# Patient Record
Sex: FEMALE | Race: WHITE | Employment: OTHER | URBAN - METROPOLITAN AREA
[De-identification: names, ages, dates, MRNs, and addresses within clinical notes are randomized per-mention and may not be internally consistent; named-entity substitution may affect disease eponyms.]

---

## 2017-04-25 ENCOUNTER — APPOINTMENT (OUTPATIENT)
Dept: CT IMAGING | Age: 72
End: 2017-04-25
Attending: STUDENT IN AN ORGANIZED HEALTH CARE EDUCATION/TRAINING PROGRAM
Payer: MEDICARE

## 2017-04-25 ENCOUNTER — HOSPITAL ENCOUNTER (EMERGENCY)
Age: 72
Discharge: HOME | End: 2017-04-25
Attending: STUDENT IN AN ORGANIZED HEALTH CARE EDUCATION/TRAINING PROGRAM
Payer: MEDICARE

## 2017-04-25 VITALS
SYSTOLIC BLOOD PRESSURE: 148 MMHG | TEMPERATURE: 97.8 F | HEART RATE: 93 BPM | HEIGHT: 63 IN | OXYGEN SATURATION: 96 % | DIASTOLIC BLOOD PRESSURE: 70 MMHG | BODY MASS INDEX: 31.89 KG/M2 | RESPIRATION RATE: 15 BRPM | WEIGHT: 180 LBS

## 2017-04-25 DIAGNOSIS — R10.31 ABDOMINAL PAIN, RIGHT LOWER QUADRANT: Primary | ICD-10-CM

## 2017-04-25 LAB
ALBUMIN SERPL BCP-MCNC: 4.1 G/DL (ref 3.5–5)
ALBUMIN/GLOB SERPL: 1 {RATIO} (ref 1.1–2.2)
ALP SERPL-CCNC: 110 U/L (ref 45–117)
ALT SERPL-CCNC: 36 U/L (ref 12–78)
ANION GAP BLD CALC-SCNC: 12 MMOL/L (ref 5–15)
APPEARANCE UR: CLEAR
AST SERPL W P-5'-P-CCNC: 42 U/L (ref 15–37)
BACTERIA URNS QL MICRO: NEGATIVE /HPF
BASOPHILS # BLD AUTO: 0 K/UL (ref 0–0.1)
BASOPHILS # BLD: 1 % (ref 0–1)
BILIRUB SERPL-MCNC: 0.4 MG/DL (ref 0.2–1)
BILIRUB UR QL: NEGATIVE
BUN SERPL-MCNC: 20 MG/DL (ref 6–20)
BUN/CREAT SERPL: 17 (ref 12–20)
CALCIUM SERPL-MCNC: 8.9 MG/DL (ref 8.5–10.1)
CHLORIDE SERPL-SCNC: 102 MMOL/L (ref 97–108)
CO2 SERPL-SCNC: 29 MMOL/L (ref 21–32)
COLOR UR: ABNORMAL
CREAT SERPL-MCNC: 1.2 MG/DL (ref 0.55–1.02)
EOSINOPHIL # BLD: 0.2 K/UL (ref 0–0.4)
EOSINOPHIL NFR BLD: 3 % (ref 0–7)
EPITH CASTS URNS QL MICRO: ABNORMAL /LPF
ERYTHROCYTE [DISTWIDTH] IN BLOOD BY AUTOMATED COUNT: 13.7 % (ref 11.5–14.5)
GLOBULIN SER CALC-MCNC: 4.2 G/DL (ref 2–4)
GLUCOSE SERPL-MCNC: 96 MG/DL (ref 65–100)
GLUCOSE UR STRIP.AUTO-MCNC: NEGATIVE MG/DL
HCT VFR BLD AUTO: 45.1 % (ref 35–47)
HGB BLD-MCNC: 14.8 G/DL (ref 11.5–16)
HGB UR QL STRIP: NEGATIVE
HYALINE CASTS URNS QL MICRO: ABNORMAL /LPF (ref 0–5)
KETONES UR QL STRIP.AUTO: NEGATIVE MG/DL
LEUKOCYTE ESTERASE UR QL STRIP.AUTO: NEGATIVE
LIPASE SERPL-CCNC: 149 U/L (ref 73–393)
LYMPHOCYTES # BLD AUTO: 27 % (ref 12–49)
LYMPHOCYTES # BLD: 2 K/UL (ref 0.8–3.5)
MCH RBC QN AUTO: 28 PG (ref 26–34)
MCHC RBC AUTO-ENTMCNC: 32.8 G/DL (ref 30–36.5)
MCV RBC AUTO: 85.3 FL (ref 80–99)
MONOCYTES # BLD: 0.8 K/UL (ref 0–1)
MONOCYTES NFR BLD AUTO: 11 % (ref 5–13)
NEUTS SEG # BLD: 4.3 K/UL (ref 1.8–8)
NEUTS SEG NFR BLD AUTO: 58 % (ref 32–75)
NITRITE UR QL STRIP.AUTO: NEGATIVE
PH UR STRIP: 5.5 [PH] (ref 5–8)
PLATELET # BLD AUTO: 299 K/UL (ref 150–400)
POTASSIUM SERPL-SCNC: 4.2 MMOL/L (ref 3.5–5.1)
PROT SERPL-MCNC: 8.3 G/DL (ref 6.4–8.2)
PROT UR STRIP-MCNC: NEGATIVE MG/DL
RBC # BLD AUTO: 5.29 M/UL (ref 3.8–5.2)
RBC #/AREA URNS HPF: ABNORMAL /HPF (ref 0–5)
SODIUM SERPL-SCNC: 143 MMOL/L (ref 136–145)
SP GR UR REFRACTOMETRY: 1.01 (ref 1–1.03)
UA: UC IF INDICATED,UAUC: ABNORMAL
UROBILINOGEN UR QL STRIP.AUTO: 0.2 EU/DL (ref 0.2–1)
WBC # BLD AUTO: 7.3 K/UL (ref 3.6–11)
WBC URNS QL MICRO: ABNORMAL /HPF (ref 0–4)

## 2017-04-25 PROCEDURE — 80053 COMPREHEN METABOLIC PANEL: CPT | Performed by: EMERGENCY MEDICINE

## 2017-04-25 PROCEDURE — 36415 COLL VENOUS BLD VENIPUNCTURE: CPT | Performed by: EMERGENCY MEDICINE

## 2017-04-25 PROCEDURE — 74011250636 HC RX REV CODE- 250/636: Performed by: STUDENT IN AN ORGANIZED HEALTH CARE EDUCATION/TRAINING PROGRAM

## 2017-04-25 PROCEDURE — 74177 CT ABD & PELVIS W/CONTRAST: CPT

## 2017-04-25 PROCEDURE — 99283 EMERGENCY DEPT VISIT LOW MDM: CPT

## 2017-04-25 PROCEDURE — 96374 THER/PROPH/DIAG INJ IV PUSH: CPT

## 2017-04-25 PROCEDURE — 83690 ASSAY OF LIPASE: CPT | Performed by: EMERGENCY MEDICINE

## 2017-04-25 PROCEDURE — 85025 COMPLETE CBC W/AUTO DIFF WBC: CPT | Performed by: EMERGENCY MEDICINE

## 2017-04-25 PROCEDURE — 81001 URINALYSIS AUTO W/SCOPE: CPT | Performed by: EMERGENCY MEDICINE

## 2017-04-25 PROCEDURE — 74011636320 HC RX REV CODE- 636/320: Performed by: RADIOLOGY

## 2017-04-25 PROCEDURE — 74011250637 HC RX REV CODE- 250/637: Performed by: STUDENT IN AN ORGANIZED HEALTH CARE EDUCATION/TRAINING PROGRAM

## 2017-04-25 RX ORDER — GLIPIZIDE AND METFORMIN HCL 2.5; 5 MG/1; MG/1
1 TABLET, FILM COATED ORAL
COMMUNITY
End: 2017-05-25

## 2017-04-25 RX ORDER — KETOROLAC TROMETHAMINE 30 MG/ML
15 INJECTION, SOLUTION INTRAMUSCULAR; INTRAVENOUS
Status: COMPLETED | OUTPATIENT
Start: 2017-04-25 | End: 2017-04-25

## 2017-04-25 RX ORDER — FUROSEMIDE 20 MG/1
TABLET ORAL DAILY
COMMUNITY
End: 2019-02-07

## 2017-04-25 RX ORDER — ONDANSETRON 4 MG/1
4 TABLET, ORALLY DISINTEGRATING ORAL
Qty: 6 TAB | Refills: 0 | Status: SHIPPED | OUTPATIENT
Start: 2017-04-25 | End: 2017-04-27

## 2017-04-25 RX ORDER — LOVASTATIN 10 MG/1
10 TABLET ORAL
COMMUNITY
End: 2020-01-13 | Stop reason: SDUPTHER

## 2017-04-25 RX ORDER — OXYCODONE AND ACETAMINOPHEN 5; 325 MG/1; MG/1
1 TABLET ORAL
Status: COMPLETED | OUTPATIENT
Start: 2017-04-25 | End: 2017-04-25

## 2017-04-25 RX ORDER — OXYCODONE AND ACETAMINOPHEN 5; 325 MG/1; MG/1
1 TABLET ORAL
Qty: 10 TAB | Refills: 0 | Status: SHIPPED | OUTPATIENT
Start: 2017-04-25 | End: 2017-04-27

## 2017-04-25 RX ADMIN — IOPAMIDOL 100 ML: 755 INJECTION, SOLUTION INTRAVENOUS at 15:41

## 2017-04-25 RX ADMIN — KETOROLAC TROMETHAMINE 15 MG: 30 INJECTION, SOLUTION INTRAMUSCULAR at 16:17

## 2017-04-25 RX ADMIN — OXYCODONE HYDROCHLORIDE AND ACETAMINOPHEN 1 TABLET: 5; 325 TABLET ORAL at 17:03

## 2017-04-25 NOTE — DISCHARGE INSTRUCTIONS
We hope that we have addressed all of your medical concerns. The examination and treatment you received in the Emergency Department were for an emergent problem and were not intended as complete care. It is important that you follow up with your healthcare provider(s) for ongoing care. If your symptoms worsen or do not improve as expected, and you are unable to reach your usual health care provider(s), you should return to the Emergency Department. Today's healthcare is undergoing tremendous change, and patient satisfaction surveys are one of the many tools to assess the quality of medical care. You may receive a survey from the [facility] regarding your experience in the Emergency Department. I hope that your experience has been completely positive, particularly the medical care that I provided. As such, please participate in the survey; anything less than excellent does not meet my expectations or intentions. [facility] and [employer] participate in nationally recognized quality of care measures. If your blood pressure is greater than 120/80, as reported below, we urge that you seek medical care to address the potential of high blood pressure, commonly known as hypertension. Hypertension can be hereditary or can be caused by certain medical conditions, pain, stress, or \"white coat syndrome. \"       Please make an appointment with your health care provider(s) for follow up of your Emergency Department visit. VITALS:   Patient Vitals for the past 8 hrs:   Temp Pulse Resp BP SpO2   04/25/17 1705 - - - 148/70 97 %   04/25/17 1420 97.8 °F (36.6 °C) 93 15 155/87 98 %          Thank you for allowing us to provide you with medical care today. We realize that you have many choices for your emergency care needs. Please choose us in the future for any continued health care needs. Tita Love, [address]   Office: [phone]            Recent Results (from the past 24 hour(s))   CBC WITH AUTOMATED DIFF    Collection Time: 04/25/17  2:39 PM   Result Value Ref Range    WBC 7.3 3.6 - 11.0 K/uL    RBC 5.29 (H) 3.80 - 5.20 M/uL    HGB 14.8 11.5 - 16.0 g/dL    HCT 45.1 35.0 - 47.0 %    MCV 85.3 80.0 - 99.0 FL    MCH 28.0 26.0 - 34.0 PG    MCHC 32.8 30.0 - 36.5 g/dL    RDW 13.7 11.5 - 14.5 %    PLATELET 784 122 - 138 K/uL    NEUTROPHILS 58 32 - 75 %    LYMPHOCYTES 27 12 - 49 %    MONOCYTES 11 5 - 13 %    EOSINOPHILS 3 0 - 7 %    BASOPHILS 1 0 - 1 %    ABS. NEUTROPHILS 4.3 1.8 - 8.0 K/UL    ABS. LYMPHOCYTES 2.0 0.8 - 3.5 K/UL    ABS. MONOCYTES 0.8 0.0 - 1.0 K/UL    ABS. EOSINOPHILS 0.2 0.0 - 0.4 K/UL    ABS. BASOPHILS 0.0 0.0 - 0.1 K/UL   METABOLIC PANEL, COMPREHENSIVE    Collection Time: 04/25/17  2:39 PM   Result Value Ref Range    Sodium 143 136 - 145 mmol/L    Potassium 4.2 3.5 - 5.1 mmol/L    Chloride 102 97 - 108 mmol/L    CO2 29 21 - 32 mmol/L    Anion gap 12 5 - 15 mmol/L    Glucose 96 65 - 100 mg/dL    BUN 20 6 - 20 MG/DL    Creatinine 1.20 (H) 0.55 - 1.02 MG/DL    BUN/Creatinine ratio 17 12 - 20      GFR est AA 54 (L) >60 ml/min/1.73m2    GFR est non-AA 45 (L) >60 ml/min/1.73m2    Calcium 8.9 8.5 - 10.1 MG/DL    Bilirubin, total 0.4 0.2 - 1.0 MG/DL    ALT (SGPT) 36 12 - 78 U/L    AST (SGOT) 42 (H) 15 - 37 U/L    Alk.  phosphatase 110 45 - 117 U/L    Protein, total 8.3 (H) 6.4 - 8.2 g/dL    Albumin 4.1 3.5 - 5.0 g/dL    Globulin 4.2 (H) 2.0 - 4.0 g/dL    A-G Ratio 1.0 (L) 1.1 - 2.2     LIPASE    Collection Time: 04/25/17  2:39 PM   Result Value Ref Range    Lipase 149 73 - 393 U/L   URINALYSIS W/ REFLEX CULTURE    Collection Time: 04/25/17  2:39 PM   Result Value Ref Range    Color YELLOW/STRAW      Appearance CLEAR CLEAR      Specific gravity 1.013 1.003 - 1.030      pH (UA) 5.5 5.0 - 8.0      Protein NEGATIVE  NEG mg/dL    Glucose NEGATIVE  NEG mg/dL    Ketone NEGATIVE  NEG mg/dL Bilirubin NEGATIVE  NEG      Blood NEGATIVE  NEG      Urobilinogen 0.2 0.2 - 1.0 EU/dL    Nitrites NEGATIVE  NEG      Leukocyte Esterase NEGATIVE  NEG      WBC 0-4 0 - 4 /hpf    RBC 0-5 0 - 5 /hpf    Epithelial cells MODERATE (A) FEW /lpf    Bacteria NEGATIVE  NEG /hpf    UA:UC IF INDICATED CULTURE NOT INDICATED BY UA RESULT CNI      Hyaline cast 2-5 0 - 5 /lpf       Ct Abd Pelv W Cont    Result Date: 4/25/2017  EXAM:  CT ABDOMEN PELVIS WITH CONTRAST INDICATION:  RLQ Pain COMPARISON: 3/10/2013. CONTRAST:  100 mL of Isovue-370. TECHNIQUE:  Multislice helical CT was performed from the diaphragm to the symphysis pubis during uneventful rapid bolus intravenous contrast administration. Oral contrast was not administered. Contiguous 5 mm axial images were reconstructed and lung and soft tissue windows were generated. Coronal and sagittal reformations were generated. CT dose reduction was achieved through use of a standardized protocol tailored for this examination and automatic exposure control for dose modulation. FINDINGS: The visualized portions of the lung bases are clear. There is a right breast implant. There are no focal abnormalities within the spleen, pancreas, or adrenal glands. There is an unchanged hepatic cyst. The liver otherwise appears unremarkable. . The gallbladder appears unremarkable. There is no biliary dilatation. The aorta tapers without aneurysm. There is no retroperitoneal adenopathy or mass. There is no bowel dilatation or bowel wall thickening. There is distal colonic diverticulosis. . The appendix is not clearly seen but is not dilated. There is no ascites or free intraperitoneal air. The uterus and adnexal regions are unremarkable. Images of the pelvis are degraded by streak artifact from the patient's left hip prosthesis. .   There is no pelvic mass or adenopathy. Scoliosis and degenerative changes are present in the spine. IMPRESSION: No acute findings in the abdomen or pelvis.  Colonic diverticulosis. .            Abdominal Pain: Care Instructions  Your Care Instructions    Abdominal pain has many possible causes. Some aren't serious and get better on their own in a few days. Others need more testing and treatment. If your pain continues or gets worse, you need to be rechecked and may need more tests to find out what is wrong. You may need surgery to correct the problem. Don't ignore new symptoms, such as fever, nausea and vomiting, urination problems, pain that gets worse, and dizziness. These may be signs of a more serious problem. Your doctor may have recommended a follow-up visit in the next 8 to 12 hours. If you are not getting better, you may need more tests or treatment. The doctor has checked you carefully, but problems can develop later. If you notice any problems or new symptoms, get medical treatment right away. Follow-up care is a key part of your treatment and safety. Be sure to make and go to all appointments, and call your doctor if you are having problems. It's also a good idea to know your test results and keep a list of the medicines you take. How can you care for yourself at home? · Rest until you feel better. · To prevent dehydration, drink plenty of fluids, enough so that your urine is light yellow or clear like water. Choose water and other caffeine-free clear liquids until you feel better. If you have kidney, heart, or liver disease and have to limit fluids, talk with your doctor before you increase the amount of fluids you drink. · If your stomach is upset, eat mild foods, such as rice, dry toast or crackers, bananas, and applesauce. Try eating several small meals instead of two or three large ones. · Wait until 48 hours after all symptoms have gone away before you have spicy foods, alcohol, and drinks that contain caffeine. · Do not eat foods that are high in fat.   · Avoid anti-inflammatory medicines such as aspirin, ibuprofen (Advil, Motrin), and naproxen (Aleve). These can cause stomach upset. Talk to your doctor if you take daily aspirin for another health problem. When should you call for help? Call 911 anytime you think you may need emergency care. For example, call if:  · You passed out (lost consciousness). · You pass maroon or very bloody stools. · You vomit blood or what looks like coffee grounds. · You have new, severe belly pain. Call your doctor now or seek immediate medical care if:  · Your pain gets worse, especially if it becomes focused in one area of your belly. · You have a new or higher fever. · Your stools are black and look like tar, or they have streaks of blood. · You have unexpected vaginal bleeding. · You have symptoms of a urinary tract infection. These may include:  ¨ Pain when you urinate. ¨ Urinating more often than usual.  ¨ Blood in your urine. · You are dizzy or lightheaded, or you feel like you may faint. Watch closely for changes in your health, and be sure to contact your doctor if:  · You are not getting better after 1 day (24 hours). Where can you learn more? Go to http://bib-amanda.info/. Enter G792 in the search box to learn more about \"Abdominal Pain: Care Instructions. \"  Current as of: May 27, 2016  Content Version: 11.2  © 5172-1508 [employer]. Care instructions adapted under license by [employer] (which disclaims liability or warranty for this information). If you have questions about a medical condition or this instruction, always ask your healthcare professional. [place] [address] any warranty or liability for your use of this information.

## 2017-04-25 NOTE — ED PROVIDER NOTES
HPI Comments: 73 y.o. female with past medical history significant for HTN, DM, breast cancer, high cholesterol, bronchitis, and obesity who presents to the ED with chief complaint of abdominal pain. Pt reports RLQ abdominal pain onset yesterday that worsened this morning and is accompanied by nausea and decreased appetite, rates pain 8-9/10. Pt states her abdominal pain improves when she is still. Pt states she has hx of right oophorectomy but denies hx of appendectomy. Pt states she has hx of left breast cancer, says she has hx of left mastectomy but denies any radiation or chemo treatments. Pt states she has hx of DM and takes Glipizide. Pt states she has hx of left hip replacement. Pt states she has been having regular BM's. Pt denies fever, chills, or constipation. There are no other acute medical complaints voiced at this time. Social Hx: Occasional EtOH use. Denies smoking tobacco or use of drugs. PCP: Chavo Hicks MD    Note written by Ashish Olivarez, as dictated by Jolly Douglas MD 3:12 PM     The history is provided by the patient. Past Medical History:   Diagnosis Date    Bronchitis     [facility] ([facility] [address]     breast    Diabetes ([facility] [address]     High cholesterol     Hypertension     Obesity (BMI 30-39. 9)        Past Surgical History:   Procedure Laterality Date    BREAST SURGERY PROCEDURE UNLISTED      L mastectomy    HX GYN      ablation, R ovary removed, tubal ligation    HX HAMMER TOE REPAIR      HX HEENT      tonsillectomy    HX ORTHOPAEDIC      L hip, R leg, C4-C5 fusion, L foot         Family History:   Problem Relation Age of Onset    Heart Attack Father     Diabetes Mother        Social History     Social History    Marital status: SINGLE     Spouse name: N/A    Number of children: N/A    Years of education: N/A     Occupational History    Not on file.      Social History Main Topics    Smoking status: Never Smoker    Smokeless tobacco: Not on file    Alcohol use Yes      Comment: seldom    Drug use: No    Sexual activity: Not on file     Other Topics Concern    Not on file     Social History Narrative         ALLERGIES: Crestor [rosuvastatin]; Lipitor [atorvastatin]; and Xarelto [rivaroxaban]    Review of Systems   Constitutional: Positive for appetite change (decreased). Negative for activity change, chills, diaphoresis, fatigue and fever. HENT: Negative for congestion and sore throat. Eyes: Negative for photophobia and visual disturbance. Respiratory: Negative for chest tightness and shortness of breath. Cardiovascular: Negative for chest pain, palpitations and leg swelling. Gastrointestinal: Positive for abdominal pain (RLQ) and nausea. Negative for blood in stool, constipation, diarrhea and vomiting. Genitourinary: Negative for difficulty urinating, dysuria, flank pain, frequency and hematuria. Musculoskeletal: Negative for back pain. Neurological: Negative for dizziness, syncope, numbness and headaches. All other systems reviewed and are negative. Vitals:    04/25/17 1420   BP: 155/87   Pulse: 93   Resp: 15   Temp: 97.8 °F (36.6 °C)   SpO2: 98%   Weight: 81.6 kg (180 lb)   Height: 5' 3\" (1.6 m)            Physical Exam   Constitutional: She is oriented to person, place, and time. She appears well-developed and well-nourished. No distress. HENT:   Head: Normocephalic and atraumatic. Nose: Nose normal.   Mouth/Throat: Oropharynx is clear and moist. No oropharyngeal exudate. Eyes: Conjunctivae and EOM are normal. Right eye exhibits no discharge. Left eye exhibits no discharge. No scleral icterus. Neck: Normal range of motion. Neck supple. No JVD present. No tracheal deviation present. No thyromegaly present. Cardiovascular: Normal rate, regular rhythm, normal heart sounds and intact distal pulses. Exam reveals no gallop and no friction rub. No murmur heard. Pulmonary/Chest: Effort normal and breath sounds normal. No stridor.  No respiratory distress. She has no wheezes. She has no rales. She exhibits no tenderness. Abdominal: Bowel sounds are normal. She exhibits no distension and no mass. There is tenderness (RLQ). There is no rebound. Musculoskeletal: Normal range of motion. She exhibits no edema or tenderness. Lymphadenopathy:     She has no cervical adenopathy. Neurological: She is alert and oriented to person, place, and time. No cranial nerve deficit. Coordination normal.   Skin: Skin is warm and dry. No rash noted. She is not diaphoretic. No erythema. No pallor. Psychiatric: She has a normal mood and affect. Her behavior is normal. Judgment and thought content normal.   Nursing note and vitals reviewed.      Note written by Ashish Duncan, as dictated by Love Hi MD 3:13 PM    MDM  Number of Diagnoses or Management Options  Abdominal pain, right lower quadrant:      Amount and/or Complexity of Data Reviewed  Clinical lab tests: ordered and reviewed  Tests in the radiology section of CPT®: ordered and reviewed  Review and summarize past medical records: yes    Risk of Complications, Morbidity, and/or Mortality  Presenting problems: moderate  Diagnostic procedures: moderate  Management options: moderate    Patient Progress  Patient progress: stable    ED Course       Procedures

## 2017-04-25 NOTE — ED TRIAGE NOTES
Patient arrives with the complaint of RLQ abdominal pain and nausea since yesterday. Denies any dysuria, hematuria, blood in stool, fevers, diarrhea.

## 2017-04-25 NOTE — ED NOTES
Assumed care of patient. Introduced self to patient and discussed plan of care and expected timeline of patients stay. Patient advised that medical information would be discussed during their stay and it is their responsibility to excuse any family or friends they do not want present. Will continue to monitor patient and update them on the plan of care.

## 2017-04-30 ENCOUNTER — HOSPITAL ENCOUNTER (EMERGENCY)
Age: 72
Discharge: HOME | End: 2017-04-30
Attending: EMERGENCY MEDICINE | Admitting: EMERGENCY MEDICINE
Payer: MEDICARE

## 2017-04-30 ENCOUNTER — APPOINTMENT (OUTPATIENT)
Dept: CT IMAGING | Age: 72
End: 2017-04-30
Attending: EMERGENCY MEDICINE
Payer: MEDICARE

## 2017-04-30 VITALS
TEMPERATURE: 98.2 F | SYSTOLIC BLOOD PRESSURE: 122 MMHG | WEIGHT: 180 LBS | BODY MASS INDEX: 31.89 KG/M2 | HEART RATE: 74 BPM | RESPIRATION RATE: 16 BRPM | OXYGEN SATURATION: 95 % | HEIGHT: 63 IN | DIASTOLIC BLOOD PRESSURE: 74 MMHG

## 2017-04-30 DIAGNOSIS — M48.061 SPINAL STENOSIS OF LUMBAR REGION: Primary | ICD-10-CM

## 2017-04-30 DIAGNOSIS — R10.31 RLQ ABDOMINAL PAIN: ICD-10-CM

## 2017-04-30 DIAGNOSIS — M54.16 LUMBAR RADICULOPATHY, ACUTE: ICD-10-CM

## 2017-04-30 DIAGNOSIS — T14.8XXA MUSCLE STRAIN: ICD-10-CM

## 2017-04-30 LAB
ALBUMIN SERPL BCP-MCNC: 3.7 G/DL (ref 3.5–5)
ALBUMIN/GLOB SERPL: 1 {RATIO} (ref 1.1–2.2)
ALP SERPL-CCNC: 89 U/L (ref 45–117)
ALT SERPL-CCNC: 28 U/L (ref 12–78)
ANION GAP BLD CALC-SCNC: 8 MMOL/L (ref 5–15)
APPEARANCE UR: CLEAR
AST SERPL W P-5'-P-CCNC: 28 U/L (ref 15–37)
BACTERIA URNS QL MICRO: NEGATIVE /HPF
BASOPHILS # BLD AUTO: 0 K/UL (ref 0–0.1)
BASOPHILS # BLD: 1 % (ref 0–1)
BILIRUB SERPL-MCNC: 0.5 MG/DL (ref 0.2–1)
BILIRUB UR QL: NEGATIVE
BUN SERPL-MCNC: 21 MG/DL (ref 6–20)
BUN/CREAT SERPL: 20 (ref 12–20)
CALCIUM SERPL-MCNC: 8.8 MG/DL (ref 8.5–10.1)
CHLORIDE SERPL-SCNC: 105 MMOL/L (ref 97–108)
CO2 SERPL-SCNC: 27 MMOL/L (ref 21–32)
COLOR UR: ABNORMAL
CREAT SERPL-MCNC: 1.04 MG/DL (ref 0.55–1.02)
EOSINOPHIL # BLD: 0.3 K/UL (ref 0–0.4)
EOSINOPHIL NFR BLD: 4 % (ref 0–7)
EPITH CASTS URNS QL MICRO: ABNORMAL /LPF
ERYTHROCYTE [DISTWIDTH] IN BLOOD BY AUTOMATED COUNT: 13.4 % (ref 11.5–14.5)
GLOBULIN SER CALC-MCNC: 3.8 G/DL (ref 2–4)
GLUCOSE SERPL-MCNC: 105 MG/DL (ref 65–100)
GLUCOSE UR STRIP.AUTO-MCNC: NEGATIVE MG/DL
HCT VFR BLD AUTO: 40.7 % (ref 35–47)
HGB BLD-MCNC: 14.2 G/DL (ref 11.5–16)
HGB UR QL STRIP: NEGATIVE
HYALINE CASTS URNS QL MICRO: ABNORMAL /LPF (ref 0–5)
KETONES UR QL STRIP.AUTO: NEGATIVE MG/DL
LEUKOCYTE ESTERASE UR QL STRIP.AUTO: NEGATIVE
LYMPHOCYTES # BLD AUTO: 26 % (ref 12–49)
LYMPHOCYTES # BLD: 2 K/UL (ref 0.8–3.5)
MCH RBC QN AUTO: 28.6 PG (ref 26–34)
MCHC RBC AUTO-ENTMCNC: 34.9 G/DL (ref 30–36.5)
MCV RBC AUTO: 81.9 FL (ref 80–99)
MONOCYTES # BLD: 0.7 K/UL (ref 0–1)
MONOCYTES NFR BLD AUTO: 9 % (ref 5–13)
NEUTS SEG # BLD: 4.7 K/UL (ref 1.8–8)
NEUTS SEG NFR BLD AUTO: 60 % (ref 32–75)
NITRITE UR QL STRIP.AUTO: NEGATIVE
PH UR STRIP: 7 [PH] (ref 5–8)
PLATELET # BLD AUTO: 292 K/UL (ref 150–400)
POTASSIUM SERPL-SCNC: 4.9 MMOL/L (ref 3.5–5.1)
PROT SERPL-MCNC: 7.5 G/DL (ref 6.4–8.2)
PROT UR STRIP-MCNC: NEGATIVE MG/DL
RBC # BLD AUTO: 4.97 M/UL (ref 3.8–5.2)
RBC #/AREA URNS HPF: ABNORMAL /HPF (ref 0–5)
SODIUM SERPL-SCNC: 140 MMOL/L (ref 136–145)
SP GR UR REFRACTOMETRY: 1.01 (ref 1–1.03)
UROBILINOGEN UR QL STRIP.AUTO: 0.2 EU/DL (ref 0.2–1)
WBC # BLD AUTO: 7.7 K/UL (ref 3.6–11)
WBC URNS QL MICRO: ABNORMAL /HPF (ref 0–4)

## 2017-04-30 PROCEDURE — 96361 HYDRATE IV INFUSION ADD-ON: CPT

## 2017-04-30 PROCEDURE — 77030005563 HC CATH URETH INT MMGH -A

## 2017-04-30 PROCEDURE — 74011636320 HC RX REV CODE- 636/320: Performed by: EMERGENCY MEDICINE

## 2017-04-30 PROCEDURE — 80053 COMPREHEN METABOLIC PANEL: CPT | Performed by: EMERGENCY MEDICINE

## 2017-04-30 PROCEDURE — 74011250636 HC RX REV CODE- 250/636: Performed by: EMERGENCY MEDICINE

## 2017-04-30 PROCEDURE — 99283 EMERGENCY DEPT VISIT LOW MDM: CPT

## 2017-04-30 PROCEDURE — 51701 INSERT BLADDER CATHETER: CPT

## 2017-04-30 PROCEDURE — 36415 COLL VENOUS BLD VENIPUNCTURE: CPT | Performed by: EMERGENCY MEDICINE

## 2017-04-30 PROCEDURE — 74177 CT ABD & PELVIS W/CONTRAST: CPT

## 2017-04-30 PROCEDURE — 96375 TX/PRO/DX INJ NEW DRUG ADDON: CPT

## 2017-04-30 PROCEDURE — 81001 URINALYSIS AUTO W/SCOPE: CPT | Performed by: EMERGENCY MEDICINE

## 2017-04-30 PROCEDURE — 85025 COMPLETE CBC W/AUTO DIFF WBC: CPT | Performed by: EMERGENCY MEDICINE

## 2017-04-30 PROCEDURE — 96374 THER/PROPH/DIAG INJ IV PUSH: CPT

## 2017-04-30 RX ORDER — DEXAMETHASONE SODIUM PHOSPHATE 10 MG/ML
4 INJECTION INTRAMUSCULAR; INTRAVENOUS
Status: COMPLETED | OUTPATIENT
Start: 2017-04-30 | End: 2017-04-30

## 2017-04-30 RX ORDER — DIAZEPAM 5 MG/1
5 TABLET ORAL
Qty: 10 TAB | Refills: 0 | Status: SHIPPED | OUTPATIENT
Start: 2017-04-30 | End: 2017-05-25

## 2017-04-30 RX ORDER — CARVEDILOL 3.12 MG/1
3.12 TABLET ORAL 2 TIMES DAILY WITH MEALS
COMMUNITY
End: 2019-03-01 | Stop reason: SDUPTHER

## 2017-04-30 RX ORDER — METHYLPREDNISOLONE 4 MG/1
TABLET ORAL
Qty: 1 DOSE PACK | Refills: 0 | Status: SHIPPED | OUTPATIENT
Start: 2017-04-30 | End: 2017-05-25

## 2017-04-30 RX ORDER — MORPHINE SULFATE 4 MG/ML
4 INJECTION, SOLUTION INTRAMUSCULAR; INTRAVENOUS ONCE
Status: COMPLETED | OUTPATIENT
Start: 2017-04-30 | End: 2017-04-30

## 2017-04-30 RX ADMIN — DEXAMETHASONE SODIUM PHOSPHATE 4 MG: 10 INJECTION, SOLUTION INTRAMUSCULAR; INTRAVENOUS at 14:06

## 2017-04-30 RX ADMIN — SODIUM CHLORIDE 1000 ML: 900 INJECTION, SOLUTION INTRAVENOUS at 11:35

## 2017-04-30 RX ADMIN — IOPAMIDOL 100 ML: 755 INJECTION, SOLUTION INTRAVENOUS at 12:50

## 2017-04-30 RX ADMIN — Medication 4 MG: at 11:32

## 2017-04-30 NOTE — ED PROVIDER NOTES
HPI Comments: 73 y.o. female with past medical history significant for DM, HTN, elevated cholesterol, breast CA, mastectomy, obesity, bronchitis, tonsillectomy, cervical fusion who presents accompanied by relative with chief complaint of RLQ abdominal pain. Patient states she has had RLQ abdominal pain and right flank pain for the past ~6 days. Patient was seen for this in ED 5 days ago where she had CT showing diverticuloses that was not infected and denies being rx abx. Patient notes pain has not improved and she complains of exacerbation in pain with sitting. In ED, patient claims her pain is \"10/10\". She reports taking Aleve with little relief. Patient denies any recent fall or injury. She denies any hx of kidney stones. Patient denies nausea, vomiting, diarrhea, constipation, urinary problems, leg pain, bowel incontinence, groin pain. There are no other acute medical concerns at this time. Social hx: non-smoker. +ETOH use; rare    PCP: Maliha Gaytan MD    Note written by Consuelo Fay. [place] Jimbo, as dictated by Tiffanie Shanks MD 11:21 AM      The history is provided by the patient. No  was used. Past Medical History:   Diagnosis Date    Bronchitis     Cancer ([address]     breast    Diabetes ([address]     High cholesterol     Hypertension     Obesity (BMI 30-39. 9)        Past Surgical History:   Procedure Laterality Date    BREAST SURGERY PROCEDURE UNLISTED      L mastectomy    HX GYN      ablation, R ovary removed, tubal ligation    HX HAMMER TOE REPAIR      HX HEENT      tonsillectomy    HX ORTHOPAEDIC      L hip, R leg, C4-C5 fusion, L foot         Family History:   Problem Relation Age of Onset    Heart Attack Father     Diabetes Mother        Social History     Social History    Marital status: SINGLE     Spouse name: N/A    Number of children: N/A    Years of education: N/A     Occupational History    Not on file.      Social History Main Topics    Smoking status: Never Smoker    Smokeless tobacco: Not on file    Alcohol use Yes      Comment: seldom    Drug use: No    Sexual activity: Not on file     Other Topics Concern    Not on file     Social History Narrative         ALLERGIES: Crestor [rosuvastatin]; Lipitor [atorvastatin]; and Xarelto [rivaroxaban]    Review of Systems   Constitutional: Negative for chills and fever. HENT: Negative for congestion. Respiratory: Negative for cough and shortness of breath. Cardiovascular: Negative for chest pain. Gastrointestinal: Positive for abdominal pain (RLQ). Negative for constipation, diarrhea, nausea and vomiting. Genitourinary: Positive for flank pain. Negative for difficulty urinating, dysuria and hematuria. Skin: Negative for rash. All other systems reviewed and are negative. Vitals:    04/30/17 1101 04/30/17 1215   BP: (!) 136/91 126/58   Pulse: 87    Resp: 16    Temp: 98.2 °F (36.8 °C)    SpO2: 96% 95%   Weight: 81.6 kg (180 lb)    Height: 5' 3\" (1.6 m)             Physical Exam   Constitutional: She is oriented to person, place, and time. She appears well-developed and well-nourished. No distress. HENT:   Head: Normocephalic and atraumatic. Eyes: Conjunctivae and EOM are normal. Pupils are equal, round, and reactive to light. Neck: Normal range of motion. Cardiovascular: Normal rate, regular rhythm, normal heart sounds and intact distal pulses. Exam reveals no friction rub. No murmur heard. Pulmonary/Chest: Effort normal and breath sounds normal. No respiratory distress. She has no wheezes. She has no rales. She exhibits no tenderness. Abdominal: Soft. Bowel sounds are normal. She exhibits no distension. There is tenderness. There is guarding. There is no rebound. rlq ttp   Musculoskeletal: Normal range of motion. No midline T or L spine ttp or step off   Neurological: She is alert and oriented to person, place, and time. Coordination normal.   Skin: Skin is warm and dry.  She is not diaphoretic. No pallor. Psychiatric: She has a normal mood and affect. Her behavior is normal.   Nursing note and vitals reviewed. MDM  Number of Diagnoses or Management Options  Lumbar radiculopathy, acute:   Muscle strain:   RLQ abdominal pain:   Spinal stenosis of lumbar region:   Diagnosis management comments: Appendicitis vs radicular pain from lumbar herniated disc vs muscular strain       Amount and/or Complexity of Data Reviewed  Clinical lab tests: ordered and reviewed  Tests in the radiology section of CPT®: ordered and reviewed    Patient Progress  Patient progress: stable    ED Course       Procedures    PROGRESS NOTE:  1:25 PM  Attempted to review  but was unable to find patient in system. 1:38 PM  spokewith pt appendix nl here today. ? l1 radicular pain vs muscle strain. Taking flexeril with no relief. Will switch and told pt to stop aleve and will rx steroid pack. She will follow up with Dr Jesus Ramos who she taylor sees.  Last steroid injection was in december

## 2017-04-30 NOTE — DISCHARGE INSTRUCTIONS
We hope that we have addressed all of your medical concerns. The examination and treatment you received in the Emergency Department were for an emergent problem and were not intended as complete care. It is important that you follow up with your healthcare provider(s) for ongoing care. If your symptoms worsen or do not improve as expected, and you are unable to reach your usual health care provider(s), you should return to the Emergency Department. Today's healthcare is undergoing tremendous change, and patient satisfaction surveys are one of the many tools to assess the quality of medical care. You may receive a survey from the [employer] regarding your experience in the Emergency Department. I hope that your experience has been completely positive, particularly the medical care that I provided. As such, please participate in the survey; anything less than excellent does not meet my expectations or intentions. [facility] and 8 [facility] participate in nationally recognized quality of care measures. If your blood pressure is greater than 120/80, as reported below, we urge that you seek medical care to address the potential of high blood pressure, commonly known as hypertension. Hypertension can be hereditary or can be caused by certain medical conditions, pain, stress, or \"white coat syndrome. \"       Please make an appointment with your health care provider(s) for follow up of your Emergency Department visit. VITALS:   Patient Vitals for the past 8 hrs:   Temp Pulse Resp BP SpO2   04/30/17 1215 - - - 126/58 95 %   04/30/17 1101 98.2 °F (36.8 °C) 87 16 (!) 136/91 96 %          Thank you for allowing us to provide you with medical care today. We realize that you have many choices for your emergency care needs. Please choose us in the future for any continued health care needs.       Shruthi Blair MD    [address] [facility] Emergency [place]: [phone]            Recent Results (from the past 24 hour(s))   CBC WITH AUTOMATED DIFF    Collection Time: 04/30/17 11:24 AM   Result Value Ref Range    WBC 7.7 3.6 - 11.0 K/uL    RBC 4.97 3.80 - 5.20 M/uL    HGB 14.2 11.5 - 16.0 g/dL    HCT 40.7 35.0 - 47.0 %    MCV 81.9 80.0 - 99.0 FL    MCH 28.6 26.0 - 34.0 PG    MCHC 34.9 30.0 - 36.5 g/dL    RDW 13.4 11.5 - 14.5 %    PLATELET 396 399 - 030 K/uL    NEUTROPHILS 60 32 - 75 %    LYMPHOCYTES 26 12 - 49 %    MONOCYTES 9 5 - 13 %    EOSINOPHILS 4 0 - 7 %    BASOPHILS 1 0 - 1 %    ABS. NEUTROPHILS 4.7 1.8 - 8.0 K/UL    ABS. LYMPHOCYTES 2.0 0.8 - 3.5 K/UL    ABS. MONOCYTES 0.7 0.0 - 1.0 K/UL    ABS. EOSINOPHILS 0.3 0.0 - 0.4 K/UL    ABS. BASOPHILS 0.0 0.0 - 0.1 K/UL   METABOLIC PANEL, COMPREHENSIVE    Collection Time: 04/30/17 11:24 AM   Result Value Ref Range    Sodium 140 136 - 145 mmol/L    Potassium 4.9 3.5 - 5.1 mmol/L    Chloride 105 97 - 108 mmol/L    CO2 27 21 - 32 mmol/L    Anion gap 8 5 - 15 mmol/L    Glucose 105 (H) 65 - 100 mg/dL    BUN 21 (H) 6 - 20 MG/DL    Creatinine 1.04 (H) 0.55 - 1.02 MG/DL    BUN/Creatinine ratio 20 12 - 20      GFR est AA >60 >60 ml/min/1.73m2    GFR est non-AA 53 (L) >60 ml/min/1.73m2    Calcium 8.8 8.5 - 10.1 MG/DL    Bilirubin, total 0.5 0.2 - 1.0 MG/DL    ALT (SGPT) 28 12 - 78 U/L    AST (SGOT) 28 15 - 37 U/L    Alk. phosphatase 89 45 - 117 U/L    Protein, total 7.5 6.4 - 8.2 g/dL    Albumin 3.7 3.5 - 5.0 g/dL    Globulin 3.8 2.0 - 4.0 g/dL    A-G Ratio 1.0 (L) 1.1 - 2.2         Ct Abd Pelv W Cont    Result Date: 4/30/2017  INDICATION: rlq pain please eval for appendicitis versus upper lumbar radiculopathy COMPARISON: CT 4/25/2017 TECHNIQUE: Following the uneventful intravenous administration of 100 cc Isovue-370, thin axial images were obtained through the abdomen and pelvis. Coronal and sagittal reconstructions were generated. Oral contrast was not administered.  CT dose reduction was achieved through use of a standardized protocol tailored for this examination and automatic exposure control for dose modulation. FINDINGS: LUNG BASES: Mild bibasilar atelectasis INCIDENTALLY IMAGED HEART AND MEDIASTINUM: A right breast implant is partially visualized. LIVER: Unchanged cyst in the right lobe of the liver GALLBLADDER: Unremarkable. SPLEEN: No mass. PANCREAS: No mass or ductal dilatation. ADRENALS: Unremarkable. KIDNEYS: No mass, calculus, or hydronephrosis. STOMACH: Unremarkable. SMALL BOWEL: No dilatation or wall thickening. COLON: No dilatation or wall thickening. Of note, the cecum is in the left upper quadrant, but is not abnormally distended. APPENDIX: Unremarkable. PERITONEUM: No ascites or pneumoperitoneum. RETROPERITONEUM: Atherosclerosis of the aorta without aneurysm REPRODUCTIVE ORGANS: Obscured by metallic artifact from the left hip replacement URINARY BLADDER: No mass or calculus. BONES: No destructive bone lesion. ADDITIONAL COMMENTS: Moderate to severe degenerative spondylosis throughout the spine. Grade 1 anterolisthesis at L4-L5. Degenerative dextroscoliosis. IMPRESSION: No acute abdominal or pelvic pathology. No evidence of appendicitis. Severe degenerative spondylosis of the lumbar spine, with severe central canal stenosis at L4-L5 and moderate to severe central canal stenosis at L3-L4. Back Pain: Care Instructions  Your Care Instructions    Back pain has many possible causes. It is often related to problems with muscles and ligaments of the back. It may also be related to problems with the nerves, discs, or bones of the back. Moving, lifting, standing, sitting, or sleeping in an awkward way can strain the back. Sometimes you don't notice the injury until later. Arthritis is another common cause of back pain. Although it may hurt a lot, back pain usually improves on its own within several weeks. Most people recover in 12 weeks or less.  Using good home treatment and being careful not to stress your back can help you feel better sooner. Follow-up care is a key part of your treatment and safety. Be sure to make and go to all appointments, and call your doctor if you are having problems. Its also a good idea to know your test results and keep a list of the medicines you take. How can you care for yourself at home? · Sit or lie in positions that are most comfortable and reduce your pain. Try one of these positions when you lie down:  ¨ Lie on your back with your knees bent and supported by large pillows. ¨ Lie on the floor with your legs on the seat of a sofa or chair. Vearl Prader on your side with your knees and hips bent and a pillow between your legs. ¨ Lie on your stomach if it does not make pain worse. · Do not sit up in bed, and avoid soft couches and twisted positions. Bed rest can help relieve pain at first, but it delays healing. Avoid bed rest after the first day of back pain. · Change positions every 30 minutes. If you must sit for long periods of time, take breaks from sitting. Get up and walk around, or lie in a comfortable position. · Try using a heating pad on a low or medium setting for 15 to 20 minutes every 2 or 3 hours. Try a warm shower in place of one session with the heating pad. · You can also try an ice pack for 10 to 15 minutes every 2 to 3 hours. Put a thin cloth between the ice pack and your skin. · Take pain medicines exactly as directed. ¨ If the doctor gave you a prescription medicine for pain, take it as prescribed. ¨ If you are not taking a prescription pain medicine, ask your doctor if you can take an over-the-counter medicine. · Take short walks several times a day. You can start with 5 to 10 minutes, 3 or 4 times a day, and work up to longer walks. Walk on level surfaces and avoid hills and stairs until your back is better. · Return to work and other activities as soon as you can.  Continued rest without activity is usually not good for your back. · To prevent future back pain, do exercises to stretch and strengthen your back and stomach. Learn how to use good posture, safe lifting techniques, and proper body mechanics. When should you call for help? Call your doctor now or seek immediate medical care if:  · You have new or worsening numbness in your legs. · You have new or worsening weakness in your legs. (This could make it hard to stand up.)  · You lose control of your bladder or bowels. Watch closely for changes in your health, and be sure to contact your doctor if:  · Your pain gets worse. · You are not getting better after 2 weeks. Where can you learn more? Go to http://bib-amanda.info/. Enter U485 in the search box to learn more about \"Back Pain: Care Instructions. \"  Current as of: May 23, 2016  Content Version: 11.2  © 6075-6354 [employer]. Care instructions adapted under license by "[employer] (which disclaims liability or warranty for this information). If you have questions about a medical condition or this instruction, always ask your healthcare professional. [place] [address] any warranty or liability for your use of this information. Lumbar Stenosis: Care Instructions  Your Care Instructions    Stenosis in the spine is a narrowing of the canal that is around the spinal cord and nerve roots in your back. It can happen as part of aging. Sometimes bone and other tissue grow into this canal and press on the spinal nerves. This can cause pain, numbness, and weakness. When it happens in the lower part of your back, it is called lumbar stenosis. Lumbar stenosis can cause problems in the legs, feet, and rear end (buttocks). You may be able to relieve symptoms of lumbar stenosis with pain medicine. Your doctor may suggest physical therapy and exercises to keep your spine strong and flexible. Some people try cortisone shots to reduce swelling.  If pain and numbness in your legs are still so bad that you cannot do your normal activities, you may need surgery. Follow-up care is a key part of your treatment and safety. Be sure to make and go to all appointments, and call your doctor if you are having problems. It's also a good idea to know your test results and keep a list of the medicines you take. How can you care for yourself at home? · Take an over-the-counter pain medicine, such as acetaminophen (Tylenol), ibuprofen (Advil, Motrin), or naproxen (Aleve). Read and follow all instructions on the label. · Do not take two or more pain medicines at the same time unless the doctor told you to. Many pain medicines have acetaminophen, which is Tylenol. Too much acetaminophen (Tylenol) can be harmful. · Stay at a healthy weight. Being overweight puts extra strain on your spine. · Change positions often when you sit or stand. This can ease pain. For example, lean forward. This may reduce pressure on the spinal cord and its nerves. · Avoid doing things that make your symptoms worse. Walking downhill and standing for a long time may cause pain. · Stretch and strengthen your back muscles as your doctor or physical therapist recommends. If your doctor says it is okay to do them, these exercises may help. ¨ Lie on your back with your knees bent. Gently pull one bent knee to your chest. Put that foot back on the floor, and then pull the other knee to your chest.  ¨ Do pelvic tilts. Lie on your back with your knees bent. Tighten your stomach muscles. Pull your belly button (navel) in and up toward your ribs. You should feel like your back is pressing to the floor and your hips and pelvis are slightly lifting off the floor. Hold for 6 seconds while breathing smoothly. ¨ Stand with your back flat against a wall. Slowly slide down until your knees are slightly bent. Hold for 10 seconds, then slide back up the wall. · Remove or change anything in your house that may cause you to fall. Keep walkways clear of clutter, electrical cords, and throw rugs. When should you call for help? Call 911 anytime you think you may need emergency care. For example, call if:  · You are unable to move a leg at all. Call your doctor now or seek immediate medical care if:  · You have new or worse symptoms in your legs, belly, or buttocks. Symptoms may include:  ¨ Numbness or tingling. ¨ Weakness. ¨ Pain. · You lose bladder or bowel control. Watch closely for changes in your health, and be sure to contact your doctor if:  · You are not getting better as expected. Where can you learn more? Go to http://bib-amanda.info/. Vickie Xie in the search box to learn more about \"Lumbar Stenosis: Care Instructions. \"  Current as of: May 23, 2016  Content Version: 11.2  © 2144-4790 [employer]. Care instructions adapted under license by [employer] (which disclaims liability or warranty for this information). If you have questions about a medical condition or this instruction, always ask your healthcare professional. [address] any warranty or liability for your use of this information.

## 2017-04-30 NOTE — ED TRIAGE NOTES
74 y/o female presents to ED complaining of RLQ and flank pain since last Monday. Was seen here Tuesday for same.

## 2017-05-25 RX ORDER — GLIPIZIDE 2.5 MG/1
2.5 TABLET, EXTENDED RELEASE ORAL
COMMUNITY
End: 2019-03-28 | Stop reason: SDUPTHER

## 2017-05-25 NOTE — PERIOP NOTES
[address]  Endoscopy Preprocedure Instructions      1. On the day of your surgery, please report to registration located on the 2nd floor of the  [facility]. yes    2. You must have a responsible adult to drive you to the hospital, stay at the hospital during your procedure and drive you home. If they leave your procedure will not be started (no exceptions). yes    3. Do not have anything to eat or drink (including water, gum, mints, coffee, and juice) after midnight. This does not apply to the medications you were instructed to take by your physician. yesIf you are currently taking Plavix, Coumadin, Aspirin, or other blood-thinning agents, contact your physician for special instructions. not applicable,    4. If you are having a procedure that requires bowel prep: We recommend that you have only clear liquids the day before your procedure and begin your bowel prep by 5:00 pm.  You may continue to drink clear liquids until midnight. If for any reason you are not able to complete your prep please notify your physician immediately. yes    5. Have a list of all current medications, including vitamins, herbal supplements and any other over the counter medications. yes    6. If you wear glasses, contacts, dentures and/or hearing aids, they may be removed prior to procedure, please bring a case to store them in. yes    7. You should understand that if you do not follow these instructions your procedure may be cancelled. If your physical condition changes (I.e. fever, cold or flu) please contact your doctor as soon as possible. 8. It is important that you be on time.   If for any reason you are unable to keep your appointment please call )- the day of or your physicians office prior to your scheduled procedure

## 2017-05-26 ENCOUNTER — ANESTHESIA EVENT (OUTPATIENT)
Dept: ENDOSCOPY | Age: 72
End: 2017-05-26
Payer: MEDICARE

## 2017-05-26 ENCOUNTER — ANESTHESIA (OUTPATIENT)
Dept: ENDOSCOPY | Age: 72
End: 2017-05-26
Payer: MEDICARE

## 2017-05-26 ENCOUNTER — HOSPITAL ENCOUNTER (OUTPATIENT)
Age: 72
Setting detail: OUTPATIENT SURGERY
Discharge: HOME | End: 2017-05-26
Attending: INTERNAL MEDICINE | Admitting: INTERNAL MEDICINE
Payer: MEDICARE

## 2017-05-26 VITALS
RESPIRATION RATE: 22 BRPM | HEIGHT: 63 IN | TEMPERATURE: 97.9 F | OXYGEN SATURATION: 96 % | DIASTOLIC BLOOD PRESSURE: 78 MMHG | SYSTOLIC BLOOD PRESSURE: 122 MMHG | HEART RATE: 94 BPM | WEIGHT: 180 LBS | BODY MASS INDEX: 31.89 KG/M2

## 2017-05-26 LAB
GLUCOSE BLD STRIP.AUTO-MCNC: 110 MG/DL (ref 65–100)
SERVICE CMNT-IMP: ABNORMAL

## 2017-05-26 PROCEDURE — 74011250636 HC RX REV CODE- 250/636

## 2017-05-26 PROCEDURE — 76060000031 HC ANESTHESIA FIRST 0.5 HR: Performed by: INTERNAL MEDICINE

## 2017-05-26 PROCEDURE — 76040000019: Performed by: INTERNAL MEDICINE

## 2017-05-26 PROCEDURE — 82962 GLUCOSE BLOOD TEST: CPT

## 2017-05-26 RX ORDER — SODIUM CHLORIDE 9 MG/ML
50 INJECTION, SOLUTION INTRAVENOUS CONTINUOUS
Status: DISCONTINUED | OUTPATIENT
Start: 2017-05-26 | End: 2017-05-26 | Stop reason: HOSPADM

## 2017-05-26 RX ORDER — NALOXONE HYDROCHLORIDE 0.4 MG/ML
0.4 INJECTION, SOLUTION INTRAMUSCULAR; INTRAVENOUS; SUBCUTANEOUS
Status: DISCONTINUED | OUTPATIENT
Start: 2017-05-26 | End: 2017-05-26 | Stop reason: HOSPADM

## 2017-05-26 RX ORDER — DEXTROMETHORPHAN/PSEUDOEPHED 2.5-7.5/.8
1.2 DROPS ORAL
Status: DISCONTINUED | OUTPATIENT
Start: 2017-05-26 | End: 2017-05-26 | Stop reason: HOSPADM

## 2017-05-26 RX ORDER — MIDAZOLAM HYDROCHLORIDE 1 MG/ML
.25-5 INJECTION, SOLUTION INTRAMUSCULAR; INTRAVENOUS
Status: DISCONTINUED | OUTPATIENT
Start: 2017-05-26 | End: 2017-05-26 | Stop reason: HOSPADM

## 2017-05-26 RX ORDER — PROPOFOL 10 MG/ML
INJECTION, EMULSION INTRAVENOUS
Status: DISCONTINUED | OUTPATIENT
Start: 2017-05-26 | End: 2017-05-26 | Stop reason: HOSPADM

## 2017-05-26 RX ORDER — PROPOFOL 10 MG/ML
INJECTION, EMULSION INTRAVENOUS AS NEEDED
Status: DISCONTINUED | OUTPATIENT
Start: 2017-05-26 | End: 2017-05-26 | Stop reason: HOSPADM

## 2017-05-26 RX ORDER — EPINEPHRINE 0.1 MG/ML
1 INJECTION INTRACARDIAC; INTRAVENOUS
Status: DISCONTINUED | OUTPATIENT
Start: 2017-05-26 | End: 2017-05-26 | Stop reason: HOSPADM

## 2017-05-26 RX ORDER — FLUMAZENIL 0.1 MG/ML
0.2 INJECTION INTRAVENOUS
Status: DISCONTINUED | OUTPATIENT
Start: 2017-05-26 | End: 2017-05-26 | Stop reason: HOSPADM

## 2017-05-26 RX ORDER — ATROPINE SULFATE 0.1 MG/ML
0.4 INJECTION INTRAVENOUS
Status: DISCONTINUED | OUTPATIENT
Start: 2017-05-26 | End: 2017-05-26 | Stop reason: HOSPADM

## 2017-05-26 RX ORDER — SODIUM CHLORIDE 9 MG/ML
INJECTION, SOLUTION INTRAVENOUS
Status: DISCONTINUED | OUTPATIENT
Start: 2017-05-26 | End: 2017-05-26 | Stop reason: HOSPADM

## 2017-05-26 RX ADMIN — PROPOFOL 125 MCG/KG/MIN: 10 INJECTION, EMULSION INTRAVENOUS at 10:20

## 2017-05-26 RX ADMIN — PROPOFOL 70 MG: 10 INJECTION, EMULSION INTRAVENOUS at 10:20

## 2017-05-26 RX ADMIN — PROPOFOL 30 MG: 10 INJECTION, EMULSION INTRAVENOUS at 10:24

## 2017-05-26 RX ADMIN — SODIUM CHLORIDE: 9 INJECTION, SOLUTION INTRAVENOUS at 10:01

## 2017-05-26 NOTE — ANESTHESIA POSTPROCEDURE EVALUATION
Post-Anesthesia Evaluation and Assessment    Patient: Franchesca Alamo MRN: [id number]  SSN: [id number]    YOB: 1945  Age: 73 y.o. Sex: female       Cardiovascular Function/Vital Signs  Visit Vitals    /78    Pulse 94    Temp 36.6 °C (97.9 °F)    Resp 22    Ht 5' 3\" (1.6 m)    Wt 81.6 kg (180 lb)    SpO2 96%    Breastfeeding No    BMI 31.89 kg/m2       Patient is status post No value filed. anesthesia for Procedure(s):  COLONOSCOPY- no info to  . Nausea/Vomiting: None    Postoperative hydration reviewed and adequate. Pain:  Pain Scale 1: Numeric (0 - 10) (05/26/17 1048)  Pain Intensity 1: 0 (05/26/17 1048)   Managed    Neurological Status: At baseline    Mental Status and Level of Consciousness: Arousable    Pulmonary Status:   O2 Device: Room air (05/26/17 1048)   Adequate oxygenation and airway patent    Complications related to anesthesia: None    Post-anesthesia assessment completed.  No concerns    Signed By: Willian Matt DO     May 26, 2017

## 2017-05-26 NOTE — ANESTHESIA PREPROCEDURE EVALUATION
Anesthetic History   No history of anesthetic complications            Review of Systems / Medical History  Patient summary reviewed and nursing notes reviewed    Pulmonary            Asthma : well controlled       Neuro/Psych              Cardiovascular    Hypertension: well controlled          Hyperlipidemia         GI/Hepatic/Renal                Endo/Other    Diabetes: well controlled, type 2    Obesity and cancer    Comments: Left Breast cancer Other Findings              Physical Exam    Airway  Mallampati: II    Neck ROM: normal range of motion   Mouth opening: Normal     Cardiovascular    Rhythm: regular  Rate: normal         Dental  No notable dental hx       Pulmonary  Breath sounds clear to auscultation               Abdominal         Other Findings            Anesthetic Plan    ASA: 2              Anesthetic plan and risks discussed with: Patient      Informed consent obtained.

## 2017-05-26 NOTE — PERIOP NOTES
Received report from [facility]. See anesthesia record. ABD remains soft and non-tender post procedure. Pt has no complaints at this time and tolerated the procedure well.

## 2017-05-26 NOTE — ROUTINE PROCESS
Franchesca Mixer  1945  [id number]    Situation:  Verbal report received from: Tono Ordonez RN  Procedure: Procedure(s):  COLONOSCOPY- no info to      Background:    Preoperative diagnosis: RIGHT LOWER QUADRANT PAIN  Postoperative diagnosis: diverticulosis, hemorrhoids    :  Dr. Franny Ahumada  Assistant(s): Endoscopy Technician-1: Daniel Coughlin  Endoscopy RN-1: Tono Ordonez RN    Specimens: * No specimens in log *  H. Pylori  no    Assessment:  Intra-procedure medications   Anesthesia gave intra-procedure sedation and medications, see anesthesia flow sheet yes    Intravenous fluids: NS@ KVO     Vital signs stable     Abdominal assessment: round and soft     Recommendation:  Discharge patient per MD order.   Family or Friend   Permission to share finding with family or friend yes

## 2017-05-26 NOTE — IP AVS SNAPSHOT
[address] [place] [address] 
[phone] Patient: Franchesca Alamo MRN: [id number] :1945 You are allergic to the following Allergen Reactions Crestor (Rosuvastatin) Other (comments) Pain in left leg Lipitor (Atorvastatin) Other (comments) Left leg pain Xarelto (Rivaroxaban) Other (comments) Pt states it caused internal bleeding Recent Documentation Height Weight Breastfeeding? BMI OB Status Smoking Status 1.6 m 81.6 kg No 31.89 kg/m2 Postmenopausal Never Smoker Emergency Contacts Name Discharge Info Relation Home Work Mobile Heather DISCHARGE CAREGIVER [3] Child [2] [phone] About your hospitalization You were admitted on:  May 26, 2017 You last received care in the:  [facility] ENDOSCOPY You were discharged on:  May 26, 2017 Unit phone number:  [phone] Why you were hospitalized Your primary diagnosis was:  Not on File Providers Seen During Your Hospitalizations Provider Role Specialty Primary office phone Sri Campbell MD Attending Provider Gastroenterology [phone] Your Primary Care Physician (PCP) Primary Care Physician Office Phone Office Fax Howell Emiliana [phone] Follow-up Information None Current Discharge Medication List  
  
CONTINUE these medications which have NOT CHANGED Dose & Instructions Dispensing Information Comments Morning Noon Evening Bedtime  
 aspirin 81 mg tablet Your last dose was: Your next dose is:    
   
   
 Dose:  81 mg Take 81 mg by mouth. Refills:  0  
     
   
   
   
  
 carvedilol 3.125 mg tablet Commonly known as:  Esaw Delphine Your last dose was: Your next dose is:    
   
   
 Dose:  3.125 mg Take 3.125 mg by mouth two (2) times daily (with meals). Refills:  0 furosemide 20 mg tablet Commonly known as:  LASIX Your last dose was: Your next dose is: Take  by mouth daily. Refills:  0  
     
   
   
   
  
 glipiZIDE SR 2.5 mg CR tablet Commonly known as:  GLUCOTROL XL Your last dose was: Your next dose is: Take  by mouth daily. Refills:  0 LINZESS 72 mcg Cap Generic drug:  linaclotide Your last dose was: Your next dose is: Take  by mouth daily. Refills:  0  
     
   
   
   
  
 losartan 25 mg tablet Commonly known as:  COZAAR Your last dose was: Your next dose is:    
   
   
 Dose:  25 mg Take 25 mg by mouth daily. Refills:  0  
     
   
   
   
  
 lovastatin 10 mg tablet Commonly known as:  MEVACOR Your last dose was: Your next dose is:    
   
   
 Dose:  10 mg Take 10 mg by mouth nightly. Refills:  0 PROTONIX 40 mg tablet Generic drug:  pantoprazole Your last dose was: Your next dose is:    
   
   
 Dose:  40 mg Take 40 mg by mouth daily. Takes prn Refills:  0 SINGULAIR 10 mg tablet Generic drug:  montelukast  
   
Your last dose was: Your next dose is:    
   
   
 Dose:  10 mg Take 10 mg by mouth daily. Refills:  0 Discharge Instructions [facility]. [facility] Zeferino Ma M.D. 
[phone] COLON DISCHARGE INSTRUCTIONS 
    
2017 [facility] :  1945 BandarLoyaldulce Medical Record Number:  [id number] COLONOSCOPY FINDINGS: 
Your colonoscopy showed diverticulosis and tight twisted colon in the sigmoid, otherwise mucosa within normal. Moderate size internal hemorrhoids seen. DISCOMFORT: 
Redness at IV site- apply warm compress to area; if redness or soreness persist- contact your physician There may be a slight amount of blood passed from the rectum Gaseous discomfort- walking, belching will help relieve any discomfort You may not operate a vehicle for 12 hours You may not engage in an occupation involving machinery or appliances for rest of today You may not drink alcoholic beverages for at least 12 hours Avoid making any critical decisions for at least 24 hour DIET: 
 High fiber diet.  however -  remember your colon is empty and a heavy meal will produce gas. Avoid these foods:  vegetables, fried / greasy foods, carbonated drinks for today ACTIVITY: 
You may resume your normal daily activities it is recommended that you spend the remainder of the day resting -  avoid any strenuous activity. CALL M.D. ANY SIGN OF: Increasing pain, nausea, vomiting Abdominal distension (swelling) New increased bleeding (oral or rectal) Fever (chills) Pain in chest area Bloody discharge from nose or mouth Shortness of breath Follow-up Instructions: 
 Call Dr. Murali Pascal if any questions or problems. Telephone # [phone] Remain on daily bowel regimen to avoid any constipation, will need to keep stools on the soft side. Should have a repeat colonoscopy in 10 years. Discharge Orders None Introducing [facility]! Dear Jr Fay: Thank you for requesting a [facility] account. Our records indicate that you already have an active [facility] account. You can access your account anytime at [email or web address] Did you know that you can access your hospital and ER discharge instructions at any time in [facility]? You can also review all of your test results from your hospital stay or ER visit. Additional Information If you have questions, please visit the Frequently Asked Questions section of the [facility] website at [email or web address] Remember, [facility] is NOT to be used for urgent needs. For medical emergencies, dial 911. Now available from your iPhone and Android! General Information Please provide this summary of care documentation to your next provider. Patient Signature:  ____________________________________________________________ Date:  ____________________________________________________________  
  
Sho Canes Provider Signature:  ____________________________________________________________ Date:  ____________________________________________________________

## 2017-05-26 NOTE — DISCHARGE INSTRUCTIONS
[facility]. Damir Conte M.D.  [phone]            COLON DISCHARGE INSTRUCTIONS       2017    Dahiana Godoy  :  1945  Prem Medical Record Number:  [id number]      COLONOSCOPY FINDINGS:  Your colonoscopy showed diverticulosis and tight twisted colon in the sigmoid, otherwise mucosa within normal. Moderate size internal hemorrhoids seen. DISCOMFORT:  Redness at IV site- apply warm compress to area; if redness or soreness persist- contact your physician  There may be a slight amount of blood passed from the rectum  Gaseous discomfort- walking, belching will help relieve any discomfort  You may not operate a vehicle for 12 hours  You may not engage in an occupation involving machinery or appliances for rest of today  You may not drink alcoholic beverages for at least 12 hours  Avoid making any critical decisions for at least 24 hour  DIET:   High fiber diet. - however -  remember your colon is empty and a heavy meal will produce gas. Avoid these foods:  vegetables, fried / greasy foods, carbonated drinks for today     ACTIVITY:  You may resume your normal daily activities it is recommended that you spend the remainder of the day resting -  avoid any strenuous activity. CALL M.D. ANY SIGN OF:   Increasing pain, nausea, vomiting  Abdominal distension (swelling)  New increased bleeding (oral or rectal)  Fever (chills)  Pain in chest area  Bloody discharge from nose or mouth   Shortness of breath    Follow-up Instructions:   Call Dr. Tirge Shipley if any questions or problems. Telephone # [phone] on daily bowel regimen to avoid any constipation, will need to keep stools on the soft side. Should have a repeat colonoscopy in 10 years.

## 2017-05-26 NOTE — ANESTHESIA POSTPROCEDURE EVALUATION
Post-Anesthesia Evaluation and Assessment    Patient: Suhas Smith MRN: [id number]  SSN: [id number]    YOB: 1945  Age: 73 y.o. Sex: female       Cardiovascular Function/Vital Signs  Visit Vitals    /78    Pulse 94    Temp 36.6 °C (97.9 °F)    Resp 22    Ht 5' 3\" (1.6 m)    Wt 81.6 kg (180 lb)    SpO2 96%    Breastfeeding No    BMI 31.89 kg/m2       Patient is status post MAC anesthesia for Procedure(s):  COLONOSCOPY- no info to  . Nausea/Vomiting: None    Postoperative hydration reviewed and adequate. Pain:  Pain Scale 1: Numeric (0 - 10) (05/26/17 1048)  Pain Intensity 1: 0 (05/26/17 1048)   Managed    Neurological Status: At baseline    Mental Status and Level of Consciousness: Arousable    Pulmonary Status:   O2 Device: Room air (05/26/17 1048)   Adequate oxygenation and airway patent    Complications related to anesthesia: None    Post-anesthesia assessment completed.  No concerns    Signed By: Anderson Rivera DO     May 26, 2017

## 2017-05-26 NOTE — PROCEDURES
Madisyn Daniel M.D.  [phone]            2017          Colonoscopy Operative Report  Dahiana Godoy  :  1945  Prem Medical Record Number:  [id number]      Indications:    Abdominal pain, RLQ, Constipation     :  Elba Lobo MD    Referring Provider: Danielle Neely MD    Sedation:  MAC anesthesia    Pre-Procedural Exam:      Airway: clear,  No airway problems anticipated  Heart: RRR, without gallops or rubs  Lungs: clear bilaterally without wheezes, crackles, or rhonchi  Abdomen: soft, nontender, nondistended, bowel sounds present  Mental Status: awake, alert and oriented to person, place and time     Procedure Details:  After informed consent was obtained with all risks and benefits of procedure explained and preoperative exam completed, the patient was taken to the endoscopy suite and placed in the left lateral decubitus position. Upon sequential sedation as per above, a digital rectal exam was performed. The Olympus videocolonoscope  was inserted in the rectum and carefully advanced to the cecum, which was identified by the ileocecal valve and appendiceal orifice. The quality of preparation was good. The colonoscope was slowly withdrawn with careful inspection and evaluation between folds. Retroflexion in the rectum was performed. Findings:   Terminal Ileum: not intubated  Cecum: normal  Ascending Colon: normal  Transverse Colon: normal  Descending Colon: no mucosal lesion appreciated  moderate diverticulosis; Sigmoid: no mucosal lesion appreciated  severe diverticulosis and long tortuosity of the lumen  Rectum: no mucosal lesion appreciated  Grade 2 internal hemorrhoid(s); Interventions:  none    Specimen Removed:  none    Complications: None. EBL:  None.     Impression:  Mucosa within normal throughout the colon                         Extensive left colon diverticulosis and tortuosity most likely causing her symptoms Small internal hemorrhoids    Recommendations:  -Repeat colonoscopy in 10 years   -High fiber diet.    -Resume normal medication(s). -Will need to remain on daily bowel regimen and keep stools soft, drinking plenty of fluids on a daily basis. Discharge Disposition:  Home in the company of a  when able to ambulate.     Trang Thorpe MD  5/26/2017  10:38 AM

## 2017-05-26 NOTE — H&P
The patient is a 73year old female who presents with a complaint of Abdominal Pain. The patient presents for reoccurrence of symptoms. The onset of the abdominal pain has been variable and has been occurring in an intermittent pattern for months with a increasing course . The abdominal pain is described as sharp pain and dull ache and  is described as being located in the right lower quadrant .  The symptoms have been associated with constipation,  while the symptoms have not been associated with bloody stools or black stools. Previous diagnostic tests have included colonoscopy (2012) and CT scan (4/30 and 4/25- reviewed- cecum noted on LUQ- stool noted throughout colon). Note for \"Abdominal Pain\": She had worsening RLQ pain into her back. She was seen in the ED at [facility] 4/25 with CT showing diverticulosis and degeneration to back. She has known back issues but felt this pain was different than her usual back pain. She was sent home and pain worsened. She returned to ED and underwent another CT 4/30. She was given Prednisone and Valium ( only took 1 tab of this) for pain from scolosis. She has noted some improvement to her back but her RLQ continues to bother her. Problem List/Past Medical (Edelmira Peter; 5/5/2017 11:05 AM)  Constipation (564.00  K59.00)   Abdominal pain, RLQ (789.03  R10.31)   Diabetes Mellitus   Hypertension     Past Surgical History (Edelmira Peter; 5/5/2017 11:05 AM)  Mastectomy  Left. Leg Surgery  Right. Hammer Toe Surgery   Cervical Fusion  c4-c5  Tonsillectomy   Hip Replacement  Left. Oophorectomy  Right. Cataract Surgery     Allergies (Daline Roelofse; 5/5/2017 11:04 AM)  Xarelto *ANTICOAGULANTS*     Medication History (Daline Roelofse; 5/5/2017 11:13 AM)  GlipiZIDE ER  (2.5MG Tablet ER 24HR, Oral daily) Active. Lovastatin  (10MG Tablet, Oral daily) Active. Singulair  (10MG Tablet, Oral) Active. Coreg  (3.125MG Tablet, Oral twice daily) Active.  (10mg)  Cozaar  (25MG Tablet, Oral) Active. Lasix  (20MG Tablet, Oral) Active. Aspirin  (81MG Tablet, Oral) Active. Multivitamin  (Oral) Active. PredniSONE (Ritesh)  (5MG Tablet, 4mg Ritseh Oral decreasing - 21 days) Active. Medications Reconciled     Family History (Louisa Mireles; 5/5/2017 11:11 AM)  Non-Contributory Family History     Social History (Louisa Mireles; 5/5/2017 11:05 AM)  Employment status  Retired. Alcohol Use  Occasional alcohol use. Tobacco Use  Never smoker. Marital status  . Blood Transfusion  Yes. Diagnostic Studies History (Louisa Mireles; 5/5/2017 11:05 AM)  Colonoscopy 02/2012    Health Maintenance History (Louisa Mireles; 5/5/2017 11:05 AM)  Leonard Ruiz Date: 2016. Pneumovax  yes    Review of Systems (Louisa Mireles; 5/5/2017 11:04 AM)  General Not Present- Chronic Fatigue, Poor Appetite, Weight Gain and Weight Loss. Skin Not Present- Itching, Rash and Skin Color Changes. HEENT Not Present- Hearing Loss and Vertigo. Respiratory Not Present- Difficulty Breathing and TB exposure. Cardiovascular Present- Hypertension. Not Present- Chest Pain, Use of Antibiotics before Dental Procedures and Use of Blood Thinners. Gastrointestinal Present- See HPI. Musculoskeletal Present- Arthritis. Not Present- Hip Replacement Surgery and Knee Replacement Surgery. Neurological Not Present- Weakness. Psychiatric Not Present- Depression. Endocrine Present- Diabetes. Not Present- Thyroid Problems. Hematology Not Present- Anemia. Vitals (Louisa Mireles; 5/5/2017 11:10 AM)  5/5/2017 11:05 AM  Weight: 189 lb   Height: 63 in    Body Surface Area: 1.95 m²   Body Mass Index: 33.48 kg/m²  Temp.: 97.8° F (Temporal)    Pulse: 84 (Regular)    Resp.: 20 (Unlabored)     BP: 132/76 (Sitting, Left Arm, Standard)        Physical Exam [facility] CORAL Ray FNP; 5/5/2017 2:44 PM)  General  Mental Status - Alert. General Appearance - Cooperative, Pleasant, Not in acute distress. Orientation - Oriented X3.   Build & Woody Hearing nourished and Well developed. Integumentary  General Characteristics  Overall examination of the patient's skin reveals - no rashes, no bruises and no spider angiomas. Color - normal coloration of skin. Head and Neck  Neck  Global Assessment - full range of motion and supple, no bruit auscultated on the right, no bruit auscultated on the left, non-tender, no lymphadenopathy. Thyroid  Gland Characteristics - normal size and consistency. Eye  Eyeball - Left - No Exophthalmos. Eyeball - Right - No Exophthalmos. Sclera/Conjunctiva - Left - No Jaundice. Sclera/Conjunctiva - Right - No Jaundice. Chest and Lung Exam  Chest and lung exam reveals  - quiet, even and easy respiratory effort with no use of accessory muscles. Auscultation  Breath sounds - Normal. Adventitious sounds - No Adventitious sounds. Cardiovascular  Auscultation  Rhythm - Regular, No Tachycardia, No Bradycardia . Heart Sounds - Normal heart sounds , S1 WNL and S2 WNL, No S3, No Summation Gallop. Murmurs & Other Heart Sounds - Auscultation of the heart reveals - No Murmurs. Abdomen  Inspection  Inspection of the abdomen reveals - Non-distended. Palpation/Percussion  Tenderness - Right Lower Quadrant. Rebound tenderness - No rebound. Liver - No hepatosplenomegaly. Abdominal Mass Palpable - No masses. Other Characteristics - No Ascites. Organomegally - None. Auscultation  Auscultation of the abdomen reveals - Bowel sounds normal, No Abdominal bruits and No Succussion splash. Rectal - Did not examine. Peripheral Vascular  Upper Extremity  Inspection - Left - Normal - No Clubbing, No Cyanosis, No Edema, Pulses Intact. Right - Normal - No Clubbing, No Cyanosis, No Edema, Pulses Intact. Palpation - Edema - Left - No edema. Right - No edema. Lower Extremity  Inspection - Left - Inspection Normal. Right - Inspection Normal. Palpation - Edema - Left - No edema. Right - No edema.     Neurologic  Neurologic evaluation reveals  - Cranial nerves grossly intact, no focal neurologic deficits. Motor  Involuntary Movements - Asterixis - not present. Musculoskeletal  Global Assessment  Gait and Station - normal gait and station. Assessment & Plan (Tami Dumont Colt Eggautam FNP; 5/5/2017 2:47 PM)  Abdominal pain, RLQ (789.03  R10.31)  Impression: Suspect her underlying constipation may not be well controlled again and this may be the culprit, however, she has had progressively worsening pain. Last colonoscopy was 5 years ago. Proceed with bowel regimen and repeat colonoscopy to exclude an other underlying pathology. Samples given of Linzess 72 to try as she had too much diarrhea on the higher dose. Current Plans    Colonoscopy (74291) (Discussed risks and benefits with the patient to include:; perforation, post polypectomy, or post biopsy bleeding, missed lesions, and sedation reactions.)  Started Suprep Bowel Prep, 1 (one) Solution Take as directed before Colonoscopy, 1 Kit, 1 day starting 05/05/2017, No Refill. Constipation (564.00  K59.00)  Impression: Trial of Linzess 72 and if she does not tolerate that, she can go back to Miralax +/- Senokot regimen and titrate for stool consistency. Current Plans  Pt Education - How to access health information online: discussed with patient and provided information. Patient is to call me for any questions or concerns. Follow up after testing is completed.

## 2018-11-06 ENCOUNTER — APPOINTMENT (OUTPATIENT)
Dept: CT IMAGING | Age: 73
End: 2018-11-06
Attending: EMERGENCY MEDICINE
Payer: MEDICARE

## 2018-11-06 ENCOUNTER — APPOINTMENT (OUTPATIENT)
Dept: ULTRASOUND IMAGING | Age: 73
End: 2018-11-06
Attending: EMERGENCY MEDICINE
Payer: MEDICARE

## 2018-11-06 ENCOUNTER — HOSPITAL ENCOUNTER (EMERGENCY)
Age: 73
Discharge: HOME | End: 2018-11-06
Attending: EMERGENCY MEDICINE
Payer: MEDICARE

## 2018-11-06 VITALS
BODY MASS INDEX: 33.66 KG/M2 | HEIGHT: 63 IN | TEMPERATURE: 98 F | WEIGHT: 190 LBS | RESPIRATION RATE: 18 BRPM | SYSTOLIC BLOOD PRESSURE: 130 MMHG | HEART RATE: 89 BPM | OXYGEN SATURATION: 96 % | DIASTOLIC BLOOD PRESSURE: 73 MMHG

## 2018-11-06 DIAGNOSIS — K76.89 HEPATIC CYST: ICD-10-CM

## 2018-11-06 DIAGNOSIS — R06.02 SOB (SHORTNESS OF BREATH): ICD-10-CM

## 2018-11-06 DIAGNOSIS — L03.119 CELLULITIS OF LOWER EXTREMITY, UNSPECIFIED LATERALITY: Primary | ICD-10-CM

## 2018-11-06 LAB
ALBUMIN SERPL-MCNC: 3.3 G/DL (ref 3.5–5)
ALBUMIN/GLOB SERPL: 0.9 {RATIO} (ref 1.1–2.2)
ALP SERPL-CCNC: 84 U/L (ref 45–117)
ALT SERPL-CCNC: 21 U/L (ref 12–78)
ANION GAP SERPL CALC-SCNC: 11 MMOL/L (ref 5–15)
APTT PPP: 23.8 SEC (ref 22.1–32)
AST SERPL-CCNC: 32 U/L (ref 15–37)
BASOPHILS # BLD: 0.1 K/UL (ref 0–0.1)
BASOPHILS NFR BLD: 1 % (ref 0–1)
BILIRUB SERPL-MCNC: 0.2 MG/DL (ref 0.2–1)
BNP SERPL-MCNC: 139 PG/ML (ref 0–125)
BUN SERPL-MCNC: 22 MG/DL (ref 6–20)
BUN/CREAT SERPL: 19 (ref 12–20)
CALCIUM SERPL-MCNC: 9.1 MG/DL (ref 8.5–10.1)
CHLORIDE SERPL-SCNC: 105 MMOL/L (ref 97–108)
CO2 SERPL-SCNC: 24 MMOL/L (ref 21–32)
CREAT SERPL-MCNC: 1.15 MG/DL (ref 0.55–1.02)
D DIMER PPP FEU-MCNC: 1.29 MG/L FEU (ref 0–0.65)
DIFFERENTIAL METHOD BLD: ABNORMAL
EOSINOPHIL # BLD: 0.3 K/UL (ref 0–0.4)
EOSINOPHIL NFR BLD: 3 % (ref 0–7)
ERYTHROCYTE [DISTWIDTH] IN BLOOD BY AUTOMATED COUNT: 14.1 % (ref 11.5–14.5)
GLOBULIN SER CALC-MCNC: 3.6 G/DL (ref 2–4)
GLUCOSE SERPL-MCNC: 179 MG/DL (ref 65–100)
HCT VFR BLD AUTO: 40.6 % (ref 35–47)
HGB BLD-MCNC: 13.8 G/DL (ref 11.5–16)
INR PPP: 0.9 (ref 0.9–1.1)
LYMPHOCYTES # BLD: 2.6 K/UL (ref 0.8–3.5)
LYMPHOCYTES NFR BLD: 23 % (ref 12–49)
MCH RBC QN AUTO: 28.3 PG (ref 26–34)
MCHC RBC AUTO-ENTMCNC: 34 G/DL (ref 30–36.5)
MCV RBC AUTO: 83.4 FL (ref 80–99)
MONOCYTES # BLD: 1.2 K/UL (ref 0–1)
MONOCYTES NFR BLD: 10 % (ref 5–13)
NEUTS SEG # BLD: 7.4 K/UL (ref 1.8–8)
NEUTS SEG NFR BLD: 63 % (ref 32–75)
PLATELET # BLD AUTO: 285 K/UL (ref 150–400)
PMV BLD AUTO: 10.3 FL (ref 8.9–12.9)
POTASSIUM SERPL-SCNC: 4.1 MMOL/L (ref 3.5–5.1)
PROT SERPL-MCNC: 6.9 G/DL (ref 6.4–8.2)
PROTHROMBIN TIME: 9.1 SEC (ref 9–11.1)
RBC # BLD AUTO: 4.87 M/UL (ref 3.8–5.2)
SODIUM SERPL-SCNC: 140 MMOL/L (ref 136–145)
THERAPEUTIC RANGE,PTTT: NORMAL SECS (ref 58–77)
TROPONIN I SERPL-MCNC: <0.05 NG/ML
WBC # BLD AUTO: 11.5 K/UL (ref 3.6–11)
XXWBCSUS: 0

## 2018-11-06 PROCEDURE — 93970 EXTREMITY STUDY: CPT

## 2018-11-06 PROCEDURE — 80053 COMPREHEN METABOLIC PANEL: CPT | Performed by: EMERGENCY MEDICINE

## 2018-11-06 PROCEDURE — 85610 PROTHROMBIN TIME: CPT | Performed by: EMERGENCY MEDICINE

## 2018-11-06 PROCEDURE — 85025 COMPLETE CBC W/AUTO DIFF WBC: CPT | Performed by: EMERGENCY MEDICINE

## 2018-11-06 PROCEDURE — 74011636320 HC RX REV CODE- 636/320: Performed by: EMERGENCY MEDICINE

## 2018-11-06 PROCEDURE — 83880 ASSAY OF NATRIURETIC PEPTIDE: CPT | Performed by: EMERGENCY MEDICINE

## 2018-11-06 PROCEDURE — 85730 THROMBOPLASTIN TIME PARTIAL: CPT | Performed by: EMERGENCY MEDICINE

## 2018-11-06 PROCEDURE — 84484 ASSAY OF TROPONIN QUANT: CPT | Performed by: EMERGENCY MEDICINE

## 2018-11-06 PROCEDURE — 85379 FIBRIN DEGRADATION QUANT: CPT | Performed by: EMERGENCY MEDICINE

## 2018-11-06 PROCEDURE — 71275 CT ANGIOGRAPHY CHEST: CPT

## 2018-11-06 PROCEDURE — 93005 ELECTROCARDIOGRAM TRACING: CPT

## 2018-11-06 PROCEDURE — 36415 COLL VENOUS BLD VENIPUNCTURE: CPT | Performed by: EMERGENCY MEDICINE

## 2018-11-06 PROCEDURE — 99283 EMERGENCY DEPT VISIT LOW MDM: CPT

## 2018-11-06 RX ORDER — CEPHALEXIN 500 MG/1
500 CAPSULE ORAL 3 TIMES DAILY
Qty: 21 CAP | Refills: 0 | Status: SHIPPED | OUTPATIENT
Start: 2018-11-06 | End: 2018-11-13

## 2018-11-06 RX ADMIN — IOPAMIDOL 100 ML: 755 INJECTION, SOLUTION INTRAVENOUS at 22:23

## 2018-11-07 LAB
ATRIAL RATE: 80 BPM
CALCULATED P AXIS, ECG09: 35 DEGREES
CALCULATED R AXIS, ECG10: 11 DEGREES
CALCULATED T AXIS, ECG11: 55 DEGREES
DIAGNOSIS, 93000: NORMAL
P-R INTERVAL, ECG05: 132 MS
Q-T INTERVAL, ECG07: 372 MS
QRS DURATION, ECG06: 70 MS
QTC CALCULATION (BEZET), ECG08: 429 MS
VENTRICULAR RATE, ECG03: 80 BPM

## 2018-11-07 NOTE — DISCHARGE INSTRUCTIONS
Cellulitis: Care Instructions  Your Care Instructions    Cellulitis is a skin infection caused by bacteria, most often strep or staph. It often occurs after a break in the skin from a scrape, cut, bite, or puncture, or after a rash. Cellulitis may be treated without doing tests to find out what caused it. But your doctor may do tests, if needed, to look for a specific bacteria, like methicillin-resistant Staphylococcus aureus (MRSA). The doctor has checked you carefully, but problems can develop later. If you notice any problems or new symptoms, get medical treatment right away. Follow-up care is a key part of your treatment and safety. Be sure to make and go to all appointments, and call your doctor if you are having problems. It's also a good idea to know your test results and keep a list of the medicines you take. How can you care for yourself at home? · Take your antibiotics as directed. Do not stop taking them just because you feel better. You need to take the full course of antibiotics. · Prop up the infected area on pillows to reduce pain and swelling. Try to keep the area above the level of your heart as often as you can. · If your doctor told you how to care for your wound, follow your doctor's instructions. If you did not get instructions, follow this general advice:  ? Wash the wound with clean water 2 times a day. Don't use hydrogen peroxide or alcohol, which can slow healing. ? You may cover the wound with a thin layer of petroleum jelly, such as Vaseline, and a nonstick bandage. ? Apply more petroleum jelly and replace the bandage as needed. · Be safe with medicines. Take pain medicines exactly as directed. ? If the doctor gave you a prescription medicine for pain, take it as prescribed. ? If you are not taking a prescription pain medicine, ask your doctor if you can take an over-the-counter medicine.   To prevent cellulitis in the future  · Try to prevent cuts, scrapes, or other injuries to your skin. Cellulitis most often occurs where there is a break in the skin. · If you get a scrape, cut, mild burn, or bite, wash the wound with clean water as soon as you can to help avoid infection. Don't use hydrogen peroxide or alcohol, which can slow healing. · If you have swelling in your legs (edema), support stockings and good skin care may help prevent leg sores and cellulitis. · Take care of your feet, especially if you have diabetes or other conditions that increase the risk of infection. Wear shoes and socks. Do not go barefoot. If you have athlete's foot or other skin problems on your feet, talk to your doctor about how to treat them. When should you call for help? Call your doctor now or seek immediate medical care if:    · You have signs that your infection is getting worse, such as:  ? Increased pain, swelling, warmth, or redness. ? Red streaks leading from the area. ? Pus draining from the area. ? A fever.     · You get a rash.    Watch closely for changes in your health, and be sure to contact your doctor if:    · You do not get better as expected. Where can you learn more? Go to http://bib-amanda.info/. Amy Morrison in the search box to learn more about \"Cellulitis: Care Instructions. \"  Current as of: April 18, 2018  Content Version: 11.8  © 5544-1022 [employer]. Care instructions adapted under license by [employer] (which disclaims liability or warranty for this information). If you have questions about a medical condition or this instruction, always ask your healthcare professional. [place] [address] any warranty or liability for your use of this information. Shortness of Breath: Care Instructions  Your Care Instructions  Shortness of breath has many causes. Sometimes conditions such as anxiety can lead to shortness of breath. Some people get mild shortness of breath when they exercise.  Trouble breathing also can be a symptom of a serious problem, such as asthma, lung disease, emphysema, heart problems, and pneumonia. If your shortness of breath continues, you may need tests and treatment. Watch for any changes in your breathing and other symptoms. Follow-up care is a key part of your treatment and safety. Be sure to make and go to all appointments, and call your doctor if you are having problems. It's also a good idea to know your test results and keep a list of the medicines you take. How can you care for yourself at home? · Do not smoke or allow others to smoke around you. If you need help quitting, talk to your doctor about stop-smoking programs and medicines. These can increase your chances of quitting for good. · Get plenty of rest and sleep. · Take your medicines exactly as prescribed. Call your doctor if you think you are having a problem with your medicine. · Find healthy ways to deal with stress. ? Exercise daily. ? Get plenty of sleep. ? Eat regularly and well. When should you call for help? Call 911 anytime you think you may need emergency care. For example, call if:    · You have severe shortness of breath.     · You have symptoms of a heart attack. These may include:  ? Chest pain or pressure, or a strange feeling in the chest.  ? Sweating. ? Shortness of breath. ? Nausea or vomiting. ? Pain, pressure, or a strange feeling in the back, neck, jaw, or upper belly or in one or both shoulders or arms. ? Lightheadedness or sudden weakness. ? A fast or irregular heartbeat. After you call 911, the  may tell you to chew 1 adult-strength or 2 to 4 low-dose aspirin. Wait for an ambulance. Do not try to drive yourself.    Call your doctor now or seek immediate medical care if:    · Your shortness of breath gets worse or you start to wheeze.  Wheezing is a high-pitched sound when you breathe.     · You wake up at night out of breath or have to prop your head up on several pillows to breathe.     · You are short of breath after only light activity or while at rest.    Watch closely for changes in your health, and be sure to contact your doctor if:    · You do not get better over the next 1 to 2 days. Where can you learn more? Go to http://bib-amanda.info/. Enter S780 in the search box to learn more about \"Shortness of Breath: Care Instructions. \"  Current as of: December 6, 2017  Content Version: 11.8  © 4374-2230 [employer]. Care instructions adapted under license by [employer] (which disclaims liability or warranty for this information). If you have questions about a medical condition or this instruction, always ask your healthcare professional. [address] any warranty or liability for your use of this information. We hope that we have addressed all of your medical concerns. The examination and treatment you received in the Emergency Department were for an emergent problem and were not intended as complete care. It is important that you follow up with your healthcare provider(s) for ongoing care. If your symptoms worsen or do not improve as expected, and you are unable to reach your usual health care provider(s), you should return to the Emergency Department. Today's healthcare is undergoing tremendous change, and patient satisfaction surveys are one of the many tools to assess the quality of medical care. You may receive a survey from the [employer] regarding your experience in the Emergency Department. I hope that your experience has been completely positive, particularly the medical care that I provided. As such, please participate in the survey; anything less than excellent does not meet my expectations or intentions. [facility] and [facility] participate in nationally recognized quality of care measures.   If your blood pressure is greater than 120/80, as reported below, we urge that you seek medical care to address the potential of high blood pressure, commonly known as hypertension. Hypertension can be hereditary or can be caused by certain medical conditions, pain, stress, or \"white coat syndrome. \"       Please make an appointment with your health care provider(s) for follow up of your Emergency Department visit. VITALS:   Patient Vitals for the past 8 hrs:   Temp Pulse Resp BP SpO2   11/06/18 2012 98 °F (36.7 °C) 86 20 179/78 96 %          Thank you for allowing us to provide you with medical care today. We realize that you have many choices for your emergency care needs. Please choose us in the future for any continued health care needs. Rosa Cueto, [address].   Office: [phone]            Recent Results (from the past 24 hour(s))   CBC WITH AUTOMATED DIFF    Collection Time: 11/06/18  9:01 PM   Result Value Ref Range    WBC 11.5 (H) 3.6 - 11.0 K/uL    RBC 4.87 3.80 - 5.20 M/uL    HGB 13.8 11.5 - 16.0 g/dL    HCT 40.6 35.0 - 47.0 %    MCV 83.4 80.0 - 99.0 FL    MCH 28.3 26.0 - 34.0 PG    MCHC 34.0 30.0 - 36.5 g/dL    RDW 14.1 11.5 - 14.5 %    PLATELET 363 265 - 800 K/uL    MPV 10.3 8.9 - 12.9 FL    NEUTROPHILS 63 32 - 75 %    LYMPHOCYTES 23 12 - 49 %    MONOCYTES 10 5 - 13 %    EOSINOPHILS 3 0 - 7 %    BASOPHILS 1 0 - 1 %    ABS. NEUTROPHILS 7.4 1.8 - 8.0 K/UL    ABS. LYMPHOCYTES 2.6 0.8 - 3.5 K/UL    ABS. MONOCYTES 1.2 (H) 0.0 - 1.0 K/UL    ABS. EOSINOPHILS 0.3 0.0 - 0.4 K/UL    ABS.  BASOPHILS 0.1 0.0 - 0.1 K/UL    DF AUTOMATED      XXWBCSUS 0     METABOLIC PANEL, COMPREHENSIVE    Collection Time: 11/06/18  9:01 PM   Result Value Ref Range    Sodium 140 136 - 145 mmol/L    Potassium 4.1 3.5 - 5.1 mmol/L    Chloride 105 97 - 108 mmol/L    CO2 24 21 - 32 mmol/L    Anion gap 11 5 - 15 mmol/L    Glucose 179 (H) 65 - 100 mg/dL    BUN 22 (H) 6 - 20 MG/DL    Creatinine 1.15 (H) 0.55 - 1.02 MG/DL    BUN/Creatinine ratio 19 12 - 20      GFR est AA 56 (L) >60 ml/min/1.73m2    GFR est non-AA 47 (L) >60 ml/min/1.73m2    Calcium 9.1 8.5 - 10.1 MG/DL    Bilirubin, total 0.2 0.2 - 1.0 MG/DL    ALT (SGPT) 21 12 - 78 U/L    AST (SGOT) 32 15 - 37 U/L    Alk. phosphatase 84 45 - 117 U/L    Protein, total 6.9 6.4 - 8.2 g/dL    Albumin 3.3 (L) 3.5 - 5.0 g/dL    Globulin 3.6 2.0 - 4.0 g/dL    A-G Ratio 0.9 (L) 1.1 - 2.2     D DIMER    Collection Time: 11/06/18  9:01 PM   Result Value Ref Range    D-dimer 1.29 (H) 0.00 - 0.65 mg/L FEU   PTT    Collection Time: 11/06/18  9:01 PM   Result Value Ref Range    aPTT 23.8 22.1 - 32.0 sec    aPTT, therapeutic range     58.0 - 77.0 SECS   PROTHROMBIN TIME + INR    Collection Time: 11/06/18  9:01 PM   Result Value Ref Range    INR 0.9 0.9 - 1.1      Prothrombin time 9.1 9.0 - 11.1 sec   NT-PRO BNP    Collection Time: 11/06/18  9:01 PM   Result Value Ref Range    NT pro- (H) 0 - 125 PG/ML   TROPONIN I    Collection Time: 11/06/18  9:01 PM   Result Value Ref Range    Troponin-I, Qt. <0.05 <0.05 ng/mL   EKG, 12 LEAD, INITIAL    Collection Time: 11/06/18  9:32 PM   Result Value Ref Range    Ventricular Rate 80 BPM    Atrial Rate 80 BPM    P-R Interval 132 ms    QRS Duration 70 ms    Q-T Interval 372 ms    QTC Calculation (Bezet) 429 ms    Calculated P Axis 35 degrees    Calculated R Axis 11 degrees    Calculated T Axis 55 degrees    Diagnosis       Sinus rhythm with marked sinus arrhythmia  Otherwise normal ECG  When compared with ECG of 02-MAY-2013 15:13,  premature atrial complexes are no longer present         Cta Chest W Or W Wo Cont    Result Date: 11/6/2018  History: Shortness of breath and elevated d-dimer. CTA of the chest was performed. 100 mL Isovue-370 were injected and scanning was performed during the arterial phase of contrast administration. Post processing was performed. 3D reconstructions were performed.   CT dose reduction was achieved through use of a standardized protocol tailored for this examination and automatic exposure control for dose modulation. There are no intraluminal filling defects to suggest pulmonary embolism. The heart, pericardium, and great vessels appear unremarkable. The chest wall and axilla appear unremarkable. The lungs are clear. There is a hepatic cyst. There is right nephrolithiasis. There is a right breast implant. IMPRESSION: 1. No evidence for pulmonary embolism. 2. Right nephrolithiasis. Duplex Lower Ext Venous Bilat    Result Date: 11/6/2018  INDICATION:  Bilateral  leg swelling Bilateral duplex venous Doppler examination was performed from the inguinal ligament to the midcalf. Deep venous structures are compressible throughout. Both wave form and color flow analyses demonstrated normal flow patterns. Augmentation was demonstrable. IMPRESSION:  NO DVT OF THE LEGS.

## 2018-11-07 NOTE — ED PROVIDER NOTES
HPI  
71 yo WF present with left leg pain onset 2 weeks ago. Pt states she fell in August and bruised her legs, then had a bout of pneumonia, was taking levaquin and prednisone. Her pcp was concerned she had a ruptured achilles tendon and sent pt to ortho on call tonight. They did not feel she had a ruptured tendon and sent to ED for doppler. Denies fever, chills, cp, sob. Pt has hx of GI bleed requiring transfusion after taking xarelto for hip replacement. Past Medical History:  
Diagnosis Date  Bronchitis  Cancer ([facility] [address] breast - left  Diabetes ([facility] [address]  Hepatitis age 11  
 High cholesterol  Hypertension  Obesity (BMI 30-39. 9) Past Surgical History:  
Procedure Laterality Date  BREAST SURGERY PROCEDURE UNLISTED    
 L mastectomy  HX GYN    
 ablation, R ovary removed, tubal ligation  HX HAMMER TOE REPAIR    
 HX HEENT    
 tonsillectomy  HX ORTHOPAEDIC    
 L hip, R leg, C4-C5 fusion, L foot  HX OTHER SURGICAL    
 xiphoid removal  
 
   
Family History:  
Problem Relation Age of Onset  Heart Attack Father  Diabetes Mother Social History Socioeconomic History  Marital status: SINGLE Spouse name: Not on file  Number of children: Not on file  Years of education: Not on file  Highest education level: Not on file Social Needs  Financial resource strain: Not on file  Food insecurity - worry: Not on file  Food insecurity - inability: Not on file  Transportation needs - medical: Not on file  Transportation needs - non-medical: Not on file Occupational History  Not on file Tobacco Use  Smoking status: Never Smoker  Smokeless tobacco: Never Used Substance and Sexual Activity  Alcohol use: Yes Comment: seldom  Drug use: No  
 Sexual activity: Not on file Other Topics Concern  Not on file Social History Narrative  Not on file ALLERGIES: Crestor [rosuvastatin]; Lipitor [atorvastatin]; and Xarelto [rivaroxaban] Review of Systems Constitutional: Negative for chills and fever. Respiratory: Positive for shortness of breath. Negative for cough. Cardiovascular: Positive for leg swelling. Negative for chest pain. Gastrointestinal: Negative for abdominal pain, diarrhea, nausea and vomiting. Genitourinary: Negative for dysuria and hematuria. Musculoskeletal: Negative for back pain. Skin: Positive for rash. All other systems reviewed and are negative. Vitals:  
 11/06/18 2012 BP: 179/78 Pulse: 86 Resp: 20 Temp: 98 °F (36.7 °C) SpO2: 96% Weight: 86.2 kg (190 lb) Height: 5' 3\" (1.6 m) Physical Exam  
Physical Examination: General appearance - alert, well appearing, and in no distress, oriented to person, place, and time and normal appearing weight Eyes - pupils equal and reactive, extraocular eye movements intact Neck - supple, no significant adenopathy Chest - clear to auscultation, no wheezes, rales or rhonchi, symmetric air entry Heart - normal rate, regular rhythm, normal S1, S2, no murmurs, rubs, clicks or gallops Abdomen - soft, nontender, nondistended, no masses or organomegaly Back exam - full range of motion, no tenderness, palpable spasm or pain on motion Neurological - alert, oriented, normal speech, no focal findings or movement disorder noted Musculoskeletal - no joint tenderness, deformity or swelling Extremities - peripheral pulses normal, mild erythema and edema to b/l LE L>R, strong pedal pulses, NVI, achilles intact b/l LE, no clubbing or cyanosis Skin - normal coloration and turgor, no rashes, no suspicious skin lesions noted MDM Number of Diagnoses or Management Options Amount and/or Complexity of Data Reviewed Clinical lab tests: ordered and reviewed Tests in the radiology section of CPT®: ordered and reviewed Decide to obtain previous medical records or to obtain history from someone other than the patient: yes Review and summarize past medical records: yes Independent visualization of images, tracings, or specimens: yes Patient Progress Patient progress: stable Procedures ED EKG interpretation: 
Rhythm: normal sinus rhythm; and irregular. Rate (approx.): 80; Axis: normal; P wave: normal; QRS interval: normal ; ST/T wave: non-specific changesEKG documented by Ramana Betancourt MD 
 
No DVT or PE. Will tx leg pain and swelling as cellulitis. F/u with pcp or return to ED for worsening symptoms.

## 2018-11-07 NOTE — ED TRIAGE NOTES
Left foot is red and swollen and painful, just left [facility] for evaluation of swelling in left foot that has been getting worse for 2 weeks. It is not an achiles tendon problem and sent here to rule out a blood clot

## 2019-02-07 ENCOUNTER — APPOINTMENT (OUTPATIENT)
Dept: VASCULAR SURGERY | Age: 74
DRG: 167 | End: 2019-02-07
Attending: EMERGENCY MEDICINE
Payer: MEDICARE

## 2019-02-07 ENCOUNTER — HOSPITAL ENCOUNTER (INPATIENT)
Age: 74
LOS: 2 days | Discharge: HOME | DRG: 167 | End: 2019-02-10
Attending: EMERGENCY MEDICINE | Admitting: INTERNAL MEDICINE
Payer: MEDICARE

## 2019-02-07 ENCOUNTER — APPOINTMENT (OUTPATIENT)
Dept: CT IMAGING | Age: 74
DRG: 167 | End: 2019-02-07
Attending: PHYSICIAN ASSISTANT
Payer: MEDICARE

## 2019-02-07 ENCOUNTER — APPOINTMENT (OUTPATIENT)
Dept: GENERAL RADIOLOGY | Age: 74
DRG: 167 | End: 2019-02-07
Attending: EMERGENCY MEDICINE
Payer: MEDICARE

## 2019-02-07 DIAGNOSIS — I82.412 ACUTE DEEP VEIN THROMBOSIS (DVT) OF FEMORAL VEIN OF LEFT LOWER EXTREMITY (HCC): ICD-10-CM

## 2019-02-07 DIAGNOSIS — R60.0 BILATERAL LOWER EXTREMITY EDEMA: ICD-10-CM

## 2019-02-07 DIAGNOSIS — I26.99 BILATERAL PULMONARY EMBOLISM (HCC): Primary | ICD-10-CM

## 2019-02-07 DIAGNOSIS — Z87.19 HISTORY OF GI BLEED: ICD-10-CM

## 2019-02-07 DIAGNOSIS — I26.92 ACUTE SADDLE PULMONARY EMBOLISM WITHOUT ACUTE COR PULMONALE (HCC): ICD-10-CM

## 2019-02-07 DIAGNOSIS — Z85.3 HISTORY OF LEFT BREAST CANCER: ICD-10-CM

## 2019-02-07 DIAGNOSIS — I82.402 DEEP VEIN THROMBOSIS (DVT) OF LEFT LOWER EXTREMITY, UNSPECIFIED CHRONICITY, UNSPECIFIED VEIN (HCC): ICD-10-CM

## 2019-02-07 DIAGNOSIS — R06.02 SHORTNESS OF BREATH: ICD-10-CM

## 2019-02-07 LAB
ALBUMIN SERPL-MCNC: 3.2 G/DL (ref 3.5–5)
ALBUMIN/GLOB SERPL: 0.8 {RATIO} (ref 1.1–2.2)
ALP SERPL-CCNC: 102 U/L (ref 45–117)
ALT SERPL-CCNC: 23 U/L (ref 12–78)
ANION GAP SERPL CALC-SCNC: 8 MMOL/L (ref 5–15)
APTT PPP: 26.6 SEC (ref 22.1–32)
AST SERPL-CCNC: 22 U/L (ref 15–37)
ATRIAL RATE: 76 BPM
BASOPHILS # BLD: 0.1 K/UL (ref 0–0.1)
BASOPHILS NFR BLD: 1 % (ref 0–1)
BILIRUB SERPL-MCNC: 0.6 MG/DL (ref 0.2–1)
BUN SERPL-MCNC: 16 MG/DL (ref 6–20)
BUN/CREAT SERPL: 15 (ref 12–20)
CALCIUM SERPL-MCNC: 9.1 MG/DL (ref 8.5–10.1)
CALCULATED P AXIS, ECG09: 27 DEGREES
CALCULATED R AXIS, ECG10: 6 DEGREES
CALCULATED T AXIS, ECG11: 61 DEGREES
CHLORIDE SERPL-SCNC: 106 MMOL/L (ref 97–108)
CO2 SERPL-SCNC: 28 MMOL/L (ref 21–32)
COMMENT, HOLDF: NORMAL
CREAT SERPL-MCNC: 1.04 MG/DL (ref 0.55–1.02)
D DIMER PPP FEU-MCNC: 9.47 MG/L FEU (ref 0–0.65)
DIAGNOSIS, 93000: NORMAL
DIFFERENTIAL METHOD BLD: ABNORMAL
EOSINOPHIL # BLD: 0.3 K/UL (ref 0–0.4)
EOSINOPHIL NFR BLD: 3 % (ref 0–7)
ERYTHROCYTE [DISTWIDTH] IN BLOOD BY AUTOMATED COUNT: 13.2 % (ref 11.5–14.5)
EST. AVERAGE GLUCOSE BLD GHB EST-MCNC: 157 MG/DL
GLOBULIN SER CALC-MCNC: 4 G/DL (ref 2–4)
GLUCOSE BLD STRIP.AUTO-MCNC: 177 MG/DL (ref 65–100)
GLUCOSE BLD STRIP.AUTO-MCNC: 196 MG/DL (ref 65–100)
GLUCOSE BLD STRIP.AUTO-MCNC: 96 MG/DL (ref 65–100)
GLUCOSE SERPL-MCNC: 107 MG/DL (ref 65–100)
HBA1C MFR BLD: 7.1 % (ref 4.2–6.3)
HCT VFR BLD AUTO: 42.9 % (ref 35–47)
HGB BLD-MCNC: 13.8 G/DL (ref 11.5–16)
IMM GRANULOCYTES # BLD AUTO: 0.1 K/UL (ref 0–0.04)
IMM GRANULOCYTES NFR BLD AUTO: 0 % (ref 0–0.5)
INR PPP: 1 (ref 0.9–1.1)
LYMPHOCYTES # BLD: 1.9 K/UL (ref 0.8–3.5)
LYMPHOCYTES NFR BLD: 15 % (ref 12–49)
MCH RBC QN AUTO: 26.8 PG (ref 26–34)
MCHC RBC AUTO-ENTMCNC: 32.2 G/DL (ref 30–36.5)
MCV RBC AUTO: 83.5 FL (ref 80–99)
MONOCYTES # BLD: 1 K/UL (ref 0–1)
MONOCYTES NFR BLD: 8 % (ref 5–13)
NEUTS SEG # BLD: 9.3 K/UL (ref 1.8–8)
NEUTS SEG NFR BLD: 74 % (ref 32–75)
NRBC # BLD: 0 K/UL (ref 0–0.01)
NRBC BLD-RTO: 0 PER 100 WBC
P-R INTERVAL, ECG05: 134 MS
PLATELET # BLD AUTO: 158 K/UL (ref 150–400)
PMV BLD AUTO: 10.2 FL (ref 8.9–12.9)
POTASSIUM SERPL-SCNC: 4.3 MMOL/L (ref 3.5–5.1)
PROT SERPL-MCNC: 7.2 G/DL (ref 6.4–8.2)
PROTHROMBIN TIME: 10.2 SEC (ref 9–11.1)
Q-T INTERVAL, ECG07: 382 MS
QRS DURATION, ECG06: 74 MS
QTC CALCULATION (BEZET), ECG08: 429 MS
RBC # BLD AUTO: 5.14 M/UL (ref 3.8–5.2)
SAMPLES BEING HELD,HOLD: NORMAL
SERVICE CMNT-IMP: ABNORMAL
SERVICE CMNT-IMP: ABNORMAL
SERVICE CMNT-IMP: NORMAL
SODIUM SERPL-SCNC: 142 MMOL/L (ref 136–145)
THERAPEUTIC RANGE,PTTT: NORMAL SECS (ref 58–77)
VENTRICULAR RATE, ECG03: 76 BPM
WBC # BLD AUTO: 12.6 K/UL (ref 3.6–11)

## 2019-02-07 PROCEDURE — 74011636320 HC RX REV CODE- 636/320: Performed by: RADIOLOGY

## 2019-02-07 PROCEDURE — 93971 EXTREMITY STUDY: CPT

## 2019-02-07 PROCEDURE — 80053 COMPREHEN METABOLIC PANEL: CPT

## 2019-02-07 PROCEDURE — 85379 FIBRIN DEGRADATION QUANT: CPT

## 2019-02-07 PROCEDURE — 93005 ELECTROCARDIOGRAM TRACING: CPT

## 2019-02-07 PROCEDURE — 82962 GLUCOSE BLOOD TEST: CPT

## 2019-02-07 PROCEDURE — 74011000250 HC RX REV CODE- 250: Performed by: INTERNAL MEDICINE

## 2019-02-07 PROCEDURE — 77030013140 HC MSK NEB VYRM -A

## 2019-02-07 PROCEDURE — 99283 EMERGENCY DEPT VISIT LOW MDM: CPT

## 2019-02-07 PROCEDURE — 36415 COLL VENOUS BLD VENIPUNCTURE: CPT

## 2019-02-07 PROCEDURE — 94762 N-INVAS EAR/PLS OXIMTRY CONT: CPT

## 2019-02-07 PROCEDURE — 71275 CT ANGIOGRAPHY CHEST: CPT

## 2019-02-07 PROCEDURE — 83036 HEMOGLOBIN GLYCOSYLATED A1C: CPT

## 2019-02-07 PROCEDURE — 94640 AIRWAY INHALATION TREATMENT: CPT

## 2019-02-07 PROCEDURE — 96372 THER/PROPH/DIAG INJ SC/IM: CPT

## 2019-02-07 PROCEDURE — 74011250637 HC RX REV CODE- 250/637: Performed by: INTERNAL MEDICINE

## 2019-02-07 PROCEDURE — 99218 HC RM OBSERVATION: CPT

## 2019-02-07 PROCEDURE — 71046 X-RAY EXAM CHEST 2 VIEWS: CPT

## 2019-02-07 PROCEDURE — 85610 PROTHROMBIN TIME: CPT

## 2019-02-07 PROCEDURE — 85730 THROMBOPLASTIN TIME PARTIAL: CPT

## 2019-02-07 PROCEDURE — 74011250636 HC RX REV CODE- 250/636: Performed by: PHYSICIAN ASSISTANT

## 2019-02-07 PROCEDURE — 85025 COMPLETE CBC W/AUTO DIFF WBC: CPT

## 2019-02-07 RX ORDER — ENOXAPARIN SODIUM 100 MG/ML
1 INJECTION SUBCUTANEOUS
Status: COMPLETED | OUTPATIENT
Start: 2019-02-07 | End: 2019-02-07

## 2019-02-07 RX ORDER — ENOXAPARIN SODIUM 100 MG/ML
1 INJECTION SUBCUTANEOUS EVERY 12 HOURS
Status: DISCONTINUED | OUTPATIENT
Start: 2019-02-08 | End: 2019-02-09

## 2019-02-07 RX ORDER — BUDESONIDE 0.5 MG/2ML
500 INHALANT ORAL
Status: DISCONTINUED | OUTPATIENT
Start: 2019-02-07 | End: 2019-02-10 | Stop reason: HOSPADM

## 2019-02-07 RX ORDER — SODIUM CHLORIDE 0.9 % (FLUSH) 0.9 %
5-40 SYRINGE (ML) INJECTION EVERY 8 HOURS
Status: DISCONTINUED | OUTPATIENT
Start: 2019-02-07 | End: 2019-02-10 | Stop reason: HOSPADM

## 2019-02-07 RX ORDER — CARVEDILOL 3.12 MG/1
3.12 TABLET ORAL 2 TIMES DAILY WITH MEALS
Status: DISCONTINUED | OUTPATIENT
Start: 2019-02-07 | End: 2019-02-10 | Stop reason: HOSPADM

## 2019-02-07 RX ORDER — LOVASTATIN 20 MG/1
10 TABLET ORAL
Status: DISCONTINUED | OUTPATIENT
Start: 2019-02-07 | End: 2019-02-10 | Stop reason: HOSPADM

## 2019-02-07 RX ORDER — MAGNESIUM SULFATE 100 %
4 CRYSTALS MISCELLANEOUS AS NEEDED
Status: DISCONTINUED | OUTPATIENT
Start: 2019-02-07 | End: 2019-02-10 | Stop reason: HOSPADM

## 2019-02-07 RX ORDER — CALC/MAG/B COMPLEX/D3/HERB 61
15 TABLET ORAL
COMMUNITY
End: 2019-03-06

## 2019-02-07 RX ORDER — INSULIN LISPRO 100 [IU]/ML
INJECTION, SOLUTION INTRAVENOUS; SUBCUTANEOUS
Status: DISCONTINUED | OUTPATIENT
Start: 2019-02-07 | End: 2019-02-10 | Stop reason: HOSPADM

## 2019-02-07 RX ORDER — ARFORMOTEROL TARTRATE 15 UG/2ML
15 SOLUTION RESPIRATORY (INHALATION)
Status: DISCONTINUED | OUTPATIENT
Start: 2019-02-07 | End: 2019-02-10 | Stop reason: HOSPADM

## 2019-02-07 RX ORDER — ENOXAPARIN SODIUM 100 MG/ML
1 INJECTION SUBCUTANEOUS EVERY 24 HOURS
Status: DISCONTINUED | OUTPATIENT
Start: 2019-02-08 | End: 2019-02-07

## 2019-02-07 RX ORDER — DEXTROSE 50 % IN WATER (D50W) INTRAVENOUS SYRINGE
25-50 AS NEEDED
Status: DISCONTINUED | OUTPATIENT
Start: 2019-02-07 | End: 2019-02-10 | Stop reason: HOSPADM

## 2019-02-07 RX ORDER — SODIUM CHLORIDE 0.9 % (FLUSH) 0.9 %
5-40 SYRINGE (ML) INJECTION AS NEEDED
Status: DISCONTINUED | OUTPATIENT
Start: 2019-02-07 | End: 2019-02-10 | Stop reason: HOSPADM

## 2019-02-07 RX ORDER — LOSARTAN POTASSIUM 25 MG/1
25 TABLET ORAL DAILY
Status: DISCONTINUED | OUTPATIENT
Start: 2019-02-08 | End: 2019-02-10 | Stop reason: HOSPADM

## 2019-02-07 RX ORDER — BISMUTH SUBSALICYLATE 262 MG
1 TABLET,CHEWABLE ORAL
COMMUNITY

## 2019-02-07 RX ORDER — PANTOPRAZOLE SODIUM 40 MG/1
40 TABLET, DELAYED RELEASE ORAL
Status: DISCONTINUED | OUTPATIENT
Start: 2019-02-08 | End: 2019-02-10 | Stop reason: HOSPADM

## 2019-02-07 RX ORDER — MONTELUKAST SODIUM 10 MG/1
10 TABLET ORAL
Status: DISCONTINUED | OUTPATIENT
Start: 2019-02-07 | End: 2019-02-10 | Stop reason: HOSPADM

## 2019-02-07 RX ORDER — GLIPIZIDE 2.5 MG/1
2.5 TABLET, EXTENDED RELEASE ORAL EVERY EVENING
Status: DISCONTINUED | OUTPATIENT
Start: 2019-02-07 | End: 2019-02-10 | Stop reason: HOSPADM

## 2019-02-07 RX ADMIN — BUDESONIDE 500 MCG: 0.5 INHALANT RESPIRATORY (INHALATION) at 21:18

## 2019-02-07 RX ADMIN — ARFORMOTEROL TARTRATE 15 MCG: 15 SOLUTION RESPIRATORY (INHALATION) at 21:18

## 2019-02-07 RX ADMIN — GLIPIZIDE 2.5 MG: 2.5 TABLET, FILM COATED, EXTENDED RELEASE ORAL at 17:47

## 2019-02-07 RX ADMIN — IOPAMIDOL 100 ML: 755 INJECTION, SOLUTION INTRAVENOUS at 12:43

## 2019-02-07 RX ADMIN — LOVASTATIN 10 MG: 20 TABLET ORAL at 21:40

## 2019-02-07 RX ADMIN — CARVEDILOL 3.12 MG: 6.25 TABLET, FILM COATED ORAL at 17:46

## 2019-02-07 RX ADMIN — Medication 10 ML: at 17:58

## 2019-02-07 RX ADMIN — MONTELUKAST SODIUM 10 MG: 10 TABLET, FILM COATED ORAL at 21:39

## 2019-02-07 RX ADMIN — ENOXAPARIN SODIUM 90 MG: 100 INJECTION SUBCUTANEOUS at 15:05

## 2019-02-07 NOTE — PROGRESS NOTES
[facility]: MED RECONCILIATION Comments/Recommendations:  
Patient is awake, alert, and knowledgeable about home medications Verifies allergies and preferred pharmacy The patient's aspirin dose was increased two weeks ago from 81 mg at bedtime to 162 mg twice daily by the family nurse practitioner for swelling in the legs Blood glucose is monitored at home first thing in the morning and the patient is usually around 100. The patient took a short course of furosemide at the beginning of the January 2019 for the swelling in her legs but it was not helpful. Medications added: · MVI · Vitamin c 
· lansoprazole Medications removed: · Linaclotide · Pantoprazole Medications adjusted: · Aspirin changed to 162 mg BID Allergies: Crestor [rosuvastatin]; Lipitor [atorvastatin]; and Xarelto [rivaroxaban] Prior to Admission Medications:  
Prior to Admission Medications Prescriptions Last Dose Informant Patient Reported? Taking?  
ascorbate calcium (VITAMIN C PO) 2/6/2019 at Unknown time Self Yes Yes Sig: Take 1 Tab by mouth nightly. aspirin 81 mg tablet 2/6/2019 at Unknown time Self Yes Yes Sig: Take 162 mg by mouth two (2) times a day. carvedilol (COREG) 3.125 mg tablet 2/7/2019 at Unknown time Self Yes Yes Sig: Take 3.125 mg by mouth two (2) times daily (with meals). glipiZIDE SR (GLUCOTROL XL) 2.5 mg CR tablet 2/6/2019 at Unknown time Self Yes Yes Sig: Take 2.5 mg by mouth nightly. lansoprazole (PREVACID) 15 mg capsule  Self Yes Yes Sig: Take 15 mg by mouth daily as needed (heartburn). losartan (COZAAR) 25 mg tablet 2/7/2019 at Unknown time Self Yes Yes Sig: Take 25 mg by mouth daily. lovastatin (MEVACOR) 10 mg tablet 2/6/2019 at Unknown time Self Yes Yes Sig: Take 10 mg by mouth nightly. montelukast (SINGULAIR) 10 mg tablet 2/6/2019 at Unknown time Self Yes Yes Sig: Take 10 mg by mouth nightly. multivitamin (ONE A DAY) tablet 2/6/2019 at Unknown time Self Yes Yes Sig: Take 1 Tab by mouth nightly. Facility-Administered Medications: None Thank you, Lester eHaly, PharmD, BCPS

## 2019-02-07 NOTE — H&P
Admission History and Physical 
 
 
NAME:  Wesley Plascencia :   1945 MRN:  [id number] PCP:  Hallie Vidal NP Date/Time:  2019 Assessment/Plan:   
  
Pulmonary embolism ([facility] (2019) / Acute deep vein thrombosis (DVT) of left lower extremity ([facility] (2019): Bilateral with saddle embolus. Not hypotensive or hypoxic. Has dyspnea with exertion. Despite hx of GI bleed, current benefit of anticoagulation outweighs risk of GI bleeding, patient agrees. Unclear precipitating event. Has hx of breast CA s/p mastectomy w/o evidence of masses on CTA chest. 
-- agree with Lovenox -- eventual dc with DOAC Type 2 diabetes mellitus without complication, without long-term current use of insulin ([facility] (2012): -- continue glipizide 
-- add SSI 
 
HTN (hypertension) (2012): Not hypotensive despite PE. 
-- continue coreg and losartan 
-- hold lasix Hx GI bleed:  Admitted in  for melena developed while on Xarelto. The Xarelto was started for DVT prophylaxis after left THR. No recurrence since. EGD then was negative for source of bleeding. Bleeding had stopped with stopping anticoagulation. Colonoscopy in  with internal hemorrhoids and tics. -- add PPI Subjective: CHIEF COMPLAINT:  Left leg swelling and sob HISTORY OF PRESENT ILLNESS:    
Ms. Ondina Jeffrey is a 72 y.o.  female who is admitted with Pulmonary embolism ([facility] Ms. Ondina Jeffrey presented to the Emergency Department today complaining of left leg swelling and sob. Left leg swelling since august / September. Reports having a fall and had bruising of both legs. However, remained ambulatory. Since then has noted increasing leg edema of left. Had doppler in November that was negative. However, has had persistent edema of LLE. Also has noted sob with activity for the past 1-2 months. No cough. No chest pain.   No fever, chills. Reports dx and treatment of pneumonia with Levaquin per PCP. No hx of car / air travel, immobility, surgery. Not on hormone replacement. Past Medical History:  
Diagnosis Date  Bronchitis  Cancer ([facility] breast - left  Diabetes ([facility]  Hepatitis age 11  
 High cholesterol  Hypertension  Obesity (BMI 30-39. 9) Past Surgical History:  
Procedure Laterality Date  BREAST SURGERY PROCEDURE UNLISTED    
 L mastectomy  COLONOSCOPY N/A 5/26/2017 COLONOSCOPY- no info to   performed by Isis Leon MD at [place] [address] HX GYN    
 ablation, R ovary removed, tubal ligation  HX HAMMER TOE REPAIR    
 HX HEENT    
 tonsillectomy  HX ORTHOPAEDIC    
 L hip, R leg, C4-C5 fusion, L foot  HX OTHER SURGICAL    
 xiphoid removal  
 
 
Social History Tobacco Use  Smoking status: Former Smoker  Smokeless tobacco: Never Used Substance Use Topics  Alcohol use: Yes Comment: seldom Family History Problem Relation Age of Onset  Heart Attack Father  Diabetes Mother Allergies Allergen Reactions  Crestor [Rosuvastatin] Other (comments) Pain in left leg Causes muscle spasms  Lipitor [Atorvastatin] Other (comments) Left leg pain Causes muscle spasms  Xarelto [Rivaroxaban] Other (comments) Pt states it caused internal bleeding Prior to Admission medications Medication Sig Start Date End Date Taking? Authorizing Provider  
glipiZIDE SR (GLUCOTROL XL) 2.5 mg CR tablet Take  by mouth daily. Provider, Historical  
linaclotide (LINZESS) 72 mcg cap Take  by mouth daily. Provider, Historical  
carvedilol (COREG) 3.125 mg tablet Take 3.125 mg by mouth two (2) times daily (with meals). Provider, Historical  
lovastatin (MEVACOR) 10 mg tablet Take 10 mg by mouth nightly. Akhil Gillespie MD  
furosemide (LASIX) 20 mg tablet Take  by mouth daily.     Akhil Gillespie MD  
 pantoprazole (PROTONIX) 40 mg tablet Take 40 mg by mouth daily. Takes prn    Akhil Gillespie MD  
aspirin 81 mg tablet Take 81 mg by mouth. Akhil Gillespie MD  
losartan (COZAAR) 25 mg tablet Take 25 mg by mouth daily. Akhil Gillespie MD  
montelukast (SINGULAIR) 10 mg tablet Take 10 mg by mouth daily. Akhil Gillespie MD  
 
 
 
Review of Systems: 
(bold if positive, if negative) Gen:  Eyes:  ENT:  CVS:  edemaPulm:  dyspneaGI:   
:   
MS:  Skin:  Endo:   
Hem:  Renal:  Edema Neuro:    
 
 
  
Objective: VITALS:   
Vital signs reviewed; most recent are: 
 
Visit Vitals BP (!) 142/94 (BP 1 Location: Right arm, BP Patient Position: At rest) Pulse 84 Temp 98 °F (36.7 °C) Resp 14 Wt 88.9 kg (196 lb) SpO2 100% BMI 34.72 kg/m² SpO2 Readings from Last 6 Encounters:  
02/07/19 100% 11/06/18 96% 05/26/17 96% 04/30/17 95% 04/25/17 96% 05/02/13 93% No intake or output data in the 24 hours ending 02/07/19 1344 Exam:  
 
Physical Exam: 
 
Gen:  Well-developed, well-nourished, in no acute distress HEENT:  Pink conjunctivae, PERRL, hearing intact to voice, moist mucous membranes Neck:  Supple Resp:  No accessory muscle use, clear breath sounds without wheezes rales or rhonchi 
Card:  No murmurs, normal S1, S2 without thrills, LLE peripheral edema Abd:  Soft, non-tender, non-distended, normoactive bowel sounds are present Lymph:  No cervical adenopathy Musc:  LLE tight and edematous Skin:  No rashes or ulcers, skin turgor is good Neuro:  Cranial nerves 3-12 are grossly intact,  strength is 5/5 bilaterally, dorsi / plantarflexion strength is 5/5 bilaterally, follows commands appropriately Psych:  Alert with good insight. Oriented to person, place, and time Labs: 
 
Recent Labs 02/07/19 
1056 WBC 12.6* HGB 13.8 HCT 42.9  Recent Labs 02/07/19 
1056   
K 4.3  CO2 28 * BUN 16  
CREA 1.04* CA 9.1 ALB 3.2*  
 TBILI 0.6 SGOT 22 ALT 23 Lab Results Component Value Date/Time Glucose (POC) 110 (H) 05/26/2017 08:12 AM  
 Glucose (POC) 99 02/25/2012 11:35 AM  
 
No results for input(s): PH, PCO2, PO2, HCO3, FIO2 in the last 72 hours. Recent Labs 02/07/19 
1056 INR 1.0  
 
CTA chest:  Multiple bilateral acute pulmonary emboli, including a saddle embolus in the main pulmonary artery. Medical records reviewed in preparation for this admission: Old medical records. Surrogate decision maker:  daughter Total time spent in care of this patient: 60 Minutes Care Plan discussed with: Patient Discussed:  Care Plan Prophylaxis:  Lovenox Probable Disposition:  Home w/Family 
        
___________________________________________________ Attending Physician: Carmelita Vieyra MD

## 2019-02-07 NOTE — PROGRESS NOTES
1500- Bedside and Verbal shift change report given to Hannah (oncoming nurse) by Mustapha Villareal (offgoing nurse). Report included the following information SBAR, Kardex, ED Summary, [facility] and Recent Results. 1920- Verbal shift change report given to Wellington Patel (oncoming nurse) by Juan Pablo Qiu (offgoing nurse). Report included the following information SBAR, Kardex, ED Summary, MAR, Recent Results and Cardiac Rhythm NSR, PVC's.

## 2019-02-07 NOTE — ED PROVIDER NOTES
72 y.o. female with past medical history significant for HTN, diabetes, high cholesterol, left breaest cancer, hepatitis, bronchitis, and obesity who presents from home with chief complaint of leg swelling. Pt began experiencing LLE redness, pain, and swelling worsening over the past few months. Pt reports that she fell 6 months ago and was found to have PNM at that time and was treated with Levaquin for ~3 months. After, she began experiencing swelling around her left achilles tendon so f/u with [facility] months ago where they told her she had cellulitis. She has since completed multiple rounds of Abx since that time and notes that while she is on Abx she does not experience LLE swelling. Pt is scheduled for a Doppler next week. She states that her swelling and pain has worsened prompting her to come to the ED for evaluation. She also notes sob. She was tx recently for pna. Her sob has been persistent since. Pt denies CP, fever, chills, n/v/d or urinary sxs. .  There are no other acute medical concerns at this time. Social hx: no tobacco use; rare EtOH use PCP: Hallie Vidal NP Note written by Ashish Soares, as dictated by Jonelle Haile MD and Curry Booth. AHMET Ford-C10:36 AM 
 
 
The history is provided by the patient. No  was used. Past Medical History:  
Diagnosis Date  Bronchitis  Cancer ([address] breast - left  Diabetes ([facility]  Hepatitis age 11  
 High cholesterol  Hypertension  Obesity (BMI 30-39. 9) Past Surgical History:  
Procedure Laterality Date  BREAST SURGERY PROCEDURE UNLISTED    
 L mastectomy  COLONOSCOPY N/A 5/26/2017 COLONOSCOPY- no info to   performed by Bee Villa MD at [address] HX GYN    
 ablation, R ovary removed, tubal ligation  HX HAMMER TOE REPAIR    
 HX HEENT    
 tonsillectomy  HX ORTHOPAEDIC    
 L hip, R leg, C4-C5 fusion, L foot  HX OTHER SURGICAL xiphoid removal  
 
   
Family History:  
Problem Relation Age of Onset  Heart Attack Father  Diabetes Mother Social History Socioeconomic History  Marital status: SINGLE Spouse name: Not on file  Number of children: Not on file  Years of education: Not on file  Highest education level: Not on file Social Needs  Financial resource strain: Not on file  Food insecurity - worry: Not on file  Food insecurity - inability: Not on file  Transportation needs - medical: Not on file  Transportation needs - non-medical: Not on file Occupational History  Not on file Tobacco Use  Smoking status: Former Smoker  Smokeless tobacco: Never Used Substance and Sexual Activity  Alcohol use: Yes Comment: seldom  Drug use: No  
 Sexual activity: Not on file Other Topics Concern  Not on file Social History Narrative  Not on file ALLERGIES: Crestor [rosuvastatin]; Lipitor [atorvastatin]; and Xarelto [rivaroxaban] Review of Systems Constitutional: Negative for appetite change, chills, fatigue and fever. HENT: Negative for congestion, ear pain, postnasal drip, rhinorrhea, sneezing and sore throat. Eyes: Negative for redness and visual disturbance. Respiratory: Negative for cough, shortness of breath and wheezing. Cardiovascular: Positive for leg swelling. Negative for chest pain and palpitations. Gastrointestinal: Negative for abdominal pain, anal bleeding, constipation, diarrhea, nausea and vomiting. Genitourinary: Negative for difficulty urinating, dysuria, frequency and hematuria. Musculoskeletal: Positive for myalgias. Negative for arthralgias, back pain and neck stiffness. Skin: Positive for color change. Negative for rash. Allergic/Immunologic: Negative for immunocompromised state. Neurological: Negative for dizziness, syncope, weakness, light-headedness and headaches. Hematological: Negative for adenopathy. Vitals:  
 02/07/19 1039 02/07/19 1503 02/07/19 1600 BP: (!) 142/94 152/55 123/63 Pulse: 84 95 94 Resp: 14 14 22 Temp: 98 °F (36.7 °C) SpO2: 100% 98% (!) 88% Weight: 88.9 kg (196 lb) Physical Exam  
Constitutional: She is oriented to person, place, and time. She appears well-developed and well-nourished. No distress. HENT:  
Head: Normocephalic and atraumatic. Right Ear: External ear normal.  
Left Ear: External ear normal.  
Eyes: EOM are normal. Pupils are equal, round, and reactive to light. Neck: Neck supple. Cardiovascular: Normal rate, regular rhythm, normal heart sounds and intact distal pulses. Exam reveals no gallop and no friction rub. No murmur heard. Pulmonary/Chest: Effort normal and breath sounds normal. No stridor. No respiratory distress. She has no wheezes. She has no rales. She exhibits no tenderness. Abdominal: Soft. Bowel sounds are normal. She exhibits no distension and no mass. There is no tenderness. There is no rebound and no guarding. Musculoskeletal: Normal range of motion. She exhibits edema. She exhibits no tenderness or deformity. Left lower leg edema with injection. No deformity. Dnvi. Cap refill brisk. Normothermic. ROM intact. No lesions, ambulates without assistance Neurological: She is alert and oriented to person, place, and time. No cranial nerve deficit. Coordination normal.  
Skin: Skin is warm and dry. Capillary refill takes less than 2 seconds. No rash noted. No erythema. No pallor. Psychiatric: She has a normal mood and affect. Her behavior is normal.  
Nursing note and vitals reviewed. MDM Number of Diagnoses or Management Options Bilateral pulmonary embolism [facility]):  
Deep vein thrombosis (DVT) of left lower extremity, unspecified chronicity, unspecified vein ([facility]): Amount and/or Complexity of Data Reviewed Clinical lab tests: ordered and reviewed Tests in the radiology section of CPT®: ordered and reviewed Tests in the medicine section of CPT®: reviewed and ordered Review and summarize past medical records: yes Independent visualization of images, tracings, or specimens: yes Critical Care Total time providing critical care: 30-74 minutes Procedures 11:27 AM 
Discussed pt, sx, hx and current findings with Jonelle Haile MD. He is in agreement with plan and will see pt. Will get labs, imaging and continue to monitor Curry Booth. MAYCOL Ford 
 
11:50 AM 
Pt has + dvt will get CTA chest 
Curry Booth. MAYCOL Ford 
 
1:25 PM  
Ct + for saddle pe. Will admit Curry Booth. MAYCOL Ford 
 
 
1:25 PM 
Curry Booth. MAYCOL Ford spoke with Dr. Aylin Aguayo, Consult for Hospitalist. Discussed available diagnostic tests and clinical findings. He is in agreement with care plans as outlined. He will admit pt  
AHMET Kasper Critical Care: The reason for providing this level of medical care for this critically ill patient was due to a critical illness that impaired one or more vital organ systems such that there was a high probability of imminent or life threatening deterioration in the patients condition. This care involved high complexity decision making to assess, manipulate, and support vital system functions. Total critical care time spent exclusive of procedures:  35 min LABS COMPLETED AND REVIEWED: 
Recent Results (from the past 12 hour(s)) CBC WITH AUTOMATED DIFF Collection Time: 02/07/19 10:56 AM  
Result Value Ref Range WBC 12.6 (H) 3.6 - 11.0 K/uL  
 RBC 5.14 3.80 - 5.20 M/uL  
 HGB 13.8 11.5 - 16.0 g/dL HCT 42.9 35.0 - 47.0 % MCV 83.5 80.0 - 99.0 FL  
 MCH 26.8 26.0 - 34.0 PG  
 MCHC 32.2 30.0 - 36.5 g/dL  
 RDW 13.2 11.5 - 14.5 % PLATELET 677 772 - 257 K/uL MPV 10.2 8.9 - 12.9 FL  
 NRBC 0.0 0  WBC ABSOLUTE NRBC 0.00 0.00 - 0.01 K/uL NEUTROPHILS 74 32 - 75 % LYMPHOCYTES 15 12 - 49 % MONOCYTES 8 5 - 13 % EOSINOPHILS 3 0 - 7 % BASOPHILS 1 0 - 1 % IMMATURE GRANULOCYTES 0 0.0 - 0.5 % ABS. NEUTROPHILS 9.3 (H) 1.8 - 8.0 K/UL  
 ABS. LYMPHOCYTES 1.9 0.8 - 3.5 K/UL  
 ABS. MONOCYTES 1.0 0.0 - 1.0 K/UL  
 ABS. EOSINOPHILS 0.3 0.0 - 0.4 K/UL  
 ABS. BASOPHILS 0.1 0.0 - 0.1 K/UL  
 ABS. IMM. GRANS. 0.1 (H) 0.00 - 0.04 K/UL  
 DF AUTOMATED METABOLIC PANEL, COMPREHENSIVE Collection Time: 02/07/19 10:56 AM  
Result Value Ref Range Sodium 142 136 - 145 mmol/L Potassium 4.3 3.5 - 5.1 mmol/L Chloride 106 97 - 108 mmol/L  
 CO2 28 21 - 32 mmol/L Anion gap 8 5 - 15 mmol/L Glucose 107 (H) 65 - 100 mg/dL BUN 16 6 - 20 MG/DL Creatinine 1.04 (H) 0.55 - 1.02 MG/DL  
 BUN/Creatinine ratio 15 12 - 20 GFR est AA >60 >60 ml/min/1.73m2 GFR est non-AA 52 (L) >60 ml/min/1.73m2 Calcium 9.1 8.5 - 10.1 MG/DL Bilirubin, total 0.6 0.2 - 1.0 MG/DL  
 ALT (SGPT) 23 12 - 78 U/L  
 AST (SGOT) 22 15 - 37 U/L Alk. phosphatase 102 45 - 117 U/L Protein, total 7.2 6.4 - 8.2 g/dL Albumin 3.2 (L) 3.5 - 5.0 g/dL Globulin 4.0 2.0 - 4.0 g/dL A-G Ratio 0.8 (L) 1.1 - 2.2 D DIMER Collection Time: 02/07/19 10:56 AM  
Result Value Ref Range D-dimer 9.47 (H) 0.00 - 0.65 mg/L FEU  
PTT Collection Time: 02/07/19 10:56 AM  
Result Value Ref Range aPTT 26.6 22.1 - 32.0 sec  
 aPTT, therapeutic range     58.0 - 77.0 SECS  
PROTHROMBIN TIME + INR Collection Time: 02/07/19 10:56 AM  
Result Value Ref Range INR 1.0 0.9 - 1.1 Prothrombin time 10.2 9.0 - 11.1 sec SAMPLES BEING HELD Collection Time: 02/07/19 10:56 AM  
Result Value Ref Range SAMPLES BEING HELD RED,SST COMMENT Add-on orders for these samples will be processed based on acceptable specimen integrity and analyte stability, which may vary by analyte. EKG, 12 LEAD, INITIAL Collection Time: 02/07/19  2:16 PM  
Result Value Ref Range Ventricular Rate 76 BPM  
 Atrial Rate 76 BPM  
 P-R Interval 134 ms QRS Duration 74 ms Q-T Interval 382 ms QTC Calculation (Bezet) 429 ms Calculated P Axis 27 degrees Calculated R Axis 6 degrees Calculated T Axis 61 degrees Diagnosis Normal sinus rhythm with sinus arrhythmia Normal ECG When compared with ECG of 06-NOV-2018 21:32, No significant change was found GLUCOSE, POC Collection Time: 02/07/19  2:29 PM  
Result Value Ref Range Glucose (POC) 96 65 - 100 mg/dL Performed by [employer] [facility] COMPLETED AND REVIEWED: 
The following have been ordered and reviewed: 
 
Xr Chest Pa Lat Result Date: 2/7/2019 INDICATION: Dyspnea COMPARISON: 5/2/2013 FINDINGS: PA and lateral views of the chest demonstrate a stable cardiomediastinal silhouette. There is mild airspace disease in the lingula. The lungs are otherwise clear. The visualized osseous structures are unremarkable. IMPRESSION: Mild airspace disease in the lingular segment of the left upper lobe may represent atelectasis or early pneumonia. Follow-up is recommended to assure resolution. Cta Chest W Or W Wo Cont Result Date: 2/7/2019 INDICATION: DVT. Shortness of breath. COMPARISON:November 6, 2018 TECHNIQUE:  Routine noncontrast imaging the chest was performed for localization purposes. Then, following the uneventful intravenous administration of 100 cc [id number], thin helical axial images were obtained through the chest. 3D image postprocessing was performed. CT dose reduction was achieved through use of a standardized protocol tailored for this examination and automatic exposure control for dose modulation. FINDINGS: CHEST WALL: Status post left mastectomy with TRAM flap reconstruction. Right breast implant. THYROID: No nodule. MEDIASTINUM: No mass or lymphadenopathy. KEVIN: No mass or lymphadenopathy. THORACIC AORTA: No dissection or aneurysm. PULMONARY ARTERIES: Main pulmonary artery is normal in caliber.  Multiple bilateral acute pulmonary emboli, including a saddle embolus in the main pulmonary artery. TRACHEA/BRONCHI: Patent. ESOPHAGUS: No wall thickening or dilatation. HEART: Normal in size. PLEURA: No effusion or pneumothorax. LUNGS: 7 mm right lower lobe pulmonary nodule (series 2, image 36) is not significantly changed. Mild linear atelectasis in the lingula. INCIDENTALLY IMAGED UPPER ABDOMEN: Punctate nonobstructing right renal calculus. BONES: No destructive bone lesion. ADDITIONAL COMMENTS: N/A IMPRESSION: Extensive bilateral acute pulmonary emboli, including saddle embolus in the main pulmonary artery. Unchanged 7 mm right lower lobe pulmonary nodule. Mild linear atelectasis in the lingula. The findings were called to Lyndsay Villatoro on February 7, 2019 at 1:22 PM by Dr. Shelly Lacy. [address] MEDICATIONS GIVEN: 
Medications  
sodium chloride (NS) flush 5-40 mL (not administered)  
sodium chloride (NS) flush 5-40 mL (not administered)  
carvedilol (COREG) tablet 3.125 mg (not administered) glipiZIDE SR (GLUCOTROL XL) tablet 2.5 mg (not administered)  
losartan (COZAAR) tablet 25 mg (not administered) lovastatin (MEVACOR) tablet 10 mg (not administered)  
montelukast (SINGULAIR) tablet 10 mg (not administered)  
pantoprazole (PROTONIX) tablet 40 mg (not administered)  
insulin lispro (HUMALOG) injection (not administered) glucose chewable tablet 16 g (not administered) dextrose (D50W) injection syrg 12.5-25 g (not administered) glucagon (GLUCAGEN) injection 1 mg (not administered)  
enoxaparin (LOVENOX) injection 90 mg (not administered) iopamidol (ISOVUE-370) 76 % injection 100 mL (100 mL IntraVENous Given 2/7/19 1243) enoxaparin (LOVENOX) injection 90 mg (90 mg SubCUTAneous Given 2/7/19 1505) CLINICAL IMPRESSION: 
1. Bilateral pulmonary embolism ([address] 2. Deep vein thrombosis (DVT) of left lower extremity, unspecified chronicity, unspecified vein (HCC) Plan 1.  Admission per Hospitalist 
 
 
1:26 PM 
 The patient is being admitted to the hospital.  The results of their tests and reasons for their admission have been discussed with them and/or available family. The patient/family has conveyed agreement and understanding for the need to be admitted and for their admission diagnosis. Consultation has been made with the inpatient physician specialist for hospitalization.

## 2019-02-07 NOTE — ED TRIAGE NOTES
Left leg swelling and redness x months/November. Seen at [place] recently but unsure of date. Dx with cellulitis previously. Leg swells when not on AB meds. PCP concerned about circulation.

## 2019-02-08 ENCOUNTER — APPOINTMENT (OUTPATIENT)
Dept: INTERVENTIONAL RADIOLOGY/VASCULAR | Age: 74
DRG: 167 | End: 2019-02-08
Attending: RADIOLOGY
Payer: MEDICARE

## 2019-02-08 ENCOUNTER — APPOINTMENT (OUTPATIENT)
Dept: NON INVASIVE DIAGNOSTICS | Age: 74
DRG: 167 | End: 2019-02-08
Attending: INTERNAL MEDICINE
Payer: MEDICARE

## 2019-02-08 PROBLEM — I26.99 ACUTE PULMONARY EMBOLISM (HCC): Status: ACTIVE | Noted: 2019-02-08

## 2019-02-08 LAB
ANION GAP SERPL CALC-SCNC: 7 MMOL/L (ref 5–15)
BUN SERPL-MCNC: 15 MG/DL (ref 6–20)
BUN/CREAT SERPL: 15 (ref 12–20)
CALCIUM SERPL-MCNC: 8.6 MG/DL (ref 8.5–10.1)
CHLORIDE SERPL-SCNC: 107 MMOL/L (ref 97–108)
CO2 SERPL-SCNC: 27 MMOL/L (ref 21–32)
CREAT SERPL-MCNC: 1 MG/DL (ref 0.55–1.02)
ECHO AO ASC DIAM: 3.02 CM
ECHO AO ROOT DIAM: 3.21 CM
ECHO EST RA PRESSURE: 3 MMHG
ECHO IVC SNIFF: 1.29 CM
ECHO LA AREA 4C: 11.7 CM2
ECHO LA MAJOR AXIS: 4.04 CM
ECHO LA TO AORTIC ROOT RATIO: 1.26
ECHO LA VOL 2C: 29.69 ML (ref 22–52)
ECHO LA VOL 4C: 23.57 ML (ref 22–52)
ECHO LA VOL BP: 28.3 ML (ref 22–52)
ECHO LA VOL/BSA BIPLANE: 14.76 ML/M2 (ref 16–28)
ECHO LA VOLUME INDEX A2C: 15.49 ML/M2 (ref 16–28)
ECHO LA VOLUME INDEX A4C: 12.29 ML/M2 (ref 16–28)
ECHO LV E' LATERAL VELOCITY: 9.63 CENTIMETER/SECOND
ECHO LV E' SEPTAL VELOCITY: 8.41 CENTIMETER/SECOND
ECHO LV INTERNAL DIMENSION DIASTOLIC: 4.25 CM (ref 3.9–5.3)
ECHO LV INTERNAL DIMENSION SYSTOLIC: 2.84 CM
ECHO LV IVSD: 1.19 CM (ref 0.6–0.9)
ECHO LV MASS 2D: 201.7 G (ref 67–162)
ECHO LV MASS INDEX 2D: 105.2 G/M2 (ref 43–95)
ECHO LV POSTERIOR WALL DIASTOLIC: 1.14 CM (ref 0.6–0.9)
ECHO LVOT DIAM: 1.62 CM
ECHO MV A VELOCITY: 56.97 CM/S
ECHO MV AREA PHT: 3.8 CM2
ECHO MV E DECELERATION TIME (DT): 201.5 MS
ECHO MV E VELOCITY: 0.55 CM/S
ECHO MV E/A RATIO: 0.01
ECHO MV PRESSURE HALF TIME (PHT): 58.4 MS
ECHO PULMONARY ARTERY SYSTOLIC PRESSURE (PASP): 37.7 MMHG
ECHO RIGHT VENTRICULAR SYSTOLIC PRESSURE (RVSP): 37.7 MMHG
ECHO RV INTERNAL DIMENSION: 3.04 CM
ECHO RV TAPSE: 2.43 CM (ref 1.5–2)
ECHO TV REGURGITANT MAX VELOCITY: 294.59 CM/S
ECHO TV REGURGITANT PEAK GRADIENT: 34.7 MMHG
ERYTHROCYTE [DISTWIDTH] IN BLOOD BY AUTOMATED COUNT: 13.2 % (ref 11.5–14.5)
GLUCOSE BLD STRIP.AUTO-MCNC: 109 MG/DL (ref 65–100)
GLUCOSE BLD STRIP.AUTO-MCNC: 148 MG/DL (ref 65–100)
GLUCOSE BLD STRIP.AUTO-MCNC: 157 MG/DL (ref 65–100)
GLUCOSE BLD STRIP.AUTO-MCNC: 91 MG/DL (ref 65–100)
GLUCOSE SERPL-MCNC: 129 MG/DL (ref 65–100)
HCT VFR BLD AUTO: 37.4 % (ref 35–47)
HGB BLD-MCNC: 12.5 G/DL (ref 11.5–16)
MAGNESIUM SERPL-MCNC: 1.8 MG/DL (ref 1.6–2.4)
MCH RBC QN AUTO: 27.5 PG (ref 26–34)
MCHC RBC AUTO-ENTMCNC: 33.4 G/DL (ref 30–36.5)
MCV RBC AUTO: 82.2 FL (ref 80–99)
NRBC # BLD: 0 K/UL (ref 0–0.01)
NRBC BLD-RTO: 0 PER 100 WBC
PHOSPHATE SERPL-MCNC: 3.4 MG/DL (ref 2.6–4.7)
PLATELET # BLD AUTO: 165 K/UL (ref 150–400)
PMV BLD AUTO: 10.6 FL (ref 8.9–12.9)
POTASSIUM SERPL-SCNC: 4.1 MMOL/L (ref 3.5–5.1)
RBC # BLD AUTO: 4.55 M/UL (ref 3.8–5.2)
SERVICE CMNT-IMP: ABNORMAL
SERVICE CMNT-IMP: NORMAL
SODIUM SERPL-SCNC: 141 MMOL/L (ref 136–145)
WBC # BLD AUTO: 10.7 K/UL (ref 3.6–11)

## 2019-02-08 PROCEDURE — 65660000000 HC RM CCU STEPDOWN

## 2019-02-08 PROCEDURE — C1769 GUIDE WIRE: HCPCS

## 2019-02-08 PROCEDURE — 37191 INS ENDOVAS VENA CAVA FILTR: CPT

## 2019-02-08 PROCEDURE — 36415 COLL VENOUS BLD VENIPUNCTURE: CPT

## 2019-02-08 PROCEDURE — 77030011229 HC DIL VESL COON COOK -A

## 2019-02-08 PROCEDURE — 96372 THER/PROPH/DIAG INJ SC/IM: CPT

## 2019-02-08 PROCEDURE — 74011250636 HC RX REV CODE- 250/636: Performed by: INTERNAL MEDICINE

## 2019-02-08 PROCEDURE — 76937 US GUIDE VASCULAR ACCESS: CPT

## 2019-02-08 PROCEDURE — 06H03DZ INSERTION OF INTRALUMINAL DEVICE INTO INFERIOR VENA CAVA, PERCUTANEOUS APPROACH: ICD-10-PCS

## 2019-02-08 PROCEDURE — 82962 GLUCOSE BLOOD TEST: CPT

## 2019-02-08 PROCEDURE — 83735 ASSAY OF MAGNESIUM: CPT

## 2019-02-08 PROCEDURE — 74011250636 HC RX REV CODE- 250/636: Performed by: RADIOLOGY

## 2019-02-08 PROCEDURE — 99218 HC RM OBSERVATION: CPT

## 2019-02-08 PROCEDURE — C1880 VENA CAVA FILTER: HCPCS

## 2019-02-08 PROCEDURE — 94640 AIRWAY INHALATION TREATMENT: CPT

## 2019-02-08 PROCEDURE — 74011250636 HC RX REV CODE- 250/636

## 2019-02-08 PROCEDURE — 85027 COMPLETE CBC AUTOMATED: CPT

## 2019-02-08 PROCEDURE — 74011636320 HC RX REV CODE- 636/320: Performed by: RADIOLOGY

## 2019-02-08 PROCEDURE — 74011250637 HC RX REV CODE- 250/637: Performed by: INTERNAL MEDICINE

## 2019-02-08 PROCEDURE — 74011000250 HC RX REV CODE- 250: Performed by: INTERNAL MEDICINE

## 2019-02-08 PROCEDURE — 80048 BASIC METABOLIC PNL TOTAL CA: CPT

## 2019-02-08 PROCEDURE — 84100 ASSAY OF PHOSPHORUS: CPT

## 2019-02-08 PROCEDURE — 93306 TTE W/DOPPLER COMPLETE: CPT

## 2019-02-08 RX ORDER — LIDOCAINE HYDROCHLORIDE 10 MG/ML
10-20 INJECTION INFILTRATION; PERINEURAL
Status: DISCONTINUED | OUTPATIENT
Start: 2019-02-08 | End: 2019-02-08 | Stop reason: HOSPADM

## 2019-02-08 RX ORDER — FENTANYL CITRATE 50 UG/ML
25-100 INJECTION, SOLUTION INTRAMUSCULAR; INTRAVENOUS
Status: DISCONTINUED | OUTPATIENT
Start: 2019-02-08 | End: 2019-02-08 | Stop reason: HOSPADM

## 2019-02-08 RX ADMIN — ARFORMOTEROL TARTRATE 15 MCG: 15 SOLUTION RESPIRATORY (INHALATION) at 08:28

## 2019-02-08 RX ADMIN — MONTELUKAST SODIUM 10 MG: 10 TABLET, FILM COATED ORAL at 21:26

## 2019-02-08 RX ADMIN — ENOXAPARIN SODIUM 90 MG: 100 INJECTION SUBCUTANEOUS at 18:11

## 2019-02-08 RX ADMIN — Medication 10 ML: at 21:27

## 2019-02-08 RX ADMIN — LIDOCAINE HYDROCHLORIDE 10 ML: 10 INJECTION, SOLUTION INFILTRATION; PERINEURAL at 15:03

## 2019-02-08 RX ADMIN — FENTANYL CITRATE 25 MCG: 50 INJECTION, SOLUTION INTRAMUSCULAR; INTRAVENOUS at 15:03

## 2019-02-08 RX ADMIN — CARVEDILOL 3.12 MG: 6.25 TABLET, FILM COATED ORAL at 18:11

## 2019-02-08 RX ADMIN — ARFORMOTEROL TARTRATE 15 MCG: 15 SOLUTION RESPIRATORY (INHALATION) at 19:53

## 2019-02-08 RX ADMIN — GLIPIZIDE 2.5 MG: 2.5 TABLET, FILM COATED, EXTENDED RELEASE ORAL at 18:10

## 2019-02-08 RX ADMIN — ENOXAPARIN SODIUM 90 MG: 100 INJECTION SUBCUTANEOUS at 06:51

## 2019-02-08 RX ADMIN — IOPAMIDOL 15 ML: 755 INJECTION, SOLUTION INTRAVENOUS at 15:15

## 2019-02-08 RX ADMIN — FENTANYL CITRATE 25 MCG: 50 INJECTION, SOLUTION INTRAMUSCULAR; INTRAVENOUS at 14:58

## 2019-02-08 RX ADMIN — BUDESONIDE 500 MCG: 0.5 INHALANT RESPIRATORY (INHALATION) at 08:28

## 2019-02-08 RX ADMIN — FENTANYL CITRATE 25 MCG: 50 INJECTION, SOLUTION INTRAMUSCULAR; INTRAVENOUS at 14:53

## 2019-02-08 RX ADMIN — BUDESONIDE 500 MCG: 0.5 INHALANT RESPIRATORY (INHALATION) at 19:53

## 2019-02-08 RX ADMIN — LOSARTAN POTASSIUM 25 MG: 25 TABLET, FILM COATED ORAL at 08:28

## 2019-02-08 RX ADMIN — FENTANYL CITRATE 25 MCG: 50 INJECTION, SOLUTION INTRAMUSCULAR; INTRAVENOUS at 15:07

## 2019-02-08 RX ADMIN — PANTOPRAZOLE SODIUM 40 MG: 40 TABLET, DELAYED RELEASE ORAL at 06:52

## 2019-02-08 RX ADMIN — LOVASTATIN 10 MG: 20 TABLET ORAL at 21:26

## 2019-02-08 RX ADMIN — CARVEDILOL 3.12 MG: 6.25 TABLET, FILM COATED ORAL at 08:29

## 2019-02-08 NOTE — CONSULTS
Name: Marysol Oakley: [employer]   : 1945 Admit Date: 2019   Phone: [phone]  Room: [place] [address]   PCP: Alfonso Mcbride NP  MRN: [id number]   Date: 2019  Code: Full Code          Chart and notes reviewed. Data reviewed. I review the patient's current medications in the medical record at each encounter. I have evaluated and examined the patient. HPI:    8:20 AM       History was obtained from patient and chart. I was asked by Angeli Salamanca MD to see Cristina Shoemaker in consultation for a chief complaint of saddle PE. Ms. Conrado Ceja is a pleasant 72year old female who presented to the [facility] ED with left leg swelling and SOB. Reports left leg swelling since Aug/Sept of last year. Reports sustaining a fall with bruising of both legs, but left leg remained swollen. Had been treated around that time with Levaquin and prednisone for pneumonia. Initially, her PCP thought she may have developed tendonitis from the Levaquin and she was referred to Ortho. Achilles tendonitis was ruled out by Ortho. She then had LE doppler in Nov that was negative. Reports WEISS that began in the last 1-2 months, off and on and mostly associated with walking long distances. Denies fever, chills, cough, or CP. Denies history of plane/car/train travel. Denies recent period of immobility or surgery. Does not take HRT. Does have history of left breast cancer, s/p mastectomy 10 years ago. States she did not require chemo or radiation and no lymph nodes were removed. She does have history of GI bleed while taking Xarelto. In , had left THR and was place on Xarelto for DVT prophylaxis. Developed melena. EGD with no bleeding source identified and patient denies further issues with bleeding since then. 2017 colonoscopy with normal mucosa, small internal hemorrhoids, and left colon diverticulosis. She is a former smoker.     Chest CTA is personally visualized and compared to prior images from 11/18. There is extensive bilateral acute pulmonary emboli, including saddle embolus in the main pulmonary artery. There is unchanged 7 mm right lower lobe pulmonary nodule. There is mild linear atelectasis in the lingula. 2/7/19 left lower extremity venous duplex is positive for deep vein thrombosis involving the CFV, FV, profunda, popliteal, posterior tibial and gastrocnemius veins. The right CFV is thrombus free. 11/6/18 BLE VD and chest CTA negative for DVT/PE. WBC 10.7  Hgb 12.5  Creat 1.00    Past Medical History:   Diagnosis Date    Bronchitis     [facility])     breast - left    Diabetes ([address]     Hepatitis age 12    High cholesterol     Hypertension     Obesity (BMI 30-39. 9)        Past Surgical History:   Procedure Laterality Date    BREAST SURGERY PROCEDURE UNLISTED      L mastectomy    COLONOSCOPY N/A 5/26/2017    COLONOSCOPY- no info to   performed by Glenna Spann MD at [facility] HX GYN      ablation, R ovary removed, tubal ligation    HX HAMMER TOE REPAIR      HX HEENT      tonsillectomy    HX ORTHOPAEDIC      L hip, R leg, C4-C5 fusion, L foot    HX OTHER SURGICAL      xiphoid removal       Family History   Problem Relation Age of Onset    Heart Attack Father     Diabetes Mother        Social History     Tobacco Use    Smoking status: Former Smoker    Smokeless tobacco: Never Used   Substance Use Topics    Alcohol use: Yes     Comment: seldom       Allergies   Allergen Reactions    Crestor [Rosuvastatin] Other (comments)     Pain in left leg  Causes muscle spasms    Lipitor [Atorvastatin] Other (comments)     Left leg pain  Causes muscle spasms    Xarelto [Rivaroxaban] Other (comments)     Pt states it caused internal bleeding       Current Facility-Administered Medications   Medication Dose Route Frequency    sodium chloride (NS) flush 5-40 mL  5-40 mL IntraVENous Q8H    sodium chloride (NS) flush 5-40 mL  5-40 mL IntraVENous PRN    carvedilol (COREG) tablet 3.125 mg  3.125 mg Oral BID WITH MEALS    glipiZIDE SR (GLUCOTROL XL) tablet 2.5 mg  2.5 mg Oral QPM    losartan (COZAAR) tablet 25 mg  25 mg Oral DAILY    lovastatin (MEVACOR) tablet 10 mg  10 mg Oral QHS    montelukast (SINGULAIR) tablet 10 mg  10 mg Oral QHS    pantoprazole (PROTONIX) tablet 40 mg  40 mg Oral ACB    insulin lispro (HUMALOG) injection   SubCUTAneous AC&HS    glucose chewable tablet 16 g  4 Tab Oral PRN    dextrose (D50W) injection syrg 12.5-25 g  25-50 mL IntraVENous PRN    glucagon (GLUCAGEN) injection 1 mg  1 mg IntraMUSCular PRN    enoxaparin (LOVENOX) injection 90 mg  1 mg/kg SubCUTAneous Q12H    arformoterol (BROVANA) neb solution 15 mcg  15 mcg Nebulization BID RT    budesonide (PULMICORT) 500 mcg/2 ml nebulizer suspension  500 mcg Nebulization BID RT     Current Outpatient Medications   Medication Sig    lansoprazole (PREVACID) 15 mg capsule Take 15 mg by mouth daily as needed (heartburn).  multivitamin (ONE A DAY) tablet Take 1 Tab by mouth nightly.  ascorbate calcium (VITAMIN C PO) Take 1 Tab by mouth nightly.  mometasone-formoterol (DULERA) 200-5 mcg/actuation HFA inhaler Take 2 Puffs by inhalation two (2) times a day.  glipiZIDE SR (GLUCOTROL XL) 2.5 mg CR tablet Take 2.5 mg by mouth nightly.  carvedilol (COREG) 3.125 mg tablet Take 3.125 mg by mouth two (2) times daily (with meals).  lovastatin (MEVACOR) 10 mg tablet Take 10 mg by mouth nightly.  aspirin 81 mg tablet Take 162 mg by mouth two (2) times a day.  losartan (COZAAR) 25 mg tablet Take 25 mg by mouth daily.  montelukast (SINGULAIR) 10 mg tablet Take 10 mg by mouth nightly. REVIEW OF SYSTEMS   12 point ROS negative except as stated in the HPI. Physical Exam:   Visit Vitals  BP (!) 176/96   Pulse 78   Temp 97.9 °F (36.6 °C)   Resp 22   Ht 5' 3\" (1.6 m)   Wt 88.9 kg (196 lb)   SpO2 95%   Breastfeeding?  No   BMI 34.72 kg/m² General:  Alert, cooperative, no distress, appears stated age. Head:  Normocephalic, without obvious abnormality, atraumatic. Eyes:  Conjunctivae/corneas clear. Nose: Nares normal. Septum midline. Mucosa normal.    Throat: Lips, mucosa, and tongue normal.    Neck: Supple, symmetrical, trachea midline, no adenopathy. Lungs:   Clear to auscultation bilaterally. Chest wall:  No tenderness or deformity. Heart:  Regular rate and rhythm, S1, S2 normal, no murmur, click, rub or gallop. Abdomen:   Soft, non-tender. Bowel sounds normal. No masses,  No organomegaly. Extremities: Right extremities normal, atraumatic, no cyanosis or edema. LLE edematous and swollen. Pulses: 2+ and symmetric all extremities. Skin: Skin color, texture, turgor normal. No rashes or lesions   Lymph nodes: Cervical, supraclavicular nodes normal.   Neurologic: Grossly nonfocal       Lab Results   Component Value Date/Time    Sodium 141 02/08/2019 03:38 AM    Potassium 4.1 02/08/2019 03:38 AM    Chloride 107 02/08/2019 03:38 AM    CO2 27 02/08/2019 03:38 AM    BUN 15 02/08/2019 03:38 AM    Creatinine 1.00 02/08/2019 03:38 AM    Glucose 129 (H) 02/08/2019 03:38 AM    Calcium 8.6 02/08/2019 03:38 AM    Magnesium 1.8 02/08/2019 03:38 AM    Phosphorus 3.4 02/08/2019 03:38 AM       Lab Results   Component Value Date/Time    WBC 10.7 02/08/2019 03:38 AM    HGB 12.5 02/08/2019 03:38 AM    PLATELET 320 18/20/3629 03:38 AM    MCV 82.2 02/08/2019 03:38 AM       Lab Results   Component Value Date/Time    INR 1.0 02/07/2019 10:56 AM    aPTT 26.6 02/07/2019 10:56 AM    AST (SGOT) 22 02/07/2019 10:56 AM    Alk.  phosphatase 102 02/07/2019 10:56 AM    Protein, total 7.2 02/07/2019 10:56 AM    Albumin 3.2 (L) 02/07/2019 10:56 AM    Globulin 4.0 02/07/2019 10:56 AM       No results found for: IRON, FE, TIBC, IBCT, PSAT, FERR    Lab Results   Component Value Date/Time    Sed rate (ESR) 36 (H) 06/17/2010 04:30 PM    C-Reactive protein 0.94 (H) 06/17/2010 04:30 PM        No results found for: PH, PHI, PCO2, PCO2I, PO2, PO2I, HCO3, HCO3I, FIO2, FIO2I    Lab Results   Component Value Date/Time    Troponin-I, Qt. <0.05 11/06/2018 09:01 PM        Lab Results   Component Value Date/Time    Culture result: KLEBSIELLA PNEUMONIAE  ESCHERICHIA COLI 03/10/2013 12:41 PM    Culture result:  06/17/2010 04:22 PM     NO GROWTH 14 DAYS, ON SOLID MEDIA  STAPHYLOCOCCUS SPECIES, COAGULASE NEGATIVE ISOLATED FROM THIO BROTH ONLY    Culture result:  06/17/2010 04:22 PM     NO GROWTH 14 DAYS, ON SOLID MEDIA  STAPHYLOCOCCUS SPECIES, COAGULASE NEGATIVE , ISOLATED FROM THIO BROTH ONLY . PLATES HELD 3 DAYS. CALL MICROBIOLOGY LAB IF SENSITIVITIES ARE NEEDED. No results found for: TOXA1, RPR, HBCM, HBSAG, HAAB, HCAB1, HAAT, G6PD, CRYAC, HIVGT, HIVR, HIV1, HIV12, HIVPC, HIVRPI    No results found for: VANCT, CPK    Lab Results   Component Value Date/Time    Color YELLOW/STRAW 04/30/2017 01:15 PM    Appearance CLEAR 04/30/2017 01:15 PM    Specific gravity 1.010 04/30/2017 01:15 PM    pH (UA) 7.0 04/30/2017 01:15 PM    Protein NEGATIVE  04/30/2017 01:15 PM    Glucose NEGATIVE  04/30/2017 01:15 PM    Ketone NEGATIVE  04/30/2017 01:15 PM    Bilirubin NEGATIVE  04/30/2017 01:15 PM    Blood NEGATIVE  04/30/2017 01:15 PM    Urobilinogen 0.2 04/30/2017 01:15 PM    Nitrites NEGATIVE  04/30/2017 01:15 PM    Leukocyte Esterase NEGATIVE  04/30/2017 01:15 PM    WBC 0-4 04/30/2017 01:15 PM    RBC 0-5 04/30/2017 01:15 PM    Bacteria NEGATIVE  04/30/2017 01:15 PM       IMPRESSION  · Saddle PE  · LLE DVT  · HTN  · DM  · Hx of GI bleed in 2012 s/p prophylactic Xarelto for left THR  · ? Hx of COPD. Given diagnosis of COPD last fall when she was treated for pneumonia. Has never had PFTs. Was placed on Dulera and Singulair - unsure she sees any benefit with use. · Small RLL lung nodule  · Former smoker    PLAN  · Currently hemodynamically stable  · ECHO pending  · Continue therapeutic Lovenox. Will need [facility] for at least 6 months at discharge. Benefit outweighs risk of bleeding at this time. · Hematology consulted  · Given age, hypercoagulable profile unlikely to be beneficial at this point. · Continue Brovana/Pulmicort nebs in substitution for home Dulera. Continue Singulair. Would benefit from outpatient PFTs. · Will need continued CT surveillance of lung nodule, particularly with hx of breast cancer and smoking history. · GI prophylaxis: Protonix    Discussed with nursing, ECHO tech, and Hematology. Thank you for allowing us to participate in the care of this patient.       Shawn Llamas

## 2019-02-08 NOTE — CONSULTS
[id number] [facility] at [facility]  [phone]    Hematology / Oncology Consult    Reason for Visit:   Daniel Cartwright is a 72 y.o. female who is seen in consultation at the request of Dr. Connie Scherer for evaluation of PE. History of Present Illness:   Ms. Sherie Mcgarry is a 71 y/o female with type II DM, hyperlipidemia, HTN, remote h/o left breast cancer admitted with worsening shortness of breath, found to have PE. She states she was diagnosed with PNA a few months ago after reporting shortness of breath. She was treated with Levaquin. However, she continued to have SOB, but chest CTA and dopplers negative in 11/2018. She states she suffered a fall when she tripped in her kitchen. She developed bruises on her legs and states her legs have remained swollen since then. No fractures, car/air travel, immobility, surgery, hormone replacement/OCPs. No prior personal or family h/o DVT. After a prior joint surgery in 2012, patient was treated with Xarelto for DVT prophylaxis, which resulted in a GI bleed. Therefore, medication was discontinued. No GI bleed since then. For h/o breast cancer, patient underwent left breast mastectomy and reconstruction; no XRT or chemotherapy. She was seen by Dr. Elizabeth Dumont in the past.     Past Medical History:   Diagnosis Date    Bronchitis     [facility])     breast - left    Diabetes ([address]     Hepatitis age 12    High cholesterol     Hypertension     Obesity (BMI 30-39. 9)       Past Surgical History:   Procedure Laterality Date    BREAST SURGERY PROCEDURE UNLISTED      L mastectomy    COLONOSCOPY N/A 5/26/2017    COLONOSCOPY- no info to   performed by Sadia Gaspar MD at [address] HX GYN      ablation, R ovary removed, tubal ligation    HX HAMMER TOE REPAIR      HX HEENT      tonsillectomy    HX ORTHOPAEDIC      L hip, R leg, C4-C5 fusion, L foot    HX OTHER SURGICAL      xiphoid removal      Social History     Tobacco Use    Smoking status: Former Smoker    Smokeless tobacco: Never Used   Substance Use Topics    Alcohol use: Yes     Comment: seldom      Family History   Problem Relation Age of Onset    Heart Attack Father     Diabetes Mother      Current Facility-Administered Medications   Medication Dose Route Frequency    sodium chloride (NS) flush 5-40 mL  5-40 mL IntraVENous Q8H    sodium chloride (NS) flush 5-40 mL  5-40 mL IntraVENous PRN    carvedilol (COREG) tablet 3.125 mg  3.125 mg Oral BID WITH MEALS    glipiZIDE SR (GLUCOTROL XL) tablet 2.5 mg  2.5 mg Oral QPM    losartan (COZAAR) tablet 25 mg  25 mg Oral DAILY    lovastatin (MEVACOR) tablet 10 mg  10 mg Oral QHS    montelukast (SINGULAIR) tablet 10 mg  10 mg Oral QHS    pantoprazole (PROTONIX) tablet 40 mg  40 mg Oral ACB    insulin lispro (HUMALOG) injection   SubCUTAneous AC&HS    glucose chewable tablet 16 g  4 Tab Oral PRN    dextrose (D50W) injection syrg 12.5-25 g  25-50 mL IntraVENous PRN    glucagon (GLUCAGEN) injection 1 mg  1 mg IntraMUSCular PRN    enoxaparin (LOVENOX) injection 90 mg  1 mg/kg SubCUTAneous Q12H    arformoterol (BROVANA) neb solution 15 mcg  15 mcg Nebulization BID RT    budesonide (PULMICORT) 500 mcg/2 ml nebulizer suspension  500 mcg Nebulization BID RT     Current Outpatient Medications   Medication Sig    lansoprazole (PREVACID) 15 mg capsule Take 15 mg by mouth daily as needed (heartburn).  multivitamin (ONE A DAY) tablet Take 1 Tab by mouth nightly.  ascorbate calcium (VITAMIN C PO) Take 1 Tab by mouth nightly.  mometasone-formoterol (DULERA) 200-5 mcg/actuation HFA inhaler Take 2 Puffs by inhalation two (2) times a day.  glipiZIDE SR (GLUCOTROL XL) 2.5 mg CR tablet Take 2.5 mg by mouth nightly.  carvedilol (COREG) 3.125 mg tablet Take 3.125 mg by mouth two (2) times daily (with meals).  lovastatin (MEVACOR) 10 mg tablet Take 10 mg by mouth nightly.     aspirin 81 mg tablet Take 162 mg by mouth two (2) times a day.    losartan (COZAAR) 25 mg tablet Take 25 mg by mouth daily.  montelukast (SINGULAIR) 10 mg tablet Take 10 mg by mouth nightly. Allergies   Allergen Reactions    Crestor [Rosuvastatin] Other (comments)     Pain in left leg  Causes muscle spasms    Lipitor [Atorvastatin] Other (comments)     Left leg pain  Causes muscle spasms    Xarelto [Rivaroxaban] Other (comments)     Pt states it caused internal bleeding        Review of Systems: A complete review of systems was obtained, negative except as described above. Physical Exam:     Visit Vitals  /54 (BP 1 Location: Left arm, BP Patient Position: At rest)   Pulse 80   Temp 97.9 °F (36.6 °C)   Resp 22   Ht 5' 3\" (1.6 m)   Wt 196 lb (88.9 kg)   SpO2 94%   Breastfeeding? No   BMI 34.72 kg/m²     ECOG PS: 0  General: No distress  Eyes: PERRLA, anicteric sclerae  HENT: Atraumatic with normal appearance of ears and nose; OP clear  Neck: Supple; no thyromegaly   Lymphatic: No cervical, supraclavicular, or inguinal adenopathy  Respiratory: CTAB, normal respiratory effort  CV: Normal rate, regular rhythm, no murmurs. Trace to 1+ pitting edema in BLE  GI: Soft, nontender, nondistended, no masses, no hepatomegaly, no splenomegaly  MS: Normal gait and station. Digits without clubbing or cyanosis. Skin: No rashes, ecchymoses, or petechiae. Normal temperature, turgor, and texture. Neuro/Psych: Alert, oriented, appropriate affect, normal judgment/insight      Results:     Lab Results   Component Value Date/Time    WBC 10.7 02/08/2019 03:38 AM    HGB 12.5 02/08/2019 03:38 AM    HCT 37.4 02/08/2019 03:38 AM    PLATELET 192 65/07/4316 03:38 AM    MCV 82.2 02/08/2019 03:38 AM    ABS.  NEUTROPHILS 9.3 (H) 02/07/2019 10:56 AM    Hemoglobin (POC) 7.1 (L) 02/24/2012 12:39 AM    Hematocrit (POC) 21 (L) 02/24/2012 12:39 AM     Lab Results   Component Value Date/Time    Sodium 141 02/08/2019 03:38 AM    Potassium 4.1 02/08/2019 03:38 AM    Chloride 107 02/08/2019 03:38 AM    CO2 27 02/08/2019 03:38 AM    Glucose 129 (H) 02/08/2019 03:38 AM    BUN 15 02/08/2019 03:38 AM    Creatinine 1.00 02/08/2019 03:38 AM    GFR est AA >60 02/08/2019 03:38 AM    GFR est non-AA 55 (L) 02/08/2019 03:38 AM    Calcium 8.6 02/08/2019 03:38 AM    Sodium (POC) 146 (H) 02/24/2012 12:39 AM    Potassium (POC) 3.5 02/24/2012 12:39 AM    Chloride (POC) 113 (H) 02/24/2012 12:39 AM    Glucose (POC) 148 (H) 02/08/2019 11:38 AM    BUN (POC) 9 02/24/2012 12:39 AM    Creatinine (POC) 0.7 02/24/2012 12:39 AM    Calcium, ionized (POC) 1.12 02/24/2012 12:39 AM     Lab Results   Component Value Date/Time    Bilirubin, total 0.6 02/07/2019 10:56 AM    ALT (SGPT) 23 02/07/2019 10:56 AM    AST (SGOT) 22 02/07/2019 10:56 AM    Alk. phosphatase 102 02/07/2019 10:56 AM    Protein, total 7.2 02/07/2019 10:56 AM    Albumin 3.2 (L) 02/07/2019 10:56 AM    Globulin 4.0 02/07/2019 10:56 AM     Chest CTA 2/7/19: IMPRESSION:  Extensive bilateral acute pulmonary emboli, including saddle embolus in the main  pulmonary artery. Unchanged 7 mm right lower lobe pulmonary nodule. Mild linear  atelectasis in the lingula. Assessment and Recommendations:   Susan Pineda is a 72 y.o. female with DM II, HTN, XOL admitted with PE.    1. Saddle PE / Bilateral PE: Unprovoked based on lack of a clear known cause, but given patient's age and lack of prior VTE, I do not have a high suspicion of inherited thrombophilias. Therefore, I do not recommend a hypercoag workup. I recommend starting on anticoagulation with plan to continue for at least 6 months. At that time, I will f/u with patient and have a risks/benefits conversation to decide whether she should continue on anticoagulation indefinitely. She does have h/o GI bleed several years ago while on Xarelto, but given that was many years ago and pt has current VTE, I wholeheartedly recommend anticoagulation as benefits outweigh risks at this time. Echo reviewed.    -- Agree with anticoagulation, Lovenox 1mg/kg q12h or IV Heparin infusion are both options. Agree with transitioning to Eliquis later in hospital stay.  or SW must ensure patient can afford medication. -- Will plan to follow up with patient in 2 weeks as outpatient     2. Shortness of breath: 2/2 PE currently. Prior SOB may have been related to PNA. However, patient may have had small pulmonary emboli even a few months ago which did not show up on CT. 3. BLE edema: Subacute since a fall in a few months ago. Would not expect a fall to cause BLE edema. Negative dopplers in the past, rechecking now. -- LE dopplers pending    4. Remote h/o left breast cancer: S/p left mastectomy and reconstruction in 2009. No radiation or chemotherapy.       Signed By: Marlin Louis MD     February 8, 2019

## 2019-02-08 NOTE — PROGRESS NOTES
Consult received Chart reviewed and case discussed with Dr Elizabeth Vivas I think IVC filter reasonable in this patient given heavy clot burden, history of falls, and remote history of GI bleed Will place a removable filter later today Recommend continue anticoagulation until then.

## 2019-02-08 NOTE — PROGRESS NOTES
Per Dr. Corinne Leys, it's ok to let patient use the bedside commode slowly. TRANSFER - OUT REPORT: 
 
Verbal report given to Dandre Storey RN(name) on Quentin Guillory  being transferred to [facility] (unit) for routine progression of care Report consisted of patients Situation, Background, Assessment and  
Recommendations(SBAR). Information from the following report(s) SBAR, Kardex, [facility] and Recent Results was reviewed with the receiving nurse. Lines:  
Peripheral IV 02/07/19 Right Arm (Active) Site Assessment Clean, dry, & intact 2/8/2019  4:22 PM  
Phlebitis Assessment 0 2/8/2019  4:22 PM  
Infiltration Assessment 0 2/8/2019  4:22 PM  
Dressing Status Clean, dry, & intact 2/8/2019  4:22 PM  
Dressing Type Transparent;Tape 2/8/2019  4:22 PM  
Hub Color/Line Status Blue 2/8/2019  4:22 PM  
  
 
Opportunity for questions and clarification was provided. Patient transported with: 
 Registered Nurse Tech

## 2019-02-08 NOTE — PROGRESS NOTES
2/7/2019  
7:42 PM 
Case management note Met with patient to discuss discharge planning. Confirmed demographics. Patient lives alone, drives and performs ADL's independently. She lives in one story home with 3 steps to enter. [employer] No advance directive Jenni Brought can transport on discharge Patient has some concerns about co pays for medications. She became tearful talking about the fact she cant get some inhalers. I gave her my card and shared some information regarding how to get them through grants from the drug company OBS educated, letter signed and placed in chart. Reason for Admission:   SOB/ leg swelling RRAT Score:      9 Plan for utilizing home health:  TBD Likelihood of Readmission:  Low/green Transition of Care Plan:          Home with family assistance, out patient follow up Care Management Interventions PCP Verified by CM: Yes(suzanne sullivan NP in [place]) Mode of Transport at Discharge: Self Transition of Care Consult (CM Consult): Discharge Planning Lisa Morfin, [address]

## 2019-02-08 NOTE — PROGRESS NOTES
[address], [address] [place] [address] 
[phone] Medical Progress Note NAME: Jaleel Edwards :  1945 MRM:  [id number] Date/Time: 2019 Subjective: Chief Complaint:  Patient was seen and examined by me. Chart reviewed. Still with some LLE pain, mild dyspnea Objective:  
 
 
Vitals:  
 
 
Last 24hrs VS reviewed since prior progress note. Most recent are: 
 
Visit Vitals BP (!) 176/96 Pulse 78 Temp 97.9 °F (36.6 °C) Resp 22 Ht 5' 3\" (1.6 m) Wt 88.9 kg (196 lb) SpO2 95% Breastfeeding? No  
BMI 34.72 kg/m² SpO2 Readings from Last 6 Encounters:  
19 95% 18 96% 17 96% 17 95% 17 96% 13 93% No intake or output data in the 24 hours ending 19 0920 Exam:  
 
Physical Exam: 
 
Gen:  Well-developed, well-nourished, obese, mild distress HEENT:  Pink conjunctivae, PERRL, hearing intact to voice, moist mucous membranes Neck:  Supple, without masses, thyroid non-tender Resp:  No accessory muscle use, clear breath sounds without wheezes rales or rhonchi 
Card:  No murmurs, normal S1, S2 without thrills, LLE edema Abd:  Soft, non-tender, non-distended, normoactive bowel sounds are present Musc:  No cyanosis or clubbing Skin:  No rashes Neuro:  Cranial nerves 3-12 are grossly intact, follows commands appropriately Psych:  Good insight, oriented to person, place and time, alert Medications Reviewed: (see below) Lab Data Reviewed: (see below) 
 
______________________________________________________________________ Medications:  
 
Current Facility-Administered Medications Medication Dose Route Frequency  sodium chloride (NS) flush 5-40 mL  5-40 mL IntraVENous Q8H  
 sodium chloride (NS) flush 5-40 mL  5-40 mL IntraVENous PRN  
 carvedilol (COREG) tablet 3.125 mg  3.125 mg Oral BID WITH MEALS  
  glipiZIDE SR (GLUCOTROL XL) tablet 2.5 mg  2.5 mg Oral QPM  
 losartan (COZAAR) tablet 25 mg  25 mg Oral DAILY  lovastatin (MEVACOR) tablet 10 mg  10 mg Oral QHS  montelukast (SINGULAIR) tablet 10 mg  10 mg Oral QHS  pantoprazole (PROTONIX) tablet 40 mg  40 mg Oral ACB  insulin lispro (HUMALOG) injection   SubCUTAneous AC&HS  
 glucose chewable tablet 16 g  4 Tab Oral PRN  
 dextrose (D50W) injection syrg 12.5-25 g  25-50 mL IntraVENous PRN  
 glucagon (GLUCAGEN) injection 1 mg  1 mg IntraMUSCular PRN  
 enoxaparin (LOVENOX) injection 90 mg  1 mg/kg SubCUTAneous Q12H  
 arformoterol (BROVANA) neb solution 15 mcg  15 mcg Nebulization BID RT  
 budesonide (PULMICORT) 500 mcg/2 ml nebulizer suspension  500 mcg Nebulization BID RT Current Outpatient Medications Medication Sig  
 lansoprazole (PREVACID) 15 mg capsule Take 15 mg by mouth daily as needed (heartburn).  multivitamin (ONE A DAY) tablet Take 1 Tab by mouth nightly.  ascorbate calcium (VITAMIN C PO) Take 1 Tab by mouth nightly.  mometasone-formoterol (DULERA) 200-5 mcg/actuation HFA inhaler Take 2 Puffs by inhalation two (2) times a day.  glipiZIDE SR (GLUCOTROL XL) 2.5 mg CR tablet Take 2.5 mg by mouth nightly.  carvedilol (COREG) 3.125 mg tablet Take 3.125 mg by mouth two (2) times daily (with meals).  lovastatin (MEVACOR) 10 mg tablet Take 10 mg by mouth nightly.  aspirin 81 mg tablet Take 162 mg by mouth two (2) times a day.  losartan (COZAAR) 25 mg tablet Take 25 mg by mouth daily.  montelukast (SINGULAIR) 10 mg tablet Take 10 mg by mouth nightly. Lab Review:  
 
Recent Labs 02/08/19 
0338 02/07/19 
1056 WBC 10.7 12.6* HGB 12.5 13.8 HCT 37.4 42.9  158 Recent Labs 02/08/19 
0338 02/07/19 
1056  142  
K 4.1 4.3  106 CO2 27 28 * 107* BUN 15 16 CREA 1.00 1.04* CA 8.6 9.1 MG 1.8  --   
PHOS 3.4  --   
ALB  --  3.2* TBILI  --  0.6 SGOT  --  22 ALT  --  23 INR  --  1.0 Lab Results Component Value Date/Time Glucose (POC) 109 (H) 02/08/2019 06:57 AM  
 Glucose (POC) 196 (H) 02/07/2019 09:36 PM  
 Glucose (POC) 177 (H) 02/07/2019 05:58 PM  
 Glucose (POC) 96 02/07/2019 02:29 PM  
 Glucose (POC) 110 (H) 05/26/2017 08:12 AM  
 
 
  
Assessment / Plan:  
 
Principal Problem: 
 
73 yo hx of HTN, DM, L breast CA, presented w/ LLE DVT, saddle pulm embolism 1) Acute saddle Pulmonary embolism/acute LLE DVT: unclear trigger. Hx of L breast CA, but resolved over 9 years ago. Will monitor closely on Tele. Check Echo to eval for heart strain. Consult Pulm and Hematology. Consult Vascular for an IVC filter. Cont Lovenox, plan for oral anticoagulant once hemodynamically stable 2) HTN: cont coreg, losartan. Holding lasix 3) DM type 2: A1C 7.1%. Cont glipizide, SSI 
 
4) Hx of GI bleed: in 2012, due to Wetzel Elmer 56. GI eval was negative. No issues with GI bleed since then. Cont PPI 5) Debility: work if PT/OT if stable Total time spent with patient: 35 Minutes Care Plan discussed with: Patient, nursing, pulm Discussed:  Care Plan Prophylaxis:  Lovenox Disposition:   PT, OT, RN 
        
___________________________________________________ Attending Physician: Christopher Mccarthy MD

## 2019-02-08 NOTE — PROGRESS NOTES
TRANSFER - IN REPORT: 
 
Verbal report received from ED RN on Darl Fleischer  being received from ED(unit) for routine progression of care Report consisted of patients Situation, Background, Assessment and  
Recommendations(SBAR). Information from the following report(s) SBAR was reviewed with the receiving nurse. Opportunity for questions and clarification was provided. Assessment completed upon patients arrival to unit and care assumed.

## 2019-02-08 NOTE — PROGRESS NOTES
Bedside and Verbal shift change report given to [address] [place] [address] (oncoming nurse) by Ena Leach RN (offgoing nurse). Report included the following information SBAR, Kardex, MAR, Recent Results and Cardiac Rhythm NSR with PVC.

## 2019-02-08 NOTE — ED NOTES
Pt Throughput: Receiving [facility] Charge RN made aware of patient's bed placement.   
 
Gauri Dietrich RN

## 2019-02-09 LAB
ANION GAP SERPL CALC-SCNC: 8 MMOL/L (ref 5–15)
BUN SERPL-MCNC: 14 MG/DL (ref 6–20)
BUN/CREAT SERPL: 13 (ref 12–20)
CALCIUM SERPL-MCNC: 8.5 MG/DL (ref 8.5–10.1)
CHLORIDE SERPL-SCNC: 107 MMOL/L (ref 97–108)
CO2 SERPL-SCNC: 27 MMOL/L (ref 21–32)
CREAT SERPL-MCNC: 1.04 MG/DL (ref 0.55–1.02)
ERYTHROCYTE [DISTWIDTH] IN BLOOD BY AUTOMATED COUNT: 13.2 % (ref 11.5–14.5)
GLUCOSE BLD STRIP.AUTO-MCNC: 102 MG/DL (ref 65–100)
GLUCOSE BLD STRIP.AUTO-MCNC: 139 MG/DL (ref 65–100)
GLUCOSE BLD STRIP.AUTO-MCNC: 192 MG/DL (ref 65–100)
GLUCOSE BLD STRIP.AUTO-MCNC: 201 MG/DL (ref 65–100)
GLUCOSE SERPL-MCNC: 127 MG/DL (ref 65–100)
HCT VFR BLD AUTO: 37 % (ref 35–47)
HGB BLD-MCNC: 12.2 G/DL (ref 11.5–16)
MAGNESIUM SERPL-MCNC: 1.9 MG/DL (ref 1.6–2.4)
MCH RBC QN AUTO: 27.4 PG (ref 26–34)
MCHC RBC AUTO-ENTMCNC: 33 G/DL (ref 30–36.5)
MCV RBC AUTO: 83.1 FL (ref 80–99)
NRBC # BLD: 0 K/UL (ref 0–0.01)
NRBC BLD-RTO: 0 PER 100 WBC
PHOSPHATE SERPL-MCNC: 3.1 MG/DL (ref 2.6–4.7)
PLATELET # BLD AUTO: 172 K/UL (ref 150–400)
PMV BLD AUTO: 10.5 FL (ref 8.9–12.9)
POTASSIUM SERPL-SCNC: 4.2 MMOL/L (ref 3.5–5.1)
RBC # BLD AUTO: 4.45 M/UL (ref 3.8–5.2)
SERVICE CMNT-IMP: ABNORMAL
SODIUM SERPL-SCNC: 142 MMOL/L (ref 136–145)
WBC # BLD AUTO: 11.2 K/UL (ref 3.6–11)

## 2019-02-09 PROCEDURE — 94640 AIRWAY INHALATION TREATMENT: CPT

## 2019-02-09 PROCEDURE — 65660000000 HC RM CCU STEPDOWN

## 2019-02-09 PROCEDURE — 74011000250 HC RX REV CODE- 250: Performed by: INTERNAL MEDICINE

## 2019-02-09 PROCEDURE — 83735 ASSAY OF MAGNESIUM: CPT

## 2019-02-09 PROCEDURE — 84100 ASSAY OF PHOSPHORUS: CPT

## 2019-02-09 PROCEDURE — 74011250637 HC RX REV CODE- 250/637: Performed by: INTERNAL MEDICINE

## 2019-02-09 PROCEDURE — 82962 GLUCOSE BLOOD TEST: CPT

## 2019-02-09 PROCEDURE — 85027 COMPLETE CBC AUTOMATED: CPT

## 2019-02-09 PROCEDURE — 80048 BASIC METABOLIC PNL TOTAL CA: CPT

## 2019-02-09 PROCEDURE — 36415 COLL VENOUS BLD VENIPUNCTURE: CPT

## 2019-02-09 PROCEDURE — 74011636637 HC RX REV CODE- 636/637: Performed by: INTERNAL MEDICINE

## 2019-02-09 PROCEDURE — 74011250636 HC RX REV CODE- 250/636: Performed by: INTERNAL MEDICINE

## 2019-02-09 PROCEDURE — 94760 N-INVAS EAR/PLS OXIMETRY 1: CPT

## 2019-02-09 RX ORDER — ACETAMINOPHEN 325 MG/1
650 TABLET ORAL
Status: DISCONTINUED | OUTPATIENT
Start: 2019-02-09 | End: 2019-02-10 | Stop reason: HOSPADM

## 2019-02-09 RX ADMIN — Medication 10 ML: at 15:15

## 2019-02-09 RX ADMIN — APIXABAN 10 MG: 5 TABLET, FILM COATED ORAL at 17:34

## 2019-02-09 RX ADMIN — ACETAMINOPHEN 650 MG: 325 TABLET ORAL at 21:00

## 2019-02-09 RX ADMIN — Medication 10 ML: at 20:49

## 2019-02-09 RX ADMIN — MONTELUKAST SODIUM 10 MG: 10 TABLET, FILM COATED ORAL at 20:48

## 2019-02-09 RX ADMIN — PANTOPRAZOLE SODIUM 40 MG: 40 TABLET, DELAYED RELEASE ORAL at 06:36

## 2019-02-09 RX ADMIN — GLIPIZIDE 2.5 MG: 2.5 TABLET, FILM COATED, EXTENDED RELEASE ORAL at 17:34

## 2019-02-09 RX ADMIN — CARVEDILOL 3.12 MG: 6.25 TABLET, FILM COATED ORAL at 17:33

## 2019-02-09 RX ADMIN — ARFORMOTEROL TARTRATE 15 MCG: 15 SOLUTION RESPIRATORY (INHALATION) at 19:31

## 2019-02-09 RX ADMIN — INSULIN LISPRO 3 UNITS: 100 INJECTION, SOLUTION INTRAVENOUS; SUBCUTANEOUS at 12:00

## 2019-02-09 RX ADMIN — BUDESONIDE 500 MCG: 0.5 INHALANT RESPIRATORY (INHALATION) at 07:02

## 2019-02-09 RX ADMIN — LOSARTAN POTASSIUM 25 MG: 25 TABLET, FILM COATED ORAL at 08:31

## 2019-02-09 RX ADMIN — BUDESONIDE 500 MCG: 0.5 INHALANT RESPIRATORY (INHALATION) at 19:31

## 2019-02-09 RX ADMIN — LOVASTATIN 10 MG: 20 TABLET ORAL at 20:47

## 2019-02-09 RX ADMIN — CARVEDILOL 3.12 MG: 6.25 TABLET, FILM COATED ORAL at 08:05

## 2019-02-09 RX ADMIN — ARFORMOTEROL TARTRATE 15 MCG: 15 SOLUTION RESPIRATORY (INHALATION) at 07:02

## 2019-02-09 RX ADMIN — ENOXAPARIN SODIUM 90 MG: 100 INJECTION SUBCUTANEOUS at 06:28

## 2019-02-09 NOTE — PROGRESS NOTES
3:09PM 
CM spoke to pt regarding cost of medication. She is able to pay for the medication, explained she will need to call her prescription insurance company on Monday to ask about her coverage to find out when they predict her medication cost will go up and how much she will be responsible for. She said she would call on Monday but felt she can cover the cost when the medication cost go up. Informed MD.  No further services. Nam Swartz 2:25 PM 
CM consult for eliquis coverage. CM [facility].  At this time in the year the cost would be $47/month which could be for several months after that the cost would go up and pt would either be responsible for 50% or 80% of the cost of the medication depending on her plan, pharmacist suggested pt calling her prescription plan to find out where she is in her coverage. CM will discuss with pt. Nam Swartz

## 2019-02-09 NOTE — PROGRESS NOTES
[employer] [facility] at [facility] [phone] Hematology / Oncology Established Visit Reason for Visit:  
Yuridia Solis is a 72 y.o. female who is seen for f/u of PE. History of Present Illness: Ms. Saray Hurst is a 73 y/o female with type II DM, hyperlipidemia, HTN, remote h/o left breast cancer admitted with worsening shortness of breath, found to have PE. She states she was diagnosed with PNA a few months ago after reporting shortness of breath. She was treated with Levaquin. However, she continued to have SOB, but chest CTA and dopplers negative in 11/2018. She states she suffered a fall when she tripped in her kitchen. She developed bruises on her legs and states her legs have remained swollen since then. No fractures, car/air travel, immobility, surgery, hormone replacement/OCPs. No prior personal or family h/o DVT. After a prior joint surgery in 2012, patient was treated with Xarelto for DVT prophylaxis, which resulted in a GI bleed. Therefore, medication was discontinued. No GI bleed since then. For h/o breast cancer, patient underwent left breast mastectomy and reconstruction; no XRT or chemotherapy. She was seen by Dr. Jose Camarillo in the past.  
 
Today, patient is doing well. She states her breathing is more comfortable and her left leg is less swollen. On 2/8/19, she underwent IVC filter placement. She is not on any supplemental O2 at this time. Past Medical History:  
Diagnosis Date  Bronchitis  Cancer ([facility] [address] breast - left  Diabetes ([facility] [address]  Hepatitis age 11  
 High cholesterol  Hypertension  Obesity (BMI 30-39. 9) Past Surgical History:  
Procedure Laterality Date  BREAST SURGERY PROCEDURE UNLISTED    
 L mastectomy  COLONOSCOPY N/A 5/26/2017 COLONOSCOPY- no info to   performed by Natalie Polk MD at [address] [facility] HX GYN    
 ablation, R ovary removed, tubal ligation  HX HAMMER TOE REPAIR    
 HX HEENT    
 tonsillectomy  HX ORTHOPAEDIC    
 L hip, R leg, C4-C5 fusion, L foot  HX OTHER SURGICAL    
 xiphoid removal  
 IR PLC IVC FILTER  2/8/2019 Social History Tobacco Use  Smoking status: Former Smoker  Smokeless tobacco: Never Used Substance Use Topics  Alcohol use: Yes Comment: seldom Family History Problem Relation Age of Onset  Heart Attack Father  Diabetes Mother Current Facility-Administered Medications Medication Dose Route Frequency  sodium chloride (NS) flush 5-40 mL  5-40 mL IntraVENous Q8H  
 sodium chloride (NS) flush 5-40 mL  5-40 mL IntraVENous PRN  
 carvedilol (COREG) tablet 3.125 mg  3.125 mg Oral BID WITH MEALS  glipiZIDE SR (GLUCOTROL XL) tablet 2.5 mg  2.5 mg Oral QPM  
 losartan (COZAAR) tablet 25 mg  25 mg Oral DAILY  lovastatin (MEVACOR) tablet 10 mg  10 mg Oral QHS  montelukast (SINGULAIR) tablet 10 mg  10 mg Oral QHS  pantoprazole (PROTONIX) tablet 40 mg  40 mg Oral ACB  insulin lispro (HUMALOG) injection   SubCUTAneous AC&HS  
 glucose chewable tablet 16 g  4 Tab Oral PRN  
 dextrose (D50W) injection syrg 12.5-25 g  25-50 mL IntraVENous PRN  
 glucagon (GLUCAGEN) injection 1 mg  1 mg IntraMUSCular PRN  
 enoxaparin (LOVENOX) injection 90 mg  1 mg/kg SubCUTAneous Q12H  
 arformoterol (BROVANA) neb solution 15 mcg  15 mcg Nebulization BID RT  
 budesonide (PULMICORT) 500 mcg/2 ml nebulizer suspension  500 mcg Nebulization BID RT Allergies Allergen Reactions  Crestor [Rosuvastatin] Other (comments) Pain in left leg Causes muscle spasms  Lipitor [Atorvastatin] Other (comments) Left leg pain Causes muscle spasms  Xarelto [Rivaroxaban] Other (comments) Pt states it caused internal bleeding Review of Systems: A complete review of systems was obtained, negative except as described above. Physical Exam:  
 
Visit Vitals /70 (BP 1 Location: Left arm, BP Patient Position: At rest) Pulse 91 Temp 98.2 °F (36.8 °C) Resp 20 Ht 5' 3\" (1.6 m) Wt 196 lb (88.9 kg) SpO2 93% Breastfeeding? No  
BMI 34.72 kg/m² ECOG PS: 0 General: No distress Eyes: PERRLA, anicteric sclerae HENT: Atraumatic with normal appearance of ears and nose; OP clear Neck: Supple; no thyromegaly Lymphatic: No cervical, supraclavicular, or inguinal adenopathy Respiratory: CTAB, normal respiratory effort CV: Normal rate, regular rhythm, no murmurs. Trace to 1+ pitting edema in BLE 
GI: Soft, nontender, nondistended, no masses, no hepatomegaly, no splenomegaly MS: Normal gait and station. Digits without clubbing or cyanosis. Skin: No rashes, ecchymoses, or petechiae. Normal temperature, turgor, and texture. Neuro/Psych: Alert, oriented, appropriate affect, normal judgment/insight Results:  
 
Lab Results Component Value Date/Time WBC 11.2 (H) 02/09/2019 01:50 AM  
 HGB 12.2 02/09/2019 01:50 AM  
 HCT 37.0 02/09/2019 01:50 AM  
 PLATELET 594 28/38/0387 01:50 AM  
 MCV 83.1 02/09/2019 01:50 AM  
 ABS. NEUTROPHILS 9.3 (H) 02/07/2019 10:56 AM  
 Hemoglobin (POC) 7.1 (L) 02/24/2012 12:39 AM  
 Hematocrit (POC) 21 (L) 02/24/2012 12:39 AM  
 
Lab Results Component Value Date/Time  Sodium 142 02/09/2019 01:50 AM  
 Potassium 4.2 02/09/2019 01:50 AM  
 Chloride 107 02/09/2019 01:50 AM  
 CO2 27 02/09/2019 01:50 AM  
 Glucose 127 (H) 02/09/2019 01:50 AM  
 BUN 14 02/09/2019 01:50 AM  
 Creatinine 1.04 (H) 02/09/2019 01:50 AM  
 GFR est AA >60 02/09/2019 01:50 AM  
 GFR est non-AA 52 (L) 02/09/2019 01:50 AM  
 Calcium 8.5 02/09/2019 01:50 AM  
 Sodium (POC) 146 (H) 02/24/2012 12:39 AM  
 Potassium (POC) 3.5 02/24/2012 12:39 AM  
 Chloride (POC) 113 (H) 02/24/2012 12:39 AM  
 Glucose (POC) 102 (H) 02/09/2019 07:27 AM  
 BUN (POC) 9 02/24/2012 12:39 AM  
 Creatinine (POC) 0.7 02/24/2012 12:39 AM  
 Calcium, ionized (POC) 1.12 02/24/2012 12:39 AM  
 
Lab Results Component Value Date/Time Bilirubin, total 0.6 02/07/2019 10:56 AM  
 ALT (SGPT) 23 02/07/2019 10:56 AM  
 AST (SGOT) 22 02/07/2019 10:56 AM  
 Alk. phosphatase 102 02/07/2019 10:56 AM  
 Protein, total 7.2 02/07/2019 10:56 AM  
 Albumin 3.2 (L) 02/07/2019 10:56 AM  
 Globulin 4.0 02/07/2019 10:56 AM  
 
Chest CTA 2/7/19: IMPRESSION: 
Extensive bilateral acute pulmonary emboli, including saddle embolus in the main 
pulmonary artery. Unchanged 7 mm right lower lobe pulmonary nodule. Mild linear 
atelectasis in the lingula. Lower extremity doppler 2/7/19: 
Left lower extremity venous duplex is positive for deep vein thrombosis involving the CFV, FV, profunda, popliteal, posterior tibial and gastrocnemius veins. The right CFV is thrombus free. Assessment and Recommendations:  
Randi Olmstead is a 72 y.o. female with DM II, HTN, XOL admitted with PE. 
 
1. Saddle PE / Bilateral PE / LLE DVT: Unprovoked based on lack of a clear known cause, but given patient's age and lack of prior VTE, I do not have a high suspicion of inherited thrombophilias. Therefore, I do not recommend a hypercoag workup. I recommend starting on anticoagulation with plan to continue for at least 6 months. At that time, I will f/u with patient and have a risks/benefits conversation to decide whether she should continue on anticoagulation indefinitely. She does have h/o GI bleed several years ago while on Xarelto, but given that was many years ago and pt has current VTE, I wholeheartedly recommend anticoagulation as benefits outweigh risks at this time. Echo reviewed. -- Continue on anticoagulation with plan to continue for at least 6 months. Agree with transitioning to Eliquis prior to hospital discharge.  or SW must ensure patient can afford medication. -- Will plan to follow up with patient in 2 weeks as outpatient 2. Shortness of breath: 2/2 PE currently. Prior SOB may have been related to PNA. However, patient may have had small pulmonary emboli even a few months ago which did not show up on CT. 3. BLE edema: 2/2 left leg DVT 4. Remote h/o left breast cancer: S/p left mastectomy and reconstruction in 2009. No radiation or chemotherapy. Signed By: Janak Houston MD   
 February 9, 2019

## 2019-02-09 NOTE — PROGRESS NOTES
Jaquan Flannery [facility] 
[address] [facility], [address] [place] [address] 
[phone] Medical Progress Note NAME: Darl Fleischer :  1945 MRM:  [id number] Date/Time: 2019  7:00 AM 
 
  
Assessment and Plan: 1. Acute saddle Pulmonary embolism/acute LLE DVT: unclear trigger. Echo is unremarkable. Hematology, pulmonary and vascular evaluation ap[preciated. Cont Lovenox, plan for oral anticoagulant for 6 months and FU with hematology to discuss future plan of  treatment. Had IVC filter placed on  
  
2. HTN: cont coreg, losartan. Holding lasix 
  
3. DM type 2: A1C 7.1%. Cont glipizide, SSI 
  
4. Hx of GI bleed: in , due to Wetzel Elmer 56. GI eval was negative. No issues with GI bleed since then. Cont PPI. Benefit of anticoagulation outweigh risk.  
  
5.  Debility: work if PT/OT Subjective: Chief Complaint:  Follow up of pt who was admitted with massive PE. Denies SOB or chest pain ROS: 
(bold if positive, if negative) Tolerating PT  Tolerating Diet Objective:  
 
Last 24hrs VS reviewed since prior progress note. Most recent are: 
 
Visit Vitals /69 (BP 1 Location: Left arm, BP Patient Position: At rest) Pulse 83 Temp 98.8 °F (37.1 °C) Resp 20 Ht 5' 3\" (1.6 m) Wt 88.9 kg (196 lb) SpO2 96% Breastfeeding? No  
BMI 34.72 kg/m² SpO2 Readings from Last 6 Encounters:  
19 96% 18 96% 17 96% 17 95% 17 96% 13 93% O2 Flow Rate (L/min): 2 l/min Intake/Output Summary (Last 24 hours) at 2019 0700 Last data filed at 2019 9901 Gross per 24 hour Intake 490 ml Output 400 ml Net 90 ml Physical Exam: 
 
Gen:  Well-developed, well-nourished, in no acute distress HEENT:  Pink conjunctivae, PERRL, hearing intact to voice, moist mucous membranes Neck:  Supple, without masses, thyroid non-tender Resp:  No accessory muscle use, clear breath sounds without wheezes rales or rhonchi 
Card:  No murmurs, normal S1, S2 without thrills, bruits or peripheral edema Abd:  Soft, non-tender, non-distended, normoactive bowel sounds are present, no palpable organomegaly and no detectable hernias Lymph:  No cervical or inguinal adenopathy Musc:  No cyanosis or clubbing Skin:  No rashes or ulcers, skin turgor is good Neuro:  Cranial nerves are grossly intact, no focal motor weakness, follows commands appropriately Psych:  Good insight, oriented to person, place and time, alert 
__________________________________________________________________ Medications Reviewed: (see below) Medications:  
 
Current Facility-Administered Medications Medication Dose Route Frequency  sodium chloride (NS) flush 5-40 mL  5-40 mL IntraVENous Q8H  
 sodium chloride (NS) flush 5-40 mL  5-40 mL IntraVENous PRN  
 carvedilol (COREG) tablet 3.125 mg  3.125 mg Oral BID WITH MEALS  glipiZIDE SR (GLUCOTROL XL) tablet 2.5 mg  2.5 mg Oral QPM  
 losartan (COZAAR) tablet 25 mg  25 mg Oral DAILY  lovastatin (MEVACOR) tablet 10 mg  10 mg Oral QHS  montelukast (SINGULAIR) tablet 10 mg  10 mg Oral QHS  pantoprazole (PROTONIX) tablet 40 mg  40 mg Oral ACB  insulin lispro (HUMALOG) injection   SubCUTAneous AC&HS  
 glucose chewable tablet 16 g  4 Tab Oral PRN  
 dextrose (D50W) injection syrg 12.5-25 g  25-50 mL IntraVENous PRN  
 glucagon (GLUCAGEN) injection 1 mg  1 mg IntraMUSCular PRN  
 enoxaparin (LOVENOX) injection 90 mg  1 mg/kg SubCUTAneous Q12H  
 arformoterol (BROVANA) neb solution 15 mcg  15 mcg Nebulization BID RT  
 budesonide (PULMICORT) 500 mcg/2 ml nebulizer suspension  500 mcg Nebulization BID RT Lab Data Reviewed: (see below) Lab Review:  
 
Recent Labs 02/09/19 
0150 02/08/19 
0338 02/07/19 
1056 WBC 11.2* 10.7 12.6* HGB 12.2 12.5 13.8 HCT 37.0 37.4 42.9  165 158 Recent Labs 02/09/19 
0150 02/08/19 
0338 02/07/19 
1056  141 142  
K 4.2 4.1 4.3  107 106 CO2 27 27 28 * 129* 107* BUN 14 15 16 CREA 1.04* 1.00 1.04* CA 8.5 8.6 9.1 MG 1.9 1.8  --   
PHOS 3.1 3.4  --   
ALB  --   --  3.2* TBILI  --   --  0.6 SGOT  --   --  22 ALT  --   --  23 INR  --   --  1.0 Lab Results Component Value Date/Time Glucose (POC) 157 (H) 02/08/2019 10:03 PM  
 Glucose (POC) 91 02/08/2019 04:18 PM  
 Glucose (POC) 148 (H) 02/08/2019 11:38 AM  
 Glucose (POC) 109 (H) 02/08/2019 06:57 AM  
 Glucose (POC) 196 (H) 02/07/2019 09:36 PM  
 
No results for input(s): PH, PCO2, PO2, HCO3, FIO2 in the last 72 hours. Recent Labs 02/07/19 
1056 INR 1.0 All Micro Results None I have reviewed notes of prior 24hr. Other pertinent lab: Total time spent with patient: 28 Care Plan discussed with: Patient, Nursing Staff and >50% of time spent in counseling and coordination of care Discussed:  Care Plan Prophylaxis:  Lovenox Disposition:  Home w/Family 
        
___________________________________________________ Attending Physician: Nighat Cavanaugh MD

## 2019-02-09 NOTE — PROGRESS NOTES
Name: Ish Chain: [address]  
: 1945 Admit Date: 2019 Phone: [phone]  Room: [facility]/01 PCP: Vester Essex, NP  MRN: [id number] Date: 2019  Code: Full Code Overnight events: 
Feels better. Walked to bathroom and back. Has had some SOB sensation when coming back to bed and felt a little weak getting out of bed Echo with no RV strain. Past Medical History:  
Diagnosis Date  Bronchitis  Cancer ([facility] breast - left  Diabetes ([facility]  Hepatitis age 11  
 High cholesterol  Hypertension  Obesity (BMI 30-39. 9) Past Surgical History:  
Procedure Laterality Date  BREAST SURGERY PROCEDURE UNLISTED    
 L mastectomy  COLONOSCOPY N/A 2017 COLONOSCOPY- no info to   performed by Marii Arredondo MD at [address] HX GYN    
 ablation, R ovary removed, tubal ligation  HX HAMMER TOE REPAIR    
 HX HEENT    
 tonsillectomy  HX ORTHOPAEDIC    
 L hip, R leg, C4-C5 fusion, L foot  HX OTHER SURGICAL    
 xiphoid removal  
 IR PLC IVC FILTER  2019 Family History Problem Relation Age of Onset  Heart Attack Father  Diabetes Mother Social History Tobacco Use  Smoking status: Former Smoker  Smokeless tobacco: Never Used Substance Use Topics  Alcohol use: Yes Comment: seldom Allergies Allergen Reactions  Crestor [Rosuvastatin] Other (comments) Pain in left leg Causes muscle spasms  Lipitor [Atorvastatin] Other (comments) Left leg pain Causes muscle spasms  Xarelto [Rivaroxaban] Other (comments) Pt states it caused internal bleeding Current Facility-Administered Medications Medication Dose Route Frequency  sodium chloride (NS) flush 5-40 mL  5-40 mL IntraVENous Q8H  
 sodium chloride (NS) flush 5-40 mL  5-40 mL IntraVENous PRN  
 carvedilol (COREG) tablet 3.125 mg  3.125 mg Oral BID WITH MEALS  
  glipiZIDE SR (GLUCOTROL XL) tablet 2.5 mg  2.5 mg Oral QPM  
 losartan (COZAAR) tablet 25 mg  25 mg Oral DAILY  lovastatin (MEVACOR) tablet 10 mg  10 mg Oral QHS  montelukast (SINGULAIR) tablet 10 mg  10 mg Oral QHS  pantoprazole (PROTONIX) tablet 40 mg  40 mg Oral ACB  insulin lispro (HUMALOG) injection   SubCUTAneous AC&HS  
 glucose chewable tablet 16 g  4 Tab Oral PRN  
 dextrose (D50W) injection syrg 12.5-25 g  25-50 mL IntraVENous PRN  
 glucagon (GLUCAGEN) injection 1 mg  1 mg IntraMUSCular PRN  
 enoxaparin (LOVENOX) injection 90 mg  1 mg/kg SubCUTAneous Q12H  
 arformoterol (BROVANA) neb solution 15 mcg  15 mcg Nebulization BID RT  
 budesonide (PULMICORT) 500 mcg/2 ml nebulizer suspension  500 mcg Nebulization BID RT  
 
 
 
REVIEW OF SYSTEMS  
12 point ROS negative except as stated in the HPI. Physical Exam:  
Visit Vitals /60 (BP 1 Location: Left arm, BP Patient Position: At rest) Pulse 87 Temp 97.7 °F (36.5 °C) Resp 20 Ht 5' 3\" (1.6 m) Wt 88.9 kg (196 lb) SpO2 94% Breastfeeding? No  
BMI 34.72 kg/m² General:  Alert, cooperative, no distress, appears stated age. Head:  Normocephalic, without obvious abnormality, atraumatic. Eyes:  Conjunctivae/corneas clear. Nose: Nares normal. Septum midline. Mucosa normal.   
Throat: Lips, mucosa, and tongue normal.   
Neck: Supple, symmetrical, trachea midline, no adenopathy. Lungs:   Clear to auscultation bilaterally. Chest wall:  No tenderness or deformity. Heart:  Regular rate and rhythm, S1, S2 normal, no murmur, click, rub or gallop. Abdomen:   Soft, non-tender. Bowel sounds normal. No masses,  No organomegaly. Extremities: Right extremities normal, atraumatic, no cyanosis or edema. LLE edematous and swollen. Pulses: 2+ and symmetric all extremities. Skin: Skin color, texture, turgor normal. No rashes or lesions Lymph nodes: Cervical, supraclavicular nodes normal.  
 Neurologic: Grossly nonfocal  
 
 
Lab Results Component Value Date/Time Sodium 142 02/09/2019 01:50 AM  
 Potassium 4.2 02/09/2019 01:50 AM  
 Chloride 107 02/09/2019 01:50 AM  
 CO2 27 02/09/2019 01:50 AM  
 BUN 14 02/09/2019 01:50 AM  
 Creatinine 1.04 (H) 02/09/2019 01:50 AM  
 Glucose 127 (H) 02/09/2019 01:50 AM  
 Calcium 8.5 02/09/2019 01:50 AM  
 Magnesium 1.9 02/09/2019 01:50 AM  
 Phosphorus 3.1 02/09/2019 01:50 AM  
 
 
Lab Results Component Value Date/Time WBC 11.2 (H) 02/09/2019 01:50 AM  
 HGB 12.2 02/09/2019 01:50 AM  
 PLATELET 071 07/43/2713 01:50 AM  
 MCV 83.1 02/09/2019 01:50 AM  
 
 
Lab Results Component Value Date/Time INR 1.0 02/07/2019 10:56 AM  
 aPTT 26.6 02/07/2019 10:56 AM  
 AST (SGOT) 22 02/07/2019 10:56 AM  
 Alk. phosphatase 102 02/07/2019 10:56 AM  
 Protein, total 7.2 02/07/2019 10:56 AM  
 Albumin 3.2 (L) 02/07/2019 10:56 AM  
 Globulin 4.0 02/07/2019 10:56 AM  
 
 
No results found for: IRON, FE, TIBC, IBCT, PSAT, FERR Lab Results Component Value Date/Time Sed rate (ESR) 36 (H) 06/17/2010 04:30 PM  
 C-Reactive protein 0.94 (H) 06/17/2010 04:30 PM  
  
 
No results found for: PH, PHI, PCO2, PCO2I, PO2, PO2I, HCO3, HCO3I, FIO2, FIO2I Lab Results Component Value Date/Time Troponin-I, Qt. <0.05 11/06/2018 09:01 PM  
  
 
Lab Results Component Value Date/Time Culture result: KLEBSIELLA PNEUMONIAE 
ESCHERICHIA COLI 03/10/2013 12:41 PM  
 Culture result:  06/17/2010 04:22 PM  
  NO GROWTH 14 DAYS, ON SOLID MEDIA 
STAPHYLOCOCCUS SPECIES, COAGULASE NEGATIVE ISOLATED FROM THIO BROTH ONLY Culture result:  06/17/2010 04:22 PM  
  NO GROWTH 14 DAYS, ON SOLID MEDIA 
STAPHYLOCOCCUS SPECIES, COAGULASE NEGATIVE , ISOLATED FROM THIO BROTH ONLY . PLATES HELD 3 DAYS. CALL MICROBIOLOGY LAB IF SENSITIVITIES ARE NEEDED.   
 
 
No results found for: TOXA1, RPR, HBCM, HBSAG, HAAB, HCAB1, HAAT, G6PD, CRYAC, HIVGT, HIVR, HIV1, HIV12, HIVPC, HIVRPI 
 
 No results found for: VANCT, CPK Lab Results Component Value Date/Time Color YELLOW/STRAW 04/30/2017 01:15 PM  
 Appearance CLEAR 04/30/2017 01:15 PM  
 Specific gravity 1.010 04/30/2017 01:15 PM  
 pH (UA) 7.0 04/30/2017 01:15 PM  
 Protein NEGATIVE  04/30/2017 01:15 PM  
 Glucose NEGATIVE  04/30/2017 01:15 PM  
 Ketone NEGATIVE  04/30/2017 01:15 PM  
 Bilirubin NEGATIVE  04/30/2017 01:15 PM  
 Blood NEGATIVE  04/30/2017 01:15 PM  
 Urobilinogen 0.2 04/30/2017 01:15 PM  
 Nitrites NEGATIVE  04/30/2017 01:15 PM  
 Leukocyte Esterase NEGATIVE  04/30/2017 01:15 PM  
 WBC 0-4 04/30/2017 01:15 PM  
 RBC 0-5 04/30/2017 01:15 PM  
 Bacteria NEGATIVE  04/30/2017 01:15 PM  
 
 
IMPRESSION 
· Saddle PE 
· LLE DVT · HTN 
· DM 
· Hx of GI bleed in 2012 s/p prophylactic Xarelto for left THR · ? Hx of COPD. Given diagnosis of COPD last fall when she was treated for pneumonia. Has never had PFTs. Was placed on Dulera and Singulair - unsure she sees any benefit with use. · Small RLL lung nodule · Former smoker PLAN 
 
· Start [facility] whenever clear which one pt will pay for · Please get orthostatics before discharge · Continue Brovana/Pulmicort nebs in substitution for home Dulera. Continue Singulair. Would benefit from outpatient PFTs. · Will need continued CT surveillance of lung nodule in 6 months, particularly with hx of breast cancer and smoking history. · Will see as needed Sunday and see back Monday if still here. Pls call with questions. · Will follow up with pulmonary in 2-3 weeks. Pt given my card.   
 
Brianne Thomas MD

## 2019-02-09 NOTE — PROGRESS NOTES
SHIFT REPORT: 
1900- Bedside shift change report given to Katiuska Mario RN (oncoming nurse) by Sammy Schaffer RN (offgoing nurse). Report included the following information SBAR, Kardex, Procedure Summary, Intake/Output, Recent Results and Cardiac Rhythm. SHIFT SUMMARY: 
2110-Dr. Villalta notified of pt having low back pain; Tylenol po ordered and given. END OF SHIFT REPORT: 
0700-Bedside shift change report given to Peter Parra RN (oncoming nurse) by Katiuska Mario RN (offgoing nurse). Report included the following information SBAR, Kardex, Procedure Summary, Intake/Output, Recent Results and Cardiac Rhythm .

## 2019-02-09 NOTE — PROGRESS NOTES
Problem: Falls - Risk of 
Goal: *Absence of Falls Document Colletteo Horn Fall Risk and appropriate interventions in the flowsheet. Outcome: Progressing Towards Goal 
Fall Risk Interventions: 
Mobility Interventions: Patient to call before getting OOB Medication Interventions: Patient to call before getting OOB, Teach patient to arise slowly Elimination Interventions: Bed/chair exit alarm, Call light in reach, Patient to call for help with toileting needs

## 2019-02-09 NOTE — PROGRESS NOTES
SHIFT REPORT: 
1900- Bedside shift change report given to Gm Melgoza RN (oncoming nurse) by Dm Perry RN (offgoing nurse). Report included the following information SBAR, Kardex, Procedure Summary, Intake/Output, Recent Results and Cardiac Rhythm. SHIFT SUMMARY: 
0150-venous blood drawn for AM labs. END OF SHIFT REPORT: 
0700-Bedside shift change report given to Dm Perry RN (oncoming nurse) by Gm Melgoza RN (offgoing nurse). Report included the following information SBAR, Kardex, Procedure Summary, Intake/Output, Recent Results and Cardiac Rhythm .

## 2019-02-10 VITALS
DIASTOLIC BLOOD PRESSURE: 68 MMHG | SYSTOLIC BLOOD PRESSURE: 138 MMHG | WEIGHT: 196 LBS | HEIGHT: 63 IN | TEMPERATURE: 97.5 F | OXYGEN SATURATION: 93 % | BODY MASS INDEX: 34.73 KG/M2 | HEART RATE: 74 BPM | RESPIRATION RATE: 17 BRPM

## 2019-02-10 LAB
GLUCOSE BLD STRIP.AUTO-MCNC: 110 MG/DL (ref 65–100)
SERVICE CMNT-IMP: ABNORMAL

## 2019-02-10 PROCEDURE — 94640 AIRWAY INHALATION TREATMENT: CPT

## 2019-02-10 PROCEDURE — 74011000250 HC RX REV CODE- 250: Performed by: INTERNAL MEDICINE

## 2019-02-10 PROCEDURE — 74011250637 HC RX REV CODE- 250/637: Performed by: INTERNAL MEDICINE

## 2019-02-10 PROCEDURE — 82962 GLUCOSE BLOOD TEST: CPT

## 2019-02-10 RX ADMIN — ARFORMOTEROL TARTRATE 15 MCG: 15 SOLUTION RESPIRATORY (INHALATION) at 07:28

## 2019-02-10 RX ADMIN — LOSARTAN POTASSIUM 25 MG: 25 TABLET, FILM COATED ORAL at 08:27

## 2019-02-10 RX ADMIN — CARVEDILOL 3.12 MG: 6.25 TABLET, FILM COATED ORAL at 08:27

## 2019-02-10 RX ADMIN — PANTOPRAZOLE SODIUM 40 MG: 40 TABLET, DELAYED RELEASE ORAL at 06:13

## 2019-02-10 RX ADMIN — BUDESONIDE 500 MCG: 0.5 INHALANT RESPIRATORY (INHALATION) at 07:28

## 2019-02-10 RX ADMIN — APIXABAN 10 MG: 5 TABLET, FILM COATED ORAL at 08:27

## 2019-02-10 NOTE — DISCHARGE INSTRUCTIONS
ACUTE DIAGNOSES:  Pulmonary embolism (HCC) [I26.99]  Acute pulmonary embolism ([facility] [I26.99]    CHRONIC MEDICAL DIAGNOSES:  Problem List as of 2/10/2019 Date Reviewed: 2/7/2019          Codes Class Noted - Resolved    Acute pulmonary embolism ([facility] ICD-10-CM: I26.99  ICD-9-CM: 415.19  2/8/2019 - Present        * (Principal) Pulmonary embolism ([facility] ICD-10-CM: I26.99  ICD-9-CM: 415.19  2/7/2019 - Present        Acute deep vein thrombosis (DVT) of left lower extremity ([facility] ICD-10-CM: I82.402  ICD-9-CM: 453.40  2/7/2019 - Present        Debility ICD-10-CM: R53.81  ICD-9-CM: 799.3  2/25/2012 - Present        Melena ICD-10-CM: K92.1  ICD-9-CM: 578.1  2/24/2012 - Present        Acute posthemorrhagic anemia ICD-10-CM: D62  ICD-9-CM: 285.1  2/24/2012 - Present        Type 2 diabetes mellitus without complication, without long-term current use of insulin ([facility] ICD-10-CM: E11.9  ICD-9-CM: 250.00  2/24/2012 - Present        HTN (hypertension) ICD-10-CM: I10  ICD-9-CM: 401.9  2/24/2012 - Present        Other and unspecified hyperlipidemia ICD-10-CM: E78.5  ICD-9-CM: 272.4  2/24/2012 - Present        RESOLVED: GI bleeding ICD-10-CM: K92.2  ICD-9-CM: 578.9  2/25/2012 - 2/25/2012        RESOLVED: Edema ICD-10-CM: R60.9  ICD-9-CM: 782.3  2/24/2012 - 2/25/2012        RESOLVED: Hypokalemia ICD-10-CM: E87.6  ICD-9-CM: 276.8  2/24/2012 - 2/25/2012        RESOLVED: Hypomagnesemia ICD-10-CM: B59.43  ICD-9-CM: 275.2  2/24/2012 - 2/25/2012              DISCHARGE MEDICATIONS:          · It is important that you take the medication exactly as they are prescribed. · Keep your medication in the bottles provided by the pharmacist and keep a list of the medication names, dosages, and times to be taken in your wallet. · Do not take other medications without consulting your doctor.        DIET:  Diabetic Diet    ACTIVITY: Activity as tolerated    ADDITIONAL INFORMATION: If you experience any of the following symptoms then please call your primary care physician or return to the emergency room if you cannot get hold of your doctor: Fever, chills, nausea, vomiting, diarrhea, change in mentation, falling, bleeding, shortness of breath. FOLLOW UP CARE:  Dr. Ray Acevedo, NP  you are to call and set up an appointment to see them in 2 weeks. Follow-up with hematology, Dr Roxanna Mackey, hematology  in 2 weeks    Follow up with pulmonary, Dr Magda Saenz in 2-3 weeks    Follow up with vascular surgery in 2-3 weeks       Information obtained by :  I understand that if any problems occur once I am at home I am to contact my physician. I understand and acknowledge receipt of the instructions indicated above.                                                                                                                                            Physician's or R.N.'s Signature                                                                  Date/Time                                                                                                                                              Patient or Representative Signature                                                          Date/Time

## 2019-02-10 NOTE — DISCHARGE SUMMARY
Hospitalist Discharge Summary     Patient ID:    La Cheek  [id number]  72 y.o.  1945    Admit date: 2/7/2019    Discharge date and time: 2/10/2019    Admission Diagnoses: Pulmonary embolism ([facility] [I26.99]  Acute pulmonary embolism ([facility] [I26.99]    Chronic Diagnoses:    Problem List as of 2/10/2019 Date Reviewed: 2/7/2019          Codes Class Noted - Resolved    Acute pulmonary embolism ([facility] ICD-10-CM: I26.99  ICD-9-CM: 415.19  2/8/2019 - Present        * (Principal) Pulmonary embolism ([facility] ICD-10-CM: I26.99  ICD-9-CM: 415.19  2/7/2019 - Present        Acute deep vein thrombosis (DVT) of left lower extremity ([facility] ICD-10-CM: I82.402  ICD-9-CM: 453.40  2/7/2019 - Present        Debility ICD-10-CM: R53.81  ICD-9-CM: 799.3  2/25/2012 - Present        Melena ICD-10-CM: K92.1  ICD-9-CM: 578.1  2/24/2012 - Present        Acute posthemorrhagic anemia ICD-10-CM: D62  ICD-9-CM: 285.1  2/24/2012 - Present        Type 2 diabetes mellitus without complication, without long-term current use of insulin ([facility] ICD-10-CM: E11.9  ICD-9-CM: 250.00  2/24/2012 - Present        HTN (hypertension) ICD-10-CM: I10  ICD-9-CM: 401.9  2/24/2012 - Present        Other and unspecified hyperlipidemia ICD-10-CM: E78.5  ICD-9-CM: 272.4  2/24/2012 - Present        RESOLVED: GI bleeding ICD-10-CM: K92.2  ICD-9-CM: 578.9  2/25/2012 - 2/25/2012        RESOLVED: Edema ICD-10-CM: R60.9  ICD-9-CM: 782.3  2/24/2012 - 2/25/2012        RESOLVED: Hypokalemia ICD-10-CM: E87.6  ICD-9-CM: 276.8  2/24/2012 - 2/25/2012        RESOLVED: Hypomagnesemia ICD-10-CM: E83.42  ICD-9-CM: 275.2  2/24/2012 - 2/25/2012              Discharge Medications:   Current Discharge Medication List      START taking these medications    Details   apixaban (ELIQUIS) 5 mg (74 tabs) starter pack Take 10 mg (two 5 mg tablets) by mouth twice a day for 7 days   Followed by 5 mg (one 5 mg tablet) by mouth twice a day  Qty: 1 Dose Pack, Refills: 0         CONTINUE these medications which have NOT CHANGED    Details   lansoprazole (PREVACID) 15 mg capsule Take 15 mg by mouth daily as needed (heartburn). multivitamin (ONE A DAY) tablet Take 1 Tab by mouth nightly. ascorbate calcium (VITAMIN C PO) Take 1 Tab by mouth nightly. mometasone-formoterol (DULERA) 200-5 mcg/actuation HFA inhaler Take 2 Puffs by inhalation two (2) times a day. glipiZIDE SR (GLUCOTROL XL) 2.5 mg CR tablet Take 2.5 mg by mouth nightly. carvedilol (COREG) 3.125 mg tablet Take 3.125 mg by mouth two (2) times daily (with meals). lovastatin (MEVACOR) 10 mg tablet Take 10 mg by mouth nightly. aspirin 81 mg tablet Take 162 mg by mouth two (2) times a day. losartan (COZAAR) 25 mg tablet Take 25 mg by mouth daily. montelukast (SINGULAIR) 10 mg tablet Take 10 mg by mouth nightly. Follow up Care:    1. Didi Celaya NP in 1-2 weeks  2. Hematology  3. pulmonary    Diet:  Diabetic Diet    Disposition:  Home. Advanced Directive:    Discharge Exam:  See today's note. CONSULTATIONS: Pulmonary/Intensive care, Hematology/Oncology and Vascular Surgery    Significant Diagnostic Studies:   Recent Labs     02/09/19  0150 02/08/19  0338   WBC 11.2* 10.7   HGB 12.2 12.5   HCT 37.0 37.4    165     Recent Labs     02/09/19  0150 02/08/19  0338 02/07/19  1056    141 142   K 4.2 4.1 4.3    107 106   CO2 27 27 28   BUN 14 15 16   CREA 1.04* 1.00 1.04*   * 129* 107*   CA 8.5 8.6 9.1   MG 1.9 1.8  --    PHOS 3.1 3.4  --      Recent Labs     02/07/19  1056   SGOT 22   ALT 23      TBILI 0.6   TP 7.2   ALB 3.2*   GLOB 4.0     Recent Labs     02/07/19  1056   INR 1.0   PTP 10.2   APTT 26.6      No results for input(s): FE, TIBC, PSAT, FERR in the last 72 hours. No results for input(s): PH, PCO2, PO2 in the last 72 hours. No results for input(s): CPK, CKMB in the last 72 hours.     No lab exists for component: TROPONINI  Lab Results   Component Value Date/Time    Glucose (POC) 110 (H) 02/10/2019 06:40 AM    Glucose (POC) 192 (H) 02/09/2019 08:54 PM    Glucose (POC) 139 (H) 02/09/2019 03:48 PM    Glucose (POC) 201 (H) 02/09/2019 10:23 AM    Glucose (POC) 102 (H) 02/09/2019 07:27 AM             HOSPITAL COURSE & TREATMENT RENDERED:   1. Acute saddle Pulmonary embolism/acute LLE DVT: unclear trigger. Echo is unremarkable. Hematology, pulmonary and vascular evaluation ap[preciated. Started on eliquis for 6 months and FU with hematology to discuss future plan of  treatment. Had IVC filter placed on 2/8. FU with vascular      2. HTN: cont coreg, losartan.       3. DM type 2: A1C 7.1%.  Cont glipizide, SSI     4. Hx of GI bleed: in 2012, due to [address] eval was negative.  No issues with GI bleed since then.  Cont PPI.  Benefit of anticoagulation outweigh risk.      5.  Debility: PT/OT     Discharged in improved condition       Signed:  Ana Lezama MD  2/10/2019  7:02 AM

## 2019-02-10 NOTE — PROGRESS NOTES
Problem: Falls - Risk of 
Goal: *Absence of Falls Document Taylor Carrillo Fall Risk and appropriate interventions in the flowsheet. Outcome: Progressing Towards Goal 
Fall Risk Interventions: 
Mobility Interventions: Bed/chair exit alarm, PT Consult for mobility concerns, Utilize walker, cane, or other assistive device, Communicate number of staff needed for ambulation/transfer Medication Interventions: Teach patient to arise slowly, Patient to call before getting OOB, Assess postural VS orthostatic hypotension Elimination Interventions: Bed/chair exit alarm, Call light in reach, Patient to call for help with toileting needs, Toilet paper/wipes in reach

## 2019-02-10 NOTE — PROGRESS NOTES
Client off unit by Wheelchair at this time. E-sign was not functioning at this time and client signed manual form to confirm she received her discharge instructions.

## 2019-02-10 NOTE — PROGRESS NOTES
[facility]  Medical Oncology at [address] [facility] [phone] Hematology / Oncology Established Visit Reason for Visit:  
Heather Mccall is a 72 y.o. female who is seen for f/u of PE. History of Present Illness: Ms. Melvin Stark is a 71 y/o female with type II DM, hyperlipidemia, HTN, remote h/o left breast cancer admitted with worsening shortness of breath, found to have PE. She states she was diagnosed with PNA a few months ago after reporting shortness of breath. She was treated with Levaquin. However, she continued to have SOB, but chest CTA and dopplers negative in 11/2018. She states she suffered a fall when she tripped in her kitchen. She developed bruises on her legs and states her legs have remained swollen since then. No fractures, car/air travel, immobility, surgery, hormone replacement/OCPs. No prior personal or family h/o DVT. After a prior joint surgery in 2012, patient was treated with Xarelto for DVT prophylaxis, which resulted in a GI bleed. Therefore, medication was discontinued. No GI bleed since then. For h/o breast cancer, patient underwent left breast mastectomy and reconstruction; no XRT or chemotherapy. She was seen by Dr. Selam Loera in the past.  
 
Today, patient is doing well. She has walked the halls and in her room without any shortness of breath. She understands Eliquis instructions and need for f/u with us in 2 weeks. Past Medical History:  
Diagnosis Date  Bronchitis  Cancer ([address] breast - left  Diabetes ([address]  Hepatitis age 11  
 High cholesterol  Hypertension  Obesity (BMI 30-39. 9) Past Surgical History:  
Procedure Laterality Date  BREAST SURGERY PROCEDURE UNLISTED    
 L mastectomy  COLONOSCOPY N/A 5/26/2017 COLONOSCOPY- no info to   performed by Lorette Bloch, MD at [address] [facility] HX GYN    
 ablation, R ovary removed, tubal ligation  HX HAMMER TOE REPAIR    
 HX HEENT    
 tonsillectomy  HX ORTHOPAEDIC    
 L hip, R leg, C4-C5 fusion, L foot  HX OTHER SURGICAL    
 xiphoid removal  
 IR PLC IVC FILTER  2/8/2019 Social History Tobacco Use  Smoking status: Former Smoker  Smokeless tobacco: Never Used Substance Use Topics  Alcohol use: Yes Comment: seldom Family History Problem Relation Age of Onset  Heart Attack Father  Diabetes Mother Current Facility-Administered Medications Medication Dose Route Frequency  apixaban (ELIQUIS) tablet 10 mg  10 mg Oral Q12H  
 acetaminophen (TYLENOL) tablet 650 mg  650 mg Oral Q6H PRN  
 sodium chloride (NS) flush 5-40 mL  5-40 mL IntraVENous Q8H  
 sodium chloride (NS) flush 5-40 mL  5-40 mL IntraVENous PRN  
 carvedilol (COREG) tablet 3.125 mg  3.125 mg Oral BID WITH MEALS  glipiZIDE SR (GLUCOTROL XL) tablet 2.5 mg  2.5 mg Oral QPM  
 losartan (COZAAR) tablet 25 mg  25 mg Oral DAILY  lovastatin (MEVACOR) tablet 10 mg  10 mg Oral QHS  montelukast (SINGULAIR) tablet 10 mg  10 mg Oral QHS  pantoprazole (PROTONIX) tablet 40 mg  40 mg Oral ACB  insulin lispro (HUMALOG) injection   SubCUTAneous AC&HS  
 glucose chewable tablet 16 g  4 Tab Oral PRN  
 dextrose (D50W) injection syrg 12.5-25 g  25-50 mL IntraVENous PRN  
 glucagon (GLUCAGEN) injection 1 mg  1 mg IntraMUSCular PRN  
 arformoterol (BROVANA) neb solution 15 mcg  15 mcg Nebulization BID RT  
 budesonide (PULMICORT) 500 mcg/2 ml nebulizer suspension  500 mcg Nebulization BID RT Allergies Allergen Reactions  Crestor [Rosuvastatin] Other (comments) Pain in left leg Causes muscle spasms  Lipitor [Atorvastatin] Other (comments) Left leg pain Causes muscle spasms  Xarelto [Rivaroxaban] Other (comments) Pt states it caused internal bleeding Review of Systems: A complete review of systems was obtained, negative except as described above. Physical Exam:  
 
Visit Vitals /68 (BP 1 Location: Left arm, BP Patient Position: Sitting) Pulse 74 Temp 97.5 °F (36.4 °C) Resp 17 Ht 5' 3\" (1.6 m) Wt 196 lb (88.9 kg) SpO2 93% Breastfeeding? No  
BMI 34.72 kg/m² ECOG PS: 0 General: No distress Eyes: PERRLA, anicteric sclerae HENT: Atraumatic with normal appearance of ears and nose; OP clear Neck: Supple; no thyromegaly Lymphatic: No cervical, supraclavicular, or inguinal adenopathy Respiratory: CTAB, normal respiratory effort CV: Normal rate, regular rhythm, no murmurs. Trace to 1+ pitting edema in BLE 
GI: Soft, nontender, nondistended, no masses, no hepatomegaly, no splenomegaly MS: Normal gait and station. Digits without clubbing or cyanosis. Skin: No rashes, ecchymoses, or petechiae. Normal temperature, turgor, and texture. Neuro/Psych: Alert, oriented, appropriate affect, normal judgment/insight Results:  
 
Lab Results Component Value Date/Time WBC 11.2 (H) 02/09/2019 01:50 AM  
 HGB 12.2 02/09/2019 01:50 AM  
 HCT 37.0 02/09/2019 01:50 AM  
 PLATELET 136 23/29/0812 01:50 AM  
 MCV 83.1 02/09/2019 01:50 AM  
 ABS. NEUTROPHILS 9.3 (H) 02/07/2019 10:56 AM  
 Hemoglobin (POC) 7.1 (L) 02/24/2012 12:39 AM  
 Hematocrit (POC) 21 (L) 02/24/2012 12:39 AM  
 
Lab Results Component Value Date/Time  Sodium 142 02/09/2019 01:50 AM  
 Potassium 4.2 02/09/2019 01:50 AM  
 Chloride 107 02/09/2019 01:50 AM  
 CO2 27 02/09/2019 01:50 AM  
 Glucose 127 (H) 02/09/2019 01:50 AM  
 BUN 14 02/09/2019 01:50 AM  
 Creatinine 1.04 (H) 02/09/2019 01:50 AM  
 GFR est AA >60 02/09/2019 01:50 AM  
 GFR est non-AA 52 (L) 02/09/2019 01:50 AM  
 Calcium 8.5 02/09/2019 01:50 AM  
 Sodium (POC) 146 (H) 02/24/2012 12:39 AM  
 Potassium (POC) 3.5 02/24/2012 12:39 AM  
 Chloride (POC) 113 (H) 02/24/2012 12:39 AM  
 Glucose (POC) 110 (H) 02/10/2019 06:40 AM  
 BUN (POC) 9 02/24/2012 12:39 AM  
 Creatinine (POC) 0.7 02/24/2012 12:39 AM  
 Calcium, ionized (POC) 1.12 02/24/2012 12:39 AM  
 
Lab Results Component Value Date/Time Bilirubin, total 0.6 02/07/2019 10:56 AM  
 ALT (SGPT) 23 02/07/2019 10:56 AM  
 AST (SGOT) 22 02/07/2019 10:56 AM  
 Alk. phosphatase 102 02/07/2019 10:56 AM  
 Protein, total 7.2 02/07/2019 10:56 AM  
 Albumin 3.2 (L) 02/07/2019 10:56 AM  
 Globulin 4.0 02/07/2019 10:56 AM  
 
Chest CTA 2/7/19: IMPRESSION: 
Extensive bilateral acute pulmonary emboli, including saddle embolus in the main 
pulmonary artery. Unchanged 7 mm right lower lobe pulmonary nodule. Mild linear 
atelectasis in the lingula. Lower extremity doppler 2/7/19: 
Left lower extremity venous duplex is positive for deep vein thrombosis involving the CFV, FV, profunda, popliteal, posterior tibial and gastrocnemius veins. The right CFV is thrombus free. Assessment and Recommendations:  
Wesley Plascencia is a 72 y.o. female with DM II, HTN, XOL admitted with PE. 
 
1. Saddle PE / Bilateral PE / LLE DVT: Unprovoked based on lack of a clear known cause, but given patient's age and lack of prior VTE, I do not have a high suspicion of inherited thrombophilias. Therefore, I do not recommend a hypercoag workup. I recommend starting on anticoagulation with plan to continue for at least 6 months. At that time, I will f/u with patient and have a risks/benefits conversation to decide whether she should continue on anticoagulation indefinitely. She does have h/o GI bleed several years ago while on Xarelto, but given that was many years ago and pt has current VTE, I wholeheartedly recommend anticoagulation as benefits outweigh risks at this time. Echo reviewed. S/p IVC filter placement on 2/8/19. 
-- Continue on anticoagulation with plan to continue for at least 6 months. Agree with Eliquis upon hospital discharge.  or SW must ensure patient can afford medication. -- Will plan to follow up with patient in 2 weeks as outpatient 2. Shortness of breath: 2/2 PE currently. Prior SOB may have been related to PNA. However, patient may have had small pulmonary emboli even a few months ago which did not show up on CT. 3. BLE edema: 2/2 left leg DVT 4. Remote h/o left breast cancer: S/p left mastectomy and reconstruction in 2009. No radiation or chemotherapy. Signed By: Nito Horn MD   
 February 10, 2019

## 2019-02-10 NOTE — PROGRESS NOTES
Jaquan Flannery [facility] 
[address] [facility], [address] [place] [address] 
[phone] Medical Progress Note NAME: Daniel Cartwright :  1945 MRM:  [id number] Date/Time: 2/10/2019  7:00 AM 
 
  
Assessment and Plan: 1. Acute saddle Pulmonary embolism/acute LLE DVT: unclear trigger. Echo is unremarkable. Hematology, pulmonary and vascular evaluation ap[preciated. Started on eliquis. FU with hematology to discuss future plan of  treatment. Had IVC filter placed on > FU with vascular 
  
2. HTN: cont coreg, losartan.   
  
3. DM type 2: A1C 7.1%. Cont glipizide, SSI 
  
4. Hx of GI bleed: in , due to [address] 56. GI eval was negative. No issues with GI bleed since then. Cont PPI. Benefit of anticoagulation outweigh risk.  
  
5.  Debility: work if PT/OT Subjective: Chief Complaint:  Follow up of pt who was admitted with massive PE. Denies SOB or chest pain ROS: 
(bold if positive, if negative) Tolerating PT  Tolerating Diet Objective:  
 
Last 24hrs VS reviewed since prior progress note. Most recent are: 
 
Visit Vitals /63 (BP 1 Location: Left arm, BP Patient Position: At rest) Pulse 69 Temp 98 °F (36.7 °C) Resp 20 Ht 5' 3\" (1.6 m) Wt 88.9 kg (196 lb) SpO2 96% Breastfeeding? No  
BMI 34.72 kg/m² SpO2 Readings from Last 6 Encounters:  
02/10/19 96% 18 96% 17 96% 17 95% 17 96% 13 93% O2 Flow Rate (L/min): 2 l/min Intake/Output Summary (Last 24 hours) at 2/10/2019 3997 Last data filed at 2/10/2019 3556 Gross per 24 hour Intake 250 ml Output 901 ml Net -651 ml Physical Exam: 
 
Gen:  Well-developed, well-nourished, in no acute distress HEENT:  Pink conjunctivae, PERRL, hearing intact to voice, moist mucous membranes Neck:  Supple, without masses, thyroid non-tender Resp:  No accessory muscle use, clear breath sounds without wheezes rales or rhonchi Card:  No murmurs, normal S1, S2 without thrills, bruits or peripheral edema Abd:  Soft, non-tender, non-distended, normoactive bowel sounds are present, no palpable organomegaly and no detectable hernias Lymph:  No cervical or inguinal adenopathy Musc:  No cyanosis or clubbing Skin:  No rashes or ulcers, skin turgor is good Neuro:  Cranial nerves are grossly intact, no focal motor weakness, follows commands appropriately Psych:  Good insight, oriented to person, place and time, alert 
__________________________________________________________________ Medications Reviewed: (see below) Medications:  
 
Current Facility-Administered Medications Medication Dose Route Frequency  apixaban (ELIQUIS) tablet 10 mg  10 mg Oral Q12H  
 acetaminophen (TYLENOL) tablet 650 mg  650 mg Oral Q6H PRN  
 sodium chloride (NS) flush 5-40 mL  5-40 mL IntraVENous Q8H  
 sodium chloride (NS) flush 5-40 mL  5-40 mL IntraVENous PRN  
 carvedilol (COREG) tablet 3.125 mg  3.125 mg Oral BID WITH MEALS  glipiZIDE SR (GLUCOTROL XL) tablet 2.5 mg  2.5 mg Oral QPM  
 losartan (COZAAR) tablet 25 mg  25 mg Oral DAILY  lovastatin (MEVACOR) tablet 10 mg  10 mg Oral QHS  montelukast (SINGULAIR) tablet 10 mg  10 mg Oral QHS  pantoprazole (PROTONIX) tablet 40 mg  40 mg Oral ACB  insulin lispro (HUMALOG) injection   SubCUTAneous AC&HS  
 glucose chewable tablet 16 g  4 Tab Oral PRN  
 dextrose (D50W) injection syrg 12.5-25 g  25-50 mL IntraVENous PRN  
 glucagon (GLUCAGEN) injection 1 mg  1 mg IntraMUSCular PRN  
 arformoterol (BROVANA) neb solution 15 mcg  15 mcg Nebulization BID RT  
 budesonide (PULMICORT) 500 mcg/2 ml nebulizer suspension  500 mcg Nebulization BID RT Lab Data Reviewed: (see below) Lab Review:  
 
Recent Labs 02/09/19 
0150 02/08/19 
0338 02/07/19 
1056 WBC 11.2* 10.7 12.6* HGB 12.2 12.5 13.8 HCT 37.0 37.4 42.9  165 158 Recent Labs 02/09/19 
0150 02/08/19 6231 02/07/19 
1056  141 142  
K 4.2 4.1 4.3  107 106 CO2 27 27 28 * 129* 107* BUN 14 15 16 CREA 1.04* 1.00 1.04* CA 8.5 8.6 9.1 MG 1.9 1.8  --   
PHOS 3.1 3.4  --   
ALB  --   --  3.2* TBILI  --   --  0.6 SGOT  --   --  22 ALT  --   --  23 INR  --   --  1.0 Lab Results Component Value Date/Time Glucose (POC) 110 (H) 02/10/2019 06:40 AM  
 Glucose (POC) 192 (H) 02/09/2019 08:54 PM  
 Glucose (POC) 139 (H) 02/09/2019 03:48 PM  
 Glucose (POC) 201 (H) 02/09/2019 10:23 AM  
 Glucose (POC) 102 (H) 02/09/2019 07:27 AM  
 
No results for input(s): PH, PCO2, PO2, HCO3, FIO2 in the last 72 hours. Recent Labs 02/07/19 
1056 INR 1.0 All Micro Results None I have reviewed notes of prior 24hr. Other pertinent lab: Total time spent with patient: 28 Care Plan discussed with: Patient, Nursing Staff and >50% of time spent in counseling and coordination of care Discussed:  Care Plan Prophylaxis:  Lovenox Disposition:  Home w/Family 
        
___________________________________________________ Attending Physician: Darius Campbell MD

## 2019-02-14 ENCOUNTER — CLINICAL SUPPORT (OUTPATIENT)
Dept: CARDIOLOGY CLINIC | Age: 74
End: 2019-02-14

## 2019-02-14 DIAGNOSIS — I65.23 BILATERAL CAROTID ARTERY STENOSIS: ICD-10-CM

## 2019-02-14 DIAGNOSIS — I73.9 PAD (PERIPHERAL ARTERY DISEASE) (HCC): ICD-10-CM

## 2019-02-15 LAB
LEFT ABI: 1.32
LEFT CCA DIST DIAS: 19.5 CENTIMETER/SECOND
LEFT CCA DIST SYS: 63.7 CENTIMETER/SECOND
LEFT CCA PROX DIAS: 15.3 CENTIMETER/SECOND
LEFT CCA PROX SYS: 96 CENTIMETER/SECOND
LEFT CFA DIST SYS PSV: 125 CENTIMETER/SECOND
LEFT CFA MID SYS PSV: 150 CENTIMETER/SECOND
LEFT CFA PROX SYS PSV: 154.2 CENTIMETER/SECOND
LEFT DIST ATA VELOCITY: 67.5 CENTIMETER/SECOND
LEFT DIST PTA PSV: 71.8 CENTIMETER/SECOND
LEFT ECA DIAS: 7 CM/S
LEFT ECA SYS: 54 CM/S
LEFT ICA DIST DIAS: 23.2 CENTIMETER/SECOND
LEFT ICA DIST SYS: 62.1 CENTIMETER/SECOND
LEFT ICA MID DIAS: 14.8 CENTIMETER/SECOND
LEFT ICA MID SYS: 45.2 CENTIMETER/SECOND
LEFT ICA PROX DIAS: 6.9 CENTIMETER/SECOND
LEFT ICA PROX SYS: 43.1 CENTIMETER/SECOND
LEFT ICA/CCA SYS: 0.65
LEFT MID ATA VELOCITY: 84.9 CENTIMETER/SECOND
LEFT POPLITEAL DIST SYS PSV: 99.3 CENTIMETER/SECOND
LEFT POPLITEAL MID SYS PSV: 77.4 CENTIMETER/SECOND
LEFT POPLITEAL PROX SYS PSV: 67.9 CENTIMETER/SECOND
LEFT POSTERIOR TIBIAL: 172 MMHG
LEFT PROX PFA A PSV: 131.1 CENTIMETER/SECOND
LEFT PROX PTA PSV: 109.1 CENTIMETER/SECOND
LEFT PTA MID SYS PSV: 93.1 CENTIMETER/SECOND
LEFT SUPER FEMORAL DIST SYS PSV: 138.1 CENTIMETER/SECOND
LEFT SUPER FEMORAL MID SYS PSV: 138.1 CENTIMETER/SECOND
LEFT SUPER FEMORAL PROX SYS PSV: 134.8 CENTIMETER/SECOND
LEFT VERTEBRAL DIAS: 13.28 CENTIMETER/SECOND
LEFT VERTEBRAL SYS: 37.5 CENTIMETER/SECOND
RIGHT ABI: 1.26
RIGHT ARM BP: 130 MMHG
RIGHT CCA DIST DIAS: 18.3 CENTIMETER/SECOND
RIGHT CCA DIST SYS: 68 CENTIMETER/SECOND
RIGHT CCA PROX DIAS: 15.6 CENTIMETER/SECOND
RIGHT CCA PROX SYS: 85.5 CENTIMETER/SECOND
RIGHT CFA DIST SYS PSV: 112.3 CENTIMETER/SECOND
RIGHT CFA MID SYS PSV: 119.4 CENTIMETER/SECOND
RIGHT CFA PROX SYS PSV: 133.2 CENTIMETER/SECOND
RIGHT DIST ATA VELOCITY: 67 CENTIMETER/SECOND
RIGHT ECA DIAS: 12 CM/S
RIGHT ECA SYS: 72 CM/S
RIGHT ICA DIST DIAS: 14 CENTIMETER/SECOND
RIGHT ICA DIST SYS: 61.9 CENTIMETER/SECOND
RIGHT ICA MID DIAS: 17.6 CENTIMETER/SECOND
RIGHT ICA MID SYS: 48.4 CENTIMETER/SECOND
RIGHT ICA PROX DIAS: 9.6 CENTIMETER/SECOND
RIGHT ICA PROX SYS: 38.2 CENTIMETER/SECOND
RIGHT ICA/CCA SYS: 0.7
RIGHT POPLITEAL DIST SYS PSV: 77.3 CENTIMETER/SECOND
RIGHT POPLITEAL MID SYS PSV: 66.9 CENTIMETER/SECOND
RIGHT POPLITEAL PROX SYS PSV: 66.9 CENTIMETER/SECOND
RIGHT POSTERIOR TIBIAL: 164 MMHG
RIGHT PROX ATA VELOCITY: 96 CENTIMETER/SECOND
RIGHT PROX PFA A PSV: 112.6 CENTIMETER/SECOND
RIGHT PROX PTA PSV: 107.4 CENTIMETER/SECOND
RIGHT PTA MID SYS PSV: 74.9 CENTIMETER/SECOND
RIGHT SUPER FEMORAL DIST SYS PSV: 76.4 CENTIMETER/SECOND
RIGHT SUPER FEMORAL MID SYS PSV: 116.6 CENTIMETER/SECOND
RIGHT SUPER FEMORAL PROX SYS PSV: 106.8 CENTIMETER/SECOND
RIGHT VERTEBRAL DIAS: 14.21 CENTIMETER/SECOND
RIGHT VERTEBRAL SYS: 41.6 CENTIMETER/SECOND

## 2019-02-28 ENCOUNTER — OFFICE VISIT (OUTPATIENT)
Dept: ONCOLOGY | Age: 74
End: 2019-02-28

## 2019-02-28 VITALS
WEIGHT: 204.2 LBS | DIASTOLIC BLOOD PRESSURE: 62 MMHG | TEMPERATURE: 97.4 F | SYSTOLIC BLOOD PRESSURE: 130 MMHG | BODY MASS INDEX: 36.18 KG/M2 | HEIGHT: 63 IN | HEART RATE: 82 BPM

## 2019-02-28 DIAGNOSIS — R06.02 SHORTNESS OF BREATH: ICD-10-CM

## 2019-02-28 DIAGNOSIS — I82.412 ACUTE DEEP VEIN THROMBOSIS (DVT) OF FEMORAL VEIN OF LEFT LOWER EXTREMITY (HCC): ICD-10-CM

## 2019-02-28 DIAGNOSIS — R60.0 BILATERAL EDEMA OF LOWER EXTREMITY: ICD-10-CM

## 2019-02-28 DIAGNOSIS — E11.9 TYPE 2 DIABETES MELLITUS WITHOUT COMPLICATION, WITHOUT LONG-TERM CURRENT USE OF INSULIN (HCC): ICD-10-CM

## 2019-02-28 DIAGNOSIS — I26.92 ACUTE SADDLE PULMONARY EMBOLISM WITHOUT ACUTE COR PULMONALE (HCC): Primary | ICD-10-CM

## 2019-02-28 NOTE — PROGRESS NOTES
La Cheek is a 72 y.o. female here for evaluation of PE/DVT. Patient with no complaints of pain at this time.

## 2019-02-28 NOTE — PROGRESS NOTES
[address] [facility] Medical Oncology at [facility] [phone] Hematology / Oncology Established Visit Reason for Visit:  
Randi Olmstead is a 72 y.o. female who is seen for f/u of PE. History of Present Illness: Ms. Matilda Roe is a 71 y/o female with type II DM, hyperlipidemia, HTN, remote h/o left breast cancer admitted with worsening shortness of breath, found to have PE. She states she was diagnosed with PNA a few months ago after reporting shortness of breath. She was treated with Levaquin. However, she continued to have SOB, but chest CTA and dopplers negative in 11/2018. She states she suffered a fall when she tripped in her kitchen. She developed bruises on her legs and states her legs have remained swollen since then. No fractures, car/air travel, immobility, surgery, hormone replacement/OCPs. No prior personal or family h/o DVT. After a prior joint surgery in 2012, patient was treated with Xarelto for DVT prophylaxis, which resulted in a GI bleed. Therefore, medication was discontinued. No GI bleed since then. For h/o breast cancer, patient underwent left breast mastectomy and reconstruction; no XRT or chemotherapy. She was seen by Dr. Yonatan Lobo in the past.  
 
Today, patient comes in for follow of PE. Her prior shortness of breath is much improved compared to recent hospitalization with diagnosis of PE. She did have some intermittent SOB even several months prior to the PE. She had a PNA in Aug/Sept 2018 as well. She states that her intermittent SOB occurs usually with exertion. She does feel like she has been generally more down on energy and depressed since leaving the hospital and wonders if the Eliquis could be causing. No SI/HI. She thinks that she may do better now that she knows it is safe to move around despite having the blood clots. Past Medical History:  
Diagnosis Date  Asthma  Bronchitis  Cancer ([address] breast - left  Diabetes ([address]  Hepatitis age 11  
 High cholesterol  Hypertension  Obesity (BMI 30-39. 9) Past Surgical History:  
Procedure Laterality Date  BREAST SURGERY PROCEDURE UNLISTED    
 L mastectomy  COLONOSCOPY N/A 5/26/2017 COLONOSCOPY- no info to   performed by Dennise Leyden, MD at [address] HX GYN    
 ablation, R ovary removed, tubal ligation  HX HAMMER TOE REPAIR    
 HX HEENT    
 tonsillectomy  HX ORTHOPAEDIC    
 L hip, R leg, C4-C5 fusion, L foot  HX OTHER SURGICAL    
 xiphoid removal  
 IR PLC IVC FILTER  2/8/2019 Social History Tobacco Use  Smoking status: Former Smoker Last attempt to quit: 2009 Years since quitting: 10.1  Smokeless tobacco: Never Used Substance Use Topics  Alcohol use: No  
  Frequency: Never Family History Problem Relation Age of Onset  Heart Attack Father  Diabetes Mother Current Outpatient Medications Medication Sig  
 apixaban (ELIQUIS) 5 mg (74 tabs) starter pack Take 10 mg (two 5 mg tablets) by mouth twice a day for 7 days Followed by 5 mg (one 5 mg tablet) by mouth twice a day  lansoprazole (PREVACID) 15 mg capsule Take 15 mg by mouth daily as needed (heartburn).  multivitamin (ONE A DAY) tablet Take 1 Tab by mouth nightly.  ascorbate calcium (VITAMIN C PO) Take 1 Tab by mouth nightly.  mometasone-formoterol (DULERA) 200-5 mcg/actuation HFA inhaler Take 2 Puffs by inhalation two (2) times a day.  glipiZIDE SR (GLUCOTROL XL) 2.5 mg CR tablet Take 2.5 mg by mouth nightly.  carvedilol (COREG) 3.125 mg tablet Take 3.125 mg by mouth two (2) times daily (with meals).  lovastatin (MEVACOR) 10 mg tablet Take 10 mg by mouth nightly.  aspirin 81 mg tablet Take 162 mg by mouth two (2) times a day.  losartan (COZAAR) 25 mg tablet Take 25 mg by mouth daily.  montelukast (SINGULAIR) 10 mg tablet Take 10 mg by mouth nightly. No current facility-administered medications for this visit. Allergies Allergen Reactions  Crestor [Rosuvastatin] Other (comments) Pain in left leg Causes muscle spasms  Lipitor [Atorvastatin] Other (comments) Left leg pain Causes muscle spasms  Xarelto [Rivaroxaban] Other (comments) Pt states it caused internal bleeding Review of Systems: A complete review of systems was obtained, negative except as described above. Physical Exam:  
 
Visit Vitals /62 (BP 1 Location: Left arm, BP Patient Position: Sitting) Pulse 82 Temp 97.4 °F (36.3 °C) (Oral) Ht 5' 3\" (1.6 m) Wt 204 lb 3.2 oz (92.6 kg) BMI 36.17 kg/m² ECOG PS: 0 General: No distress Eyes: PERRLA, anicteric sclerae HENT: Atraumatic with normal appearance of ears and nose; OP clear Neck: Supple; no thyromegaly Lymphatic: No cervical, supraclavicular, or inguinal adenopathy Respiratory: CTAB, normal respiratory effort CV: Normal rate, regular rhythm, no murmurs. 1+ pitting edema in LLE, trace pitting edema in RLE 
GI: Soft, nontender, nondistended, no masses, no hepatomegaly, no splenomegaly MS: Normal gait and station. Digits without clubbing or cyanosis. Skin: No rashes, ecchymoses, or petechiae. Normal temperature, turgor, and texture. Neuro/Psych: Alert, oriented, appropriate affect, normal judgment/insight Results:  
 
Lab Results Component Value Date/Time WBC 11.2 (H) 02/09/2019 01:50 AM  
 HGB 12.2 02/09/2019 01:50 AM  
 HCT 37.0 02/09/2019 01:50 AM  
 PLATELET 059 19/54/1410 01:50 AM  
 MCV 83.1 02/09/2019 01:50 AM  
 ABS. NEUTROPHILS 9.3 (H) 02/07/2019 10:56 AM  
 Hemoglobin (POC) 7.1 (L) 02/24/2012 12:39 AM  
 Hematocrit (POC) 21 (L) 02/24/2012 12:39 AM  
 
Lab Results Component Value Date/Time  Sodium 142 02/09/2019 01:50 AM  
 Potassium 4.2 02/09/2019 01:50 AM  
 Chloride 107 02/09/2019 01:50 AM  
 CO2 27 02/09/2019 01:50 AM  
 Glucose 127 (H) 02/09/2019 01:50 AM  
 BUN 14 02/09/2019 01:50 AM  
 Creatinine 1.04 (H) 02/09/2019 01:50 AM  
 GFR est AA >60 02/09/2019 01:50 AM  
 GFR est non-AA 52 (L) 02/09/2019 01:50 AM  
 Calcium 8.5 02/09/2019 01:50 AM  
 Sodium (POC) 146 (H) 02/24/2012 12:39 AM  
 Potassium (POC) 3.5 02/24/2012 12:39 AM  
 Chloride (POC) 113 (H) 02/24/2012 12:39 AM  
 Glucose (POC) 110 (H) 02/10/2019 06:40 AM  
 BUN (POC) 9 02/24/2012 12:39 AM  
 Creatinine (POC) 0.7 02/24/2012 12:39 AM  
 Calcium, ionized (POC) 1.12 02/24/2012 12:39 AM  
 
Lab Results Component Value Date/Time Bilirubin, total 0.6 02/07/2019 10:56 AM  
 ALT (SGPT) 23 02/07/2019 10:56 AM  
 AST (SGOT) 22 02/07/2019 10:56 AM  
 Alk. phosphatase 102 02/07/2019 10:56 AM  
 Protein, total 7.2 02/07/2019 10:56 AM  
 Albumin 3.2 (L) 02/07/2019 10:56 AM  
 Globulin 4.0 02/07/2019 10:56 AM  
 
Chest CTA 2/7/19: IMPRESSION: 
Extensive bilateral acute pulmonary emboli, including saddle embolus in the main 
pulmonary artery. Unchanged 7 mm right lower lobe pulmonary nodule. Mild linear 
atelectasis in the lingula. Lower extremity doppler 2/7/19: 
Left lower extremity venous duplex is positive for deep vein thrombosis involving the CFV, FV, profunda, popliteal, posterior tibial and gastrocnemius veins. The right CFV is thrombus free. Assessment and Recommendations:  
Jennyfer Rahman is a 72 y.o. female with DM II, HTN, XOL admitted with PE. 
 
1. Saddle PE / Bilateral PE / LLE DVT: Unprovoked based on lack of a clear known cause, but given patient's age and lack of prior VTE, I do not have a high suspicion of inherited thrombophilias. Therefore, I do not recommend a hypercoag workup. I recommend starting on anticoagulation with plan to continue for at least 6 months. At that time, I will f/u with patient and have a risks/benefits conversation to decide whether she should continue on anticoagulation indefinitely.  She does have h/o GI bleed several years ago while on Xarelto, but given that was many years ago and pt has current VTE, I recommended anticoagulation as benefits outweigh risks at this time. Echo reviewed. S/p IVC filter placement on 2/8/19. 
-- Continue on anticoagulation with plan to continue for at least 6 months and possibly indefinitely. Agree with Eliquis upon hospital discharge. -- Return in one month to see if fatigue, depression is better - as pt feels this may be related to Eliquis. I do not suspect Eliquis, but cannot rule it out. No  
 
2. Fatigue, depression: Most likely pt is overwhelmed by recent hospital stay and medical problems. No SI/HI. I offered for patient to speak with our SW and obtain list of resources for support, but she declined. 3. Intermittent shortness of breath: 2/2 PE currently. Prior SOB may have been related to PNA. However, patient may have had small pulmonary emboli even a few months ago which did not show up on CT. 
-- f/u with Pulmonary today 4. BLE edema: L > R, 2/2 left leg DVT. I discussed with patient that lymphedema evaluation may be helpful since her edema is slow to improve and to reduce risk of post-thrombotic syndrome. -- Refer to lymphedema clinic. 5. Remote h/o left breast cancer: S/p left mastectomy and reconstruction in 2009. No radiation or chemotherapy. Signed By: Mary Melendez MD   
 February 28, 2019

## 2019-03-01 ENCOUNTER — OFFICE VISIT (OUTPATIENT)
Dept: FAMILY MEDICINE CLINIC | Age: 74
End: 2019-03-01

## 2019-03-01 ENCOUNTER — TELEPHONE (OUTPATIENT)
Dept: ONCOLOGY | Age: 74
End: 2019-03-01

## 2019-03-01 VITALS
BODY MASS INDEX: 36.14 KG/M2 | RESPIRATION RATE: 20 BRPM | HEIGHT: 63 IN | SYSTOLIC BLOOD PRESSURE: 132 MMHG | HEART RATE: 70 BPM | DIASTOLIC BLOOD PRESSURE: 71 MMHG | TEMPERATURE: 98.1 F | WEIGHT: 204 LBS

## 2019-03-01 DIAGNOSIS — Z76.89 ENCOUNTER TO ESTABLISH CARE: Primary | ICD-10-CM

## 2019-03-01 DIAGNOSIS — Z09 ENCOUNTER FOR EXAMINATION FOLLOWING TREATMENT AT HOSPITAL: ICD-10-CM

## 2019-03-01 DIAGNOSIS — E11.9 TYPE 2 DIABETES MELLITUS WITHOUT COMPLICATION, WITHOUT LONG-TERM CURRENT USE OF INSULIN (HCC): ICD-10-CM

## 2019-03-01 DIAGNOSIS — M79.89 SWELLING OF LOWER EXTREMITY: ICD-10-CM

## 2019-03-01 DIAGNOSIS — I82.412 ACUTE DEEP VEIN THROMBOSIS (DVT) OF FEMORAL VEIN OF LEFT LOWER EXTREMITY (HCC): ICD-10-CM

## 2019-03-01 DIAGNOSIS — I26.92 ACUTE SADDLE PULMONARY EMBOLISM WITHOUT ACUTE COR PULMONALE (HCC): ICD-10-CM

## 2019-03-01 DIAGNOSIS — J44.9 COPD SUGGESTED BY INITIAL EVALUATION (HCC): ICD-10-CM

## 2019-03-01 DIAGNOSIS — E78.49 OTHER HYPERLIPIDEMIA: ICD-10-CM

## 2019-03-01 DIAGNOSIS — I10 ESSENTIAL HYPERTENSION: ICD-10-CM

## 2019-03-01 DIAGNOSIS — Z79.01 CHRONIC ANTICOAGULATION: ICD-10-CM

## 2019-03-01 DIAGNOSIS — I82.409 DEEP VEIN THROMBOSIS (DVT) OF LOWER EXTREMITY, UNSPECIFIED CHRONICITY, UNSPECIFIED LATERALITY, UNSPECIFIED VEIN (HCC): Primary | ICD-10-CM

## 2019-03-01 PROBLEM — E66.01 SEVERE OBESITY (HCC): Status: ACTIVE | Noted: 2019-03-01

## 2019-03-01 RX ORDER — LOSARTAN POTASSIUM 25 MG/1
25 TABLET ORAL DAILY
Qty: 30 TAB | Refills: 0 | Status: SHIPPED | OUTPATIENT
Start: 2019-03-01 | End: 2019-03-31

## 2019-03-01 RX ORDER — CARVEDILOL 3.12 MG/1
3.12 TABLET ORAL 2 TIMES DAILY WITH MEALS
Qty: 60 TAB | Refills: 2 | Status: SHIPPED | OUTPATIENT
Start: 2019-03-01 | End: 2019-03-31

## 2019-03-01 RX ORDER — BUDESONIDE AND FORMOTEROL FUMARATE DIHYDRATE 160; 4.5 UG/1; UG/1
2 AEROSOL RESPIRATORY (INHALATION) 2 TIMES DAILY
COMMUNITY
Start: 2019-03-01 | End: 2019-07-15 | Stop reason: ALTCHOICE

## 2019-03-01 NOTE — PROGRESS NOTES
1. Have you been to the ER, urgent care clinic since your last visit? Hospitalized since your last visit? No    2. Have you seen or consulted any other health care providers outside of the [address] [place] [address] since your last visit? Include any pap smears or colon screening.  No  Reviewed record in preparation for visit and have necessary documentation  Pt did not bring medication to office visit for review    Goals that were addressed and/or need to be completed during or after this appointment include   Health Maintenance Due   Topic Date Due    Hepatitis C Screening  1945    LIPID PANEL Q1  1945    FOOT EXAM Q1  07/26/1957    MICROALBUMIN Q1  07/26/1957    EYE EXAM RETINAL OR DILATED  07/26/1957    DTaP/Tdap/Td series (1 - Tdap) 07/26/1968    Shingrix Vaccine Age 50> (1 of 2) 07/26/1997    FOBT Q 1 YEAR AGE 50-75  07/26/1997    GLAUCOMA SCREENING Q2Y  07/26/2012    Bone Densitometry (Dexa) Screening  07/26/2012    Pneumococcal 65+ Low/Medium Risk (1 of 2 - PCV13) 07/26/2012    BREAST CANCER SCRN MAMMOGRAM  10/23/2014    MEDICARE YEARLY EXAM  03/20/2018    Influenza Age 9 to Adult  08/01/2018

## 2019-03-01 NOTE — PROGRESS NOTES
[id number] [address] [facility],    [address]   Affiliated with [facility] and Giorgio Hough Note   Subjective:   Octavia Resendez is a 72 y.o. female with history of HTN, type II DM, hyperlipidemia, HTN,h/o breast cancer s/p left breast masectomy (2010), history of GI bleed 2012, current h/o unprovoked  PE, Bilateral PE, LLE DVT, saddle PE on [facility] IVCF presents for evaluation of establishing care  [address] [place] [address] care  History provided by patient     She is here today to establish care. She is transferring care from [place]. HPI:  Saddle PE / Bilateral PE / LLE DVT  Pt is followed by Dr. Beverly Pillai currently on Eliquis for the next 6 months. Recently had a IVCF placed on 2/8/2019 during recent hospital stay 2/7 to 2/10/2019. Pt went to [facility] ED for left leg swelling and SOB for the 5-6 months with bruising on both legs. Not on hormone replacement therapy, denied recent travel,immobility or surgery. CTA showed extenisve bilateral acute pulmonary emboli, including saddle embolus in the main pulmonary artery. She has follow up with Dr. Beverly Pillai 3/28/2019. She was noted to have a extensive history of PNA, treated with levaquin. Pt states she has had follow up with Dr. Marlen Clemente recommended leaving in the filter indefinitely. Pt also had follow up with Pulmonologist yesterday and was told to continue with [facility], Symbicort. She has follow up for PFT testing to confirm COPD next month. Pt states she is only glizipide 2.5mg qhs. Pt thinks her last A1c 6.1. HTN   Pt takes coreg, cozaar. Social history   Pt reports smoking, started 25year old, stopped at 63years of age. Retired from working at a [employer]. Current Outpatient Medications on File Prior to Visit   Medication Sig Dispense Refill    budesonide-formoterol (SYMBICORT) 160-4.5 mcg/actuation HFAA Take 2 Puffs by inhalation two (2) times a day.       apixaban (ELIQUIS) 5 mg (74 tabs) starter pack Take 10 mg (two 5 mg tablets) by mouth twice a day for 7 days   Followed by 5 mg (one 5 mg tablet) by mouth twice a day 1 Dose Pack 0    multivitamin (ONE A DAY) tablet Take 1 Tab by mouth nightly.  ascorbate calcium (VITAMIN C PO) Take 1 Tab by mouth nightly.  mometasone-formoterol (DULERA) 200-5 mcg/actuation HFA inhaler Take 2 Puffs by inhalation two (2) times a day.  glipiZIDE SR (GLUCOTROL XL) 2.5 mg CR tablet Take 2.5 mg by mouth nightly.  lovastatin (MEVACOR) 10 mg tablet Take 10 mg by mouth nightly.  aspirin 81 mg tablet Take 162 mg by mouth two (2) times a day.  montelukast (SINGULAIR) 10 mg tablet Take 10 mg by mouth nightly.  lansoprazole (PREVACID) 15 mg capsule Take 15 mg by mouth daily as needed (heartburn). No current facility-administered medications on file prior to visit. Past Medical History:   Diagnosis Date    Asthma     Bronchitis     Cancer ([facility] [address]     breast - left    Diabetes (HCC)     Hepatitis age 12    High cholesterol     Hypertension     Obesity (BMI 30-39. 9)        Social History     Socioeconomic History    Marital status: SINGLE     Spouse name: Not on file    Number of children: Not on file    Years of education: Not on file    Highest education level: Not on file   Social Needs    Financial resource strain: Not on file    Food insecurity - worry: Not on file    Food insecurity - inability: Not on file    Transportation needs - medical: Not on file   [place] needs - non-medical: Not on file   Occupational History    Not on file   Tobacco Use    Smoking status: Former Smoker     Last attempt to quit: 2009     Years since quitting: 10.1    Smokeless tobacco: Never Used   Substance and Sexual Activity    Alcohol use: No     Frequency: Never    Drug use: No    Sexual activity: No   Other Topics Concern    Not on file   Social History Narrative    Not on file     Past Surgical History:   Procedure Laterality Date    BREAST SURGERY PROCEDURE UNLISTED      L mastectomy    COLONOSCOPY N/A 5/26/2017    COLONOSCOPY- no info to   performed by Isis Leon MD at [address] HX GYN      ablation, R ovary removed, tubal ligation    HX HAMMER TOE REPAIR      HX HEENT      tonsillectomy    HX ORTHOPAEDIC      L hip, R leg, C4-C5 fusion, L foot    HX OTHER SURGICAL      xiphoid removal    IR PLC IVC FILTER  2/8/2019     . Review of Systems   Constitutional: Negative for chills and fever. Respiratory: Negative for cough and hemoptysis. Cardiovascular: Negative for chest pain. Gastrointestinal: Negative for abdominal pain, nausea and vomiting. Musculoskeletal: Negative for myalgias. Skin: Negative for itching and rash. Neurological: Negative for dizziness, tingling, sensory change, focal weakness and headaches. Objective:     Visit Vitals  /71 (BP 1 Location: Left arm, BP Patient Position: Sitting)   Pulse 70   Temp 98.1 °F (36.7 °C) (Oral)   Resp 20   Ht 5' 3\" (1.6 m)   Wt 204 lb (92.5 kg)   BMI 36.14 kg/m²      Physical Exam:  Physical Exam   Constitutional: She is oriented to person, place, and time. She appears well-developed and well-nourished. HENT:   Head: Normocephalic and atraumatic. Eyes: Conjunctivae are normal. Pupils are equal, round, and reactive to light. Right eye exhibits no discharge. Left eye exhibits no discharge. No scleral icterus. Neck: Normal range of motion. Neck supple. Cardiovascular: Normal rate, regular rhythm, normal heart sounds and intact distal pulses. Exam reveals no gallop and no friction rub. No murmur heard. Pulmonary/Chest: Effort normal and breath sounds normal. No respiratory distress. She has no wheezes. She has no rales. She exhibits no tenderness. Abdominal: Soft. Bowel sounds are normal. There is no tenderness. Musculoskeletal: Normal range of motion. She exhibits no edema, tenderness or deformity.    Neurological: She is alert and oriented to person, place, and time. No cranial nerve deficit. Skin: Skin is warm and dry. No rash noted. No erythema. Nursing note and vitals reviewed. Assessment and orders:    Pt is a 73 yo female with extensive medical history as stated above who is here to establish care and HTN medication refill         ICD-10-CM ICD-9-CM    1. Encounter to establish care Z76.89 V65.8    2. Encounter for examination following treatment at hospital Z09 V67.9    3. Essential hypertension I10 401.9 carvedilol (COREG) 3.125 mg tablet      losartan (COZAAR) 25 mg tablet   4. Acute deep vein thrombosis (DVT) of femoral vein of left lower extremity (HCC) I82.412 453.41    5. Acute saddle pulmonary embolism without acute cor pulmonale (HCC) I26.92 415.13 budesonide-formoterol (SYMBICORT) 160-4.5 mcg/actuation HFAA   6. Type 2 diabetes mellitus without complication, without long-term current use of insulin (HCC) E11.9 250.00    7. Chronic anticoagulation Z79.01 V58.61    8. Other hyperlipidemia E78.49 272.4    9. COPD suggested by initial evaluation ([facility] [address] J44.9 496 budesonide-formoterol (SYMBICORT) 160-4.5 mcg/actuation HFAA     Diagnoses and all orders for this visit:    1. Encounter to establish care    2. Encounter for examination following treatment at hospital    3. Essential hypertension  -     carvedilol (COREG) 3.125 mg tablet; Take 1 Tab by mouth two (2) times daily (with meals) for 30 days. -     losartan (COZAAR) 25 mg tablet; Take 1 Tab by mouth daily for 30 days. 4. Acute deep vein thrombosis (DVT) of femoral vein of left lower extremity ([facility] [address]    5. Acute saddle pulmonary embolism without acute cor pulmonale (HCC)    6. Type 2 diabetes mellitus without complication, without long-term current use of insulin ([facility])    7. Chronic anticoagulation    8. Other hyperlipidemia    9.  COPD suggested by initial evaluation [facility])      Follow-up Disposition:  Return in about 2 weeks (around 3/15/2019) for routine . Patient Care Team:  Karsten Lang MD as PCP - General (Family Practice)  Christiano Quezada MD (Hematology and Oncology)  Sury Holt MD (Gastroenterology)  Gaby Dumont MD (Gynecology)  Aung Major MD (Ophthalmology)  Leela Cruz MD (Pulmonary Disease)    I have reviewed patient medical and social history and medications. I have reviewed pertinent labs results and other data. I have discussed the diagnosis with the patient and the intended plan as seen in the above orders. The patient has received an after-visit summary and questions were answered concerning future plans. I have discussed medication side effects and warnings with the patient as well.     Amor Pablo MD  Resident [facility] HERNANDEZ [facility]ERRY ORDONE[facility]  03/05/19

## 2019-03-08 ENCOUNTER — DOCUMENTATION ONLY (OUTPATIENT)
Dept: FAMILY MEDICINE CLINIC | Age: 74
End: 2019-03-08

## 2019-03-08 DIAGNOSIS — D05.12 DUCTAL CARCINOMA IN SITU (DCIS) OF LEFT BREAST: ICD-10-CM

## 2019-03-08 PROBLEM — D05.10 DCIS (DUCTAL CARCINOMA IN SITU) OF BREAST: Status: ACTIVE | Noted: 2019-03-08

## 2019-03-08 NOTE — PROGRESS NOTES
[address] [facility]     Mammogram Results : 2017  Pt with left mastectomy 2/2 breast cancer DCIS in 2008.    Mammogram completed 7/14/2017 showing no specfic mammogram evidence of malignancy of the right breast. Recommendation was rescreen in one year    Pap smear Results 2/2017 from [facility]   Negative for intraepithelial or malignancy    Colonoscopy :5/30/2017 : negative , completed by Dr. Velázquez Stable : normal 8/22/2007    Shaquille Webb MD  Family Medicine Resident  PGY 3

## 2019-03-12 ENCOUNTER — OFFICE VISIT (OUTPATIENT)
Dept: FAMILY MEDICINE CLINIC | Age: 74
End: 2019-03-12

## 2019-03-12 VITALS
BODY MASS INDEX: 36.14 KG/M2 | OXYGEN SATURATION: 96 % | DIASTOLIC BLOOD PRESSURE: 81 MMHG | RESPIRATION RATE: 20 BRPM | HEIGHT: 63 IN | WEIGHT: 204 LBS | SYSTOLIC BLOOD PRESSURE: 135 MMHG | TEMPERATURE: 97.7 F | HEART RATE: 46 BPM

## 2019-03-12 DIAGNOSIS — Z00.00 MEDICARE ANNUAL WELLNESS VISIT, SUBSEQUENT: Primary | ICD-10-CM

## 2019-03-12 RX ORDER — DEXTROMETHORPHAN HYDROBROMIDE, GUAIFENESIN 5; 100 MG/5ML; MG/5ML
650 LIQUID ORAL EVERY 8 HOURS
Qty: 60 TAB | Refills: 0 | Status: SHIPPED | OUTPATIENT
Start: 2019-03-12

## 2019-03-12 NOTE — PROGRESS NOTES
[address]  [address] [facility] [address]  [phone]             Date of visit: 3/12/2019       This is an Subsequent Medicare Annual Wellness Visit (AWV), (Performed more than 12 months after effective date of Medicare Part B enrollment and 12 months after last preventive visit, Once in a lifetime)    I have reviewed the patient's medical history in detail and updated the computerized patient record. Josie Layne is a 72 y.o. female   History obtained from: the patient. Concerns today   Patient understands that medical problems addressed today may incur additional cost as this is a preventive visit  History     Patient Active Problem List   Diagnosis Code    Melena K92.1    Acute posthemorrhagic anemia D62    Type 2 diabetes mellitus without complication, without long-term current use of insulin (HCC) E11.9    HTN (hypertension) I10    Other hyperlipidemia E78.49    Debility R53.81    Pulmonary embolism (HCC) I26.99    Acute deep vein thrombosis (DVT) of left lower extremity (HCC) I82.402    Acute pulmonary embolism (HCC) I26.99    Chronic anticoagulation Z79.01    Severe obesity (HCC) E66.01    DCIS (ductal carcinoma in situ) of breast D05.10     Past Medical History:   Diagnosis Date    Asthma     Bronchitis     Cancer ([address]     breast - left    Diabetes ([address]     Hepatitis age 11    High cholesterol     Hypertension     Obesity (BMI 30-39. 9)       Past Surgical History:   Procedure Laterality Date    BREAST SURGERY PROCEDURE UNLISTED      L mastectomy    COLONOSCOPY N/A 5/26/2017    COLONOSCOPY- no info to   performed by Marii Arredondo MD at [address] HX GYN      ablation, R ovary removed, tubal ligation    HX HAMMER TOE REPAIR      HX HEENT      tonsillectomy    HX ORTHOPAEDIC      L hip, R leg, C4-C5 fusion, L foot    HX OTHER SURGICAL      xiphoid removal    IR PLC IVC FILTER  2/8/2019     Allergies Allergen Reactions    Crestor [Rosuvastatin] Other (comments)     Pain in left leg  Causes muscle spasms    Lipitor [Atorvastatin] Other (comments)     Left leg pain  Causes muscle spasms    Xarelto [Rivaroxaban] Other (comments)     Pt states it caused internal bleeding     Current Outpatient Medications   Medication Sig Dispense Refill    acetaminophen (TYLENOL ARTHRITIS PAIN) 650 mg TbER Take 1 Tab by mouth every eight (8) hours. 60 Tab 0    budesonide-formoterol (SYMBICORT) 160-4.5 mcg/actuation HFAA Take 2 Puffs by inhalation two (2) times a day.  carvedilol (COREG) 3.125 mg tablet Take 1 Tab by mouth two (2) times daily (with meals) for 30 days. 60 Tab 2    losartan (COZAAR) 25 mg tablet Take 1 Tab by mouth daily for 30 days. 30 Tab 0    apixaban (ELIQUIS) 5 mg (74 tabs) starter pack Take 10 mg (two 5 mg tablets) by mouth twice a day for 7 days   Followed by 5 mg (one 5 mg tablet) by mouth twice a day 1 Dose Pack 0    multivitamin (ONE A DAY) tablet Take 1 Tab by mouth nightly.  ascorbate calcium (VITAMIN C PO) Take 1 Tab by mouth nightly.  mometasone-formoterol (DULERA) 200-5 mcg/actuation HFA inhaler Take 2 Puffs by inhalation two (2) times a day.  glipiZIDE SR (GLUCOTROL XL) 2.5 mg CR tablet Take 2.5 mg by mouth nightly.  lovastatin (MEVACOR) 10 mg tablet Take 10 mg by mouth nightly.  aspirin 81 mg tablet Take 162 mg by mouth two (2) times a day.  montelukast (SINGULAIR) 10 mg tablet Take 10 mg by mouth nightly. Family History   Problem Relation Age of Onset    Heart Attack Father     Diabetes Mother      Social History     Tobacco Use    Smoking status: Former Smoker     Last attempt to quit: 2009     Years since quitting: 10.1    Smokeless tobacco: Never Used   Substance Use Topics    Alcohol use: No     Frequency: Never       Specialists/Care Team   Heather Ulloa Thania has established care with the following healthcare providers:  Patient Care Team:  Zak Ornelas MD as PCP - General (Family Practice)  Lonnie Kam MD (Hematology and Oncology)  Kayla Kaiser MD (Gastroenterology)  Maritza Deluna MD (Gynecology)  Meggan Upton MD (Ophthalmology)  Kurt Raygoza MD (Pulmonary Disease)    Health Risk Assessment     Demographics   female  72 y.o. General Health Questions   -During the past 4 weeks:   -how would you rate your health in general? Good   -how often have you been bothered by feeling dizzy when standing up? never   -how much have you been bothered by bodily pain? The only pain is the sciatic nerve on the left leg. She was previously getting steroid shots. She has been on gabapentin. She is willing try    -Have you noticed any hearing difficulties? no   -has your physical and emotional health limited your social activities with family or friends? No, not since being out of the hospital. Her grandson went to [facility]    Emotional Health Questions   -Do you have a history of depression, anxiety, or emotional problems? no  -Over the past 2 weeks, have you felt down, depressed or hopeless? no  -Over the past 2 weeks, have you felt little interest or pleasure in doing things? She     Health Habits   Please describe your diet habits: not the best but trying to improve. She is eating one salad and meat a day. Do you get 5 servings of fruits or vegetables daily? no  Do you exercise regularly? no  Alcohol Use: Had not had a drink in at least 20 years. Activities of Daily Living and Functional Status   -Do you need help with eating, walking, dressing, bathing, toileting, the phone, transportation, shopping, preparing meals, housework, laundry, medications or managing money? Yes, daugther or grandson  -In the past four weeks, was someone available to help you if you needed and wanted help with anything? Yes, grandson or daughter  -Are you confident are you that you can control and manage most of your health problems? yes  -Have you been given information to help you keep track of your medications? yes  -How often do you have trouble taking your medications as prescribed? never    Fall Risk and Home Safety   Have you fallen 2 or more times in the past year? No, not since the one in august   Does your home have rugs in the hallway, lack grab bars in the bathroom, lack handrails on the stairs or have poor lighting? Pt has grab bars in the bathroom   Do you have smoke detectors and check them regularly? yes  Do you have difficulties driving a car? no  Do you always fasten your seat belt when you are in a car? yes    Review of Systems (if indicated for problems addressed today)       Physical Examination     Vitals:    03/12/19 1106   BP: 135/81   Pulse: (!) 46   Resp: 20   Temp: 97.7 °F (36.5 °C)   TempSrc: Oral   SpO2: 96%   Weight: 204 lb (92.5 kg)   Height: 5' 3\" (1.6 m)     Body mass index is 36.14 kg/m². No exam data present  Was the patient's timed Up & Go test unsteady or longer than 30 seconds?  yes    Evaluation of Cognitive Function   Mood/affect:  neutralwit some happiness  Orientation: Person, Place, Time and Situation  Appearance: age appropriate, casually dressed and well dressed  Family member/caregiver input: N/A    Additional exam if indicated for problems addressed today:      Advice/Referrals/Counseling (as indicated)   Education and counseling provided for any problems identified above:     Preventive Services     (Preventive care checklist to be included in patient instructions)  Pt reports the following were done previously however, awaiting paperwork from former PCP's office     Discussed today Done Previously Not Needed     x  Pneumococcal vaccines    x  Flu vaccine    x  Hepatitis B vaccine (if at risk)    x  Shingles vaccine    x  TDAP vaccine    x  Mammogram    xx  Pap smear      Colorectal cancer screening    x  Low-dose CT for lung cancer screening      Bone density test      Glaucoma screening    x Cholesterol test   2/7/2019 x  Diabetes screening test       Diabetes self-management class      Nutritionist referral for diabetes or renal disease     Discussion of Advance Directive   Discussed with Heather Rivas her ability to prepare and advance directive in the case that an injury or illness causes her to be unable to make health care decisions. Assessment/Plan   V70.0    ICD-10-CM ICD-9-CM    1. Medicare annual wellness visit, subsequent Z00.00 V70.0        Orders Placed This Encounter    acetaminophen (TYLENOL ARTHRITIS PAIN) 650 mg TbER       Follow-up Disposition:  Return in about 4 weeks (around 4/9/2019) for routine follow up .     Brandon Ward MD

## 2019-03-12 NOTE — PROGRESS NOTES
1. Have you been to the ER, urgent care clinic since your last visit? Hospitalized since your last visit? no    2. Have you seen or consulted any other health care providers outside of the [address] [place] [address] since your last visit? Include any pap smears or colon screening. no  Reviewed record in preparation for visit and have obtained necessary documentation. Patient did not bring medications to visit for review. Information provided on Advanced Directive, Living Will. Body mass index is 36.14 kg/m².    Health Maintenance Due   Topic Date Due    Hepatitis C Screening  1945    LIPID PANEL Q1  1945    FOOT EXAM Q1  07/26/1957    MICROALBUMIN Q1  07/26/1957    EYE EXAM RETINAL OR DILATED  07/26/1957    DTaP/Tdap/Td series (1 - Tdap) 07/26/1968    Shingrix Vaccine Age 50> (1 of 2) 07/26/1997    FOBT Q 1 YEAR AGE 50-75  07/26/1997    GLAUCOMA SCREENING Q2Y  07/26/2012    Pneumococcal 65+ Low/Medium Risk (1 of 2 - PCV13) 07/26/2012    MEDICARE YEARLY EXAM  03/20/2018

## 2019-03-12 NOTE — PATIENT INSTRUCTIONS
A Healthy Lifestyle: Care Instructions  Your Care Instructions    A healthy lifestyle can help you feel good, stay at a healthy weight, and have plenty of energy for both work and play. A healthy lifestyle is something you can share with your whole family. A healthy lifestyle also can lower your risk for serious health problems, such as high blood pressure, heart disease, and diabetes. You can follow a few steps listed below to improve your health and the health of your family. Follow-up care is a key part of your treatment and safety. Be sure to make and go to all appointments, and call your doctor if you are having problems. It's also a good idea to know your test results and keep a list of the medicines you take. How can you care for yourself at home? · Do not eat too much sugar, fat, or fast foods. You can still have dessert and treats now and then. The goal is moderation. · Start small to improve your eating habits. Pay attention to portion sizes, drink less juice and soda pop, and eat more fruits and vegetables. ? Eat a healthy amount of food. A 3-ounce serving of meat, for example, is about the size of a deck of cards. Fill the rest of your plate with vegetables and whole grains. ? Limit the amount of soda and sports drinks you have every day. Drink more water when you are thirsty. ? Eat at least 5 servings of fruits and vegetables every day. It may seem like a lot, but it is not hard to reach this goal. A serving or helping is 1 piece of fruit, 1 cup of vegetables, or 2 cups of leafy, raw vegetables. Have an apple or some carrot sticks as an afternoon snack instead of a candy bar. Try to have fruits and/or vegetables at every meal.  · Make exercise part of your daily routine. You may want to start with simple activities, such as walking, bicycling, or slow swimming. Try to be active 30 to 60 minutes every day. You do not need to do all 30 to 60 minutes all at once.  For example, you can exercise 3 times a day for 10 or 20 minutes. Moderate exercise is safe for most people, but it is always a good idea to talk to your doctor before starting an exercise program.  · Keep moving. Tracy Em the lawn, work in the garden, or [employer]. Take the stairs instead of the elevator at work. · If you smoke, quit. People who smoke have an increased risk for heart attack, stroke, cancer, and other lung illnesses. Quitting is hard, but there are ways to boost your chance of quitting tobacco for good. ? Use nicotine gum, patches, or lozenges. ? Ask your doctor about stop-smoking programs and medicines. ? Keep trying. In addition to reducing your risk of diseases in the future, you will notice some benefits soon after you stop using tobacco. If you have shortness of breath or asthma symptoms, they will likely get better within a few weeks after you quit. · Limit how much alcohol you drink. Moderate amounts of alcohol (up to 2 drinks a day for men, 1 drink a day for women) are okay. But drinking too much can lead to liver problems, high blood pressure, and other health problems. Family health  If you have a family, there are many things you can do together to improve your health. · Eat meals together as a family as often as possible. · Eat healthy foods. This includes fruits, vegetables, lean meats and dairy, and whole grains. · Include your family in your fitness plan. Most people think of activities such as jogging or tennis as the way to fitness, but there are many ways you and your family can be more active. Anything that makes you breathe hard and gets your heart pumping is exercise. Here are some tips:  ? Walk to do errands or to take your child to school or the bus.  ? Go for a family bike ride after dinner instead of watching TV. Where can you learn more? Go to http://bib-amanda.info/. Enter N717 in the search box to learn more about \"A Healthy Lifestyle: Care Instructions. \"  Current as of: September 11, 2018  Content Version: 11.9  © 9417-9660 [employer]. Care instructions adapted under license by [employer] (which disclaims liability or warranty for this information). If you have questions about a medical condition or this instruction, always ask your healthcare professional. [address] any warranty or liability for your use of this information.

## 2019-03-27 ENCOUNTER — OFFICE VISIT (OUTPATIENT)
Dept: FAMILY MEDICINE CLINIC | Age: 74
End: 2019-03-27

## 2019-03-27 VITALS
DIASTOLIC BLOOD PRESSURE: 67 MMHG | HEART RATE: 51 BPM | SYSTOLIC BLOOD PRESSURE: 144 MMHG | RESPIRATION RATE: 20 BRPM | TEMPERATURE: 96.7 F | WEIGHT: 208.8 LBS | OXYGEN SATURATION: 99 % | HEIGHT: 63 IN | BODY MASS INDEX: 37 KG/M2

## 2019-03-27 DIAGNOSIS — R05.8 NON-PRODUCTIVE COUGH: Primary | ICD-10-CM

## 2019-03-27 DIAGNOSIS — J06.9 UPPER RESPIRATORY TRACT INFECTION, UNSPECIFIED TYPE: ICD-10-CM

## 2019-03-27 DIAGNOSIS — Z87.01 HX OF BACTERIAL PNEUMONIA: ICD-10-CM

## 2019-03-27 RX ORDER — PREDNISONE 20 MG/1
40 TABLET ORAL
Qty: 10 TAB | Refills: 0 | Status: SHIPPED | OUTPATIENT
Start: 2019-03-27 | End: 2019-04-01

## 2019-03-27 RX ORDER — MINERAL OIL
180 ENEMA (ML) RECTAL
COMMUNITY
Start: 2019-03-27 | End: 2020-02-10

## 2019-03-27 RX ORDER — AZITHROMYCIN 250 MG/1
TABLET, FILM COATED ORAL
Qty: 6 TAB | Refills: 0 | Status: SHIPPED | OUTPATIENT
Start: 2019-03-27 | End: 2019-04-01

## 2019-03-27 NOTE — PROGRESS NOTES
Chief Complaint   Patient presents with    Cough     runny nose, sneezing, SOB     Visit Vitals  /67 (BP 1 Location: Left arm, BP Patient Position: Sitting)   Pulse (!) 51   Temp 96.7 °F (35.9 °C) (Oral)   Resp 20   Ht 5' 3\" (1.6 m)   Wt 208 lb 12.8 oz (94.7 kg)   SpO2 99%   BMI 36.99 kg/m²     1. Have you been to the ER, urgent care clinic since your last visit? Hospitalized since your last visit? No    2. Have you seen or consulted any other health care providers outside of the [address] [place] [address] since your last visit? Include any pap smears or colon screening.  No    Reviewed record in preparation for visit and have necessary documentation  Pt did not bring medication to office visit for review  opportunity was given for questions  Goals that were addressed and/or need to be completed during or after this appointment include  Health Maintenance Due   Topic Date Due    Hepatitis C Screening  1945    LIPID PANEL Q1  1945    FOOT EXAM Q1  07/26/1957    MICROALBUMIN Q1  07/26/1957    EYE EXAM RETINAL OR DILATED  07/26/1957    DTaP/Tdap/Td series (1 - Tdap) 07/26/1968    Shingrix Vaccine Age 50> (1 of 2) 07/26/1997    FOBT Q 1 YEAR AGE 50-75  07/26/1997    GLAUCOMA SCREENING Q2Y  07/26/2012    Pneumococcal 65+ years (1 of 2 - PCV13) 07/26/2012    BREAST CANCER SCRN MAMMOGRAM  07/14/2019

## 2019-03-27 NOTE — PROGRESS NOTES
Olamide Sloan  72 y.o. female  1945  [address]  [id number]     [id number]: Progress Note       Encounter Date: 3/27/2019    Chief Complaint   Patient presents with    Cough     runny nose, sneezing, SOB     History of Present Illness   Olamide Sloan is a 72 y.o. female who presents to clinic today for:    Cough- ~3 days ago began with runny nose, sneezing, SOB, Right eye pain and pressure. No known sick contact exposure. Denies fever, sore throat, ear pain, body aches/chills. Tolerating fluids but decreased appetite. OTC treatments tylenol and aleve as needed. Patient states she had pneumonia 08/2018. She will establish with pulmonology 04/2019 for what sounds like PFT/spirometry testing. She states she is unsure if she has asthma or COPD. She has a lingering and annoying cough. She quit smoking in 2009; she was smoking ~1-2 packs a day for ~40 years. She has a Hematologist appointment tomorrow for f/u on blood clots. She is also establishing with the lymphedema clinic for her Left leg edema. Health Maintenance    Health Maintenance Due   Topic Date Due    Hepatitis C Screening  1945    LIPID PANEL Q1  1945    FOOT EXAM Q1  07/26/1957    MICROALBUMIN Q1  07/26/1957    EYE EXAM RETINAL OR DILATED  07/26/1957    DTaP/Tdap/Td series (1 - Tdap) 07/26/1968    Shingrix Vaccine Age 50> (1 of 2) 07/26/1997    FOBT Q 1 YEAR AGE 50-75  07/26/1997    GLAUCOMA SCREENING Q2Y  07/26/2012    Pneumococcal 65+ years (1 of 2 - PCV13) 07/26/2012    BREAST CANCER SCRN MAMMOGRAM  07/14/2019     Review of Systems   Review of Systems   Constitutional: Negative. HENT: Positive for sinus pain. Eyes: Negative. Respiratory: Positive for cough and shortness of breath. Cardiovascular: Negative. Gastrointestinal: Negative. Genitourinary: Negative. Musculoskeletal: Negative. Skin: Negative. Neurological: Negative. Endo/Heme/Allergies: Negative. Psychiatric/Behavioral: Negative. Vitals/Objective:     Vitals:    03/27/19 1406   BP: 144/67   Pulse: (!) 51   Resp: 20   Temp: 96.7 °F (35.9 °C)   TempSrc: Oral   SpO2: 99%   Weight: 208 lb 12.8 oz (94.7 kg)   Height: 5' 3\" (1.6 m)     Body mass index is 36.99 kg/m². Physical Exam   Constitutional: She is oriented to person, place, and time. She is active and cooperative. HENT:   Head: Normocephalic. Nose: Mucosal edema present. Right sinus exhibits maxillary sinus tenderness and frontal sinus tenderness. Left sinus exhibits no maxillary sinus tenderness and no frontal sinus tenderness. Mouth/Throat: Uvula is midline, oropharynx is clear and moist and mucous membranes are normal.   Eyes: Conjunctivae are normal.   Neck: Trachea normal, normal range of motion, full passive range of motion without pain and phonation normal. Neck supple. Cardiovascular: Normal rate, regular rhythm, normal heart sounds and normal pulses. Pulmonary/Chest: Effort normal and breath sounds normal.   Musculoskeletal: Normal range of motion. Lymphadenopathy:     She has no cervical adenopathy. Neurological: She is alert and oriented to person, place, and time. Skin: Skin is warm, dry and intact. Psychiatric: She has a normal mood and affect. Her speech is normal and behavior is normal. Judgment and thought content normal. Cognition and memory are normal.       No results found for this or any previous visit (from the past 24 hour(s)). Assessment and Plan:   1. Non-productive cough    - predniSONE (DELTASONE) 20 mg tablet; Take 40 mg by mouth daily (with breakfast) for 5 days. Dispense: 10 Tab; Refill: 0  - azithromycin (ZITHROMAX) 250 mg tablet; Take 2 tablets today, then take 1 tablet daily  Dispense: 6 Tab; Refill: 0    2. Hx of bacterial pneumonia      3. Upper respiratory tract infection, unspecified type    Will cover with a antibiotic and steroid treatment.  Discussed strict ED parameters. Discussed supportive therapy-hydration to thin secretions, tylenol/ibuprofen as needed. Also discussed mucinex DM 1200mg daily for the next 7 days. Patient to closely monitor her blood sugars and diet while taking the steroid. She states her sugars typically run in the low 120s. Discussed patient taking her medications/list with her to pulmonology in order to discuss if the beta blocker is ok for her to take. Unsure if she has asthma and the role the beta blocker plays in her lung disease. I have discussed the diagnosis with the patient and the intended plan as seen in the above orders. she has expressed understanding. The patient has received an after-visit summary and questions were answered concerning future plans. I have discussed medication side effects and warnings with the patient as well. Electronically Signed: Peter Alvarado NP     History/Allergies   Patients past medical, surgical and family histories were reviewed and updated. Past Medical History:   Diagnosis Date    Asthma     Bronchitis     Cancer ([facility] [address]     breast - left    Diabetes (HCC)     Hepatitis age 12    High cholesterol     Hypertension     Obesity (BMI 30-39. 9)       Past Surgical History:   Procedure Laterality Date    BREAST SURGERY PROCEDURE UNLISTED      L mastectomy    COLONOSCOPY N/A 5/26/2017    COLONOSCOPY- no info to   performed by Elieser Sanders MD at [facility] HX GYN      ablation, R ovary removed, tubal ligation    HX HAMMER TOE REPAIR      HX HEENT      tonsillectomy    HX ORTHOPAEDIC      L hip, R leg, C4-C5 fusion, L foot    HX OTHER SURGICAL      xiphoid removal    IR PLC IVC FILTER  2/8/2019     Family History   Problem Relation Age of Onset    Heart Attack Father     Diabetes Mother      Social History     Socioeconomic History    Marital status: SINGLE     Spouse name: Not on file    Number of children: Not on file    Years of education: Not on file    Highest education level: Not on file   Occupational History    Not on file   Social Needs    Financial resource strain: Not on file    Food insecurity:     Worry: Not on file     Inability: Not on file    Transportation needs:     Medical: Not on file     Non-medical: Not on file   Tobacco Use    Smoking status: Former Smoker     Last attempt to quit: 2009     Years since quitting: 10.2    Smokeless tobacco: Never Used   Substance and Sexual Activity    Alcohol use: No     Frequency: Never    Drug use: No    Sexual activity: Never   Lifestyle    Physical activity:     Days per week: Not on file     Minutes per session: Not on file    Stress: Not on file   Relationships    Social connections:     Talks on phone: Not on file     Gets together: Not on file     Attends [employer] service: Not on file     Active member of club or organization: Not on file     Attends meetings of clubs or organizations: Not on file     Relationship status: Not on file    Intimate partner violence:     Fear of current or ex partner: Not on file     Emotionally abused: Not on file     Physically abused: Not on file     Forced sexual activity: Not on file   Other Topics Concern    Not on file   Social History Narrative    Not on file         Allergies   Allergen Reactions    Crestor [Rosuvastatin] Other (comments)     Pain in left leg  Causes muscle spasms    Lipitor [Atorvastatin] Other (comments)     Left leg pain  Causes muscle spasms    Xarelto [Rivaroxaban] Other (comments)     Pt states it caused internal bleeding       Disposition     Follow-up and Dispositions  ·   Return if symptoms worsen or fail to improve.          Future Appointments   Date Time Provider Bill Ricardo   3/28/2019 10:30 AM Michelle Vega MD [facility] SCHED   4/4/2019 12:30 PM Bebeto Push [address]   4[facility]9/2019 10:40 [facility]            Current Medications after this visit Current Outpatient Medications   Medication Sig    fexofenadine (ALLEGRA) 180 mg tablet Take 1 Tab by mouth.  predniSONE (DELTASONE) 20 mg tablet Take 40 mg by mouth daily (with breakfast) for 5 days.  azithromycin (ZITHROMAX) 250 mg tablet Take 2 tablets today, then take 1 tablet daily    acetaminophen (TYLENOL ARTHRITIS PAIN) 650 mg TbER Take 1 Tab by mouth every eight (8) hours.  budesonide-formoterol (SYMBICORT) 160-4.5 mcg/actuation HFAA Take 2 Puffs by inhalation two (2) times a day.  carvedilol (COREG) 3.125 mg tablet Take 1 Tab by mouth two (2) times daily (with meals) for 30 days.  losartan (COZAAR) 25 mg tablet Take 1 Tab by mouth daily for 30 days.  apixaban (ELIQUIS) 5 mg (74 tabs) starter pack Take 10 mg (two 5 mg tablets) by mouth twice a day for 7 days   Followed by 5 mg (one 5 mg tablet) by mouth twice a day    multivitamin (ONE A DAY) tablet Take 1 Tab by mouth nightly.  ascorbate calcium (VITAMIN C PO) Take 1 Tab by mouth nightly.  mometasone-formoterol (DULERA) 200-5 mcg/actuation HFA inhaler Take 2 Puffs by inhalation two (2) times a day.  glipiZIDE SR (GLUCOTROL XL) 2.5 mg CR tablet Take 2.5 mg by mouth nightly.  lovastatin (MEVACOR) 10 mg tablet Take 10 mg by mouth nightly.  aspirin 81 mg tablet Take 162 mg by mouth two (2) times a day.  montelukast (SINGULAIR) 10 mg tablet Take 10 mg by mouth nightly. No current facility-administered medications for this visit. There are no discontinued medications.

## 2019-03-27 NOTE — PATIENT INSTRUCTIONS

## 2019-03-28 ENCOUNTER — DOCUMENTATION ONLY (OUTPATIENT)
Dept: ONCOLOGY | Age: 74
End: 2019-03-28

## 2019-03-28 ENCOUNTER — OFFICE VISIT (OUTPATIENT)
Dept: ONCOLOGY | Age: 74
End: 2019-03-28

## 2019-03-28 VITALS
SYSTOLIC BLOOD PRESSURE: 142 MMHG | RESPIRATION RATE: 20 BRPM | WEIGHT: 205 LBS | BODY MASS INDEX: 36.32 KG/M2 | HEIGHT: 63 IN | DIASTOLIC BLOOD PRESSURE: 54 MMHG | TEMPERATURE: 97.7 F | HEART RATE: 71 BPM | OXYGEN SATURATION: 96 %

## 2019-03-28 DIAGNOSIS — Z79.01 ON CONTINUOUS ORAL ANTICOAGULATION: ICD-10-CM

## 2019-03-28 DIAGNOSIS — Z86.711 HISTORY OF PULMONARY EMBOLUS (PE): Primary | ICD-10-CM

## 2019-03-28 DIAGNOSIS — Z86.718 HISTORY OF DVT (DEEP VEIN THROMBOSIS): ICD-10-CM

## 2019-03-28 DIAGNOSIS — R60.0 LOWER EXTREMITY EDEMA: ICD-10-CM

## 2019-03-28 RX ORDER — GLIPIZIDE 2.5 MG/1
2.5 TABLET, EXTENDED RELEASE ORAL
Qty: 30 TAB | Status: CANCELLED | OUTPATIENT
Start: 2019-03-28

## 2019-03-28 NOTE — PROGRESS NOTES
[id number] [facility] at [address] [place] [address]  [phone]    Hematology / Oncology Established Visit    Reason for Visit:   Randi Olmstead is a 72 y.o. female who is seen for f/u of PE. History of Present Illness:   Ms. Matilda Roe is a 71 y/o female with type II DM, hyperlipidemia, HTN, remote h/o left breast cancer admitted with worsening shortness of breath, found to have PE. She states she was diagnosed with PNA a few months ago after reporting shortness of breath. She was treated with Levaquin. However, she continued to have SOB, but chest CTA and dopplers negative in 11/2018. She states she suffered a fall when she tripped in her kitchen. She developed bruises on her legs and states her legs have remained swollen since then. No fractures, car/air travel, immobility, surgery, hormone replacement/OCPs. No prior personal or family h/o DVT. After a prior joint surgery in 2012, patient was treated with Xarelto for DVT prophylaxis, which resulted in a GI bleed. Therefore, medication was discontinued. No GI bleed since then. For h/o breast cancer, patient underwent left breast mastectomy and reconstruction; no XRT or chemotherapy. She was seen by Dr. Yonatan Lobo in the past.      Interval History: Today, patient comes in for follow of PE. She is currently sick with a URI, on prednisone and z-pack. Follows up with pulmonary in April for additional testing due to mild WEISS. Her mood has improved and her energy has improved. She continues to take Eliquis. She denies chest pain, heart palpitations. Still has some SOB and sees Pulmonary next month. Past Medical History:   Diagnosis Date    Asthma     Bronchitis     Cancer ([address]     breast - left    Diabetes (HCC)     Hepatitis age 12    High cholesterol     Hypertension     Obesity (BMI 30-39. 9)       Past Surgical History:   Procedure Laterality Date    BREAST SURGERY PROCEDURE UNLISTED      L mastectomy    COLONOSCOPY N/A 5/26/2017 COLONOSCOPY- no info to   performed by Lb Cote MD at [facility] HX GYN      ablation, R ovary removed, tubal ligation    HX HAMMER TOE REPAIR      HX HEENT      tonsillectomy    HX ORTHOPAEDIC      L hip, R leg, C4-C5 fusion, L foot    HX OTHER SURGICAL      xiphoid removal    IR PLC IVC FILTER  2/8/2019      Social History     Tobacco Use    Smoking status: Former Smoker     Last attempt to quit: 2009     Years since quitting: 10.2    Smokeless tobacco: Never Used   Substance Use Topics    Alcohol use: No     Frequency: Never      Family History   Problem Relation Age of Onset    Heart Attack Father     Diabetes Mother      Current Outpatient Medications   Medication Sig    fexofenadine (ALLEGRA) 180 mg tablet Take 1 Tab by mouth.  predniSONE (DELTASONE) 20 mg tablet Take 40 mg by mouth daily (with breakfast) for 5 days.  azithromycin (ZITHROMAX) 250 mg tablet Take 2 tablets today, then take 1 tablet daily    acetaminophen (TYLENOL ARTHRITIS PAIN) 650 mg TbER Take 1 Tab by mouth every eight (8) hours.  budesonide-formoterol (SYMBICORT) 160-4.5 mcg/actuation HFAA Take 2 Puffs by inhalation two (2) times a day.  carvedilol (COREG) 3.125 mg tablet Take 1 Tab by mouth two (2) times daily (with meals) for 30 days.  losartan (COZAAR) 25 mg tablet Take 1 Tab by mouth daily for 30 days.  apixaban (ELIQUIS) 5 mg (74 tabs) starter pack Take 10 mg (two 5 mg tablets) by mouth twice a day for 7 days   Followed by 5 mg (one 5 mg tablet) by mouth twice a day    multivitamin (ONE A DAY) tablet Take 1 Tab by mouth nightly.  ascorbate calcium (VITAMIN C PO) Take 1 Tab by mouth nightly.  mometasone-formoterol (DULERA) 200-5 mcg/actuation HFA inhaler Take 2 Puffs by inhalation two (2) times a day.  glipiZIDE SR (GLUCOTROL XL) 2.5 mg CR tablet Take 2.5 mg by mouth nightly.  lovastatin (MEVACOR) 10 mg tablet Take 10 mg by mouth nightly.     aspirin 81 mg tablet Take 162 mg by mouth two (2) times a day.  montelukast (SINGULAIR) 10 mg tablet Take 10 mg by mouth nightly. No current facility-administered medications for this visit. Allergies   Allergen Reactions    Crestor [Rosuvastatin] Other (comments)     Pain in left leg  Causes muscle spasms    Lipitor [Atorvastatin] Other (comments)     Left leg pain  Causes muscle spasms    Xarelto [Rivaroxaban] Other (comments)     Pt states it caused internal bleeding        Review of Systems: A complete review of systems was obtained, negative except as described above. Physical Exam:     Visit Vitals  /54   Pulse 71   Temp 97.7 °F (36.5 °C) (Oral)   Resp 20   Ht 5' 3\" (1.6 m)   Wt 205 lb (93 kg)   SpO2 96%   BMI 36.31 kg/m²     ECOG PS: 0  General: No distress  Eyes: PERRLA, anicteric sclerae  HENT: Atraumatic with normal appearance of ears and nose; OP clear  Neck: Supple; no thyromegaly   Lymphatic: No cervical, supraclavicular, or inguinal adenopathy  Respiratory: CTAB, normal respiratory effort  CV: Normal rate, regular rhythm, no murmurs. 2+ pitting edema in LLE, trace pitting edema in RLE  GI: Soft, nontender, nondistended, no masses, no hepatomegaly, no splenomegaly  MS: Normal gait and station. Digits without clubbing or cyanosis. Skin: No rashes, ecchymoses, or petechiae. Normal temperature, turgor, and texture. Neuro/Psych: Alert, oriented, appropriate affect, normal judgment/insight      Results:     Lab Results   Component Value Date/Time    WBC 11.2 (H) 02/09/2019 01:50 AM    HGB 12.2 02/09/2019 01:50 AM    HCT 37.0 02/09/2019 01:50 AM    PLATELET 426 48/20/9977 01:50 AM    MCV 83.1 02/09/2019 01:50 AM    ABS.  NEUTROPHILS 9.3 (H) 02/07/2019 10:56 AM    Hemoglobin (POC) 7.1 (L) 02/24/2012 12:39 AM    Hematocrit (POC) 21 (L) 02/24/2012 12:39 AM     Lab Results   Component Value Date/Time    Sodium 142 02/09/2019 01:50 AM    Potassium 4.2 02/09/2019 01:50 AM    Chloride 107 02/09/2019 01:50 AM    CO2 27 02/09/2019 01:50 AM    Glucose 127 (H) 02/09/2019 01:50 AM    BUN 14 02/09/2019 01:50 AM    Creatinine 1.04 (H) 02/09/2019 01:50 AM    GFR est AA >60 02/09/2019 01:50 AM    GFR est non-AA 52 (L) 02/09/2019 01:50 AM    Calcium 8.5 02/09/2019 01:50 AM    Sodium (POC) 146 (H) 02/24/2012 12:39 AM    Potassium (POC) 3.5 02/24/2012 12:39 AM    Chloride (POC) 113 (H) 02/24/2012 12:39 AM    Glucose (POC) 110 (H) 02/10/2019 06:40 AM    BUN (POC) 9 02/24/2012 12:39 AM    Creatinine (POC) 0.7 02/24/2012 12:39 AM    Calcium, ionized (POC) 1.12 02/24/2012 12:39 AM     Lab Results   Component Value Date/Time    Bilirubin, total 0.6 02/07/2019 10:56 AM    ALT (SGPT) 23 02/07/2019 10:56 AM    AST (SGOT) 22 02/07/2019 10:56 AM    Alk. phosphatase 102 02/07/2019 10:56 AM    Protein, total 7.2 02/07/2019 10:56 AM    Albumin 3.2 (L) 02/07/2019 10:56 AM    Globulin 4.0 02/07/2019 10:56 AM     Chest CTA 2/7/19: IMPRESSION:  Extensive bilateral acute pulmonary emboli, including saddle embolus in the main  pulmonary artery. Unchanged 7 mm right lower lobe pulmonary nodule. Mild linear  atelectasis in the lingula. Lower extremity doppler 2/7/19:  Left lower extremity venous duplex is positive for deep vein thrombosis involving the CFV, FV, profunda, popliteal, posterior tibial and gastrocnemius veins. The right CFV is thrombus free. Assessment and Recommendations:   Osiel Landaverde is a 72 y.o. female with DM II, HTN, XOL admitted with PE.    1. Saddle PE / Bilateral PE / LLE DVT: Unprovoked based on lack of a clear known cause, but given patient's age and lack of prior VTE, I do not have a high suspicion of inherited thrombophilias. Therefore, I do not recommend a hypercoag workup. I recommend remaining on anticoagulation indefinitely. She does have h/o GI bleed several years ago while on Xarelto, but given that was many years ago and pt has current VTE, I recommended anticoagulation as benefits outweigh risks at this time. Echo reviewed. S/p IVC filter placement on 2/8/19.  -- Continue on anticoagulation (on Eliquis) with plan to continue for at least 6 months and possibly indefinitely. -- She is worried about the increasing cost of Eliquis, her insurance is changing in July and new cost will be 500 dollars, on Xalrelto in the past caused GI bleed. -- Meet with Melissa Pettit. -- Return in 3 months. 2. Intermittent shortness of breath: 2/2 PE currently. Prior SOB may have been related to PNA. However, patient may have had small pulmonary emboli even a few months ago which did not show up on CT.  -- F/U with Pulmonary in April 18     4. BLE edema: L > R, 2/2 left leg DVT. I discussed with patient that lymphedema evaluation may be helpful since her edema is slow to improve and to reduce risk of post-thrombotic syndrome. -- Lymphedema clinic on 4/4/19.   -- Encouraged walking for collateral circulation and elevation of legs when sitting. 5. Remote h/o left breast cancer: S/p left mastectomy and reconstruction in 2009. No radiation or chemotherapy.       Signed By: Lena Martinez MD     March 28, 2019

## 2019-03-29 RX ORDER — GLIPIZIDE 2.5 MG/1
2.5 TABLET, EXTENDED RELEASE ORAL
Qty: 30 TAB | Refills: 3 | Status: SHIPPED | OUTPATIENT
Start: 2019-03-29 | End: 2020-01-13 | Stop reason: SDUPTHER

## 2019-04-04 ENCOUNTER — HOSPITAL ENCOUNTER (OUTPATIENT)
Dept: PHYSICAL THERAPY | Age: 74
Discharge: HOME | End: 2019-04-04
Payer: MEDICARE

## 2019-04-04 VITALS
WEIGHT: 205 LBS | OXYGEN SATURATION: 96 % | BODY MASS INDEX: 36.32 KG/M2 | HEIGHT: 63 IN | SYSTOLIC BLOOD PRESSURE: 156 MMHG | DIASTOLIC BLOOD PRESSURE: 78 MMHG | HEART RATE: 88 BPM

## 2019-04-04 PROCEDURE — 97530 THERAPEUTIC ACTIVITIES: CPT

## 2019-04-04 PROCEDURE — 97162 PT EVAL MOD COMPLEX 30 MIN: CPT

## 2019-04-04 NOTE — PROGRESS NOTES
[address] Darlyn Li, [address]        OUTPATIENT physical Therapy Evaluation with CMS G codes    NAME: Marni Alcala AGE: 72 y.o. GENDER: female  DATE: 4/4/2019  REFERRING PHYSICIAN: Ashley Allan MD  HISTORY AND BACKGROUND:  Patient referred to clinic for evaluation and treatment of bilateral LE swelling, L>R, with recent history of L DVT and multiple pulmonary emboli 2/7/2019. Patient reports noting bilateral LE swelling 8/2018, with negative doppler study at that time. States trial of oral diuretic did not improve LE edema. States she did experience a fall at home 8/2018 upon carrying her groceries in. See further medical history as noted below. Primary Diagnosis:  · B LE lymphedema, secondary (I89.0)  Other Treatment Diagnoses:  · Swelling not relieved by elevation (R60.9)  · Morbid obesity (E66.01)  · L LE DVT (I82.409)  · Pulmonary embolism (I26)   Date of Onset: 2/7/2019  Present Symptoms and Functional Limitations: bilateral LE edema, present since 8/2018, with diagnosis of PE/L LE DVT 2/7/2019. Patient reports no LE swelling noted prior to 8/2018. Reports mild pain, moderate to severe limb heaviness/skin tightness, enlarged limb size. Reports moderate to severe impact on body image, socialization being limited, with no understanding of how to manage condition. States difficulty fitting pants/shoes due to swelling. Lymphedema Life Impact Scale: Score of 34 and impairment percentage of 50%. See scanned document. Past Medical History:   Past Medical History:   Diagnosis Date    Asthma     Bronchitis     Cancer ([address]     breast - left    Diabetes (HCC)     Hepatitis age 12    High cholesterol     Hypertension     Obesity (BMI 30-39. 9)     Thromboembolus ([address]     L LE with PE     Past Surgical History:   Procedure Laterality Date    BREAST SURGERY PROCEDURE UNLISTED      L mastectomy    COLONOSCOPY N/A 5/26/2017    COLONOSCOPY- no info to   performed by Dominique Franco MD at [address] [facility] [address] HX GYN      ablation, R ovary removed, tubal ligation    HX HAMMER TOE REPAIR      HX HEENT      tonsillectomy    HX ORTHOPAEDIC      L hip, R leg, C4-C5 fusion, L foot    HX OTHER SURGICAL      xiphoid removal    IR PLC IVC FILTER  2/8/2019     Current Medications:    Current Outpatient Medications   Medication Sig    glipiZIDE SR (GLUCOTROL XL) 2.5 mg CR tablet Take 1 Tab by mouth nightly.  fexofenadine (ALLEGRA) 180 mg tablet Take 1 Tab by mouth.  acetaminophen (TYLENOL ARTHRITIS PAIN) 650 mg TbER Take 1 Tab by mouth every eight (8) hours.  budesonide-formoterol (SYMBICORT) 160-4.5 mcg/actuation HFAA Take 2 Puffs by inhalation two (2) times a day.  apixaban (ELIQUIS) 5 mg (74 tabs) starter pack Take 10 mg (two 5 mg tablets) by mouth twice a day for 7 days   Followed by 5 mg (one 5 mg tablet) by mouth twice a day    multivitamin (ONE A DAY) tablet Take 1 Tab by mouth nightly.  ascorbate calcium (VITAMIN C PO) Take 1 Tab by mouth nightly.  mometasone-formoterol (DULERA) 200-5 mcg/actuation HFA inhaler Take 2 Puffs by inhalation two (2) times a day.  lovastatin (MEVACOR) 10 mg tablet Take 10 mg by mouth nightly.  aspirin 81 mg tablet Take 162 mg by mouth two (2) times a day.  montelukast (SINGULAIR) 10 mg tablet Take 10 mg by mouth nightly. No current facility-administered medications for this encounter. Allergies: Allergies   Allergen Reactions    Crestor [Rosuvastatin] Other (comments)     Pain in left leg  Causes muscle spasms    Lipitor [Atorvastatin] Other (comments)     Left leg pain  Causes muscle spasms    Xarelto [Rivaroxaban] Other (comments)     Pt states it caused internal bleeding      Social/Work History and Prior Level of Function: Retired from [employer], sedentary job.   Has been retired for 9 years. Reports LE swelling initially noticed 8/2018. No swelling prior to that time. Living Situation: house, 3 stairs to enter. Trainable Caregiver?: Daughter 10 miles from patient, works during school year. Self-care/ADLs: indep     Mobility: indep   Sleeping Arrangement:  bed   Adaptive Equipment Owned: cane, shower seat. Other: na  Previous Therapy:  Tried oral diuretic 8/2018, not helpful. Tried OTC compression sock, knee highs. Compression/Lymphedema Equipment:  OTC compression socks, too tight. SUBJECTIVE:   \"My legs started swelling last August.\"  Pt reports pneumonia diagnosed around that time, and history of 1 fall 8/2018. Pt states she tried compression socks she obtained at pharmacy, knee highs, spent about $10 on socks, stating they caused pain when she wore them as they were too tight. Otherwise, patient reports she has not tried other measures to manage her LE edema. Patients goals for therapy: reduce LE swelling and learn how to [address] [place] [address] SUMMARY:   Pain:   Pain Scale 1: Numeric (0 - 10)  Right LE 2/10, tightness, heaviness, lower leg. Pain Intensity 1: 3     Pain Location 1: Leg     Pain Orientation 1: Lower, Left, Upper     Patient Stated Pain Goal: 0             Skin and Tissue Assessment:  Dermal Status:  [x]   Intact [x]  Dry   [x]  Tenuous [x] Flaky   []  Wound/lesion present []  Scars   []  Dermatitis    Texture/Consistency:  [x]  Boggy: R LE calf/ankle; L foot/full leg [x]  Pitting Edema: +2 L dorsal foot/lower leg; +1 R distal leg/ankle region. []  Brawny []  Combination   [x]  Fibrotic/Woody: bilateral LE, lower leg region, mid calf to ankle. L knee/thigh mild tissue changes noted. Pigmentation/Color Change:  []  Normal []  Hemosiderin   []  Red []  Erythematous   [x]  Hyperpigmented:  Bilateral LE, L>R.   See photoa []  Hyperlipodermatosclerosis   Anomalies:  []  Lymphorrhea []  Vesicles   []  Petechiae []  Warty Vercusis   [] Bullae []  Papilloma   Circulatory:  []  DEB []  Varicosities:   [x]  Pulses: dorsal pedal pulses palpable. []  Vascular studies ruled out DVT in past   []  DVT History    Nails:  []   Normal  [x]   Fungus  Stemmers Sign: mild positive, L        Height:  Height: 5' 3\" (160 cm)  Weight:  Weight: 93 kg (205 lb)   BMI:  BMI (calculated): 36.3  (36 or greater: adversely affecting lymphedema)  Volumetric Measurements:   Right:  9953.07 mL Left:  10,658. 34 mL   % Difference: 7.09% Dorsal foot involvement, L   (See scanned graph)  Range of Motion: WFL bilateral LE  Strength: WFL     Sensation:   Intact  Mobility:    Independent  Safety:  Patient is alert and oriented:  Intact x 4   Safety awareness:  intact   Fall Risk?:   Moderate, 1 fall 8/2018. Patient given written fall prevention handout: Yes   Precautions:  Standard lymphedema precautions to include avoiding blood pressure readings, injections and IVs or other procedures/acts that could lead to broken skin on affected area, and avoiding excessive heat, resistive activity or altitude without compression garment    Evaluation Time: 12:32-1:00 pm  28 minutes    TREATMENT PROVIDED:   1. Treatment description:  Therapeutic activity:  The patient was educated regarding the role and function of the lymphatic system, pathophysiology of lymphedema/post thrombotic syndrome, and instructed in the lymphedema management protocol of complete decongestive therapy (CDT). This includes skin care to prevent breakdown or infection, lymphedema exercises, custom compression therapy options (bandaging, compression garments, compression pump, [employer] [place], [employer], [employer], etc. as needed). Patient advised that manual lymph drainage will be declined due to history of DVT 2 months ago. May re-visit 6 months post DVT. We discussed the need for consistent compression system for lymphedema management.   Skin care education was performed,applying low pH lotion to extremity using upward strokes to stimulate lymphatic vessels. Pt was given information regarding obtaining bandaging supplies. Patient advised that CCL 2 medical grade compression stocking wear is indicated post limb decongestion, with thigh high stocking indicated for L LE, knee high for R LE. Patient verbalizes understanding that compression garment wear will be deferred until limb volume reduction is attained bilateral LE. Due to patient living over an hour from the clinic, patient was advised that having a family member/friend learning the bandaging technique would benefit her, including allowing her to reduce visits to weekly when indicated while awaiting compression stockings for fit. ASSESSMENT:   Brenda Domínguez is a 72 y.o. female who presents with stage II lymphedema bilateral LE's, complicated by post-thrombotic syndrome L LE. Note limb volume discrepancy of 7.09% difference, L LE>R LE, with mild positive Stemmer's sign and dorsal foot edema noted L LE. Pitting edema noted bilateral LE as stated above, with 8 month history of LE swelling reported by patient. Due to skin/tissue changes bilateral LE's, poor response to oral diuretic, anticipate lymphedema vs edema currently present. Patient will benefit from complete decongestive therapy (CDT) including short-stretch textile bandages/compression system to decongest limbs, instruction in proper skin care to recognize signs/symptoms of and prevent infection, therapeutic exercise. MLD will be deferred due to patient having recent history of DVT L LE as noted above. This care is medically necessary due to the infection risk with lymphedema and to improve functional activities. CDT is necessary to resolve swelling to allow patient to return to wearing normal clothes/footwear and prevent worsening of symptoms such as venous stasis ulcerations, infections, or hospitalizations.   Patient will be independent with home program strategies to allow improved ADL ability and mobility and to allow patient to return to greatest functional independence. Rehabilitation potential is considered to be Good. Factors which may influence rehabilitation potential include distance patient lives from clinic, patient requiring a  to transport her to app. Patient does report good family support of daughter/grandson. Patient will benefit from 10-14 physical therapy visits over 10-12 weeks to optimize improvement in these areas. PLAN OF CARE:   Recommendations and Planned Interventions:  Manual lymph drainage/complete decongestive therapy--Defer  Multi-layer compression bandaging (short-stretch)  Compression garment fitting/provision  Lymphedema therapeutic exercise  Self-care training  Education in skin care and lymphedema precautions  Self/family-bandaging education per home program  Caregiver education as needed     GOALS  Short term goals  Time frame: 6-8 weeks  1. Patient will demonstrate knowledge of signs/symptoms of infections/cellulitis and be independent in skin care to prevent cellulitis. 2.  Patient will demonstrate independence in lymphedema home program of therapeutic exercises to improve circulation and decongest limbs, 200 R LE/400-500 L LE, to improve ADLs. 3.  Patient will tolerate multi-layer bandages (MLB) and show measureable decrease in limb volume to allow ordering of home compression system (daytime, nighttime garments and pump as needed). Long term goals  Time frame: 8-12 weeks  1. Pt will show improvement in Lymphedema Life Impact Scale by decreasing impairment percentage to under 30% and thus allow improvement in patient's quality of life. 2.  Patient will be independent with don/doff of compression system and use in order to prevent reaccumulation of fluid at discharge.   3.  Pt will be independent in lymphedema specific HEP show stable limb volumes showing decongestion and pt. ready for transition to independent restorative phase of lymphedema therapy. In compliance with CMSs Claims Based Outcome Reporting, the following G-code set was chosen for this patient based on their primary functional limitation being treated: The outcome measure chosen to determine the severity of the functional limitation was the LLIS with a score of 34/68 which was correlated with the impairment scale. ? Other PT/OT Primary Functional Limitations:     - CURRENT STATUS: CK - 40%-59% impaired, limited or restricted    - GOAL STATUS: CJ - 20%-39% impaired, limited or restricted    - D/C STATUS:  ---------------To be determined---------------     Physical Therapy Evaluation Charge Determination   History Examination Presentation Decision-Making   MEDIUM  Complexity : 1-2 comorbidities / personal factors will impact the outcome/ POC  MEDIUM Complexity : 3 Standardized tests and measures addressing body structure, function, activity limitation and / or participation in recreation  MEDIUM Complexity : Evolving with changing characteristics  Other outcome measures LLIS  MEDIUM      Based on the above components, the patient evaluation is determined to be of the following complexity level: MEDIUM  Patient has participated in goal setting and agrees to work toward plan of care. Patient was instructed to call if questions or concerns arise. Thank you for this referral.  Anh Garza, PT, CLT   Time Calculation: 60 mins    TREATMENT PLAN EFFECTIVE DATES:   4/4/2019 TO 7/1/2019  I have read the above plan of care for Robin Leon. I certify the above prescribed services are required by this patient and are medically necessary.   The above plan of care has been developed in conjunction with the lymphedema/physical therapist.       Physician Signature: ____________________________________Date:______________

## 2019-04-09 ENCOUNTER — OFFICE VISIT (OUTPATIENT)
Dept: FAMILY MEDICINE CLINIC | Age: 74
End: 2019-04-09

## 2019-04-09 VITALS
HEART RATE: 79 BPM | BODY MASS INDEX: 36.5 KG/M2 | RESPIRATION RATE: 16 BRPM | SYSTOLIC BLOOD PRESSURE: 129 MMHG | DIASTOLIC BLOOD PRESSURE: 71 MMHG | TEMPERATURE: 97.1 F | HEIGHT: 63 IN | WEIGHT: 206 LBS | OXYGEN SATURATION: 95 %

## 2019-04-09 DIAGNOSIS — Z79.01 CHRONIC ANTICOAGULATION: ICD-10-CM

## 2019-04-09 DIAGNOSIS — I82.412 ACUTE DEEP VEIN THROMBOSIS (DVT) OF FEMORAL VEIN OF LEFT LOWER EXTREMITY (HCC): ICD-10-CM

## 2019-04-09 DIAGNOSIS — Z87.19 HISTORY OF GI BLEED: ICD-10-CM

## 2019-04-09 DIAGNOSIS — E11.9 TYPE 2 DIABETES MELLITUS WITHOUT COMPLICATION, WITHOUT LONG-TERM CURRENT USE OF INSULIN (HCC): ICD-10-CM

## 2019-04-09 DIAGNOSIS — I26.92 ACUTE SADDLE PULMONARY EMBOLISM WITHOUT ACUTE COR PULMONALE (HCC): ICD-10-CM

## 2019-04-09 DIAGNOSIS — R25.2 LEG CRAMPING: ICD-10-CM

## 2019-04-09 DIAGNOSIS — I10 ESSENTIAL HYPERTENSION: Primary | ICD-10-CM

## 2019-04-09 RX ORDER — CYCLOBENZAPRINE HCL 5 MG
5 TABLET ORAL
Qty: 30 TAB | Refills: 0 | Status: SHIPPED | OUTPATIENT
Start: 2019-04-09 | End: 2019-07-29 | Stop reason: SDUPTHER

## 2019-04-09 NOTE — PROGRESS NOTES
1. Have you been to the ER, urgent care clinic since your last visit? Hospitalized since your last visit? no 
 
2. Have you seen or consulted any other health care providers outside of the [address] [place] [address] since your last visit? Include any pap smears or colon screening. yes Reviewed record in preparation for visit and have obtained necessary documentation. Patient did not bring medications to visit for review. Information provided on Advanced Directive, Living Will. Body mass index is 36.49 kg/m². Health Maintenance Due Topic Date Due  
 Hepatitis C Screening  1945  LIPID PANEL Q1  1945  
 FOOT EXAM Q1  07/26/1957  MICROALBUMIN Q1  07/26/1957  
 EYE EXAM RETINAL OR DILATED  07/26/1957  
 DTaP/Tdap/Td series (1 - Tdap) 07/26/1968  Shingrix Vaccine Age 50> (1 of 2) 07/26/1997  
 FOBT Q 1 YEAR AGE 50-75  07/26/1997  GLAUCOMA SCREENING Q2Y  07/26/2012  Pneumococcal 65+ years (1 of 2 - PCV13) 07/26/2012  BREAST CANCER SCRN MAMMOGRAM  07/14/2019  
 
- check for functional glucose monitor and record keeping system Pt was given BS record log to document home readings and return to office for review Diabetic Bundle: LDL- 
A1C-7.1 BP-129/71 Smoking? no 
Anticoagulation medication? yes Eye exam dilated?  
Foot exam?

## 2019-04-09 NOTE — PATIENT INSTRUCTIONS
A Healthy Lifestyle: Care Instructions Your Care Instructions A healthy lifestyle can help you feel good, stay at a healthy weight, and have plenty of energy for both work and play. A healthy lifestyle is something you can share with your whole family. A healthy lifestyle also can lower your risk for serious health problems, such as high blood pressure, heart disease, and diabetes. You can follow a few steps listed below to improve your health and the health of your family. Follow-up care is a key part of your treatment and safety. Be sure to make and go to all appointments, and call your doctor if you are having problems. It's also a good idea to know your test results and keep a list of the medicines you take. How can you care for yourself at home? · Do not eat too much sugar, fat, or fast foods. You can still have dessert and treats now and then. The goal is moderation. · Start small to improve your eating habits. Pay attention to portion sizes, drink less juice and soda pop, and eat more fruits and vegetables. ? Eat a healthy amount of food. A 3-ounce serving of meat, for example, is about the size of a deck of cards. Fill the rest of your plate with vegetables and whole grains. ? Limit the amount of soda and sports drinks you have every day. Drink more water when you are thirsty. ? Eat at least 5 servings of fruits and vegetables every day. It may seem like a lot, but it is not hard to reach this goal. A serving or helping is 1 piece of fruit, 1 cup of vegetables, or 2 cups of leafy, raw vegetables. Have an apple or some carrot sticks as an afternoon snack instead of a candy bar. Try to have fruits and/or vegetables at every meal. 
· Make exercise part of your daily routine. You may want to start with simple activities, such as walking, bicycling, or slow swimming. Try to be active 30 to 60 minutes every day.  You do not need to do all 30 to 60 minutes all at once. For example, you can exercise 3 times a day for 10 or 20 minutes. Moderate exercise is safe for most people, but it is always a good idea to talk to your doctor before starting an exercise program. 
· Keep moving. [place] the lawn, work in the garden, or [place]. Take the stairs instead of the elevator at work. · If you smoke, quit. People who smoke have an increased risk for heart attack, stroke, cancer, and other lung illnesses. Quitting is hard, but there are ways to boost your chance of quitting tobacco for good. ? Use nicotine gum, patches, or lozenges. ? Ask your doctor about stop-smoking programs and medicines. ? Keep trying. In addition to reducing your risk of diseases in the future, you will notice some benefits soon after you stop using tobacco. If you have shortness of breath or asthma symptoms, they will likely get better within a few weeks after you quit. · Limit how much alcohol you drink. Moderate amounts of alcohol (up to 2 drinks a day for men, 1 drink a day for women) are okay. But drinking too much can lead to liver problems, high blood pressure, and other health problems. Family health If you have a family, there are many things you can do together to improve your health. · Eat meals together as a family as often as possible. · Eat healthy foods. This includes fruits, vegetables, lean meats and dairy, and whole grains. · Include your family in your fitness plan. Most people think of activities such as jogging or tennis as the way to fitness, but there are many ways you and your family can be more active. Anything that makes you breathe hard and gets your heart pumping is exercise. Here are some tips: 
? Walk to do errands or to take your child to school or the bus. 
? Go for a family bike ride after dinner instead of watching TV. Where can you learn more? Go to http://bib-amanda.info/. Enter O111 in the search box to learn more about \"A Healthy Lifestyle: Care Instructions. \" Current as of: September 11, 2018 Content Version: 11.9 © 1301-0143 [employer]. Care instructions adapted under license by [employer] (which disclaims liability or warranty for this information). If you have questions about a medical condition or this instruction, always ask your healthcare professional. [place] [address] any warranty or liability for your use of this information.

## 2019-04-09 NOTE — PROGRESS NOTES
[address] [facility], [address] [facility] [employer] Note Subjective:  
Maureen Connolly is a 72 y.o. female presents for evaluation routine follow up and fasting labs CC: \" routine follow up\" History provided by patient HPI: 
 
Pt has follow up with lymphedema clinic, 5/10. She had follow up with Dr. Dayne Alarcon, they are working towards making eliquis affordable. Pt states her mood is upbeat She is not feeling down or hopeless. Pt states she has worsening of cramping of her lower extremities with  fluctuating swelling, she has follow up with lymphema clinic. Pt denies symptoms of polyuria,polydipsia. She compliant with outpatient medications. Current Outpatient Medications on File Prior to Visit Medication Sig Dispense Refill  glipiZIDE SR (GLUCOTROL XL) 2.5 mg CR tablet Take 1 Tab by mouth nightly. 30 Tab 3  
 fexofenadine (ALLEGRA) 180 mg tablet Take 1 Tab by mouth.  acetaminophen (TYLENOL ARTHRITIS PAIN) 650 mg TbER Take 1 Tab by mouth every eight (8) hours. 60 Tab 0  
 budesonide-formoterol (SYMBICORT) 160-4.5 mcg/actuation HFAA Take 2 Puffs by inhalation two (2) times a day.  apixaban (ELIQUIS) 5 mg (74 tabs) starter pack Take 10 mg (two 5 mg tablets) by mouth twice a day for 7 days Followed by 5 mg (one 5 mg tablet) by mouth twice a day 1 Dose Pack 0  
 multivitamin (ONE A DAY) tablet Take 1 Tab by mouth nightly.  ascorbate calcium (VITAMIN C PO) Take 1 Tab by mouth nightly.  mometasone-formoterol (DULERA) 200-5 mcg/actuation HFA inhaler Take 2 Puffs by inhalation two (2) times a day.  lovastatin (MEVACOR) 10 mg tablet Take 10 mg by mouth nightly.  aspirin 81 mg tablet Take 162 mg by mouth two (2) times a day.  montelukast (SINGULAIR) 10 mg tablet Take 10 mg by mouth nightly. No current facility-administered medications on file prior to visit. Past Medical History: Diagnosis Date  Asthma  Bronchitis  Cancer ([facility] breast - left  Diabetes ([facility]  Hepatitis age 11  
 High cholesterol  Hypertension  Obesity (BMI 30-39. 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  
  Worry: Not on file Inability: Not on file  Transportation needs:  
  Medical: Not on file Non-medical: Not on file Tobacco Use  Smoking status: Former Smoker Last attempt to quit: 2009 Years since quitting: 10.2  Smokeless tobacco: Never Used Substance and Sexual Activity  Alcohol use: No  
  Frequency: Never  Drug use: No  
 Sexual activity: Never Lifestyle  Physical activity:  
  Days per week: Not on file Minutes per session: Not on file  Stress: Not on file Relationships  Social connections:  
  Talks on phone: Not on file Gets together: Not on file Attends Sabianist service: Not on file Active member of club or organization: Not on file Attends meetings of clubs or organizations: Not on file Relationship status: Not on file  Intimate partner violence:  
  Fear of current or ex partner: Not on file Emotionally abused: Not on file Physically abused: Not on file Forced sexual activity: Not on file Other Topics Concern  Caffeine Concern Not Asked  Back Care Not Asked  Exercise Not Asked  Occupational Exposure Not Asked  Sleep Concern Not Asked  Stress Concern Not Asked  Weight Concern Not Asked Social History Narrative  Not on file Review of Systems Constitutional: Negative for chills and fever. Respiratory: Negative for cough and hemoptysis. Cardiovascular: Negative for chest pain. Gastrointestinal: Negative for abdominal pain, nausea and vomiting. Genitourinary: Negative for dysuria. Musculoskeletal: Negative for myalgias. Skin: Negative for itching and rash. Neurological: Negative for dizziness, tingling, sensory change, focal weakness and headaches. Psychiatric/Behavioral: Negative for depression, hallucinations, memory loss, substance abuse and suicidal ideas. The patient is not nervous/anxious and does not have insomnia. Objective:  
 
Visit Vitals /71 (BP 1 Location: Left arm, BP Patient Position: Sitting) Pulse 79 Temp 97.1 °F (36.2 °C) (Oral) Resp 16 Ht 5' 3\" (1.6 m) Wt 206 lb (93.4 kg) SpO2 95% BMI 36.49 kg/m² Physical Exam: 
Physical Exam  
Constitutional: She is oriented to person, place, and time. She appears well-developed and well-nourished. HENT:  
Head: Normocephalic and atraumatic. Eyes: Pupils are equal, round, and reactive to light. Conjunctivae are normal. Right eye exhibits no discharge. Left eye exhibits no discharge. No scleral icterus. Neck: Normal range of motion. Neck supple. Cardiovascular: Normal rate, regular rhythm, normal heart sounds and intact distal pulses. Exam reveals no gallop and no friction rub. No murmur heard. Pulmonary/Chest: Effort normal and breath sounds normal. No respiratory distress. She has no wheezes. She has no rales. She exhibits no tenderness. Abdominal: Soft. Bowel sounds are normal. There is no tenderness. Musculoskeletal: She exhibits no edema, tenderness or deformity. 2 + pitting edema in the LLE Trace pitting edema in the RLE Neurological: She is alert and oriented to person, place, and time. No cranial nerve deficit. Skin: Skin is warm and dry. No rash noted. No erythema. Psychiatric: She has a normal mood and affect.  Her behavior is normal. Judgment and thought content normal.  
Nursing note and vitals reviewed. Assessment and orders: ICD-10-CM ICD-9-CM 1. Essential hypertension V35 221.2 METABOLIC PANEL, COMPREHENSIVE  
   TSH REFLEX TO T4  
2. Type 2 diabetes mellitus without complication, without long-term current use of insulin (HCC) E11.9 250.00 HEMOGLOBIN A1C WITH EAG 3. Leg cramping R25.2 729.82 cyclobenzaprine (FLEXERIL) 5 mg tablet Diagnoses and all orders for this visit: 1. Essential hypertension -     METABOLIC PANEL, COMPREHENSIVE 
-     TSH REFLEX TO T4 
 
2. Type 2 diabetes mellitus without complication, without long-term current use of insulin (HCC) 
-     HEMOGLOBIN A1C WITH EAG 3. Leg cramping 
-     cyclobenzaprine (FLEXERIL) 5 mg tablet; Take 1 Tab by mouth nightly. Awaiting more records from former PCP in order to update HM Follow-up and Dispositions · Return in about 3 months (around 7/9/2019) for routine follow up. I have reviewed patient medical and social history and medications. I have reviewed pertinent labs results and other data. I have discussed the diagnosis with the patient and the intended plan as seen in the above orders. The patient has received an after-visit summary and questions were answered concerning future plans. I have discussed medication side effects and warnings with the patient as well. Tony Wagner MD 
Resident JAGDISH HERNANDEZ & JERRY ORDONEZ [facility] 04/12/19

## 2019-04-16 PROBLEM — Z87.19 HISTORY OF GI BLEED: Status: ACTIVE | Noted: 2019-04-16

## 2019-04-17 NOTE — PROGRESS NOTES
I reviewed with the resident the medical history and the resident's findings on the physical examination. I discussed with the resident the patient's diagnosis and concur with the plan. Records from most recent Hematology visit personally reviewed. They report pt doing well on Eliquis after having CTA showing multiple pulmonary emboli. Also report that she had a GI bleed after being on Xarelto for DVT ppx in the past. Of note, pt now taking Eliquis as well as aspirin per her medication list. Need to confirm with H/O that she should continue the aspirin as well.

## 2019-04-23 ENCOUNTER — OFFICE VISIT (OUTPATIENT)
Dept: FAMILY MEDICINE CLINIC | Age: 74
End: 2019-04-23

## 2019-04-23 VITALS
SYSTOLIC BLOOD PRESSURE: 135 MMHG | OXYGEN SATURATION: 95 % | WEIGHT: 203.8 LBS | BODY MASS INDEX: 36.11 KG/M2 | HEART RATE: 58 BPM | TEMPERATURE: 97.3 F | HEIGHT: 63 IN | DIASTOLIC BLOOD PRESSURE: 62 MMHG | RESPIRATION RATE: 20 BRPM

## 2019-04-23 DIAGNOSIS — R06.2 WHEEZE: ICD-10-CM

## 2019-04-23 DIAGNOSIS — R05.9 COUGH IN ADULT: Primary | ICD-10-CM

## 2019-04-23 RX ORDER — GUAIFENESIN AND DEXTROMETHORPHAN HYDROBROMIDE 1200; 60 MG/1; MG/1
1 TABLET, EXTENDED RELEASE ORAL 2 TIMES DAILY
Qty: 14 TAB | Refills: 0 | Status: SHIPPED | OUTPATIENT
Start: 2019-04-23 | End: 2019-04-30

## 2019-04-23 RX ORDER — IPRATROPIUM BROMIDE AND ALBUTEROL SULFATE 2.5; .5 MG/3ML; MG/3ML
3 SOLUTION RESPIRATORY (INHALATION)
Qty: 30 NEBULE | Refills: 0
Start: 2019-04-23 | End: 2019-04-23 | Stop reason: ALTCHOICE

## 2019-04-23 RX ORDER — PREDNISONE 20 MG/1
40 TABLET ORAL
Qty: 10 TAB | Refills: 0 | Status: SHIPPED | OUTPATIENT
Start: 2019-04-23 | End: 2019-04-28

## 2019-04-23 RX ORDER — ALBUTEROL SULFATE 90 UG/1
AEROSOL, METERED RESPIRATORY (INHALATION)
Refills: 3 | COMMUNITY
Start: 2019-04-18 | End: 2020-11-18 | Stop reason: SDUPTHER

## 2019-04-23 RX ORDER — TIOTROPIUM BROMIDE AND OLODATEROL 3.124; 2.736 UG/1; UG/1
SPRAY, METERED RESPIRATORY (INHALATION)
Refills: 6 | COMMUNITY
Start: 2019-04-18 | End: 2019-08-14 | Stop reason: ALTCHOICE

## 2019-04-23 NOTE — PROGRESS NOTES
Chief Complaint   Patient presents with    Cough     thick yellow, with slight blood noted     Visit Vitals  /62 (BP 1 Location: Left arm, BP Patient Position: Sitting)   Pulse (!) 58   Temp 97.3 °F (36.3 °C) (Oral)   Resp 20   Ht 5' 3\" (1.6 m)   Wt 203 lb 12.8 oz (92.4 kg)   SpO2 95%   BMI 36.10 kg/m²     1. Have you been to the ER, urgent care clinic since your last visit? Hospitalized since your last visit? No    2. Have you seen or consulted any other health care providers outside of the [address] [place] [address] since your last visit? Include any pap smears or colon screening.  No    Reviewed record in preparation for visit and have necessary documentation  Pt did not bring medication to office visit for review  opportunity was given for questions  Goals that were addressed and/or need to be completed during or after this appointment include   Health Maintenance Due   Topic Date Due    Hepatitis C Screening  1945    LIPID PANEL Q1  1945    FOOT EXAM Q1  07/26/1957    MICROALBUMIN Q1  07/26/1957    DTaP/Tdap/Td series (1 - Tdap) 07/26/1968    Shingrix Vaccine Age 50> (1 of 2) 07/26/1997    FOBT Q 1 YEAR AGE 50-75  07/26/1997    Pneumococcal 65+ years (1 of 2 - PCV13) 07/26/2012    BREAST CANCER SCRN MAMMOGRAM  07/14/2019

## 2019-04-23 NOTE — PATIENT INSTRUCTIONS
COPD Exacerbation Plan: Care Instructions  Your Care Instructions    If you have chronic obstructive pulmonary disease (COPD), your usual shortness of breath could suddenly get worse. You may start coughing more and have more mucus. This flare-up is called a COPD exacerbation (say \"do-KTR-[id number]-BAY-priscilla\"). A lung infection or air pollution could set off an exacerbation. Sometimes it can happen after a quick change in temperature or being around chemicals. Work with your doctor to make a plan for dealing with an exacerbation. You can better manage it if you plan ahead. Follow-up care is a key part of your treatment and safety. Be sure to make and go to all appointments, and call your doctor if you are having problems. It's also a good idea to know your test results and keep a list of the medicines you take. How can you care for yourself at home? During an exacerbation  · Do not panic if you start to have one. Quick treatment at home may help you prevent serious breathing problems. If you have a COPD exacerbation plan that you developed with your doctor, follow it. · Take your medicines exactly as your doctor tells you.  ? Use your inhaler as directed by your doctor. If your symptoms do not get better after you use your medicine, have someone take you to the emergency room. Call an ambulance if necessary. ? With inhaled medicines, a spacer or a nebulizer may help you get more medicine to your lungs. Ask your doctor or pharmacist how to use them properly. Practice using the spacer in front of a mirror before you have an exacerbation. This may help you get the medicine into your lungs quickly. ? If your doctor has given you steroid pills, take them as directed. ? Your doctor may have given you a prescription for antibiotics, which you can fill if you need it. ? Talk to your doctor if you have any problems with your medicine.  And call your doctor if you have to use your antibiotic or steroid pills.  Preventing an exacerbation  · Do not smoke. This is the most important step you can take to prevent more damage to your lungs and prevent problems. If you already smoke, it is never too late to stop. If you need help quitting, talk to your doctor about stop-smoking programs and medicines. These can increase your chances of quitting for good. · Take your daily medicines as prescribed. · Avoid colds and flu. ? Get a pneumococcal vaccine. ? Get a flu vaccine each year, as soon as it is available. Ask those you live or work with to do the same, so they will not get the flu and infect you. ? Try to stay away from people with colds or the flu. ? Wash your hands often. · Avoid secondhand smoke; air pollution; cold, dry air; hot, humid air; and high altitudes. Stay at home with your windows closed when air pollution is bad. · Learn breathing techniques for COPD, such as breathing through pursed lips. These techniques can help you breathe easier during an exacerbation. When should you call for help? Call 911 anytime you think you may need emergency care. For example, call if:    · You have severe trouble breathing.     · You have severe chest pain.    Call your doctor now or seek immediate medical care if:    · You have new or worse shortness of breath.     · You develop new chest pain.     · You are coughing more deeply or more often, especially if you notice more mucus or a change in the color of your mucus.     · You cough up blood.     · You have new or increased swelling in your legs or belly.     · You have a fever.    Watch closely for changes in your health, and be sure to contact your doctor if:    · You need to use your antibiotic or steroid pills.     · Your symptoms are getting worse. Where can you learn more? Go to http://bib-amanda.info/. Enter M082 in the search box to learn more about \"COPD Exacerbation Plan: Care Instructions. \"  Current as of: September 5, 2018  Content Version: 11.9  © 5423-3194 [employer]. Care instructions adapted under license by [employer] (which disclaims liability or warranty for this information). If you have questions about a medical condition or this instruction, always ask your healthcare professional. [address] any warranty or liability for your use of this information.

## 2019-04-23 NOTE — PROGRESS NOTES
Brenda Domínguez  72 y.o. female  1945  [facility] [id number]  [id number]     [facility]: Progress Note       Encounter Date: 4/23/2019    Chief Complaint   Patient presents with    Cough     thick yellow, with slight blood noted     History of Present Illness   Brenda Domínguez is a 72 y.o. female who presents to clinic today for:    Cough-Noted cough in the AM with scant blood from cough. States she does not note any blood in the PM. Established with Pulmonology and dx with COPD; she will f/u with their office mid summer. She states she was given sample inhalers-symbicort, dulera and stiolto respimat; patient states she can not afford the ~$160 copay for her inhalers and is relying on samples. She denies fevers, SOB, CP, LE edema, unintentional weight loss, changes with her voice or swallowing issues. Some anxiety about pneumonia-was dx with pneumonia 2018. Unsure of her recent triggers however pollen count has been usually high this season. Health Maintenance    Health Maintenance Due   Topic Date Due    Hepatitis C Screening  1945    LIPID PANEL Q1  1945    FOOT EXAM Q1  07/26/1957    MICROALBUMIN Q1  07/26/1957    DTaP/Tdap/Td series (1 - Tdap) 07/26/1968    Shingrix Vaccine Age 50> (1 of 2) 07/26/1997    FOBT Q 1 YEAR AGE 50-75  07/26/1997    Pneumococcal 65+ years (1 of 2 - PCV13) 07/26/2012    BREAST CANCER SCRN MAMMOGRAM  07/14/2019     Review of Systems   Review of Systems   Constitutional: Negative. HENT: Negative. Eyes: Negative. Respiratory: Positive for cough. Cardiovascular: Negative. Gastrointestinal: Negative. Genitourinary: Negative. Musculoskeletal: Negative. Skin: Negative. Neurological: Negative. Endo/Heme/Allergies: Negative. Psychiatric/Behavioral: Negative.         Vitals/Objective:     Vitals:    04/23/19 0927   BP: 135/62   Pulse: (!) 58   Resp: 20   Temp: 97.3 °F (36.3 °C)   TempSrc: Oral   SpO2: 95%   Weight: 203 lb 12.8 oz (92.4 kg)   Height: 5' 3\" (1.6 m)     Body mass index is 36.1 kg/m². Physical Exam   Constitutional: She is oriented to person, place, and time. She is active and cooperative. HENT:   Nose: Nose normal.   Mouth/Throat: Uvula is midline, oropharynx is clear and moist and mucous membranes are normal.   Eyes: Conjunctivae and lids are normal.   Neck: Normal range of motion and phonation normal.   Cardiovascular: Normal rate, regular rhythm, normal heart sounds and normal pulses. Pulmonary/Chest: Effort normal. She has no decreased breath sounds. She has wheezes. She has no rhonchi. She has no rales. Anterior-wheeze noted on expiration. Posterior-scattered wheeze noted on inspiration and expiration. Negative for rales or rhonchi. Musculoskeletal: Normal range of motion. Lymphadenopathy:     She has no cervical adenopathy. Neurological: She is alert and oriented to person, place, and time. Skin: Skin is warm, dry and intact. Psychiatric: She has a normal mood and affect. Her speech is normal and behavior is normal. Judgment and thought content normal. Cognition and memory are normal.       No results found for this or any previous visit (from the past 24 hour(s)). Assessment and Plan:   1. Wheeze    - predniSONE (DELTASONE) 20 mg tablet; Take 40 mg by mouth daily (with breakfast) for 5 days. Dispense: 10 Tab; Refill: 0  - UT PRESSURIZED/NONPRESSURIZED INHALATION TREATMENT    2. Cough in adult    - predniSONE (DELTASONE) 20 mg tablet; Take 40 mg by mouth daily (with breakfast) for 5 days. Dispense: 10 Tab; Refill: 0  - dextromethorphan-guaiFENesin (MUCINEX DM) 60-1,200 mg Tb12; Take 1 Tab by mouth two (2) times a day for 7 days. Dispense: 14 Tab; Refill: 0    Duo neb given in clinic with positive results. Advised patient to go home and use her inhalers as prescribed by pulmonology. Discussed steroid burst, fluids and high dose mucinex dm.  Patient to call our office by Friday for worsening symptoms. I have discussed the diagnosis with the patient and the intended plan as seen in the above orders. she has expressed understanding. The patient has received an after-visit summary and questions were answered concerning future plans. I have discussed medication side effects and warnings with the patient as well. Electronically Signed: Vandana Bryan NP     History/Allergies   Patients past medical, surgical and family histories were reviewed and updated. Past Medical History:   Diagnosis Date    Asthma     Bronchitis     Cancer ([facility]     breast - left    Diabetes (HCC)     Hepatitis age 12    High cholesterol     Hypertension     Obesity (BMI 30-39. 9)     Thromboembolus ([facility]     L LE with PE      Past Surgical History:   Procedure Laterality Date    BREAST SURGERY PROCEDURE UNLISTED      L mastectomy    COLONOSCOPY N/A 5/26/2017    COLONOSCOPY- no info to   performed by Simeon Doss MD at [facility] HX GYN      ablation, R ovary removed, tubal ligation    HX HAMMER TOE REPAIR      HX HEENT      tonsillectomy    HX ORTHOPAEDIC      L hip, R leg, C4-C5 fusion, L foot    HX OTHER SURGICAL      xiphoid removal    IR PLC IVC FILTER  2/8/2019     Family History   Problem Relation Age of Onset    Heart Attack Father     Diabetes Mother      Social History     Socioeconomic History    Marital status: SINGLE     Spouse name: Not on file    Number of children: Not on file    Years of education: Not on file    Highest education level: Not on file   Occupational History    Not on file   Social Needs    Financial resource strain: Not on file    Food insecurity:     Worry: Not on file     Inability: Not on file    Transportation needs:     Medical: Not on file     Non-medical: Not on file   Tobacco Use    Smoking status: Former Smoker     Last attempt to quit: 2009     Years since quitting: 10.3    Smokeless tobacco: Never Used   Substance and Sexual Activity    Alcohol use: No     Frequency: Never    Drug use: No    Sexual activity: Never   Lifestyle    Physical activity:     Days per week: Not on file     Minutes per session: Not on file    Stress: Not on file   Relationships    Social connections:     Talks on phone: Not on file     Gets together: Not on file     Attends Mormonism service: Not on file     Active member of club or organization: Not on file     Attends meetings of clubs or organizations: Not on file     Relationship status: Not on file    Intimate partner violence:     Fear of current or ex partner: Not on file     Emotionally abused: Not on file     Physically abused: Not on file     Forced sexual activity: Not on file   Other Topics Concern    Caffeine Concern Not Asked    Back Care Not Asked    Exercise Not Asked    Occupational Exposure Not Asked    Sleep Concern Not Asked    Stress Concern Not Asked    Weight Concern Not Asked   Social History Narrative    Not on file         Allergies   Allergen Reactions    Crestor [Rosuvastatin] Other (comments)     Pain in left leg  Causes muscle spasms    Lipitor [Atorvastatin] Other (comments)     Left leg pain  Causes muscle spasms    Xarelto [Rivaroxaban] Other (comments)     Pt states it caused internal bleeding       Disposition     Follow-up and Dispositions  ·   Return if symptoms worsen or fail to improve.          Future Appointments   Date Time Provider Bill Ricardo   5/10/2019 12:30 PM Sary Raid [address]   5/16/2019 12:30 PM Sary Raid [address]   5/24/2019 12:30 PM Sary Raid [address]   5/30/2019 12:30 PM Tereza Phillips, PT [address]   6/7/2019 12:30 PM Sary Raid [address]   6/14/2019 12:30 PM Sary Raid [address]   6/21/2019 10:30 AM Luciano Kirby MD [facility]   6/28/2019 12:30 PM Tereza Phillips, PT [facility]            Current Medications after this visit     Current Outpatient Medications   Medication Sig    STIOLTO RESPIMAT 2.5-2.5 mcg/actuation inhaler     predniSONE (DELTASONE) 20 mg tablet Take 40 mg by mouth daily (with breakfast) for 5 days.  dextromethorphan-guaiFENesin (MUCINEX DM) 60-1,200 mg Tb12 Take 1 Tab by mouth two (2) times a day for 7 days.  cyclobenzaprine (FLEXERIL) 5 mg tablet Take 1 Tab by mouth nightly.  glipiZIDE SR (GLUCOTROL XL) 2.5 mg CR tablet Take 1 Tab by mouth nightly.  fexofenadine (ALLEGRA) 180 mg tablet Take 1 Tab by mouth.  acetaminophen (TYLENOL ARTHRITIS PAIN) 650 mg TbER Take 1 Tab by mouth every eight (8) hours.  budesonide-formoterol (SYMBICORT) 160-4.5 mcg/actuation HFAA Take 2 Puffs by inhalation two (2) times a day.  apixaban (ELIQUIS) 5 mg (74 tabs) starter pack Take 10 mg (two 5 mg tablets) by mouth twice a day for 7 days   Followed by 5 mg (one 5 mg tablet) by mouth twice a day    multivitamin (ONE A DAY) tablet Take 1 Tab by mouth nightly.  ascorbate calcium (VITAMIN C PO) Take 1 Tab by mouth nightly.  mometasone-formoterol (DULERA) 200-5 mcg/actuation HFA inhaler Take 2 Puffs by inhalation two (2) times a day.  lovastatin (MEVACOR) 10 mg tablet Take 10 mg by mouth nightly.  aspirin 81 mg tablet Take 162 mg by mouth two (2) times a day.  montelukast (SINGULAIR) 10 mg tablet Take 10 mg by mouth nightly.  PROAIR HFA 90 mcg/actuation inhaler      No current facility-administered medications for this visit.       Medications Discontinued During This Encounter   Medication Reason    albuterol-ipratropium (DUO-NEB) 2.5 mg-0.5 mg/3 ml nebu Therapy Completed

## 2019-04-26 ENCOUNTER — TELEPHONE (OUTPATIENT)
Dept: FAMILY MEDICINE CLINIC | Age: 74
End: 2019-04-26

## 2019-04-26 NOTE — TELEPHONE ENCOUNTER
Returned patient call,  States she is feeling some better, still coughing, not coughing up as much phlegm but has some labored breathing at times, still using inhaler as needed,  Also states she think she will be okay and will give the office a call on Monday with an update.

## 2019-05-09 DIAGNOSIS — E78.5 HYPERLIPIDEMIA, UNSPECIFIED HYPERLIPIDEMIA TYPE: Primary | ICD-10-CM

## 2019-05-10 ENCOUNTER — HOSPITAL ENCOUNTER (OUTPATIENT)
Dept: PHYSICAL THERAPY | Age: 74
Discharge: HOME | End: 2019-05-10
Payer: MEDICARE

## 2019-05-10 LAB
ALBUMIN SERPL-MCNC: 4 G/DL (ref 3.5–4.8)
ALBUMIN/GLOB SERPL: 1.4 {RATIO} (ref 1.2–2.2)
ALP SERPL-CCNC: 85 IU/L (ref 39–117)
ALT SERPL-CCNC: 17 IU/L (ref 0–32)
AST SERPL-CCNC: 23 IU/L (ref 0–40)
BILIRUB SERPL-MCNC: 0.4 MG/DL (ref 0–1.2)
BUN SERPL-MCNC: 21 MG/DL (ref 8–27)
BUN/CREAT SERPL: 22 (ref 12–28)
CALCIUM SERPL-MCNC: 9.4 MG/DL (ref 8.7–10.3)
CHLORIDE SERPL-SCNC: 105 MMOL/L (ref 96–106)
CO2 SERPL-SCNC: 24 MMOL/L (ref 20–29)
CREAT SERPL-MCNC: 0.94 MG/DL (ref 0.57–1)
EST. AVERAGE GLUCOSE BLD GHB EST-MCNC: 146 MG/DL
GLOBULIN SER CALC-MCNC: 2.8 G/DL (ref 1.5–4.5)
GLUCOSE SERPL-MCNC: 116 MG/DL (ref 65–99)
HBA1C MFR BLD: 6.7 % (ref 4.8–5.6)
POTASSIUM SERPL-SCNC: 4.6 MMOL/L (ref 3.5–5.2)
PROT SERPL-MCNC: 6.8 G/DL (ref 6–8.5)
SODIUM SERPL-SCNC: 143 MMOL/L (ref 134–144)
TSH SERPL DL<=0.005 MIU/L-ACNC: 2.32 UIU/ML (ref 0.45–4.5)

## 2019-05-10 PROCEDURE — 97530 THERAPEUTIC ACTIVITIES: CPT

## 2019-05-10 NOTE — PROGRESS NOTES
_                                   
[address] [facility] [address] [place] [address]. [address] Dylon Li LYmphedema Therapy Visit: 2 [x]                  Daily note               []                 30 day/10th visit progress note NAME: Rajiv Turcios DATE: 5/10/2019 GOALS Short term goals Time frame: 6-8 weeks 1. Patient will demonstrate knowledge of signs/symptoms of infections/cellulitis and be independent in skin care to prevent cellulitis. 2.  Patient will demonstrate independence in lymphedema home program of therapeutic exercises to improve circulation and decongest limbs, 200 R LE/400-500 L LE, to improve ADLs. 3.  Patient will tolerate multi-layer bandages (MLB) and show measureable decrease in limb volume to allow ordering of home compression system (daytime, nighttime garments and pump as needed).  
  
Long term goals Time frame: 8-12 weeks 1. Pt will show improvement in Lymphedema Life Impact Scale by decreasing impairment percentage to under 30% and thus allow improvement in patient's quality of life. 2.  Patient will be independent with don/doff of compression system and use in order to prevent reaccumulation of fluid at discharge. 3.  Pt will be independent in lymphedema specific HEP show stable limb volumes showing decongestion and pt. ready for transition to independent restorative phase of lymphedema therapy. SUBJECTIVE REPORT:  Patient arrives with compression bandaging supplies today, with her daughter present to learn compression bandaging technique. Patient lives over an hour from clinic, and states 2x/week visits would be difficult for her. Requests family learn bandaging technique, with plan for weekly outpatient visits. Pain: 6/10, L LE 
     
     
     
     
     
Gait:  
[x]  Independent gait without any assistive device for community distances ADLs:  indep Treatment Response:  Initiated family instruction in bandaging application, knee high L, with good response noted. [x]   Patient reviewed packet received at evaluation 
[]   Patient completed home program as prescribed 
[]   Patient not fully compliant with home program to date 
[]   Patient waiting on compression garment arrival 
Function: ongoing 
[]   Patient requiring less assistance with completion of home program 
[]   ADLs are requiring less assistance  
[]   Patient able to return to work/leisure activities Lymphedema Life Impact Scale: scores 34 with 50% impairment TREATMENT AND OBJECTIVE DATA SUMMARY:  
Patient/Family Education:  
 
Educated in skin care: [x]   Skin care products [x]   Hygiene [x]  Prevention of cellulitis 
[]   Wound care Educated in exercise: []   Walking program 
[]   Sara ball routine 
[]   Stick routine [x]   ROM routine Instructed in self MLD:  
Written sequence given and reviewed with patient as well as demonstration and instruction during MLD portion of the session. Instructed in don/doff of compression system:  
[x]   Multi layer bandage (MLB) donning principles and wear precautions []   Day garments []   Night garments Therapeutic Exercise/Procedure Free exercises/ROM: Patient to continue ankle pumps/circumduction/toe flex/ext Home program: Patient to perform daily to BID: 
[x]   Skin care [x]   Deep abdominal breathing 
[x]   Exercise routine 
[]   Walking program 
[x]   Rest in supine  
[x]   Compression bandage 
[]   Compression garments 
[]   Vasopneumatic device 
[]   Wound care 
[]   Self MLD [x]   Bring supplies to each therapy visit 
[]   Purchase necessary supplies 
[]   Weight loss program 
[]   Follow up with Rationale: Exercise will increase the lymph angiomotoricity and tissue pressure of the skin and thus decrease swelling. Therapeutic activity: 12:45-1:44 pm  59 minutes Area to decongest:   
[] UE 
 []  Right     []  Left      [] Trunk [x] LE   
         []  Right     [x]  Left      [] Trunk Sequence used and effectiveness: [] Secondary/Primary sequence for upper extremity with / without trunk involvement 
 
[] Secondary/Primary sequence for lower extremities with / without trunk involvement  
 
[] Modifications were made to manual lymph drainage sequence to exclude cervical techniques secondary to patient's age [x] Self MLD sequence/techniques/hand strokes: patient to apply low pH lotion R LE/L thigh, superficial technique, upward strokes. Skin/wound care/debridement:   
Upper/Lower extremity compression: Patient/dtr educated regarding pathophysiology of post-thrombotic syndrome/lymphedema. Advised that condition is chronic in nature. Advised that compression garment wear will be indicated upon decongestion of L LE. Daughter instructed in knee high MLB L LE. Therapist demonstrated technique initially, with daughter able to apply MLB with min cues, however, demonstrates good technique, stating she is comfortable completing task at home 1-2x/week until next treatment. To call clinic with any questions or concerns prior to next scheduled appt. Therapist re-applied multi-layer compression bandaging to: Below knee [x] Thigh high  [] Upper Extremity [] Right []   Left [x]    Bilateral [] The following multi-layer bandages were applied: 
[]  Tricofix           
[]  Silver Stockinette            
[]  Tg soft [x]  Protouch 
 
[]  Transelast  
 
Padding layer: 
[]  Cellona [x]  Fleece Foam: 
[] 10cm roll 
[x] 12cm roll 
[] 15cm roll 
[] Gray foam 
[] Komprex 
[] Komprex 2 Short stretch bandages:  
[] 4cm [x] 6cm [x] 8cm [x] 10cm 
[] 12cm Educated patient in multi-layer bandaging donning principles and precautions. Compression bandaging instructions: 1.   Compression bandaging will be applied twice a week by therapist in clinic, with adjustments to be made at home as indicated if bandaging becomes loose or uncomfortable. 2. If tolerated, remain bandaged between appointments with therapist, removing under the following conditionsDO NOT WAIT FOR A RETURN PHONE José Luis Gibbs: 
 
-Numbness/tingling in extremity different from what you have experienced without the bandages in place. 
-Compromise in circulation, monitoring blood refill into toes after applying gentle pressure to toes. 
-Onset of pain in extremity that is sudden and severe in nature. -Redness, warmth in limb, and/or fever, flu-like symptoms, which may indicate infection. If this occurs, call your doctor right away. If you note a sudden increase in swelling in extremity, with or without redness/warmth, go to the emergency room as soon as possible to have blood clot ruled out. 3. Compression bandaging supplies that can be laundered are the brown compression wraps and any foam applied for padding. Launder in washer/dryer with NO fabric softener, bleach, woolite. Dry on a low heat. 4. Once compression garments are ordered, compression bandaging will be continued until garments arrive for fitting. This process can take several weeks. Kinesiotaping: na  
Girth/Volume measurement: Reviewed results of volumetric measurements taken on evaluation. TOTAL TREATMENT 59 mins ASSESSMENT:  
Treatment effectiveness and tolerance: Dtr demonstrates good understanding of MLB application, knee high, L LE. Patient/Dtr stating grandson is also willing to learn bandaging technique to assist patient, and will likely attend next visit. Good capillary refill noted L toes after bandaging application. Hyperpigmentation persists L LE, however, not warm to touch comparing with R LE. Progress toward goals: Ongoing.   
 
PLAN OF CARE:  
Changes to the plan of care: Assess response to MLB, and home application of bandaging. Address any concerns regarding home bandaging. Repeat limb volumes 1-2 visits. Proceed with compression garment order when indicated. Frequency: []  2 times a week [x]  Weekly []  Biweekly []  Monthly Emelina Grullon PT, CLT

## 2019-05-14 ENCOUNTER — OFFICE VISIT (OUTPATIENT)
Dept: FAMILY MEDICINE CLINIC | Age: 74
End: 2019-05-14

## 2019-05-14 VITALS
HEART RATE: 90 BPM | WEIGHT: 204 LBS | RESPIRATION RATE: 24 BRPM | TEMPERATURE: 97.7 F | BODY MASS INDEX: 36.14 KG/M2 | HEIGHT: 63 IN | DIASTOLIC BLOOD PRESSURE: 77 MMHG | OXYGEN SATURATION: 95 % | SYSTOLIC BLOOD PRESSURE: 131 MMHG

## 2019-05-14 DIAGNOSIS — E11.9 TYPE 2 DIABETES MELLITUS WITHOUT COMPLICATION, WITHOUT LONG-TERM CURRENT USE OF INSULIN (HCC): Primary | ICD-10-CM

## 2019-05-14 DIAGNOSIS — Z11.59 ENCOUNTER FOR HEPATITIS C SCREENING TEST FOR LOW RISK PATIENT: ICD-10-CM

## 2019-05-14 NOTE — PROGRESS NOTES
1. Have you been to the ER, urgent care clinic since your last visit? Hospitalized since your last visit? no 
 
2. Have you seen or consulted any other health care providers outside of the [address] [place] [address] since your last visit? Include any pap smears or colon screening. no 
Reviewed record in preparation for visit and have obtained necessary documentation. Patient did not bring medications to visit for review. Information provided on Advanced Directive, Living Will. Body mass index is 36.14 kg/m². Health Maintenance Due Topic Date Due  
 Hepatitis C Screening  1945  LIPID PANEL Q1  1945  
 FOOT EXAM Q1  07/26/1957  MICROALBUMIN Q1  07/26/1957  
 DTaP/Tdap/Td series (1 - Tdap) 07/26/1968  Shingrix Vaccine Age 50> (1 of 2) 07/26/1997  
 FOBT Q 1 YEAR AGE 50-75  07/26/1997  Pneumococcal 65+ years (1 of 2 - PCV13) 07/26/2012  BREAST CANCER SCRN MAMMOGRAM  07/14/2019

## 2019-05-14 NOTE — PATIENT INSTRUCTIONS
A Healthy Lifestyle: Care Instructions Your Care Instructions A healthy lifestyle can help you feel good, stay at a healthy weight, and have plenty of energy for both work and play. A healthy lifestyle is something you can share with your whole family. A healthy lifestyle also can lower your risk for serious health problems, such as high blood pressure, heart disease, and diabetes. You can follow a few steps listed below to improve your health and the health of your family. Follow-up care is a key part of your treatment and safety. Be sure to make and go to all appointments, and call your doctor if you are having problems. It's also a good idea to know your test results and keep a list of the medicines you take. How can you care for yourself at home? · Do not eat too much sugar, fat, or fast foods. You can still have dessert and treats now and then. The goal is moderation. · Start small to improve your eating habits. Pay attention to portion sizes, drink less juice and soda pop, and eat more fruits and vegetables. ? Eat a healthy amount of food. A 3-ounce serving of meat, for example, is about the size of a deck of cards. Fill the rest of your plate with vegetables and whole grains. ? Limit the amount of soda and sports drinks you have every day. Drink more water when you are thirsty. ? Eat at least 5 servings of fruits and vegetables every day. It may seem like a lot, but it is not hard to reach this goal. A serving or helping is 1 piece of fruit, 1 cup of vegetables, or 2 cups of leafy, raw vegetables. Have an apple or some carrot sticks as an afternoon snack instead of a candy bar. Try to have fruits and/or vegetables at every meal. 
· Make exercise part of your daily routine. You may want to start with simple activities, such as walking, bicycling, or slow swimming. Try to be active 30 to 60 minutes every day.  You do not need to do all 30 to 60 minutes all at once. For example, you can exercise 3 times a day for 10 or 20 minutes. Moderate exercise is safe for most people, but it is always a good idea to talk to your doctor before starting an exercise program. 
· Keep moving. Tad Kim the lawn, work in the garden, or Onco[employer]ex. Take the stairs instead of the elevator at work. · If you smoke, quit. People who smoke have an increased risk for heart attack, stroke, cancer, and other lung illnesses. Quitting is hard, but there are ways to boost your chance of quitting tobacco for good. ? Use nicotine gum, patches, or lozenges. ? Ask your doctor about stop-smoking programs and medicines. ? Keep trying. In addition to reducing your risk of diseases in the future, you will notice some benefits soon after you stop using tobacco. If you have shortness of breath or asthma symptoms, they will likely get better within a few weeks after you quit. · Limit how much alcohol you drink. Moderate amounts of alcohol (up to 2 drinks a day for men, 1 drink a day for women) are okay. But drinking too much can lead to liver problems, high blood pressure, and other health problems. Family health If you have a family, there are many things you can do together to improve your health. · Eat meals together as a family as often as possible. · Eat healthy foods. This includes fruits, vegetables, lean meats and dairy, and whole grains. · Include your family in your fitness plan. Most people think of activities such as jogging or tennis as the way to fitness, but there are many ways you and your family can be more active. Anything that makes you breathe hard and gets your heart pumping is exercise. Here are some tips: 
? Walk to do errands or to take your child to school or the bus. 
? Go for a family bike ride after dinner instead of watching TV. Where can you learn more? Go to http://bib-amanda.info/. Enter B618 in the search box to learn more about \"A Healthy Lifestyle: Care Instructions. \" Current as of: September 11, 2018 Content Version: 11.9 © 9921-4532 [employer]. Care instructions adapted under license by [employer] (which disclaims liability or warranty for this information). If you have questions about a medical condition or this instruction, always ask your healthcare professional. [place] [address] any warranty or liability for your use of this information.

## 2019-05-14 NOTE — PROGRESS NOTES
[id number] [facility], [address] [facility]iliated with [facility] and [facility] Note Subjective:  
Sy Ulrich is a 72 y.o. female presents for discussion of labs and DM follow up CC: Lab follow up History provided by patient HPI: 
Pt is followed by Lymphedema clinic routinely. She recently had left leg wrapped for symptoms of swelling. She has follow up again in two days. Type II DM Pt takes glucotrol 2.5mg qhs. She checks her blood sugars occasionally. Blood sugars run in the low to medium 100s. Current Outpatient Medications on File Prior to Visit Medication Sig Dispense Refill  PROAIR HFA 90 mcg/actuation inhaler   3  
 STIOLTO RESPIMAT 2.5-2.5 mcg/actuation inhaler   6  cyclobenzaprine (FLEXERIL) 5 mg tablet Take 1 Tab by mouth nightly. 30 Tab 0  
 glipiZIDE SR (GLUCOTROL XL) 2.5 mg CR tablet Take 1 Tab by mouth nightly. 30 Tab 3  
 fexofenadine (ALLEGRA) 180 mg tablet Take 1 Tab by mouth.  acetaminophen (TYLENOL ARTHRITIS PAIN) 650 mg TbER Take 1 Tab by mouth every eight (8) hours. 60 Tab 0  
 budesonide-formoterol (SYMBICORT) 160-4.5 mcg/actuation HFAA Take 2 Puffs by inhalation two (2) times a day.  apixaban (ELIQUIS) 5 mg (74 tabs) starter pack Take 10 mg (two 5 mg tablets) by mouth twice a day for 7 days Followed by 5 mg (one 5 mg tablet) by mouth twice a day 1 Dose Pack 0  
 multivitamin (ONE A DAY) tablet Take 1 Tab by mouth nightly.  ascorbate calcium (VITAMIN C PO) Take 1 Tab by mouth nightly.  lovastatin (MEVACOR) 10 mg tablet Take 10 mg by mouth nightly.  aspirin 81 mg tablet Take 162 mg by mouth two (2) times a day.  montelukast (SINGULAIR) 10 mg tablet Take 10 mg by mouth nightly.  mometasone-formoterol (DULERA) 200-5 mcg/actuation HFA inhaler Take 2 Puffs by inhalation two (2) times a day. No current facility-administered medications on file prior to visit. Past Medical History:  
Diagnosis Date  Asthma  Bronchitis  Cancer ([facility] breast - left  Diabetes ([facility]  Hepatitis age 11  
 High cholesterol  Hypertension  Obesity (BMI 30-39. 9)  Thromboembolus ([facility] L LE with PE Social History Socioeconomic History  Marital status: SINGLE Spouse name: Not on file  Number of children: Not on file  Years of education: Not on file  Highest education level: Not on file Occupational History  Not on file Social Needs  Financial resource strain: Not on file  Food insecurity:  
  Worry: Not on file Inability: Not on file  Transportation needs:  
  Medical: Not on file Non-medical: Not on file Tobacco Use  Smoking status: Former Smoker Last attempt to quit: 2009 Years since quitting: 10.3  Smokeless tobacco: Never Used Substance and Sexual Activity  Alcohol use: No  
  Frequency: Never  Drug use: No  
 Sexual activity: Never Lifestyle  Physical activity:  
  Days per week: Not on file Minutes per session: Not on file  Stress: Not on file Relationships  Social connections:  
  Talks on phone: Not on file Gets together: Not on file Attends Worship service: Not on file Active member of club or organization: Not on file Attends meetings of clubs or organizations: Not on file Relationship status: Not on file  Intimate partner violence:  
  Fear of current or ex partner: Not on file Emotionally abused: Not on file Physically abused: Not on file Forced sexual activity: Not on file Other Topics Concern  Caffeine Concern Not Asked  Back Care Not Asked  Exercise Not Asked  Occupational Exposure Not Asked  Sleep Concern Not Asked  Stress Concern Not Asked  Weight Concern Not Asked Social History Narrative  Not on file Review of Systems Constitutional: Negative for chills and fever. Respiratory: Negative for cough, hemoptysis, sputum production, shortness of breath and wheezing. Cardiovascular: Negative for chest pain and palpitations. Gastrointestinal: Negative for abdominal pain, nausea and vomiting. Musculoskeletal: Negative for back pain, joint pain, myalgias and neck pain. Neurological: Negative for dizziness, sensory change, speech change, focal weakness, weakness and headaches. Objective:  
 
Visit Vitals /77 (BP 1 Location: Right arm, BP Patient Position: Sitting) Pulse 90 Temp 97.7 °F (36.5 °C) (Oral) Resp 24 Ht 5' 3\" (1.6 m) Wt 204 lb (92.5 kg) SpO2 95% BMI 36.14 kg/m² Physical Exam: 
Physical Exam  
Constitutional: She is oriented to person, place, and time. She appears well-developed and well-nourished. HENT:  
Head: Normocephalic and atraumatic. Eyes: Pupils are equal, round, and reactive to light. Conjunctivae are normal. Right eye exhibits no discharge. Left eye exhibits no discharge. No scleral icterus. Neck: Normal range of motion. Neck supple. Cardiovascular: Normal rate, regular rhythm, normal heart sounds and intact distal pulses. Exam reveals no gallop and no friction rub. No murmur heard. Pulmonary/Chest: Effort normal and breath sounds normal. No respiratory distress. She has no wheezes. She has no rales. She exhibits no tenderness. Musculoskeletal: She exhibits no edema, tenderness or deformity. Left LE with ace bandage to the knees. Right LE with trace pitting edema Neurological: She is alert and oriented to person, place, and time. No cranial nerve deficit. Skin: Skin is warm and dry. No rash noted. No erythema. Nursing note and vitals reviewed. Diabetic foot exam:   
 
Sensation by vibration sense: good Monofilament test:  good Skin integrity: intact without lesions Vascular: good circulation Motor:  moves all toes, strength seems ok Assessment and orders: ICD-10-CM ICD-9-CM 1. Type 2 diabetes mellitus without complication, without long-term current use of insulin (HCC) E11.9 250.00 glucose blood VI test strips (FREESTYLE LITE STRIPS) strip MICROALBUMIN, UR, RAND W/ MICROALB/CREAT RATIO  
    DIABETES FOOT EXAM  
2. Encounter for hepatitis C screening test for low risk patient Z11.59 V73.89 HEPATITIS C AB Diagnoses and all orders for this visit: 
 
1. Type 2 diabetes mellitus without complication, without long-term current use of insulin (HCC) 
-     glucose blood VI test strips (FREESTYLE LITE STRIPS) strip; Test blood sugars prn -     MICROALBUMIN, UR, RAND W/ MICROALB/CREAT RATIO 
-      DIABETES FOOT EXAM 
 
2. Encounter for hepatitis C screening test for low risk patient 
-     HEPATITIS C AB Follow-up and Dispositions · Return for return as needed . I have reviewed patient medical and social history and medications. I have reviewed pertinent labs results and other data. I have discussed the diagnosis with the patient and the intended plan as seen in the above orders. The patient has received an after-visit summary and questions were answered concerning future plans. I have discussed medication side effects and warnings with the patient as well. Lily Rivera MD 
Resident [facility]AGD[facility] HERNANDEZ & JERRY ORDONEZ [facility] 05/14/19

## 2019-05-16 ENCOUNTER — HOSPITAL ENCOUNTER (OUTPATIENT)
Dept: PHYSICAL THERAPY | Age: 74
Discharge: HOME | End: 2019-05-16
Payer: MEDICARE

## 2019-05-16 PROCEDURE — 97530 THERAPEUTIC ACTIVITIES: CPT

## 2019-05-16 NOTE — PROGRESS NOTES
_                                   
[address] [facility] [address] [place] [address]. [address] Dylon Li LYmphedema Therapy Visit: 3 [x]                  Daily note               []                 30 day/10th visit progress note NAME: Nohemi Monae DATE: 5/16/2019 GOALS Short term goals Time frame: 6-8 weeks 1. Patient will demonstrate knowledge of signs/symptoms of infections/cellulitis and be independent in skin care to prevent cellulitis. 5/16/2019 goal met. 2.  Patient will demonstrate independence in lymphedema home program of therapeutic exercises to improve circulation and decongest limbs, 200 R LE/400-500 L LE, to improve ADLs. 5/16/2019 L LE reduced by 760.92 mL; R LE reduced by 215.31%. 3.  Patient will tolerate multi-layer bandages (MLB) and show measureable decrease in limb volume to allow ordering of home compression system (daytime, nighttime garments and pump as needed).  
  
Long term goals Time frame: 8-12 weeks 1. Pt will show improvement in Lymphedema Life Impact Scale by decreasing impairment percentage to under 30% and thus allow improvement in patient's quality of life. 2.  Patient will be independent with don/doff of compression system and use in order to prevent reaccumulation of fluid at discharge. 3.  Pt will be independent in lymphedema specific HEP show stable limb volumes showing decongestion and pt. ready for transition to independent restorative phase of lymphedema therapy. SUBJECTIVE REPORT:  Patient arrives with compression bandaging knee high L LE today, with her daughter applying bandaging at home. Patient lives over an hour from clinic, and states 2x/week visits would be difficult for her, with patient bringing grandson to appt today with her to observe bandaging. Patient reports some discomfort with bandaging.   Upon arrival, states she does not want to bandage anymore and would like to consider velcro compression garment option. Pain: 6/10, L LE 
     
     
     
     
     
Gait:  
[x]  Independent gait without any assistive device for community distances ADLs:  indep Treatment Response:  Initiated family instruction in bandaging application, knee high L, with good response noted. [x]   Patient reviewed packet received at evaluation 
[x]   Patient completed home program as prescribed 
[]   Patient not fully compliant with home program to date 
[]   Patient waiting on compression garment arrival 
Function: ongoing 
[]   Patient requiring less assistance with completion of home program 
[]   ADLs are requiring less assistance  
[]   Patient able to return to work/leisure activities Lymphedema Life Impact Scale: scores 34 with 50% impairment TREATMENT AND OBJECTIVE DATA SUMMARY:  
Patient/Family Education:  
 
Educated in skin care: [x]   Skin care products [x]   Hygiene [x]  Prevention of cellulitis 
[]   Wound care Educated in exercise: []   Walking program 
[]   Sara ball routine 
[]   Stick routine [x]   ROM routine Instructed in self MLD:  
Written sequence given and reviewed with patient as well as demonstration and instruction during MLD portion of the session. Instructed in don/doff of compression system:  
[x]   Multi layer bandage (MLB) donning principles and wear precautions []   Day garments []   Night garments Therapeutic Exercise/Procedure Free exercises/ROM: Patient to continue ankle pumps/circumduction/toe flex/ext Home program: Patient to perform daily to BID: 
[x]   Skin care [x]   Deep abdominal breathing 
[x]   Exercise routine 
[]   Walking program 
[x]   Rest in supine  
[x]   Compression bandage 
[]   Compression garments 
[]   Vasopneumatic device 
[]   Wound care 
[]   Self MLD [x]   Bring supplies to each therapy visit 
[]   Purchase necessary supplies []   Weight loss program 
[]   Follow up with Rationale: Exercise will increase the lymph angiomotoricity and tissue pressure of the skin and thus decrease swelling. Therapeutic activity: 12:40-1:45 pm  59 minutes Area to decongest:   
[] UE 
        []  Right     []  Left      [] Trunk [x] LE   
         []  Right     [x]  Left      [] Trunk Sequence used and effectiveness: [] Secondary/Primary sequence for upper extremity with / without trunk involvement 
 
[] Secondary/Primary sequence for lower extremities with / without trunk involvement  
 
[] Modifications were made to manual lymph drainage sequence to exclude cervical techniques secondary to patient's age 
 
[] Self MLD sequence/techniques/hand strokes: patient to apply low pH lotion R LE/L thigh, superficial technique, upward strokes. Skin/wound care/debridement:   
Upper/Lower extremity compression: Patient reports upon arrival that she does not want to continue bandaging, stating it is hard to get a shower. When pricing for velcro system presented, patient reports she cannot afford velcro garment. Limb volumes completed today with excellent progress noted. L LE reduced by 760.92 mL, with limb volume discrepancy reduced from 7.09% to 1.64%. Patient advised that she has progressed to the point of being measured for compression stockings, with pricing of $58.98/pair obtained from [facility], with patient advised that she will require 2 pair/year for effective management of lymphedema. Patient advised that she would benefit from thigh high compression bandaging/garment wear, however, patient declines, and initially, states she declines further treatment due to being unable to afford stockings. Patient advised of risks of leaving lymphedema unmanaged, with primary concern being placed at higher risk of developing cellulitis, which patient states she has already had.   Patient advised that she has the right to refuse treatment, with physicians to be notified of patient's desire to no longer continue treatment. However, after further discussion with patient/grandson, patient prefers to continue bandaging, and will consider proceeding with compression garment order next visit. Patient advised of good response to treatment thus far, anticipating she will do well with continued conservative treatment. Grandson observed knee high MLB L LE. Therapist provided verbal instruction in bandaging application. stating she is comfortable family to continue completing task 1-2x/week until next treatment. To call clinic with any questions or concerns prior to next scheduled appt. Therapist re-applied multi-layer compression bandaging to: Below knee [x] Thigh high  [] Upper Extremity [] Right []   Left [x]    Bilateral [] The following multi-layer bandages were applied: 
[]  Tricofix           
[]  Silver Stockinette            
[]  Tg soft [x]  Protouch 
 
[]  Transelast  
 
Padding layer: 
[]  Cellona [x]  Fleece Foam: 
[] 10cm roll 
[x] 12cm roll 
[] 15cm roll 
[] Gray foam 
[] Komprex 
[] Komprex 2 Short stretch bandages:  
[] 4cm [x] 6cm [x] 8cm [x] 10cm 
[] 12cm Educated patient in multi-layer bandaging donning principles and precautions. Compression bandaging instructions: 1. Compression bandaging will be applied twice a week by therapist in clinic, with adjustments to be made at home as indicated if bandaging becomes loose or uncomfortable. 2. If tolerated, remain bandaged between appointments with therapist, removing under the following conditionsDO NOT WAIT FOR A RETURN PHONE José Luis [address]: 
 
-Numbness/tingling in extremity different from what you have experienced without the bandages in place. 
-Compromise in circulation, monitoring blood refill into toes after applying gentle pressure to toes. 
-Onset of pain in extremity that is sudden and severe in nature. -Redness, warmth in limb, and/or fever, flu-like symptoms, which may indicate infection. If this occurs, call your doctor right away. If you note a sudden increase in swelling in extremity, with or without redness/warmth, go to the emergency room as soon as possible to have blood clot ruled out. 3. Compression bandaging supplies that can be laundered are the brown compression wraps and any foam applied for padding. Launder in washer/dryer with NO fabric softener, bleach, woolite. Dry on a low heat. 4. Once compression garments are ordered, compression bandaging will be continued until garments arrive for fitting. This process can take several weeks. Kinesiotaping: na  
Girth/Volume measurement: Reviewed results of volumetric measurements taken on evaluation. TOTAL TREATMENT 65 mins ASSESSMENT:  
Treatment effectiveness and tolerance: Dtr demonstrates good understanding of MLB application previous visit, knee high, L LE. Ggrandson to appt today with patient to observe treatment, providing good support to patient in encouraging she continue treatment. Good capillary refill noted L toes after bandaging application. Hyperpigmentation persists L LE, however, not warm to touch comparing with R LE. Improvement in limb volume measurements and visible presentation of L LE today. Progress toward goals: Ongoing. PLAN OF CARE:  
Changes to the plan of care: Assess response to MLB, and home application of bandaging. Address any concerns regarding home bandaging. Repeat limb volumes 1-2 visits. Proceed with compression garment order when indicated. Frequency: []  2 times a week [x]  Weekly []  Biweekly []  Monthly Sudarshan Fry PT, CLT

## 2019-05-23 DIAGNOSIS — E11.9 TYPE 2 DIABETES MELLITUS WITHOUT COMPLICATION, WITHOUT LONG-TERM CURRENT USE OF INSULIN (HCC): ICD-10-CM

## 2019-05-23 NOTE — TELEPHONE ENCOUNTER
Kimberly Pan, [address]             Pt is calling about her recent Rx for her testing strips that went over to the pharmacy on file.  Pt's pharmacy([facility]) has had the Rx for over a week and they are not able to fill it to a possible prior authorization.  She wanted someone to F/Up with the pharmacy so she can get her Rx. Best contact number is [phone].

## 2019-05-24 ENCOUNTER — APPOINTMENT (OUTPATIENT)
Dept: PHYSICAL THERAPY | Age: 74
End: 2019-05-24
Payer: MEDICARE

## 2019-05-28 DIAGNOSIS — E11.9 TYPE 2 DIABETES MELLITUS WITHOUT COMPLICATION, WITHOUT LONG-TERM CURRENT USE OF INSULIN (HCC): ICD-10-CM

## 2019-05-28 NOTE — TELEPHONE ENCOUNTER
Pt states that she still has not been able to get her test strips. Pharmacy states that another physician has to write Rx b/c ins will not cover under Dr. Sanket Greer.

## 2019-05-30 ENCOUNTER — HOSPITAL ENCOUNTER (OUTPATIENT)
Dept: PHYSICAL THERAPY | Age: 74
Discharge: HOME | End: 2019-05-30
Payer: MEDICARE

## 2019-05-30 ENCOUNTER — HOSPITAL ENCOUNTER (OUTPATIENT)
Dept: CT IMAGING | Age: 74
Discharge: HOME | End: 2019-05-30
Attending: NURSE PRACTITIONER
Payer: MEDICARE

## 2019-05-30 DIAGNOSIS — R93.89 ABNORMAL CT OF THE CHEST: ICD-10-CM

## 2019-05-30 PROCEDURE — 97140 MANUAL THERAPY 1/> REGIONS: CPT

## 2019-05-30 PROCEDURE — 71250 CT THORAX DX C-: CPT

## 2019-05-30 NOTE — PROGRESS NOTES
[facility]  [facility]  [address] [facility]  [address]  Dylon Li        LYmphedema Therapy  Visit: 4    [x]                  Daily note               []                 30 day/10th visit progress note    NAME: Suma Cabello  DATE: 5/30/2019    GOALS  Short term goals  Time frame: 6-8 weeks  1. Patient will demonstrate knowledge of signs/symptoms of infections/cellulitis and be independent in skin care to prevent cellulitis. 5/16/2019 goal met. 2.  Patient will demonstrate independence in lymphedema home program of therapeutic exercises to improve circulation and decongest limbs, 200 R LE/400-500 L LE, to improve ADLs. 5/16/2019 L LE reduced by 760.92 mL; R LE reduced by 215.31%. 3.  Patient will tolerate multi-layer bandages (MLB) and show measureable decrease in limb volume to allow ordering of home compression system (daytime, nighttime garments and pump as needed).      Long term goals  Time frame: 8-12 weeks  1. Pt will show improvement in Lymphedema Life Impact Scale by decreasing impairment percentage to under 30% and thus allow improvement in patient's quality of life. 2.  Patient will be independent with don/doff of compression system and use in order to prevent reaccumulation of fluid at discharge. 3.  Pt will be independent in lymphedema specific HEP show stable limb volumes showing decongestion and pt. ready for transition to independent restorative phase of lymphedema therapy. SUBJECTIVE REPORT:  Patient arrives with compression bandaging knee high L LE today, with her daughter applying bandaging at home for the last 2 weeks. Patient lives over an hour from clinic, and states 2x/week visits would be difficult for her. Pt asking today about transition to garments, says she is tired of bandaging.  Pt brought knee high stockings from home to trial.    Patient also asking today about a vasopneumatic pump. Pt says her friend has a pump that used to belong to her  and asked if pt would like to have it. Advised pt that pump is not recommended due to history of recent DVT left LE in February; pt verbalized understanding. Pain: 6/10, L LE                                Gait:   [x]  Independent gait without any assistive device for community distances  ADLs:  indep  Treatment Response:  Continued family instruction in bandaging application, knee high L, with good response noted. [x]   Patient reviewed packet received at evaluation  [x]   Patient completed home program as prescribed  []   Patient not fully compliant with home program to date  [x]   Patient waiting on compression garment arrival  Function: ongoing  []   Patient requiring less assistance with completion of home program  []   ADLs are requiring less assistance   []   Patient able to return to work/leisure activities  Lymphedema Life Impact Scale: scores 34 with 50% impairment  TREATMENT AND OBJECTIVE DATA SUMMARY:   Patient/Family Education:      Educated in skin care: [x]   Skin care products  [x]   Hygiene  [x]  Prevention of cellulitis  []   Wound care     Educated in exercise: []   Walking program  []   Sara ball routine  []   Stick routine  [x]   ROM routine     Instructed in self MLD:   Written sequence given and reviewed with patient as well as demonstration and instruction during MLD portion of the session.      Instructed in don/doff of compression system:   [x]   Multi layer bandage (MLB) donning principles and wear precautions  []   Day garments  []   Night garments     Therapeutic Exercise/Procedure              Free exercises/ROM: Patient to continue ankle pumps/circumduction/toe flex/ext   Home program: Patient to perform daily to BID:  [x]   Skin care  [x]   Deep abdominal breathing  [x]   Exercise routine  []   Walking program  [x]   Rest in supine   [x]   Compression bandage  []   Compression garments  [] Vasopneumatic device  []   Wound care  []   Self MLD  [x]   Bring supplies to each therapy visit  []   Purchase necessary supplies  []   Weight loss program  []   Follow up with       Rationale: Exercise will increase the lymph angiomotoricity and tissue pressure of the skin and thus decrease swelling. Manual therapy  40 minutes     Area to decongest:    [] UE          []  Right     []  Left      [] Trunk      [x] LE             []  Right     [x]  Left      [] Trunk         Sequence used and effectiveness: [] Secondary/Primary sequence for upper extremity with / without trunk involvement    [] Secondary/Primary sequence for lower extremities with / without trunk involvement     [] Modifications were made to manual lymph drainage sequence to exclude cervical techniques secondary to patient's age    [] Self MLD sequence/techniques/hand strokes: patient to apply low pH lotion R LE/L thigh, superficial technique, upward strokes. Skin/wound care/debridement: MLB removed and pt's left LE washed with hygienic wipes. Applied eucerin lotion to left LE using upward strokes to stimulate lymphatic vessels. Upper/Lower extremity compression: Pt asking today about transition to garments. Pt brought knee high sheer pantyhose to trial today, however explained to pt that these will not provide adequate compression to manage L LE lymphedema and pt would benefit from medical grade compression stocking. Pt agreeable to purchase one pair of RM stockings. Pt measured for [employer] knee high stocking, open toe per pt request, size II regular. Order sent to [facility] compression today. Therapist re-applied multi-layer compression bandaging to:     Below knee [x]  Thigh high  []   Upper Extremity []    Right []   Left [x]    Bilateral []    The following multi-layer bandages were applied:  []  Tricofix            []  Silver Stockinette             []  Tg soft  [x]  Protouch    []  Transelast     Padding layer:  []  Cellona [x]  Fleece    Foam:  [] 10cm roll  [x] 12cm roll  [] 15cm roll  [] Gray foam  [] Komprex  [] Komprex 2      Short stretch bandages:   [] 4cm  [x] 6cm  [x] 8cm  [x] 10cm  [] 12cm  Educated patient in multi-layer bandaging donning principles and precautions. Compression bandaging instructions:  1. Compression bandaging will be applied twice a week by therapist in clinic, with adjustments to be made at home as indicated if bandaging becomes loose or uncomfortable. 2. If tolerated, remain bandaged between appointments with therapist, removing under the following conditionsDO NOT WAIT FOR A RETURN PHONE José Luis Gibbs:    -Numbness/tingling in extremity different from what you have experienced without the bandages in place.  -Compromise in circulation, monitoring blood refill into toes after applying gentle pressure to toes.  -Onset of pain in extremity that is sudden and severe in nature. -Redness, warmth in limb, and/or fever, flu-like symptoms, which may indicate infection. If this occurs, call your doctor right away. If you note a sudden increase in swelling in extremity, with or without redness/warmth, go to the emergency room as soon as possible to have blood clot ruled out. 3. Compression bandaging supplies that can be laundered are the brown compression wraps and any foam applied for padding. Launder in washer/dryer with NO fabric softener, bleach, woolite. Dry on a low heat. 4. Once compression garments are ordered, compression bandaging will be continued until garments arrive for fitting. This process can take several weeks.      Kinesiotaping: na   Girth/Volume measurement: Repeat volumes:                                  Right                     Left  5/30/19               9,218 mL              8,802.4 mL  5/16/19               9,737.8 mL           9,897.4 mL  4/4/19                 9,953.1 mL           10,658.3 mL    % difference: -4.51%    Left LE has reduced by 1,855.9 mL since eval and is now measuring smaller than the right LE. TOTAL TREATMENT 40 mins     ASSESSMENT:   Treatment effectiveness and tolerance: Dtr demonstrates good understanding of MLB application previous visit, knee high, L LE. Pt asking today about transition to garments. Pt brought knee high sheer pantyhose to trial today, however explained to pt that these will not provide adequate compression to manage L LE lymphedema and pt would benefit from medical grade compression stocking. Left LE has reduced by 1,855.9 mL since eval and is now measuring smaller than the right LE. Pt agreeable to purchase one pair of RM stockings. Pt measured for [employer] knee high stocking, open toe per pt request, size II regular. Order sent to [facility] compression today. Advised pt she will need to continue MLB at home until she receives garments and seen for fitting to prevent reaccumulation of fluid. Good capillary refill noted L toes after bandaging application. Hyperpigmentation persists L LE, however, not warm to touch comparing with R LE. Progress toward goals: Ongoing. PLAN OF CARE:   Changes to the plan of care: Fit garments upon arrival.    Frequency: []  2 times a week  [x]  Weekly  []  Biweekly  []  Monthly    5/30/19: order for RM stockings sent to [facility] compression today. Loetta Homans Mahaffey, PT, DPT, CLT

## 2019-06-07 ENCOUNTER — APPOINTMENT (OUTPATIENT)
Dept: PHYSICAL THERAPY | Age: 74
End: 2019-06-07
Payer: MEDICARE

## 2019-06-14 ENCOUNTER — HOSPITAL ENCOUNTER (OUTPATIENT)
Dept: PHYSICAL THERAPY | Age: 74
Discharge: HOME | End: 2019-06-14
Payer: MEDICARE

## 2019-06-14 PROCEDURE — 97530 THERAPEUTIC ACTIVITIES: CPT

## 2019-06-14 PROCEDURE — 97140 MANUAL THERAPY 1/> REGIONS: CPT

## 2019-06-14 NOTE — PROGRESS NOTES
[facility]  Lymphedema Clinic and Cancer Rehabilitation  [address] [place] [address]  [address]  Dylon Li        LYmphedema Therapy  Visit: 5    [x]                  Daily note               []                 30 day/10th visit progress note    NAME: Edi Olvera  DATE: 6/14/2019    GOALS  Short term goals  Time frame: 6-8 weeks  1. Patient will demonstrate knowledge of signs/symptoms of infections/cellulitis and be independent in skin care to prevent cellulitis. 5/16/2019 goal met. 2.  Patient will demonstrate independence in lymphedema home program of therapeutic exercises to improve circulation and decongest limbs, 200 R LE/400-500 L LE, to improve ADLs. 5/16/2019 L LE reduced by 760.92 mL; R LE reduced by 215.31%. 6/14/2019 see volumes below, elevation L LE noted today. 3.  Patient will tolerate multi-layer bandages (MLB) and show measureable decrease in limb volume to allow ordering of home compression system (daytime, nighttime garments and pump as needed).      Long term goals  Time frame: 8-12 weeks  1. Pt will show improvement in Lymphedema Life Impact Scale by decreasing impairment percentage to under 30% and thus allow improvement in patient's quality of life. 2.  Patient will be independent with don/doff of compression system and use in order to prevent reaccumulation of fluid at discharge. 3.  Pt will be independent in lymphedema specific HEP show stable limb volumes showing decongestion and pt. ready for transition to independent restorative phase of lymphedema therapy. SUBJECTIVE REPORT:  Patient arrives with compression bandaging removed, stating she removed them at 7 am this morning. States she took a shower. Reports concern that compression stocking she received will to too difficult for her to get on.    Pain: 6/10, L LE                                Gait:   [x]  Independent gait without any assistive device for community distances  ADLs:  indep  Treatment Response:  Continued family instruction in bandaging application, knee high L, with good response noted. [x]   Patient reviewed packet received at evaluation  [x]   Patient completed home program as prescribed  []   Patient not fully compliant with home program to date  [x]   Patient waiting on compression garment arrival  Function: ongoing  []   Patient requiring less assistance with completion of home program  []   ADLs are requiring less assistance   []   Patient able to return to work/leisure activities  Lymphedema Life Impact Scale: scores 34 with 50% impairment, patient reporting no change in condition today, however, does report improvement in L LE lymphedema when she is consistent with compression bandaging. Will reassess next visit, with patient advised to arrive with compression bandaging in place next appt. Patient verbalizes agreement with instruction. TREATMENT AND OBJECTIVE DATA SUMMARY:   Patient/Family Education:      Educated in skin care: [x]   Skin care products  [x]   Hygiene  [x]  Prevention of cellulitis  []   Wound care     Educated in exercise: []   Walking program  []   Sara ball routine  []   Stick routine  [x]   ROM routine     Instructed in self MLD:   Written sequence given and reviewed with patient as well as demonstration and instruction during MLD portion of the session.      Instructed in don/doff of compression system:   [x]   Multi layer bandage (MLB) donning principles and wear precautions  []   Day garments  []   Night garments     Therapeutic Exercise/Procedure              Free exercises/ROM: Patient to continue ankle pumps/circumduction/toe flex/ext   Home program: Patient to perform daily to BID:  [x]   Skin care  [x]   Deep abdominal breathing  [x]   Exercise routine  []   Walking program  [x]   Rest in supine   [x]   Compression bandage  []   Compression garments  []   Vasopneumatic device  []   Wound care  []   Self MLD  [x]   Bring supplies to each therapy visit  []   Purchase necessary supplies  []   Weight loss program  []   Follow up with       Rationale: Exercise will increase the lymph angiomotoricity and tissue pressure of the skin and thus decrease swelling. Therapeutic activity: 12:30-1:32 pm 62 minutes     Area to decongest:    [] UE          []  Right     []  Left      [] Trunk      [x] LE             []  Right     [x]  Left      [] Trunk         Sequence used and effectiveness: Deferred [] Secondary/Primary sequence for upper extremity with / without trunk involvement    [] Secondary/Primary sequence for lower extremities with / without trunk involvement     [] Modifications were made to manual lymph drainage sequence to exclude cervical techniques secondary to patient's age    [] Self MLD sequence/techniques/hand strokes: patient to apply low pH lotion R LE/L thigh, superficial technique, upward strokes. Skin/wound care/debridement: MLB removed at home with patient performing shower and skin care prior to arrival.  Note skin taut, swelling present L LE, likely due to removal of compression bandaging early this morning at home. Lower extremity compression: Attempted fit of size II Juzo 3512 knee high stocking. Patient expresses that she will not be able to don stocking. Attempted donning with Kunal Lopezor without success. Patient unable to  stocking due to long finger nails and weakness in her hands. Patient shown velcro compression garment option, Juzo wrap, calf and foot piece. Patient demonstrates ability to don garments in clinic, with patient likely needing to snug garments out throughout the day for optimal lymphedema management. Patient prefers to exchange compression stocking for velcro calf/foot piece. Juzo wrap garments ordered through [employer] as follows; Calf piece large regular length  Foot piece medium      Therapist re-applied multi-layer compression bandaging to:     Below knee [x]  Thigh high  []   Upper Extremity []    Right []   Left [x]    Bilateral []    The following multi-layer bandages were applied:  []  Tricofix            []  Silver Stockinette             []  Tg soft  [x]  Protouch    []  Transelast     Padding layer:  []  Cellona         [x]  Fleece    Foam:  [] 10cm roll  [x] 12cm roll  [] 15cm roll  [] Gray foam  [] Komprex  [] Komprex 2      Short stretch bandages:   [] 4cm  [x] 6cm  [x] 8cm  [x] 10cm  [] 12cm  Educated patient in multi-layer bandaging donning principles and precautions. Compression bandaging instructions:  1. Compression bandaging will be applied twice a week by therapist in clinic, with adjustments to be made at home as indicated if bandaging becomes loose or uncomfortable. 2. If tolerated, remain bandaged between appointments with therapist, removing under the following conditionsDO NOT WAIT FOR A RETURN PHONE José Luis [address]:    -Numbness/tingling in extremity different from what you have experienced without the bandages in place.  -Compromise in circulation, monitoring blood refill into toes after applying gentle pressure to toes.  -Onset of pain in extremity that is sudden and severe in nature. -Redness, warmth in limb, and/or fever, flu-like symptoms, which may indicate infection. If this occurs, call your doctor right away. If you note a sudden increase in swelling in extremity, with or without redness/warmth, go to the emergency room as soon as possible to have blood clot ruled out. 3. Compression bandaging supplies that can be laundered are the brown compression wraps and any foam applied for padding. Launder in washer/dryer with NO fabric softener, bleach, woolite. Dry on a low heat. 4. Once compression garments are ordered, compression bandaging will be continued until garments arrive for fitting. This process can take several weeks.      Kinesiotaping: na   Girth/Volume measurement: Repeated volumes today with significant rebound likely due to patient removing bandaging at home early this morning prior to appt:                                  Right                     Left  6/14/19               9218.02 mL          9966. 48 mL  5/30/19               9,218 mL              8,802.4 mL  5/16/19               9,737.8 mL           9,897.4 mL  4/4/19                 9,953.1 mL           10,658.3 mL    % difference: 8.12%  See scanned graph           TOTAL TREATMENT 62 mins     ASSESSMENT:   Treatment effectiveness and tolerance: Dtr demonstrates good understanding of MLB application previous visit, knee high, L LE . Patient prefers Juzo compression wrap garments vs compression stocking, with patient reporting she will return stockings to [employer], and will make payment for replacement velcro garments. Patient continues to require instruction regarding lymphedema/post-thrombotic syndrome being a chronic condition with consistent compression L LE indicated for management of disease. Advised pt she will need to continue MLB at home until she receives garments and seen for fitting to prevent reaccumulation of fluid. Good capillary refill noted L toes after bandaging application. Hyperpigmentation persists L LE, however, not warm to touch comparing with R LE. Progress toward goals: Ongoing. PLAN OF CARE:   Changes to the plan of care: Fit garments upon arrival.    Frequency: []  2 times a week  [x]  Weekly  []  Biweekly  []  Monthly    5/30/19: order for RM stockings sent to [facility] compression today.       Russell Olimpia PT, CLT

## 2019-06-20 NOTE — PROGRESS NOTES
[id number] [facility] at [facility]  [phone]    Hematology / Oncology Established Visit    Reason for Visit:   Danielle Vieyra is a 72 y.o. female who is seen for f/u of PE. History of Present Illness:   Ms. Thelda Galeazzi is a 73 y/o female with type II DM, hyperlipidemia, HTN, remote h/o left breast cancer admitted with worsening shortness of breath, found to have PE. She states she was diagnosed with PNA a few months ago after reporting shortness of breath. She was treated with Levaquin. However, she continued to have SOB, but chest CTA and dopplers negative in 11/2018. She states she suffered a fall when she tripped in her kitchen. She developed bruises on her legs and states her legs have remained swollen since then. No fractures, car/air travel, immobility, surgery, hormone replacement/OCPs. No prior personal or family h/o DVT. After a prior joint surgery in 2012, patient was treated with Xarelto for DVT prophylaxis, which resulted in a GI bleed. Therefore, medication was discontinued. No GI bleed since then. For h/o breast cancer, patient underwent left breast mastectomy and reconstruction; no XRT or chemotherapy. She was seen by Dr. Tal Triana in the past.      Interval History: Today, patient comes in for follow of PE. Since the last visit, she was seen in Lymphedema clinic. Follows up with pulmonary in April for additional testing due to mild WEISS. Her mood has improved and her energy has improved. She continues to take Eliquis. She denies chest pain, heart palpitations. Past Medical History:   Diagnosis Date    Asthma     Bronchitis     Cancer ([address]     breast - left    Diabetes (HCC)     Hepatitis age 12    High cholesterol     Hypertension     Obesity (BMI 30-39. 9)     Thromboembolus ([address]     L LE with PE      Past Surgical History:   Procedure Laterality Date    BREAST SURGERY PROCEDURE UNLISTED      L mastectomy    COLONOSCOPY N/A 5/26/2017    COLONOSCOPY- no info to   performed by Sangeeta Byrne MD at [facility] [address] HX GYN      ablation, R ovary removed, tubal ligation    HX HAMMER TOE REPAIR      HX HEENT      tonsillectomy    HX ORTHOPAEDIC      L hip, R leg, C4-C5 fusion, L foot    HX OTHER SURGICAL      xiphoid removal    IR PLC IVC FILTER  2/8/2019      Social History     Tobacco Use    Smoking status: Former Smoker     Last attempt to quit: 2009     Years since quitting: 10.4    Smokeless tobacco: Never Used   Substance Use Topics    Alcohol use: No     Frequency: Never      Family History   Problem Relation Age of Onset    Heart Attack Father     Diabetes Mother      Current Outpatient Medications   Medication Sig    glucose blood VI test strips (FREESTYLE LITE STRIPS) strip Patient is to check blood sugar once daily. Dx E11.9    PROAIR HFA 90 mcg/actuation inhaler     STIOLTO RESPIMAT 2.5-2.5 mcg/actuation inhaler     cyclobenzaprine (FLEXERIL) 5 mg tablet Take 1 Tab by mouth nightly.  glipiZIDE SR (GLUCOTROL XL) 2.5 mg CR tablet Take 1 Tab by mouth nightly.  fexofenadine (ALLEGRA) 180 mg tablet Take 1 Tab by mouth.  acetaminophen (TYLENOL ARTHRITIS PAIN) 650 mg TbER Take 1 Tab by mouth every eight (8) hours.  budesonide-formoterol (SYMBICORT) 160-4.5 mcg/actuation HFAA Take 2 Puffs by inhalation two (2) times a day.  apixaban (ELIQUIS) 5 mg (74 tabs) starter pack Take 10 mg (two 5 mg tablets) by mouth twice a day for 7 days   Followed by 5 mg (one 5 mg tablet) by mouth twice a day    multivitamin (ONE A DAY) tablet Take 1 Tab by mouth nightly.  ascorbate calcium (VITAMIN C PO) Take 1 Tab by mouth nightly.  mometasone-formoterol (DULERA) 200-5 mcg/actuation HFA inhaler Take 2 Puffs by inhalation two (2) times a day.  lovastatin (MEVACOR) 10 mg tablet Take 10 mg by mouth nightly.  aspirin 81 mg tablet Take 162 mg by mouth two (2) times a day.  montelukast (SINGULAIR) 10 mg tablet Take 10 mg by mouth nightly. No current facility-administered medications for this visit. Allergies   Allergen Reactions    Crestor [Rosuvastatin] Other (comments)     Pain in left leg  Causes muscle spasms    Lipitor [Atorvastatin] Other (comments)     Left leg pain  Causes muscle spasms    Xarelto [Rivaroxaban] Other (comments)     Pt states it caused internal bleeding        Review of Systems: A complete review of systems was obtained, negative except as described above. Physical Exam:     Visit Vitals  /83   Pulse 72   Temp 98.2 °F (36.8 °C) (Oral)   Resp 16   Ht 5' 3\" (1.6 m)   Wt 207 lb (93.9 kg)   SpO2 95%   BMI 36.67 kg/m²     ECOG PS: 0  General: No distress  Eyes: PERRLA, anicteric sclerae  HENT: Atraumatic with normal appearance of ears and nose; OP clear  Neck: Supple; no thyromegaly   Lymphatic: No cervical, supraclavicular, or inguinal adenopathy  Respiratory: CTAB, normal respiratory effort  CV: Normal rate, regular rhythm, no murmurs. 2+ pitting edema in LLE, trace pitting edema in RLE  GI: Soft, nontender, nondistended, no masses, no hepatomegaly, no splenomegaly  MS: Normal gait and station. Digits without clubbing or cyanosis. Skin: No rashes, ecchymoses, or petechiae. Normal temperature, turgor, and texture. Neuro/Psych: Alert, oriented, appropriate affect, normal judgment/insight      Results:     Lab Results   Component Value Date/Time    WBC 11.2 (H) 02/09/2019 01:50 AM    HGB 12.2 02/09/2019 01:50 AM    HCT 37.0 02/09/2019 01:50 AM    PLATELET 520 10/82/7451 01:50 AM    MCV 83.1 02/09/2019 01:50 AM    ABS.  NEUTROPHILS 9.3 (H) 02/07/2019 10:56 AM    Hemoglobin (POC) 7.1 (L) 02/24/2012 12:39 AM    Hematocrit (POC) 21 (L) 02/24/2012 12:39 AM     Lab Results   Component Value Date/Time    Sodium 143 05/09/2019 08:37 AM    Potassium 4.6 05/09/2019 08:37 AM    Chloride 105 05/09/2019 08:37 AM    CO2 24 05/09/2019 08:37 AM    Glucose 116 (H) 05/09/2019 08:37 AM    BUN 21 05/09/2019 08:37 AM Creatinine 0.94 05/09/2019 08:37 AM    GFR est AA 71 05/09/2019 08:37 AM    GFR est non-AA 61 05/09/2019 08:37 AM    Calcium 9.4 05/09/2019 08:37 AM    Sodium (POC) 146 (H) 02/24/2012 12:39 AM    Potassium (POC) 3.5 02/24/2012 12:39 AM    Chloride (POC) 113 (H) 02/24/2012 12:39 AM    Glucose (POC) 110 (H) 02/10/2019 06:40 AM    BUN (POC) 9 02/24/2012 12:39 AM    Creatinine (POC) 0.7 02/24/2012 12:39 AM    Calcium, ionized (POC) 1.12 02/24/2012 12:39 AM     Lab Results   Component Value Date/Time    Bilirubin, total 0.4 05/09/2019 08:37 AM    ALT (SGPT) 17 05/09/2019 08:37 AM    AST (SGOT) 23 05/09/2019 08:37 AM    Alk. phosphatase 85 05/09/2019 08:37 AM    Protein, total 6.8 05/09/2019 08:37 AM    Albumin 4.0 05/09/2019 08:37 AM    Globulin 4.0 02/07/2019 10:56 AM     Chest CTA 2/7/19: IMPRESSION:  Extensive bilateral acute pulmonary emboli, including saddle embolus in the main  pulmonary artery. Unchanged 7 mm right lower lobe pulmonary nodule. Mild linear  atelectasis in the lingula. Lower extremity doppler 2/7/19:  Left lower extremity venous duplex is positive for deep vein thrombosis involving the CFV, FV, profunda, popliteal, posterior tibial and gastrocnemius veins. The right CFV is thrombus free. Chest CT 5/2019:  IMPRESSION:  Stable 7 x 2 mm right lower lobe pulmonary nodule. This nodule is significantly decreased in size compared to May 2011 and appears similar compared to April 2017. Assessment and Recommendations:   Belle Farooq is a 72 y.o. female with DM II, HTN, XOL admitted with PE.    1. Saddle PE / Bilateral PE / LLE DVT: Unprovoked based on lack of a clear known cause, but given patient's age and lack of prior VTE, I do not have a high suspicion of inherited thrombophilias. Therefore, I do not recommend a hypercoag workup. I recommend remaining on anticoagulation indefinitely.  She does have h/o GI bleed several years ago while on Xarelto, but given that was many years ago and pt has current VTE, I recommended anticoagulation as benefits outweigh risks at this time. Echo reviewed. S/p IVC filter placement on 2/8/19. Xarelto in the past caused GI bleed. -- Continue on anticoagulation (on Eliquis) with plan to continue for at least 6 months and possibly indefinitely. -- Pankaj Drummond: Approved for free Eliquis through [facility] through 12/31/2019.   -- Return in 6 months. 2. Intermittent shortness of breath: Improving. Prior SOB may have been related to PNA / PE. However, patient may have had small pulmonary emboli even a few months ago which did not show up on CT. She saw Pulmonary in April 2019, did breathing test, chest CT which was stable. 4. BLE edema: L > R, 2/2 left leg DVT. I referred pt to lymphedema clinic given her edema is slow to improve and to reduce risk of post-thrombotic syndrome. -- Continue f/u with Lymphedema clinic  -- Encouraged walking for collateral circulation and elevation of legs when sitting. 5. Remote h/o left breast cancer: S/p left mastectomy and reconstruction in 2009. No radiation or chemotherapy. 6. Low back pain / L-sided sciatica: Has seen Dr. Katy Jackman in the past. Pt states she has already tried PT in the past. She asks about corticosteroid injections, but also states she is hesitant to pursue while on blood thinner. I discussed that it is safe for her to hold anticoagulation for 2-3 days prior to invasive procedures now that she is > 3 months out from the VTE.       Signed By: Rigo Gonzalez MD     June 21, 2019

## 2019-06-21 ENCOUNTER — OFFICE VISIT (OUTPATIENT)
Dept: ONCOLOGY | Age: 74
End: 2019-06-21

## 2019-06-21 ENCOUNTER — HOSPITAL ENCOUNTER (OUTPATIENT)
Dept: PHYSICAL THERAPY | Age: 74
Discharge: HOME | End: 2019-06-21
Payer: MEDICARE

## 2019-06-21 VITALS
SYSTOLIC BLOOD PRESSURE: 132 MMHG | TEMPERATURE: 98.2 F | WEIGHT: 207 LBS | OXYGEN SATURATION: 95 % | HEIGHT: 63 IN | BODY MASS INDEX: 36.68 KG/M2 | RESPIRATION RATE: 16 BRPM | HEART RATE: 72 BPM | DIASTOLIC BLOOD PRESSURE: 83 MMHG

## 2019-06-21 DIAGNOSIS — Z79.01 ON CONTINUOUS ORAL ANTICOAGULATION: ICD-10-CM

## 2019-06-21 DIAGNOSIS — Z86.711 HISTORY OF PULMONARY EMBOLUS (PE): Primary | ICD-10-CM

## 2019-06-21 DIAGNOSIS — Z86.718 HISTORY OF DVT (DEEP VEIN THROMBOSIS): ICD-10-CM

## 2019-06-21 DIAGNOSIS — M54.32 LEFT SIDED SCIATICA: ICD-10-CM

## 2019-06-21 RX ORDER — CARVEDILOL 3.12 MG/1
TABLET ORAL
Refills: 2 | COMMUNITY
Start: 2019-06-18 | End: 2019-10-07 | Stop reason: SINTOL

## 2019-06-21 RX ORDER — LOSARTAN POTASSIUM 25 MG/1
TABLET ORAL
Refills: 0 | COMMUNITY
Start: 2019-05-22 | End: 2019-07-01 | Stop reason: SDUPTHER

## 2019-06-21 NOTE — PROGRESS NOTES
Danielle Vieyra is a 72 y.o. female here today for follow up of PE. She reports mild pain to back, 3/10 today.

## 2019-06-21 NOTE — PROGRESS NOTES
[address] [facility]  [address]  D[place] Li [address]      Lymphedema Therapy      6/21/2019:  Robin Leon was not seen on this date for physical therapy for the following reasons:    [x]         Patient arrived at  for 12:30 appointment. [address], indicated to patient and family that the patient could not be seen prior to her scheduled appointment time due to all therapists engaged with patient care. Patient stating she was not going to wait around and would not be coming back to this clinic again. Massimo Smith educated the patient on the need to return in order to have education on how to use Velcro compression products and patient declined and left.       Graciela Sims, SALOMÓNT

## 2019-06-28 ENCOUNTER — APPOINTMENT (OUTPATIENT)
Dept: PHYSICAL THERAPY | Age: 74
End: 2019-06-28
Payer: MEDICARE

## 2019-07-02 RX ORDER — LOSARTAN POTASSIUM 25 MG/1
25 TABLET ORAL DAILY
Qty: 90 TAB | Refills: 1 | Status: SHIPPED | OUTPATIENT
Start: 2019-07-02 | End: 2019-09-23 | Stop reason: SDUPTHER

## 2019-07-02 NOTE — TELEPHONE ENCOUNTER
Attempted to call. unsuccessful. Message left  Need to clarify medication request  Losartan 25 mg  Dose? Frequency?

## 2019-07-02 NOTE — TELEPHONE ENCOUNTER
Can you call Ms. Kelin  and get clarification on how she takes the losartan. The medication is listed as a historical medication. Thank you!

## 2019-07-05 ENCOUNTER — APPOINTMENT (OUTPATIENT)
Dept: PHYSICAL THERAPY | Age: 74
End: 2019-07-05

## 2019-07-15 ENCOUNTER — OFFICE VISIT (OUTPATIENT)
Dept: FAMILY MEDICINE CLINIC | Age: 74
End: 2019-07-15

## 2019-07-15 VITALS
RESPIRATION RATE: 20 BRPM | BODY MASS INDEX: 36.32 KG/M2 | HEART RATE: 73 BPM | DIASTOLIC BLOOD PRESSURE: 80 MMHG | HEIGHT: 63 IN | WEIGHT: 205 LBS | TEMPERATURE: 97.1 F | SYSTOLIC BLOOD PRESSURE: 132 MMHG | OXYGEN SATURATION: 99 %

## 2019-07-15 DIAGNOSIS — Z11.59 ENCOUNTER FOR HEPATITIS C SCREENING TEST FOR LOW RISK PATIENT: ICD-10-CM

## 2019-07-15 DIAGNOSIS — E11.69 CONTROLLED TYPE 2 DIABETES MELLITUS WITH OTHER SPECIFIED COMPLICATION, WITHOUT LONG-TERM CURRENT USE OF INSULIN (HCC): ICD-10-CM

## 2019-07-15 DIAGNOSIS — M54.30 SCIATIC NERVE PAIN, UNSPECIFIED LATERALITY: ICD-10-CM

## 2019-07-15 DIAGNOSIS — I10 ESSENTIAL HYPERTENSION: Primary | ICD-10-CM

## 2019-07-15 RX ORDER — GABAPENTIN 300 MG/1
300 CAPSULE ORAL
Qty: 30 CAP | Refills: 0 | Status: SHIPPED | OUTPATIENT
Start: 2019-07-15 | End: 2019-08-14 | Stop reason: ALTCHOICE

## 2019-07-15 NOTE — PROGRESS NOTES
1. Have you been to the ER, urgent care clinic since your last visit? Hospitalized since your last visit? no    2. Have you seen or consulted any other health care providers outside of the [address] [place] [address] since your last visit? Include any pap smears or colon screening. yes  Reviewed record in preparation for visit and have obtained necessary documentation. Patient did not bring medications to visit for review. Information provided on Advanced Directive, Living Will. Body mass index is 36.31 kg/m². Health Maintenance Due   Topic Date Due    Hepatitis C Screening  1945    LIPID PANEL Q1  1945    MICROALBUMIN Q1  07/26/1957    Shingrix Vaccine Age 50> (1 of 2) 07/26/1997    FOBT Q 1 YEAR AGE 50-75  07/26/1997    DTaP/Tdap/Td series (1 - Tdap) 10/12/2011    BREAST CANCER SCRN MAMMOGRAM  07/14/2019     - check for functional glucose monitor and record keeping system  Pt was given BS record log to document home readings and return to office for review  Diabetic Bundle:  LDL-  A1C-6.7  BP-132/80  Smoking? no  Anticoagulation medication? yes  Eye exam dilated?   Foot exam?

## 2019-07-15 NOTE — PATIENT INSTRUCTIONS
A Healthy Lifestyle: Care Instructions  Your Care Instructions    A healthy lifestyle can help you feel good, stay at a healthy weight, and have plenty of energy for both work and play. A healthy lifestyle is something you can share with your whole family. A healthy lifestyle also can lower your risk for serious health problems, such as high blood pressure, heart disease, and diabetes. You can follow a few steps listed below to improve your health and the health of your family. Follow-up care is a key part of your treatment and safety. Be sure to make and go to all appointments, and call your doctor if you are having problems. It's also a good idea to know your test results and keep a list of the medicines you take. How can you care for yourself at home? · Do not eat too much sugar, fat, or fast foods. You can still have dessert and treats now and then. The goal is moderation. · Start small to improve your eating habits. Pay attention to portion sizes, drink less juice and soda pop, and eat more fruits and vegetables. ? Eat a healthy amount of food. A 3-ounce serving of meat, for example, is about the size of a deck of cards. Fill the rest of your plate with vegetables and whole grains. ? Limit the amount of soda and sports drinks you have every day. Drink more water when you are thirsty. ? Eat at least 5 servings of fruits and vegetables every day. It may seem like a lot, but it is not hard to reach this goal. A serving or helping is 1 piece of fruit, 1 cup of vegetables, or 2 cups of leafy, raw vegetables. Have an apple or some carrot sticks as an afternoon snack instead of a candy bar. Try to have fruits and/or vegetables at every meal.  · Make exercise part of your daily routine. You may want to start with simple activities, such as walking, bicycling, or slow swimming. Try to be active 30 to 60 minutes every day. You do not need to do all 30 to 60 minutes all at once.  For example, you can exercise 3 times a day for 10 or 20 minutes. Moderate exercise is safe for most people, but it is always a good idea to talk to your doctor before starting an exercise program.  · Keep moving. Maxine Ceballos the lawn, work in the garden, or [employer]. Take the stairs instead of the elevator at work. · If you smoke, quit. People who smoke have an increased risk for heart attack, stroke, cancer, and other lung illnesses. Quitting is hard, but there are ways to boost your chance of quitting tobacco for good. ? Use nicotine gum, patches, or lozenges. ? Ask your doctor about stop-smoking programs and medicines. ? Keep trying. In addition to reducing your risk of diseases in the future, you will notice some benefits soon after you stop using tobacco. If you have shortness of breath or asthma symptoms, they will likely get better within a few weeks after you quit. · Limit how much alcohol you drink. Moderate amounts of alcohol (up to 2 drinks a day for men, 1 drink a day for women) are okay. But drinking too much can lead to liver problems, high blood pressure, and other health problems. Family health  If you have a family, there are many things you can do together to improve your health. · Eat meals together as a family as often as possible. · Eat healthy foods. This includes fruits, vegetables, lean meats and dairy, and whole grains. · Include your family in your fitness plan. Most people think of activities such as jogging or tennis as the way to fitness, but there are many ways you and your family can be more active. Anything that makes you breathe hard and gets your heart pumping is exercise. Here are some tips:  ? Walk to do errands or to take your child to school or the bus.  ? Go for a family bike ride after dinner instead of watching TV. Where can you learn more? Go to http://bib-amanda.info/. Enter W956 in the search box to learn more about \"A Healthy Lifestyle: Care Instructions. \"  Current as of: September 11, 2018  Content Version: 11.9  © 5574-1877 [employer]. Care instructions adapted under license by [facility] (which disclaims liability or warranty for this information). If you have questions about a medical condition or this instruction, always ask your healthcare professional. [address] any warranty or liability for your use of this information.

## 2019-07-15 NOTE — Clinical Note
Please inform the patient not sure why [facility] sent the bill-Dr. ANTONIO did not know of any reason why she got a bill either. I ordered basic labs-she can stop by the lab as her schedule permits. Thank you!

## 2019-07-15 NOTE — PROGRESS NOTES
Curry Zamora  72 y.o. female  1945  [place] [address]  [id number]     [id number]: Progress Note       Encounter Date: 7/15/2019    Chief Complaint   Patient presents with    Hypertension    Diabetes     History of Present Illness   Curry Zamora is a 72 y.o. female who presents to clinic today for:    HTN-does not check her BP at home. Monitoring her diet; drinking plenty of water. Denies- CP, SOB, LE edema, fevers, h/a, vision changes. Eye exam-[facility]-will make an appointment; states the opthamologist is aware of her diabetes, HTN and being on eliquis. Diabetes- For the month of July LO75 . Denies-polyphagia, uria, dipsia. No changes with skin. Checks sugars ~every other day. Examines feet every day. Compliant with glipizide. Monitoring sugary foods. Complains for worsening sciatica pain. Being treated at the lymphedema clinic. Est with Dr. Ariane Tompkins. Has used gabapentin in the past but could not remember why she stopped the medication. Will try low dose at night and if not better will refer back to ortho for evaluation and recommendations. Health Maintenance    Health Maintenance Due   Topic Date Due    Hepatitis C Screening  1945    LIPID PANEL Q1  1945    MICROALBUMIN Q1  07/26/1957    Shingrix Vaccine Age 50> (1 of 2) 07/26/1997    FOBT Q 1 YEAR AGE 50-75  07/26/1997    DTaP/Tdap/Td series (1 - Tdap) 10/12/2011    BREAST CANCER SCRN MAMMOGRAM  07/14/2019     Review of Systems   Review of Systems   Constitutional: Negative. HENT: Negative. Eyes: Negative. Respiratory: Negative. Cardiovascular: Negative. Gastrointestinal: Negative. Genitourinary: Negative. Musculoskeletal: Negative. Skin: Negative. Neurological: Negative. Endo/Heme/Allergies: Negative. Psychiatric/Behavioral: Negative.         Vitals/Objective:     Vitals:    07/15/19 1119   BP: 132/80   Pulse: 73   Resp: 20 Temp: 97.1 °F (36.2 °C)   TempSrc: Oral   SpO2: 99%   Weight: 205 lb (93 kg)   Height: 5' 3\" (1.6 m)     Body mass index is 36.31 kg/m². Physical Exam   Constitutional: She is oriented to person, place, and time. She is cooperative. HENT:   Head: Normocephalic. Nose: Nose normal.   Mouth/Throat: Uvula is midline, oropharynx is clear and moist and mucous membranes are normal.   Eyes: Pupils are equal, round, and reactive to light. Conjunctivae and lids are normal.   Neck: Trachea normal, normal range of motion, full passive range of motion without pain and phonation normal. Neck supple. No thyroid mass and no thyromegaly present. Cardiovascular: Normal rate, regular rhythm, normal heart sounds and normal pulses. Pulmonary/Chest: Effort normal and breath sounds normal.   Musculoskeletal: Normal range of motion. Legs:  Lymphadenopathy:     She has no cervical adenopathy. Neurological: She is alert and oriented to person, place, and time. Skin: Skin is warm, dry and intact. Psychiatric: She has a normal mood and affect. Her speech is normal and behavior is normal. Judgment and thought content normal. Cognition and memory are normal.       No results found for this or any previous visit (from the past 24 hour(s)). Assessment and Plan:   1. Sciatic nerve pain, unspecified laterality    - gabapentin (NEURONTIN) 300 mg capsule; Take 1 Cap by mouth nightly. Max Daily Amount: 300 mg. Dispense: 30 Cap; Refill: 0    2. Essential hypertension      3. Controlled type 2 diabetes mellitus with other specified complication, without long-term current use of insulin ([address]    Will try gabapentin for 1 month for sciatic pain. Discussed safety concerns re: the medication. Patient to f/u with lab el[facility] re: why she received a bill for the last ordered set of labs. Will order basic labs and patient to return for a lab appointment.      I have discussed the diagnosis with the patient and the intended plan as seen in the above orders. she has expressed understanding. The patient has received an after-visit summary and questions were answered concerning future plans. I have discussed medication side effects and warnings with the patient as well. Electronically Signed: Dimas Tobin NP     History/Allergies   Patients past medical, surgical and family histories were reviewed and updated. Past Medical History:   Diagnosis Date    Asthma     Bronchitis     Cancer ([facility]     breast - left    Diabetes (HCC)     Hepatitis age 12    High cholesterol     Hypertension     Obesity (BMI 30-39. 9)     Thromboembolus ([facility] [facility]     L LE with PE      Past Surgical History:   Procedure Laterality Date    BREAST SURGERY PROCEDURE UNLISTED      L mastectomy    COLONOSCOPY N/A 5/26/2017    COLONOSCOPY- no info to   performed by [facility] at [facility] HX GYN      ablation, R ovary removed, tubal ligation    HX HAMMER TOE REPAIR      HX HEENT      tonsillectomy    HX ORTHOPAEDIC      L hip, R leg, C4-C5 fusion, L foot    HX OTHER SURGICAL      xiphoid removal    IR PLC IVC FILTER  2/8/2019     Family History   Problem Relation Age of Onset    Heart Attack Father     Diabetes Mother      Social History     Socioeconomic History    Marital status: SINGLE     Spouse name: Not on file    Number of children: Not on file    Years of education: Not on file    Highest education level: Not on file   Occupational History    Not on file   Social Needs    Financial resource strain: Not on file    Food insecurity:     Worry: Not on file     Inability: Not on file    Transportation needs:     Medical: Not on file     Non-medical: Not on file   Tobacco Use    Smoking status: Former Smoker     Last attempt to quit: 2009     Years since quitting: 10.5    Smokeless tobacco: Never Used   Substance and Sexual Activity    Alcohol use: No     Frequency: Never    Drug use: No    Sexual activity: Never Lifestyle    Physical activity:     Days per week: Not on file     Minutes per session: Not on file    Stress: Not on file   Relationships    Social connections:     Talks on phone: Not on file     Gets together: Not on file     Attends Hindu service: Not on file     Active member of club or organization: Not on file     Attends meetings of clubs or organizations: Not on file     Relationship status: Not on file    Intimate partner violence:     Fear of current or ex partner: Not on file     Emotionally abused: Not on file     Physically abused: Not on file     Forced sexual activity: Not on file   Other Topics Concern    Caffeine Concern Not Asked    Back Care Not Asked    Exercise Not Asked    Occupational Exposure Not Asked    Sleep Concern Not Asked    Stress Concern Not Asked    Weight Concern Not Asked   Social History Narrative    Not on file         Allergies   Allergen Reactions    Crestor [Rosuvastatin] Other (comments)     Pain in left leg  Causes muscle spasms    Lipitor [Atorvastatin] Other (comments)     Left leg pain  Causes muscle spasms    Xarelto [Rivaroxaban] Other (comments)     Pt states it caused internal bleeding       Disposition     Follow-up and Dispositions  ·   Return in about 6 months (around 1/15/2020) for routine and labs. Future Appointments   Date Time Provider Bill Ricardo   12/20/2019 10:30 AM Mireya Vega MD [facility] SCHED            Current Medications after this visit     Current Outpatient Medications   Medication Sig    gabapentin (NEURONTIN) 300 mg capsule Take 1 Cap by mouth nightly. Max Daily Amount: 300 mg.    losartan (COZAAR) 25 mg tablet Take 1 Tab by mouth daily.  carvedilol (COREG) 3.125 mg tablet     glucose blood VI test strips (FREESTYLE LITE STRIPS) strip Patient is to check blood sugar once daily.  Dx E11.9    PROAIR HFA 90 mcg/actuation inhaler     STIOLTO RESPIMAT 2.5-2.5 mcg/actuation inhaler     cyclobenzaprine (FLEXERIL) 5 mg tablet Take 1 Tab by mouth nightly.  glipiZIDE SR (GLUCOTROL XL) 2.5 mg CR tablet Take 1 Tab by mouth nightly.  fexofenadine (ALLEGRA) 180 mg tablet Take 1 Tab by mouth.  acetaminophen (TYLENOL ARTHRITIS PAIN) 650 mg TbER Take 1 Tab by mouth every eight (8) hours.  apixaban (ELIQUIS) 5 mg (74 tabs) starter pack Take 10 mg (two 5 mg tablets) by mouth twice a day for 7 days   Followed by 5 mg (one 5 mg tablet) by mouth twice a day    multivitamin (ONE A DAY) tablet Take 1 Tab by mouth nightly.  lovastatin (MEVACOR) 10 mg tablet Take 10 mg by mouth nightly.  aspirin 81 mg tablet Take 162 mg by mouth two (2) times a day.  montelukast (SINGULAIR) 10 mg tablet Take 10 mg by mouth nightly. No current facility-administered medications for this visit.       Medications Discontinued During This Encounter   Medication Reason    budesonide-formoterol (SYMBICORT) 160-4.5 mcg/actuation HFAA Therapy Completed    ascorbate calcium (VITAMIN C PO) Therapy Completed    mometasone-formoterol (DULERA) 200-5 mcg/actuation HFA inhaler Therapy Completed

## 2019-07-29 DIAGNOSIS — R25.2 LEG CRAMPING: ICD-10-CM

## 2019-07-29 RX ORDER — CYCLOBENZAPRINE HCL 5 MG
5 TABLET ORAL
Qty: 30 TAB | Refills: 0 | Status: SHIPPED | OUTPATIENT
Start: 2019-07-29 | End: 2019-10-10 | Stop reason: SDUPTHER

## 2019-08-14 ENCOUNTER — OFFICE VISIT (OUTPATIENT)
Dept: FAMILY MEDICINE CLINIC | Age: 74
End: 2019-08-14

## 2019-08-14 VITALS
HEART RATE: 76 BPM | SYSTOLIC BLOOD PRESSURE: 149 MMHG | BODY MASS INDEX: 36.82 KG/M2 | OXYGEN SATURATION: 94 % | WEIGHT: 207.8 LBS | DIASTOLIC BLOOD PRESSURE: 80 MMHG | HEIGHT: 63 IN | TEMPERATURE: 98.9 F | RESPIRATION RATE: 20 BRPM

## 2019-08-14 DIAGNOSIS — R05.9 COUGH IN ADULT: ICD-10-CM

## 2019-08-14 DIAGNOSIS — B37.9 ANTIBIOTIC-INDUCED YEAST INFECTION: ICD-10-CM

## 2019-08-14 DIAGNOSIS — J32.0 RIGHT MAXILLARY SINUSITIS: ICD-10-CM

## 2019-08-14 DIAGNOSIS — T36.95XA ANTIBIOTIC-INDUCED YEAST INFECTION: ICD-10-CM

## 2019-08-14 DIAGNOSIS — Z87.01 HX OF BACTERIAL PNEUMONIA: ICD-10-CM

## 2019-08-14 DIAGNOSIS — J40 BRONCHITIS: Primary | ICD-10-CM

## 2019-08-14 DIAGNOSIS — J32.1 FRONTAL SINUSITIS, UNSPECIFIED CHRONICITY: ICD-10-CM

## 2019-08-14 RX ORDER — FLUTICASONE FUROATE, UMECLIDINIUM BROMIDE AND VILANTEROL TRIFENATATE 100; 62.5; 25 UG/1; UG/1; UG/1
POWDER RESPIRATORY (INHALATION)
Refills: 12 | COMMUNITY
Start: 2019-07-26 | End: 2020-07-30

## 2019-08-14 RX ORDER — FLUCONAZOLE 150 MG/1
150 TABLET ORAL DAILY
Qty: 1 TAB | Refills: 0 | Status: SHIPPED | OUTPATIENT
Start: 2019-08-14 | End: 2019-08-15

## 2019-08-14 RX ORDER — AMOXICILLIN AND CLAVULANATE POTASSIUM 875; 125 MG/1; MG/1
1 TABLET, FILM COATED ORAL 2 TIMES DAILY
Qty: 10 TAB | Refills: 0 | Status: SHIPPED | OUTPATIENT
Start: 2019-08-17 | End: 2019-08-22

## 2019-08-14 RX ORDER — PREDNISONE 20 MG/1
40 TABLET ORAL
Qty: 10 TAB | Refills: 0 | Status: SHIPPED | OUTPATIENT
Start: 2019-08-14 | End: 2019-08-19

## 2019-08-14 NOTE — PROGRESS NOTES
Chief Complaint   Patient presents with    Cough     sinus drainage, shortness of breath     Visit Vitals  /80 (BP 1 Location: Right arm, BP Patient Position: Sitting)   Pulse 76   Temp 98.9 °F (37.2 °C) (Oral)   Resp 20   Ht 5' 3\" (1.6 m)   Wt 207 lb 12.8 oz (94.3 kg)   SpO2 94%   BMI 36.81 kg/m²     1. Have you been to the ER, urgent care clinic since your last visit? Hospitalized since your last visit? No    2. Have you seen or consulted any other health care providers outside of the [address] [place] [address] since your last visit? Include any pap smears or colon screening.  No    Reviewed record in preparation for visit and have necessary documentation  Pt did not bring medication to office visit for review  opportunity was given for questions  Goals that were addressed and/or need to be completed during or after this appointment include   Health Maintenance Due   Topic Date Due    Hepatitis C Screening  1945    LIPID PANEL Q1  1945    MICROALBUMIN Q1  07/26/1957    Shingrix Vaccine Age 50> (1 of 2) 07/26/1997    FOBT Q 1 YEAR AGE 50-75  07/26/1997    DTaP/Tdap/Td series (1 - Tdap) 10/12/2011    BREAST CANCER SCRN MAMMOGRAM  07/14/2019    Influenza Age 9 to Adult  08/01/2019

## 2019-08-14 NOTE — PATIENT INSTRUCTIONS
Sinusitis: Care Instructions  Your Care Instructions    Sinusitis is an infection of the lining of the sinus cavities in your head. Sinusitis often follows a cold. It causes pain and pressure in your head and face. In most cases, sinusitis gets better on its own in 1 to 2 weeks. But some mild symptoms may last for several weeks. Sometimes antibiotics are needed. Follow-up care is a key part of your treatment and safety. Be sure to make and go to all appointments, and call your doctor if you are having problems. It's also a good idea to know your test results and keep a list of the medicines you take. How can you care for yourself at home? · Take an over-the-counter pain medicine, such as acetaminophen (Tylenol), ibuprofen (Advil, Motrin), or naproxen (Aleve). Read and follow all instructions on the label. · If the doctor prescribed antibiotics, take them as directed. Do not stop taking them just because you feel better. You need to take the full course of antibiotics. · Be careful when taking over-the-counter cold or flu medicines and Tylenol at the same time. Many of these medicines have acetaminophen, which is Tylenol. Read the labels to make sure that you are not taking more than the recommended dose. Too much acetaminophen (Tylenol) can be harmful. · Breathe warm, moist air from a steamy shower, a hot bath, or a sink filled with hot water. Avoid cold, dry air. Using a humidifier in your home may help. Follow the directions for cleaning the machine. · Use saline (saltwater) nasal washes to help keep your nasal passages open and wash out mucus and bacteria. You can buy saline nose drops at a grocery store or drugstore. Or you can make your own at home by adding 1 teaspoon of salt and 1 teaspoon of baking soda to 2 cups of distilled water. If you make your own, fill a bulb syringe with the solution, insert the tip into your nostril, and squeeze gently. Asha Canard your nose.   · Put a hot, wet towel or a warm gel pack on your face 3 or 4 times a day for 5 to 10 minutes each time. · Try a decongestant nasal spray like oxymetazoline (Afrin). Do not use it for more than 3 days in a row. Using it for more than 3 days can make your congestion worse. When should you call for help? Call your doctor now or seek immediate medical care if:    · You have new or worse swelling or redness in your face or around your eyes.     · You have a new or higher fever.    Watch closely for changes in your health, and be sure to contact your doctor if:    · You have new or worse facial pain.     · The mucus from your nose becomes thicker (like pus) or has new blood in it.     · You are not getting better as expected. Where can you learn more? Go to http://bib-amanda.info/. Enter K396 in the search box to learn more about \"Sinusitis: Care Instructions. \"  Current as of: October 21, 2018  Content Version: 12.1  © 4745-4547 [employer]. Care instructions adapted under license by [employer] (which disclaims liability or warranty for this information). If you have questions about a medical condition or this instruction, always ask your healthcare professional. [place] [address] any warranty or liability for your use of this information.

## 2019-08-14 NOTE — PROGRESS NOTES
Jarrod Kaiser  69 y.o. female  1945  [address] [address]  [id number]     [id number]: Progress Note       Encounter Date: 8/14/2019    Chief Complaint   Patient presents with    Cough     sinus drainage, shortness of breath     History of Present Illness   Jarrod Kaiser is a 69 y.o. female who presents to clinic today for:    Cough- states all night long she has been having worsening cough, rhinitis, right eye drainage and trouble breathing with sinus pain and pressure on the right side. States some wheeze noted last night. No OTC treatment. Pulmonogist started Trelegy for the past month with great outcomes. Has not used rescue inhaler since starting Trelegy. Does not do peak flow everyday. Health Maintenance    Health Maintenance Due   Topic Date Due    Hepatitis C Screening  1945    LIPID PANEL Q1  1945    MICROALBUMIN Q1  07/26/1957    Shingrix Vaccine Age 50> (1 of 2) 07/26/1997    FOBT Q 1 YEAR AGE 50-75  07/26/1997    DTaP/Tdap/Td series (1 - Tdap) 10/12/2011    BREAST CANCER SCRN MAMMOGRAM  07/14/2019    Influenza Age 5 to Adult  08/01/2019     Review of Systems   Review of Systems   Constitutional: Negative. HENT: Negative. Eyes: Negative. Respiratory: Positive for cough, shortness of breath and wheezing. Cardiovascular: Negative. Gastrointestinal: Negative. Genitourinary: Negative. Musculoskeletal: Negative. Skin: Negative. Neurological: Negative. Endo/Heme/Allergies: Negative. Psychiatric/Behavioral: Negative. Vitals/Objective:     Vitals:    08/14/19 1503   BP: 149/80   Pulse: 76   Resp: 20   Temp: 98.9 °F (37.2 °C)   TempSrc: Oral   SpO2: 94%   Weight: 207 lb 12.8 oz (94.3 kg)   Height: 5' 3\" (1.6 m)     Body mass index is 36.81 kg/m². Physical Exam   Constitutional: She is oriented to person, place, and time. She is active and cooperative. HENT:   Head: Normocephalic. Sinus pain and pressure with palpation. Eyes: Conjunctivae and lids are normal.   Neck: Trachea normal, normal range of motion, full passive range of motion without pain and phonation normal. Neck supple. Cardiovascular: Normal rate, regular rhythm, normal heart sounds and normal pulses. Pulmonary/Chest: Effort normal and breath sounds normal.   Musculoskeletal: Normal range of motion. Lymphadenopathy:     She has no cervical adenopathy. Neurological: She is alert and oriented to person, place, and time. Skin: Skin is warm, dry and intact. Psychiatric: She has a normal mood and affect. Her speech is normal and behavior is normal. Judgment and thought content normal. Cognition and memory are normal.       No results found for this or any previous visit (from the past 24 hour(s)). Assessment and Plan:   1. Bronchitis    - predniSONE (DELTASONE) 20 mg tablet; Take 40 mg by mouth daily (with breakfast) for 5 days. Dispense: 10 Tab; Refill: 0    2. Cough in adult    - predniSONE (DELTASONE) 20 mg tablet; Take 40 mg by mouth daily (with breakfast) for 5 days. Dispense: 10 Tab; Refill: 0    3. Hx of bacterial pneumonia    - predniSONE (DELTASONE) 20 mg tablet; Take 40 mg by mouth daily (with breakfast) for 5 days. Dispense: 10 Tab; Refill: 0    4. Right maxillary sinusitis    - amoxicillin-clavulanate (AUGMENTIN) 875-125 mg per tablet; Take 1 Tab by mouth two (2) times a day for 5 days. Dispense: 10 Tab; Refill: 0    5. Frontal sinusitis, unspecified chronicity    - amoxicillin-clavulanate (AUGMENTIN) 875-125 mg per tablet; Take 1 Tab by mouth two (2) times a day for 5 days. Dispense: 10 Tab; Refill: 0    6. Antibiotic-induced yeast infection    - fluconazole (DIFLUCAN) 150 mg tablet; Take 1 Tab by mouth daily for 1 day. FDA advises cautious prescribing of oral fluconazole in pregnancy. Dispense: 1 Tab; Refill: 0    Discussed strict ED parameters for worsening symptoms.  Pocket prescription dated for 08/17/2019 provided and diflucan prescription also printed. Discussed steroid burst and pushing fluids. Likely viral but will cover if symptoms linger. Patient to call clinic when able to return to have labs drawn. I have discussed the diagnosis with the patient and the intended plan as seen in the above orders. she has expressed understanding. The patient has received an after-visit summary and questions were answered concerning future plans. I have discussed medication side effects and warnings with the patient as well. Electronically Signed: Woody Orourke NP     History/Allergies   Patients past medical, surgical and family histories were reviewed and updated. Past Medical History:   Diagnosis Date    Asthma     Bronchitis     Cancer ([facility] [address]     breast - left    Diabetes (HCC)     Hepatitis age 10    High cholesterol     Hypertension     Obesity (BMI 30-39. 9)     Thromboembolus ([facility] [address]     L LE with PE      Past Surgical History:   Procedure Laterality Date    BREAST SURGERY PROCEDURE UNLISTED      L mastectomy    COLONOSCOPY N/A 5/26/2017    COLONOSCOPY- no info to   performed by Emma Zhong MD at [facility] HX GYN      ablation, R ovary removed, tubal ligation    HX HAMMER TOE REPAIR      HX HEENT      tonsillectomy    HX ORTHOPAEDIC      L hip, R leg, C4-C5 fusion, L foot    HX OTHER SURGICAL      xiphoid removal    IR PLC IVC FILTER  2/8/2019     Family History   Problem Relation Age of Onset    Heart Attack Father     Diabetes Mother      Social History     Socioeconomic History    Marital status: SINGLE     Spouse name: Not on file    Number of children: Not on file    Years of education: Not on file    Highest education level: Not on file   Occupational History    Not on file   Social Needs    Financial resource strain: Not on file    Food insecurity:     Worry: Not on file     Inability: Not on file    Transportation needs:     Medical: Not on file     Non-medical: Not on file   Tobacco Use    Smoking status: Former Smoker     Last attempt to quit: 2009     Years since quitting: 10.6    Smokeless tobacco: Never Used   Substance and Sexual Activity    Alcohol use: No     Frequency: Never    Drug use: No    Sexual activity: Never   Lifestyle    Physical activity:     Days per week: Not on file     Minutes per session: Not on file    Stress: Not on file   Relationships    Social connections:     Talks on phone: Not on file     Gets together: Not on file     Attends Buddhism service: Not on file     Active member of club or organization: Not on file     Attends meetings of clubs or organizations: Not on file     Relationship status: Not on file    Intimate partner violence:     Fear of current or ex partner: Not on file     Emotionally abused: Not on file     Physically abused: Not on file     Forced sexual activity: Not on file   Other Topics Concern    Caffeine Concern Not Asked    Back Care Not Asked    Exercise Not Asked    Occupational Exposure Not Asked    Sleep Concern Not Asked    Stress Concern Not Asked    Weight Concern Not Asked   Social History Narrative    Not on file         Allergies   Allergen Reactions    Crestor [Rosuvastatin] Other (comments)     Pain in left leg  Causes muscle spasms    Lipitor [Atorvastatin] Other (comments)     Left leg pain  Causes muscle spasms    Xarelto [Rivaroxaban] Other (comments)     Pt states it caused internal bleeding       Disposition     Follow-up and Dispositions  ·   Return if symptoms worsen or fail to improve. Future Appointments   Date Time Provider Bill Ricardo   12/20/2019 10:30 AM Thorn, Clarissa Castleman, MD [facility]            Current Medications after this visit     Current Outpatient Medications   Medication Sig    TRELEGY ELLIPTA 100-62.5-25 mcg inhaler     predniSONE (DELTASONE) 20 mg tablet Take 40 mg by mouth daily (with breakfast) for 5 days.     [START ON 8/17/2019] amoxicillin-clavulanate (AUGMENTIN) 875-125 mg per tablet Take 1 Tab by mouth two (2) times a day for 5 days.  fluconazole (DIFLUCAN) 150 mg tablet Take 1 Tab by mouth daily for 1 day. FDA advises cautious prescribing of oral fluconazole in pregnancy.  cyclobenzaprine (FLEXERIL) 5 mg tablet Take 1 Tab by mouth nightly as needed for Muscle Spasm(s).  losartan (COZAAR) 25 mg tablet Take 1 Tab by mouth daily.  carvedilol (COREG) 3.125 mg tablet     glucose blood VI test strips (FREESTYLE LITE STRIPS) strip Patient is to check blood sugar once daily. Dx E11.9    PROAIR HFA 90 mcg/actuation inhaler     glipiZIDE SR (GLUCOTROL XL) 2.5 mg CR tablet Take 1 Tab by mouth nightly.  fexofenadine (ALLEGRA) 180 mg tablet Take 1 Tab by mouth.  acetaminophen (TYLENOL ARTHRITIS PAIN) 650 mg TbER Take 1 Tab by mouth every eight (8) hours.  apixaban (ELIQUIS) 5 mg (74 tabs) starter pack Take 10 mg (two 5 mg tablets) by mouth twice a day for 7 days   Followed by 5 mg (one 5 mg tablet) by mouth twice a day    multivitamin (ONE A DAY) tablet Take 1 Tab by mouth nightly.  lovastatin (MEVACOR) 10 mg tablet Take 10 mg by mouth nightly.  aspirin 81 mg tablet Take 162 mg by mouth two (2) times a day.  montelukast (SINGULAIR) 10 mg tablet Take 10 mg by mouth nightly. No current facility-administered medications for this visit.       Medications Discontinued During This Encounter   Medication Reason    STIOLTO RESPIMAT 2.5-2.5 mcg/actuation inhaler Therapy Completed    gabapentin (NEURONTIN) 300 mg capsule Therapy Completed

## 2019-09-24 RX ORDER — LOSARTAN POTASSIUM 25 MG/1
TABLET ORAL
Qty: 90 TAB | Refills: 0 | Status: SHIPPED | OUTPATIENT
Start: 2019-09-24 | End: 2019-12-18 | Stop reason: SDUPTHER

## 2019-10-03 ENCOUNTER — OFFICE VISIT (OUTPATIENT)
Dept: FAMILY MEDICINE CLINIC | Age: 74
End: 2019-10-03

## 2019-10-03 VITALS
DIASTOLIC BLOOD PRESSURE: 81 MMHG | SYSTOLIC BLOOD PRESSURE: 139 MMHG | HEART RATE: 81 BPM | HEIGHT: 63 IN | TEMPERATURE: 98.8 F | WEIGHT: 204.4 LBS | RESPIRATION RATE: 20 BRPM | BODY MASS INDEX: 36.21 KG/M2 | OXYGEN SATURATION: 96 %

## 2019-10-03 DIAGNOSIS — J44.1 COPD EXACERBATION (HCC): ICD-10-CM

## 2019-10-03 DIAGNOSIS — R05.9 COUGH: ICD-10-CM

## 2019-10-03 DIAGNOSIS — R06.2 WHEEZE: Primary | ICD-10-CM

## 2019-10-03 DIAGNOSIS — Z87.01 HX OF BACTERIAL PNEUMONIA: ICD-10-CM

## 2019-10-03 DIAGNOSIS — J20.9 BRONCHITIS WITH BRONCHOSPASM: ICD-10-CM

## 2019-10-03 DIAGNOSIS — J45.41 MODERATE PERSISTENT ASTHMATIC BRONCHITIS WITH ACUTE EXACERBATION: ICD-10-CM

## 2019-10-03 RX ORDER — DOXYCYCLINE 100 MG/1
100 CAPSULE ORAL 2 TIMES DAILY
Refills: 0 | COMMUNITY
Start: 2019-09-30 | End: 2019-10-24 | Stop reason: ALTCHOICE

## 2019-10-03 RX ORDER — AZITHROMYCIN 500 MG/1
500 TABLET, FILM COATED ORAL DAILY
Qty: 3 TAB | Refills: 0 | Status: SHIPPED | OUTPATIENT
Start: 2019-10-03 | End: 2019-10-06

## 2019-10-03 RX ORDER — IPRATROPIUM BROMIDE AND ALBUTEROL SULFATE 2.5; .5 MG/3ML; MG/3ML
3 SOLUTION RESPIRATORY (INHALATION)
Qty: 30 NEBULE | Refills: 0
Start: 2019-10-03 | End: 2019-10-03

## 2019-10-03 NOTE — PROGRESS NOTES
Chief Complaint   Patient presents with    Cough     with wheeze     Visit Vitals  /81 (BP 1 Location: Left arm, BP Patient Position: Sitting)   Pulse 81   Temp 98.8 °F (37.1 °C) (Oral)   Resp 20   Ht 5' 3\" (1.6 m)   Wt 204 lb 6.4 oz (92.7 kg)   SpO2 96%   BMI 36.21 kg/m²     1. Have you been to the ER, urgent care clinic since your last visit? Hospitalized since your last visit? No    2. Have you seen or consulted any other health care providers outside of the [address] [place] [address] since your last visit? Include any pap smears or colon screening.  No    Reviewed record in preparation for visit and have necessary documentation  Pt did not bring medication to office visit for review  opportunity was given for questions  Goals that were addressed and/or need to be completed during or after this appointment include   Health Maintenance Due   Topic Date Due    Hepatitis C Screening  1945    LIPID PANEL Q1  1945    MICROALBUMIN Q1  07/26/1957    Shingrix Vaccine Age 50> (1 of 2) 07/26/1997    FOBT Q 1 YEAR AGE 50-75  07/26/1997    DTaP/Tdap/Td series (1 - Tdap) 10/12/2011    BREAST CANCER SCRN MAMMOGRAM  07/14/2019    Influenza Age 9 to Adult  08/01/2019

## 2019-10-03 NOTE — PROGRESS NOTES
Rama Stephens  69 y.o. female  1945  [address] [address]  [id number]     [id number]: Progress Note       Encounter Date: 10/3/2019    Chief Complaint   Patient presents with    Cough     with wheeze     History of Present Illness   Rama Stephens is a 69 y.o. female who presents to clinic today for:    Cough-Pulmonology appointment 09/27/2019-started her on doxcycline with 3 days remaining; states she has not seen a change in her symptoms since starting the doxcy. Steroid taper and completed the medication 2 days ago. Compliant with Trelegy; used rescue inhaler twice this week. Reports \"hard to breath and tired\". Audible wheeze noted during exam.  Denies fevers. Hx of pneumonia, bronchitis, PE. Former smoker quit in 2009. Health Maintenance    Health Maintenance Due   Topic Date Due    Hepatitis C Screening  1945    LIPID PANEL Q1  1945    MICROALBUMIN Q1  07/26/1957    Shingrix Vaccine Age 50> (1 of 2) 07/26/1997    FOBT Q 1 YEAR AGE 50-75  07/26/1997    DTaP/Tdap/Td series (1 - Tdap) 10/12/2011    BREAST CANCER SCRN MAMMOGRAM  07/14/2019    Influenza Age 5 to Adult  08/01/2019     Review of Systems   Review of Systems   Constitutional: Negative. HENT: Negative. Eyes: Negative. Respiratory: Positive for shortness of breath and wheezing. Cardiovascular: Negative. Gastrointestinal: Negative. Genitourinary: Negative. Musculoskeletal: Negative. Skin: Negative. Neurological: Negative. Endo/Heme/Allergies: Negative. Psychiatric/Behavioral: Negative. Vitals/Objective:     Vitals:    10/03/19 1347   BP: 139/81   Pulse: 81   Resp: 20   Temp: 98.8 °F (37.1 °C)   TempSrc: Oral   SpO2: 96%   Weight: 204 lb 6.4 oz (92.7 kg)   Height: 5' 3\" (1.6 m)     Body mass index is 36.21 kg/m². Physical Exam   Cardiovascular: Normal rate, regular rhythm, normal heart sounds and normal pulses.    Pulmonary/Chest: Effort normal. She has wheezes. Posterior wheeze noted in all lung fields on expiration. Tolerated duo neb. Skin: Skin is warm, dry and intact. Psychiatric: She has a normal mood and affect. Her speech is normal and behavior is normal. Judgment and thought content normal. Cognition and memory are normal.       No results found for this or any previous visit (from the past 24 hour(s)). Assessment and Plan:   1. Wheeze    - ALBUTEROL IPRATROP NON-COMP  - TN PRESSURIZED/NONPRESSURIZED INHALATION TREATMENT  - albuterol-ipratropium (DUO-NEB) 2.5 mg-0.5 mg/3 ml nebu; 3 mL by Nebulization route now for 1 dose. Dispense: 30 Nebule; Refill: 0  - XR CHEST PA LAT; Future    2. Hx of bacterial pneumonia    - XR CHEST PA LAT; Future    3. Cough    - XR CHEST PA LAT; Future    4. COPD exacerbation ([facility] [address]      5. Moderate persistent asthmatic bronchitis with acute exacerbation      6. Bronchitis with bronchospasm    - azithromycin (ZITHROMAX) 500 mg tab; Take 1 Tab by mouth daily for 3 days. Dispense: 3 Tab; Refill: 0    Reviewed xray- will stop the doxcy and start azithromycin. Continue supportive therapy. Discussed strict ED parameters for worsening symptoms. I have discussed the diagnosis with the patient and the intended plan as seen in the above orders. she has expressed understanding. The patient has received an after-visit summary and questions were answered concerning future plans. I have discussed medication side effects and warnings with the patient as well. Electronically Signed: Olga Lyon NP     History/Allergies   Patients past medical, surgical and family histories were reviewed and updated. Past Medical History:   Diagnosis Date    Asthma     Bronchitis     Cancer ([address]     breast - left    Diabetes (HCC)     Hepatitis age 12    High cholesterol     Hypertension     Obesity (BMI 30-39. 9)     Thromboembolus ([facility] [address]     L LE with PE      Past Surgical History:   Procedure Laterality Date    BREAST SURGERY PROCEDURE UNLISTED      L mastectomy    COLONOSCOPY N/A 5/26/2017    COLONOSCOPY- no info to   performed by Smith Suggs MD at [facility] HX GYN      ablation, R ovary removed, tubal ligation    HX HAMMER TOE REPAIR      HX HEENT      tonsillectomy    HX ORTHOPAEDIC      L hip, R leg, C4-C5 fusion, L foot    HX OTHER SURGICAL      xiphoid removal    IR PLC IVC FILTER  2/8/2019     Family History   Problem Relation Age of Onset    Heart Attack Father     Diabetes Mother      Social History     Socioeconomic History    Marital status: SINGLE     Spouse name: Not on file    Number of children: Not on file    Years of education: Not on file    Highest education level: Not on file   Occupational History    Not on file   Social Needs    Financial resource strain: Not on file    Food insecurity:     Worry: Not on file     Inability: Not on file    Transportation needs:     Medical: Not on file     Non-medical: Not on file   Tobacco Use    Smoking status: Former Smoker     Last attempt to quit: 2009     Years since quitting: 10.7    Smokeless tobacco: Never Used   Substance and Sexual Activity    Alcohol use: No     Frequency: Never    Drug use: No    Sexual activity: Never   Lifestyle    Physical activity:     Days per week: Not on file     Minutes per session: Not on file    Stress: Not on file   Relationships    Social connections:     Talks on phone: Not on file     Gets together: Not on file     Attends Christian service: Not on file     Active member of club or organization: Not on file     Attends meetings of clubs or organizations: Not on file     Relationship status: Not on file    Intimate partner violence:     Fear of current or ex partner: Not on file     Emotionally abused: Not on file     Physically abused: Not on file     Forced sexual activity: Not on file   Other Topics Concern    Caffeine Concern Not Asked    Back Care Not Asked    Exercise Not Asked    Occupational Exposure Not Asked    Sleep Concern Not Asked    Stress Concern Not Asked    Weight Concern Not Asked   Social History Narrative    Not on file         Allergies   Allergen Reactions    Crestor [Rosuvastatin] Other (comments)     Pain in left leg  Causes muscle spasms    Lipitor [Atorvastatin] Other (comments)     Left leg pain  Causes muscle spasms    Xarelto [Rivaroxaban] Other (comments)     Pt states it caused internal bleeding       Disposition         Future Appointments   Date Time Provider Bill Ricardo   12/20/2019 10:30 AM Aracelis Vega MD [facility]            Current Medications after this visit     Current Outpatient Medications   Medication Sig    doxycycline (VIBRAMYCIN) 100 mg capsule Take 100 mg by mouth two (2) times a day.  albuterol-ipratropium (DUO-NEB) 2.5 mg-0.5 mg/3 ml nebu 3 mL by Nebulization route now for 1 dose.  azithromycin (ZITHROMAX) 500 mg tab Take 1 Tab by mouth daily for 3 days.  losartan (COZAAR) 25 mg tablet TAKE ONE TABLET BY MOUTH EVERY DAY    TRELEGY ELLIPTA 100-62.5-25 mcg inhaler     cyclobenzaprine (FLEXERIL) 5 mg tablet Take 1 Tab by mouth nightly as needed for Muscle Spasm(s).  carvedilol (COREG) 3.125 mg tablet     glucose blood VI test strips (FREESTYLE LITE STRIPS) strip Patient is to check blood sugar once daily. Dx E11.9    PROAIR HFA 90 mcg/actuation inhaler     glipiZIDE SR (GLUCOTROL XL) 2.5 mg CR tablet Take 1 Tab by mouth nightly.  fexofenadine (ALLEGRA) 180 mg tablet Take 1 Tab by mouth.  acetaminophen (TYLENOL ARTHRITIS PAIN) 650 mg TbER Take 1 Tab by mouth every eight (8) hours.  apixaban (ELIQUIS) 5 mg (74 tabs) starter pack Take 10 mg (two 5 mg tablets) by mouth twice a day for 7 days   Followed by 5 mg (one 5 mg tablet) by mouth twice a day    multivitamin (ONE A DAY) tablet Take 1 Tab by mouth nightly.  lovastatin (MEVACOR) 10 mg tablet Take 10 mg by mouth nightly.     aspirin 81 mg tablet Take 162 mg by mouth two (2) times a day.  montelukast (SINGULAIR) 10 mg tablet Take 10 mg by mouth nightly. No current facility-administered medications for this visit. There are no discontinued medications.

## 2019-10-03 NOTE — PATIENT INSTRUCTIONS
Bronchitis: Care Instructions  Your Care Instructions    Bronchitis is inflammation of the bronchial tubes, which carry air to the lungs. The tubes swell and produce mucus, or phlegm. The mucus and inflamed bronchial tubes make you cough. You may have trouble breathing. Most cases of bronchitis are caused by viruses like those that cause colds. Antibiotics usually do not help and they may be harmful. Bronchitis usually develops rapidly and lasts about 2 to 3 weeks in otherwise healthy people. Follow-up care is a key part of your treatment and safety. Be sure to make and go to all appointments, and call your doctor if you are having problems. It's also a good idea to know your test results and keep a list of the medicines you take. How can you care for yourself at home? · Take all medicines exactly as prescribed. Call your doctor if you think you are having a problem with your medicine. · Get some extra rest.  · Take an over-the-counter pain medicine, such as acetaminophen (Tylenol), ibuprofen (Advil, Motrin), or naproxen (Aleve) to reduce fever and relieve body aches. Read and follow all instructions on the label. · Do not take two or more pain medicines at the same time unless the doctor told you to. Many pain medicines have acetaminophen, which is Tylenol. Too much acetaminophen (Tylenol) can be harmful. · Take an over-the-counter cough medicine that contains dextromethorphan to help quiet a dry, hacking cough so that you can sleep. Avoid cough medicines that have more than one active ingredient. Read and follow all instructions on the label. · Breathe moist air from a humidifier, hot shower, or sink filled with hot water. The heat and moisture will thin mucus so you can cough it out. · Do not smoke. Smoking can make bronchitis worse. If you need help quitting, talk to your doctor about stop-smoking programs and medicines. These can increase your chances of quitting for good.   When should you call for help? Call 911 anytime you think you may need emergency care. For example, call if:    · You have severe trouble breathing.    Call your doctor now or seek immediate medical care if:    · You have new or worse trouble breathing.     · You cough up dark brown or bloody mucus (sputum).     · You have a new or higher fever.     · You have a new rash.    Watch closely for changes in your health, and be sure to contact your doctor if:    · You cough more deeply or more often, especially if you notice more mucus or a change in the color of your mucus.     · You are not getting better as expected. Where can you learn more? Go to http://bib-amanda.info/. Enter H333 in the search box to learn more about \"Bronchitis: Care Instructions. \"  Current as of: June 9, 2019  Content Version: 12.2  © 5152-6543 [employer]. Care instructions adapted under license by [facility] (which disclaims liability or warranty for this information). If you have questions about a medical condition or this instruction, always ask your healthcare professional. [address] any warranty or liability for your use of this information.

## 2019-10-07 ENCOUNTER — TELEPHONE (OUTPATIENT)
Dept: FAMILY MEDICINE CLINIC | Age: 74
End: 2019-10-07

## 2019-10-07 DIAGNOSIS — J45.41 MODERATE PERSISTENT ASTHMATIC BRONCHITIS WITH ACUTE EXACERBATION: Primary | ICD-10-CM

## 2019-10-07 RX ORDER — AZITHROMYCIN 500 MG/1
500 TABLET, FILM COATED ORAL DAILY
Qty: 3 TAB | Refills: 0 | Status: SHIPPED | OUTPATIENT
Start: 2019-10-07 | End: 2019-10-10

## 2019-10-07 NOTE — TELEPHONE ENCOUNTER
Verified identifiers and advised patient to hold the beta blocker (coreg) for now. Unsure if the beta blocker is aggravating the asthma/copd symptoms. Patient has a BP cuff at home and will monitor her blood pressure. Patient states she is feeling better but still has sputum and continuing to cough-will extend antibiotic. Patient to f/u in a few weeks for flu shot and blood pressure check. Patient aware and in agreement with plan.

## 2019-10-09 ENCOUNTER — OFFICE VISIT (OUTPATIENT)
Dept: FAMILY MEDICINE CLINIC | Age: 74
End: 2019-10-09

## 2019-10-09 ENCOUNTER — APPOINTMENT (OUTPATIENT)
Dept: GENERAL RADIOLOGY | Age: 74
End: 2019-10-09
Attending: EMERGENCY MEDICINE
Payer: MEDICARE

## 2019-10-09 ENCOUNTER — HOSPITAL ENCOUNTER (EMERGENCY)
Age: 74
Discharge: HOME | End: 2019-10-09
Attending: EMERGENCY MEDICINE
Payer: MEDICARE

## 2019-10-09 VITALS
OXYGEN SATURATION: 96 % | HEART RATE: 98 BPM | TEMPERATURE: 98.2 F | RESPIRATION RATE: 24 BRPM | DIASTOLIC BLOOD PRESSURE: 74 MMHG | SYSTOLIC BLOOD PRESSURE: 136 MMHG

## 2019-10-09 VITALS
HEIGHT: 63 IN | RESPIRATION RATE: 20 BRPM | TEMPERATURE: 97.6 F | OXYGEN SATURATION: 96 % | HEART RATE: 79 BPM | BODY MASS INDEX: 36.14 KG/M2 | DIASTOLIC BLOOD PRESSURE: 83 MMHG | SYSTOLIC BLOOD PRESSURE: 146 MMHG | WEIGHT: 204 LBS

## 2019-10-09 DIAGNOSIS — Z87.891 FORMER SMOKER: ICD-10-CM

## 2019-10-09 DIAGNOSIS — J44.1 COPD EXACERBATION (HCC): Primary | ICD-10-CM

## 2019-10-09 DIAGNOSIS — R06.00 DYSPNEA, UNSPECIFIED TYPE: ICD-10-CM

## 2019-10-09 DIAGNOSIS — Z86.711 HX OF PULMONARY EMBOLUS: ICD-10-CM

## 2019-10-09 DIAGNOSIS — J44.9 CHRONIC OBSTRUCTIVE PULMONARY DISEASE, UNSPECIFIED COPD TYPE (HCC): ICD-10-CM

## 2019-10-09 DIAGNOSIS — R05.9 COUGH: Primary | ICD-10-CM

## 2019-10-09 LAB
ALBUMIN SERPL-MCNC: 3.6 G/DL (ref 3.5–5)
ALBUMIN/GLOB SERPL: 1 {RATIO} (ref 1.1–2.2)
ALP SERPL-CCNC: 104 U/L (ref 45–117)
ALT SERPL-CCNC: 24 U/L (ref 12–78)
ANION GAP SERPL CALC-SCNC: 6 MMOL/L (ref 5–15)
AST SERPL-CCNC: 32 U/L (ref 15–37)
BASOPHILS # BLD: 0.1 K/UL (ref 0–0.1)
BASOPHILS NFR BLD: 1 % (ref 0–1)
BILIRUB SERPL-MCNC: 0.4 MG/DL (ref 0.2–1)
BUN SERPL-MCNC: 22 MG/DL (ref 6–20)
BUN/CREAT SERPL: 20 (ref 12–20)
CALCIUM SERPL-MCNC: 8.4 MG/DL (ref 8.5–10.1)
CHLORIDE SERPL-SCNC: 109 MMOL/L (ref 97–108)
CO2 SERPL-SCNC: 26 MMOL/L (ref 21–32)
CREAT SERPL-MCNC: 1.09 MG/DL (ref 0.55–1.02)
DIFFERENTIAL METHOD BLD: ABNORMAL
EOSINOPHIL # BLD: 0.3 K/UL (ref 0–0.4)
EOSINOPHIL NFR BLD: 2 % (ref 0–7)
ERYTHROCYTE [DISTWIDTH] IN BLOOD BY AUTOMATED COUNT: 13.7 % (ref 11.5–14.5)
GLOBULIN SER CALC-MCNC: 3.6 G/DL (ref 2–4)
GLUCOSE SERPL-MCNC: 163 MG/DL (ref 65–100)
HCT VFR BLD AUTO: 43.3 % (ref 35–47)
HGB BLD-MCNC: 14.3 G/DL (ref 11.5–16)
IMM GRANULOCYTES # BLD AUTO: 0.1 K/UL (ref 0–0.04)
IMM GRANULOCYTES NFR BLD AUTO: 1 % (ref 0–0.5)
LYMPHOCYTES # BLD: 2.8 K/UL (ref 0.8–3.5)
LYMPHOCYTES NFR BLD: 24 % (ref 12–49)
MCH RBC QN AUTO: 26.9 PG (ref 26–34)
MCHC RBC AUTO-ENTMCNC: 33 G/DL (ref 30–36.5)
MCV RBC AUTO: 81.4 FL (ref 80–99)
MONOCYTES # BLD: 0.9 K/UL (ref 0–1)
MONOCYTES NFR BLD: 7 % (ref 5–13)
NEUTS SEG # BLD: 7.8 K/UL (ref 1.8–8)
NEUTS SEG NFR BLD: 65 % (ref 32–75)
NRBC # BLD: 0 K/UL (ref 0–0.01)
NRBC BLD-RTO: 0 PER 100 WBC
PLATELET # BLD AUTO: 290 K/UL (ref 150–400)
PMV BLD AUTO: 9.8 FL (ref 8.9–12.9)
POTASSIUM SERPL-SCNC: 4.5 MMOL/L (ref 3.5–5.1)
PROT SERPL-MCNC: 7.2 G/DL (ref 6.4–8.2)
RBC # BLD AUTO: 5.32 M/UL (ref 3.8–5.2)
SODIUM SERPL-SCNC: 141 MMOL/L (ref 136–145)
WBC # BLD AUTO: 11.9 K/UL (ref 3.6–11)

## 2019-10-09 PROCEDURE — 94640 AIRWAY INHALATION TREATMENT: CPT

## 2019-10-09 PROCEDURE — 99284 EMERGENCY DEPT VISIT MOD MDM: CPT

## 2019-10-09 PROCEDURE — 71046 X-RAY EXAM CHEST 2 VIEWS: CPT

## 2019-10-09 PROCEDURE — 36415 COLL VENOUS BLD VENIPUNCTURE: CPT

## 2019-10-09 PROCEDURE — 74011000250 HC RX REV CODE- 250: Performed by: EMERGENCY MEDICINE

## 2019-10-09 PROCEDURE — 74011636637 HC RX REV CODE- 636/637: Performed by: EMERGENCY MEDICINE

## 2019-10-09 PROCEDURE — 77030013140 HC MSK NEB VYRM -A

## 2019-10-09 PROCEDURE — 80053 COMPREHEN METABOLIC PANEL: CPT

## 2019-10-09 PROCEDURE — 85025 COMPLETE CBC W/AUTO DIFF WBC: CPT

## 2019-10-09 PROCEDURE — A9270 NON-COVERED ITEM OR SERVICE: HCPCS | Performed by: EMERGENCY MEDICINE

## 2019-10-09 RX ORDER — PREDNISONE 50 MG/1
50 TABLET ORAL DAILY
Qty: 6 TAB | Refills: 0 | Status: SHIPPED | OUTPATIENT
Start: 2019-10-09 | End: 2019-10-15

## 2019-10-09 RX ORDER — PREDNISONE 20 MG/1
60 TABLET ORAL
Status: COMPLETED | OUTPATIENT
Start: 2019-10-09 | End: 2019-10-09

## 2019-10-09 RX ADMIN — PREDNISONE 60 MG: 20 TABLET ORAL at 15:51

## 2019-10-09 RX ADMIN — ALBUTEROL SULFATE 1 DOSE: 2.5 SOLUTION RESPIRATORY (INHALATION) at 16:07

## 2019-10-09 RX ADMIN — ALBUTEROL SULFATE 1 DOSE: 2.5 SOLUTION RESPIRATORY (INHALATION) at 16:25

## 2019-10-09 RX ADMIN — ALBUTEROL SULFATE 1 DOSE: 2.5 SOLUTION RESPIRATORY (INHALATION) at 15:52

## 2019-10-09 NOTE — PROGRESS NOTES
Chief Complaint   Patient presents with    Cough     Visit Vitals  /83 (BP 1 Location: Left arm, BP Patient Position: Sitting)   Pulse 79   Temp 97.6 °F (36.4 °C) (Oral)   Resp 20   Ht 5' 3\" (1.6 m)   Wt 204 lb (92.5 kg)   SpO2 96%   BMI 36.14 kg/m²     1. Have you been to the ER, urgent care clinic since your last visit? Hospitalized since your last visit? No    2. Have you seen or consulted any other health care providers outside of the [address] [place] [address] since your last visit? Include any pap smears or colon screening.  No    Reviewed record in preparation for visit and have necessary documentation  Pt did not bring medication to office visit for review  opportunity was given for questions  Goals that were addressed and/or need to be completed during or after this appointment include   Health Maintenance Due   Topic Date Due    Hepatitis C Screening  1945    LIPID PANEL Q1  1945    MICROALBUMIN Q1  07/26/1957    Shingrix Vaccine Age 50> (1 of 2) 07/26/1997    FOBT Q 1 YEAR AGE 50-75  07/26/1997    DTaP/Tdap/Td series (1 - Tdap) 10/12/2011    BREAST CANCER SCRN MAMMOGRAM  07/14/2019    Influenza Age 9 to Adult  08/01/2019    HEMOGLOBIN A1C Q6M  11/09/2019

## 2019-10-09 NOTE — PROGRESS NOTES
Chyna Victor  69 y.o. female  1945  [address] [id number]  [id number]     [id number]: Progress Note       Encounter Date: 10/9/2019    Chief Complaint   Patient presents with    Cough     History of Present Illness   Chyna Victor is a 69 y.o. female who presents to clinic today for:    Cough- Hx of pneumonia 2018; states she was on several antibiotics. Hx of former smoker. Dx with COPD and PE. Recap of events-est with Pulmonology last appointment 09/27/2019-started on doxcycline and steroid taper. Pulmonology noted scanned to CC. Prescribed trelegy and rescue inhaler. Reports to PCP on 10/03/2019 for worsening cough and \"not better\" and states \"hard to breath and tired\". Audible wheeze noted and administered a duo neb while in clinic. Also reviewed MAR and discontinued beta blocker-unsure if medication was making respiratory symptoms worse; states no cardiac history. Prescribed Azithromycin 500mg daily for 3 days-2 courses and patient states she is not back at baseline. Chest xray in clinic 10/03/2019-IMPRESSION: Persistent opacities in the left midlung zone. These are relatively  linear and may represent scarring or atelectasis. Report called to [facility] ED. Patient in agreement with evaluation. Health Maintenance    Health Maintenance Due   Topic Date Due    Hepatitis C Screening  1945    LIPID PANEL Q1  1945    MICROALBUMIN Q1  07/26/1957    Shingrix Vaccine Age 50> (1 of 2) 07/26/1997    FOBT Q 1 YEAR AGE 50-75  07/26/1997    DTaP/Tdap/Td series (1 - Tdap) 10/12/2011    BREAST CANCER SCRN MAMMOGRAM  07/14/2019    Influenza Age 9 to Adult  08/01/2019    HEMOGLOBIN A1C Q6M  11/09/2019     Review of Systems   Review of Systems   Constitutional: Negative. HENT: Negative. Eyes: Negative. Respiratory: Positive for cough, shortness of breath and wheezing. Cardiovascular: Negative. Gastrointestinal: Negative. Genitourinary: Negative. Musculoskeletal: Negative. Skin: Negative. Neurological: Negative. Endo/Heme/Allergies: Negative. Psychiatric/Behavioral: Negative. Vitals/Objective:     Vitals:    10/09/19 1027   BP: 146/83   Pulse: 79   Resp: 20   Temp: 97.6 °F (36.4 °C)   TempSrc: Oral   SpO2: 96%   Weight: 204 lb (92.5 kg)   Height: 5' 3\" (1.6 m)     Body mass index is 36.14 kg/m². Physical Exam   Constitutional: She is oriented to person, place, and time. She is active. She appears ill. Cardiovascular: Normal rate, regular rhythm, normal heart sounds and normal pulses. Pulmonary/Chest:   Observed patient have some difficulty breathing; no accessory muscle usage. Decreased air movement noted posteriorly. Negative for wheeze, rales or rhonchi. Cough observed. Neurological: She is alert and oriented to person, place, and time. Skin: Skin is warm, dry and intact. Psychiatric: She has a normal mood and affect. Her speech is normal and behavior is normal. Judgment and thought content normal. Cognition and memory are normal.       No results found for this or any previous visit (from the past 24 hour(s)). Assessment and Plan:   1. Cough      2. Dyspnea, unspecified type      3. Chronic obstructive pulmonary disease, unspecified COPD type ([facility] [address]      4. Hx of pulmonary embolus      5. Former smoker        I have discussed the diagnosis with the patient and the intended plan as seen in the above orders. she has expressed understanding. The patient has received an after-visit summary and questions were answered concerning future plans. I have discussed medication side effects and warnings with the patient as well. Electronically Signed: Abril Moncada NP     History/Allergies   Patients past medical, surgical and family histories were reviewed and updated.     Past Medical History:   Diagnosis Date    Asthma     Bronchitis     Cancer ([facility] [address] breast - left    Diabetes ([facility] [address]     Hepatitis age 12    High cholesterol     Hypertension     Obesity (BMI 30-39. 9)     Thromboembolus ([facility] [address]     L LE with PE      Past Surgical History:   Procedure Laterality Date    BREAST SURGERY PROCEDURE UNLISTED      L mastectomy    COLONOSCOPY N/A 5/26/2017    COLONOSCOPY- no info to   performed by Livan Oquendo MD at [facility] HX GYN      ablation, R ovary removed, tubal ligation    HX HAMMER TOE REPAIR      HX HEENT      tonsillectomy    HX ORTHOPAEDIC      L hip, R leg, C4-C5 fusion, L foot    HX OTHER SURGICAL      xiphoid removal    IR PLC IVC FILTER  2/8/2019     Family History   Problem Relation Age of Onset    Heart Attack Father     Diabetes Mother      Social History     Socioeconomic History    Marital status: SINGLE     Spouse name: Not on file    Number of children: Not on file    Years of education: Not on file    Highest education level: Not on file   Occupational History    Not on file   Social Needs    Financial resource strain: Not on file    Food insecurity:     Worry: Not on file     Inability: Not on file    Transportation needs:     Medical: Not on file     Non-medical: Not on file   Tobacco Use    Smoking status: Former Smoker     Last attempt to quit: 2009     Years since quitting: 10.7    Smokeless tobacco: Never Used   Substance and Sexual Activity    Alcohol use: No     Frequency: Never    Drug use: No    Sexual activity: Never   Lifestyle    Physical activity:     Days per week: Not on file     Minutes per session: Not on file    Stress: Not on file   Relationships    Social connections:     Talks on phone: Not on file     Gets together: Not on file     Attends [employer] service: Not on file     Active member of club or organization: Not on file     Attends meetings of clubs or organizations: Not on file     Relationship status: Not on file    Intimate partner violence:     Fear of current or ex partner: Not on file     Emotionally abused: Not on file     Physically abused: Not on file     Forced sexual activity: Not on file   Other Topics Concern    Caffeine Concern Not Asked    Back Care Not Asked    Exercise Not Asked    Occupational Exposure Not Asked    Sleep Concern Not Asked    Stress Concern Not Asked    Weight Concern Not Asked   Social History Narrative    Not on file         Allergies   Allergen Reactions    Crestor [Rosuvastatin] Other (comments)     Pain in left leg  Causes muscle spasms    Lipitor [Atorvastatin] Other (comments)     Left leg pain  Causes muscle spasms    Xarelto [Rivaroxaban] Other (comments)     Pt states it caused internal bleeding       Disposition         Future Appointments   Date Time Provider Bill Haleigh   12/20/2019 10:30 AM Shukri Vega MD [facility] SCHED            Current Medications after this visit     Current Outpatient Medications   Medication Sig    azithromycin (ZITHROMAX) 500 mg tab Take 1 Tab by mouth daily for 3 days.  doxycycline (VIBRAMYCIN) 100 mg capsule Take 100 mg by mouth two (2) times a day.  losartan (COZAAR) 25 mg tablet TAKE ONE TABLET BY MOUTH EVERY DAY    TRELEGY ELLIPTA 100-62.5-25 mcg inhaler     cyclobenzaprine (FLEXERIL) 5 mg tablet Take 1 Tab by mouth nightly as needed for Muscle Spasm(s).  glucose blood VI test strips (FREESTYLE LITE STRIPS) strip Patient is to check blood sugar once daily. Dx E11.9    PROAIR HFA 90 mcg/actuation inhaler     glipiZIDE SR (GLUCOTROL XL) 2.5 mg CR tablet Take 1 Tab by mouth nightly.  fexofenadine (ALLEGRA) 180 mg tablet Take 1 Tab by mouth.  acetaminophen (TYLENOL ARTHRITIS PAIN) 650 mg TbER Take 1 Tab by mouth every eight (8) hours.  apixaban (ELIQUIS) 5 mg (74 tabs) starter pack Take 10 mg (two 5 mg tablets) by mouth twice a day for 7 days   Followed by 5 mg (one 5 mg tablet) by mouth twice a day    multivitamin (ONE A DAY) tablet Take 1 Tab by mouth nightly.     lovastatin (MEVACOR) 10 mg tablet Take 10 mg by mouth nightly.  aspirin 81 mg tablet Take 162 mg by mouth two (2) times a day.  montelukast (SINGULAIR) 10 mg tablet Take 10 mg by mouth nightly. No current facility-administered medications for this visit. There are no discontinued medications.

## 2019-10-09 NOTE — DISCHARGE INSTRUCTIONS
Follow up with pulmonary in the next week  Finish prednisone  Take your albuterol as needed - don't forget about it  This is likely a viral illness. Antibiotics unlikely to help you as much as prednisone  You may cough for 3-4 weeks after the start of the illness. Patient Education        Chronic Obstructive Pulmonary Disease (COPD) Flare-Ups: Care Instructions  Your Care Instructions    Chronic obstructive pulmonary disease (COPD) is a lung disease that makes it hard to breathe. It is caused by damage to the lungs over many years, usually from smoking. COPD is often a mix of two diseases:  · Chronic bronchitis: The airways that carry air to the lungs (bronchial tubes) get inflamed and make a lot of mucus. This can narrow or block the airways. · Emphysema: In a healthy person, the tiny air sacs in the lungs are like balloons. As you breathe in and out, they get bigger and smaller to move air through your lungs. But with emphysema, these air sacs are damaged and lose their stretch. Less air gets in and out of the lungs. Many people with COPD have attacks called flare-ups or exacerbations. This is when your usual symptoms quickly get worse and stay worse. The doctor has checked you carefully. But problems can develop later. If you notice any problems or new symptoms, get medical treatment right away. Follow-up care is a key part of your treatment and safety. Be sure to make and go to all appointments, and call your doctor if you are having problems. It's also a good idea to know your test results and keep a list of the medicines you take. How can you care for yourself at home? · Be safe with medicines. Take your medicines exactly as prescribed. Call your doctor if you think you are having a problem with your medicine. You may be taking medicines such as:  ? Bronchodilators. These help open your airways and make breathing easier. ? Corticosteroids. These reduce airway inflammation.  They may be given as pills, in a vein, or in an inhaled form. You may go home with pills in addition to an inhaler that you already use. · A spacer may help you get more inhaled medicine to your lungs. Ask your doctor or pharmacist if a spacer is right for you. If it is, ask how to use it properly. · If your doctor prescribed antibiotics, take them as directed. Do not stop taking them just because you feel better. You need to take the full course of antibiotics. · If your doctor prescribed oxygen, use the flow rate your doctor has recommended. Do not change it without talking to your doctor first.  · Do not smoke. Smoking makes COPD worse. If you need help quitting, talk to your doctor about stop-smoking programs and medicines. These can increase your chances of quitting for good. When should you call for help? Call 911 anytime you think you may need emergency care. For example, call if:    · You have severe trouble breathing.    Call your doctor now or seek immediate medical care if:    · You have new or worse trouble breathing.     · Your coughing or wheezing gets worse.     · You cough up dark brown or bloody mucus (sputum).     · You have a new or higher fever.    Watch closely for changes in your health, and be sure to contact your doctor if:    · You notice more mucus or a change in the color of your mucus.     · You need to use your antibiotic or steroid pills.     · You do not get better as expected. Where can you learn more? Go to http://bib-amanda.info/. Enter H538 in the search box to learn more about \"Chronic Obstructive Pulmonary Disease (COPD) Flare-Ups: Care Instructions. \"  Current as of: June 9, 2019  Content Version: 12.2  © 2453-6282 [employer]. Care instructions adapted under license by [employer] (which disclaims liability or warranty for this information).  If you have questions about a medical condition or this instruction, always ask your healthcare professional. [address] any warranty or liability for your use of this information.

## 2019-10-09 NOTE — ED PROVIDER NOTES
69 y.o. female presents with chief complaint of SOB. Pt c/o SOB and wheezing for 1 week, which worsened today. Pt's SOB is exacerbated with ambulation. Pt notes she recently completed 3 rounds of antibiotics. Pt is anticoagulated on Eliquis after a PE in 02/2019. Pt notes she has missed a few doses of Eliquis. Pt denies fever, chills, or N/V. I have evaluated the patient as the Provider in Triage. I have reviewed Her vital signs and the triage nurse assessment. I have talked with the patient and any available family and advised that I am the provider in triage and have ordered the appropriate study to initiate their work up based on the clinical presentation during my assessment. I have advised that the patient will be accommodated in the Main ED as soon as possible. I have also requested to contact the triage nurse or myself immediately if the patient experiences any changes in their condition during this brief waiting period. Note written by Ashish Pedro, as dictated by Ele Martinez NP 2:31 PM  ---     69 y.o. female with past medical history significant for DM, HTN, high cholesterol, left breast CA, asthma, COPD, h/o PE in 02/2019, s/p IVC filter, currently anticoagulated on Eliquis, presents to the ED with chief complaint of shortness of breath. Patient reports that she felt the onset of shortness of breath on Friday 09/27/2019, accompanied by a cough and wheezing. She saw her pulmonologist on that date and was prescribed a six day prednisone taper. Patient started taking the steroids, but by Monday 09/30/2019, patient called her pulmonologist to request abx and was prescribed a course of doxycycline. She started taking the doxycycline, but felt like her symptoms became more severe once she finished the steroid taper. Patient followed-up with her PCP on 10/3/2019 for continued symptoms and had a CXR ordered and performed.  The CXR showed \"persistent opacities in the left midlung zone; relatively linear and may represent scarring or atelectasis\", and patient was switched from doxycycline to azithromycin on that date. Patient reports that she has been taking the azithromycin as prescribed, and has also been compliant with her Eliquis, but she feels like her symptoms are not improving. The shortness of breath is still present and is exacerbated with exertion. She additionally complains of a continued productive cough with yellow sputum, wheezing, and chest tightness. Patient also notes that she has abdominal \"soreness\", which she attributes to coughing. Patient has been compliant with her Trelegy Ellipta inhaler, and used her albuterol rescue inhaler last week. She has not used her rescue inhaler at all this week. Patient followed up with her PCP today, who due to the continued WEISS and productive cough despite taking abx, recommended patient go to the ED for further evaluation and treatment. Patient specifically denies fevers, chills, hemoptysis, orthopnea, or PND. She also denies chronic O2 requirement. There are no other acute medical concerns at this time. Social hx: Former smoker (smoked <1 PPD for 20-30 years, quit smoking ten years ago). Denies current Tobacco use; Positive EtOH use (rare); Denies Illicit Drug Abuse    PCP: Asia Salazar NP    Note written by Ashish Alvarado, as dictated by Jeanie Vasquez DO 2:53 PM     The history is provided by the patient and medical records. No  was used. Past Medical History:   Diagnosis Date    Asthma     Bronchitis     Cancer ([facility] [address]     breast - left    Diabetes (HCC)     Hepatitis age 12    High cholesterol     Hypertension     Obesity (BMI 30-39. 9)     Thromboembolus ([address]     L LE with PE       Past Surgical History:   Procedure Laterality Date    BREAST SURGERY PROCEDURE UNLISTED      L mastectomy    COLONOSCOPY N/A 5/26/2017    COLONOSCOPY- no info to   performed by Stephen Sanchez Arianne Diggs MD at [facility] HX GYN      ablation, R ovary removed, tubal ligation    HX HAMMER TOE REPAIR      HX HEENT      tonsillectomy    HX ORTHOPAEDIC      L hip, R leg, C4-C5 fusion, L foot    HX OTHER SURGICAL      xiphoid removal    IR PLC IVC FILTER  2/8/2019         Family History:   Problem Relation Age of Onset    Heart Attack Father     Diabetes Mother        Social History     Socioeconomic History    Marital status: SINGLE     Spouse name: Not on file    Number of children: Not on file    Years of education: Not on file    Highest education level: Not on file   Occupational History    Not on file   Social Needs    Financial resource strain: Not on file    Food insecurity:     Worry: Not on file     Inability: Not on file    Transportation needs:     Medical: Not on file     Non-medical: Not on file   Tobacco Use    Smoking status: Former Smoker     Last attempt to quit: 2009     Years since quitting: 10.7    Smokeless tobacco: Never Used   Substance and Sexual Activity    Alcohol use: No     Frequency: Never    Drug use: No    Sexual activity: Never   Lifestyle    Physical activity:     Days per week: Not on file     Minutes per session: Not on file    Stress: Not on file   Relationships    Social connections:     Talks on phone: Not on file     Gets together: Not on file     Attends [employer] service: Not on file     Active member of club or organization: Not on file     Attends meetings of clubs or organizations: Not on file     Relationship status: Not on file    Intimate partner violence:     Fear of current or ex partner: Not on file     Emotionally abused: Not on file     Physically abused: Not on file     Forced sexual activity: Not on file   Other Topics Concern    Caffeine Concern Not Asked    Back Care Not Asked    Exercise Not Asked    Occupational Exposure Not Asked    Sleep Concern Not Asked    Stress Concern Not Asked    Weight Concern Not Asked   Social History Narrative    Not on file         ALLERGIES: Crestor [rosuvastatin]; Lipitor [atorvastatin]; and Xarelto [rivaroxaban]    Review of Systems   Constitutional: Negative for chills and fever. HENT: Positive for congestion. Respiratory: Positive for cough, chest tightness, shortness of breath and wheezing. Negative for hemoptysis, PND, and orthopnea. Gastrointestinal: Positive for abdominal pain (secondary to cough). All other systems reviewed and are negative. Vitals:    10/09/19 1431   BP: 138/74   Pulse: (!) 102   Resp: 24   Temp: 98.2 °F (36.8 °C)   SpO2: 97%            Physical Exam        Constitutional: Pt is awake and alert. Pt appears well-developed and well-nourished. HENT:   Head: Normocephalic and atraumatic. Nose: Nose normal.   Mouth/Throat: Oropharynx is clear and moist. No oropharyngeal exudate. Eyes: Conjunctivae and extraocular motions are normal. Pupils are equal, round, and reactive to light. Right eye exhibits no discharge. Left eye exhibits no discharge. No scleral icterus. Neck: No tracheal deviation present. Supple neck. Cardiovascular: Normal rate, regular rhythm, normal heart sounds and intact distal pulses. Exam reveals no gallop and no friction rub. No murmur heard. Pulmonary/Chest: Mild respiratory distress. Diminished lung sounds globally. Pt  has no wheezes. Pt  has no rales. Abdominal: Soft. Pt  exhibits no distension and no mass. No tenderness. Pt  has no rebound and no guarding. Musculoskeletal:  Pt  exhibits no edema and no tenderness. Ext: Normal ROM in all four extremities; not tender to palpation; distal pulses are normal, no edema. Neurological:  Pt is alert. nonfocal neuro exam.  Skin: Skin is warm and dry. Pt  is not diaphoretic. Psychiatric:  Pt  has a normal mood and affect. Behavior is normal.     Note written by Jluis Garcia.  Ashish Power, as dictated by Gina Messer, DO 2:53 PM       MDM Procedures             4:37 PM - had 3 nebs. At beginning of this illness, she was placed on a quick pred taper over 6 days. I think this was too soon of a taper. I am bursting her over a week. Doubt PNA or PE. On [address] She now remembers her great grandson coming home with a URI a couple weeks ago shortly before she got sick. This is likely a viral illness. She also hasn't taken her albuterol since last week. I reeducated her on taking her albuterol. She has neb soln and a machine at home. Feels jittery but better. Reviewed cxr images      Recent Results (from the past 12 hour(s))   CBC WITH AUTOMATED DIFF    Collection Time: 10/09/19  2:43 PM   Result Value Ref Range    WBC 11.9 (H) 3.6 - 11.0 K/uL    RBC 5.32 (H) 3.80 - 5.20 M/uL    HGB 14.3 11.5 - 16.0 g/dL    HCT 43.3 35.0 - 47.0 %    MCV 81.4 80.0 - 99.0 FL    MCH 26.9 26.0 - 34.0 PG    MCHC 33.0 30.0 - 36.5 g/dL    RDW 13.7 11.5 - 14.5 %    PLATELET 527 167 - 465 K/uL    MPV 9.8 8.9 - 12.9 FL    NRBC 0.0 0  WBC    ABSOLUTE NRBC 0.00 0.00 - 0.01 K/uL    NEUTROPHILS 65 32 - 75 %    LYMPHOCYTES 24 12 - 49 %    MONOCYTES 7 5 - 13 %    EOSINOPHILS 2 0 - 7 %    BASOPHILS 1 0 - 1 %    IMMATURE GRANULOCYTES 1 (H) 0.0 - 0.5 %    ABS. NEUTROPHILS 7.8 1.8 - 8.0 K/UL    ABS. LYMPHOCYTES 2.8 0.8 - 3.5 K/UL    ABS. MONOCYTES 0.9 0.0 - 1.0 K/UL    ABS. EOSINOPHILS 0.3 0.0 - 0.4 K/UL    ABS. BASOPHILS 0.1 0.0 - 0.1 K/UL    ABS. IMM.  GRANS. 0.1 (H) 0.00 - 0.04 K/UL    DF AUTOMATED     METABOLIC PANEL, COMPREHENSIVE    Collection Time: 10/09/19  2:43 PM   Result Value Ref Range    Sodium 141 136 - 145 mmol/L    Potassium 4.5 3.5 - 5.1 mmol/L    Chloride 109 (H) 97 - 108 mmol/L    CO2 26 21 - 32 mmol/L    Anion gap 6 5 - 15 mmol/L    Glucose 163 (H) 65 - 100 mg/dL    BUN 22 (H) 6 - 20 MG/DL    Creatinine 1.09 (H) 0.55 - 1.02 MG/DL    BUN/Creatinine ratio 20 12 - 20      GFR est AA 60 (L) >60 ml/min/1.73m2    GFR est non-AA 49 (L) >60 ml/min/1.73m2 Calcium 8.4 (L) 8.5 - 10.1 MG/DL    Bilirubin, total 0.4 0.2 - 1.0 MG/DL    ALT (SGPT) 24 12 - 78 U/L    AST (SGOT) 32 15 - 37 U/L    Alk.  phosphatase 104 45 - 117 U/L    Protein, total 7.2 6.4 - 8.2 g/dL    Albumin 3.6 3.5 - 5.0 g/dL    Globulin 3.6 2.0 - 4.0 g/dL    A-G Ratio 1.0 (L) 1.1 - 2.2

## 2019-10-09 NOTE — ED TRIAGE NOTES
Pt presents with shortness of breath and wheezing x1 week. Pt has been on antibiotics recently and seen by PCP.

## 2019-10-10 ENCOUNTER — TELEPHONE (OUTPATIENT)
Dept: FAMILY MEDICINE CLINIC | Age: 74
End: 2019-10-10

## 2019-10-10 DIAGNOSIS — R25.2 LEG CRAMPING: ICD-10-CM

## 2019-10-10 RX ORDER — CYCLOBENZAPRINE HCL 5 MG
TABLET ORAL
Qty: 30 TAB | Refills: 0 | Status: SHIPPED | OUTPATIENT
Start: 2019-10-10 | End: 2020-07-09 | Stop reason: SDUPTHER

## 2019-10-10 NOTE — TELEPHONE ENCOUNTER
Patient called per NP:  Yes that's a higher steroid burst dose. Yes jittery and increased heart rate is a side effect from the duo neb mixed with the steroid    Patient verbalized understanding and appreciative.

## 2019-10-10 NOTE — TELEPHONE ENCOUNTER
Pt states that she needs to speak to you about her visit to [facility] ER yesterday. Please call.

## 2019-10-10 NOTE — TELEPHONE ENCOUNTER
Returned patient call,  Patient states, ER gave her 60mg prednisone and 3 duoneb txs in a row, which made her real jittery, and a rx for prednisone 50mg x6 days,  Wants to know if NP thinks prednisone 50mg a day is okay to take without tapering off.

## 2019-10-24 ENCOUNTER — OFFICE VISIT (OUTPATIENT)
Dept: FAMILY MEDICINE CLINIC | Age: 74
End: 2019-10-24

## 2019-10-24 VITALS
HEART RATE: 77 BPM | OXYGEN SATURATION: 98 % | HEIGHT: 63 IN | WEIGHT: 208.4 LBS | RESPIRATION RATE: 20 BRPM | TEMPERATURE: 97 F | DIASTOLIC BLOOD PRESSURE: 83 MMHG | BODY MASS INDEX: 36.93 KG/M2 | SYSTOLIC BLOOD PRESSURE: 147 MMHG

## 2019-10-24 DIAGNOSIS — Z79.899 MEDICATION MANAGEMENT: ICD-10-CM

## 2019-10-24 DIAGNOSIS — E11.9 CONTROLLED TYPE 2 DIABETES MELLITUS WITHOUT COMPLICATION, WITHOUT LONG-TERM CURRENT USE OF INSULIN (HCC): ICD-10-CM

## 2019-10-24 DIAGNOSIS — E78.00 HIGH CHOLESTEROL: ICD-10-CM

## 2019-10-24 DIAGNOSIS — Z23 ENCOUNTER FOR IMMUNIZATION: ICD-10-CM

## 2019-10-24 DIAGNOSIS — I10 ESSENTIAL HYPERTENSION: Primary | ICD-10-CM

## 2019-10-24 RX ORDER — CARVEDILOL 3.12 MG/1
3.12 TABLET ORAL 2 TIMES DAILY WITH MEALS
Qty: 60 TAB | Refills: 2 | COMMUNITY
Start: 2019-10-24 | End: 2020-01-10 | Stop reason: SDUPTHER

## 2019-10-24 NOTE — PATIENT INSTRUCTIONS
Nighttime Leg Cramps: Care Instructions  Your Care Instructions    Nighttime leg cramps happen when a leg muscle tightens up suddenly. This most often happens in the calf. But cramps in the thigh or foot are also common. Cramps often occur just as you fall asleep or wake up. Leg cramps can be painful. They can last a few seconds to a few minutes. Though they are common, experts don't know exactly what causes them. To treat muscle cramps, you can stretch and massage the muscle. If cramps keep coming back, your doctor may prescribe medicine that relaxes your muscles. Follow-up care is a key part of your treatment and safety. Be sure to make and go to all appointments, and call your doctor if you are having problems. It's also a good idea to know your test results and keep a list of the medicines you take. How can you care for yourself at home? · To stop a leg cramp, sit down and straighten your leg as you bend your foot up toward your knee. It may help to place a rolled towel under the ball of your foot and, while you hold the towel at both ends, gently pull the towel toward you while you keep your knee straight. This stretches the calf muscles. The cramp usually goes away after a few minutes. · Take a warm shower or bath to relax the muscle. Some people find that a heating pad placed on the muscle can also help. Others get relief by rubbing the calf with an ice pack. · Stretch your muscles every day, especially before and after exercise and at bedtime. Regular stretching can relax your muscles and may prevent cramps. · Do not suddenly increase the amount of exercise you get. Increase your exercise a little each week. · If your doctor prescribes medicine, take it exactly as prescribed. Call your doctor if you think you are having a problem with your medicine.   · Ask your doctor if you can take an over-the-counter pain medicine, such as acetaminophen (Tylenol), ibuprofen (Advil, Motrin), or naproxen (Aleve). Be safe with medicines. Read and follow all instructions on the label. · Drink plenty of fluids. If you have kidney, heart, or liver disease and have to limit fluids, talk with your doctor before you increase the amount of fluids you drink. When should you call for help? Watch closely for changes in your health, and be sure to contact your doctor if:    · You often have muscle cramps that do not go away after home treatment.     · Your muscle cramps often wake you up at night.     · You do not get better as expected. Where can you learn more? Go to http://bib-amanda.info/. Enter S910 in the search box to learn more about \"Nighttime Leg Cramps: Care Instructions. \"  Current as of: March 28, 2019  Content Version: 12.2  © 8082-6290 [employer]. Care instructions adapted under license by [employer] (which disclaims liability or warranty for this information). If you have questions about a medical condition or this instruction, always ask your healthcare professional. [address] any warranty or liability for your use of this information.

## 2019-10-24 NOTE — PROGRESS NOTES
Chief Complaint   Patient presents with   [facility] Follow Up     Visit Vitals  /83 (BP 1 Location: Left arm, BP Patient Position: Sitting)   Pulse 77   Temp 97 °F (36.1 °C) (Oral)   Resp 20   Ht 5' 3\" (1.6 m)   Wt 208 lb 6.4 oz (94.5 kg)   SpO2 98%   BMI 36.92 kg/m²     1. Have you been to the ER, urgent care clinic since your last visit? Hospitalized since your last visit? Yes [address] [place] [address]    2. Have you seen or consulted any other health care providers outside of the [address] [place] [address] since your last visit? Include any pap smears or colon screening.  No    Reviewed record in preparation for visit and have necessary documentation  Pt did not bring medication to office visit for review  opportunity was given for questions  Goals that were addressed and/or need to be completed during or after this appointment include   Health Maintenance Due   Topic Date Due    Hepatitis C Screening  1945    LIPID PANEL Q1  1945    MICROALBUMIN Q1  07/26/1957    Shingrix Vaccine Age 50> (1 of 2) 07/26/1997    FOBT Q 1 YEAR AGE 50-75  07/26/1997    DTaP/Tdap/Td series (1 - Tdap) 10/12/2011    BREAST CANCER SCRN MAMMOGRAM  07/14/2019    Influenza Age 9 to Adult  08/01/2019    HEMOGLOBIN A1C Q6M  11/09/2019

## 2019-10-24 NOTE — PROGRESS NOTES
Kirby Chun  69 y.o. female  1945  [place]  [address]  [id number]     [facility]: Progress Note       Encounter Date: 10/24/2019    Chief Complaint   Patient presents with   [facility] Follow Up     History of Present Illness   Kirby Chun is a 69 y.o. female who presents to clinic today for:    Blood pressure- home reading 157/84 with headaches. Discontinued the coreg a few weeks ago to see if her asthma/bronchitis would get better. Had discussed that beta blockers are not recommended with asthma/bronshitis. States she was placed on coreg >10 years ago for blood pressure control and family history of cardiac disease also she states she could afford the coreg. Compliant with the losartan. Patient request that she restart the coreg for fear of MI or stroke. She states the increased blood pressure and headache was very concerning to her. Patient to also ask pulmonologist about their recommendations. She states the pulmonologist is aware of her medication list.     Blood sugars- the the month of October blood sugar readings  [id number] a cup of coffee with sugar and blood sugar increased. Does not check her feet everyday-denies numbness & tingling in her toes. Denies-poly dipsia, uria, phagia. Denies issues with her skin. Monitoring her diet-trying to cut back on starches. Hydrating with water. Reports compliance with glipizide. Of note she will f/u with Dr. Cami Schroeder in a week to discuss LLE sciatica. Health Maintenance  Dr. Madeline River last colonoscopy ~5 years ago and was told to come back in 10 years.      Health Maintenance Due   Topic Date Due    Hepatitis C Screening  1945    LIPID PANEL Q1  1945    MICROALBUMIN Q1  07/26/1957    Shingrix Vaccine Age 50> (1 of 2) 07/26/1997    FOBT Q 1 YEAR AGE 50-75  07/26/1997    DTaP/Tdap/Td series (1 - Tdap) 10/12/2011    BREAST CANCER SCRN MAMMOGRAM  07/14/2019    Influenza Age 9 to Adult  08/01/2019    HEMOGLOBIN A1C Q6M  11/09/2019     Review of Systems   Review of Systems   Constitutional: Negative. HENT: Negative. Eyes: Negative. Respiratory: Negative. Cardiovascular: Negative. Gastrointestinal: Negative. Genitourinary: Negative. Musculoskeletal: Negative. Skin: Negative. Neurological: Negative. Endo/Heme/Allergies: Negative. Psychiatric/Behavioral: Negative. Vitals/Objective:     Vitals:    10/24/19 0818   BP: 147/83   Pulse: 77   Resp: 20   Temp: 97 °F (36.1 °C)   TempSrc: Oral   SpO2: 98%   Weight: 208 lb 6.4 oz (94.5 kg)   Height: 5' 3\" (1.6 m)     Body mass index is 36.92 kg/m². Physical Exam   Constitutional: She is oriented to person, place, and time. She is active and cooperative. Cardiovascular: Normal rate, regular rhythm, normal heart sounds and normal pulses. Pulmonary/Chest: Effort normal and breath sounds normal.   Neurological: She is alert and oriented to person, place, and time. She has normal strength. She displays a negative Romberg sign. Skin: Skin is warm, dry and intact. Psychiatric: She has a normal mood and affect. Her speech is normal and behavior is normal. Judgment and thought content normal. Cognition and memory are normal.       No results found for this or any previous visit (from the past 24 hour(s)). Assessment and Plan:   1. Essential hypertension      2. Controlled type 2 diabetes mellitus without complication, without long-term current use of insulin ([facility] [address]      3. Medication management      4. Encounter for immunization    - ADMIN INFLUENZA VIRUS VAC  - INFLUENZA VACCINE INACTIVATED (IIV), SUBUNIT, ADJUVANTED, IM     Awaiting labs. F/u in 6 months for routine visit and labs. I have discussed the diagnosis with the patient and the intended plan as seen in the above orders. she has expressed understanding.   The patient has received an after-visit summary and questions were answered concerning future plans.  I have discussed medication side effects and warnings with the patient as well. Electronically Signed: Kristan Robert NP     History/Allergies   Patients past medical, surgical and family histories were reviewed and updated. Past Medical History:   Diagnosis Date    Asthma     Bronchitis     Cancer ([facility] [address]     breast - left    Diabetes (HCC)     Hepatitis age 12    High cholesterol     Hypertension     Obesity (BMI 30-39. 9)     Thromboembolus ([facility] [address]     L LE with PE      Past Surgical History:   Procedure Laterality Date    BREAST SURGERY PROCEDURE UNLISTED      L mastectomy    COLONOSCOPY N/A 5/26/2017    COLONOSCOPY- no info to   performed by Keary Fothergill, MD at [facility] HX GYN      ablation, R ovary removed, tubal ligation    HX HAMMER TOE REPAIR      HX HEENT      tonsillectomy    HX ORTHOPAEDIC      L hip, R leg, C4-C5 fusion, L foot    HX OTHER SURGICAL      xiphoid removal    IR PLC IVC FILTER  2/8/2019     Family History   Problem Relation Age of Onset    Heart Attack Father     Diabetes Mother      Social History     Socioeconomic History    Marital status: SINGLE     Spouse name: Not on file    Number of children: Not on file    Years of education: Not on file    Highest education level: Not on file   Occupational History    Not on file   Social Needs    Financial resource strain: Not on file    Food insecurity:     Worry: Not on file     Inability: Not on file    Transportation needs:     Medical: Not on file     Non-medical: Not on file   Tobacco Use    Smoking status: Former Smoker     Last attempt to quit: 2009     Years since quitting: 10.8    Smokeless tobacco: Never Used   Substance and Sexual Activity    Alcohol use: No     Frequency: Never    Drug use: No    Sexual activity: Never   Lifestyle    Physical activity:     Days per week: Not on file     Minutes per session: Not on file    Stress: Not on file   Relationships    Social connections:     Talks on phone: Not on file     Gets together: Not on file     Attends Nondenominational service: Not on file     Active member of club or organization: Not on file     Attends meetings of clubs or organizations: Not on file     Relationship status: Not on file    Intimate partner violence:     Fear of current or ex partner: Not on file     Emotionally abused: Not on file     Physically abused: Not on file     Forced sexual activity: Not on file   Other Topics Concern    Caffeine Concern Not Asked    Back Care Not Asked    Exercise Not Asked    Occupational Exposure Not Asked    Sleep Concern Not Asked    Stress Concern Not Asked    Weight Concern Not Asked   Social History Narrative    Not on file         Allergies   Allergen Reactions    Crestor [Rosuvastatin] Other (comments)     Pain in left leg  Causes muscle spasms    Lipitor [Atorvastatin] Other (comments)     Left leg pain  Causes muscle spasms    Xarelto [Rivaroxaban] Other (comments)     Pt states it caused internal bleeding       Disposition     Follow-up and Dispositions  ·   Return if symptoms worsen or fail to improve. Future Appointments   Date Time Provider Bill Ricardo   12/20/2019 10:30 AM Ryan Vega MD [facility]            Current Medications after this visit     Current Outpatient Medications   Medication Sig    carvedilol (COREG) 3.125 mg tablet Take 1 Tab by mouth two (2) times daily (with meals).  cyclobenzaprine (FLEXERIL) 5 mg tablet TAKE ONE TABLET BY MOUTH NIGHTLY IF NEEDED FOR MUSCLE SPASMS    losartan (COZAAR) 25 mg tablet TAKE ONE TABLET BY MOUTH EVERY DAY    TRELEGY ELLIPTA 100-62.5-25 mcg inhaler     glucose blood VI test strips (FREESTYLE LITE STRIPS) strip Patient is to check blood sugar once daily. Dx E11.9    PROAIR HFA 90 mcg/actuation inhaler     glipiZIDE SR (GLUCOTROL XL) 2.5 mg CR tablet Take 1 Tab by mouth nightly.     fexofenadine (ALLEGRA) 180 mg tablet Take 1 Tab by mouth.  acetaminophen (TYLENOL ARTHRITIS PAIN) 650 mg TbER Take 1 Tab by mouth every eight (8) hours.  apixaban (ELIQUIS) 5 mg (74 tabs) starter pack Take 10 mg (two 5 mg tablets) by mouth twice a day for 7 days   Followed by 5 mg (one 5 mg tablet) by mouth twice a day    multivitamin (ONE A DAY) tablet Take 1 Tab by mouth nightly.  lovastatin (MEVACOR) 10 mg tablet Take 10 mg by mouth nightly.  aspirin 81 mg tablet Take 162 mg by mouth two (2) times a day.  montelukast (SINGULAIR) 10 mg tablet Take 10 mg by mouth nightly. No current facility-administered medications for this visit.       Medications Discontinued During This Encounter   Medication Reason    doxycycline (VIBRAMYCIN) 100 mg capsule Therapy Completed

## 2019-10-25 ENCOUNTER — TELEPHONE (OUTPATIENT)
Dept: FAMILY MEDICINE CLINIC | Age: 74
End: 2019-10-25

## 2019-10-25 DIAGNOSIS — R73.09 ELEVATED HEMOGLOBIN A1C: Primary | ICD-10-CM

## 2019-10-25 LAB
BUN SERPL-MCNC: 22 MG/DL (ref 8–27)
BUN/CREAT SERPL: 24 (ref 12–28)
CALCIUM SERPL-MCNC: 9.2 MG/DL (ref 8.7–10.3)
CHLORIDE SERPL-SCNC: 106 MMOL/L (ref 96–106)
CHOLEST SERPL-MCNC: 181 MG/DL (ref 100–199)
CO2 SERPL-SCNC: 25 MMOL/L (ref 20–29)
CREAT SERPL-MCNC: 0.93 MG/DL (ref 0.57–1)
EST. AVERAGE GLUCOSE BLD GHB EST-MCNC: 157 MG/DL
GLUCOSE SERPL-MCNC: 77 MG/DL (ref 65–99)
HBA1C MFR BLD: 7.1 % (ref 4.8–5.6)
HCV AB S/CO SERPL IA: <0.1 S/CO RATIO (ref 0–0.9)
HDLC SERPL-MCNC: 64 MG/DL
LDLC SERPL CALC-MCNC: 88 MG/DL (ref 0–99)
POTASSIUM SERPL-SCNC: 4.3 MMOL/L (ref 3.5–5.2)
SODIUM SERPL-SCNC: 146 MMOL/L (ref 134–144)
TRIGL SERPL-MCNC: 147 MG/DL (ref 0–149)
VLDLC SERPL CALC-MCNC: 29 MG/DL (ref 5–40)

## 2019-10-25 RX ORDER — METFORMIN HYDROCHLORIDE 500 MG/1
500 TABLET, EXTENDED RELEASE ORAL
Qty: 90 TAB | Refills: 0 | Status: SHIPPED | OUTPATIENT
Start: 2019-10-25 | End: 2019-11-19 | Stop reason: SDUPTHER

## 2019-10-25 NOTE — TELEPHONE ENCOUNTER
Pt called back to advise that she would be able to afford the metformin. So if NP wants her to just take metformin, she is in agreement w/ that.

## 2019-10-25 NOTE — TELEPHONE ENCOUNTER
Patient called per NP:  Will order metformin 500mg take in AM and glipizide in the PM. Sent to [facility]. Patient verbalized understanding and appreciative.

## 2019-10-25 NOTE — TELEPHONE ENCOUNTER
Patient called per NP:  Please inform Ms. Meera Howell that her A1C is elevated from 6.7 to 7.1-options 1. Work on diet and f/u in 3 months 2. Increase glipizide to 5mg 3. Continue glipizide 2.5mg and add metformin 500mg.  She also needs to drink more water based on her sodium level. Patient verbalized understanding and appreciative. Patient states she would like to know which medication option would NP recommend that would be best for her. The thing about increasing the glipizide is because some her mornings her sugars are low in the 50's and as for the metformin, the copay was too expensive a few years ago.

## 2019-11-01 ENCOUNTER — TELEPHONE (OUTPATIENT)
Dept: FAMILY MEDICINE CLINIC | Age: 74
End: 2019-11-01

## 2019-11-01 NOTE — TELEPHONE ENCOUNTER
Pt states NP wants to know about her BP. She states it is still up. 150/86 this morning. Please direct.  Thanks

## 2019-11-01 NOTE — TELEPHONE ENCOUNTER
Patient called per NP:  Please have patient keep a BP log over the w/e of her BP-take in the AM after using the restroom, feet flat on the floor and in her left arm. Call clinic on Monday with readings to determine plan. Patient verbalized understanding and appreciative. Patient states her BP is normally worse in the morning when she gets up and then comes down later in the day.

## 2019-11-04 ENCOUNTER — TELEPHONE (OUTPATIENT)
Dept: FAMILY MEDICINE CLINIC | Age: 74
End: 2019-11-04

## 2019-11-04 NOTE — TELEPHONE ENCOUNTER
Pt states her BP was 143/86 - Saturday a.m.   137/79 - Sunday a.m. Pt states she believes she knows why her BP is going up. Will discuss when she talks to Nurse Corinne.

## 2019-11-04 NOTE — TELEPHONE ENCOUNTER
Returned patient call,  Patient states her BP this AM was 122/79. She thinks \"flexeril\" was causing her BP to go up. Also, wanted to know if there is anything she can get for her sciatica pain.

## 2019-11-05 NOTE — TELEPHONE ENCOUNTER
Patient called per NP:  Given that Ms. Meera Howell can not take any NSAIDS-the recommendation is tylenol and a low dose opioid (tramadol)-as needed for pain. If she is interested in the tramadol it is our clinic's policy that she complete a controlled substance agreement and agree to UDS.     Has she discussed with ortho? Patient states she does not want tramadol, she has had that before and it made her feel funny,  She tries to stay away from opiods.   She said the only ortho suggested was for her to come off the blood thinners for awhile in order to get a steroid injection and she does not want to do that,  Patient says she is going to try the chiropractor to see if that is going to help

## 2019-11-13 ENCOUNTER — OFFICE VISIT (OUTPATIENT)
Dept: FAMILY MEDICINE CLINIC | Age: 74
End: 2019-11-13

## 2019-11-13 VITALS
TEMPERATURE: 97.2 F | BODY MASS INDEX: 37.03 KG/M2 | RESPIRATION RATE: 18 BRPM | HEART RATE: 82 BPM | DIASTOLIC BLOOD PRESSURE: 78 MMHG | SYSTOLIC BLOOD PRESSURE: 142 MMHG | HEIGHT: 63 IN | OXYGEN SATURATION: 97 % | WEIGHT: 209 LBS

## 2019-11-13 DIAGNOSIS — Z87.09 HISTORY OF COPD: ICD-10-CM

## 2019-11-13 DIAGNOSIS — I99.8 FLUCTUATING BLOOD PRESSURE: ICD-10-CM

## 2019-11-13 DIAGNOSIS — I10 HYPERTENSION GOAL BP (BLOOD PRESSURE) < 150/90: ICD-10-CM

## 2019-11-13 DIAGNOSIS — J32.0 MAXILLARY SINUSITIS, UNSPECIFIED CHRONICITY: Primary | ICD-10-CM

## 2019-11-13 DIAGNOSIS — Z87.09 HX OF BRONCHITIS: ICD-10-CM

## 2019-11-13 RX ORDER — AMOXICILLIN AND CLAVULANATE POTASSIUM 875; 125 MG/1; MG/1
1 TABLET, FILM COATED ORAL 2 TIMES DAILY
Qty: 10 TAB | Refills: 0 | Status: SHIPPED | OUTPATIENT
Start: 2019-11-13 | End: 2019-11-18

## 2019-11-13 NOTE — PROGRESS NOTES
Tania Hill  69 y.o. female  1945  [place]  [address]  [id number]     [facility]: Progress Note       Encounter Date: 11/13/2019    Chief Complaint   Patient presents with    Medication Evaluation     History of Present Illness   Tania Hill is a 69 y.o. female who presents to clinic today for: This note will not be viewable in [address] Medication Evaluation- Home BP readings-taking prior to taking coreg and losartan 141-149 / 76-85. Denies dizziness, h/a, vision changes, CP, SOB, TIA. Reviewed BP trends with patient. BP in clinic noted-took BP medication  ~1 hour prior to clinic arrival.    Ongoing productive cough and sinus pain/pressure with right eye drainage. No treatments. Health Maintenance    Health Maintenance Due   Topic Date Due    MICROALBUMIN Q1  07/26/1957    Shingrix Vaccine Age 50> (1 of 2) 07/26/1997    FOBT Q 1 YEAR AGE 50-75  07/26/1997    DTaP/Tdap/Td series (1 - Tdap) 10/12/2011    BREAST CANCER SCRN MAMMOGRAM  07/14/2019    GLAUCOMA SCREENING Q2Y  01/02/2020     Review of Systems   Review of Systems   Constitutional: Negative. HENT: Negative. Eyes: Positive for discharge. Respiratory: Positive for cough. Cardiovascular: Negative. Gastrointestinal: Negative. Genitourinary: Negative. Musculoskeletal: Negative. Skin: Negative. Neurological: Negative. Endo/Heme/Allergies: Negative. Psychiatric/Behavioral: Negative. Vitals/Objective:     Vitals:    11/13/19 1023   BP: 142/78   Pulse: 82   Resp: 18   Temp: 97.2 °F (36.2 °C)   TempSrc: Oral   SpO2: 97%   Weight: 209 lb (94.8 kg)   Height: 5' 3\" (1.6 m)     Body mass index is 37.02 kg/m². Physical Exam   Constitutional: She is oriented to person, place, and time. She is active and cooperative. HENT:   Head: Normocephalic. Nose: Right sinus exhibits maxillary sinus tenderness.    Mouth/Throat: Uvula is midline, oropharynx is clear and moist and mucous membranes are normal.   Eyes: Conjunctivae and lids are normal.   Cardiovascular: Normal rate, regular rhythm, normal heart sounds and normal pulses. Pulmonary/Chest: Effort normal and breath sounds normal.   Musculoskeletal: Normal range of motion. Neurological: She is alert and oriented to person, place, and time. She has normal strength. She displays a negative Romberg sign. Skin: Skin is warm, dry and intact. Psychiatric: She has a normal mood and affect. Her speech is normal and behavior is normal. Judgment and thought content normal. Cognition and memory are normal.       No results found for this or any previous visit (from the past 24 hour(s)). Assessment and Plan:   1. Maxillary sinusitis, unspecified chronicity    - amoxicillin-clavulanate (AUGMENTIN) 875-125 mg per tablet; Take 1 Tab by mouth two (2) times a day for 5 days. Indications: a common cold  Dispense: 10 Tab; Refill: 0    Will cover with augmentin. Discussed info on AVS and supportive therapy. Discussed JNC 8 guideline re: BP goal <150/90. Advised patient to call clinic with BP ~1 hour after taking medication in the AM-call 1 week of readings. Discussed ED parameters for worsening BP readings or symptoms. Discussed only taking reading once a day and not stressing over the next BP. This note will not be viewable in [address] I have discussed the diagnosis with the patient and the intended plan as seen in the above orders. she has expressed understanding. The patient has received an after-visit summary and questions were answered concerning future plans. I have discussed medication side effects and warnings with the patient as well. Electronically Signed: Leno Rowan NP     History/Allergies   Patients past medical, surgical and family histories were reviewed and updated.     Past Medical History:   Diagnosis Date    Asthma     Bronchitis     Cancer ([facility]     breast - left    Diabetes ([facility]     Hepatitis age 12    High cholesterol     Hypertension     Obesity (BMI 30-39. 9)     Thromboembolus ([facility] [address] [facility]     L LE with PE      Past Surgical History:   Procedure Laterality Date    BREAST SURGERY PROCEDURE UNLISTED      L mastectomy    COLONOSCOPY N/A 5/26/2017    COLONOSCOPY- no info to   performed by Cindy Doss MD at [address] [facility] H[facility] GYN      ablation, R ovary removed, tubal ligation    HX HAMMER TOE REPAIR      HX HEENT      tonsillectomy    HX ORTHOPAEDIC      L hip, R leg, C4-C5 fusion, L foot    HX OTHER SURGICAL      xiphoid removal    IR PLC IVC FILTER  2/8/2019     Family History   Problem Relation Age of Onset    Heart Attack Father     Diabetes Mother      Social History     Socioeconomic History    Marital status: SINGLE     Spouse name: Not on file    Number of children: Not on file    Years of education: Not on file    Highest education level: Not on file   Occupational History    Not on file   Social Needs    Financial resource strain: Not on file    Food insecurity:     Worry: Not on file     Inability: Not on file    Transportation needs:     Medical: Not on file     Non-medical: Not on file   Tobacco Use    Smoking status: Former Smoker     Last attempt to quit: 2009     Years since quitting: 10.8    Smokeless tobacco: Never Used   Substance and Sexual Activity    Alcohol use: No     Frequency: Never    Drug use: No    Sexual activity: Never   Lifestyle    Physical activity:     Days per week: Not on file     Minutes per session: Not on file    Stress: Not on file   Relationships    Social connections:     Talks on phone: Not on file     Gets together: Not on file     Attends Druze service: Not on file     Active member of club or organization: Not on file     Attends meetings of clubs or organizations: Not on file     Relationship status: Not on file    Intimate partner violence:     Fear of current or ex partner: Not on file     Emotionally abused: Not on file     Physically abused: Not on file     Forced sexual activity: Not on file   Other Topics Concern    Caffeine Concern Not Asked    Back Care Not Asked    Exercise Not Asked    Occupational Exposure Not Asked    Sleep Concern Not Asked    Stress Concern Not Asked    Weight Concern Not Asked   Social History Narrative    Not on file         Allergies   Allergen Reactions    Crestor [Rosuvastatin] Other (comments)     Pain in left leg  Causes muscle spasms    Lipitor [Atorvastatin] Other (comments)     Left leg pain  Causes muscle spasms    Xarelto [Rivaroxaban] Other (comments)     Pt states it caused internal bleeding       Disposition     Follow-up and Dispositions  ·   Return if symptoms worsen or fail to improve. Future Appointments   Date Time Provider Bill Britti   12/20/2019 10:30 AM Nadeem Vega MD [facility] SCH[facility]            Current Medications after this visit     Current Outpatient Medications   Medication Sig    amoxicillin-clavulanate (AUGMENTIN) 875-125 mg per tablet Take 1 Tab by mouth two (2) times a day for 5 days. Indications: a common cold    metFORMIN ER (GLUCOPHAGE XR) 500 mg tablet Take 1 Tab by mouth daily (with breakfast). Indications: type 2 diabetes mellitus    carvedilol (COREG) 3.125 mg tablet Take 1 Tab by mouth two (2) times daily (with meals).  losartan (COZAAR) 25 mg tablet TAKE ONE TABLET BY MOUTH EVERY DAY    TRELEGY ELLIPTA 100-62.5-25 mcg inhaler     glucose blood VI test strips (FREESTYLE LITE STRIPS) strip Patient is to check blood sugar once daily. Dx E11.9    PROAIR HFA 90 mcg/actuation inhaler     glipiZIDE SR (GLUCOTROL XL) 2.5 mg CR tablet Take 1 Tab by mouth nightly.  fexofenadine (ALLEGRA) 180 mg tablet Take 1 Tab by mouth.  acetaminophen (TYLENOL ARTHRITIS PAIN) 650 mg TbER Take 1 Tab by mouth every eight (8) hours.     apixaban (ELIQUIS) 5 mg (74 tabs) starter pack Take 10 mg (two 5 mg tablets) by mouth twice a day for 7 days   Followed by 5 mg (one 5 mg tablet) by mouth twice a day    multivitamin (ONE A DAY) tablet Take 1 Tab by mouth nightly.  lovastatin (MEVACOR) 10 mg tablet Take 10 mg by mouth nightly.  aspirin 81 mg tablet Take 162 mg by mouth two (2) times a day.  montelukast (SINGULAIR) 10 mg tablet Take 10 mg by mouth nightly.  cyclobenzaprine (FLEXERIL) 5 mg tablet TAKE ONE TABLET BY MOUTH NIGHTLY IF NEEDED FOR MUSCLE SPASMS     No current facility-administered medications for this visit. There are no discontinued medications.

## 2019-11-13 NOTE — PROGRESS NOTES
Chief Complaint   Patient presents with    Medication Evaluation     Visit Vitals  /78 (BP 1 Location: Left arm, BP Patient Position: Sitting)   Pulse 82   Temp 97.2 °F (36.2 °C) (Oral)   Resp 18   Ht 5' 3\" (1.6 m)   Wt 209 lb (94.8 kg)   SpO2 97%   BMI 37.02 kg/m²     1. Have you been to the ER, urgent care clinic since your last visit? Hospitalized since your last visit? No    2. Have you seen or consulted any other health care providers outside of the [address] [place] [address] since your last visit? Include any pap smears or colon screening.  No    Reviewed record in preparation for visit and have necessary documentation  Pt did not bring medication to office visit for review  opportunity was given for questions  Goals that were addressed and/or need to be completed during or after this appointment include   Health Maintenance Due   Topic Date Due    MICROALBUMIN Q1  07/26/1957    Shingrix Vaccine Age 50> (1 of 2) 07/26/1997    FOBT Q 1 YEAR AGE 50-75  07/26/1997    DTaP/Tdap/Td series (1 - Tdap) 10/12/2011    BREAST CANCER SCRN MAMMOGRAM  07/14/2019    GLAUCOMA SCREENING Q2Y  01/02/2020

## 2019-11-13 NOTE — PATIENT INSTRUCTIONS
Sinusitis: Care Instructions Your Care Instructions Sinusitis is an infection of the lining of the sinus cavities in your head. Sinusitis often follows a cold. It causes pain and pressure in your head and face. In most cases, sinusitis gets better on its own in 1 to 2 weeks. But some mild symptoms may last for several weeks. Sometimes antibiotics are needed. Follow-up care is a key part of your treatment and safety. Be sure to make and go to all appointments, and call your doctor if you are having problems. It's also a good idea to know your test results and keep a list of the medicines you take. How can you care for yourself at home? · Take an over-the-counter pain medicine, such as acetaminophen (Tylenol), ibuprofen (Advil, Motrin), or naproxen (Aleve). Read and follow all instructions on the label. · If the doctor prescribed antibiotics, take them as directed. Do not stop taking them just because you feel better. You need to take the full course of antibiotics. · Be careful when taking over-the-counter cold or flu medicines and Tylenol at the same time. Many of these medicines have acetaminophen, which is Tylenol. Read the labels to make sure that you are not taking more than the recommended dose. Too much acetaminophen (Tylenol) can be harmful. · Breathe warm, moist air from a steamy shower, a hot bath, or a sink filled with hot water. Avoid cold, dry air. Using a humidifier in your home may help. Follow the directions for cleaning the machine. · Use saline (saltwater) nasal washes to help keep your nasal passages open and wash out mucus and bacteria. You can buy saline nose drops at a grocery store or drugstore. Or you can make your own at home by adding 1 teaspoon of salt and 1 teaspoon of baking soda to 2 cups of distilled water. If you make your own, fill a bulb syringe with the solution, insert the tip into your nostril, and squeeze gently. Olamide Satinder your nose. · Put a hot, wet towel or a warm gel pack on your face 3 or 4 times a day for 5 to 10 minutes each time. · Try a decongestant nasal spray like oxymetazoline (Afrin). Do not use it for more than 3 days in a row. Using it for more than 3 days can make your congestion worse. When should you call for help? Call your doctor now or seek immediate medical care if: 
  · You have new or worse swelling or redness in your face or around your eyes.  
  · You have a new or higher fever.  
 Watch closely for changes in your health, and be sure to contact your doctor if: 
  · You have new or worse facial pain.  
  · The mucus from your nose becomes thicker (like pus) or has new blood in it.  
  · You are not getting better as expected. Where can you learn more? Go to http://bib-amanda.info/. Enter C267 in the search box to learn more about \"Sinusitis: Care Instructions. \" Current as of: October 21, 2018 Content Version: 12.2 © 8311-1959 [employer]. Care instructions adapted under license by [employer] (which disclaims liability or warranty for this information). If you have questions about a medical condition or this instruction, always ask your healthcare professional. [address] any warranty or liability for your use of this information.

## 2019-11-19 DIAGNOSIS — R73.09 ELEVATED HEMOGLOBIN A1C: ICD-10-CM

## 2019-11-19 RX ORDER — METFORMIN HYDROCHLORIDE 500 MG/1
TABLET, EXTENDED RELEASE ORAL
Qty: 90 TAB | Refills: 0 | Status: SHIPPED | OUTPATIENT
Start: 2019-11-19 | End: 2019-12-18 | Stop reason: SDUPTHER

## 2019-12-05 ENCOUNTER — OFFICE VISIT (OUTPATIENT)
Dept: FAMILY MEDICINE CLINIC | Age: 74
End: 2019-12-05

## 2019-12-05 VITALS
HEART RATE: 72 BPM | WEIGHT: 209.4 LBS | SYSTOLIC BLOOD PRESSURE: 141 MMHG | HEIGHT: 63 IN | DIASTOLIC BLOOD PRESSURE: 79 MMHG | RESPIRATION RATE: 18 BRPM | TEMPERATURE: 96.8 F | BODY MASS INDEX: 37.1 KG/M2 | OXYGEN SATURATION: 96 %

## 2019-12-05 DIAGNOSIS — R05.9 COUGH: ICD-10-CM

## 2019-12-05 DIAGNOSIS — J45.901 ASTHMA WITH ACUTE EXACERBATION, UNSPECIFIED ASTHMA SEVERITY, UNSPECIFIED WHETHER PERSISTENT: ICD-10-CM

## 2019-12-05 DIAGNOSIS — J34.89 RHINORRHEA: ICD-10-CM

## 2019-12-05 DIAGNOSIS — J06.9 UPPER RESPIRATORY TRACT INFECTION, UNSPECIFIED TYPE: Primary | ICD-10-CM

## 2019-12-05 RX ORDER — PREDNISONE 20 MG/1
40 TABLET ORAL
Qty: 10 TAB | Refills: 0 | Status: SHIPPED | OUTPATIENT
Start: 2019-12-05 | End: 2019-12-10

## 2019-12-05 RX ORDER — AZITHROMYCIN 250 MG/1
TABLET, FILM COATED ORAL
Qty: 6 TAB | Refills: 0 | Status: SHIPPED | OUTPATIENT
Start: 2019-12-05 | End: 2019-12-10

## 2019-12-05 NOTE — PROGRESS NOTES
Chief Complaint   Patient presents with    Cough     since sunday night     Visit Vitals  /79 (BP 1 Location: Right arm, BP Patient Position: Sitting)   Pulse 72   Temp 96.8 °F (36 °C) (Oral)   Resp 18   Ht 5' 3\" (1.6 m)   Wt 209 lb 6.4 oz (95 kg)   SpO2 96%   BMI 37.09 kg/m²     1. Have you been to the ER, urgent care clinic since your last visit? Hospitalized since your last visit? No    2. Have you seen or consulted any other health care providers outside of the [address] [place] [address] since your last visit? Include any pap smears or colon screening.  No    Reviewed record in preparation for visit and have necessary documentation  Pt did not bring medication to office visit for review  opportunity was given for questions  Goals that were addressed and/or need to be completed during or after this appointment include  Health Maintenance Due   Topic Date Due    MICROALBUMIN Q1  07/26/1957    DTaP/Tdap/Td series (1 - Tdap) 07/26/1958    Shingrix Vaccine Age 50> (1 of 2) 07/26/1997    FOBT Q 1 YEAR AGE 50-75  07/26/1997    BREAST CANCER SCRN MAMMOGRAM  07/14/2019    GLAUCOMA SCREENING Q2Y  01/02/2020    EYE EXAM RETINAL OR DILATED  01/02/2020

## 2019-12-05 NOTE — PROGRESS NOTES
Sammi Quintana  69 y.o. female  1945  [place] [address]  [id number]     [id number]: Progress Note       Encounter Date: 12/5/2019    Chief Complaint   Patient presents with    Cough     since sunday night     History of Present Illness   Sammi Quintana is a 69 y.o. female who presents to clinic today for:    Cough- ~1 week of intermittent productive cough, runny nose, sneezing, intermittent wheeze (had to use rescue inhaler 2 days ago). Treatment-Allegra D with some relief. Tolerating fluids but decreased appetite. Hx of asthma and bronchitis. Denies-CP, SOB, fevers, sore throat. Health Maintenance    Health Maintenance Due   Topic Date Due    MICROALBUMIN Q1  07/26/1957    DTaP/Tdap/Td series (1 - Tdap) 07/26/1958    Shingrix Vaccine Age 50> (1 of 2) 07/26/1997    FOBT Q 1 YEAR AGE 50-75  07/26/1997    BREAST CANCER SCRN MAMMOGRAM  07/14/2019    GLAUCOMA SCREENING Q2Y  01/02/2020    EYE EXAM RETINAL OR DILATED  01/02/2020     Review of Systems   Review of Systems   Constitutional: Negative. HENT: Negative. Eyes: Negative. Respiratory: Positive for cough and wheezing. Cardiovascular: Negative. Gastrointestinal: Negative. Genitourinary: Negative. Musculoskeletal: Negative. Skin: Negative. Neurological: Negative. Endo/Heme/Allergies: Negative. Psychiatric/Behavioral: Negative. Vitals/Objective:     Vitals:    12/05/19 1401   BP: 141/79   Pulse: 72   Resp: 18   Temp: 96.8 °F (36 °C)   TempSrc: Oral   SpO2: 96%   Weight: 209 lb 6.4 oz (95 kg)   Height: 5' 3\" (1.6 m)     Body mass index is 37.09 kg/m². Physical Exam  HENT:      Head: Normocephalic. Nose: Rhinorrhea present. Mouth/Throat:      Lips: Pink. Mouth: Mucous membranes are moist.      Pharynx: Oropharynx is clear.    Eyes:      General: Lids are normal.      Conjunctiva/sclera: Conjunctivae normal.   Neck:      Musculoskeletal: Full passive range of motion without pain, normal range of motion and neck supple. Trachea: Trachea and phonation normal.   Cardiovascular:      Rate and Rhythm: Normal rate and regular rhythm. Pulses: Normal pulses. Heart sounds: Normal heart sounds. Pulmonary:      Effort: Pulmonary effort is normal.      Breath sounds: Normal breath sounds and air entry. Musculoskeletal: Normal range of motion. Right lower leg: No edema. Left lower leg: No edema. Lymphadenopathy:      Cervical: No cervical adenopathy. Skin:     General: Skin is warm and dry. Capillary Refill: Capillary refill takes less than 2 seconds. Neurological:      Mental Status: She is alert and oriented to person, place, and time. Psychiatric:         Attention and Perception: Attention and perception normal.         Mood and Affect: Mood and affect normal.         Speech: Speech normal.         Behavior: Behavior is cooperative. Thought Content: Thought content normal.         Cognition and Memory: Cognition normal.         Judgment: Judgment normal.         No results found for this or any previous visit (from the past 24 hour(s)). Assessment and Plan:   1. Upper respiratory tract infection, unspecified type    - predniSONE (DELTASONE) 20 mg tablet; Take 40 mg by mouth daily (with breakfast) for 5 days. Dispense: 10 Tab; Refill: 0  - azithromycin (ZITHROMAX) 250 mg tablet; Take 2 tablets today, then take 1 tablet daily  Dispense: 6 Tab; Refill: 0    Will cover with an antibiotic and printed pocket prescription for prednisone. Discussed supportive therapy. RTC for worsening conditions. I have discussed the diagnosis with the patient and the intended plan as seen in the above orders. she has expressed understanding. The patient has received an after-visit summary and questions were answered concerning future plans.   I have discussed medication side effects and warnings with the patient as well.    Electronically Signed: Latonia Maxwell NP     History/Allergies   Patients past medical, surgical and family histories were reviewed and updated. Past Medical History:   Diagnosis Date    Asthma     Bronchitis     Cancer ([facility] [address]     breast - left    Diabetes (HCC)     Hepatitis age 12    High cholesterol     Hypertension     Obesity (BMI 30-39. 9)     Thromboembolus ([facility]     L LE with PE      Past Surgical History:   Procedure Laterality Date    BREAST SURGERY PROCEDURE UNLISTED      L mastectomy    COLONOSCOPY N/A 5/26/2017    COLONOSCOPY- no info to   performed by Frederick Muro MD at [address] [place] [address] HX GYN      ablation, R ovary removed, tubal ligation    HX HAMMER TOE REPAIR      HX HEENT      tonsillectomy    HX ORTHOPAEDIC      L hip, R leg, C4-C5 fusion, L foot    HX OTHER SURGICAL      xiphoid removal    IR PLC IVC FILTER  2/8/2019     Family History   Problem Relation Age of Onset    Heart Attack Father     Diabetes Mother      Social History     Socioeconomic History    Marital status: SINGLE     Spouse name: Not on file    Number of children: Not on file    Years of education: Not on file    Highest education level: Not on file   Occupational History    Not on file   Social Needs    Financial resource strain: Not on file    Food insecurity:     Worry: Not on file     Inability: Not on file    Transportation needs:     Medical: Not on file     Non-medical: Not on file   Tobacco Use    Smoking status: Former Smoker     Last attempt to quit: 2009     Years since quitting: 10.9    Smokeless tobacco: Never Used   Substance and Sexual Activity    Alcohol use: No     Frequency: Never    Drug use: No    Sexual activity: Never   Lifestyle    Physical activity:     Days per week: Not on file     Minutes per session: Not on file    Stress: Not on file   Relationships    Social connections:     Talks on phone: Not on file     Gets together: Not on file Attends Yazdanism service: Not on file     Active member of club or organization: Not on file     Attends meetings of clubs or organizations: Not on file     Relationship status: Not on file    Intimate partner violence:     Fear of current or ex partner: Not on file     Emotionally abused: Not on file     Physically abused: Not on file     Forced sexual activity: Not on file   Other Topics Concern    Caffeine Concern Not Asked    Back Care Not Asked    Exercise Not Asked    Occupational Exposure Not Asked    Sleep Concern Not Asked    Stress Concern Not Asked    Weight Concern Not Asked   Social History Narrative    Not on file         Allergies   Allergen Reactions    Crestor [Rosuvastatin] Other (comments)     Pain in left leg  Causes muscle spasms    Lipitor [Atorvastatin] Other (comments)     Left leg pain  Causes muscle spasms    Xarelto [Rivaroxaban] Other (comments)     Pt states it caused internal bleeding       Disposition     Follow-up and Dispositions  ·   Return in about 4 months (around 4/5/2020) for Routine and labs. Future Appointments   Date Time Provider Bill Ricardo   12/20/2019 10:30 AM Marshall Vega MD [facility] SCHED            Current Medications after this visit     Current Outpatient Medications   Medication Sig    predniSONE (DELTASONE) 20 mg tablet Take 40 mg by mouth daily (with breakfast) for 5 days.  azithromycin (ZITHROMAX) 250 mg tablet Take 2 tablets today, then take 1 tablet daily    metFORMIN ER (GLUCOPHAGE XR) 500 mg tablet TAKE ONE TABLET BY MOUTH DAILY WITH BREAKFAST    carvedilol (COREG) 3.125 mg tablet Take 1 Tab by mouth two (2) times daily (with meals).     cyclobenzaprine (FLEXERIL) 5 mg tablet TAKE ONE TABLET BY MOUTH NIGHTLY IF NEEDED FOR MUSCLE SPASMS    losartan (COZAAR) 25 mg tablet TAKE ONE TABLET BY MOUTH EVERY DAY    TRELEGY ELLIPTA 100-62.5-25 mcg inhaler     glucose blood VI test strips (FREESTYLE LITE STRIPS) strip Patient is to check blood sugar once daily. Dx E11.9    PROAIR HFA 90 mcg/actuation inhaler     glipiZIDE SR (GLUCOTROL XL) 2.5 mg CR tablet Take 1 Tab by mouth nightly.  fexofenadine (ALLEGRA) 180 mg tablet Take 1 Tab by mouth.  acetaminophen (TYLENOL ARTHRITIS PAIN) 650 mg TbER Take 1 Tab by mouth every eight (8) hours.  apixaban (ELIQUIS) 5 mg (74 tabs) starter pack Take 10 mg (two 5 mg tablets) by mouth twice a day for 7 days   Followed by 5 mg (one 5 mg tablet) by mouth twice a day    multivitamin (ONE A DAY) tablet Take 1 Tab by mouth nightly.  lovastatin (MEVACOR) 10 mg tablet Take 10 mg by mouth nightly.  aspirin 81 mg tablet Take 162 mg by mouth two (2) times a day.  montelukast (SINGULAIR) 10 mg tablet Take 10 mg by mouth nightly. No current facility-administered medications for this visit. There are no discontinued medications.

## 2019-12-05 NOTE — PATIENT INSTRUCTIONS

## 2019-12-10 ENCOUNTER — TELEPHONE (OUTPATIENT)
Dept: FAMILY MEDICINE CLINIC | Age: 74
End: 2019-12-10

## 2019-12-10 NOTE — TELEPHONE ENCOUNTER
Caller's first/last name:  Alexx Delcid    Reason for call:  New medication     Does caller want a return call?  yes    Best contact number:  [phone]    Further clarification of call:      Finished the z-pack. Does she need to come back in or can something else be sent in?   Please advise at [phone]

## 2019-12-10 NOTE — TELEPHONE ENCOUNTER
Returned patient call,  Patient states she is not feeling any better,  Does she need to come in or can something else be sent in for her.

## 2019-12-11 NOTE — TELEPHONE ENCOUNTER
Patient called per NP:  If she completed the antibiotic then likely the illness is viral. What are her symptoms? Patient states she is still coughing, sneezing and drainage from nose. She states she has been on the same antibiotics for so long then maybe they just are not working any more. She also states, that it may just needs to run its course.

## 2019-12-18 DIAGNOSIS — R73.09 ELEVATED HEMOGLOBIN A1C: ICD-10-CM

## 2019-12-18 RX ORDER — LOSARTAN POTASSIUM 25 MG/1
TABLET ORAL
Qty: 90 TAB | Refills: 1 | Status: SHIPPED | OUTPATIENT
Start: 2019-12-18 | End: 2020-01-10 | Stop reason: SDUPTHER

## 2019-12-18 RX ORDER — METFORMIN HYDROCHLORIDE 500 MG/1
TABLET, EXTENDED RELEASE ORAL
Qty: 90 TAB | Refills: 1 | Status: SHIPPED | OUTPATIENT
Start: 2019-12-18 | End: 2020-04-06

## 2019-12-20 ENCOUNTER — OFFICE VISIT (OUTPATIENT)
Dept: ONCOLOGY | Age: 74
End: 2019-12-20

## 2019-12-20 VITALS
HEIGHT: 63 IN | BODY MASS INDEX: 36.86 KG/M2 | HEART RATE: 70 BPM | RESPIRATION RATE: 16 BRPM | SYSTOLIC BLOOD PRESSURE: 151 MMHG | DIASTOLIC BLOOD PRESSURE: 85 MMHG | WEIGHT: 208 LBS | OXYGEN SATURATION: 95 % | TEMPERATURE: 97.8 F

## 2019-12-20 DIAGNOSIS — M54.32 LEFT SIDED SCIATICA: ICD-10-CM

## 2019-12-20 DIAGNOSIS — Z86.711 HISTORY OF PULMONARY EMBOLUS (PE): Primary | ICD-10-CM

## 2019-12-20 DIAGNOSIS — Z86.718 HISTORY OF DVT (DEEP VEIN THROMBOSIS): ICD-10-CM

## 2019-12-20 NOTE — PROGRESS NOTES
[id number] [facility] Oncology at [facility]  [phone]    Hematology / Oncology Established Visit    Reason for Visit:   Jesus Manuel Rivera is a 69 y.o. female who is seen for f/u of PE. History of Present Illness:   Ms. Swati Navarro is a 73 y/o female with type II DM, hyperlipidemia, HTN, remote h/o left breast cancer admitted with worsening shortness of breath, found to have PE. She states she was diagnosed with PNA a few months ago after reporting shortness of breath. She was treated with Levaquin. However, she continued to have SOB, but chest CTA and dopplers negative in 11/2018. She states she suffered a fall when she tripped in her kitchen. She developed bruises on her legs and states her legs have remained swollen since then. No fractures, car/air travel, immobility, surgery, hormone replacement/OCPs. No prior personal or family h/o DVT. After a prior joint surgery in 2012, patient was treated with Xarelto for DVT prophylaxis, which resulted in a GI bleed. Therefore, medication was discontinued. No GI bleed since then. For h/o breast cancer, patient underwent left breast mastectomy and reconstruction; no XRT or chemotherapy. She was seen by Dr. Sherre Bosworth in the past.      Interval History: Today, patient comes in for follow of PE. Reports pain in left leg, reports chronic sciatica. Patient is not having anymore swelling in left leg. She is no longer seeing lymphedema. Reports discoloration in left foot. Denies SOB/WEISS. She is using trelegy inhaler once daily and PRN albuterol. Her mood has improved and her energy has improved. She continues to take Eliquis. She denies chest pain, heart palpitations. Past Medical History:   Diagnosis Date    Asthma     Bronchitis     Cancer ([address]     breast - left    Diabetes (HCC)     Hepatitis age 12    High cholesterol     Hypertension     Obesity (BMI 30-39. 9)     Thromboembolus ([address]     L LE with PE      Past Surgical History: Procedure Laterality Date    BREAST SURGERY PROCEDURE UNLISTED      L mastectomy    COLONOSCOPY N/A 5/26/2017    COLONOSCOPY- no info to   performed by Muna Calvo MD at [address] [facility] HX GYN      ablation, R ovary removed, tubal ligation    HX HAMMER TOE REPAIR      HX HEENT      tonsillectomy    HX ORTHOPAEDIC      L hip, R leg, C4-C5 fusion, L foot    HX OTHER SURGICAL      xiphoid removal    IR PLC IVC FILTER  2/8/2019      Social History     Tobacco Use    Smoking status: Former Smoker     Last attempt to quit: 2009     Years since quitting: 10.9    Smokeless tobacco: Never Used   Substance Use Topics    Alcohol use: No     Frequency: Never      Family History   Problem Relation Age of Onset    Heart Attack Father     Diabetes Mother      Current Outpatient Medications   Medication Sig    losartan (COZAAR) 25 mg tablet TAKE ONE TABLET BY MOUTH EVERY DAY    metFORMIN ER (GLUCOPHAGE XR) 500 mg tablet TAKE ONE TABLET BY MOUTH DAILY WITH BREAKFAST    carvedilol (COREG) 3.125 mg tablet Take 1 Tab by mouth two (2) times daily (with meals).  cyclobenzaprine (FLEXERIL) 5 mg tablet TAKE ONE TABLET BY MOUTH NIGHTLY IF NEEDED FOR MUSCLE SPASMS    TRELEGY ELLIPTA 100-62.5-25 mcg inhaler     glucose blood VI test strips (FREESTYLE LITE STRIPS) strip Patient is to check blood sugar once daily. Dx E11.9    PROAIR HFA 90 mcg/actuation inhaler     glipiZIDE SR (GLUCOTROL XL) 2.5 mg CR tablet Take 1 Tab by mouth nightly.  fexofenadine (ALLEGRA) 180 mg tablet Take 1 Tab by mouth.  acetaminophen (TYLENOL ARTHRITIS PAIN) 650 mg TbER Take 1 Tab by mouth every eight (8) hours.  apixaban (ELIQUIS) 5 mg (74 tabs) starter pack Take 10 mg (two 5 mg tablets) by mouth twice a day for 7 days   Followed by 5 mg (one 5 mg tablet) by mouth twice a day    multivitamin (ONE A DAY) tablet Take 1 Tab by mouth nightly.  lovastatin (MEVACOR) 10 mg tablet Take 10 mg by mouth nightly.     aspirin 81 mg tablet Take 162 mg by mouth two (2) times a day.  montelukast (SINGULAIR) 10 mg tablet Take 10 mg by mouth nightly. No current facility-administered medications for this visit. Allergies   Allergen Reactions    Crestor [Rosuvastatin] Other (comments)     Pain in left leg  Causes muscle spasms    Lipitor [Atorvastatin] Other (comments)     Left leg pain  Causes muscle spasms    Xarelto [Rivaroxaban] Other (comments)     Pt states it caused internal bleeding        Review of Systems: A complete review of systems was obtained, negative except as described above. Physical Exam:     Visit Vitals  /85   Pulse 70   Temp 97.8 °F (36.6 °C) (Oral)   Resp 16   Ht 5' 3\" (1.6 m)   Wt 208 lb (94.3 kg)   SpO2 95%   BMI 36.85 kg/m²     ECOG PS: 0  General: No distress  Eyes: PERRLA, anicteric sclerae  HENT: Atraumatic with normal appearance of ears and nose; OP clear  Neck: Supple; no thyromegaly   Lymphatic: No cervical, supraclavicular, or inguinal adenopathy  Respiratory: CTAB, normal respiratory effort  CV: Normal rate, regular rhythm, no murmurs, trace pitting edema bilateral LE  GI: Soft, nontender, nondistended, no masses, no hepatomegaly, no splenomegaly  MS: Normal gait and station. Digits without clubbing or cyanosis. Skin: No rashes, ecchymoses, or petechiae. Normal temperature, turgor, and texture. Neuro/Psych: Alert, oriented, appropriate affect, normal judgment/insight      Results:     Lab Results   Component Value Date/Time    WBC 11.9 (H) 10/09/2019 02:43 PM    HGB 14.3 10/09/2019 02:43 PM    HCT 43.3 10/09/2019 02:43 PM    PLATELET 134 03/52/9100 02:43 PM    MCV 81.4 10/09/2019 02:43 PM    ABS.  NEUTROPHILS 7.8 10/09/2019 02:43 PM    Hemoglobin (POC) 7.1 (L) 02/24/2012 12:39 AM    Hematocrit (POC) 21 (L) 02/24/2012 12:39 AM     Lab Results   Component Value Date/Time    Sodium 146 (H) 10/24/2019 10:15 AM    Potassium 4.3 10/24/2019 10:15 AM    Chloride 106 10/24/2019 10:15 AM    CO2 25 10/24/2019 10:15 AM    Glucose 77 10/24/2019 10:15 AM    BUN 22 10/24/2019 10:15 AM    Creatinine 0.93 10/24/2019 10:15 AM    GFR est AA 71 10/24/2019 10:15 AM    GFR est non-AA 62 10/24/2019 10:15 AM    Calcium 9.2 10/24/2019 10:15 AM    Sodium (POC) 146 (H) 02/24/2012 12:39 AM    Potassium (POC) 3.5 02/24/2012 12:39 AM    Chloride (POC) 113 (H) 02/24/2012 12:39 AM    Glucose (POC) 110 (H) 02/10/2019 06:40 AM    BUN (POC) 9 02/24/2012 12:39 AM    Creatinine (POC) 0.7 02/24/2012 12:39 AM    Calcium, ionized (POC) 1.12 02/24/2012 12:39 AM     Lab Results   Component Value Date/Time    Bilirubin, total 0.4 10/09/2019 02:43 PM    ALT (SGPT) 24 10/09/2019 02:43 PM    AST (SGOT) 32 10/09/2019 02:43 PM    Alk. phosphatase 104 10/09/2019 02:43 PM    Protein, total 7.2 10/09/2019 02:43 PM    Albumin 3.6 10/09/2019 02:43 PM    Globulin 3.6 10/09/2019 02:43 PM     Chest CTA 2/7/19: IMPRESSION:  Extensive bilateral acute pulmonary emboli, including saddle embolus in the main  pulmonary artery. Unchanged 7 mm right lower lobe pulmonary nodule. Mild linear  atelectasis in the lingula. Lower extremity doppler 2/7/19:  Left lower extremity venous duplex is positive for deep vein thrombosis involving the CFV, FV, profunda, popliteal, posterior tibial and gastrocnemius veins. The right CFV is thrombus free. Chest CT 5/2019:  IMPRESSION:  Stable 7 x 2 mm right lower lobe pulmonary nodule. This nodule is significantly decreased in size compared to May 2011 and appears similar compared to April 2017. Assessment and Recommendations:   Kim Gatica is a 69 y.o. female with DM II, HTN, XOL admitted with PE.    1. Saddle PE / Bilateral PE / LLE DVT: Unprovoked based on lack of a clear known cause, but given patient's age and lack of prior VTE, I do not have a high suspicion of inherited thrombophilias. Therefore, I did not recommend a hypercoag workup. I recommend remaining on anticoagulation indefinitely.  She does have h/o GI bleed several years ago while on Xarelto, but given that was many years ago and pt has current VTE, I recommended anticoagulation as benefits outweigh risks at this time. Echo reviewed. S/p IVC filter placement on 2/8/19. Xarelto in the past caused GI bleed, but pt tolerating Eliquis well. -- Continue on anticoagulation (on Eliquis 5mg BID) indefinitely. Prescribed again with 11 refills. Can continue to fill through PCP.   -- Per [place]oda: Approved for free Eliquis through [facility] through 12/31/2019. Is reapplying again now. -- Return as needed. 2. Intermittent shortness of breath: Improving. Prior SOB may have been related to PNA / PE. However, patient may have had small pulmonary emboli even a few months ago which did not show up on CT. She saw Pulmonary in April 2019, did breathing test, chest CT which was stable. On Trelegy inhaler daily, PRN albuterol. Follows up with pulmonary 3/2020 with possible repeat CTA of chest.     4. H/o BLE edema: She met with lymphedema clinic but did not find visit useful. Reports swelling has decreased in left leg and reports no swelling in right leg.   -- Encouraged walking to help with collateral circulation. 5. Remote h/o left breast cancer: S/p left mastectomy and reconstruction in 2009. No radiation or chemotherapy. 6. Low back pain / L-sided sciatica: Follows with Dr. Wellington Culver. Stable. She would have to stop eliquis for a week in order to get injection. She is reluctant to do this.        Signed By: Nas Martinez MD     December 20, 2019

## 2019-12-20 NOTE — PROGRESS NOTES
Arabella Garcia is a 69 y.o. female here today for follow up of DVT/PE. She is having left leg pain. States this is sciatica. States this is chronic. Having cramps in bilateral lower legs, calf area. Right leg more than left leg.

## 2020-01-10 RX ORDER — CARVEDILOL 3.12 MG/1
3.12 TABLET ORAL 2 TIMES DAILY WITH MEALS
Qty: 60 TAB | Refills: 2 | Status: SHIPPED | OUTPATIENT
Start: 2020-01-10 | End: 2020-03-09

## 2020-01-10 RX ORDER — LOSARTAN POTASSIUM 25 MG/1
TABLET ORAL
Qty: 90 TAB | Refills: 1 | Status: SHIPPED | OUTPATIENT
Start: 2020-01-10 | End: 2020-11-18 | Stop reason: DRUGHIGH

## 2020-01-13 RX ORDER — LOVASTATIN 10 MG/1
10 TABLET ORAL
Qty: 90 TAB | Refills: 1 | Status: SHIPPED | OUTPATIENT
Start: 2020-01-13 | End: 2020-04-06

## 2020-01-13 RX ORDER — GLIPIZIDE 2.5 MG/1
2.5 TABLET, EXTENDED RELEASE ORAL
Qty: 90 TAB | Refills: 1 | Status: SHIPPED | OUTPATIENT
Start: 2020-01-13 | End: 2020-03-09

## 2020-02-10 ENCOUNTER — OFFICE VISIT (OUTPATIENT)
Dept: FAMILY MEDICINE CLINIC | Age: 75
End: 2020-02-10

## 2020-02-10 VITALS
DIASTOLIC BLOOD PRESSURE: 77 MMHG | BODY MASS INDEX: 36.32 KG/M2 | OXYGEN SATURATION: 95 % | HEIGHT: 63 IN | SYSTOLIC BLOOD PRESSURE: 135 MMHG | HEART RATE: 73 BPM | RESPIRATION RATE: 20 BRPM | WEIGHT: 205 LBS | TEMPERATURE: 97.8 F

## 2020-02-10 DIAGNOSIS — J01.00 ACUTE NON-RECURRENT MAXILLARY SINUSITIS: Primary | ICD-10-CM

## 2020-02-10 RX ORDER — AMOXICILLIN AND CLAVULANATE POTASSIUM 875; 125 MG/1; MG/1
1 TABLET, FILM COATED ORAL EVERY 12 HOURS
Qty: 14 TAB | Refills: 0 | Status: SHIPPED | OUTPATIENT
Start: 2020-02-10 | End: 2020-02-17

## 2020-02-10 NOTE — PROGRESS NOTES
CC: Shortness of breath    HPI: Pt is a 69 y.o. female who presents for shortness of breath. Started 3 days ago and associated with facial pain on the R side, post-nasal drainage and cough. Cough is productive of increased amount of clear-yellow phlegm. No fevers. She has been taking her Trelegy and used her albuterol once a few days ago which helped when she was wheezing. She has a h/o COPD and asthma and has also had PNA several times. Of note, her PCP has left the clinic and she states that she would like to establish with Dr. Abdulaziz Zamora. Past Medical History:   Diagnosis Date    Asthma     Bronchitis     Cancer ([address]     breast - left    COPD (chronic obstructive pulmonary disease) ([address]     [facility]; pulmonary nodule 7mm stable (found )    Diabetes ([address]     Hepatitis age 11    High cholesterol     Hypertension     Obesity (BMI 30-39. 9)     Thromboembolus ([address]     L LE with PE       Family History   Problem Relation Age of Onset    Heart Attack Father     Diabetes Mother        Social History     Tobacco Use    Smoking status: Former Smoker     Last attempt to quit:      Years since quittin.1    Smokeless tobacco: Never Used   Substance Use Topics    Alcohol use: No     Frequency: Never    Drug use: No       ROS:  Per HPI    PE:  Visit Vitals  /77 (BP 1 Location: Left arm, BP Patient Position: Sitting)   Pulse 73   Temp 97.8 °F (36.6 °C) (Oral)   Resp 20   Ht 5' 3\" (1.6 m)   Wt 205 lb (93 kg)   SpO2 95%   BMI 36.31 kg/m²     Gen: Pt sitting in chair, in NAD  Head: Normocephalic, atraumatic  Eyes: Sclera anicteric, EOM grossly intact, PERRL  Ears: L TM pearly with good light reflex. R TM obscured by cerumen  Nose: Normal nasal mucosa, +clear drainage  Throat: MMM, normal lips, tongue and gums  Neck: Supple, no LAD  CVS: Normal S1, S2, no m/r/g  Resp: CTAB, no wheezes or rales. Good air movement throughout.    Extrem: Atraumatic, no cyanosis  Pulses: 2+   Skin: Warm, dry  Neuro: Alert, oriented, appropriate      A/P:   Encounter Diagnoses     ICD-10-CM ICD-9-CM   1. Acute non-recurrent maxillary sinusitis J01.00 461.0     1. Acute non-recurrent maxillary sinusitis: Discussed with pt, given her symptoms and her exam, she is not having a COPD exacerbation, and more likely a sinusitis. However, she is at high risk for an infection turning into either a COPD exacerbation or PNA given her history. Discussed that most sinus infections are viral and will resolve with time, however given her history, will treat with abx in case there is a bacterial component. Advised her to use albuterol every 4 hours as needed and call if she is needing it more than every 4 hours, or if her breathing is worsening or symptoms not improving with the Augmentin. - amoxicillin-clavulanate (AUGMENTIN) 875-125 mg per tablet; Take 1 Tab by mouth every twelve (12) hours for 7 days. Dispense: 14 Tab; Refill: 0       RTC prn if symptoms worsen or fail to improve    Discussed diagnoses in detail with patient. Medication risks/benefits/side effects discussed with patient. All of the patient's questions were addressed. The patient understands and agrees with our plan of care. The patient knows to call back if they are unsure of or forget any changes we discussed today or if the symptoms change. The patient received an After-Visit Summary which contains VS, orders, medication list and allergy list. This can be used as a \"mini-medical record\" should they have to seek medical care while out of town. Current Outpatient Medications on File Prior to Visit   Medication Sig Dispense Refill    lovastatin (MEVACOR) 10 mg tablet Take 1 Tab by mouth nightly. 90 Tab 1    glipiZIDE SR (GLUCOTROL XL) 2.5 mg CR tablet Take 1 Tab by mouth nightly. 90 Tab 1    carvedilol (COREG) 3.125 mg tablet Take 1 Tab by mouth two (2) times daily (with meals).  60 Tab 2    losartan (COZAAR) 25 mg tablet TAKE ONE TABLET BY MOUTH EVERY DAY 90 Tab 1    apixaban (ELIQUIS) 5 mg tablet Take 1 Tab by mouth two (2) times a day. 60 Tab 11    metFORMIN ER (GLUCOPHAGE XR) 500 mg tablet TAKE ONE TABLET BY MOUTH DAILY WITH BREAKFAST 90 Tab 1    cyclobenzaprine (FLEXERIL) 5 mg tablet TAKE ONE TABLET BY MOUTH NIGHTLY IF NEEDED FOR MUSCLE SPASMS 30 Tab 0    TRELEGY ELLIPTA 100-62.5-25 mcg inhaler   12    glucose blood VI test strips (FREESTYLE LITE STRIPS) strip Patient is to check blood sugar once daily. Dx E11.9 100 Strip 2    PROAIR HFA 90 mcg/actuation inhaler   3    acetaminophen (TYLENOL ARTHRITIS PAIN) 650 mg TbER Take 1 Tab by mouth every eight (8) hours. 60 Tab 0    multivitamin (ONE A DAY) tablet Take 1 Tab by mouth nightly.  aspirin 81 mg tablet Take 162 mg by mouth two (2) times a day.  montelukast (SINGULAIR) 10 mg tablet Take 10 mg by mouth nightly. No current facility-administered medications on file prior to visit.

## 2020-02-10 NOTE — PROGRESS NOTES
1. Have you been to the ER, urgent care clinic since your last visit? Hospitalized since your last visit? no    2. Have you seen or consulted any other health care providers outside of the [address] [place] [address] since your last visit? Include any pap smears or colon screening. no  Reviewed record in preparation for visit and have obtained necessary documentation. Patient did not bring medications to visit for review. Information provided on Advanced Directive, Living Will. Body mass index is 36.31 kg/m².    Health Maintenance Due   Topic Date Due    MICROALBUMIN Q1  07/26/1955    DTaP/Tdap/Td series (1 - Tdap) 07/26/1958    Shingrix Vaccine Age 50> (1 of 2) 07/26/1997    FOBT Q1Y Age 50-75  07/26/1997    Breast Cancer Screen Mammogram  07/14/2018

## 2020-02-10 NOTE — PATIENT INSTRUCTIONS
Sinusitis: Care Instructions Your Care Instructions Sinusitis is an infection of the lining of the sinus cavities in your head. Sinusitis often follows a cold. It causes pain and pressure in your head and face. In most cases, sinusitis gets better on its own in 1 to 2 weeks. But some mild symptoms may last for several weeks. Sometimes antibiotics are needed. Follow-up care is a key part of your treatment and safety. Be sure to make and go to all appointments, and call your doctor if you are having problems. It's also a good idea to know your test results and keep a list of the medicines you take. How can you care for yourself at home? · Take an over-the-counter pain medicine, such as acetaminophen (Tylenol), ibuprofen (Advil, Motrin), or naproxen (Aleve). Read and follow all instructions on the label. · If the doctor prescribed antibiotics, take them as directed. Do not stop taking them just because you feel better. You need to take the full course of antibiotics. · Be careful when taking over-the-counter cold or flu medicines and Tylenol at the same time. Many of these medicines have acetaminophen, which is Tylenol. Read the labels to make sure that you are not taking more than the recommended dose. Too much acetaminophen (Tylenol) can be harmful. · Breathe warm, moist air from a steamy shower, a hot bath, or a sink filled with hot water. Avoid cold, dry air. Using a humidifier in your home may help. Follow the directions for cleaning the machine. · Use saline (saltwater) nasal washes to help keep your nasal passages open and wash out mucus and bacteria. You can buy saline nose drops at a grocery store or drugstore. Or you can make your own at home by adding 1 teaspoon of salt and 1 teaspoon of baking soda to 2 cups of distilled water. If you make your own, fill a bulb syringe with the solution, insert the tip into your nostril, and squeeze gently. Juan Bees your nose. · Put a hot, wet towel or a warm gel pack on your face 3 or 4 times a day for 5 to 10 minutes each time. · Try a decongestant nasal spray like oxymetazoline (Afrin). Do not use it for more than 3 days in a row. Using it for more than 3 days can make your congestion worse. When should you call for help? Call your doctor now or seek immediate medical care if: 
  · You have new or worse swelling or redness in your face or around your eyes.  
  · You have a new or higher fever.  
 Watch closely for changes in your health, and be sure to contact your doctor if: 
  · You have new or worse facial pain.  
  · The mucus from your nose becomes thicker (like pus) or has new blood in it.  
  · You are not getting better as expected. Where can you learn more? Go to http://bib-amanda.info/. Enter W939 in the search box to learn more about \"Sinusitis: Care Instructions. \" Current as of: October 21, 2018 Content Version: 12.2 © 2707-0317 [employer]. Care instructions adapted under license by [employer] (which disclaims liability or warranty for this information). If you have questions about a medical condition or this instruction, always ask your healthcare professional. [address] any warranty or liability for your use of this information.

## 2020-02-19 ENCOUNTER — TELEPHONE (OUTPATIENT)
Dept: ONCOLOGY | Age: 75
End: 2020-02-19

## 2020-02-19 NOTE — TELEPHONE ENCOUNTER
Received a letter from [employer] [facility] patient assistance stating patient was denied for free drug for Eliquis. Called [facility] Patient Assistance to inquire why she was denied and the representative stated she was denied because she has to meet the 3% of her income in out of pocket prescription cost which is $671.58. Called patient and informed her of the above information. Patient verbalized understanding and stated that she had to to do the same thing last year but she will pay the copay until she reaches the $671.58 and then she will send the office her prescription out of pocket to be submitted to [facility].

## 2020-02-20 ENCOUNTER — TELEPHONE (OUTPATIENT)
Dept: ONCOLOGY | Age: 75
End: 2020-02-20

## 2020-02-20 NOTE — TELEPHONE ENCOUNTER
02/20/20 2:35 PM Spoke with patient about switching from eliquis to coumadin due to high co-pay of eliquis. Patient decline switching to coumadin. No further questions at this time.

## 2020-04-01 ENCOUNTER — TELEPHONE (OUTPATIENT)
Dept: FAMILY MEDICINE CLINIC | Age: 75
End: 2020-04-01

## 2020-04-01 ENCOUNTER — VIRTUAL VISIT (OUTPATIENT)
Dept: FAMILY MEDICINE CLINIC | Age: 75
End: 2020-04-01

## 2020-04-01 DIAGNOSIS — I10 ESSENTIAL HYPERTENSION: ICD-10-CM

## 2020-04-01 DIAGNOSIS — J32.9 BACTERIAL SINUSITIS: Primary | ICD-10-CM

## 2020-04-01 DIAGNOSIS — B96.89 BACTERIAL SINUSITIS: Primary | ICD-10-CM

## 2020-04-01 DIAGNOSIS — E11.9 TYPE 2 DIABETES MELLITUS WITHOUT COMPLICATION, WITHOUT LONG-TERM CURRENT USE OF INSULIN (HCC): ICD-10-CM

## 2020-04-01 RX ORDER — AZITHROMYCIN 250 MG/1
TABLET, FILM COATED ORAL
Qty: 6 TAB | Refills: 0 | Status: SHIPPED | OUTPATIENT
Start: 2020-04-01 | End: 2020-04-06

## 2020-04-01 RX ORDER — FLUTICASONE PROPIONATE 50 MCG
2 SPRAY, SUSPENSION (ML) NASAL DAILY
Qty: 1 BOTTLE | Refills: 2 | Status: SHIPPED | OUTPATIENT
Start: 2020-04-01 | End: 2022-04-27

## 2020-04-01 NOTE — TELEPHONE ENCOUNTER
Pt called to report she has mucus drainage and sore throat. No fever or other symptoms. Pt is requesting a call from Dr. Real Montenegro. She has an [facility] and is okay w/ a virtual visit if need be. Pt was seeing [facility] and was recommended to see Dr. Real Montenegro when kayce left.

## 2020-04-01 NOTE — PROGRESS NOTES
Esperanza Guerar is a 69 y.o. female evaluated via telephone on 20. Patient Identity confirmed by . Consent:  He and/or health care decision maker is aware that that he may receive a bill for this telephone service, depending on his insurance coverage, and has provided verbal consent to proceed: Yes    Physician Location: Office  Patient Location: Home  Nurse Assisting with Encounter: Srikanth Knott LPN    Chief Complaint   Patient presents with    Nasal Congestion      Information gathered from patient and/or health care decision maker. HPI:   Sinusitis: Patient presents with sinusitis symptoms over the last 5 days. Patient reports symptoms including nasal congestion, cough, sneezing, sore throats and denies itchy eyes, itchy nose. Patient describes pain of the right sinus(es). Patient does report Viral URI prior to developing these symptoms. Previous OTC treatments include nothing which have been not effective in treating symptoms. she denies recent sick contacts. she does have history of recurrent sinusitis. - Last antibiotic use: Augmentin in February    HTN: BP at home in the 130/80's. Taking medications, No issues at this time. Diabetes: Taking medications. BG have been well controlled per patient. Last A1C 6 months ago and 7.1    Review of Systems   Constitutional: Negative for chills, fever and malaise/fatigue. HENT: Positive for congestion, sinus pain and sore throat. Negative for nosebleeds. Gastrointestinal: Negative for abdominal pain, diarrhea, nausea and vomiting. Current Outpatient Medications:     azithromycin (ZITHROMAX) 250 mg tablet, Take 2 tablets today, then take 1 tablet daily, Disp: 6 Tab, Rfl: 0    fluticasone propionate (FLONASE) 50 mcg/actuation nasal spray, 2 Sprays by Both Nostrils route daily. , Disp: 1 Bottle, Rfl: 2    carvediloL (COREG) 3.125 mg tablet, TAKE ONE TABLET BY MOUTH TWICE DAILY WITH FOOD, Disp: 60 Tab, Rfl: 0    glipiZIDE SR (GLUCOTROL XL) 2.5 mg CR tablet, TAKE ONE TABLET BY MOUTH NIGHTLY, Disp: 30 Tab, Rfl: 0    lovastatin (MEVACOR) 10 mg tablet, Take 1 Tab by mouth nightly., Disp: 90 Tab, Rfl: 1    losartan (COZAAR) 25 mg tablet, TAKE ONE TABLET BY MOUTH EVERY DAY, Disp: 90 Tab, Rfl: 1    apixaban (ELIQUIS) 5 mg tablet, Take 1 Tab by mouth two (2) times a day., Disp: 60 Tab, Rfl: 11    metFORMIN ER (GLUCOPHAGE XR) 500 mg tablet, TAKE ONE TABLET BY MOUTH DAILY WITH BREAKFAST, Disp: 90 Tab, Rfl: 1    cyclobenzaprine (FLEXERIL) 5 mg tablet, TAKE ONE TABLET BY MOUTH NIGHTLY IF NEEDED FOR MUSCLE SPASMS, Disp: 30 Tab, Rfl: 0    TRELEGY ELLIPTA 100-62.5-25 mcg inhaler, , Disp: , Rfl: 12    glucose blood VI test strips (FREESTYLE LITE STRIPS) strip, Patient is to check blood sugar once daily. Dx E11.9, Disp: 100 Strip, Rfl: 2    PROAIR HFA 90 mcg/actuation inhaler, , Disp: , Rfl: 3    acetaminophen (TYLENOL ARTHRITIS PAIN) 650 mg TbER, Take 1 Tab by mouth every eight (8) hours. , Disp: 60 Tab, Rfl: 0    multivitamin (ONE A DAY) tablet, Take 1 Tab by mouth nightly., Disp: , Rfl:     aspirin 81 mg tablet, Take 162 mg by mouth two (2) times a day., Disp: , Rfl:     montelukast (SINGULAIR) 10 mg tablet, Take 10 mg by mouth nightly., Disp: , Rfl:      Allergies   Allergen Reactions    Crestor [Rosuvastatin] Other (comments)     Pain in left leg  Causes muscle spasms    Lipitor [Atorvastatin] Other (comments)     Left leg pain  Causes muscle spasms    Xarelto [Rivaroxaban] Other (comments)     Pt states it caused internal bleeding        Patient Active Problem List    Diagnosis Date Noted    COPD (chronic obstructive pulmonary disease) ([facility] [address] [facility]     History of GI bleed 04/16/2019    DCIS (ductal carcinoma in situ) of breast 03/08/2019    Chronic anticoagulation 03/01/2019    Severe obesity ([facility] [address] 03/01/2019    Acute pulmonary embolism ([facility] [address] 02/08/2019    Pulmonary embolism ([facility] [address] 02/07/2019    Acute deep vein thrombosis (DVT) of left lower extremity ([facility] 02/07/2019    Debility 02/25/2012    Melena 02/24/2012    Acute posthemorrhagic anemia 02/24/2012    Type 2 diabetes mellitus without complication, without long-term current use of insulin ([facility] 02/24/2012    HTN (hypertension) 02/24/2012    Other hyperlipidemia 02/24/2012        Assessment/Plan:  Details of this discussion including any medical advice provided: Bacterial Sinusitis, will treat with Azithromycin as previously effective and patient recently on Augmentin. Also, HTN appears stable at home as does DM. Will need patient to get labs in the next 2-3 months, but most recent labs were reviewed and within goal or normal, so can wait on labs for now. ICD-10-CM ICD-9-CM    1. Bacterial sinusitis J32.9 473.9 azithromycin (ZITHROMAX) 250 mg tablet    B96.89 041.9 fluticasone propionate (FLONASE) 50 mcg/actuation nasal spray   2. Essential hypertension I10 401.9    3. Type 2 diabetes mellitus without complication, without long-term current use of insulin ([facility]) E11.9 250.00      Follow-up and Dispositions    · Return in about 2 months (around 6/1/2020) for FOllow up for labs (In Clinic). Total Time: minutes: 11-20 minutes was spent on phone call discussing above problems and plans. Patient Problem list, medications, and Allergies were reviewed during this encounter. I affirm this is a Patient Initiated Episode with an Established Patient who has not had a related appointment within my department in the past 7 days or scheduled within the next 24 hours.     Note: not billable if this call serves to triage the patient into an appointment for the relevant concern    MD JAGDISH Barth & JERRY ORDONEZ [facility]  04/01/20

## 2020-04-06 DIAGNOSIS — R73.09 ELEVATED HEMOGLOBIN A1C: ICD-10-CM

## 2020-04-06 RX ORDER — LOVASTATIN 10 MG/1
TABLET ORAL
Qty: 90 TAB | Refills: 0 | Status: SHIPPED | OUTPATIENT
Start: 2020-04-06 | End: 2020-09-30

## 2020-04-06 RX ORDER — METFORMIN HYDROCHLORIDE 500 MG/1
TABLET, EXTENDED RELEASE ORAL
Qty: 90 TAB | Refills: 0 | Status: SHIPPED | OUTPATIENT
Start: 2020-04-06 | End: 2021-01-05

## 2020-04-13 NOTE — TELEPHONE ENCOUNTER
4/10/20- Received pharmacy receipts from patient showing she has met her 3% of her OOP expense for 2020 prescription costs. Had this faxed to [facility] patient assistance. Fax Conf received. 4/13/20- Call [facility] to check statis and representative stated patient was approved for free Eliquis and they will reach out to her this week. Called patient and informed her of the above information and she was very grateful. Nothing more needed at this time.

## 2020-04-22 ENCOUNTER — VIRTUAL VISIT (OUTPATIENT)
Dept: FAMILY MEDICINE CLINIC | Age: 75
End: 2020-04-22

## 2020-04-22 DIAGNOSIS — J34.89 SINUS DRAINAGE: Primary | ICD-10-CM

## 2020-04-22 NOTE — PROGRESS NOTES
Wiley Tavera is a 69 y.o. female evaluated via Doximetry on 20. Patient Identity confirmed by . Consent:  He and/or health care decision maker is aware that that he may receive a bill for this telephone service, depending on his insurance coverage, and has provided verbal consent to proceed: Yes    Physician Location: Office  Patient Location: Home  Nurse Assisting with Encounter: Carmel Garcia LPN    Chief Complaint   Patient presents with    Sinus Pain      Information gathered from patient and/or health care decision maker. HPI:   Patient reports that she has had right sinus mild pressure and some sinus drainage for the past 24 hours. Denies fevers, chills, purulent drainage, or other symptoms. Denies sick contacts. Denies significant allergen exposure. No recent URI symptoms. Patient was recently treated for a bacterial sinusitis with Azithromycin which was very effective. COPD is stable at this time. /84  Temp 97.2    Review of Systems   Constitutional: Negative for chills and fever. HENT: Positive for congestion. Negative for sore throat. Respiratory: Positive for cough. Neurological: Negative for headaches. Current Outpatient Medications:     metFORMIN ER (GLUCOPHAGE XR) 500 mg tablet, TAKE ONE TABLET BY MOUTH DAILY WITH BREAKFAST, Disp: 90 Tab, Rfl: 0    lovastatin (MEVACOR) 10 mg tablet, TAKE ONE TABLET BY MOUTH NIGHTLY, Disp: 90 Tab, Rfl: 0    fluticasone propionate (FLONASE) 50 mcg/actuation nasal spray, 2 Sprays by Both Nostrils route daily. , Disp: 1 Bottle, Rfl: 2    carvediloL (COREG) 3.125 mg tablet, TAKE ONE TABLET BY MOUTH TWICE DAILY WITH FOOD, Disp: 60 Tab, Rfl: 0    glipiZIDE SR (GLUCOTROL XL) 2.5 mg CR tablet, TAKE ONE TABLET BY MOUTH NIGHTLY, Disp: 30 Tab, Rfl: 0    losartan (COZAAR) 25 mg tablet, TAKE ONE TABLET BY MOUTH EVERY DAY, Disp: 90 Tab, Rfl: 1    apixaban (ELIQUIS) 5 mg tablet, Take 1 Tab by mouth two (2) times a day. , Disp: 60 Tab, Rfl: 11    cyclobenzaprine (FLEXERIL) 5 mg tablet, TAKE ONE TABLET BY MOUTH NIGHTLY IF NEEDED FOR MUSCLE SPASMS, Disp: 30 Tab, Rfl: 0    TRELEGY ELLIPTA 100-62.5-25 mcg inhaler, , Disp: , Rfl: 12    glucose blood VI test strips (FREESTYLE LITE STRIPS) strip, Patient is to check blood sugar once daily. Dx E11.9, Disp: 100 Strip, Rfl: 2    PROAIR HFA 90 mcg/actuation inhaler, , Disp: , Rfl: 3    acetaminophen (TYLENOL ARTHRITIS PAIN) 650 mg TbER, Take 1 Tab by mouth every eight (8) hours. , Disp: 60 Tab, Rfl: 0    multivitamin (ONE A DAY) tablet, Take 1 Tab by mouth nightly., Disp: , Rfl:     aspirin 81 mg tablet, Take 162 mg by mouth two (2) times a day., Disp: , Rfl:     montelukast (SINGULAIR) 10 mg tablet, Take 10 mg by mouth nightly., Disp: , Rfl:      Allergies   Allergen Reactions    Crestor [Rosuvastatin] Other (comments)     Pain in left leg  Causes muscle spasms    Lipitor [Atorvastatin] Other (comments)     Left leg pain  Causes muscle spasms    Xarelto [Rivaroxaban] Other (comments)     Pt states it caused internal bleeding        Patient Active Problem List    Diagnosis Date Noted    [facility] disease) [facility]     History of GI bleed 04/16/2019    DCIS (ductal carcinoma in situ) of breast 03/08/2019    Chronic anticoagulation 03/01/2019    Severe obesity ([address] 03/01/2019    Acute pulmonary embolism ([address] 02/08/2019    Pulmonary embolism ([address] 02/07/2019    Acute deep vein thrombosis (DVT) of left lower extremity ([address] 02/07/2019    Debility 02/25/2012    Melena 02/24/2012    Acute posthemorrhagic anemia 02/24/2012    Type 2 diabetes mellitus without complication, without long-term current use of insulin ([address] 02/24/2012    HTN (hypertension) 02/24/2012    Other hyperlipidemia 02/24/2012        Health Maintenance Due   Topic Date Due    MICROALBUMIN Q1  07/26/1957    DTaP/Tdap/Td series (1 - Tdap) 07/26/1968    Shingrix Vaccine Age 50> (1 of 2) 07/26/1997    FOBT Q1Y Age 50-75  07/26/1997    Breast Cancer Screen Mammogram  07/14/2018    Medicare Yearly Exam  03/12/2020    Foot Exam Q1  05/14/2020        Assessment/Plan:  Details of this discussion including any medical advice provided: Sinus drainage and mild pressure. Likely more allergy related, advised on symptomatic therapies. To follow up/call back in 2 days to review. Recently use Azithromycin for Sinusitis. If worsening may consider repeat course. ICD-10-CM ICD-9-CM    1. Sinus drainage J34.89 478.19      Follow-up and Dispositions    · Return in about 2 days (around 4/24/2020), or if symptoms worsen or fail to improve. Total Time: minutes: 5-10 minutes was spent on telemedicine encounter discussing above problems and plans. Patient Problem list, medications, and Allergies were reviewed during this encounter. Pursuant to the emergency declaration under the [address] [place] [address] waiver authority and the [employer] Act, this Telephone Visit was conducted, with patient's consent, to reduce the patient's risk of exposure to COVID-19 and provide continuity of care for an established patient. I affirm this is a Patient Initiated Episode with an Established Patient who has not had a related appointment within my department in the past 7 days or scheduled within the next 24 hours. Discussed diagnoses in detail with patient. Medication risks/benefits/side effects discussed with patient. All of the patient's questions were addressed. The patient understands and agrees with our plan of care. The patient knows to call back if they are unsure of or forget any changes we discussed today or if the symptoms change.     Note: not billable if this call serves to triage the patient into an appointment for the relevant concern    MD JAGDISH Rubio & JERRY ORDONEZ [facility]  04/22/20

## 2020-05-26 ENCOUNTER — TELEPHONE (OUTPATIENT)
Dept: FAMILY MEDICINE CLINIC | Age: 75
End: 2020-05-26

## 2020-05-26 ENCOUNTER — OFFICE VISIT (OUTPATIENT)
Dept: FAMILY MEDICINE CLINIC | Age: 75
End: 2020-05-26

## 2020-05-26 VITALS
BODY MASS INDEX: 37.56 KG/M2 | HEART RATE: 100 BPM | OXYGEN SATURATION: 96 % | RESPIRATION RATE: 20 BRPM | SYSTOLIC BLOOD PRESSURE: 150 MMHG | TEMPERATURE: 97.9 F | WEIGHT: 212 LBS | HEIGHT: 63 IN | DIASTOLIC BLOOD PRESSURE: 82 MMHG

## 2020-05-26 DIAGNOSIS — E66.01 SEVERE OBESITY (HCC): ICD-10-CM

## 2020-05-26 DIAGNOSIS — E11.9 TYPE 2 DIABETES MELLITUS WITHOUT COMPLICATION, WITHOUT LONG-TERM CURRENT USE OF INSULIN (HCC): ICD-10-CM

## 2020-05-26 DIAGNOSIS — R30.0 DYSURIA: Primary | ICD-10-CM

## 2020-05-26 LAB
ALBUMIN UR QL STRIP: 30 MG/L
BILIRUB UR QL STRIP: NEGATIVE
CREATININE, URINE POC: 200 MG/DL
GLUCOSE POC: 270 MG/DL
GLUCOSE UR-MCNC: NORMAL MG/DL
HBA1C MFR BLD HPLC: 6.6 %
KETONES P FAST UR STRIP-MCNC: NEGATIVE MG/DL
MICROALBUMIN/CREAT RATIO POC: <30 MG/G
PH UR STRIP: 5.5 [PH] (ref 4.6–8)
PROT UR QL STRIP: NEGATIVE
SP GR UR STRIP: 1.02 (ref 1–1.03)
UA UROBILINOGEN AMB POC: NORMAL (ref 0.2–1)
URINALYSIS CLARITY POC: CLEAR
URINALYSIS COLOR POC: YELLOW
URINE BLOOD POC: NORMAL
URINE LEUKOCYTES POC: NEGATIVE
URINE NITRITES POC: NEGATIVE

## 2020-05-26 RX ORDER — CEPHALEXIN 500 MG/1
500 CAPSULE ORAL 3 TIMES DAILY
Qty: 21 CAP | Refills: 0 | Status: SHIPPED | OUTPATIENT
Start: 2020-05-26 | End: 2020-06-02

## 2020-05-26 NOTE — TELEPHONE ENCOUNTER
----- Message from Richie Weeks sent at 2020  2:20 PM EDT -----  Regarding: Eladio Hunt / telephone  Patient's first and last name:Heather BEVERLY  :  1945  ID numbers: [id number]  Caller's first and last name:n/a  Reason for call: Pt stated she believes that she has a UTI and would like to know if she could have come to the office to have labs done.   Callback required yes/no and why:yes  Best contact number(s):[phone]  Details to clarify the request:n/a

## 2020-05-26 NOTE — PROGRESS NOTES
1. Have you been to the ER, urgent care clinic since your last visit? Hospitalized since your last visit? No    2. Have you seen or consulted any other health care providers outside of the [address] [place] [address] since your last visit? Include any pap smears or colon screening.  No  Reviewed record in preparation for visit and have necessary documentation  Pt did not bring medication to office visit for review    Goals that were addressed and/or need to be completed during or after this appointment include   Health Maintenance Due   Topic Date Due    MICROALBUMIN Q1  07/26/1957    DTaP/Tdap/Td series (1 - Tdap) 07/26/1968    Shingrix Vaccine Age 50> (1 of 2) 07/26/1997    FOBT Q1Y Age 50-75  07/26/1997    Breast Cancer Screen Mammogram  07/14/2018    Medicare Yearly Exam  03/12/2020    Foot Exam Q1  05/14/2020

## 2020-05-26 NOTE — PATIENT INSTRUCTIONS
[facility] [address] [facility] with [address] [place] [address], [facility] [address] Lynda Omer [facility] 
[phone] Monitor blood pressure outside the office several times weekly at different times during the day and evening. Bring the record to me in 3 weeks for review. Blood Pressure Record Patient Name:  ______________________ :  ______________________ Date/Time BP Reading Pulse Home Blood Pressure Test: About This Test 
What is it? A home blood pressure test allows you to keep track of your blood pressure at home. Blood pressure is a measure of the force of blood against the walls of your arteries. Blood pressure readings include two numbers, such as 130/80 (say \"130 over 80\"). The first number is the systolic pressure. The second number is the diastolic pressure. Why is this test done? You may do this test at home to: · Find out if you have high blood pressure. · Track your blood pressure if you have high blood pressure. · Track how well medicine is working to reduce high blood pressure. · Check how lifestyle changes, such as weight loss and exercise, are affecting blood pressure. How do you prepare for the test? 
For at least 30 minutes before you take your blood pressure, don't exercise or use caffeine, tobacco, or medicines that raise blood pressure. Take your blood pressure while you feel comfortable and relaxed. Sit quietly with both feet on the floor for at least 5 minutes before the test. 
How is the test done? · Sit with your arm slightly bent and resting on a table so that your upper arm is at the same level as your heart. · Roll up your sleeve or take off your shirt to expose your upper arm. · Wrap the blood pressure cuff around your upper arm so that the lower edge of the cuff is about 1 inch above the bend of your elbow. Proceed with the following steps depending on if you are using an automatic or manual pressure monitor. Automatic blood pressure monitors · Press the on/off button on the automatic monitor and wait until the ready-to-measure \"heart\" symbol appears next to zero in the display window. · Press the start button. The cuff will inflate and deflate by itself. · Your blood pressure numbers will appear on the screen. · Write your numbers in your log book, along with the date and time. Manual blood pressure monitors · Place the earpieces of a stethoscope in your ears, and place the bell of the stethoscope over the artery, just below the cuff. · Close the valve on the rubber inflating bulb. · Squeeze the bulb rapidly with your opposite hand to inflate the cuff until the dial or column of mercury reads about 30 mm Hg higher than your usual systolic pressure. If you do not know your usual pressure, inflate the cuff to 210 mm Hg or until the pulse at your wrist disappears. · Open the pressure valve just slightly by twisting or pressing the valve on the bulb. · As you watch the pressure slowly fall, note the level on the dial at which you first start to hear a pulsing or tapping sound through the stethoscope. This is your systolic blood pressure. · Continue letting the air out slowly. The sounds will become muffled and will finally disappear. Note the pressure when the sounds completely disappear. This is your diastolic blood pressure. Let out all the remaining air. · Write your numbers in your log book, along with the date and time. Follow-up care is a key part of your treatment and safety. Be sure to make and go to all appointments, and call your doctor if you are having problems. It's also a good idea to keep a list of the medicines you take. Where can you learn more? Go to http://bib-amanda.info/ Enter C427 in the search box to learn more about \"Home Blood Pressure Test: About This Test.\" Current as of: December 15, 2019Content Version: 12.4 © 5319-7817 [employer]. Care instructions adapted under license by [employer] (which disclaims liability or warranty for this information). If you have questions about a medical condition or this instruction, always ask your healthcare professional. [place] [address] any warranty or liability for your use of this information.

## 2020-05-28 NOTE — PROGRESS NOTES
Patient: Lisandro Healy MRN: [id number]  SSN: [id number]    YOB: 1945  Age: 69 y.o. Sex: female      Chief Complaint   Patient presents with    Urinary Frequency     Lisandro Healy is a 69 y.o. female new to me who complains of frequency for 1  week. Patient with hx of T2D, HTN, HLD, chronic anticoagulation and obesity. Patient without flank pain, fever, chills, nausea or vomiting, abnormal vaginal discharge or bleeding. Patient does not have a history of recurrent UTI. Patient does not have a history of pyelonephritis. Medications:     Current Outpatient Medications   Medication Sig    cephALEXin (KEFLEX) 500 mg capsule Take 1 Cap by mouth three (3) times daily for 7 days.  carvediloL (COREG) 3.125 mg tablet TAKE ONE TABLET BY MOUTH TWICE DAILY WITH FOOD    metFORMIN ER (GLUCOPHAGE XR) 500 mg tablet TAKE ONE TABLET BY MOUTH DAILY WITH BREAKFAST    lovastatin (MEVACOR) 10 mg tablet TAKE ONE TABLET BY MOUTH NIGHTLY    fluticasone propionate (FLONASE) 50 mcg/actuation nasal spray 2 Sprays by Both Nostrils route daily.  glipiZIDE SR (GLUCOTROL XL) 2.5 mg CR tablet TAKE ONE TABLET BY MOUTH NIGHTLY    losartan (COZAAR) 25 mg tablet TAKE ONE TABLET BY MOUTH EVERY DAY    apixaban (ELIQUIS) 5 mg tablet Take 1 Tab by mouth two (2) times a day.  cyclobenzaprine (FLEXERIL) 5 mg tablet TAKE ONE TABLET BY MOUTH NIGHTLY IF NEEDED FOR MUSCLE SPASMS    TRELEGY ELLIPTA 100-62.5-25 mcg inhaler     glucose blood VI test strips (FREESTYLE LITE STRIPS) strip Patient is to check blood sugar once daily. Dx E11.9    PROAIR HFA 90 mcg/actuation inhaler     acetaminophen (TYLENOL ARTHRITIS PAIN) 650 mg TbER Take 1 Tab by mouth every eight (8) hours.  multivitamin (ONE A DAY) tablet Take 1 Tab by mouth nightly.  aspirin 81 mg tablet Take 162 mg by mouth two (2) times a day.  montelukast (SINGULAIR) 10 mg tablet Take 10 mg by mouth nightly.      No current facility-administered medications for this visit. Problem List:     Patient Active Problem List    Diagnosis Date Noted    COPD (chronic obstructive pulmonary disease) ([address]     History of GI bleed 04/16/2019    DCIS (ductal carcinoma in situ) of breast 03/08/2019    Chronic anticoagulation 03/01/2019    Severe obesity ([address] 03/01/2019    Acute pulmonary embolism ([address] 02/08/2019    Pulmonary embolism ([address] 02/07/2019    Acute deep vein thrombosis (DVT) of left lower extremity ([address] 02/07/2019    Debility 02/25/2012    Melena 02/24/2012    Acute posthemorrhagic anemia 02/24/2012    Type 2 diabetes mellitus without complication, without long-term current use of insulin ([address] 02/24/2012    HTN (hypertension) 02/24/2012    Other hyperlipidemia 02/24/2012       Medical History:     Past Medical History:   Diagnosis Date    Asthma     Bronchitis     Cancer ([address]     breast - left    COPD (chronic obstructive pulmonary disease) ([address]     [facility]; pulmonary nodule 7mm stable (found 2011)    Diabetes ([address]     Hepatitis age 12    High cholesterol     Hypertension     Obesity (BMI 30-39. 9)     Thromboembolus ([address]     L LE with PE       Allergies:      Allergies   Allergen Reactions    Crestor [Rosuvastatin] Other (comments)     Pain in left leg  Causes muscle spasms    Lipitor [Atorvastatin] Other (comments)     Left leg pain  Causes muscle spasms    Xarelto [Rivaroxaban] Other (comments)     Pt states it caused internal bleeding       Surgical History:     Past Surgical History:   Procedure Laterality Date    BREAST SURGERY PROCEDURE UNLISTED      L mastectomy    COLONOSCOPY N/A 5/26/2017    COLONOSCOPY- no info to   performed by Jose Chacko MD at [address] HX GYN      ablation, R ovary removed, tubal ligation    HX HAMMER TOE REPAIR      HX HEENT      tonsillectomy    HX ORTHOPAEDIC      L hip, R leg, C4-C5 fusion, L foot    HX OTHER SURGICAL      xiphoid removal    IR PLC IVC FILTER  2019       Social History:     Social History     Socioeconomic History    Marital status: SINGLE     Spouse name: Not on file    Number of children: Not on file    Years of education: Not on file    Highest education level: Not on file   Tobacco Use    Smoking status: Former Smoker     Last attempt to quit: 2009     Years since quittin.4    Smokeless tobacco: Never Used   Substance and Sexual Activity    Alcohol use: No     Frequency: Never    Drug use: No    Sexual activity: Never   Other Topics Concern       Review of Symptoms:  Constitutional: No fever, chills, fatigue, malaise  Cardiovascular: Negative for chest pain or palpitations  Respiratory: Negative for cough, wheezing or SOB  Gastrointestinal: Negative for nausea or abdominal pain  Genital/urinary: see HPI  Neurological: Negative for weakness or paresthesia     Vitals:    20 1556   BP: 150/82   Pulse: 100   Resp: 20   Temp: 97.9 °F (36.6 °C)   TempSrc: Oral   SpO2: 96%   Weight: 212 lb (96.2 kg)   Height: 5' 3\" (1.6 m)        Physical Examination:  General: Appears well, in no apparent distress. Eyes: Sclera clear  Cardiovascular: Regular rate and rhythm  Respiratory: Symmetrical, unlabored effort  Abdomen: Soft without tenderness. Back: No CVA tenderness  Extremities: Full range of motion  Neuro: Active, alert, and oriented    Urine dipstick shows positive for RBC's and positive for glucose. ASSESSMENT:   Diagnoses and all orders for this visit:    1. Dysuria  -     AMB POC GLUCOSE BLOOD, BY GLUCOSE MONITORING DEVICE  -     CULTURE, URINE; Future  -     cephALEXin (KEFLEX) 500 mg capsule; Take 1 Cap by mouth three (3) times daily for 7 days.     2. Type 2 diabetes mellitus without complication, without long-term current use of insulin (HCC)  -     AMB POC HEMOGLOBIN A1C  -     AMB POC GLUCOSE BLOOD, BY GLUCOSE MONITORING DEVICE  -     AMB POC URINALYSIS DIP STICK AUTO W/O MICRO  -     AMB POC URINE, MICROALBUMIN, SEMIQUANT (3 RESULTS)    3. Severe obesity ([address]      Plan of care:  Diagnoses were discussed in detail with patient. Medication risks/benefits/side effects discussed with patient. All of the patient's questions were addressed and answered to apparent satisfaction. The patient understands and agrees with our plan of care. The patient knows to call back if they have questions about the plan of care or if symptoms change. The patient received an After-Visit Summary which contains VS, diagnoses, orders, allergy and medication lists. No future appointments. Follow-up and Dispositions    · Return follow up for , for -Due for a Medicare Wellness Visit.

## 2020-07-06 ENCOUNTER — TELEPHONE (OUTPATIENT)
Dept: FAMILY MEDICINE CLINIC | Age: 75
End: 2020-07-06

## 2020-07-06 NOTE — TELEPHONE ENCOUNTER
Pt would like to schedule an office visit. She would like to explain to the nurse the need for an office visit.

## 2020-07-06 NOTE — TELEPHONE ENCOUNTER
Please advise, clinic or virtual?  Returned call to patient, patient states she needs lab work, [facility] and her legs are swelling mostly at night, also want to speak with provider in regards to handling her blood thinner for dental surgery, patient would like an in office appointment

## 2020-07-09 ENCOUNTER — VIRTUAL VISIT (OUTPATIENT)
Dept: FAMILY MEDICINE CLINIC | Age: 75
End: 2020-07-09

## 2020-07-09 DIAGNOSIS — R25.2 LEG CRAMPING: ICD-10-CM

## 2020-07-09 DIAGNOSIS — M79.89 LEG SWELLING: ICD-10-CM

## 2020-07-09 DIAGNOSIS — Z00.00 MEDICARE ANNUAL WELLNESS VISIT, SUBSEQUENT: Primary | ICD-10-CM

## 2020-07-09 RX ORDER — FUROSEMIDE 20 MG/1
10 TABLET ORAL
Qty: 30 TAB | Refills: 1 | Status: SHIPPED | OUTPATIENT
Start: 2020-07-09 | End: 2020-08-21

## 2020-07-09 RX ORDER — CYCLOBENZAPRINE HCL 5 MG
TABLET ORAL
Qty: 30 TAB | Refills: 2 | Status: SHIPPED | OUTPATIENT
Start: 2020-07-09 | End: 2021-05-12 | Stop reason: SDUPTHER

## 2020-07-09 NOTE — PROGRESS NOTES
Vesta Vick is a 69 y.o. female evaluated via Tele-video on 20. Patient Identity confirmed by . Consent:  He and/or health care decision maker is aware that that he may receive a bill for this telephone service, depending on his insurance coverage, and has provided verbal consent to proceed: Yes    Physician Location: Office  Patient Location: Home  Nurse Assisting with Encounter: Elif Elizabeth LPN    This is the Subsequent Medicare Annual Wellness Exam, performed 12 months or more after the Initial AWV or the last Subsequent AWV    I have reviewed the patient's medical history in detail and updated the computerized patient record. CC: Medicare Wellness Telemedicine Visit   Information gathered from patient and/or health care decision maker. HPI:   Well Woman exam:  Vesta Vick is a 69 y.o. female presenting for well woman exam.     How would you rate your health in generally over the last year? Good  Has your physical and emotional health limited your social activities with family or friends? no  Ability to perform activities of daily living? completes all daily living activities without any functional limitations    Current Complaints? Leg swelling, patient has a history of Lymphedema, concern for Lymphedema. Worsens during the day and improves at night. Pertinent past medical history: HTN, PE/DVT, Diabetes Mellitus type 2, COPD, HLD, Obesity, History of GI Bleed, DCIS    Menstrual/Sexual History:  Age at which menses began: 17 y.o. Menopause: [age] y.o. No obstetric history on file. Risk factors for breast cancer: Low risk    Diet/Exercise History:  Diet: Favorite food Seafood . - Does patient eat at least 5 servings of Fruits/vegetables daily? Yes   - Is patient currently dieting? No    Exercise: Patient does not have structured exercise  - Does patient get 150 minutes of structured exercise weekly?  No  - Type of work patient has? retired    Healthcare maintenance:   Health Maintenance Due   Topic Date Due    DTaP/Tdap/Td series (1 - Tdap) 07/26/1968    Shingrix Vaccine Age 50> (1 of 2) 07/26/1997    FOBT Q1Y Age 50-75  07/26/1997    Breast Cancer Screen Mammogram  07/14/2018    Medicare Yearly Exam  03/12/2020    Foot Exam Q1  05/14/2020     Mammogram indicated? No   Colonoscopy indicated? No   DEXA scan indicated? No   HIV/STI testing indicated? No   Hepatitis C testing indicated? No   Lung cancer screening indicated? No   AAA screening indicated? No     Immunization History   Administered Date(s) Administered    Influenza Vaccine 10/01/2018    Influenza Vaccine (Tri) Adjuvanted 10/24/2019    Pneumococcal Conjugate (PCV-13) 10/11/2016    Pneumococcal Polysaccharide (PPSV-23) 12/17/2014    Td 10/11/2011    Zoster Vaccine, Live 12/07/2010     Flu indicated? No   Tdap indicated? Yes  Pneumovax indicated? Yes  Zostervax indicated? Yes  Meningococcal indicated? No      Past Medical History:   Diagnosis Date    Asthma     Bronchitis     Cancer ([address]     breast - left    COPD (chronic obstructive pulmonary disease) ([address]     [facility]; pulmonary nodule 7mm stable (found 2011)    Diabetes ([address]     Hepatitis age 11    High cholesterol     Hypertension     Obesity (BMI 30-39. 9)     Thromboembolus ([address]     L LE with PE      Past Surgical History:   Procedure Laterality Date    BREAST SURGERY PROCEDURE UNLISTED      L mastectomy    COLONOSCOPY N/A 5/26/2017    COLONOSCOPY- no info to   performed by Dulce Maria Fernandez MD at [facility] HX GYN      ablation, R ovary removed, tubal ligation    HX HAMMER TOE REPAIR      HX HEENT      tonsillectomy    HX ORTHOPAEDIC      L hip, R leg, C4-C5 fusion, L foot    HX OTHER SURGICAL      xiphoid removal    IR PLC IVC FILTER  2/8/2019     Allergies   Allergen Reactions    Crestor [Rosuvastatin] Other (comments)     Pain in left leg  Causes muscle spasms    Lipitor [Atorvastatin] Other (comments)     Left leg pain  Causes muscle spasms    Xarelto [Rivaroxaban] Other (comments)     Pt states it caused internal bleeding     Family History   Problem Relation Age of Onset    Heart Attack Father     Diabetes Mother      Social History     Tobacco Use    Smoking status: Former Smoker     Last attempt to quit:      Years since quittin.5    Smokeless tobacco: Never Used   Substance Use Topics    Alcohol use: No     Frequency: Never       Current Outpatient Medications:     furosemide (LASIX) 20 mg tablet, Take 0.5 Tabs by mouth daily as needed (Leg Swelling). , Disp: 30 Tab, Rfl: 1    cyclobenzaprine (FLEXERIL) 5 mg tablet, TAKE ONE TABLET BY MOUTH NIGHTLY IF NEEDED FOR MUSCLE SPASMS, Disp: 30 Tab, Rfl: 2    carvediloL (COREG) 3.125 mg tablet, TAKE ONE TABLET BY MOUTH TWICE DAILY WITH FOOD, Disp: 180 Tab, Rfl: 1    metFORMIN ER (GLUCOPHAGE XR) 500 mg tablet, TAKE ONE TABLET BY MOUTH DAILY WITH BREAKFAST, Disp: 90 Tab, Rfl: 0    lovastatin (MEVACOR) 10 mg tablet, TAKE ONE TABLET BY MOUTH NIGHTLY, Disp: 90 Tab, Rfl: 0    fluticasone propionate (FLONASE) 50 mcg/actuation nasal spray, 2 Sprays by Both Nostrils route daily. , Disp: 1 Bottle, Rfl: 2    glipiZIDE SR (GLUCOTROL XL) 2.5 mg CR tablet, TAKE ONE TABLET BY MOUTH NIGHTLY, Disp: 30 Tab, Rfl: 0    losartan (COZAAR) 25 mg tablet, TAKE ONE TABLET BY MOUTH EVERY DAY, Disp: 90 Tab, Rfl: 1    apixaban (ELIQUIS) 5 mg tablet, Take 1 Tab by mouth two (2) times a day., Disp: 60 Tab, Rfl: 11    TRELEGY ELLIPTA 100-62.5-25 mcg inhaler, , Disp: , Rfl: 12    glucose blood VI test strips (FREESTYLE LITE STRIPS) strip, Patient is to check blood sugar once daily. Dx E11.9, Disp: 100 Strip, Rfl: 2    PROAIR HFA 90 mcg/actuation inhaler, , Disp: , Rfl: 3    acetaminophen (TYLENOL ARTHRITIS PAIN) 650 mg TbER, Take 1 Tab by mouth every eight (8) hours. , Disp: 60 Tab, Rfl: 0    multivitamin (ONE A DAY) tablet, Take 1 Tab by mouth nightly., Disp: , Rfl:     aspirin 81 mg tablet, Take 81 mg by mouth daily. , Disp: , Rfl:     montelukast (SINGULAIR) 10 mg tablet, Take 10 mg by mouth nightly., Disp: , Rfl:      Review of Systems   Constitutional: Negative for chills and fever. Cardiovascular: Negative for chest pain and palpitations. Gastrointestinal: Negative for nausea and vomiting. Skin: Negative for rash. Neurological: Negative for tingling and headaches. Depression Risk Factor Screening:     3 most recent PHQ Screens 5/26/2020   Little interest or pleasure in doing things Not at all   Feeling down, depressed, irritable, or hopeless Not at all   Total Score PHQ 2 0   Trouble falling or staying asleep, or sleeping too much -   Feeling tired or having little energy -   Poor appetite, weight loss, or overeating -   Feeling bad about yourself - or that you are a failure or have let yourself or your family down -   Trouble concentrating on things such as school, work, reading, or watching TV -   Moving or speaking so slowly that other people could have noticed; or the opposite being so fidgety that others notice -   Thoughts of being better off dead, or hurting yourself in some way -   PHQ 9 Score -   How difficult have these problems made it for you to do your work, take care of your home and get along with others -       Alcohol Risk Factor Screening:   Low risk    Functional Ability and Level of Safety:   Hearing Loss  Hearing is good.     Activities of Daily Living  ADL Assessment 7/9/2020   Feeding yourself No Help Needed   Getting from bed to chair No Help Needed   Getting dressed No Help Needed   Bathing or showering No Help Needed   Walk across the room (includes cane/walker) No Help Needed   Using the telphone No Help Needed   Taking your medications No Help Needed   Preparing meals No Help Needed   Managing money (expenses/bills) No Help Needed   Moderately strenuous housework (laundry) No Help Needed   Shopping for personal items (toiletries/medicines) No Help Needed   Shopping for groceries No Help Needed   Driving No Help Needed   Climbing a flight of stairs No Help Needed   Getting to places beyond walking distances No Help Needed       Fall Risk  Fall Risk Assessment, last 12 mths 5/26/2020   Able to walk? Yes   Fall in past 12 months? No   Fall with injury? -   Number of falls in past 12 months -   Fall Risk Score -       Abuse Screen  Abuse Screening Questionnaire 7/15/2019   Do you ever feel afraid of your partner? N   Are you in a relationship with someone who physically or mentally threatens you? N   Is it safe for you to go home? Y       Cognitive Screening   Evaluation of Cognitive Function:  Has your family/caregiver stated any concerns about your memory: no  Normal  No flowsheet data found. Patient Care Team   Patient Care Team:  Silvestre Montiel MD as PCP - [facility])  Mary Hollis MD (Hematology and Oncology)  Sveta Ward MD (Gastroenterology)  Juan Ayala MD (Gynecology)  Ryan Sosa MD (Ophthalmology)  Sury Bowser MD (Pulmonary Disease)    Assessment/Plan   Education and counseling provided:  Are appropriate based on today's review and evaluation    Assessment/Plan:  Details of this discussion including any medical advice provided: Overall doing well. Advised on compression stocking and trial of lasix for leg swelling and follow up in office for evaluation. Refill of medication     ICD-10-CM ICD-9-CM    1. Medicare annual wellness visit, subsequent  Z00.00 V70.0    2. Leg swelling  M79.89 729.81 furosemide (LASIX) 20 mg tablet   3. Leg cramping  R25.2 729.82 cyclobenzaprine (FLEXERIL) 5 mg tablet     Follow-up and Dispositions    · Return in about 2 weeks (around 7/23/2020) for Follow up leg swelling.          Health Maintenance Topics with due status: Overdue       Topic Date Due    DTaP/Tdap/Td series 07/26/1968    Shingrix Vaccine Age 50> 07/26/1997    FOBT Q1Y Age 50-75 07/26/1997    Breast Cancer Screen Mammogram 07/14/2018    Medicare Yearly Exam 03/12/2020    Foot Exam Q1 05/14/2020     Health Maintenance Topics with due status: Not Due       Topic Last Completion Date    Lipid Screen 10/24/2019    Influenza Age 9 to Adult 10/24/2019    GLAUCOMA SCREENING Q2Y 01/07/2020    Eye Exam Retinal or Dilated 01/07/2020    A1C test (Diabetic or Prediabetic) 05/26/2020    MICROALBUMIN Q1 05/26/2020     Health Maintenance Topics with due status: Completed       Topic Last Completion Date    Bone Densitometry (Dexa) Screening 08/22/2007    Pneumococcal 65+ years 10/11/2016    Hepatitis C Screening 10/24/2019       Total Time: minutes: 11-20 minutes was spent on telemedicine encounter discussing above problems and plans. Patient Problem list, medications, and Allergies were reviewed during this encounter. Pursuant to the emergency declaration under the [address] [place] [address], [facility] waiver authority and the [employer] Act, this Telephone Visit was conducted, with patient's consent, to reduce the patient's risk of exposure to COVID-19 and provide continuity of care for an established patient. I affirm this is a Patient Initiated Episode with an Established Patient who has not had a related appointment within my department in the past 7 days or scheduled within the next 24 hours. Discussed diagnoses in detail with patient. Medication risks/benefits/side effects discussed with patient. All of the patient's questions were addressed. The patient understands and agrees with our plan of care. The patient knows to call back if they are unsure of or forget any changes we discussed today or if the symptoms change.     Note: not billable if this call serves to triage the patient into an appointment for the relevant concern    MD JAGDISH [facility]  07/09/20

## 2020-07-09 NOTE — PROGRESS NOTES
1. Have you been to the ER, urgent care clinic since your last visit? Hospitalized since your last visit? No    2. Have you seen or consulted any other health care providers outside of the [address] [place] [address] since your last visit? Include any pap smears or colon screening.  No        Health Maintenance Due   Topic Date Due    DTaP/Tdap/Td series (1 - Tdap) 07/26/1968    Shingrix Vaccine Age 50> (1 of 2) 07/26/1997    FOBT Q1Y Age 50-75  07/26/1997    Breast Cancer Screen Mammogram  07/14/2018    Medicare Yearly Exam  03/12/2020    Foot Exam Q1  05/14/2020

## 2020-07-30 ENCOUNTER — OFFICE VISIT (OUTPATIENT)
Dept: FAMILY MEDICINE CLINIC | Age: 75
End: 2020-07-30

## 2020-07-30 ENCOUNTER — HOSPITAL ENCOUNTER (OUTPATIENT)
Dept: LAB | Age: 75
Discharge: HOME | End: 2020-07-30

## 2020-07-30 VITALS
DIASTOLIC BLOOD PRESSURE: 86 MMHG | OXYGEN SATURATION: 93 % | SYSTOLIC BLOOD PRESSURE: 138 MMHG | TEMPERATURE: 97.3 F | HEIGHT: 63 IN | BODY MASS INDEX: 37.67 KG/M2 | WEIGHT: 212.6 LBS | RESPIRATION RATE: 18 BRPM | HEART RATE: 76 BPM

## 2020-07-30 DIAGNOSIS — Z23 ENCOUNTER FOR IMMUNIZATION: ICD-10-CM

## 2020-07-30 DIAGNOSIS — I10 ESSENTIAL HYPERTENSION: Primary | ICD-10-CM

## 2020-07-30 DIAGNOSIS — E11.9 TYPE 2 DIABETES MELLITUS WITHOUT COMPLICATION, WITHOUT LONG-TERM CURRENT USE OF INSULIN (HCC): ICD-10-CM

## 2020-07-30 DIAGNOSIS — E78.49 OTHER HYPERLIPIDEMIA: ICD-10-CM

## 2020-07-30 DIAGNOSIS — J41.0 SIMPLE CHRONIC BRONCHITIS (HCC): ICD-10-CM

## 2020-07-30 DIAGNOSIS — M19.90 ARTHRITIS: ICD-10-CM

## 2020-07-30 DIAGNOSIS — I10 ESSENTIAL HYPERTENSION: ICD-10-CM

## 2020-07-30 DIAGNOSIS — I26.92 ACUTE SADDLE PULMONARY EMBOLISM WITHOUT ACUTE COR PULMONALE (HCC): ICD-10-CM

## 2020-07-30 LAB
ALBUMIN SERPL-MCNC: 4 G/DL (ref 3.5–5)
ALBUMIN/GLOB SERPL: 1.2 {RATIO} (ref 1.1–2.2)
ALP SERPL-CCNC: 110 U/L (ref 45–117)
ALT SERPL-CCNC: 22 U/L (ref 12–78)
ANION GAP SERPL CALC-SCNC: 9 MMOL/L (ref 5–15)
AST SERPL-CCNC: 25 U/L (ref 15–37)
BILIRUB SERPL-MCNC: 0.4 MG/DL (ref 0.2–1)
BUN SERPL-MCNC: 20 MG/DL (ref 6–20)
BUN/CREAT SERPL: 20 (ref 12–20)
CALCIUM SERPL-MCNC: 8.7 MG/DL (ref 8.5–10.1)
CHLORIDE SERPL-SCNC: 111 MMOL/L (ref 97–108)
CHOLEST SERPL-MCNC: 155 MG/DL
CO2 SERPL-SCNC: 24 MMOL/L (ref 21–32)
CREAT SERPL-MCNC: 0.98 MG/DL (ref 0.55–1.02)
CREAT UR-MCNC: 224 MG/DL
ERYTHROCYTE [DISTWIDTH] IN BLOOD BY AUTOMATED COUNT: 14.1 % (ref 11.5–14.5)
EST. AVERAGE GLUCOSE BLD GHB EST-MCNC: 137 MG/DL
GLOBULIN SER CALC-MCNC: 3.4 G/DL (ref 2–4)
GLUCOSE SERPL-MCNC: 101 MG/DL (ref 65–100)
HBA1C MFR BLD: 6.4 % (ref 4–5.6)
HCT VFR BLD AUTO: 44 % (ref 35–47)
HDLC SERPL-MCNC: 50 MG/DL
HDLC SERPL: 3.1 {RATIO} (ref 0–5)
HGB BLD-MCNC: 14 G/DL (ref 11.5–16)
LDLC SERPL CALC-MCNC: 73.8 MG/DL (ref 0–100)
LIPID PROFILE,FLP: ABNORMAL
MCH RBC QN AUTO: 26.7 PG (ref 26–34)
MCHC RBC AUTO-ENTMCNC: 31.8 G/DL (ref 30–36.5)
MCV RBC AUTO: 84 FL (ref 80–99)
MICROALBUMIN UR-MCNC: 1.3 MG/DL
MICROALBUMIN/CREAT UR-RTO: 6 MG/G (ref 0–30)
NRBC # BLD: 0 K/UL (ref 0–0.01)
NRBC BLD-RTO: 0 PER 100 WBC
PLATELET # BLD AUTO: 287 K/UL (ref 150–400)
PMV BLD AUTO: 11.1 FL (ref 8.9–12.9)
POTASSIUM SERPL-SCNC: 4.5 MMOL/L (ref 3.5–5.1)
PROT SERPL-MCNC: 7.4 G/DL (ref 6.4–8.2)
RBC # BLD AUTO: 5.24 M/UL (ref 3.8–5.2)
SODIUM SERPL-SCNC: 144 MMOL/L (ref 136–145)
TRIGL SERPL-MCNC: 156 MG/DL (ref ?–150)
VLDLC SERPL CALC-MCNC: 31.2 MG/DL
WBC # BLD AUTO: 8.2 K/UL (ref 3.6–11)

## 2020-07-30 RX ORDER — DICLOFENAC SODIUM 10 MG/G
1 GEL TOPICAL 4 TIMES DAILY
Qty: 350 G | Refills: 1 | Status: SHIPPED | OUTPATIENT
Start: 2020-07-30 | End: 2021-03-01

## 2020-07-30 RX ORDER — FLUTICASONE PROPIONATE AND SALMETEROL 250; 50 UG/1; UG/1
1 POWDER RESPIRATORY (INHALATION) EVERY 12 HOURS
Qty: 60 EACH | Refills: 5 | Status: SHIPPED | OUTPATIENT
Start: 2020-07-30 | End: 2020-08-21

## 2020-07-30 NOTE — PROGRESS NOTES
Chief Complaint   Patient presents with    Leg Swelling    Labs     Visit Vitals  /86 (BP 1 Location: Right arm, BP Patient Position: Sitting)   Pulse 76   Temp 97.3 °F (36.3 °C) (Oral)   Resp 18   Ht 5' 3\" (1.6 m)   Wt 212 lb 9.6 oz (96.4 kg)   SpO2 93%   BMI 37.66 kg/m²     1. Have you been to the ER, urgent care clinic since your last visit? Hospitalized since your last visit? No    2. Have you seen or consulted any other health care providers outside of the [address] [place] [address] since your last visit? Include any pap smears or colon screening.  No    Reviewed record in preparation for visit and have necessary documentation  Pt did not bring medication to office visit for review  opportunity was given for questions  Goals that were addressed and/or need to be completed during or after this appointment include   Health Maintenance Due   Topic Date Due    DTaP/Tdap/Td series (1 - Tdap) 07/26/1968    Shingrix Vaccine Age 50> (1 of 2) 07/26/1997    FOBT Q1Y Age 50-75  07/26/1997    Breast Cancer Screen Mammogram  07/14/2018    Foot Exam Q1  05/14/2020

## 2020-07-30 NOTE — PROGRESS NOTES
[facility]    History of Present Illness:   Pamella Hill is a 70 y.o. female with history of HTN, Pulmonary Embolism, DVT, Type 2 diabetes, COPD, HLD, DCIS  CC: Follow up Chronic Conditions  History provided by patient and Records    HPI:  Diabetes Follow up: Overall the patient feels well with good energy level. Current Medications:   Key Antihyperglycemic Medications             metFORMIN ER (GLUCOPHAGE XR) 500 mg tablet (Taking) TAKE ONE TABLET BY MOUTH DAILY WITH BREAKFAST    glipiZIDE SR (GLUCOTROL XL) 2.5 mg CR tablet (Taking) TAKE ONE TABLET BY MOUTH NIGHTLY      . Insulin dependence: NO   Frequency of home glucose testing: Monday, Wednesday, and Friday   Blood Sugar range at home: 120-160    Last eye exam: In past 12 months. Last foot exam: This year. Polyuria, polyphagia or polydipsia: NO   Retinopathy: NO   Neuropathy SX: YES, Burning and stinging at night   Low blood sugar symptoms: YES Occasional   Dietary compliance: compliant most of the time   Medication compliance: compliant most of the time   On ASA: YES   Tobacco Use: NO   Depression: NO     Wt Readings from Last 3 Encounters:   07/30/20 212 lb 9.6 oz (96.4 kg)   05/26/20 212 lb (96.2 kg)   02/10/20 205 lb (93 kg)     Hypertension Follow up:  Currently Taking Lasix 20 mg as needed, Losartan 25 mg, Carvedilol 3.125. The patient reports:  taking medications as instructed, no medication side effects noted, no TIA's, no chest pain on exertion, no dyspnea on exertion, no swelling of ankles. BP Readings from Last 3 Encounters:   07/30/20 138/86   05/26/20 150/82   02/10/20 135/77      Hypertriglyceridemia Follow up:   Cardiovascular risks for her are: diabetic  hypertension  hyperlipidemia  former smoker  obese. Currently she takes Lovastatin , 10 mg.    Myalgias: YES   Fatigue: NO   Other side effects: NO     Wt Readings from Last 3 Encounters:   07/30/20 212 lb 9.6 oz (96.4 kg)   05/26/20 212 lb (96.2 kg) 02/10/20 205 lb (93 kg)       COPD Follow Up:  Patient is following up for COPD. Patient is not currently in exacerbation.  - Currently COPD symptoms are stable. Chronic Symptoms:chronic dyspnea: severity = Mild to moderate  - Oxygen: she currently is not on home oxygen therapy. - Compliance with medications: Fair  - Patient denies smoke cigarettes. - Flu shot past 12 months? yes  Pulmonologist:  Yes    History of Pulmonary embolism: Continuing Blood thinner    Chronic Pain/Stiffness: Patient reports stiffness in the mornings with lower extremity pains in particular. Chronic issue. Worst In the mornining and after prolonged sitting. Worst in the knee's, hips, and backs. Patient takes Tylenlol and and Flexeril for cramping. Patient has not tried lidocaine patches. Health Maintenance  Health Maintenance Due   Topic Date Due    DTaP/Tdap/Td series (1 - Tdap) 07/26/1968    Shingrix Vaccine Age 50> (1 of 2) 07/26/1997    Breast Cancer Screen Mammogram  07/14/2018    Foot Exam Q1  05/14/2020       Past Medical, Family, and Social History:     Current Outpatient Medications on File Prior to Visit   Medication Sig Dispense Refill    furosemide (LASIX) 20 mg tablet Take 0.5 Tabs by mouth daily as needed (Leg Swelling). 30 Tab 1    cyclobenzaprine (FLEXERIL) 5 mg tablet TAKE ONE TABLET BY MOUTH NIGHTLY IF NEEDED FOR MUSCLE SPASMS 30 Tab 2    carvediloL (COREG) 3.125 mg tablet TAKE ONE TABLET BY MOUTH TWICE DAILY WITH FOOD 180 Tab 1    metFORMIN ER (GLUCOPHAGE XR) 500 mg tablet TAKE ONE TABLET BY MOUTH DAILY WITH BREAKFAST 90 Tab 0    lovastatin (MEVACOR) 10 mg tablet TAKE ONE TABLET BY MOUTH NIGHTLY 90 Tab 0    fluticasone propionate (FLONASE) 50 mcg/actuation nasal spray 2 Sprays by Both Nostrils route daily.  1 Bottle 2    glipiZIDE SR (GLUCOTROL XL) 2.5 mg CR tablet TAKE ONE TABLET BY MOUTH NIGHTLY 30 Tab 0    losartan (COZAAR) 25 mg tablet TAKE ONE TABLET BY MOUTH EVERY DAY 90 Tab 1    apixaban (ELIQUIS) 5 mg tablet Take 1 Tab by mouth two (2) times a day. 60 Tab 11    glucose blood VI test strips (FREESTYLE LITE STRIPS) strip Patient is to check blood sugar once daily. Dx E11.9 100 Strip 2    PROAIR HFA 90 mcg/actuation inhaler   3    acetaminophen (TYLENOL ARTHRITIS PAIN) 650 mg TbER Take 1 Tab by mouth every eight (8) hours. 60 Tab 0    multivitamin (ONE A DAY) tablet Take 1 Tab by mouth nightly.  aspirin 81 mg tablet Take 81 mg by mouth daily.  montelukast (SINGULAIR) 10 mg tablet Take 10 mg by mouth nightly.  [DISCONTINUED] TRELEGY ELLIPTA 100-62.5-25 mcg inhaler   12     No current facility-administered medications on file prior to visit.         Patient Active Problem List   Diagnosis Code    Melena K92.1    Acute posthemorrhagic anemia D62    Type 2 diabetes mellitus without complication, without long-term current use of insulin (HCC) E11.9    HTN (hypertension) I10    Other hyperlipidemia E78.49    Debility R53.81    Pulmonary embolism ([facility]) I26.99    Acute deep vein thrombosis (DVT) of left lower extremity ([facility]) I82.402    Acute pulmonary embolism (HCC) I26.99    Chronic anticoagulation Z79.01    Severe obesity ([facility]) E66.01    DCIS (ductal carcinoma in situ) of breast D05.10    History of GI bleed Z87.19    COPD (chronic obstructive pulmonary disease) ([facility]) J44.9       Social History     Socioeconomic History    Marital status: SINGLE     Spouse name: Not on file    Number of children: Not on file    Years of education: Not on file    Highest education level: Not on file   Occupational History    Not on file   Social Needs    Financial resource strain: Not on file    Food insecurity     Worry: Not on file     Inability: Not on file    Transportation needs     Medical: Not on file     Non-medical: Not on file   Tobacco Use    Smoking status: Former Smoker     Last attempt to quit:      Years since quittin.5    Smokeless tobacco: Never Used Substance and Sexual Activity    Alcohol use: No     Frequency: Never    Drug use: No    Sexual activity: Never   Lifestyle    Physical activity     Days per week: Not on file     Minutes per session: Not on file    Stress: Not on file   Relationships    Social connections     Talks on phone: Not on file     Gets together: Not on file     Attends Judaism service: Not on file     Active member of club or organization: Not on file     Attends meetings of clubs or organizations: Not on file     Relationship status: Not on file    Intimate partner violence     Fear of current or ex partner: Not on file     Emotionally abused: Not on file     Physically abused: Not on file     Forced sexual activity: Not on file   Other Topics Concern    Caffeine Concern Not Asked    Back Care Not Asked    Exercise Not Asked    Occupational Exposure Not Asked    Sleep Concern Not Asked    Stress Concern Not Asked    Weight Concern Not Asked   Social History Narrative    Not on file       Review of Systems   Review of Systems   Constitutional: Negative for fever. HENT: Negative for congestion. Respiratory: Negative for shortness of breath. Cardiovascular: Negative for chest pain. Gastrointestinal: Negative for nausea and vomiting. Neurological: Negative for headaches. Objective:     Visit Vitals  /86 (BP 1 Location: Right arm, BP Patient Position: Sitting)   Pulse 76   Temp 97.3 °F (36.3 °C) (Oral)   Resp 18   Ht 5' 3\" (1.6 m)   Wt 212 lb 9.6 oz (96.4 kg)   SpO2 93%   BMI 37.66 kg/m²        Physical Exam  Vitals signs and nursing note reviewed. Constitutional:       Appearance: Normal appearance. HENT:      Head: Normocephalic and atraumatic. Eyes:      Extraocular Movements: Extraocular movements intact. Conjunctiva/sclera: Conjunctivae normal.   Neck:      Musculoskeletal: Normal range of motion and neck supple. Cardiovascular:      Rate and Rhythm: Normal rate and regular rhythm. Pulses: Normal pulses. Heart sounds: Normal heart sounds. Pulmonary:      Effort: Pulmonary effort is normal.      Breath sounds: Normal breath sounds. Abdominal:      General: Abdomen is flat. Bowel sounds are normal.      Palpations: Abdomen is soft. Skin:     General: Skin is warm and dry. Neurological:      Mental Status: She is alert. Diabetic foot exam:     Left Foot:   Visual Exam: normal    Pulse DP: 2+ (normal)   Filament test: normal sensation        Right Foot:   Visual Exam: normal    Pulse DP: 2+ (normal)   Filament test: normal sensation         Pertinent Labs/Studies:      Assessment and orders:       ICD-10-CM ICD-9-CM    1. Essential hypertension  I10 401.9 CBC W/O DIFF      METABOLIC PANEL, COMPREHENSIVE   2. Type 2 diabetes mellitus without complication, without long-term current use of insulin ([facility])  E11.9 250.00 HEMOGLOBIN A1C WITH EAG      HM DIABETES FOOT EXAM      MICROALBUMIN, UR, RAND W/ MICROALB/CREAT RATIO   3. Simple chronic bronchitis ([facility])  J41.0 491.0 fluticasone propion-salmeteroL (ADVAIR/WIXELA) 250-50 mcg/dose diskus inhaler   4. Other hyperlipidemia  E78.49 272.4 LIPID PANEL   5. Acute saddle pulmonary embolism without acute cor pulmonale ([facility])  I26.92 415.13    6. Encounter for immunization  Z23 V03.89 varicella-zoster recombinant, PF, (SHINGRIX) 50 mcg/0.5 mL susr injection   7. Arthritis  M19.90 716.90 diclofenac (VOLTAREN) 1 % gel     Diagnoses and all orders for this visit:    1. Essential hypertension: Our goal is to normalize the blood pressure to decrease the risks of strokes and heart attacks. The patient is in agreement with the plan. -     CBC W/O DIFF; Future  -     METABOLIC PANEL, COMPREHENSIVE; Future    2. Type 2 diabetes mellitus without complication, without long-term current use of insulin [facility]): Discussed with patient today that the goal for their diabetes is to have a HgA1C<7 and ideally as close to 6.5 as possible.   We discussed diet and medications. The goal for the cholesterol LDL is less than 70 and HDL>40. Patient is aware of the need for yearly eye exams and to take care of their feet daily. Discussed with patient that blood pressure should be less than 130/80 and watching salt intake is very important.    -     HEMOGLOBIN A1C WITH EAG; Future  -      DIABETES FOOT EXAM  -     MICROALBUMIN, UR, RAND W/ MICROALB/CREAT RATIO; Future    3. Simple chronic bronchitis ([facility] [address]): Trelegy too expensive, trial of Advair based on coverage  -     fluticasone propion-salmeteroL (ADVAIR/WIXELA) 250-50 mcg/dose diskus inhaler; Take 1 Puff by inhalation every twelve (12) hours. 4. Other hyperlipidemia: The patient is aware of our goal to reduce or eliminate the long term problems (such as strokes and heart attacks) related to poorly controlled Triglycerides, LDL, Cholesterol.   -     LIPID PANEL; Future    5. Acute saddle pulmonary embolism without acute cor pulmonale ([facility] Continue medication    6. Encounter for immunization  -     varicella-zoster recombinant, PF, (SHINGRIX) 50 mcg/0.5 mL susr injection; 1 dose now, repeat in 2 months    7. Arthritis: Trial of Diclofenac, add Lidocaine if not effective  -     diclofenac (VOLTAREN) 1 % gel; Apply 1 g to affected area four (4) times daily. Apply to knees and back      Follow-up and Dispositions    · Return in about 2 weeks (around 8/13/2020) for Follow up Joint pain VV. I have discussed the diagnosis with the patient and the intended plan as seen in the above orders. Social history, medical history, and labs were reviewed. The patient has received an after-visit summary and questions were answered concerning future plans. I have discussed medication side effects and warnings with the patient as well.     MD JAGDISH Donahue & EJRRY ORDONEZ [facility]  07/30/20

## 2020-07-31 NOTE — PROGRESS NOTES
A1C well controlled, negative Urine microalbumin. Normal kidney and Liver function. Lipid panel unremarkable. CBC unremarkable.

## 2020-08-21 ENCOUNTER — VIRTUAL VISIT (OUTPATIENT)
Dept: FAMILY MEDICINE CLINIC | Age: 75
End: 2020-08-21
Payer: MEDICARE

## 2020-08-21 DIAGNOSIS — I10 ESSENTIAL HYPERTENSION: ICD-10-CM

## 2020-08-21 DIAGNOSIS — M19.90 ARTHRITIS: ICD-10-CM

## 2020-08-21 DIAGNOSIS — E11.9 TYPE 2 DIABETES MELLITUS WITHOUT COMPLICATION, WITHOUT LONG-TERM CURRENT USE OF INSULIN (HCC): Primary | ICD-10-CM

## 2020-08-21 PROCEDURE — 99441 PR PHYS/QHP TELEPHONE EVALUATION 5-10 MIN: CPT | Performed by: FAMILY MEDICINE

## 2020-08-21 NOTE — PROGRESS NOTES
Sammi Quintana is a 70 y.o. female evaluated via Telephone on 20. Patient Identity confirmed by . Consent:  He and/or health care decision maker is aware that that he may receive a bill for this telephone service, depending on his insurance coverage, and has provided verbal consent to proceed: Yes    Physician Location: Office  Patient Location: Home  Nurse Assisting with Encounter: Branden Red LPN    Chief Complaint   Patient presents with    Follow-up      Information gathered from patient and/or health care decision maker. HPI:   Joint Pains: Diclofenac has been effective with controlling joint pains and uses flexeril as needed for Muscle cramps. Hypertension Follow up:  Currently Taking Losartan 100 mg, Coreg 3.125. The patient reports:  taking medications as instructed, no medication side effects noted, home BP monitoring in range of 481-327'M systolic over 91-41'P diastolic, no TIA's, no chest pain on exertion, no dyspnea on exertion, no swelling of ankles. BP Readings from Last 3 Encounters:   20 138/86   20 150/82   02/10/20 135/77      Diabetes: A1C is well controlled at this time, taking Metformin and GLipizide currently. Review of Systems   Constitutional: Negative for chills, fever and malaise/fatigue. HENT: Negative for congestion. Respiratory: Negative for cough. Cardiovascular: Negative for chest pain and palpitations. Musculoskeletal: Positive for joint pain. Limited Exam:  Due to this being a TeleHealth evaluation, many elements of the physical examination are unable to be assessed. Constitutional: Appears well-developed and well-nourished in no apparent distress   Mental status: Alert and awake, Oriented to person/place/time, Able to follow commands  Psychiatric: Normal affect, normal judgment/insight.  No hallucinations     Current Outpatient Medications on File Prior to Visit   Medication Sig Dispense Refill    carvediloL (COREG) 3.125 mg tablet TAKE ONE TABLET BY MOUTH TWICE DAILY WITH FOOD 180 Tab 1    varicella-zoster recombinant, PF, (SHINGRIX) 50 mcg/0.5 mL susr injection 1 dose now, repeat in 2 months 0.5 mL 1    diclofenac (VOLTAREN) 1 % gel Apply 1 g to affected area four (4) times daily. Apply to knees and back 350 g 1    cyclobenzaprine (FLEXERIL) 5 mg tablet TAKE ONE TABLET BY MOUTH NIGHTLY IF NEEDED FOR MUSCLE SPASMS 30 Tab 2    metFORMIN ER (GLUCOPHAGE XR) 500 mg tablet TAKE ONE TABLET BY MOUTH DAILY WITH BREAKFAST 90 Tab 0    lovastatin (MEVACOR) 10 mg tablet TAKE ONE TABLET BY MOUTH NIGHTLY 90 Tab 0    fluticasone propionate (FLONASE) 50 mcg/actuation nasal spray 2 Sprays by Both Nostrils route daily. 1 Bottle 2    losartan (COZAAR) 25 mg tablet TAKE ONE TABLET BY MOUTH EVERY DAY 90 Tab 1    apixaban (ELIQUIS) 5 mg tablet Take 1 Tab by mouth two (2) times a day. 60 Tab 11    glucose blood VI test strips (FREESTYLE LITE STRIPS) strip Patient is to check blood sugar once daily. Dx E11.9 100 Strip 2    PROAIR HFA 90 mcg/actuation inhaler   3    acetaminophen (TYLENOL ARTHRITIS PAIN) 650 mg TbER Take 1 Tab by mouth every eight (8) hours. 60 Tab 0    multivitamin (ONE A DAY) tablet Take 1 Tab by mouth nightly.  aspirin 81 mg tablet Take 81 mg by mouth daily.  montelukast (SINGULAIR) 10 mg tablet Take 10 mg by mouth nightly.  [DISCONTINUED] glipiZIDE SR (GLUCOTROL XL) 2.5 mg CR tablet TAKE ONE TABLET BY MOUTH NIGHTLY 90 Tab 1    [DISCONTINUED] fluticasone propion-salmeteroL (ADVAIR/WIXELA) 250-50 mcg/dose diskus inhaler Take 1 Puff by inhalation every twelve (12) hours. 60 Each 5    [DISCONTINUED] furosemide (LASIX) 20 mg tablet Take 0.5 Tabs by mouth daily as needed (Leg Swelling). 30 Tab 1     No current facility-administered medications on file prior to visit.          Allergies   Allergen Reactions    Crestor [Rosuvastatin] Other (comments)     Pain in left leg  Causes muscle spasms    Lipitor [Atorvastatin] Other (comments)     Left leg pain  Causes muscle spasms    Xarelto [Rivaroxaban] Other (comments)     Pt states it caused internal bleeding        Patient Active Problem List    Diagnosis Date Noted    COPD (chronic obstructive pulmonary disease) ([facility]     History of GI bleed 04/16/2019    DCIS (ductal carcinoma in situ) of breast 03/08/2019    Chronic anticoagulation 03/01/2019    Severe obesity ([facility] 03/01/2019    [facility] 02/08/2019    Pulmonary embolism [facility] 02/07/2019    Acute deep vein thrombosis (DVT) of left lower extremity ([facility] 02/07/2019    Debility 02/25/2012    Melena 02/24/2012    Acute posthemorrhagic anemia 02/24/2012    Type 2 diabetes mellitus without complication, without long-term current use of insulin ([facility] 02/24/2012    HTN (hypertension) 02/24/2012    Other hyperlipidemia 02/24/2012        Health Maintenance Due   Topic Date Due    DTaP/Tdap/Td series (1 - Tdap) 07/26/1968    Shingrix Vaccine Age 50> (1 of 2) 07/26/1997    Breast Cancer Screen Mammogram  07/14/2018    Foot Exam Q1  05/14/2020        Assessment/Plan:  Details of this discussion including any medical advice provided: Stopping GLipizide due to well controlled A1C. Recehck A1C in 3 months. Restarting Lovastatin now, see if affects joint pain. Otherwise doing well. Patient is scheduling Shingrix shot and Mammogram.    ICD-10-CM ICD-9-CM    1. Type 2 diabetes mellitus without complication, without long-term current use of insulin ([facility])  E11.9 250.00 HEMOGLOBIN A1C WITH EAG   2. Arthritis  M19.90 716.90    3. Essential hypertension  I10 401.9      Follow-up and Dispositions    · Return in about 3 months (around 11/21/2020) for Checking A1C in 3 months. Total Time: minutes: 5-10 minutes was spent on telemedicine encounter discussing above problems and plans. Patient Problem list, medications, and Allergies were reviewed during this encounter.     Pursuant to the emergency declaration under the [facility] waiver authority and the [employer] Act, this Telephone Visit was conducted, with patient's consent, to reduce the patient's risk of exposure to COVID-19 and provide continuity of care for an established patient. I affirm this is a Patient Initiated Episode with an Established Patient who has not had a related appointment within my department in the past 7 days or scheduled within the next 24 hours. Discussed diagnoses in detail with patient. Medication risks/benefits/side effects discussed with patient. All of the patient's questions were addressed. The patient understands and agrees with our plan of care. The patient knows to call back if they are unsure of or forget any changes we discussed today or if the symptoms change.     Note: not billable if this call serves to triage the patient into an appointment for the relevant concern    MD JAGDISH Albarran & JERRY ORDONEZ [facility]  08/21/20

## 2020-08-21 NOTE — PROGRESS NOTES
Chief Complaint   Patient presents with    Follow-up       1. Have you been to the ER, urgent care clinic since your last visit? Hospitalized since your last visit? No    2. Have you seen or consulted any other health care providers outside of the [address] [place] [address] since your last visit? Include any pap smears or colon screening.  No    Reviewed record in preparation for visit and have necessary documentation  Pt did not bring medication to office visit for review  opportunity was given for questions  Goals that were addressed and/or need to be completed during or after this appointment include   Health Maintenance Due   Topic Date Due    DTaP/Tdap/Td series (1 - Tdap) 07/26/1968    Shingrix Vaccine Age 50> (1 of 2) 07/26/1997    Breast Cancer Screen Mammogram  07/14/2018    Foot Exam Q1  05/14/2020

## 2020-09-08 RX ORDER — MONTELUKAST SODIUM 10 MG/1
10 TABLET ORAL
Qty: 90 TAB | Refills: 1 | Status: SHIPPED | OUTPATIENT
Start: 2020-09-08 | End: 2020-12-07

## 2020-09-22 ENCOUNTER — VIRTUAL VISIT (OUTPATIENT)
Dept: FAMILY MEDICINE CLINIC | Age: 75
End: 2020-09-22
Payer: MEDICARE

## 2020-09-22 DIAGNOSIS — R68.89 FLU-LIKE SYMPTOMS: Primary | ICD-10-CM

## 2020-09-22 DIAGNOSIS — J41.0 SIMPLE CHRONIC BRONCHITIS (HCC): ICD-10-CM

## 2020-09-22 PROCEDURE — 99441 PR PHYS/QHP TELEPHONE EVALUATION 5-10 MIN: CPT | Performed by: FAMILY MEDICINE

## 2020-09-22 RX ORDER — ALBUTEROL SULFATE 0.83 MG/ML
2.5 SOLUTION RESPIRATORY (INHALATION)
Qty: 30 NEBULE | Refills: 1 | Status: SHIPPED | OUTPATIENT
Start: 2020-09-22 | End: 2021-02-05

## 2020-09-22 NOTE — PROGRESS NOTES
Lorre Schaumann is a 70 y.o. female evaluated via Telephone on 20. Patient Identity confirmed by . Consent:  He and/or health care decision maker is aware that that he may receive a bill for this telephone service, depending on his insurance coverage, and has provided verbal consent to proceed: Yes    Physician Location: Office  Patient Location: Home  Nurse Assisting with Encounter: Maryam Velasquez LPN    Chief Complaint   Patient presents with    Flu Like Symptoms     fever, diarrhea, body aches,       Information gathered from patient and/or health care decision maker. HPI:   Flu-like Symptoms: Patient was tested for Coronavirus at [facility] and saw Physician there and got a 3 in one shot this past Thursday. Duration of symptoms: 1 week ago   Fever:yes, Highest fever recorded: 100   Chills:yes   Sweats:no   N,V,D: yes (Diarrhea)   Shortness of breath:no   Wheezing:yes   Sputum:yes (Baseline)   Other symptoms: sore throat, myalgias, headache and pain while swallowing     History of asthma/COPD:yes   Smoker:no   Sick Contacts: no    COPD: Patient has a nebulizer for her COPD. Has not had to use regularly, but would like to use currently. Machine is 13years old, may need to get replacement soon. Paitent does not use oxygen. DOes not feel like having exacerbation at this time. Review of Systems   Constitutional: Positive for fever and malaise/fatigue. HENT: Positive for congestion and sore throat. Respiratory: Positive for cough and sputum production. Cardiovascular: Negative for chest pain. Neurological: Positive for headaches. Limited Exam:  Due to this being a TeleHealth evaluation, many elements of the physical examination are unable to be assessed. Constitutional: Appears in no apparent distress   Mental status: Alert and awake, Oriented to person/place/time, Able to follow commands  Psychiatric: Normal affect, normal judgment/insight.  No hallucinations Current Outpatient Medications on File Prior to Visit   Medication Sig Dispense Refill    montelukast (Singulair) 10 mg tablet Take 1 Tab by mouth nightly. 90 Tab 1    carvediloL (COREG) 3.125 mg tablet TAKE ONE TABLET BY MOUTH TWICE DAILY WITH FOOD 180 Tab 1    varicella-zoster recombinant, PF, (SHINGRIX) 50 mcg/0.5 mL susr injection 1 dose now, repeat in 2 months 0.5 mL 1    diclofenac (VOLTAREN) 1 % gel Apply 1 g to affected area four (4) times daily. Apply to knees and back 350 g 1    cyclobenzaprine (FLEXERIL) 5 mg tablet TAKE ONE TABLET BY MOUTH NIGHTLY IF NEEDED FOR MUSCLE SPASMS 30 Tab 2    metFORMIN ER (GLUCOPHAGE XR) 500 mg tablet TAKE ONE TABLET BY MOUTH DAILY WITH BREAKFAST 90 Tab 0    lovastatin (MEVACOR) 10 mg tablet TAKE ONE TABLET BY MOUTH NIGHTLY 90 Tab 0    fluticasone propionate (FLONASE) 50 mcg/actuation nasal spray 2 Sprays by Both Nostrils route daily. 1 Bottle 2    losartan (COZAAR) 25 mg tablet TAKE ONE TABLET BY MOUTH EVERY DAY 90 Tab 1    apixaban (ELIQUIS) 5 mg tablet Take 1 Tab by mouth two (2) times a day. 60 Tab 11    glucose blood VI test strips (FREESTYLE LITE STRIPS) strip Patient is to check blood sugar once daily. Dx E11.9 100 Strip 2    PROAIR HFA 90 mcg/actuation inhaler   3    acetaminophen (TYLENOL ARTHRITIS PAIN) 650 mg TbER Take 1 Tab by mouth every eight (8) hours. 60 Tab 0    multivitamin (ONE A DAY) tablet Take 1 Tab by mouth nightly.  aspirin 81 mg tablet Take 81 mg by mouth daily. No current facility-administered medications on file prior to visit.          Allergies   Allergen Reactions    Crestor [Rosuvastatin] Other (comments)     Pain in left leg  Causes muscle spasms    Lipitor [Atorvastatin] Other (comments)     Left leg pain  Causes muscle spasms    Xarelto [Rivaroxaban] Other (comments)     Pt states it caused internal bleeding        Patient Active Problem List    Diagnosis Date Noted    [facility]     History of GI bleed 04/16/2019    DCIS (ductal carcinoma in situ) of breast 03/08/2019    Chronic anticoagulation 03/01/2019    Severe obesity ([facility] 03/01/2019    [facility] pulmonary embolism ([facility] 02/08/2019    Pulmonary embolism ([facility] 02/07/2019    Acute deep vein thrombosis (DVT) of left lower extremity ([facility] 02/07/2019    Debility 02/25/2012    Melena 02/24/2012    Acute posthemorrhagic anemia 02/24/2012    Type 2 diabetes mellitus without complication, without long-term current use of insulin ([facility] 02/24/2012    HTN (hypertension) 02/24/2012    Other hyperlipidemia 02/24/2012        Health Maintenance Due   Topic Date Due    DTaP/Tdap/Td series (1 - Tdap) 07/26/1968    Shingrix Vaccine Age 50> (1 of 2) 07/26/1997    Breast Cancer Screen Mammogram  07/14/2018    Flu Vaccine (1) 09/01/2020        Assessment/Plan:  Details of this discussion including any medical advice provided: Discussed symptomatic therapies. Possible COvid-19 exposure, but did not coincide timing wise. Follow up PRN if red flag symptoms develop. ICD-10-CM ICD-9-CM    1. Flu-like symptoms  R68.89 780.99    2. Simple chronic bronchitis (HCC)  J41.0 491.0 albuterol (PROVENTIL VENTOLIN) 2.5 mg /3 mL (0.083 %) nebu     Follow-up and Dispositions    · Return if symptoms worsen or fail to improve, for VV. Total Time: minutes: 5-10 minutes was spent on telemedicine encounter discussing above problems and plans. Patient Problem list, medications, and Allergies were reviewed during this encounter. Pursuant to the emergency declaration under the [facility], [facility] waiver authority and the [employer] and [employer] Act, this Telephone Visit was conducted, with patient's consent, to reduce the patient's risk of exposure to COVID-19 and provide continuity of care for an established patient.      I affirm this is a Patient Initiated Episode with an Established Patient who has not had a related appointment within my department in the past 7 days or scheduled within the next 24 hours. Discussed diagnoses in detail with patient. Medication risks/benefits/side effects discussed with patient. All of the patient's questions were addressed. The patient understands and agrees with our plan of care. The patient knows to call back if they are unsure of or forget any changes we discussed today or if the symptoms change.     Note: not billable if this call serves to triage the patient into an appointment for the relevant concern    MD JAGDISH Hercules & JERRY ORDONEZ [facility]  09/22/20

## 2020-09-22 NOTE — PROGRESS NOTES
Chief Complaint   Patient presents with    Flu Like Symptoms     fever, diarrhea, body aches,      1. Have you been to the ER, urgent care clinic since your last visit? Hospitalized since your last visit? No    2. Have you seen or consulted any other health care providers outside of the [address] [place] [address] since your last visit? Include any pap smears or colon screening.  No    Reviewed record in preparation for visit and have necessary documentation  Pt did not bring medication to office visit for review  opportunity was given for questions  Goals that were addressed and/or need to be completed during or after this appointment include   Health Maintenance Due   Topic Date Due    DTaP/Tdap/Td series (1 - Tdap) 07/26/1968    Shingrix Vaccine Age 50> (1 of 2) 07/26/1997    Breast Cancer Screen Mammogram  07/14/2018    Flu Vaccine (1) 09/01/2020

## 2020-10-07 ENCOUNTER — CLINICAL SUPPORT (OUTPATIENT)
Dept: FAMILY MEDICINE CLINIC | Age: 75
End: 2020-10-07
Payer: MEDICARE

## 2020-10-07 VITALS — TEMPERATURE: 98 F

## 2020-10-07 DIAGNOSIS — Z23 ENCOUNTER FOR IMMUNIZATION: Primary | ICD-10-CM

## 2020-10-07 PROCEDURE — G0008 ADMIN INFLUENZA VIRUS VAC: HCPCS | Performed by: FAMILY MEDICINE

## 2020-10-07 PROCEDURE — 90694 VACC AIIV4 NO PRSRV 0.5ML IM: CPT | Performed by: FAMILY MEDICINE

## 2020-11-12 ENCOUNTER — VIRTUAL VISIT (OUTPATIENT)
Dept: FAMILY MEDICINE CLINIC | Age: 75
End: 2020-11-12
Payer: MEDICARE

## 2020-11-12 ENCOUNTER — LAB ONLY (OUTPATIENT)
Dept: FAMILY MEDICINE CLINIC | Age: 75
End: 2020-11-12

## 2020-11-12 DIAGNOSIS — I82.412 ACUTE DEEP VEIN THROMBOSIS (DVT) OF FEMORAL VEIN OF LEFT LOWER EXTREMITY (HCC): ICD-10-CM

## 2020-11-12 DIAGNOSIS — E78.49 OTHER HYPERLIPIDEMIA: ICD-10-CM

## 2020-11-12 DIAGNOSIS — I10 ESSENTIAL HYPERTENSION: ICD-10-CM

## 2020-11-12 DIAGNOSIS — G62.9 NEUROPATHY: ICD-10-CM

## 2020-11-12 DIAGNOSIS — E11.9 TYPE 2 DIABETES MELLITUS WITHOUT COMPLICATION, WITHOUT LONG-TERM CURRENT USE OF INSULIN (HCC): ICD-10-CM

## 2020-11-12 DIAGNOSIS — J41.0 SIMPLE CHRONIC BRONCHITIS (HCC): Primary | ICD-10-CM

## 2020-11-12 DIAGNOSIS — E55.9 VITAMIN D DEFICIENCY, UNSPECIFIED: ICD-10-CM

## 2020-11-12 DIAGNOSIS — I26.92 ACUTE SADDLE PULMONARY EMBOLISM WITHOUT ACUTE COR PULMONALE (HCC): ICD-10-CM

## 2020-11-12 DIAGNOSIS — R68.89 OTHER GENERAL SYMPTOMS AND SIGNS: ICD-10-CM

## 2020-11-12 LAB
ALBUMIN SERPL-MCNC: 3.7 G/DL (ref 3.5–5)
ALBUMIN/GLOB SERPL: 1.1 {RATIO} (ref 1.1–2.2)
ALP SERPL-CCNC: 122 U/L (ref 45–117)
ALT SERPL-CCNC: 26 U/L (ref 12–78)
ANION GAP SERPL CALC-SCNC: 7 MMOL/L (ref 5–15)
AST SERPL-CCNC: 18 U/L (ref 15–37)
BILIRUB SERPL-MCNC: 0.3 MG/DL (ref 0.2–1)
BUN SERPL-MCNC: 20 MG/DL (ref 6–20)
BUN/CREAT SERPL: 20 (ref 12–20)
CALCIUM SERPL-MCNC: 9.2 MG/DL (ref 8.5–10.1)
CHLORIDE SERPL-SCNC: 107 MMOL/L (ref 97–108)
CO2 SERPL-SCNC: 26 MMOL/L (ref 21–32)
CREAT SERPL-MCNC: 0.99 MG/DL (ref 0.55–1.02)
ERYTHROCYTE [DISTWIDTH] IN BLOOD BY AUTOMATED COUNT: 14.4 % (ref 11.5–14.5)
FOLATE SERPL-MCNC: 43.1 NG/ML (ref 5–21)
GLOBULIN SER CALC-MCNC: 3.5 G/DL (ref 2–4)
GLUCOSE SERPL-MCNC: 163 MG/DL (ref 65–100)
HCT VFR BLD AUTO: 41.8 % (ref 35–47)
HGB BLD-MCNC: 13.5 G/DL (ref 11.5–16)
MCH RBC QN AUTO: 27.4 PG (ref 26–34)
MCHC RBC AUTO-ENTMCNC: 32.3 G/DL (ref 30–36.5)
MCV RBC AUTO: 85 FL (ref 80–99)
NRBC # BLD: 0 K/UL (ref 0–0.01)
NRBC BLD-RTO: 0 PER 100 WBC
PLATELET # BLD AUTO: 265 K/UL (ref 150–400)
PMV BLD AUTO: 11.2 FL (ref 8.9–12.9)
POTASSIUM SERPL-SCNC: 4.7 MMOL/L (ref 3.5–5.1)
PROT SERPL-MCNC: 7.2 G/DL (ref 6.4–8.2)
RBC # BLD AUTO: 4.92 M/UL (ref 3.8–5.2)
SODIUM SERPL-SCNC: 140 MMOL/L (ref 136–145)
VIT B12 SERPL-MCNC: 1560 PG/ML (ref 193–986)
WBC # BLD AUTO: 7.6 K/UL (ref 3.6–11)

## 2020-11-12 PROCEDURE — G8428 CUR MEDS NOT DOCUMENT: HCPCS | Performed by: FAMILY MEDICINE

## 2020-11-12 PROCEDURE — 99214 OFFICE O/P EST MOD 30 MIN: CPT | Performed by: FAMILY MEDICINE

## 2020-11-12 PROCEDURE — 3044F HG A1C LEVEL LT 7.0%: CPT | Performed by: FAMILY MEDICINE

## 2020-11-12 PROCEDURE — 1101F PT FALLS ASSESS-DOCD LE1/YR: CPT | Performed by: FAMILY MEDICINE

## 2020-11-12 PROCEDURE — 1090F PRES/ABSN URINE INCON ASSESS: CPT | Performed by: FAMILY MEDICINE

## 2020-11-12 PROCEDURE — G8756 NO BP MEASURE DOC: HCPCS | Performed by: FAMILY MEDICINE

## 2020-11-12 PROCEDURE — G8432 DEP SCR NOT DOC, RNG: HCPCS | Performed by: FAMILY MEDICINE

## 2020-11-12 PROCEDURE — G8399 PT W/DXA RESULTS DOCUMENT: HCPCS | Performed by: FAMILY MEDICINE

## 2020-11-12 PROCEDURE — 2022F DILAT RTA XM EVC RTNOPTHY: CPT | Performed by: FAMILY MEDICINE

## 2020-11-12 PROCEDURE — 3017F COLORECTAL CA SCREEN DOC REV: CPT | Performed by: FAMILY MEDICINE

## 2020-11-12 RX ORDER — FLUTICASONE FUROATE, UMECLIDINIUM BROMIDE AND VILANTEROL TRIFENATATE 100; 62.5; 25 UG/1; UG/1; UG/1
1 POWDER RESPIRATORY (INHALATION) DAILY
Qty: 60 BLISTER | Refills: 3 | Status: SHIPPED | OUTPATIENT
Start: 2020-11-12 | End: 2021-04-01

## 2020-11-12 NOTE — PROGRESS NOTES
Neil Rowland is a 70 y.o. female evaluated via Doximetry on 20. Patient Identity confirmed by . Consent:  He and/or health care decision maker is aware that that he may receive a bill for this telephone service, depending on his insurance coverage, and has provided verbal consent to proceed: Yes    Physician Location: Office  Patient Location: Home  Nurse Assisting with Encounter: Kamaljit Ga LPN    Chief Complaint   Patient presents with    Medication Refill      Information gathered from patient and/or health care decision maker. HPI:   COPD Follow Up:  Patient is following up for COPD. Patient is not currently in exacerbation.  - Currently COPD symptoms are stable. Chronic Symptoms:chronic dyspnea: severity = moderate: course of sx: stable. Chronic Cough: severity: mild: sputum : none. - Increased symptoms occur monthly, infrequently and generally consist of productive cough for 1-2 days. Wheezing when present is described as mild and and controlled with rescue bronchodilator.  - Current limitations in activity from COPD: Can do light housework without difficulty. Patient becomes dyspneic after 2 blocks, can climb 1 flights of stairs. - Oxygen: she currently is not on home oxygen therapy. - Compliance with medications: Good  - Patient does not smoke cigarettes. - Flu shot past 12 months? yes    Pulmonologist:  Was seeing [facility]    DVT/PE Follow up:  Patient is following up on Pulmonary Embolism, Deep Vein Thrombosis. Patient currently denies issues with persistent chest pain, leg pain, leg swelling, syncope and hemoptysis. Current Anticoagulation: Eliquis, plan is to continue lifelong anticoagulation. Bleeding Signs/Symptoms:  None. Review of Systems   Constitutional: Negative for chills and fever. HENT: Negative for congestion. Respiratory: Positive for cough. Negative for sputum production, shortness of breath and wheezing.     Cardiovascular: Negative for chest pain and palpitations. Gastrointestinal: Negative for abdominal pain, nausea and vomiting. Limited Exam:  Due to this being a TeleHealth evaluation, many elements of the physical examination are unable to be assessed. Constitutional: Appears well-developed and well-nourished in no apparent distress   Mental status: Alert and awake, Oriented to person/place/time, Able to follow commands  Eyes: EOM normal, Sclera normal, No visible ocular discharge  HENT: Normocephalic, atraumatic; Mouth/Throat: Moist mucous membranes, External Ears normal  Neck: No visualized mass  Pulmonary/Chest: Respiratory effort normal, No visualized signs of difficulty breathing or respiratory distress   Musculoskeletal: Normal gait with no signs of ataxia, Normal range of motion of neck  Neurological: No facial asymmetry (Cranial nerve 7 motor function), No gaze palsy  Skin: No significant exanthematous lesions or discoloration noted on facial skin  Psychiatric: Normal affect, normal judgment/insight. No hallucinations     Current Outpatient Medications on File Prior to Visit   Medication Sig Dispense Refill    lovastatin (MEVACOR) 10 mg tablet TAKE ONE TABLET BY MOUTH NIGHTLY 90 Tab 1    albuterol (PROVENTIL VENTOLIN) 2.5 mg /3 mL (0.083 %) nebu 3 mL by Nebulization route every six (6) hours as needed for Wheezing. 30 Nebule 1    montelukast (Singulair) 10 mg tablet Take 1 Tab by mouth nightly. 90 Tab 1    carvediloL (COREG) 3.125 mg tablet TAKE ONE TABLET BY MOUTH TWICE DAILY WITH FOOD 180 Tab 1    varicella-zoster recombinant, PF, (SHINGRIX) 50 mcg/0.5 mL susr injection 1 dose now, repeat in 2 months 0.5 mL 1    diclofenac (VOLTAREN) 1 % gel Apply 1 g to affected area four (4) times daily.  Apply to knees and back 350 g 1    cyclobenzaprine (FLEXERIL) 5 mg tablet TAKE ONE TABLET BY MOUTH NIGHTLY IF NEEDED FOR MUSCLE SPASMS 30 Tab 2    metFORMIN ER (GLUCOPHAGE XR) 500 mg tablet TAKE ONE TABLET BY MOUTH DAILY WITH BREAKFAST 90 Tab 0    fluticasone propionate (FLONASE) 50 mcg/actuation nasal spray 2 Sprays by Both Nostrils route daily. 1 Bottle 2    losartan (COZAAR) 25 mg tablet TAKE ONE TABLET BY MOUTH EVERY DAY 90 Tab 1    glucose blood VI test strips (FREESTYLE LITE STRIPS) strip Patient is to check blood sugar once daily. Dx E11.9 100 Strip 2    PROAIR HFA 90 mcg/actuation inhaler   3    acetaminophen (TYLENOL ARTHRITIS PAIN) 650 mg TbER Take 1 Tab by mouth every eight (8) hours. 60 Tab 0    multivitamin (ONE A DAY) tablet Take 1 Tab by mouth nightly.  aspirin 81 mg tablet Take 81 mg by mouth daily.  [DISCONTINUED] apixaban (ELIQUIS) 5 mg tablet Take 1 Tab by mouth two (2) times a day. 60 Tab 11     No current facility-administered medications on file prior to visit.          Allergies   Allergen Reactions    Crestor [Rosuvastatin] Other (comments)     Pain in left leg  Causes muscle spasms    Lipitor [Atorvastatin] Other (comments)     Left leg pain  Causes muscle spasms    Xarelto [Rivaroxaban] Other (comments)     Pt states it caused internal bleeding        Patient Active Problem List    Diagnosis Date Noted    [facility] disease) [facility]     History of GI bleed 04/16/2019    DCIS (ductal carcinoma in situ) of breast 03/08/2019    Chronic anticoagulation 03/01/2019    Severe obesity ([address] 03/01/2019    Acute pulmonary embolism ([address] 02/08/2019    Pulmonary embolism ([address] 02/07/2019    Acute deep vein thrombosis (DVT) of left lower extremity ([address] 02/07/2019    Debility 02/25/2012    Melena 02/24/2012    Acute posthemorrhagic anemia 02/24/2012    Type 2 diabetes mellitus without complication, without long-term current use of insulin ([address] 02/24/2012    HTN (hypertension) 02/24/2012    Other hyperlipidemia 02/24/2012        Health Maintenance Due   Topic Date Due    DTaP/Tdap/Td series (1 - Tdap) 07/26/1968    Shingrix Vaccine Age 50> (1 of 2) 07/26/1997    Breast Cancer Screen Mammogram  07/14/2018        Assessment/Plan:  Details of this discussion including any medical advice provided: Refills on medications, labs ordered for in office follow up. ICD-10-CM ICD-9-CM    1. Simple chronic bronchitis ([facility])  J41.0 491.0 Trelegy Ellipta 100-62.5-25 mcg inhaler   2. Acute saddle pulmonary embolism without acute cor pulmonale ([facility])  I26.92 415.13 apixaban (ELIQUIS) 5 mg tablet   3. Acute deep vein thrombosis (DVT) of femoral vein of left lower extremity ([facility])  I82.412 453.41 apixaban (ELIQUIS) 5 mg tablet   4. Essential hypertension  H07 283.7 METABOLIC PANEL, COMPREHENSIVE      CBC W/O DIFF   5. Other hyperlipidemia  E78.49 272.4 LIPID PANEL   6. Type 2 diabetes mellitus without complication, without long-term current use of insulin ([facility])  E11.9 250.00 HEMOGLOBIN A1C WITH EAG   7. Neuropathy  G62.9 355.9 VITAMIN B12 & FOLATE      VITAMIN D, 25 HYDROXY   8. Other general symptoms and signs   R68.89 780.99 VITAMIN B12 & FOLATE   9. Vitamin D deficiency, unspecified   E55.9 268.9 VITAMIN D, 25 HYDROXY     Follow-up and Dispositions    · Return in about 2 weeks (around 11/26/2020) for Follow up in office for lab review and HTN follow up. Total Time: minutes: 11-20 minutes was spent on telemedicine encounter discussing above problems and plans. Patient Problem list, medications, and Allergies were reviewed during this encounter. Pursuant to the emergency declaration under the [facility], [facility] waiver authority and the [employer] and [employer] Act, this Telephone Visit was conducted, with patient's consent, to reduce the patient's risk of exposure to COVID-19 and provide continuity of care for an established patient.      I affirm this is a Patient Initiated Episode with an Established Patient who has not had a related appointment within my department in the past 7 days or scheduled within the next 24 hours. Discussed diagnoses in detail with patient. Medication risks/benefits/side effects discussed with patient. All of the patient's questions were addressed. The patient understands and agrees with our plan of care. The patient knows to call back if they are unsure of or forget any changes we discussed today or if the symptoms change.     Note: not billable if this call serves to triage the patient into an appointment for the relevant concern    MD JAGDISH Carranza & JERRY ORDONEZ [facility]  11/12/20

## 2020-11-12 NOTE — PROGRESS NOTES
Chief Complaint   Patient presents with    Medication Refill     1. Have you been to the ER, urgent care clinic since your last visit? Hospitalized since your last visit? No    2. Have you seen or consulted any other health care providers outside of the [address] [place] [address] since your last visit? Include any pap smears or colon screening.  No    Reviewed record in preparation for visit and have necessary documentation  Pt did not bring medication to office visit for review  opportunity was given for questions  Goals that were addressed and/or need to be completed during or after this appointment include   Health Maintenance Due   Topic Date Due    DTaP/Tdap/Td series (1 - Tdap) 07/26/1968    Shingrix Vaccine Age 50> (1 of 2) 07/26/1997    Breast Cancer Screen Mammogram  07/14/2018

## 2020-11-13 LAB
25(OH)D3 SERPL-MCNC: 38.2 NG/ML (ref 30–100)
CHOLEST SERPL-MCNC: 190 MG/DL
EST. AVERAGE GLUCOSE BLD GHB EST-MCNC: 148 MG/DL
HBA1C MFR BLD: 6.8 % (ref 4–5.6)
HDLC SERPL-MCNC: 44 MG/DL
HDLC SERPL: 4.3 {RATIO} (ref 0–5)
LDLC SERPL CALC-MCNC: ABNORMAL MG/DL (ref 0–100)
LDLC SERPL DIRECT ASSAY-MCNC: 96 MG/DL (ref 0–100)
LIPID PROFILE,FLP: ABNORMAL
TRIGL SERPL-MCNC: 487 MG/DL (ref ?–150)
VLDLC SERPL CALC-MCNC: ABNORMAL MG/DL

## 2020-11-16 NOTE — PROGRESS NOTES
Vitamin D wnl, Trig elevated, Lipids otherwise normal, LDL above goal.  A1C is well controlled, B12 and Folate high. Glucose mildly elevated and alk phos mildly elevated, CBC wnl.

## 2020-11-18 ENCOUNTER — OFFICE VISIT (OUTPATIENT)
Dept: FAMILY MEDICINE CLINIC | Age: 75
End: 2020-11-18
Payer: MEDICARE

## 2020-11-18 VITALS
SYSTOLIC BLOOD PRESSURE: 150 MMHG | WEIGHT: 216.4 LBS | HEART RATE: 86 BPM | OXYGEN SATURATION: 94 % | DIASTOLIC BLOOD PRESSURE: 75 MMHG | RESPIRATION RATE: 18 BRPM | HEIGHT: 63 IN | BODY MASS INDEX: 38.34 KG/M2 | TEMPERATURE: 97.1 F

## 2020-11-18 DIAGNOSIS — E11.69 CONTROLLED TYPE 2 DIABETES MELLITUS WITH OTHER SPECIFIED COMPLICATION, WITHOUT LONG-TERM CURRENT USE OF INSULIN (HCC): ICD-10-CM

## 2020-11-18 DIAGNOSIS — E78.1 HYPERTRIGLYCERIDEMIA: ICD-10-CM

## 2020-11-18 DIAGNOSIS — G62.9 POLYNEUROPATHY: ICD-10-CM

## 2020-11-18 DIAGNOSIS — I10 ESSENTIAL HYPERTENSION: Primary | ICD-10-CM

## 2020-11-18 PROCEDURE — G8417 CALC BMI ABV UP PARAM F/U: HCPCS | Performed by: FAMILY MEDICINE

## 2020-11-18 PROCEDURE — 3044F HG A1C LEVEL LT 7.0%: CPT | Performed by: FAMILY MEDICINE

## 2020-11-18 PROCEDURE — G8754 DIAS BP LESS 90: HCPCS | Performed by: FAMILY MEDICINE

## 2020-11-18 PROCEDURE — G8399 PT W/DXA RESULTS DOCUMENT: HCPCS | Performed by: FAMILY MEDICINE

## 2020-11-18 PROCEDURE — 1101F PT FALLS ASSESS-DOCD LE1/YR: CPT | Performed by: FAMILY MEDICINE

## 2020-11-18 PROCEDURE — G8427 DOCREV CUR MEDS BY ELIG CLIN: HCPCS | Performed by: FAMILY MEDICINE

## 2020-11-18 PROCEDURE — G8753 SYS BP > OR = 140: HCPCS | Performed by: FAMILY MEDICINE

## 2020-11-18 PROCEDURE — 2022F DILAT RTA XM EVC RTNOPTHY: CPT | Performed by: FAMILY MEDICINE

## 2020-11-18 PROCEDURE — 3017F COLORECTAL CA SCREEN DOC REV: CPT | Performed by: FAMILY MEDICINE

## 2020-11-18 PROCEDURE — 1090F PRES/ABSN URINE INCON ASSESS: CPT | Performed by: FAMILY MEDICINE

## 2020-11-18 PROCEDURE — 99214 OFFICE O/P EST MOD 30 MIN: CPT | Performed by: FAMILY MEDICINE

## 2020-11-18 PROCEDURE — G8432 DEP SCR NOT DOC, RNG: HCPCS | Performed by: FAMILY MEDICINE

## 2020-11-18 PROCEDURE — G8536 NO DOC ELDER MAL SCRN: HCPCS | Performed by: FAMILY MEDICINE

## 2020-11-18 RX ORDER — ALBUTEROL SULFATE 90 UG/1
2 AEROSOL, METERED RESPIRATORY (INHALATION)
Qty: 1 INHALER | Refills: 3 | Status: SHIPPED | OUTPATIENT
Start: 2020-11-18 | End: 2022-01-10

## 2020-11-18 RX ORDER — LOSARTAN POTASSIUM 50 MG/1
50 TABLET ORAL DAILY
Qty: 90 TAB | Refills: 1 | Status: SHIPPED | OUTPATIENT
Start: 2020-11-18 | End: 2021-04-01

## 2020-11-18 NOTE — PROGRESS NOTES
[address] [facility]    History of Present Illness:   Shanice Redding is a 70 y.o. female with history of HTN, VTE, Diabetes type 2, COPD, HLD,   CC: Follow up labs, HTN, and Neuropathy  History provided by patient and Records    HPI:  Hypertension Follow up:  Currently Taking Carvedilol 3.25 mg, Losartan 25 mg. The patient reports:  taking medications as instructed, no medication side effects noted, home BP monitoring in range of 300-208'Q systolic over 85-13'R diastolic, no TIA's, no chest pain on exertion, no dyspnea on exertion, no swelling of ankles. BP Readings from Last 3 Encounters:   11/18/20 (!) 150/75   07/30/20 138/86   05/26/20 150/82     Neuropathy: Mainly in the feet with constant cold feeling that worsens at night. Also has moderate to severe burning sensation at night as well in the feet. Noting cramping in legs intermittent as well. Had been on Gabapentin, caused her to have a sensation of \"Giddiness\". Labs were negative for metabolic causes. Elevated Triglycerides: Patient ate a lot of shrimp the day before. Health Maintenance  Health Maintenance Due   Topic Date Due    DTaP/Tdap/Td series (1 - Tdap) 07/26/1968    Shingrix Vaccine Age 50> (1 of 2) 07/26/1997    Breast Cancer Screen Mammogram  07/14/2018       Past Medical, Family, and Social History:     Current Outpatient Medications on File Prior to Visit   Medication Sig Dispense Refill    Trelegy Ellipta 100-62.5-25 mcg inhaler Take 1 Puff by inhalation daily. 60 Blister 3    apixaban (ELIQUIS) 5 mg tablet Take 1 Tab by mouth two (2) times a day. 60 Tab 11    lovastatin (MEVACOR) 10 mg tablet TAKE ONE TABLET BY MOUTH NIGHTLY 90 Tab 1    albuterol (PROVENTIL VENTOLIN) 2.5 mg /3 mL (0.083 %) nebu 3 mL by Nebulization route every six (6) hours as needed for Wheezing. 30 Nebule 1    montelukast (Singulair) 10 mg tablet Take 1 Tab by mouth nightly.  90 Tab 1    carvediloL (COREG) 3.125 mg tablet TAKE ONE TABLET BY MOUTH TWICE DAILY WITH FOOD 180 Tab 1    varicella-zoster recombinant, PF, (SHINGRIX) 50 mcg/0.5 mL susr injection 1 dose now, repeat in 2 months 0.5 mL 1    diclofenac (VOLTAREN) 1 % gel Apply 1 g to affected area four (4) times daily. Apply to knees and back 350 g 1    cyclobenzaprine (FLEXERIL) 5 mg tablet TAKE ONE TABLET BY MOUTH NIGHTLY IF NEEDED FOR MUSCLE SPASMS 30 Tab 2    metFORMIN ER (GLUCOPHAGE XR) 500 mg tablet TAKE ONE TABLET BY MOUTH DAILY WITH BREAKFAST 90 Tab 0    fluticasone propionate (FLONASE) 50 mcg/actuation nasal spray 2 Sprays by Both Nostrils route daily. 1 Bottle 2    glucose blood VI test strips (FREESTYLE LITE STRIPS) strip Patient is to check blood sugar once daily. Dx E11.9 100 Strip 2    acetaminophen (TYLENOL ARTHRITIS PAIN) 650 mg TbER Take 1 Tab by mouth every eight (8) hours. 60 Tab 0    multivitamin (ONE A DAY) tablet Take 1 Tab by mouth nightly.  aspirin 81 mg tablet Take 81 mg by mouth daily.  [DISCONTINUED] losartan (COZAAR) 25 mg tablet TAKE ONE TABLET BY MOUTH EVERY DAY 90 Tab 1    [DISCONTINUED] PROAIR HFA 90 mcg/actuation inhaler   3     No current facility-administered medications on file prior to visit.         Patient Active Problem List   Diagnosis Code    Melena K92.1    Acute posthemorrhagic anemia D62    Type 2 diabetes mellitus without complication, without long-term current use of insulin (HCC) E11.9    HTN (hypertension) I10    Other hyperlipidemia E78.49    Debility R53.81    Pulmonary embolism (HCC) I26.99    Acute deep vein thrombosis (DVT) of left lower extremity ([facility]) I82.402    Acute pulmonary embolism (HCC) I26.99    Chronic anticoagulation Z79.01    Severe obesity ([facility]) E66.01    DCIS (ductal carcinoma in situ) of breast D05.10    History of GI bleed Z87.19    COPD (chronic obstructive pulmonary disease) ([facility]) J44.9       Social History     Socioeconomic History    Marital status: SINGLE     Spouse name: Not on file    Number of children: Not on file    Years of education: Not on file    Highest education level: Not on file   Occupational History    Not on file   Social Needs    Financial resource strain: Not on file    Food insecurity     Worry: Not on file     Inability: Not on file    Transportation needs     Medical: Not on file     Non-medical: Not on file   Tobacco Use    Smoking status: Former Smoker     Last attempt to quit:      Years since quittin.8    Smokeless tobacco: Never Used   Substance and Sexual Activity    Alcohol use: No     Frequency: Never    Drug use: No    Sexual activity: Never   Lifestyle    Physical activity     Days per week: Not on file     Minutes per session: Not on file    Stress: Not on file   Relationships    Social connections     Talks on phone: Not on file     Gets together: Not on file     Attends Mu-ism service: Not on file     Active member of club or organization: Not on file     Attends meetings of clubs or organizations: Not on file     Relationship status: Not on file    Intimate partner violence     Fear of current or ex partner: Not on file     Emotionally abused: Not on file     Physically abused: Not on file     Forced sexual activity: Not on file   Other Topics Concern    Caffeine Concern Not Asked    Back Care Not Asked    Exercise Not Asked    Occupational Exposure Not Asked    Sleep Concern Not Asked    Stress Concern Not Asked    Weight Concern Not Asked   Social History Narrative    Not on file       Review of Systems   Review of Systems   Constitutional: Negative for chills and fever. HENT: Negative for congestion. Respiratory: Negative for cough. Cardiovascular: Negative for chest pain and palpitations.        Objective:     Visit Vitals  BP (!) 150/75 (BP 1 Location: Right arm, BP Patient Position: Sitting)   Pulse 86   Temp 97.1 °F (36.2 °C) (Temporal)   Resp 18   Ht 5' 3\" (1.6 m)   Wt 216 lb 6.4 oz (98.2 kg)   SpO2 94% BMI 38.33 kg/m²        Physical Exam  Vitals signs reviewed. Constitutional:       Appearance: Normal appearance. HENT:      Head: Normocephalic and atraumatic. Cardiovascular:      Rate and Rhythm: Normal rate and regular rhythm. Pulses: Normal pulses. Heart sounds: Normal heart sounds. No murmur. No friction rub. No gallop. Pulmonary:      Effort: Pulmonary effort is normal.      Breath sounds: Normal breath sounds. Abdominal:      General: Bowel sounds are normal.      Palpations: Abdomen is soft. Skin:     General: Skin is warm and dry. Neurological:      Mental Status: She is alert. Pertinent Labs/Studies:      Assessment and orders:       ICD-10-CM ICD-9-CM    1. Essential hypertension  I10 401.9 losartan (COZAAR) 50 mg tablet   2. Controlled type 2 diabetes mellitus with other specified complication, without long-term current use of insulin ([facility])  E11.69 250.80    3. Polyneuropathy  G62.9 356.9    4. Hypertriglyceridemia  E78.1 272.1      Diagnoses and all orders for this visit:    1. Essential hypertension: Increasing Losartan dose, follow up in 4 weeks  -     losartan (COZAAR) 50 mg tablet; Take 1 Tab by mouth daily. 2. Controlled type 2 diabetes mellitus with other specified complication, without long-term current use of insulin [facility]): Doing well overall, no significant issues other than polyneuropathy    3. Polyneuropathy: Discussed options, patient not interested in new medication at this time but did recommend Capsaicin cream    4. Hypertriglyceridemia: Elevated Trigycerides, repeat in 1 month. Other orders  -     ProAir HFA 90 mcg/actuation inhaler; Take 2 Puffs by inhalation every six (6) hours as needed for Wheezing. Follow-up and Dispositions    · Return in about 4 weeks (around 12/16/2020) for HTN and Lipids. I have discussed the diagnosis with the patient and the intended plan as seen in the above orders.   Social history, medical history, and labs were reviewed. The patient has received an after-visit summary and questions were answered concerning future plans. I have discussed medication side effects and warnings with the patient as well.     MD JAGDISH Davalos & JERRY ORDONEZ [facility]  11/18/20

## 2020-11-18 NOTE — PATIENT INSTRUCTIONS
- Capsaicin Cream on the feet - Foot soaks and grind foot callus - Double up you Losartan 25 mg daily

## 2020-11-18 NOTE — PROGRESS NOTES
Chief Complaint   Patient presents with    Labs     review     Visit Vitals  BP (!) 150/75 (BP 1 Location: Right arm, BP Patient Position: Sitting)   Pulse 86   Temp 97.1 °F (36.2 °C) (Temporal)   Resp 18   Ht 5' 3\" (1.6 m)   Wt 216 lb 6.4 oz (98.2 kg)   SpO2 94%   BMI 38.33 kg/m²     1. Have you been to the ER, urgent care clinic since your last visit? Hospitalized since your last visit? No    2. Have you seen or consulted any other health care providers outside of the [address] [place] [address] since your last visit? Include any pap smears or colon screening.  No    Reviewed record in preparation for visit and have necessary documentation  Pt did not bring medication to office visit for review  opportunity was given for questions  Goals that were addressed and/or need to be completed during or after this appointment include   Health Maintenance Due   Topic Date Due    DTaP/Tdap/Td series (1 - Tdap) 07/26/1968    Shingrix Vaccine Age 50> (1 of 2) 07/26/1997    Breast Cancer Screen Mammogram  07/14/2018

## 2020-12-11 ENCOUNTER — VIRTUAL VISIT (OUTPATIENT)
Dept: FAMILY MEDICINE CLINIC | Age: 75
End: 2020-12-11
Payer: MEDICARE

## 2020-12-11 DIAGNOSIS — J41.0 SIMPLE CHRONIC BRONCHITIS (HCC): ICD-10-CM

## 2020-12-11 DIAGNOSIS — J06.9 VIRAL UPPER RESPIRATORY TRACT INFECTION: Primary | ICD-10-CM

## 2020-12-11 PROCEDURE — 99441 PR PHYS/QHP TELEPHONE EVALUATION 5-10 MIN: CPT | Performed by: FAMILY MEDICINE

## 2020-12-11 RX ORDER — PREDNISONE 20 MG/1
40 TABLET ORAL
Qty: 10 TAB | Refills: 0 | Status: SHIPPED | OUTPATIENT
Start: 2020-12-11 | End: 2020-12-17 | Stop reason: SDUPTHER

## 2020-12-11 NOTE — PROGRESS NOTES
Chief Complaint   Patient presents with    Cold Symptoms     1. Have you been to the ER, urgent care clinic since your last visit? Hospitalized since your last visit? No    2. Have you seen or consulted any other health care providers outside of the [address] [place] [address] since your last visit? Include any pap smears or colon screening.  No    Reviewed record in preparation for visit and have necessary documentation  Pt did not bring medication to office visit for review  opportunity was given for questions  Goals that were addressed and/or need to be completed during or after this appointment include   Health Maintenance Due   Topic Date Due    DTaP/Tdap/Td series (1 - Tdap) 07/26/1968    Breast Cancer Screen Mammogram  07/14/2018

## 2020-12-11 NOTE — PROGRESS NOTES
Jesus Monday is a 70 y.o. female evaluated via Telephone on 20. Patient Identity confirmed by . Consent:  He and/or health care decision maker is aware that that he may receive a bill for this telephone service, depending on his insurance coverage, and has provided verbal consent to proceed: Yes    Physician Location: Office  Patient Location: Home  Nurse Assisting with Encounter: Refugio He LPN    Chief Complaint   Patient presents with   Latisha Salazar Symptoms      Information gathered from patient and/or health care decision maker. HPI:   Upper respiratory illness:  Presents with complaints of congestion, sore throat, post nasal drip, dry cough, bilateral sinus pain, chills and  nasal discharge for 2 days. Reports no nausea and no vomiting. Denies myalgias, headache and fever. Symptoms are mild. Over-the-counter remedies including Nothing other than prescribed medications   has been used with poor relief of symptoms. Drinking plenty of fluids: yes  History of Asthma/COPD:  yes  Smoking Status: Former Smoker  Sick Contacts: non known but did have a Thanksgiving gathering of about 20 people a little less than 2 weeks ago    Review of Systems   Constitutional: Positive for malaise/fatigue. Negative for chills and fever. HENT: Negative for congestion. Respiratory: Positive for cough. Cardiovascular: Negative for chest pain and palpitations. Gastrointestinal: Negative for nausea and vomiting. Limited Exam:  Due to this being a TeleHealth evaluation, many elements of the physical examination are unable to be assessed. Constitutional: Appears well-developed and well-nourished in no apparent distress   Mental status: Alert and awake, Oriented to person/place/time, Able to follow commands  Pulmonary/Chest: Respiratory effort normal, No visualized signs of difficulty breathing or respiratory distress   Psychiatric: Normal affect, normal judgment/insight.  No hallucinations Current Outpatient Medications on File Prior to Visit   Medication Sig Dispense Refill    montelukast (SINGULAIR) 10 mg tablet TAKE ONE TABLET BY MOUTH NIGHTLY 90 Tab 1    losartan (COZAAR) 50 mg tablet Take 1 Tab by mouth daily. 90 Tab 1    ProAir HFA 90 mcg/actuation inhaler Take 2 Puffs by inhalation every six (6) hours as needed for Wheezing. 1 Inhaler 3    Trelegy Ellipta 100-62.5-25 mcg inhaler Take 1 Puff by inhalation daily. 60 Blister 3    apixaban (ELIQUIS) 5 mg tablet Take 1 Tab by mouth two (2) times a day. 60 Tab 11    lovastatin (MEVACOR) 10 mg tablet TAKE ONE TABLET BY MOUTH NIGHTLY 90 Tab 1    albuterol (PROVENTIL VENTOLIN) 2.5 mg /3 mL (0.083 %) nebu 3 mL by Nebulization route every six (6) hours as needed for Wheezing. 30 Nebule 1    carvediloL (COREG) 3.125 mg tablet TAKE ONE TABLET BY MOUTH TWICE DAILY WITH FOOD 180 Tab 1    varicella-zoster recombinant, PF, (SHINGRIX) 50 mcg/0.5 mL susr injection 1 dose now, repeat in 2 months 0.5 mL 1    diclofenac (VOLTAREN) 1 % gel Apply 1 g to affected area four (4) times daily. Apply to knees and back 350 g 1    cyclobenzaprine (FLEXERIL) 5 mg tablet TAKE ONE TABLET BY MOUTH NIGHTLY IF NEEDED FOR MUSCLE SPASMS 30 Tab 2    metFORMIN ER (GLUCOPHAGE XR) 500 mg tablet TAKE ONE TABLET BY MOUTH DAILY WITH BREAKFAST 90 Tab 0    fluticasone propionate (FLONASE) 50 mcg/actuation nasal spray 2 Sprays by Both Nostrils route daily. 1 Bottle 2    glucose blood VI test strips (FREESTYLE LITE STRIPS) strip Patient is to check blood sugar once daily. Dx E11.9 100 Strip 2    acetaminophen (TYLENOL ARTHRITIS PAIN) 650 mg TbER Take 1 Tab by mouth every eight (8) hours. 60 Tab 0    multivitamin (ONE A DAY) tablet Take 1 Tab by mouth nightly.  aspirin 81 mg tablet Take 81 mg by mouth daily. No current facility-administered medications on file prior to visit.          Allergies   Allergen Reactions    Crestor [Rosuvastatin] Other (comments) Pain in left leg  Causes muscle spasms    Lipitor [Atorvastatin] Other (comments)     Left leg pain  Causes muscle spasms    Xarelto [Rivaroxaban] Other (comments)     Pt states it caused internal bleeding        Patient Active Problem List    Diagnosis Date Noted    [facility]chronic obstructive [facility] disease) ([facility]     History of GI bleed 04/16/2019    DCIS (ductal carcinoma in situ) of breast 03/08/2019    Chronic anticoagulation 03/01/2019    Severe obesity ([facility] 03/01/2019    Acute pulmonary embolism ([facility] 02/08/2019    Pulmonary embolism ([facility] 02/07/2019    [facility] ([facility]) [facility] 02/07/2019    Debility 02/25/2012    Melena 02/24/2012    Acute posthemorrhagic anemia 02/24/2012    Type 2 diabetes mellitus without complication, without long-term current use of insulin ([facility] 02/24/2012    HTN (hypertension) 02/24/2012    Other hyperlipidemia 02/24/2012        Health Maintenance Due   Topic Date Due    DTaP/Tdap/Td series (1 - Tdap) 07/26/1968    Breast Cancer Screen Mammogram  07/14/2018        Assessment/Plan:  Details of this discussion including any medical advice provided: Advised on getting COVID testing, Prednisone ordered in case of COPD exacerbation,  May bring in on Monday if symptoms worsening    ICD-10-CM ICD-9-CM    1. Viral upper respiratory tract infection  J06.9 465.9    2. Simple chronic bronchitis (HCC)  J41.0 491.0 predniSONE (DELTASONE) 20 mg tablet     Follow-up and Dispositions    · Return if symptoms worsen or fail to improve, for VV or possible in office. Total Time: minutes: 5-10 minutes was spent on telemedicine encounter discussing above problems and plans. Patient Problem list, medications, and Allergies were reviewed during this encounter.     Pursuant to the emergency declaration under the [address] [facility] waiver authority and the [facility] Appropriations Act, this Telephone Visit was conducted, with patient's consent, to reduce the patient's risk of exposure to COVID-19 and provide continuity of care for an established patient. I affirm this is a Patient Initiated Episode with an Established Patient who has not had a related appointment within my department in the past 7 days or scheduled within the next 24 hours. Discussed diagnoses in detail with patient. Medication risks/benefits/side effects discussed with patient. All of the patient's questions were addressed. The patient understands and agrees with our plan of care. The patient knows to call back if they are unsure of or forget any changes we discussed today or if the symptoms change.     Note: not billable if this call serves to triage the patient into an appointment for the relevant concern    MD JAGDISH Gonzales & JERRY ORDONEZ [facility]  12/11/20

## 2020-12-15 ENCOUNTER — TELEPHONE (OUTPATIENT)
Dept: FAMILY MEDICINE CLINIC | Age: 75
End: 2020-12-15

## 2020-12-15 NOTE — TELEPHONE ENCOUNTER
----- Message from [employer] sent at 12/15/2020  9:25 AM EST -----  Regarding: Dr. Nathan London Message/Vendor Calls    Caller's first and last name: Pt      Reason for call: update on covid testing      Callback required yes/no and why: Yes, to discuss if an appointment is required following her antibiotic      Best contact number(s): [phone]      Details to clarify the request: Pt calling to notify Dr. Carisa Ramos that she was tested for Covid on Monday, 12/14 and her results came back negative. She was however put on Azithromycin for her congestion.       [employer]

## 2020-12-17 ENCOUNTER — OFFICE VISIT (OUTPATIENT)
Dept: FAMILY MEDICINE CLINIC | Age: 75
End: 2020-12-17
Payer: MEDICARE

## 2020-12-17 VITALS
SYSTOLIC BLOOD PRESSURE: 162 MMHG | RESPIRATION RATE: 18 BRPM | DIASTOLIC BLOOD PRESSURE: 83 MMHG | WEIGHT: 216 LBS | TEMPERATURE: 97.4 F | OXYGEN SATURATION: 97 % | HEIGHT: 63 IN | BODY MASS INDEX: 38.27 KG/M2 | HEART RATE: 84 BPM

## 2020-12-17 DIAGNOSIS — J44.1 COPD EXACERBATION (HCC): Primary | ICD-10-CM

## 2020-12-17 DIAGNOSIS — I10 ESSENTIAL HYPERTENSION: ICD-10-CM

## 2020-12-17 PROCEDURE — 1101F PT FALLS ASSESS-DOCD LE1/YR: CPT | Performed by: FAMILY MEDICINE

## 2020-12-17 PROCEDURE — 1090F PRES/ABSN URINE INCON ASSESS: CPT | Performed by: FAMILY MEDICINE

## 2020-12-17 PROCEDURE — G8427 DOCREV CUR MEDS BY ELIG CLIN: HCPCS | Performed by: FAMILY MEDICINE

## 2020-12-17 PROCEDURE — 3017F COLORECTAL CA SCREEN DOC REV: CPT | Performed by: FAMILY MEDICINE

## 2020-12-17 PROCEDURE — G8399 PT W/DXA RESULTS DOCUMENT: HCPCS | Performed by: FAMILY MEDICINE

## 2020-12-17 PROCEDURE — G8432 DEP SCR NOT DOC, RNG: HCPCS | Performed by: FAMILY MEDICINE

## 2020-12-17 PROCEDURE — 99214 OFFICE O/P EST MOD 30 MIN: CPT | Performed by: FAMILY MEDICINE

## 2020-12-17 PROCEDURE — G8753 SYS BP > OR = 140: HCPCS | Performed by: FAMILY MEDICINE

## 2020-12-17 PROCEDURE — G8754 DIAS BP LESS 90: HCPCS | Performed by: FAMILY MEDICINE

## 2020-12-17 PROCEDURE — G8536 NO DOC ELDER MAL SCRN: HCPCS | Performed by: FAMILY MEDICINE

## 2020-12-17 PROCEDURE — G8417 CALC BMI ABV UP PARAM F/U: HCPCS | Performed by: FAMILY MEDICINE

## 2020-12-17 RX ORDER — PREDNISONE 20 MG/1
40 TABLET ORAL
Qty: 10 TAB | Refills: 0 | Status: SHIPPED | OUTPATIENT
Start: 2020-12-17 | End: 2021-02-24 | Stop reason: SDUPTHER

## 2020-12-17 RX ORDER — GUAIFENESIN 600 MG/1
600 TABLET, EXTENDED RELEASE ORAL 2 TIMES DAILY
Qty: 60 TAB | Refills: 1 | Status: SHIPPED | OUTPATIENT
Start: 2020-12-17

## 2020-12-17 RX ORDER — AMOXICILLIN AND CLAVULANATE POTASSIUM 875; 125 MG/1; MG/1
1 TABLET, FILM COATED ORAL EVERY 12 HOURS
Qty: 14 TAB | Refills: 0 | Status: SHIPPED | OUTPATIENT
Start: 2020-12-17 | End: 2020-12-24

## 2020-12-17 NOTE — PROGRESS NOTES
[facility]    History of Present Illness:   Jose Foster is a 70 y.o. female with history of HTN, VTE, Diabetes type 2, COPD, HLD,   CC: Follow up sick  History provided by patient and Records    HPI:  Patient reports having headaches, sinus congestion, persistent chest congestion, wheezing despite taking Azithromycin 500 mg for 3 days. Endorses fatigue. Denies Fevers, chills, nausea, vomiting, diarrhea. Not worse with laying flat. Patient is not using mucinex, not using Flonase. Noting also BP has been mildly elevated while sick. Health Maintenance  Health Maintenance Due   Topic Date Due    DTaP/Tdap/Td series (1 - Tdap) 07/26/1968    Breast Cancer Screen Mammogram  07/14/2018       Past Medical, Family, and Social History:     Current Outpatient Medications on File Prior to Visit   Medication Sig Dispense Refill    montelukast (SINGULAIR) 10 mg tablet TAKE ONE TABLET BY MOUTH NIGHTLY 90 Tab 1    losartan (COZAAR) 50 mg tablet Take 1 Tab by mouth daily. 90 Tab 1    ProAir HFA 90 mcg/actuation inhaler Take 2 Puffs by inhalation every six (6) hours as needed for Wheezing. 1 Inhaler 3    Trelegy Ellipta 100-62.5-25 mcg inhaler Take 1 Puff by inhalation daily. 60 Blister 3    apixaban (ELIQUIS) 5 mg tablet Take 1 Tab by mouth two (2) times a day. 60 Tab 11    lovastatin (MEVACOR) 10 mg tablet TAKE ONE TABLET BY MOUTH NIGHTLY 90 Tab 1    albuterol (PROVENTIL VENTOLIN) 2.5 mg /3 mL (0.083 %) nebu 3 mL by Nebulization route every six (6) hours as needed for Wheezing. 30 Nebule 1    carvediloL (COREG) 3.125 mg tablet TAKE ONE TABLET BY MOUTH TWICE DAILY WITH FOOD 180 Tab 1    varicella-zoster recombinant, PF, (SHINGRIX) 50 mcg/0.5 mL susr injection 1 dose now, repeat in 2 months 0.5 mL 1    diclofenac (VOLTAREN) 1 % gel Apply 1 g to affected area four (4) times daily.  Apply to knees and back 350 g 1    cyclobenzaprine (FLEXERIL) 5 mg tablet TAKE ONE TABLET BY MOUTH NIGHTLY IF NEEDED FOR MUSCLE SPASMS 30 Tab 2    metFORMIN ER (GLUCOPHAGE XR) 500 mg tablet TAKE ONE TABLET BY MOUTH DAILY WITH BREAKFAST 90 Tab 0    fluticasone propionate (FLONASE) 50 mcg/actuation nasal spray 2 Sprays by Both Nostrils route daily. 1 Bottle 2    glucose blood VI test strips (FREESTYLE LITE STRIPS) strip Patient is to check blood sugar once daily. Dx E11.9 100 Strip 2    acetaminophen (TYLENOL ARTHRITIS PAIN) 650 mg TbER Take 1 Tab by mouth every eight (8) hours. 60 Tab 0    multivitamin (ONE A DAY) tablet Take 1 Tab by mouth nightly.  aspirin 81 mg tablet Take 81 mg by mouth daily.  [DISCONTINUED] predniSONE (DELTASONE) 20 mg tablet Take 40 mg by mouth daily (with breakfast). 10 Tab 0     No current facility-administered medications on file prior to visit.         Patient Active Problem List   Diagnosis Code    Melena K92.1    Acute posthemorrhagic anemia D62    Type 2 diabetes mellitus without complication, without long-term current use of insulin (HCC) E11.9    HTN (hypertension) I10    Other hyperlipidemia E78.49    Debility R53.81    Pulmonary embolism (HCC) I26.99    Acute deep vein thrombosis (DVT) of left lower extremity ([facility]) I82.402    Acute pulmonary embolism (HCC) I26.99    Chronic anticoagulation Z79.01    Severe obesity ([facility]) E66.01    DCIS (ductal carcinoma in situ) of breast D05.10    History of GI bleed Z87.19    COPD (chronic obstructive pulmonary disease) ([facility]) J44.9       Social History     Socioeconomic History    Marital status: SINGLE     Spouse name: Not on file    Number of children: Not on file    Years of education: Not on file    Highest education level: Not on file   Occupational History    Not on file   Social Needs    Financial resource strain: Not on file    Food insecurity     Worry: Not on file     Inability: Not on file    Transportation needs     Medical: Not on file     Non-medical: Not on file   Tobacco Use    Smoking status: Former Smoker     Quit date:      Years since quittin.9    Smokeless tobacco: Never Used   Substance and Sexual Activity    Alcohol use: No     Frequency: Never    Drug use: No    Sexual activity: Never   Lifestyle    Physical activity     Days per week: Not on file     Minutes per session: Not on file    Stress: Not on file   Relationships    Social connections     Talks on phone: Not on file     Gets together: Not on file     Attends [employer] service: Not on file     Active member of club or organization: Not on file     Attends meetings of clubs or organizations: Not on file     Relationship status: Not on file    Intimate partner violence     Fear of current or ex partner: Not on file     Emotionally abused: Not on file     Physically abused: Not on file     Forced sexual activity: Not on file   Other Topics Concern    Caffeine Concern Not Asked    Back Care Not Asked    Exercise Not Asked    Occupational Exposure Not Asked    Sleep Concern Not Asked    Stress Concern Not Asked    Weight Concern Not Asked   Social History Narrative    Not on file       Review of Systems   Review of Systems   Constitutional: Positive for malaise/fatigue. Negative for chills and fever. HENT: Positive for congestion. Respiratory: Positive for cough. Negative for sputum production. Cardiovascular: Positive for chest pain. Gastrointestinal: Negative for abdominal pain, nausea and vomiting. Neurological: Negative for dizziness and headaches. Objective:     Visit Vitals  BP (!) 162/83 (BP 1 Location: Right arm, BP Patient Position: Sitting)   Pulse 84   Temp 97.4 °F (36.3 °C) (Temporal)   Resp 18   Ht 5' 3\" (1.6 m)   Wt 216 lb (98 kg)   SpO2 97%   BMI 38.26 kg/m²        Physical Exam  Vitals signs reviewed. Constitutional:       Appearance: Normal appearance. HENT:      Head: Normocephalic and atraumatic. Neck:      Musculoskeletal: Normal range of motion and neck supple. Cardiovascular:      Rate and Rhythm: Normal rate and regular rhythm. Pulses: Normal pulses. Heart sounds: Normal heart sounds. Pulmonary:      Effort: Pulmonary effort is normal.      Breath sounds: Normal breath sounds. Abdominal:      General: Bowel sounds are normal.      Palpations: Abdomen is soft. Neurological:      Mental Status: She is alert. Pertinent Labs/Studies:      Assessment and orders:       ICD-10-CM ICD-9-CM    1. COPD exacerbation ([facility])  J44.1 491.21 guaiFENesin ER (MUCINEX) 600 mg ER tablet      predniSONE (DELTASONE) 20 mg tablet      amoxicillin-clavulanate (AUGMENTIN) 875-125 mg per tablet   2. Essential hypertension  I10 401.9      Diagnoses and all orders for this visit:    1. COPD exacerbation ([address]): COPD is exacerbated at this time. Increasing breathing treatments now and will add Augmentin. Starting patient on a Prednisone Burst.  Strict ER precautions discussed. -     guaiFENesin ER (MUCINEX) 600 mg ER tablet; Take 1 Tab by mouth two (2) times a day. -     predniSONE (DELTASONE) 20 mg tablet; Take 40 mg by mouth daily (with breakfast). -     amoxicillin-clavulanate (AUGMENTIN) 875-125 mg per tablet; Take 1 Tab by mouth every twelve (12) hours for 7 days. 2. Essential hypertension: Should improve with treatment of COPD. Follow-up and Dispositions    · Return if symptoms worsen or fail to improve. I have discussed the diagnosis with the patient and the intended plan as seen in the above orders. Social history, medical history, and labs were reviewed. The patient has received an after-visit summary and questions were answered concerning future plans. I have discussed medication side effects and warnings with the patient as well.     MD JAGDISH Osborne & JERRY ORDONEZ [facility]  12/17/20

## 2020-12-17 NOTE — PROGRESS NOTES
Chief Complaint   Patient presents with    Follow-up     Visit Vitals  BP (!) 162/83 (BP 1 Location: Right arm, BP Patient Position: Sitting)   Pulse 84   Temp 97.4 °F (36.3 °C) (Temporal)   Resp 18   Ht 5' 3\" (1.6 m)   Wt 216 lb (98 kg)   SpO2 97%   BMI 38.26 kg/m²     1. Have you been to the ER, urgent care clinic since your last visit? Hospitalized since your last visit? No    2. Have you seen or consulted any other health care providers outside of the [address] [place] [address] since your last visit? Include any pap smears or colon screening.  No    Reviewed record in preparation for visit and have necessary documentation  Pt did not bring medication to office visit for review  opportunity was given for questions  Goals that were addressed and/or need to be completed during or after this appointment include   Health Maintenance Due   Topic Date Due    DTaP/Tdap/Td series (1 - Tdap) 07/26/1968    Breast Cancer Screen Mammogram  07/14/2018

## 2021-01-13 ENCOUNTER — TELEPHONE (OUTPATIENT)
Dept: FAMILY MEDICINE CLINIC | Age: 76
End: 2021-01-13

## 2021-02-23 ENCOUNTER — TELEPHONE (OUTPATIENT)
Dept: FAMILY MEDICINE CLINIC | Age: 76
End: 2021-02-23

## 2021-02-24 ENCOUNTER — VIRTUAL VISIT (OUTPATIENT)
Dept: FAMILY MEDICINE CLINIC | Age: 76
End: 2021-02-24
Payer: COMMERCIAL

## 2021-02-24 DIAGNOSIS — J44.1 COPD EXACERBATION (HCC): Primary | ICD-10-CM

## 2021-02-24 PROCEDURE — G2012 BRIEF CHECK IN BY MD/QHP: HCPCS | Performed by: FAMILY MEDICINE

## 2021-02-24 RX ORDER — PREDNISONE 20 MG/1
40 TABLET ORAL
Qty: 10 TAB | Refills: 0 | Status: SHIPPED | OUTPATIENT
Start: 2021-02-24 | End: 2021-05-12

## 2021-02-24 NOTE — PROGRESS NOTES
Jeanne Huitron is a 70 y.o. female evaluated via Telephone on 21. Patient Identity confirmed by . Consent:  He and/or health care decision maker is aware that that he may receive a bill for this telephone service, depending on his insurance coverage, and has provided verbal consent to proceed: Yes    Physician Location: Office  Patient Location: Home  Nurse Assisting with Encounter: Lisbeth Anthony LPN    Chief Complaint   Patient presents with    Breathing Problem      Information gathered from patient and/or health care decision maker. HPI:   COPD Exacerbation:  Patient reports exacerbation of COPD. Exacerbation started a few days ago. Trigger appears to be: Cold weather. Current medication compliance has been Good. - Acute dyspnea: severity = mild: course since onset of episode: Stable. - Change in cough/Sputum: severity: not present: sputum : none. - Increased wheezing, current severity is mild and rescue inhalers are moderately effective. - Patient also endorses None. - Oxygen: she currently is not on home oxygen therapy. .  This is unchanged from baseline. Review of Systems   Constitutional: Negative for chills, fever and malaise/fatigue. HENT: Negative for congestion. Respiratory: Positive for cough and wheezing. Gastrointestinal: Negative for abdominal pain, nausea and vomiting. Neurological: Negative for headaches. Limited Exam:  Due to this being a TeleHealth evaluation, many elements of the physical examination are unable to be assessed. Constitutional: Appears well-developed and well-nourished in no apparent distress   Mental status: Alert and awake, Oriented to person/place/time, Able to follow commands  Psychiatric: Normal affect, normal judgment/insight.  No hallucinations     Current Outpatient Medications on File Prior to Visit   Medication Sig Dispense Refill    albuterol (PROVENTIL VENTOLIN) 2.5 mg /3 mL (0.083 %) nebu INHALE CONTENTS OF 1 VIAL EVERY 6 HOURS AS NEEDED FOR WHEEZING 75 mL 1    carvediloL (COREG) 3.125 mg tablet TAKE ONE TABLET BY MOUTH TWICE DAILY WITH FOOD 180 Tab 1    metFORMIN ER (GLUCOPHAGE XR) 500 mg tablet TAKE ONE TABLET BY MOUTH DAILY WITH BREAKFAST 90 Tab 1    lovastatin (MEVACOR) 10 mg tablet TAKE ONE TABLET BY MOUTH NIGHTLY 90 Tab 1    montelukast (SINGULAIR) 10 mg tablet TAKE ONE TABLET BY MOUTH NIGHTLY 90 Tab 1    losartan (COZAAR) 50 mg tablet Take 1 Tab by mouth daily. 90 Tab 1    Trelegy Ellipta 100-62.5-25 mcg inhaler Take 1 Puff by inhalation daily. 60 Blister 3    apixaban (ELIQUIS) 5 mg tablet Take 1 Tab by mouth two (2) times a day. 60 Tab 11    diclofenac (VOLTAREN) 1 % gel Apply 1 g to affected area four (4) times daily. Apply to knees and back 350 g 1    cyclobenzaprine (FLEXERIL) 5 mg tablet TAKE ONE TABLET BY MOUTH NIGHTLY IF NEEDED FOR MUSCLE SPASMS 30 Tab 2    fluticasone propionate (FLONASE) 50 mcg/actuation nasal spray 2 Sprays by Both Nostrils route daily. 1 Bottle 2    glucose blood VI test strips (FREESTYLE LITE STRIPS) strip Patient is to check blood sugar once daily. Dx E11.9 100 Strip 2    acetaminophen (TYLENOL ARTHRITIS PAIN) 650 mg TbER Take 1 Tab by mouth every eight (8) hours. 60 Tab 0    multivitamin (ONE A DAY) tablet Take 1 Tab by mouth nightly.  aspirin 81 mg tablet Take 81 mg by mouth daily.  guaiFENesin ER (MUCINEX) 600 mg ER tablet Take 1 Tab by mouth two (2) times a day. 60 Tab 1    [DISCONTINUED] predniSONE (DELTASONE) 20 mg tablet Take 40 mg by mouth daily (with breakfast). 10 Tab 0    ProAir HFA 90 mcg/actuation inhaler Take 2 Puffs by inhalation every six (6) hours as needed for Wheezing. 1 Inhaler 3    varicella-zoster recombinant, PF, (SHINGRIX) 50 mcg/0.5 mL susr injection 1 dose now, repeat in 2 months 0.5 mL 1     No current facility-administered medications on file prior to visit.          Allergies   Allergen Reactions    Crestor [Rosuvastatin] Other (comments)     Pain in left leg  Causes muscle spasms    Lipitor [Atorvastatin] Other (comments)     Left leg pain  Causes muscle spasms    Xarelto [Rivaroxaban] Other (comments)     Pt states it caused internal bleeding        Patient Active Problem List    Diagnosis Date Noted    [facility] disease[facility]     History of GI bleed 04/16/2019    DCIS (ductal carcinoma in situ) of breast 03/08/2019    Chronic anticoagulation 03/01/2019    Severe obesity ([address] 03/01/2019    Acute pulmonary embolism ([address] 02/08/2019    Pulmonary embolism ([address] 02/07/2019    Acute deep vein thrombosis (DVT) of left lower extremity ([facility] 02/07/2019    Debility 02/25/2012    Melena 02/24/2012    Acute posthemorrhagic anemia 02/24/2012    Type 2 diabetes mellitus without complication, without long-term current use of insulin ([facility] 02/24/2012    HTN (hypertension) 02/24/2012    Other hyperlipidemia 02/24/2012        Health Maintenance Due   Topic Date Due    COVID-19 Vaccine (1 of 2) 07/26/1963    DTaP/Tdap/Td series (1 - Tdap) 07/26/1968    Breast Cancer Screen Mammogram  07/14/2018    Shingrix Vaccine Age 50> (2 of 2) 01/12/2021        Assessment/Plan:  Details of this discussion including any medical advice provided: Mild exacerbation, trial of steroid Burst    ICD-10-CM ICD-9-CM    1. COPD exacerbation (HCC)  J44.1 491.21 predniSONE (DELTASONE) 20 mg tablet     Follow-up and Dispositions    · Return in about 3 months (around 5/24/2021) for Follow up in office for labs and F/U chronic conditions. Total Time: minutes: 11-20 minutes was spent on telemedicine encounter discussing above problems and plans. Patient Problem list, medications, and Allergies were reviewed during this encounter.     Pursuant to the emergency declaration under the [facility], [address] [place] [address] authority and the [employer], this Telephone Visit was conducted, with patient's consent, to reduce the patient's risk of exposure to COVID-19 and provide continuity of care for an established patient. I affirm this is a Patient Initiated Episode with an Established Patient who has not had a related appointment within my department in the past 7 days or scheduled within the next 24 hours. Discussed diagnoses in detail with patient. Medication risks/benefits/side effects discussed with patient. All of the patient's questions were addressed. The patient understands and agrees with our plan of care. The patient knows to call back if they are unsure of or forget any changes we discussed today or if the symptoms change.     Note: not billable if this call serves to triage the patient into an appointment for the relevant concern    MD JAGDISH Beckett & JERRY ORDONEZ [facility]  02/24/21

## 2021-02-24 NOTE — PROGRESS NOTES
1. Have you been to the ER, urgent care clinic since your last visit? Hospitalized since your last visit? No    2. Have you seen or consulted any other health care providers outside of the [address] [place] [address] since your last visit? Include any pap smears or colon screening.  No  Reviewed record in preparation for visit and have necessary documentation    Goals that were addressed and/or need to be completed during or after this appointment include   Health Maintenance Due   Topic Date Due    COVID-19 Vaccine (1 of 2) 07/26/1963    DTaP/Tdap/Td series (1 - Tdap) 07/26/1968    Breast Cancer Screen Mammogram  07/14/2018    Shingrix Vaccine Age 50> (2 of 2) 01/12/2021

## 2021-03-22 ENCOUNTER — VIRTUAL VISIT (OUTPATIENT)
Dept: FAMILY MEDICINE CLINIC | Age: 76
End: 2021-03-22
Payer: COMMERCIAL

## 2021-03-22 DIAGNOSIS — K52.9 GASTROENTERITIS: Primary | ICD-10-CM

## 2021-03-22 PROCEDURE — 99212 OFFICE O/P EST SF 10 MIN: CPT | Performed by: FAMILY MEDICINE

## 2021-03-22 NOTE — PROGRESS NOTES
Chief Complaint   Patient presents with    Other     discolored urine, soft stool but not diarrhea, no appetite     1. Have you been to the ER, urgent care clinic since your last visit? Hospitalized since your last visit? No    2. Have you seen or consulted any other health care providers outside of the [address] [place] [address] since your last visit? Include any pap smears or colon screening.  No    Reviewed record in preparation for visit and have necessary documentation  Pt did not bring medication to office visit for review  opportunity was given for questions  Goals that were addressed and/or need to be completed during or after this appointment include   Health Maintenance Due   Topic Date Due    COVID-19 Vaccine (1) Never done    DTaP/Tdap/Td series (1 - Tdap) 07/26/1968    Breast Cancer Screen Mammogram  07/14/2018    Shingrix Vaccine Age 50> (2 of 2) 01/12/2021

## 2021-03-22 NOTE — PROGRESS NOTES
Ross Blair is a 70 y.o. female evaluated via Telephone on 21. Patient Identity confirmed by . Consent:  He and/or health care decision maker is aware that that he may receive a bill for this telephone service, depending on his insurance coverage, and has provided verbal consent to proceed: Yes    Physician Location: Office  Patient Location: Home  Nurse Assisting with Encounter: Courtney Jeffrey LPN    Chief Complaint   Patient presents with    Other     discolored urine, soft stool but not diarrhea, no appetite      Information gathered from patient and/or health care decision maker. HPI:   Patient noting that her urine has been very concentrated and has noted that she either has normal or small amounts of urine. Noting also loose, though not diarrhea, stools, and a decreased appetite. Denies abdominal pain, nausea, vomiting. Patient does report drinking 4 x 24 oz cups a day. Patient reports only starting Joshua Lindau recently. Symptoms ongoing for about 4 days. Denies any sick contacts or recent travel. Review of Systems   Constitutional: Negative for chills and fever. HENT: Negative for congestion. Respiratory: Negative for cough. Gastrointestinal: Positive for diarrhea. Genitourinary: Negative for dysuria, frequency and urgency. Neurological: Negative for headaches. Limited Exam:  Due to this being a TeleHealth evaluation, many elements of the physical examination are unable to be assessed. Constitutional: Appears well-developed and well-nourished in no apparent distress   Mental status: Alert and awake, Oriented to person/place/time, Able to follow commands  Psychiatric: Normal affect, normal judgment/insight.  No hallucinations     Current Outpatient Medications on File Prior to Visit   Medication Sig Dispense Refill    diclofenac (VOLTAREN) 1 % gel APPLY 1 GRAM TO AFFECTED AREA OF KNEES AND BACK 4 TIMES DAILY 300 g 5    albuterol (PROVENTIL VENTOLIN) 2.5 mg /3 mL (0.083 %) nebu INHALE CONTENTS OF 1 VIAL EVERY 6 HOURS AS NEEDED FOR WHEEZING 75 mL 1    carvediloL (COREG) 3.125 mg tablet TAKE ONE TABLET BY MOUTH TWICE DAILY WITH FOOD 180 Tab 1    metFORMIN ER (GLUCOPHAGE XR) 500 mg tablet TAKE ONE TABLET BY MOUTH DAILY WITH BREAKFAST 90 Tab 1    lovastatin (MEVACOR) 10 mg tablet TAKE ONE TABLET BY MOUTH NIGHTLY 90 Tab 1    guaiFENesin ER (MUCINEX) 600 mg ER tablet Take 1 Tab by mouth two (2) times a day. (Patient taking differently: Take 600 mg by mouth as needed.) 60 Tab 1    montelukast (SINGULAIR) 10 mg tablet TAKE ONE TABLET BY MOUTH NIGHTLY 90 Tab 1    losartan (COZAAR) 50 mg tablet Take 1 Tab by mouth daily. 90 Tab 1    ProAir HFA 90 mcg/actuation inhaler Take 2 Puffs by inhalation every six (6) hours as needed for Wheezing. 1 Inhaler 3    Trelegy Ellipta 100-62.5-25 mcg inhaler Take 1 Puff by inhalation daily. 60 Blister 3    apixaban (ELIQUIS) 5 mg tablet Take 1 Tab by mouth two (2) times a day. 60 Tab 11    varicella-zoster recombinant, PF, (SHINGRIX) 50 mcg/0.5 mL susr injection 1 dose now, repeat in 2 months 0.5 mL 1    cyclobenzaprine (FLEXERIL) 5 mg tablet TAKE ONE TABLET BY MOUTH NIGHTLY IF NEEDED FOR MUSCLE SPASMS 30 Tab 2    glucose blood VI test strips (FREESTYLE LITE STRIPS) strip Patient is to check blood sugar once daily. Dx E11.9 100 Strip 2    acetaminophen (TYLENOL ARTHRITIS PAIN) 650 mg TbER Take 1 Tab by mouth every eight (8) hours. 60 Tab 0    multivitamin (ONE A DAY) tablet Take 1 Tab by mouth nightly.  aspirin 81 mg tablet Take 81 mg by mouth daily.  predniSONE (DELTASONE) 20 mg tablet Take 40 mg by mouth daily (with breakfast). 10 Tab 0    fluticasone propionate (FLONASE) 50 mcg/actuation nasal spray 2 Sprays by Both Nostrils route daily. 1 Bottle 2     No current facility-administered medications on file prior to visit.          Allergies   Allergen Reactions    Crestor [Rosuvastatin] Other (comments)     Pain in left leg  Causes muscle spasms    Lipitor [Atorvastatin] Other (comments)     Left leg pain  Causes muscle spasms    Xarelto [Rivaroxaban] Other (comments)     Pt states it caused internal bleeding        Patient Active Problem List    Diagnosis Date Noted    [facility] disease) [facility]     History of GI bleed 04/16/2019    DCIS (ductal carcinoma in situ) of breast 03/08/2019    Chronic anticoagulation 03/01/2019    Severe obesity ([facility] 03/01/2019    Acute pulmonary embolism ([facility] 02/08/2019    Pulmonary embolism ([facility] 02/07/2019    Acute deep vein thrombosis (DVT) of left lower extremity ([facility] 02/07/2019    Debility 02/25/2012    Melena 02/24/2012    Acute posthemorrhagic anemia 02/24/2012    Type 2 diabetes mellitus without complication, without long-term current use of insulin ([facility] 02/24/2012    HTN (hypertension) 02/24/2012    Other hyperlipidemia 02/24/2012        Health Maintenance Due   Topic Date Due    COVID-19 Vaccine (1) Never done    DTaP/Tdap/Td series (1 - Tdap) 07/26/1968    Breast Cancer Screen Mammogram  07/14/2018    Shingrix Vaccine Age 50> (2 of 2) 01/12/2021        Assessment/Plan:  Details of this discussion including any medical advice provided: Monitor for a few days now and see if symptoms improve. ICD-10-CM ICD-9-CM    1. Gastroenteritis  K52.9 558.9      Follow-up and Dispositions    · Return if symptoms worsen or fail to improve. Total Time: minutes: 5-10 minutes was spent on telemedicine encounter discussing above problems and plans. Patient Problem list, medications, and Allergies were reviewed during this encounter.     Pursuant to the emergency declaration under the [address] [place] [address] authority and the [employer] and [employer] Act, this Telephone Visit was conducted, with patient's consent, to reduce the patient's risk of exposure to COVID-19 and provide continuity of care for an established patient. I affirm this is a Patient Initiated Episode with an Established Patient who has not had a related appointment within my department in the past 7 days or scheduled within the next 24 hours. Discussed diagnoses in detail with patient. Medication risks/benefits/side effects discussed with patient. All of the patient's questions were addressed. The patient understands and agrees with our plan of care. The patient knows to call back if they are unsure of or forget any changes we discussed today or if the symptoms change.     Note: not billable if this call serves to triage the patient into an appointment for the relevant concern    MD JAGDISH Meeks & JERRY ORDONEZ [facility]  03/22/21

## 2021-03-23 ENCOUNTER — TELEPHONE (OUTPATIENT)
Dept: FAMILY MEDICINE CLINIC | Age: 76
End: 2021-03-23

## 2021-03-24 ENCOUNTER — PATIENT MESSAGE (OUTPATIENT)
Dept: FAMILY MEDICINE CLINIC | Age: 76
End: 2021-03-24

## 2021-03-24 RX ORDER — FLUCONAZOLE 150 MG/1
150 TABLET ORAL DAILY
Qty: 1 TAB | Refills: 0 | Status: SHIPPED | OUTPATIENT
Start: 2021-03-24 | End: 2021-03-25

## 2021-03-24 NOTE — TELEPHONE ENCOUNTER
From: Ayde Cesar  To: Elena Barfield MD  Sent: 3/24/2021 9:17 AM EDT  Subject: Prescription Question    Dr Fer Gutierrez, as of late yesterday I now have a yeast infection, please send a prescription to [facility] so I can get rid of the infection.  Thank You

## 2021-05-12 ENCOUNTER — OFFICE VISIT (OUTPATIENT)
Dept: FAMILY MEDICINE CLINIC | Age: 76
End: 2021-05-12
Payer: COMMERCIAL

## 2021-05-12 VITALS
WEIGHT: 217 LBS | RESPIRATION RATE: 18 BRPM | HEIGHT: 63 IN | TEMPERATURE: 97.7 F | BODY MASS INDEX: 38.45 KG/M2 | SYSTOLIC BLOOD PRESSURE: 142 MMHG | DIASTOLIC BLOOD PRESSURE: 73 MMHG | OXYGEN SATURATION: 100 % | HEART RATE: 90 BPM

## 2021-05-12 DIAGNOSIS — R25.2 LEG CRAMPING: ICD-10-CM

## 2021-05-12 DIAGNOSIS — J41.0 SIMPLE CHRONIC BRONCHITIS (HCC): ICD-10-CM

## 2021-05-12 DIAGNOSIS — M79.89 LEG SWELLING: Primary | ICD-10-CM

## 2021-05-12 DIAGNOSIS — I10 ESSENTIAL HYPERTENSION: ICD-10-CM

## 2021-05-12 DIAGNOSIS — E11.69 CONTROLLED TYPE 2 DIABETES MELLITUS WITH OTHER SPECIFIED COMPLICATION, WITHOUT LONG-TERM CURRENT USE OF INSULIN (HCC): ICD-10-CM

## 2021-05-12 DIAGNOSIS — E66.01 SEVERE OBESITY (HCC): ICD-10-CM

## 2021-05-12 DIAGNOSIS — E11.9 TYPE 2 DIABETES MELLITUS WITHOUT COMPLICATION, WITHOUT LONG-TERM CURRENT USE OF INSULIN (HCC): ICD-10-CM

## 2021-05-12 DIAGNOSIS — G62.9 POLYNEUROPATHY: ICD-10-CM

## 2021-05-12 PROBLEM — I26.99 PULMONARY EMBOLISM (HCC): Status: RESOLVED | Noted: 2019-02-07 | Resolved: 2021-05-12

## 2021-05-12 PROBLEM — I26.99 ACUTE PULMONARY EMBOLISM (HCC): Status: RESOLVED | Noted: 2019-02-08 | Resolved: 2021-05-12

## 2021-05-12 PROCEDURE — 99214 OFFICE O/P EST MOD 30 MIN: CPT | Performed by: FAMILY MEDICINE

## 2021-05-12 RX ORDER — ALBUTEROL SULFATE 0.83 MG/ML
2.5 SOLUTION RESPIRATORY (INHALATION)
Qty: 75 ML | Refills: 4 | Status: SHIPPED | OUTPATIENT
Start: 2021-05-12

## 2021-05-12 RX ORDER — FUROSEMIDE 20 MG/1
20 TABLET ORAL DAILY
Qty: 60 TAB | Refills: 0 | Status: SHIPPED | OUTPATIENT
Start: 2021-05-12 | End: 2021-06-28 | Stop reason: DRUGHIGH

## 2021-05-12 RX ORDER — CARVEDILOL 3.12 MG/1
3.12 TABLET ORAL 2 TIMES DAILY
Qty: 180 TAB | Refills: 1 | Status: SHIPPED | OUTPATIENT
Start: 2021-05-12 | End: 2021-11-11

## 2021-05-12 RX ORDER — CYCLOBENZAPRINE HCL 5 MG
5 TABLET ORAL
Qty: 90 TAB | Refills: 1 | Status: SHIPPED | OUTPATIENT
Start: 2021-05-12 | End: 2021-08-02

## 2021-05-12 NOTE — PROGRESS NOTES
Chief Complaint   Patient presents with    Leg Swelling     right     Visit Vitals  BP (!) 144/72 (BP 1 Location: Right arm, BP Patient Position: Sitting, BP Cuff Size: Adult)   Pulse 90   Temp 97.7 °F (36.5 °C) (Oral)   Resp 18   Ht 5' 3\" (1.6 m)   Wt 217 lb (98.4 kg)   SpO2 100%   BMI 38.44 kg/m²     1. Have you been to the ER, urgent care clinic since your last visit? Hospitalized since your last visit? No    2. Have you seen or consulted any other health care providers outside of the [address] [place] [address] since your last visit? Include any pap smears or colon screening.  No    Reviewed record in preparation for visit and have necessary documentation  Pt did not bring medication to office visit for review  opportunity was given for questions  Goals that were addressed and/or need to be completed during or after this appointment include   Health Maintenance Due   Topic Date Due    COVID-19 Vaccine (1) Never done    DTaP/Tdap/Td series (1 - Tdap) 07/26/1968    Breast Cancer Screen Mammogram  07/14/2018    Shingrix Vaccine Age 50> (2 of 2) 01/12/2021

## 2021-05-12 NOTE — PROGRESS NOTES
[facility]    History of Present Illness:   Ascencion Current is a 70 y.o. female with history of HTN, PE, DVT, DM, COPD, HLD, DCIS  CC: Follow up Chronic conditions. History provided by patient and Records    HPI:  Leg Swelling: Noting Right leg swelling persistently that is not particularly painful. Swelling does improve at night/laying flat, but returns and worsens with standing/walking. Neuropathy:  Noting persistent issue with neuropathy in the feet, pain is burning worse than normal over the last 2 weeks, burning in the night in particular and feel cold. Keeping her awake at night. Intensity of 7/10. Has tried Gabapentin which made her feel odd. Hx DVT: Still taking Eliquis    COPD: Unable to get Trelegy due to cost.    Health Maintenance  Health Maintenance Due   Topic Date Due    DTaP/Tdap/Td series (1 - Tdap) 07/26/1968    Breast Cancer Screen Mammogram  07/14/2018    Shingrix Vaccine Age 50> (2 of 2) 01/12/2021       Past Medical, Family, and Social History:     Current Outpatient Medications on File Prior to Visit   Medication Sig Dispense Refill    metFORMIN ER (GLUCOPHAGE XR) 500 mg tablet TAKE ONE TABLET BY MOUTH DAILY WITH BREAKFAST 90 Tab 1    losartan (COZAAR) 50 mg tablet TAKE ONE TABLET BY MOUTH DAILY 90 Tab 1    diclofenac (VOLTAREN) 1 % gel APPLY 1 GRAM TO AFFECTED AREA OF KNEES AND BACK 4 TIMES DAILY 300 g 5    lovastatin (MEVACOR) 10 mg tablet TAKE ONE TABLET BY MOUTH NIGHTLY 90 Tab 1    guaiFENesin ER (MUCINEX) 600 mg ER tablet Take 1 Tab by mouth two (2) times a day. (Patient taking differently: Take 600 mg by mouth as needed.) 60 Tab 1    montelukast (SINGULAIR) 10 mg tablet TAKE ONE TABLET BY MOUTH NIGHTLY 90 Tab 1    ProAir HFA 90 mcg/actuation inhaler Take 2 Puffs by inhalation every six (6) hours as needed for Wheezing. 1 Inhaler 3    apixaban (ELIQUIS) 5 mg tablet Take 1 Tab by mouth two (2) times a day.  60 Tab 11    fluticasone propionate (FLONASE) 50 mcg/actuation nasal spray 2 Sprays by Both Nostrils route daily. (Patient taking differently: 2 Sprays by Both Nostrils route as needed.) 1 Bottle 2    acetaminophen (TYLENOL ARTHRITIS PAIN) 650 mg TbER Take 1 Tab by mouth every eight (8) hours. 60 Tab 0    multivitamin (ONE A DAY) tablet Take 1 Tab by mouth nightly.  aspirin 81 mg tablet Take 81 mg by mouth daily.  Trelegy Ellipta 100-62.5-25 mcg inhaler Take 1 Puff by inhalation daily. 1 Inhaler 2    [DISCONTINUED] predniSONE (DELTASONE) 20 mg tablet Take 40 mg by mouth daily (with breakfast). 10 Tab 0    [DISCONTINUED] albuterol (PROVENTIL VENTOLIN) 2.5 mg /3 mL (0.083 %) nebu INHALE CONTENTS OF 1 VIAL EVERY 6 HOURS AS NEEDED FOR WHEEZING 75 mL 1    [DISCONTINUED] carvediloL (COREG) 3.125 mg tablet TAKE ONE TABLET BY MOUTH TWICE DAILY WITH FOOD 180 Tab 1    [DISCONTINUED] varicella-zoster recombinant, PF, (SHINGRIX) 50 mcg/0.5 mL susr injection 1 dose now, repeat in 2 months 0.5 mL 1    [DISCONTINUED] cyclobenzaprine (FLEXERIL) 5 mg tablet TAKE ONE TABLET BY MOUTH NIGHTLY IF NEEDED FOR MUSCLE SPASMS 30 Tab 2    glucose blood VI test strips (FREESTYLE LITE STRIPS) strip Patient is to check blood sugar once daily. Dx E11.9 100 Strip 2     No current facility-administered medications on file prior to visit.         Patient Active Problem List   Diagnosis Code    Melena K92.1    Acute posthemorrhagic anemia D62    Type 2 diabetes mellitus without complication, without long-term current use of insulin ([facility]) E11.9    HTN (hypertension) I10    Other hyperlipidemia E78.49    Debility R53.81    Acute deep vein thrombosis (DVT) of left lower extremity ([facility]) I82.402    Chronic anticoagulation Z79.01    Severe obesity ([facility]) E66.01    DCIS (ductal carcinoma in situ) of breast D05.10    History of GI bleed Z87.19    COPD (chronic obstructive pulmonary disease) ([facility]) J44.9       Social History     Socioeconomic History    Marital status: SINGLE     Spouse name: Not on file    Number of children: Not on file    Years of education: Not on file    Highest education level: Not on file   Occupational History    Not on file   Social Needs    Financial resource strain: Not on file    Food insecurity     Worry: Not on file     Inability: Not on file    Transportation needs     Medical: Not on file     Non-medical: Not on file   Tobacco Use    Smoking status: Former Smoker     Quit date:      Years since quittin.3    Smokeless tobacco: Never Used   Substance and Sexual Activity    Alcohol use: No     Frequency: Never    Drug use: No    Sexual activity: Never   Lifestyle    Physical activity     Days per week: Not on file     Minutes per session: Not on file    Stress: Not on file   Relationships    Social connections     Talks on phone: Not on file     Gets together: Not on file     Attends Hoahaoism service: Not on file     Active member of club or organization: Not on file     Attends meetings of clubs or organizations: Not on file     Relationship status: Not on file    Intimate partner violence     Fear of current or ex partner: Not on file     Emotionally abused: Not on file     Physically abused: Not on file     Forced sexual activity: Not on file   Other Topics Concern    Caffeine Concern Not Asked    Back Care Not Asked    Exercise Not Asked    Occupational Exposure Not Asked    Sleep Concern Not Asked    Stress Concern Not Asked    Weight Concern Not Asked   Social History Narrative    Not on file       Review of Systems   Review of Systems   Constitutional: Negative for chills and fever. HENT: Negative for congestion. Respiratory: Negative for cough. Cardiovascular: Negative for chest pain and palpitations. Gastrointestinal: Negative for nausea and vomiting. Musculoskeletal: Negative for myalgias. Neurological: Negative for dizziness and headaches.        Objective:     Visit Vitals  BP (!) 144/72 (BP 1 Location: Right arm, BP Patient Position: Sitting, BP Cuff Size: Adult)   Pulse 90   Temp 97.7 °F (36.5 °C) (Oral)   Resp 18   Ht 5' 3\" (1.6 m)   Wt 217 lb (98.4 kg)   SpO2 100%   BMI 38.44 kg/m²        Physical Exam  Vitals signs and nursing note reviewed. Constitutional:       Appearance: Normal appearance. HENT:      Head: Normocephalic and atraumatic. Neck:      Musculoskeletal: Normal range of motion and neck supple. Cardiovascular:      Rate and Rhythm: Normal rate and regular rhythm. Pulses: Normal pulses. Heart sounds: Normal heart sounds. No murmur. No friction rub. No gallop. Pulmonary:      Effort: Pulmonary effort is normal.      Breath sounds: Normal breath sounds. Abdominal:      General: Abdomen is flat. Bowel sounds are normal.      Palpations: Abdomen is soft. Musculoskeletal:      Right lower leg: Edema present. Left lower leg: No edema. Skin:     General: Skin is warm and dry. Neurological:      Mental Status: She is alert. Pertinent Labs/Studies:      Assessment and orders:       ICD-10-CM ICD-9-CM    1. Leg swelling  M79.89 729.81 D DIMER      furosemide (LASIX) 20 mg tablet   2. Leg cramping  R25.2 729.82 cyclobenzaprine (FLEXERIL) 5 mg tablet   3. Simple chronic bronchitis (HCC)  J41.0 491.0 albuterol (PROVENTIL VENTOLIN) 2.5 mg /3 mL (0.083 %) nebu      CBC W/O DIFF      METABOLIC PANEL, COMPREHENSIVE   4. Controlled type 2 diabetes mellitus with other specified complication, without long-term current use of insulin (HCC)  E11.69 250.80 LIPID PANEL      HEMOGLOBIN A1C WITH EAG   5. Severe obesity ([facility] [address]  E66.01 278.01    6. Essential hypertension  I10 401.9 carvediloL (COREG) 3.125 mg tablet   7. Polyneuropathy  G62.9 356.9 VITAMIN B12 & FOLATE     Diagnoses and all orders for this visit:    1. Leg swelling: D-dimer, lower suspicion for DVT, but will check, may need US. -     D DIMER;  Future  -     furosemide (LASIX) 20 mg tablet; Take 1 Tab by mouth daily. 2. Leg cramping: Refill on Flexeril  -     cyclobenzaprine (FLEXERIL) 5 mg tablet; Take 1 Tab by mouth nightly. 3. Simple chronic bronchitis ([address]): Refills  -     albuterol (PROVENTIL VENTOLIN) 2.5 mg /3 mL (0.083 %) nebu; 3 mL by Nebulization route every six (6) hours as needed for Wheezing.  -     CBC W/O DIFF; Future  -     METABOLIC PANEL, COMPREHENSIVE; Future    4. Controlled type 2 diabetes mellitus with other specified complication, without long-term current use of insulin [facility]): Discussed with patient today that the goal for their diabetes is to have a HgA1C<7 and ideally as close to 6.5 as possible. We discussed diet and medications. The goal for the cholesterol LDL is less than 70 and HDL>40. Patient is aware of the need for yearly eye exams and to take care of their feet daily. Discussed with patient that blood pressure should be less than 130/80 and watching salt intake is very important.    -     LIPID PANEL; Future  -     HEMOGLOBIN A1C WITH EAG; Future    5. Severe obesity ([facility] [address]    6. Essential hypertension  -     carvediloL (COREG) 3.125 mg tablet; Take 1 Tab by mouth two (2) times a day. 7. Polyneuropathy: Consider Lyrica or Tricyclic if not other cause. -     VITAMIN B12 & FOLATE; Future      Follow-up and Dispositions    · Return in about 4 weeks (around 6/9/2021) for Follow up Pain. .           I have discussed the diagnosis with the patient and the intended plan as seen in the above orders. Social history, medical history, and labs were reviewed. The patient has received an after-visit summary and questions were answered concerning future plans. I have discussed medication side effects and warnings with the patient as well.     True Patient, MD JAGDISH HERNANDEZ & JERRY ORDONEZ [facility]  05/12/21

## 2021-05-13 LAB
ALBUMIN SERPL-MCNC: 3.8 G/DL (ref 3.5–5)
ALBUMIN/GLOB SERPL: 1.2 {RATIO} (ref 1.1–2.2)
ALP SERPL-CCNC: 129 U/L (ref 45–117)
ALT SERPL-CCNC: 22 U/L (ref 12–78)
ANION GAP SERPL CALC-SCNC: 8 MMOL/L (ref 5–15)
AST SERPL-CCNC: 20 U/L (ref 15–37)
BILIRUB SERPL-MCNC: 0.3 MG/DL (ref 0.2–1)
BUN SERPL-MCNC: 15 MG/DL (ref 6–20)
BUN/CREAT SERPL: 16 (ref 12–20)
CALCIUM SERPL-MCNC: 9 MG/DL (ref 8.5–10.1)
CHLORIDE SERPL-SCNC: 107 MMOL/L (ref 97–108)
CHOLEST SERPL-MCNC: 180 MG/DL
CO2 SERPL-SCNC: 25 MMOL/L (ref 21–32)
CREAT SERPL-MCNC: 0.94 MG/DL (ref 0.55–1.02)
ERYTHROCYTE [DISTWIDTH] IN BLOOD BY AUTOMATED COUNT: 13.7 % (ref 11.5–14.5)
EST. AVERAGE GLUCOSE BLD GHB EST-MCNC: 217 MG/DL
FOLATE SERPL-MCNC: 15.5 NG/ML (ref 5–21)
GLOBULIN SER CALC-MCNC: 3.3 G/DL (ref 2–4)
GLUCOSE SERPL-MCNC: 201 MG/DL (ref 65–100)
HBA1C MFR BLD: 9.2 % (ref 4–5.6)
HCT VFR BLD AUTO: 42.9 % (ref 35–47)
HDLC SERPL-MCNC: 43 MG/DL
HDLC SERPL: 4.2 {RATIO} (ref 0–5)
HGB BLD-MCNC: 13.5 G/DL (ref 11.5–16)
LDLC SERPL CALC-MCNC: ABNORMAL MG/DL (ref 0–100)
LDLC SERPL DIRECT ASSAY-MCNC: 87 MG/DL (ref 0–100)
LIPID PROFILE,FLP: ABNORMAL
MCH RBC QN AUTO: 26.4 PG (ref 26–34)
MCHC RBC AUTO-ENTMCNC: 31.5 G/DL (ref 30–36.5)
MCV RBC AUTO: 84 FL (ref 80–99)
NRBC # BLD: 0 K/UL (ref 0–0.01)
NRBC BLD-RTO: 0 PER 100 WBC
PLATELET # BLD AUTO: 290 K/UL (ref 150–400)
PMV BLD AUTO: 11.1 FL (ref 8.9–12.9)
POTASSIUM SERPL-SCNC: 4.7 MMOL/L (ref 3.5–5.1)
PROT SERPL-MCNC: 7.1 G/DL (ref 6.4–8.2)
RBC # BLD AUTO: 5.11 M/UL (ref 3.8–5.2)
SODIUM SERPL-SCNC: 140 MMOL/L (ref 136–145)
TRIGL SERPL-MCNC: 546 MG/DL (ref ?–150)
VIT B12 SERPL-MCNC: 1310 PG/ML (ref 193–986)
VLDLC SERPL CALC-MCNC: ABNORMAL MG/DL
WBC # BLD AUTO: 9.5 K/UL (ref 3.6–11)

## 2021-05-14 LAB — D DIMER PPP FEU-MCNC: 0.79 MG/L FEU (ref 0–0.65)

## 2021-05-19 ENCOUNTER — VIRTUAL VISIT (OUTPATIENT)
Dept: FAMILY MEDICINE CLINIC | Age: 76
End: 2021-05-19
Payer: COMMERCIAL

## 2021-05-19 DIAGNOSIS — M79.89 RIGHT LEG SWELLING: Primary | ICD-10-CM

## 2021-05-19 DIAGNOSIS — E11.9 TYPE 2 DIABETES MELLITUS WITHOUT COMPLICATION, WITHOUT LONG-TERM CURRENT USE OF INSULIN (HCC): ICD-10-CM

## 2021-05-19 DIAGNOSIS — J41.0 SIMPLE CHRONIC BRONCHITIS (HCC): ICD-10-CM

## 2021-05-19 PROCEDURE — 99213 OFFICE O/P EST LOW 20 MIN: CPT | Performed by: FAMILY MEDICINE

## 2021-05-19 RX ORDER — BLOOD-GLUCOSE METER
KIT MISCELLANEOUS
Qty: 100 STRIP | Refills: 2 | Status: SHIPPED | OUTPATIENT
Start: 2021-05-19 | End: 2021-05-21 | Stop reason: SDUPTHER

## 2021-05-19 RX ORDER — BUDESONIDE AND FORMOTEROL FUMARATE DIHYDRATE 80; 4.5 UG/1; UG/1
2 AEROSOL RESPIRATORY (INHALATION) DAILY
Qty: 1 INHALER | Refills: 1 | Status: SHIPPED | OUTPATIENT
Start: 2021-05-19 | End: 2021-06-18

## 2021-05-19 NOTE — PROGRESS NOTES
Danae Dupree is a 70 y.o. female evaluated via Telephone on 21. Patient Identity confirmed by . Consent:  He and/or health care decision maker is aware that that he may receive a bill for this telephone service, depending on his insurance coverage, and has provided verbal consent to proceed: Yes    Physician Location: Office  Patient Location: Home  Nurse Assisting with Encounter: Dale Lane LPN    Chief Complaint   Patient presents with    Labs     discuss results      Information gathered from patient and/or health care decision maker. HPI:   Patient with persistent right leg/calf swelling, and noting some right foot swelling as well over the last couple of days. Calf is still feeling hard and despite recumbency the calf swelling does not fully recover. Still denies and specific pain, but it feels \"Heavy\". Patient is taking Eliquis and Baby Aspirin. Does note She has been eating more carbs at this time. Patient reports Lasix has been ineffective for leg swelling. Review of Systems   Constitutional: Negative for chills and fever. Cardiovascular: Positive for leg swelling. Negative for chest pain and palpitations. Gastrointestinal: Negative for nausea and vomiting. Neurological: Negative for dizziness and headaches. Limited Exam:  Due to this being a TeleHealth evaluation, many elements of the physical examination are unable to be assessed. Constitutional: Appears well-developed and well-nourished in no apparent distress   Mental status: Alert and awake, Oriented to person/place/time, Able to follow commands  Psychiatric: Normal affect, normal judgment/insight. No hallucinations     Current Outpatient Medications on File Prior to Visit   Medication Sig Dispense Refill    albuterol (PROVENTIL VENTOLIN) 2.5 mg /3 mL (0.083 %) nebu 3 mL by Nebulization route every six (6) hours as needed for Wheezing.  75 mL 4    cyclobenzaprine (FLEXERIL) 5 mg tablet Take 1 Tab by mouth nightly. 90 Tab 1    furosemide (LASIX) 20 mg tablet Take 1 Tab by mouth daily. 60 Tab 0    carvediloL (COREG) 3.125 mg tablet Take 1 Tab by mouth two (2) times a day. 180 Tab 1    metFORMIN ER (GLUCOPHAGE XR) 500 mg tablet TAKE ONE TABLET BY MOUTH DAILY WITH BREAKFAST 90 Tab 1    losartan (COZAAR) 50 mg tablet TAKE ONE TABLET BY MOUTH DAILY 90 Tab 1    diclofenac (VOLTAREN) 1 % gel APPLY 1 GRAM TO AFFECTED AREA OF KNEES AND BACK 4 TIMES DAILY 300 g 5    lovastatin (MEVACOR) 10 mg tablet TAKE ONE TABLET BY MOUTH NIGHTLY 90 Tab 1    guaiFENesin ER (MUCINEX) 600 mg ER tablet Take 1 Tab by mouth two (2) times a day. (Patient taking differently: Take 600 mg by mouth as needed.) 60 Tab 1    montelukast (SINGULAIR) 10 mg tablet TAKE ONE TABLET BY MOUTH NIGHTLY 90 Tab 1    ProAir HFA 90 mcg/actuation inhaler Take 2 Puffs by inhalation every six (6) hours as needed for Wheezing. 1 Inhaler 3    apixaban (ELIQUIS) 5 mg tablet Take 1 Tab by mouth two (2) times a day. 60 Tab 11    fluticasone propionate (FLONASE) 50 mcg/actuation nasal spray 2 Sprays by Both Nostrils route daily. (Patient taking differently: 2 Sprays by Both Nostrils route as needed.) 1 Bottle 2    acetaminophen (TYLENOL ARTHRITIS PAIN) 650 mg TbER Take 1 Tab by mouth every eight (8) hours. 60 Tab 0    multivitamin (ONE A DAY) tablet Take 1 Tab by mouth nightly.  aspirin 81 mg tablet Take 81 mg by mouth daily.  Trelegy Ellipta 100-62.5-25 mcg inhaler Take 1 Puff by inhalation daily. (Patient not taking: Reported on 5/19/2021) 1 Inhaler 2    [DISCONTINUED] glucose blood VI test strips (FREESTYLE LITE STRIPS) strip Patient is to check blood sugar once daily. Dx E11.9 100 Strip 2     No current facility-administered medications on file prior to visit.         Allergies   Allergen Reactions    Crestor [Rosuvastatin] Other (comments)     Pain in left leg  Causes muscle spasms    Lipitor [Atorvastatin] Other (comments)     Left leg pain  Causes muscle spasms    Xarelto [Rivaroxaban] Other (comments)     Pt states it caused internal bleeding        Patient Active Problem List    Diagnosis Date Noted    COPD [facility] obstructive [facility] disease) [facility]     History of GI bleed 04/16/2019    DCIS (ductal carcinoma in situ) of breast 03/08/2019    Chronic anticoagulation 03/01/2019    Severe obesity ([facility] 03/01/2019    Acute deep vein thrombosis (DVT) of left lower extremity ([facility] 02/07/2019    Debility 02/25/2012    Melena 02/24/2012    Acute posthemorrhagic anemia 02/24/2012    Type 2 diabetes mellitus without complication, without long-term current use of insulin ([facility] 02/24/2012    HTN (hypertension) 02/24/2012    Other hyperlipidemia 02/24/2012        Health Maintenance Due   Topic Date Due    DTaP/Tdap/Td series (1 - Tdap) 07/26/1968    Breast Cancer Screen Mammogram  07/14/2018    Shingrix Vaccine Age 50> (2 of 2) 01/12/2021        Assessment/Plan:  Details of this discussion including any medical advice provided: Duplex for Right leg, Evaluate for DVT despite being on blood thinner. Also Discussed changing diet for Diabetes control. Trial of Symbicort, Trelegy not covered. ICD-10-CM ICD-9-CM    1. Right leg swelling  M79.89 729.81 DUPLEX LOWER EXT VENOUS RIGHT   2. Type 2 diabetes mellitus without complication, without long-term current use of insulin (HCC)  E11.9 250.00 glucose blood VI test strips (FreeStyle Lite Strips) strip   3. Simple chronic bronchitis (HCC)  J41.0 491.0 budesonide-formoteroL (Symbicort) 80-4.5 mcg/actuation HFAA           Total Time: minutes: 11-20 minutes was spent on telemedicine encounter discussing above problems and plans. Patient Problem list, medications, and Allergies were reviewed during this encounter.     Pursuant to the emergency declaration under the [facility] waiver authority and the [employer] Appropriations Act, this Telephone Visit was conducted, with patient's consent, to reduce the patient's risk of exposure to COVID-19 and provide continuity of care for an established patient. I affirm this is a Patient Initiated Episode with an Established Patient who has not had a related appointment within my department in the past 7 days or scheduled within the next 24 hours. Discussed diagnoses in detail with patient. Medication risks/benefits/side effects discussed with patient. All of the patient's questions were addressed. The patient understands and agrees with our plan of care. The patient knows to call back if they are unsure of or forget any changes we discussed today or if the symptoms change.     Note: not billable if this call serves to triage the patient into an appointment for the relevant concern    MD JAGDISH Mckinney & JERRY ORDONEZ [facility]  05/19/21

## 2021-05-19 NOTE — PROGRESS NOTES
Chief Complaint   Patient presents with    Labs     discuss results     1. Have you been to the ER, urgent care clinic since your last visit? Hospitalized since your last visit? No    2. Have you seen or consulted any other health care providers outside of the [address] [place] [address] since your last visit? Include any pap smears or colon screening.  No    Reviewed record in preparation for visit and have necessary documentation  Pt did not bring medication to office visit for review  opportunity was given for questions  Goals that were addressed and/or need to be completed during or after this appointment include   Health Maintenance Due   Topic Date Due    DTaP/Tdap/Td series (1 - Tdap) 07/26/1968    Breast Cancer Screen Mammogram  07/14/2018    Shingrix Vaccine Age 50> (2 of 2) 01/12/2021

## 2021-05-21 ENCOUNTER — TELEPHONE (OUTPATIENT)
Dept: FAMILY MEDICINE CLINIC | Age: 76
End: 2021-05-21

## 2021-05-21 DIAGNOSIS — E11.9 TYPE 2 DIABETES MELLITUS WITHOUT COMPLICATION, WITHOUT LONG-TERM CURRENT USE OF INSULIN (HCC): ICD-10-CM

## 2021-05-21 RX ORDER — BLOOD-GLUCOSE METER
KIT MISCELLANEOUS
Qty: 100 STRIP | Refills: 2 | Status: SHIPPED | OUTPATIENT
Start: 2021-05-21

## 2021-05-21 NOTE — TELEPHONE ENCOUNTER
Pt is asking for a call back from Nurse Ben Hoyos concerning her diabetic test strips and her inhaler.  Thanks

## 2021-05-21 NOTE — TELEPHONE ENCOUNTER
----- Message from Yeny Schwartz sent at 5/21/2021 12:04 PM EDT -----  Regarding: Dr. Fanta Bacon / telephone  General Message/Vendor Calls    Caller's first and last name: Rodolfo Starr       Reason for call: recently pt had two medications called in to pharmacy however these need to be pre-authorized. Simvicort and test strips for glucometer      Callback required yes/no and why: yes to advise it's been done. Best contact number(s): [phone]      Details to clarify the request: Pt also wanted Dr. Fanta Bacon to know that she has scheduled an appt Monday at [facility] to have a scan on her leg for blood clot.        Yeny Schwartz

## 2021-05-21 NOTE — TELEPHONE ENCOUNTER
Spoke with patient, patient needs test strips sent to [facility] for insurance purposes and another prior authorization for brand name symbicort. Prior authorization completed, awaiting response.

## 2021-05-24 ENCOUNTER — HOSPITAL ENCOUNTER (OUTPATIENT)
Dept: VASCULAR SURGERY | Age: 76
Discharge: HOME | End: 2021-05-24
Attending: FAMILY MEDICINE
Payer: MEDICARE

## 2021-05-24 DIAGNOSIS — M79.89 RIGHT LEG SWELLING: ICD-10-CM

## 2021-05-24 PROCEDURE — 93971 EXTREMITY STUDY: CPT

## 2021-05-27 ENCOUNTER — TELEPHONE (OUTPATIENT)
Dept: FAMILY MEDICINE CLINIC | Age: 76
End: 2021-05-27

## 2021-05-27 NOTE — TELEPHONE ENCOUNTER
Patient called per Dr. Carolina Stovall: For now she should continue the fluid pill, that is the best way to treat the bakers cyst until it heals on its own. Patient verbalized understanding and appreciative.     Appt scheduled

## 2021-05-27 NOTE — TELEPHONE ENCOUNTER
Patient called per Dr. Rea Getting:  there is no sign of a DVT. Patient verbalized understanding and appreciative. Patient would like to know should she keep taking the flood pill and what should she do about the swelling. Her leg is still bothering her. The technician told her she had a baker cyst that burst and is probably draining down her leg. She was thinking to maybe treat the baker cyst to get some relief.

## 2021-06-14 ENCOUNTER — OFFICE VISIT (OUTPATIENT)
Dept: FAMILY MEDICINE CLINIC | Age: 76
End: 2021-06-14
Payer: MEDICARE

## 2021-06-14 VITALS
WEIGHT: 212.6 LBS | SYSTOLIC BLOOD PRESSURE: 114 MMHG | TEMPERATURE: 98 F | RESPIRATION RATE: 18 BRPM | DIASTOLIC BLOOD PRESSURE: 65 MMHG | OXYGEN SATURATION: 96 % | HEART RATE: 82 BPM | HEIGHT: 63 IN | BODY MASS INDEX: 37.67 KG/M2

## 2021-06-14 DIAGNOSIS — E11.9 TYPE 2 DIABETES MELLITUS WITHOUT COMPLICATION, WITHOUT LONG-TERM CURRENT USE OF INSULIN (HCC): Primary | ICD-10-CM

## 2021-06-14 DIAGNOSIS — M79.89 RIGHT LEG SWELLING: ICD-10-CM

## 2021-06-14 DIAGNOSIS — I82.412 ACUTE DEEP VEIN THROMBOSIS (DVT) OF FEMORAL VEIN OF LEFT LOWER EXTREMITY (HCC): ICD-10-CM

## 2021-06-14 PROCEDURE — G8427 DOCREV CUR MEDS BY ELIG CLIN: HCPCS | Performed by: FAMILY MEDICINE

## 2021-06-14 PROCEDURE — G8510 SCR DEP NEG, NO PLAN REQD: HCPCS | Performed by: FAMILY MEDICINE

## 2021-06-14 PROCEDURE — 2022F DILAT RTA XM EVC RTNOPTHY: CPT | Performed by: FAMILY MEDICINE

## 2021-06-14 PROCEDURE — 99214 OFFICE O/P EST MOD 30 MIN: CPT | Performed by: FAMILY MEDICINE

## 2021-06-14 PROCEDURE — G8752 SYS BP LESS 140: HCPCS | Performed by: FAMILY MEDICINE

## 2021-06-14 PROCEDURE — G8754 DIAS BP LESS 90: HCPCS | Performed by: FAMILY MEDICINE

## 2021-06-14 PROCEDURE — G8536 NO DOC ELDER MAL SCRN: HCPCS | Performed by: FAMILY MEDICINE

## 2021-06-14 PROCEDURE — 1090F PRES/ABSN URINE INCON ASSESS: CPT | Performed by: FAMILY MEDICINE

## 2021-06-14 PROCEDURE — 1101F PT FALLS ASSESS-DOCD LE1/YR: CPT | Performed by: FAMILY MEDICINE

## 2021-06-14 PROCEDURE — G8399 PT W/DXA RESULTS DOCUMENT: HCPCS | Performed by: FAMILY MEDICINE

## 2021-06-14 PROCEDURE — 3046F HEMOGLOBIN A1C LEVEL >9.0%: CPT | Performed by: FAMILY MEDICINE

## 2021-06-14 PROCEDURE — 3017F COLORECTAL CA SCREEN DOC REV: CPT | Performed by: FAMILY MEDICINE

## 2021-06-14 PROCEDURE — G8417 CALC BMI ABV UP PARAM F/U: HCPCS | Performed by: FAMILY MEDICINE

## 2021-06-14 NOTE — PROGRESS NOTES
[facility]    History of Present Illness:   Selam Uriostegui is a 70 y.o. female with history of HTN, PE, DVT, DM, COPD, HLD, DCIS  CC: Right Leg Swelling  History provided by patient and Records    HPI:  Leg Swelling: Patient reports persistent issues with right leg swelling. Recent Duplex showed ruptured bakers cyst on the right, no new clot. Noting worsened with standing prolonged periods. Diabetes: Patient has not been testing Blood sugar, does not had some bad diet in the recent past.    Health Maintenance  Health Maintenance Due   Topic Date Due    DTaP/Tdap/Td series (1 - Tdap) 07/26/1968    Breast Cancer Screen Mammogram  07/14/2018    Shingrix Vaccine Age 50> (2 of 2) 01/12/2021    Medicare Yearly Exam  07/10/2021       Past Medical, Family, and Social History:     Current Outpatient Medications on File Prior to Visit   Medication Sig Dispense Refill    montelukast (SINGULAIR) 10 mg tablet TAKE ONE TABLET BY MOUTH NIGHTLY 90 Tablet 1    glucose blood VI test strips (FreeStyle Lite Strips) strip Patient is to check blood sugar once daily. Dx E11.9 100 Strip 2    budesonide-formoteroL (Symbicort) 80-4.5 mcg/actuation HFAA Take 2 Puffs by inhalation daily. 1 Inhaler 1    albuterol (PROVENTIL VENTOLIN) 2.5 mg /3 mL (0.083 %) nebu 3 mL by Nebulization route every six (6) hours as needed for Wheezing. 75 mL 4    cyclobenzaprine (FLEXERIL) 5 mg tablet Take 1 Tab by mouth nightly. 90 Tab 1    furosemide (LASIX) 20 mg tablet Take 1 Tab by mouth daily. 60 Tab 0    carvediloL (COREG) 3.125 mg tablet Take 1 Tab by mouth two (2) times a day. 180 Tab 1    Trelegy Ellipta 100-62.5-25 mcg inhaler Take 1 Puff by inhalation daily.  1 Inhaler 2    metFORMIN ER (GLUCOPHAGE XR) 500 mg tablet TAKE ONE TABLET BY MOUTH DAILY WITH BREAKFAST 90 Tab 1    losartan (COZAAR) 50 mg tablet TAKE ONE TABLET BY MOUTH DAILY 90 Tab 1    diclofenac (VOLTAREN) 1 % gel APPLY 1 GRAM TO AFFECTED AREA OF KNEES AND BACK 4 TIMES DAILY 300 g 5    lovastatin (MEVACOR) 10 mg tablet TAKE ONE TABLET BY MOUTH NIGHTLY 90 Tab 1    guaiFENesin ER (MUCINEX) 600 mg ER tablet Take 1 Tab by mouth two (2) times a day. (Patient taking differently: Take 600 mg by mouth as needed.) 60 Tab 1    ProAir HFA 90 mcg/actuation inhaler Take 2 Puffs by inhalation every six (6) hours as needed for Wheezing. 1 Inhaler 3    apixaban (ELIQUIS) 5 mg tablet Take 1 Tab by mouth two (2) times a day. 60 Tab 11    fluticasone propionate (FLONASE) 50 mcg/actuation nasal spray 2 Sprays by Both Nostrils route daily. (Patient taking differently: 2 Sprays by Both Nostrils route as needed.) 1 Bottle 2    acetaminophen (TYLENOL ARTHRITIS PAIN) 650 mg TbER Take 1 Tab by mouth every eight (8) hours. 60 Tab 0    multivitamin (ONE A DAY) tablet Take 1 Tab by mouth nightly.  aspirin 81 mg tablet Take 81 mg by mouth daily. No current facility-administered medications on file prior to visit.        Patient Active Problem List   Diagnosis Code    Melena K92.1    Acute posthemorrhagic anemia D62    Type 2 diabetes mellitus without complication, without long-term current use of insulin ([facility]) E11.9    HTN (hypertension) I10    Other hyperlipidemia E78.49    Debility R53.81    Acute deep vein thrombosis (DVT) of left lower extremity ([facility]) I82.402    Chronic anticoagulation Z79.01    Severe obesity ([facility]) E66.01    DCIS (ductal carcinoma in situ) of breast D05.10    History of GI bleed Z87.19    COPD (chronic obstructive pulmonary disease) ([facility]) J44.9       Social History     Socioeconomic History    Marital status: SINGLE     Spouse name: Not on file    Number of children: Not on file    Years of education: Not on file    Highest education level: Not on file   Occupational History    Not on file   Tobacco Use    Smoking status: Former Smoker     Quit date:      Years since quittin.4    Smokeless tobacco: Never Used   Substance and Sexual Activity    Alcohol use: No    Drug use: No    Sexual activity: Never   Other Topics Concern    Caffeine Concern Not Asked    Back Care Not Asked    Exercise Not Asked    Occupational Exposure Not Asked    Sleep Concern Not Asked    Stress Concern Not Asked    Weight Concern Not Asked   Social History Narrative    Not on file     Social Determinants of Health     Financial Resource Strain:     Difficulty of Paying Living Expenses:    Food Insecurity:     Worried About Running Out of Food in the Last Year:     Ran Out of Food in the Last Year:    Transportation Needs:     Lack of Transportation (Medical):  Lack of Transportation (Non-Medical):    Physical Activity:     Days of Exercise per Week:     Minutes of Exercise per Session:    Stress:     Feeling of Stress :    Social Connections:     Frequency of Communication with Friends and Family:     Frequency of Social Gatherings with Friends and Family:     Attends [employer] Services:     Active Member of Clubs or Organizations:     Attends Club or Organization Meetings:     Marital Status:    Intimate Partner Violence:     Fear of Current or Ex-Partner:     Emotionally Abused:     Physically Abused:     Sexually Abused:        Review of Systems   Review of Systems   Constitutional: Negative for chills and fever. HENT: Negative for congestion. Cardiovascular: Positive for leg swelling. Gastrointestinal: Negative for nausea and vomiting. Objective:     Visit Vitals  /65 (BP 1 Location: Left arm, BP Patient Position: Sitting, BP Cuff Size: Adult)   Pulse 82   Temp 98 °F (36.7 °C) (Temporal)   Resp 18   Ht 5' 3\" (1.6 m)   Wt 212 lb 9.6 oz (96.4 kg)   SpO2 96%   BMI 37.66 kg/m²        Physical Exam  Vitals and nursing note reviewed. Constitutional:       Appearance: Normal appearance. HENT:      Head: Normocephalic and atraumatic. Cardiovascular:      Rate and Rhythm: Normal rate and regular rhythm. Pulses: Normal pulses. Heart sounds: Normal heart sounds. No murmur heard. No friction rub. No gallop. Pulmonary:      Effort: Pulmonary effort is normal.      Breath sounds: Normal breath sounds. Abdominal:      General: Abdomen is flat. Bowel sounds are normal.      Palpations: Abdomen is soft. Musculoskeletal:         General: Normal range of motion. Cervical back: Normal range of motion and neck supple. Right lower leg: Edema present. Skin:     General: Skin is warm and dry. Neurological:      Mental Status: She is alert. Pertinent Labs/Studies:      Assessment and orders:       ICD-10-CM ICD-9-CM    1. Type 2 diabetes mellitus without complication, without long-term current use of insulin ([facility])  E11.9 250.00    2. Acute deep vein thrombosis (DVT) of femoral vein of left lower extremity ([facility])  I82.412 453.41    3. Right leg swelling  M79.89 729.81      Diagnoses and all orders for this visit:    1. Type 2 diabetes mellitus without complication, without long-term current use of insulin ([address]): Uncontrolled likely related to diet. Starting Daily BG monitoring now and will follow up Mychart and in office in 2 weeks. Plan to possibly add Glipizide    2. Acute deep vein thrombosis (DVT) of femoral vein of left lower extremity ([address]): Stable    3. Right leg swelling: Likely related to Venous Insufficiency and ruptured bakers cyst.  Increasing Lasix to 40 mg daily for the next 1-2 weeks. Follow-up and Dispositions    · Return in about 2 weeks (around 6/28/2021) for MWE and Leg swelling/Diabetes. I have discussed the diagnosis with the patient and the intended plan as seen in the above orders. Social history, medical history, and labs were reviewed. The patient has received an after-visit summary and questions were answered concerning future plans. I have discussed medication side effects and warnings with the patient as well.     MD [facility]  06/14/21

## 2021-06-14 NOTE — PROGRESS NOTES
Chief Complaint   Patient presents with    Leg Swelling     both with redness     Visit Vitals  /65 (BP 1 Location: Left arm, BP Patient Position: Sitting, BP Cuff Size: Adult)   Pulse 82   Temp 98 °F (36.7 °C) (Temporal)   Resp 18   Ht 5' 3\" (1.6 m)   Wt 212 lb 9.6 oz (96.4 kg)   SpO2 96%   BMI 37.66 kg/m²     1. Have you been to the ER, urgent care clinic since your last visit? Hospitalized since your last visit? No    2. Have you seen or consulted any other health care providers outside of the [address] [place] [address] since your last visit? Include any pap smears or colon screening.  No    Reviewed record in preparation for visit and have necessary documentation  Pt did not bring medication to office visit for review  opportunity was given for questions  Goals that were addressed and/or need to be completed during or after this appointment include   Health Maintenance Due   Topic Date Due    DTaP/Tdap/Td series (1 - Tdap) 07/26/1968    Breast Cancer Screen Mammogram  07/14/2018    Shingrix Vaccine Age 50> (2 of 2) 01/12/2021    Medicare Yearly Exam  07/10/2021

## 2021-06-14 NOTE — PATIENT INSTRUCTIONS
- Take 2 Lasix tablets once daily for 1 week. - Check BG sugars at least 2 times daily for a the next 2 weeks. [facility] 
 [address] 
[phone] Blood Sugar Record Patient Name:__________________________                     :__________________ Monitor blood sugar at home then bring the record to office  in 3 weeks for review. Date Breakfast Lunch Dinner Bedtime notes

## 2021-06-28 ENCOUNTER — OFFICE VISIT (OUTPATIENT)
Dept: FAMILY MEDICINE CLINIC | Age: 76
End: 2021-06-28
Payer: MEDICARE

## 2021-06-28 VITALS
BODY MASS INDEX: 37.6 KG/M2 | SYSTOLIC BLOOD PRESSURE: 107 MMHG | WEIGHT: 212.2 LBS | HEART RATE: 60 BPM | TEMPERATURE: 97.9 F | RESPIRATION RATE: 18 BRPM | OXYGEN SATURATION: 96 % | HEIGHT: 63 IN | DIASTOLIC BLOOD PRESSURE: 56 MMHG

## 2021-06-28 DIAGNOSIS — Z00.00 MEDICARE ANNUAL WELLNESS VISIT, SUBSEQUENT: Primary | ICD-10-CM

## 2021-06-28 DIAGNOSIS — E66.01 SEVERE OBESITY (BMI 35.0-35.9 WITH COMORBIDITY) (HCC): ICD-10-CM

## 2021-06-28 DIAGNOSIS — M79.89 LEG SWELLING: ICD-10-CM

## 2021-06-28 PROCEDURE — 1101F PT FALLS ASSESS-DOCD LE1/YR: CPT | Performed by: FAMILY MEDICINE

## 2021-06-28 PROCEDURE — G8752 SYS BP LESS 140: HCPCS | Performed by: FAMILY MEDICINE

## 2021-06-28 PROCEDURE — G8427 DOCREV CUR MEDS BY ELIG CLIN: HCPCS | Performed by: FAMILY MEDICINE

## 2021-06-28 PROCEDURE — G8536 NO DOC ELDER MAL SCRN: HCPCS | Performed by: FAMILY MEDICINE

## 2021-06-28 PROCEDURE — G8754 DIAS BP LESS 90: HCPCS | Performed by: FAMILY MEDICINE

## 2021-06-28 PROCEDURE — G8417 CALC BMI ABV UP PARAM F/U: HCPCS | Performed by: FAMILY MEDICINE

## 2021-06-28 PROCEDURE — G8399 PT W/DXA RESULTS DOCUMENT: HCPCS | Performed by: FAMILY MEDICINE

## 2021-06-28 PROCEDURE — 3017F COLORECTAL CA SCREEN DOC REV: CPT | Performed by: FAMILY MEDICINE

## 2021-06-28 PROCEDURE — G8510 SCR DEP NEG, NO PLAN REQD: HCPCS | Performed by: FAMILY MEDICINE

## 2021-06-28 PROCEDURE — G0439 PPPS, SUBSEQ VISIT: HCPCS | Performed by: FAMILY MEDICINE

## 2021-06-28 RX ORDER — FUROSEMIDE 40 MG/1
40 TABLET ORAL DAILY
Qty: 90 TABLET | Refills: 1 | Status: SHIPPED | OUTPATIENT
Start: 2021-06-28 | End: 2021-11-11

## 2021-06-28 NOTE — PROGRESS NOTES
Chief Complaint   Patient presents with    Annual Wellness Visit    Follow Up Chronic Condition     Visit Vitals  BP (!) 107/56 (BP 1 Location: Left arm, BP Patient Position: Sitting, BP Cuff Size: Adult)   Pulse 60   Temp 97.9 °F (36.6 °C) (Temporal)   Resp 18   Ht 5' 3\" (1.6 m)   Wt 212 lb 3.2 oz (96.3 kg)   SpO2 96%   BMI 37.59 kg/m²     1. Have you been to the ER, urgent care clinic since your last visit? Hospitalized since your last visit? No    2. Have you seen or consulted any other health care providers outside of the [address] [place] [address] since your last visit? Include any pap smears or colon screening.  No    Reviewed record in preparation for visit and have necessary documentation  Pt did not bring medication to office visit for review  opportunity was given for questions  Goals that were addressed and/or need to be completed during or after this appointment include   Health Maintenance Due   Topic Date Due    DTaP/Tdap/Td series (1 - Tdap) 10/12/2011    Breast Cancer Screen Mammogram  07/14/2018    Eye Exam Retinal or Dilated  01/07/2021    Shingrix Vaccine Age 50> (2 of 2) 01/12/2021    Medicare Yearly Exam  07/10/2021

## 2021-06-28 NOTE — PROGRESS NOTES
This is the Subsequent Medicare Annual Wellness Exam, performed 12 months or more after the Initial AWV or the last Subsequent AWV    I have reviewed the patient's medical history in detail and updated the computerized patient record. History     Well Woman exam:  Genevieve Pritchard is a 70 y.o. female presenting for well woman exam.     How would you rate your health in generally over the last year? Fair  Has your physical and emotional health limited your social activities with family or friends? no    Current Complaints? Follow up BP and DM       Menstrual/Sexual History:  Vaginal Bleeding: None    PAP History: Normal      Sexually active: No    Risk factors for breast cancer: History of left breast cancer    Diet/Exercise History:  Diet: Favorite food Seafood and Pizza. - Does patient eat at least 5 servings of Fruits/vegetables daily? No   - Is patient currently dieting? No    Exercise: Patient does not have structured exercise  - Does patient get 150 minutes of structured exercise weekly? No  - Type of work patient has? retired  - Limitations to exercise? None    Healthcare maintenance:   Health Maintenance Due   Topic Date Due    DTaP/Tdap/Td series (1 - Tdap) 10/12/2011    Breast Cancer Screen Mammogram  2018    Eye Exam Retinal or Dilated  2021    Shingrix Vaccine Age 50> (2 of 2) 2021     Mammogram indicated? Yes  Colonoscopy indicated? No   DEXA scan indicated? No   HIV/STI testing indicated? No   Hepatitis C testing indicated? No   Lung cancer screening indicated? No   AAA screening indicated?   No     Immunization History   Administered Date(s) Administered    Covid-19, MODERNA, Mrna, Lnp-s, Pf, 100mcg/0.5mL 2021, 2021    Influenza Vaccine 10/01/2018    Influenza Vaccine (Tri) Adjuvanted (>65 Yrs FLUAD TRI [id number]) 10/24/2019    Influenza, Quadrivalent, Adjuvanted (>65 Yrs FLUAD QUAD [id number]) 10/07/2020    Pneumococcal Conjugate (PCV-13) 10/11/2016    Pneumococcal Polysaccharide (PPSV-23) 2014    Td 10/11/2011    Zoster Recombinant 2020    Zoster Vaccine, Live 2010     Flu indicated? No   Tdap indicated? No   Pneumovax indicated? No   Zostervax indicated? Yes  Meningococcal indicated? No      Past Medical History:   Diagnosis Date    Asthma     Bronchitis     Cancer ([facility]     breast - left    COPD (chronic obstructive pulmonary disease) ([facility]     [facility]; pulmonary nodule 7mm stable (found )    Diabetes ([facility]     Hepatitis age 11    High cholesterol     Hypertension     Obesity (BMI 30-39. 9)     Thromboembolus ([facility]     L LE with PE      Past Surgical History:   Procedure Laterality Date    COLONOSCOPY N/A 2017    COLONOSCOPY- no info to   performed by Tavon Lima MD at [facility] HX GYN      ablation, R ovary removed, tubal ligation    HX HAMMER TOE REPAIR      HX HEENT      tonsillectomy    HX ORTHOPAEDIC      L hip, R leg, C4-C5 fusion, L foot    HX OTHER SURGICAL      xiphoid removal    IR PLC IVC FILTER  2019    SC BREAST SURGERY PROCEDURE UNLISTED      L mastectomy     Allergies   Allergen Reactions    Crestor [Rosuvastatin] Other (comments)     Pain in left leg  Causes muscle spasms    Lipitor [Atorvastatin] Other (comments)     Left leg pain  Causes muscle spasms    Xarelto [Rivaroxaban] Other (comments)     Pt states it caused internal bleeding     Family History   Problem Relation Age of Onset    Heart Attack Father     Diabetes Mother      Social History     Tobacco Use    Smoking status: Former Smoker     Quit date:      Years since quittin.4    Smokeless tobacco: Never Used   Substance Use Topics    Alcohol use: No     Depression Risk Factor Screening:     3 most recent PHQ Screens 2021   Little interest or pleasure in doing things Not at all   Feeling down, depressed, irritable, or hopeless Not at all   Total Score PHQ 2 0   Trouble falling or staying asleep, or sleeping too much -   Feeling tired or having little energy -   Poor appetite, weight loss, or overeating -   Feeling bad about yourself - or that you are a failure or have let yourself or your family down -   Trouble concentrating on things such as school, work, reading, or watching TV -   Moving or speaking so slowly that other people could have noticed; or the opposite being so fidgety that others notice -   Thoughts of being better off dead, or hurting yourself in some way -   PHQ 9 Score -   How difficult have these problems made it for you to do your work, take care of your home and get along with others -     Alcohol Risk Factor Screening:    Do you average more than 1 drink per night or more than 7 drinks a week: No    In the past three months have you have had more than 4 drinks containing alcohol on one occasion: No  Functional Ability and Level of Safety:   Hearing Loss  Hearing is good. Activities of Daily Living  The home contains: handrails and grab bars  Patient does total self care  ADL Assessment 6/28/2021   Feeding yourself No Help Needed   Getting from bed to chair No Help Needed   Getting dressed No Help Needed   Bathing or showering No Help Needed   Walk across the room (includes cane/walker) No Help Needed   Using the telphone No Help Needed   Taking your medications No Help Needed   Preparing meals No Help Needed   Managing money (expenses/bills) No Help Needed   Moderately strenuous housework (laundry) No Help Needed   Shopping for personal items (toiletries/medicines) No Help Needed   Shopping for groceries No Help Needed   Driving No Help Needed   Climbing a flight of stairs No Help Needed   Getting to places beyond walking distances No Help Needed       Ambulation: with no difficulty    Fall Risk  Fall Risk Assessment, last 12 mths 2/24/2021   Able to walk? Yes   Fall in past 12 months? 0   Do you feel unsteady?  0   Are you worried about falling 0   Number of falls in past 12 months -   Fall with injury? -       Abuse Screen  Abuse Screening Questionnaire 5/12/2021   Do you ever feel afraid of your partner? N   Are you in a relationship with someone who physically or mentally threatens you? N   Is it safe for you to go home? Y     Cognitive Screening   Evaluation of Cognitive Function:  Has your family/caregiver stated any concerns about your memory: no  Normal  No flowsheet data found. Patient Care Team   Patient Care Team:  Hallie West MD as PCP - General (Family Medicine)  Hallie eWst MD as PCP - [address] [place] [address]  [facility] Provider  Romeo Dougherty MD (Hematology and Oncology)  Tam Healy MD (Gastroenterology)  Faye Amato MD (Gynecology)  Dimple Dietz MD (Ophthalmology)  Ro Baeza MD (Pulmonary Disease)  Assessment/Plan   Education and counseling provided:  Are appropriate based on today's review and evaluation  End-of-Life planning (with patient's consent)    Diagnoses and all orders for this visit:    1. Medicare annual wellness visit, subsequent: Overall well at this time    2. Severe obesity (BMI 35.0-35.9 with comorbidity) (HCC)    3. Leg swelling: Refill  -     furosemide (LASIX) 40 mg tablet; Take 1 Tablet by mouth daily.         Health Maintenance Topics with due status: Overdue       Topic Date Due    DTaP/Tdap/Td series 10/12/2011    Breast Cancer Screen Mammogram 07/14/2018    Eye Exam Retinal or Dilated 01/07/2021    Shingrix Vaccine Age 50> 01/12/2021     Health Maintenance Topics with due status: Not Due       Topic Last Completion Date    Colorectal Cancer Screening Combo 05/26/2017    Foot Exam Q1 07/30/2020    MICROALBUMIN Q1 07/30/2020    A1C test (Diabetic or Prediabetic) 05/12/2021    Lipid Screen 05/12/2021    Medicare Yearly Exam 06/28/2021     Health Maintenance Topics with due status: Completed       Topic Last Completion Date    Bone Densitometry (Dexa) Screening 08/22/2007    Pneumococcal 65+ years 10/11/2016 Hepatitis C Screening 10/24/2019    Flu Vaccine 10/07/2020    COVID-19 Vaccine 03/30/2021

## 2021-06-28 NOTE — PATIENT INSTRUCTIONS

## 2021-11-11 ENCOUNTER — OFFICE VISIT (OUTPATIENT)
Dept: FAMILY MEDICINE CLINIC | Age: 76
End: 2021-11-11
Payer: MEDICARE

## 2021-11-11 VITALS
HEIGHT: 63 IN | DIASTOLIC BLOOD PRESSURE: 82 MMHG | TEMPERATURE: 97.1 F | OXYGEN SATURATION: 96 % | BODY MASS INDEX: 35.61 KG/M2 | SYSTOLIC BLOOD PRESSURE: 142 MMHG | HEART RATE: 75 BPM | RESPIRATION RATE: 24 BRPM | WEIGHT: 201 LBS

## 2021-11-11 DIAGNOSIS — Z23 ENCOUNTER FOR IMMUNIZATION: ICD-10-CM

## 2021-11-11 DIAGNOSIS — E11.9 TYPE 2 DIABETES MELLITUS WITHOUT COMPLICATION, WITHOUT LONG-TERM CURRENT USE OF INSULIN (HCC): ICD-10-CM

## 2021-11-11 DIAGNOSIS — J41.0 SIMPLE CHRONIC BRONCHITIS (HCC): ICD-10-CM

## 2021-11-11 DIAGNOSIS — I63.9 CEREBROVASCULAR ACCIDENT (CVA), UNSPECIFIED MECHANISM (HCC): Primary | ICD-10-CM

## 2021-11-11 DIAGNOSIS — E11.65 TYPE 2 DIABETES MELLITUS WITH HYPERGLYCEMIA, WITHOUT LONG-TERM CURRENT USE OF INSULIN (HCC): ICD-10-CM

## 2021-11-11 DIAGNOSIS — Z09 HOSPITAL DISCHARGE FOLLOW-UP: ICD-10-CM

## 2021-11-11 DIAGNOSIS — I10 PRIMARY HYPERTENSION: ICD-10-CM

## 2021-11-11 DIAGNOSIS — R51.9 NONINTRACTABLE HEADACHE, UNSPECIFIED CHRONICITY PATTERN, UNSPECIFIED HEADACHE TYPE: ICD-10-CM

## 2021-11-11 PROBLEM — Z86.73 HISTORY OF STROKE: Status: ACTIVE | Noted: 2021-11-11

## 2021-11-11 LAB
CHOLEST SERPL-MCNC: 172 MG/DL
CREAT UR-MCNC: 207 MG/DL
EST. AVERAGE GLUCOSE BLD GHB EST-MCNC: 157 MG/DL
HBA1C MFR BLD: 7.1 % (ref 4–5.6)
HDLC SERPL-MCNC: 43 MG/DL
HDLC SERPL: 4 {RATIO} (ref 0–5)
LDLC SERPL CALC-MCNC: 74.8 MG/DL (ref 0–100)
MICROALBUMIN UR-MCNC: 1.16 MG/DL
MICROALBUMIN/CREAT UR-RTO: 6 MG/G (ref 0–30)
TRIGL SERPL-MCNC: 271 MG/DL (ref ?–150)
VLDLC SERPL CALC-MCNC: 54.2 MG/DL

## 2021-11-11 PROCEDURE — 3017F COLORECTAL CA SCREEN DOC REV: CPT | Performed by: FAMILY MEDICINE

## 2021-11-11 PROCEDURE — 3046F HEMOGLOBIN A1C LEVEL >9.0%: CPT | Performed by: FAMILY MEDICINE

## 2021-11-11 PROCEDURE — 1101F PT FALLS ASSESS-DOCD LE1/YR: CPT | Performed by: FAMILY MEDICINE

## 2021-11-11 PROCEDURE — G8536 NO DOC ELDER MAL SCRN: HCPCS | Performed by: FAMILY MEDICINE

## 2021-11-11 PROCEDURE — 1090F PRES/ABSN URINE INCON ASSESS: CPT | Performed by: FAMILY MEDICINE

## 2021-11-11 PROCEDURE — G8754 DIAS BP LESS 90: HCPCS | Performed by: FAMILY MEDICINE

## 2021-11-11 PROCEDURE — 2022F DILAT RTA XM EVC RTNOPTHY: CPT | Performed by: FAMILY MEDICINE

## 2021-11-11 PROCEDURE — G0008 ADMIN INFLUENZA VIRUS VAC: HCPCS | Performed by: FAMILY MEDICINE

## 2021-11-11 PROCEDURE — 90694 VACC AIIV4 NO PRSRV 0.5ML IM: CPT | Performed by: FAMILY MEDICINE

## 2021-11-11 PROCEDURE — G8753 SYS BP > OR = 140: HCPCS | Performed by: FAMILY MEDICINE

## 2021-11-11 PROCEDURE — 99214 OFFICE O/P EST MOD 30 MIN: CPT | Performed by: FAMILY MEDICINE

## 2021-11-11 PROCEDURE — G8417 CALC BMI ABV UP PARAM F/U: HCPCS | Performed by: FAMILY MEDICINE

## 2021-11-11 PROCEDURE — G8399 PT W/DXA RESULTS DOCUMENT: HCPCS | Performed by: FAMILY MEDICINE

## 2021-11-11 PROCEDURE — G8510 SCR DEP NEG, NO PLAN REQD: HCPCS | Performed by: FAMILY MEDICINE

## 2021-11-11 PROCEDURE — G8427 DOCREV CUR MEDS BY ELIG CLIN: HCPCS | Performed by: FAMILY MEDICINE

## 2021-11-11 RX ORDER — CARVEDILOL 6.25 MG/1
6.25 TABLET ORAL 2 TIMES DAILY
COMMUNITY
Start: 2021-11-02 | End: 2021-11-22 | Stop reason: SDUPTHER

## 2021-11-11 RX ORDER — MECLIZINE HYDROCHLORIDE 25 MG/1
TABLET ORAL
COMMUNITY
Start: 2021-11-05 | End: 2021-12-14

## 2021-11-11 NOTE — PROGRESS NOTES
1. Have you been to the ER, urgent care clinic since your last visit? Hospitalized since your last visit? [address] for CVA     2. Have you seen or consulted any other health care providers outside of the [address] [place] [address] since your last visit? Include any pap smears or colon screening. No    Reviewed record in preparation for visit and have necessary documentation  Goals that were addressed and/or need to be completed during or after this appointment include     Health Maintenance Due   Topic Date Due    DTaP/Tdap/Td series (1 - Tdap) 10/12/2011    Breast Cancer Screen Mammogram  07/14/2018    Eye Exam Retinal or Dilated  01/07/2021    Foot Exam Q1  07/30/2021    MICROALBUMIN Q1  07/30/2021    A1C test (Diabetic or Prediabetic)  08/12/2021    Flu Vaccine (1) 09/01/2021    COVID-19 Vaccine (3 - Booster for Moderna series) 09/30/2021       Patient is accompanied by self I have received verbal consent from Yuridia Beach to discuss any/all medical information while they are present in the room.

## 2021-11-11 NOTE — PROGRESS NOTES
[address]  [phone]        Transition of Care Visit    Patient: Derrek Cowart MRN: [id number]  SSN: [id number]    YOB: 1945  Age: 78 y.o. Sex: female      Hospital: [address] [place] [address]  Dates of admission: 10/27/21 - 10/2/21   Discharge diagnoses: CVA  State of health at discharge: Stable  Surgical or invasive procedures done: None    Amount and/or Complexity of Data Reviewed:   Clinical lab tests: Reviewed or ordered   Tests in the radiology section: reviewed or ordered  Discussion of test results with the patient: yes  Obtain previous medical records or obtain history from someone other than the patient: Yes  Obtain history from someone other than the patient: No  Review and address past medical records: Yes  Discuss the patient with another provider: no  Independant visualization of image, tracing, or specimen: no    Risk of Significant Complications, Morbidity, and/or Mortality:   Presenting problems: High   Diagnostic procedures: Moderate   Management options: Moderate     Transition of Care time spent:   Total time providing care and documentation: 40-60 minutes   Progress at discharge:   Stable on Discharge      Progress Note    Patient: Derrek Cowart MRN: [id number]  SSN: [id number]    YOB: 1945  Age: 78 y.o. Sex: female        Chief Complaint   Patient presents with   [facility] Follow Up         Subjective:     Encounter Diagnoses   Name Primary?     Cerebrovascular accident (CVA), unspecified mechanism ([address] Yes    Nonintractable headache, unspecified chronicity pattern, unspecified headache type    [facility] discharge follow-up     Type 2 diabetes mellitus without complication, without long-term current use of insulin (HCC)     Type 2 diabetes mellitus with hyperglycemia, without long-term current use of insulin ([address]     Primary hypertension     Simple chronic bronchitis ([address]  Encounter for immunization      Patient remembers waking and was severely dizzy and unable to sit up barely. Went to [facility] ER before admission to [address] [place] [address] and MRI showed a posterior stroke. Patient then returned to ER on 11/5/21 for Headache, but did not require Readmission. Patient states she is about 85% of normal but notes persistent headache, some issues with stability. Reporting they stopped Losartan and Lasix. Also stopped Metformin. Current and past medical information:    Current Medications after this visit[de-identified]     Current Outpatient Medications   Medication Sig    carvediloL (COREG) 6.25 mg tablet Take 6.25 mg by mouth two (2) times a day.  lovastatin (MEVACOR) 10 mg tablet TAKE ONE TABLET BY MOUTH NIGHTLY    montelukast (SINGULAIR) 10 mg tablet TAKE ONE TABLET BY MOUTH NIGHTLY    albuterol (PROVENTIL VENTOLIN) 2.5 mg /3 mL (0.083 %) nebu 3 mL by Nebulization route every six (6) hours as needed for Wheezing.  Trelegy Ellipta 100-62.5-25 mcg inhaler Take 1 Puff by inhalation daily.  diclofenac (VOLTAREN) 1 % gel APPLY 1 GRAM TO AFFECTED AREA OF KNEES AND BACK 4 TIMES DAILY    guaiFENesin ER (MUCINEX) 600 mg ER tablet Take 1 Tab by mouth two (2) times a day. (Patient taking differently: Take 600 mg by mouth as needed.)    ProAir HFA 90 mcg/actuation inhaler Take 2 Puffs by inhalation every six (6) hours as needed for Wheezing.  apixaban (ELIQUIS) 5 mg tablet Take 1 Tab by mouth two (2) times a day.  multivitamin (ONE A DAY) tablet Take 1 Tab by mouth nightly.  aspirin 81 mg tablet Take 81 mg by mouth daily.     meclizine (ANTIVERT) 25 mg tablet     metFORMIN ER (GLUCOPHAGE XR) 500 mg tablet TAKE ONE TABLET BY MOUTH DAILY WITH BREAKFAST (Patient not taking: Reported on 11/11/2021)    budesonide-formoteroL (SYMBICORT) 80-4.5 mcg/actuation HFAA INHALE 2 PUFFS BY INHALATION DAILY (Patient not taking: Reported on 11/11/2021)    glucose blood VI test strips (FreeStyle Lite Strips) strip Patient is to check blood sugar once daily. Dx E11.9    fluticasone propionate (FLONASE) 50 mcg/actuation nasal spray 2 Sprays by Both Nostrils route daily. (Patient taking differently: 2 Sprays by Both Nostrils route as needed.)    acetaminophen (TYLENOL ARTHRITIS PAIN) 650 mg TbER Take 1 Tab by mouth every eight (8) hours. No current facility-administered medications for this visit. Health Maintenance Due   Topic Date Due    DTaP/Tdap/Td series (1 - Tdap) 10/12/2011    Breast Cancer Screen Mammogram  07/14/2018    Eye Exam Retinal or Dilated  01/07/2021    MICROALBUMIN Q1  07/30/2021    A1C test (Diabetic or Prediabetic)  08/12/2021    Flu Vaccine (1) 09/01/2021    COVID-19 Vaccine (3 - Booster for Moderna series) 09/30/2021       Patient Active Problem List    Diagnosis Date Noted    History of stroke 11/11/2021    COPD (chronic obstructive pulmonary disease) ([address]     History of GI bleed 04/16/2019    DCIS (ductal carcinoma in situ) of breast 03/08/2019    Chronic anticoagulation 03/01/2019    Severe obesity ([address] 03/01/2019    Acute deep vein thrombosis (DVT) of left lower extremity ([address] 02/07/2019    Debility 02/25/2012    Melena 02/24/2012    Acute posthemorrhagic anemia 02/24/2012    Type 2 diabetes mellitus without complication, without long-term current use of insulin ([address] 02/24/2012    HTN (hypertension) 02/24/2012    Other hyperlipidemia 02/24/2012       Past Medical History:   Diagnosis Date    Asthma     Bronchitis     Cancer ([address]     breast - left    COPD (chronic obstructive pulmonary disease) ([address]     [facility]; pulmonary nodule 7mm stable (found 2011)    Diabetes ([address]     Hepatitis age 12    High cholesterol     Hypertension     Obesity (BMI 30-39. 9)     Thromboembolus ([address]     L LE with PE       Allergies   Allergen Reactions    Crestor [Rosuvastatin] Other (comments)     Pain in left leg  Causes muscle spasms    Lipitor [Atorvastatin] Other (comments)     Left leg pain  Causes muscle spasms    Xarelto [Rivaroxaban] Other (comments)     Pt states it caused internal bleeding       Past Surgical History:   Procedure Laterality Date    COLONOSCOPY N/A 2017    COLONOSCOPY- no info to   performed by Cj Rowan MD at [facility] HX GYN      ablation, R ovary removed, tubal ligation    HX HAMMER TOE REPAIR      HX HEENT      tonsillectomy    HX ORTHOPAEDIC      L hip, R leg, C4-C5 fusion, L foot    HX OTHER SURGICAL      xiphoid removal    IR PLC IVC FILTER  2019    [place] BREAST SURGERY PROCEDURE UNLISTED      L mastectomy       Social History     Socioeconomic History    Marital status: SINGLE   Tobacco Use    Smoking status: Former Smoker     Quit date:      Years since quittin.8    Smokeless tobacco: Never Used   Substance and Sexual Activity    Alcohol use: No    Drug use: No    Sexual activity: Never         Review of Systems   Review of Systems   Constitutional: Negative for chills and fever. HENT: Negative for congestion. Cardiovascular: Negative for chest pain and palpitations. Gastrointestinal: Negative for nausea and vomiting. Neurological: Negative for dizziness and headaches. Objective:     Vitals:    21 1322   BP: (!) 142/82   Pulse: 75   Resp: 24   Temp: 97.1 °F (36.2 °C)   TempSrc: Oral   SpO2: 96%   Weight: 201 lb (91.2 kg)   Height: 5' 3\" (1.6 m)      Body mass index is 35.61 kg/m². Physical Exam  Vitals and nursing note reviewed. Constitutional:       Appearance: Normal appearance. HENT:      Head: Normocephalic and atraumatic. Cardiovascular:      Rate and Rhythm: Normal rate and regular rhythm. Pulses: Normal pulses. Heart sounds: Normal heart sounds. No murmur heard. No friction rub. No gallop. Pulmonary:      Effort: Pulmonary effort is normal.      Breath sounds: Normal breath sounds.    Abdominal:      General: Abdomen is flat. Bowel sounds are normal.      Palpations: Abdomen is soft. Musculoskeletal:      Cervical back: Normal range of motion and neck supple. Neurological:      Mental Status: She is alert. Diabetic foot exam:     Left Foot:   Visual Exam: normal    Pulse DP: 1+ (weak)   Filament test: reduced sensation    Vibratory sensation: diminished      Right Foot:   Visual Exam: normal    Pulse DP: 1+ (weak)   Filament test: reduced sensation    Vibratory sensation: diminished      Assessment and orders:       ICD-10-CM ICD-9-CM    1. Cerebrovascular accident (CVA), unspecified mechanism ([facility]  I63.9 434.91    2. Nonintractable headache, unspecified chronicity pattern, unspecified headache type  R51.9 784.0    3. Hospital discharge follow-up  Z09 V67.59    4. Type 2 diabetes mellitus without complication, without long-term current use of insulin ([facility])  E11.9 250.00 LIPID PANEL      HEMOGLOBIN A1C WITH EAG      MICROALBUMIN, UR, RAND W/ MICROALB/CREAT RATIO   5. Type 2 diabetes mellitus with hyperglycemia, without long-term current use of insulin ([facility])  E11.65 250.00      790.29    6. Primary hypertension  I10 401.9    7. Simple chronic bronchitis ([facility])  J41.0 491.0    8. Encounter for immunization  Z23 V03.89 FLU (FLUAD QUAD INFLUENZA VACCINE,QUAD,ADJUVANTED)      ADMIN INFLUENZA VIRUS VAC      CANCELED: ADMIN PNEUMOCOCCAL VACCINE     Diagnoses and all orders for this visit:    1. Cerebrovascular accident (CVA), unspecified mechanism ([address]): Follow up as needed    2. Nonintractable headache, unspecified chronicity pattern, unspecified headache type: May be related to stroke though improinvg    3. Hospital discharge follow-up: Getting Records    4. Type 2 diabetes mellitus without complication, without long-term current use of insulin ([facility] Discussed with patient today that the goal for their diabetes is to have a HgA1C<7 and ideally as close to 6.5 as possible. We discussed diet and medications.   The goal for the cholesterol LDL is less than 70 and HDL>40. Patient is aware of the need for yearly eye exams and to take care of their feet daily. Discussed with patient that blood pressure should be less than 130/80 and watching salt intake is very important.    -     LIPID PANEL; Future  -     HEMOGLOBIN A1C WITH EAG; Future    5. Type 2 diabetes mellitus with hyperglycemia, without long-term current use of insulin [facility]): Patient off Metformin right now, will get labs    6. Primary hypertension: Holding Losartan and Lasix for now. 7. Simple chronic bronchitis ([address]): Stable overall, On Trelegy. Follow-up and Dispositions    · Return in about 3 months (around 2/11/2022). Plan of care:  Discussed diagnoses in detail with patient. Medication risks/benefits/side effects discussed with patient. All of the patient's questions were addressed. The patient understands and agrees with our plan of care. The patient knows to call back if they are unsure of or forget any changes we discussed today or if the symptoms change. The patient received an After-Visit Summary which contains VS, orders, medication list and allergy list. This can be used as a \"mini-medical record\" should they have to seek medical care while out of town. Follow-up and Dispositions    · Return in about 3 months (around 2/11/2022). No future appointments.     Signed By: Ryan Jurado MD     November 11, 2021

## 2021-11-22 RX ORDER — CARVEDILOL 6.25 MG/1
6.25 TABLET ORAL 2 TIMES DAILY
Qty: 180 TABLET | Refills: 1 | Status: SHIPPED | OUTPATIENT
Start: 2021-11-22 | End: 2022-02-28

## 2021-12-12 ENCOUNTER — APPOINTMENT (OUTPATIENT)
Dept: CT IMAGING | Age: 76
DRG: 149 | End: 2021-12-12
Attending: EMERGENCY MEDICINE
Payer: MEDICARE

## 2021-12-12 ENCOUNTER — HOSPITAL ENCOUNTER (INPATIENT)
Age: 76
LOS: 2 days | Discharge: HOME HEALTH | DRG: 149 | End: 2021-12-14
Attending: EMERGENCY MEDICINE | Admitting: STUDENT IN AN ORGANIZED HEALTH CARE EDUCATION/TRAINING PROGRAM
Payer: MEDICARE

## 2021-12-12 DIAGNOSIS — I10 PRIMARY HYPERTENSION: ICD-10-CM

## 2021-12-12 DIAGNOSIS — Z86.73 HISTORY OF CVA (CEREBROVASCULAR ACCIDENT): ICD-10-CM

## 2021-12-12 DIAGNOSIS — Z79.01 CHRONIC ANTICOAGULATION: ICD-10-CM

## 2021-12-12 DIAGNOSIS — R27.0 ATAXIA: ICD-10-CM

## 2021-12-12 DIAGNOSIS — R26.81 GAIT INSTABILITY: ICD-10-CM

## 2021-12-12 DIAGNOSIS — R42 DIZZINESS: Primary | ICD-10-CM

## 2021-12-12 LAB
ALBUMIN SERPL-MCNC: 3.2 G/DL (ref 3.5–5)
ALBUMIN/GLOB SERPL: 0.8 {RATIO} (ref 1.1–2.2)
ALP SERPL-CCNC: 103 U/L (ref 45–117)
ALT SERPL-CCNC: 15 U/L (ref 12–78)
AMMONIA PLAS-SCNC: 11 UMOL/L
ANION GAP SERPL CALC-SCNC: 7 MMOL/L (ref 5–15)
AST SERPL-CCNC: 24 U/L (ref 15–37)
BASOPHILS # BLD: 0.1 K/UL (ref 0–0.1)
BASOPHILS NFR BLD: 1 % (ref 0–1)
BILIRUB SERPL-MCNC: 0.4 MG/DL (ref 0.2–1)
BUN SERPL-MCNC: 19 MG/DL (ref 6–20)
BUN/CREAT SERPL: 17 (ref 12–20)
CALCIUM SERPL-MCNC: 8.7 MG/DL (ref 8.5–10.1)
CHLORIDE SERPL-SCNC: 111 MMOL/L (ref 97–108)
CO2 SERPL-SCNC: 25 MMOL/L (ref 21–32)
COMMENT, HOLDF: NORMAL
CREAT SERPL-MCNC: 1.15 MG/DL (ref 0.55–1.02)
DIFFERENTIAL METHOD BLD: NORMAL
EOSINOPHIL # BLD: 0.2 K/UL (ref 0–0.4)
EOSINOPHIL NFR BLD: 3 % (ref 0–7)
ERYTHROCYTE [DISTWIDTH] IN BLOOD BY AUTOMATED COUNT: 13.6 % (ref 11.5–14.5)
GLOBULIN SER CALC-MCNC: 4 G/DL (ref 2–4)
GLUCOSE SERPL-MCNC: 106 MG/DL (ref 65–100)
HCT VFR BLD AUTO: 39.4 % (ref 35–47)
HGB BLD-MCNC: 12.8 G/DL (ref 11.5–16)
IMM GRANULOCYTES # BLD AUTO: 0 K/UL (ref 0–0.04)
IMM GRANULOCYTES NFR BLD AUTO: 0 % (ref 0–0.5)
LYMPHOCYTES # BLD: 2.1 K/UL (ref 0.8–3.5)
LYMPHOCYTES NFR BLD: 26 % (ref 12–49)
MAGNESIUM SERPL-MCNC: 1.9 MG/DL (ref 1.6–2.4)
MCH RBC QN AUTO: 26.2 PG (ref 26–34)
MCHC RBC AUTO-ENTMCNC: 32.5 G/DL (ref 30–36.5)
MCV RBC AUTO: 80.7 FL (ref 80–99)
MONOCYTES # BLD: 0.7 K/UL (ref 0–1)
MONOCYTES NFR BLD: 9 % (ref 5–13)
NEUTS SEG # BLD: 5 K/UL (ref 1.8–8)
NEUTS SEG NFR BLD: 61 % (ref 32–75)
NRBC # BLD: 0 K/UL (ref 0–0.01)
NRBC BLD-RTO: 0 PER 100 WBC
PLATELET # BLD AUTO: 277 K/UL (ref 150–400)
PMV BLD AUTO: 9.8 FL (ref 8.9–12.9)
POTASSIUM SERPL-SCNC: 4.6 MMOL/L (ref 3.5–5.1)
PROT SERPL-MCNC: 7.2 G/DL (ref 6.4–8.2)
RBC # BLD AUTO: 4.88 M/UL (ref 3.8–5.2)
SAMPLES BEING HELD,HOLD: NORMAL
SODIUM SERPL-SCNC: 143 MMOL/L (ref 136–145)
WBC # BLD AUTO: 8.2 K/UL (ref 3.6–11)

## 2021-12-12 PROCEDURE — 85025 COMPLETE CBC W/AUTO DIFF WBC: CPT

## 2021-12-12 PROCEDURE — 86592 SYPHILIS TEST NON-TREP QUAL: CPT

## 2021-12-12 PROCEDURE — 87389 HIV-1 AG W/HIV-1&-2 AB AG IA: CPT

## 2021-12-12 PROCEDURE — 82140 ASSAY OF AMMONIA: CPT

## 2021-12-12 PROCEDURE — 80053 COMPREHEN METABOLIC PANEL: CPT

## 2021-12-12 PROCEDURE — 36415 COLL VENOUS BLD VENIPUNCTURE: CPT

## 2021-12-12 PROCEDURE — 84443 ASSAY THYROID STIM HORMONE: CPT

## 2021-12-12 PROCEDURE — 82607 VITAMIN B-12: CPT

## 2021-12-12 PROCEDURE — 70450 CT HEAD/BRAIN W/O DYE: CPT

## 2021-12-12 PROCEDURE — 93005 ELECTROCARDIOGRAM TRACING: CPT

## 2021-12-12 PROCEDURE — 82746 ASSAY OF FOLIC ACID SERUM: CPT

## 2021-12-12 PROCEDURE — 74011250637 HC RX REV CODE- 250/637: Performed by: STUDENT IN AN ORGANIZED HEALTH CARE EDUCATION/TRAINING PROGRAM

## 2021-12-12 PROCEDURE — 99285 EMERGENCY DEPT VISIT HI MDM: CPT

## 2021-12-12 PROCEDURE — 65270000029 HC RM PRIVATE

## 2021-12-12 PROCEDURE — 83735 ASSAY OF MAGNESIUM: CPT

## 2021-12-12 RX ORDER — ACETAMINOPHEN 325 MG/1
650 TABLET ORAL
Status: DISCONTINUED | OUTPATIENT
Start: 2021-12-12 | End: 2021-12-14 | Stop reason: HOSPADM

## 2021-12-12 RX ORDER — IBUPROFEN 200 MG
1 TABLET ORAL
Status: DISCONTINUED | OUTPATIENT
Start: 2021-12-12 | End: 2021-12-14 | Stop reason: HOSPADM

## 2021-12-12 RX ORDER — ASPIRIN 81 MG/1
81 TABLET ORAL DAILY
Status: DISCONTINUED | OUTPATIENT
Start: 2021-12-13 | End: 2021-12-14 | Stop reason: HOSPADM

## 2021-12-12 RX ORDER — ALBUTEROL SULFATE 0.83 MG/ML
2.5 SOLUTION RESPIRATORY (INHALATION)
Status: DISCONTINUED | OUTPATIENT
Start: 2021-12-12 | End: 2021-12-14 | Stop reason: HOSPADM

## 2021-12-12 RX ORDER — ACETAMINOPHEN 650 MG/1
650 SUPPOSITORY RECTAL
Status: DISCONTINUED | OUTPATIENT
Start: 2021-12-12 | End: 2021-12-14 | Stop reason: HOSPADM

## 2021-12-12 RX ORDER — DICLOFENAC SODIUM 10 MG/G
4 GEL TOPICAL 4 TIMES DAILY
Status: DISCONTINUED | OUTPATIENT
Start: 2021-12-12 | End: 2021-12-14 | Stop reason: HOSPADM

## 2021-12-12 RX ORDER — MONTELUKAST SODIUM 10 MG/1
10 TABLET ORAL
Status: DISCONTINUED | OUTPATIENT
Start: 2021-12-12 | End: 2021-12-14 | Stop reason: HOSPADM

## 2021-12-12 RX ORDER — FLUTICASONE PROPIONATE 50 MCG
2 SPRAY, SUSPENSION (ML) NASAL
Status: DISCONTINUED | OUTPATIENT
Start: 2021-12-12 | End: 2021-12-14 | Stop reason: HOSPADM

## 2021-12-12 RX ADMIN — DICLOFENAC SODIUM 4 G: 10 GEL TOPICAL at 23:55

## 2021-12-12 RX ADMIN — APIXABAN 5 MG: 5 TABLET, FILM COATED ORAL at 23:54

## 2021-12-12 RX ADMIN — MONTELUKAST 10 MG: 10 TABLET, FILM COATED ORAL at 23:54

## 2021-12-13 ENCOUNTER — APPOINTMENT (OUTPATIENT)
Dept: MRI IMAGING | Age: 76
DRG: 149 | End: 2021-12-13
Attending: STUDENT IN AN ORGANIZED HEALTH CARE EDUCATION/TRAINING PROGRAM
Payer: MEDICARE

## 2021-12-13 LAB
AMPHET UR QL SCN: NEGATIVE
ANION GAP SERPL CALC-SCNC: 6 MMOL/L (ref 5–15)
APPEARANCE UR: CLEAR
BACTERIA URNS QL MICRO: NEGATIVE /HPF
BARBITURATES UR QL SCN: NEGATIVE
BENZODIAZ UR QL: NEGATIVE
BILIRUB UR QL: NEGATIVE
BUN SERPL-MCNC: 17 MG/DL (ref 6–20)
BUN/CREAT SERPL: 17 (ref 12–20)
CALCIUM SERPL-MCNC: 8.7 MG/DL (ref 8.5–10.1)
CANNABINOIDS UR QL SCN: NEGATIVE
CHLORIDE SERPL-SCNC: 112 MMOL/L (ref 97–108)
CO2 SERPL-SCNC: 26 MMOL/L (ref 21–32)
COCAINE UR QL SCN: NEGATIVE
COLOR UR: ABNORMAL
CREAT SERPL-MCNC: 1 MG/DL (ref 0.55–1.02)
DRUG SCRN COMMENT,DRGCM: NORMAL
EPITH CASTS URNS QL MICRO: ABNORMAL /LPF
ERYTHROCYTE [DISTWIDTH] IN BLOOD BY AUTOMATED COUNT: 13.5 % (ref 11.5–14.5)
FOLATE SERPL-MCNC: 6.4 NG/ML (ref 5–21)
GLUCOSE SERPL-MCNC: 121 MG/DL (ref 65–100)
GLUCOSE UR STRIP.AUTO-MCNC: NEGATIVE MG/DL
HCT VFR BLD AUTO: 38.3 % (ref 35–47)
HGB BLD-MCNC: 12.1 G/DL (ref 11.5–16)
HGB UR QL STRIP: NEGATIVE
HIV 1+2 AB+HIV1 P24 AG SERPL QL IA: NONREACTIVE
HIV12 RESULT COMMENT, HHIVC: NORMAL
HYALINE CASTS URNS QL MICRO: ABNORMAL /LPF (ref 0–5)
KETONES UR QL STRIP.AUTO: ABNORMAL MG/DL
LEUKOCYTE ESTERASE UR QL STRIP.AUTO: NEGATIVE
MAGNESIUM SERPL-MCNC: 2.1 MG/DL (ref 1.6–2.4)
MCH RBC QN AUTO: 26 PG (ref 26–34)
MCHC RBC AUTO-ENTMCNC: 31.6 G/DL (ref 30–36.5)
MCV RBC AUTO: 82.2 FL (ref 80–99)
METHADONE UR QL: NEGATIVE
NITRITE UR QL STRIP.AUTO: NEGATIVE
NRBC # BLD: 0 K/UL (ref 0–0.01)
NRBC BLD-RTO: 0 PER 100 WBC
OPIATES UR QL: NEGATIVE
PCP UR QL: NEGATIVE
PH UR STRIP: 5.5 [PH] (ref 5–8)
PLATELET # BLD AUTO: 261 K/UL (ref 150–400)
PMV BLD AUTO: 10.2 FL (ref 8.9–12.9)
POTASSIUM SERPL-SCNC: 3.7 MMOL/L (ref 3.5–5.1)
PROT UR STRIP-MCNC: NEGATIVE MG/DL
RBC # BLD AUTO: 4.66 M/UL (ref 3.8–5.2)
RBC #/AREA URNS HPF: ABNORMAL /HPF (ref 0–5)
RPR SER QL: NONREACTIVE
SODIUM SERPL-SCNC: 144 MMOL/L (ref 136–145)
SP GR UR REFRACTOMETRY: 1.02 (ref 1–1.03)
TSH SERPL DL<=0.05 MIU/L-ACNC: 1.21 UIU/ML (ref 0.36–3.74)
UA: UC IF INDICATED,UAUC: ABNORMAL
UROBILINOGEN UR QL STRIP.AUTO: 0.2 EU/DL (ref 0.2–1)
VIT B12 SERPL-MCNC: 487 PG/ML (ref 193–986)
WBC # BLD AUTO: 7.1 K/UL (ref 3.6–11)
WBC URNS QL MICRO: ABNORMAL /HPF (ref 0–4)

## 2021-12-13 PROCEDURE — 80307 DRUG TEST PRSMV CHEM ANLYZR: CPT

## 2021-12-13 PROCEDURE — 97530 THERAPEUTIC ACTIVITIES: CPT

## 2021-12-13 PROCEDURE — 99223 1ST HOSP IP/OBS HIGH 75: CPT | Performed by: STUDENT IN AN ORGANIZED HEALTH CARE EDUCATION/TRAINING PROGRAM

## 2021-12-13 PROCEDURE — 74011250637 HC RX REV CODE- 250/637: Performed by: STUDENT IN AN ORGANIZED HEALTH CARE EDUCATION/TRAINING PROGRAM

## 2021-12-13 PROCEDURE — 36415 COLL VENOUS BLD VENIPUNCTURE: CPT

## 2021-12-13 PROCEDURE — 70551 MRI BRAIN STEM W/O DYE: CPT

## 2021-12-13 PROCEDURE — 97162 PT EVAL MOD COMPLEX 30 MIN: CPT

## 2021-12-13 PROCEDURE — 81001 URINALYSIS AUTO W/SCOPE: CPT

## 2021-12-13 PROCEDURE — 65660000000 HC RM CCU STEPDOWN

## 2021-12-13 PROCEDURE — 80048 BASIC METABOLIC PNL TOTAL CA: CPT

## 2021-12-13 PROCEDURE — 97116 GAIT TRAINING THERAPY: CPT

## 2021-12-13 PROCEDURE — 99222 1ST HOSP IP/OBS MODERATE 55: CPT | Performed by: PSYCHIATRY & NEUROLOGY

## 2021-12-13 PROCEDURE — 83735 ASSAY OF MAGNESIUM: CPT

## 2021-12-13 PROCEDURE — 85027 COMPLETE CBC AUTOMATED: CPT

## 2021-12-13 PROCEDURE — 97165 OT EVAL LOW COMPLEX 30 MIN: CPT

## 2021-12-13 PROCEDURE — 97535 SELF CARE MNGMENT TRAINING: CPT

## 2021-12-13 RX ORDER — GADOTERATE MEGLUMINE 376.9 MG/ML
18 INJECTION INTRAVENOUS
Status: DISCONTINUED | OUTPATIENT
Start: 2021-12-13 | End: 2021-12-13

## 2021-12-13 RX ORDER — CARVEDILOL 6.25 MG/1
6.25 TABLET ORAL 2 TIMES DAILY WITH MEALS
Status: DISCONTINUED | OUTPATIENT
Start: 2021-12-13 | End: 2021-12-14 | Stop reason: HOSPADM

## 2021-12-13 RX ORDER — IBUPROFEN 200 MG
400 TABLET ORAL ONCE
Status: COMPLETED | OUTPATIENT
Start: 2021-12-13 | End: 2021-12-13

## 2021-12-13 RX ORDER — IBUPROFEN 200 MG
400 TABLET ORAL
Status: DISCONTINUED | OUTPATIENT
Start: 2021-12-13 | End: 2021-12-13

## 2021-12-13 RX ORDER — BUTALBITAL, ACETAMINOPHEN AND CAFFEINE 50; 325; 40 MG/1; MG/1; MG/1
1 TABLET ORAL
Status: COMPLETED | OUTPATIENT
Start: 2021-12-13 | End: 2021-12-13

## 2021-12-13 RX ORDER — HYDRALAZINE HYDROCHLORIDE 20 MG/ML
10 INJECTION INTRAMUSCULAR; INTRAVENOUS
Status: DISCONTINUED | OUTPATIENT
Start: 2021-12-13 | End: 2021-12-14 | Stop reason: HOSPADM

## 2021-12-13 RX ORDER — IBUPROFEN 200 MG
600 TABLET ORAL
Status: DISCONTINUED | OUTPATIENT
Start: 2021-12-13 | End: 2021-12-13

## 2021-12-13 RX ADMIN — IBUPROFEN 400 MG: 200 TABLET, FILM COATED ORAL at 13:30

## 2021-12-13 RX ADMIN — MONTELUKAST 10 MG: 10 TABLET, FILM COATED ORAL at 21:31

## 2021-12-13 RX ADMIN — BUTALBITAL, ACETAMINOPHEN, AND CAFFEINE 1 TABLET: 50; 325; 40 TABLET ORAL at 16:26

## 2021-12-13 RX ADMIN — APIXABAN 5 MG: 5 TABLET, FILM COATED ORAL at 08:13

## 2021-12-13 RX ADMIN — DICLOFENAC SODIUM 4 G: 10 GEL TOPICAL at 13:09

## 2021-12-13 RX ADMIN — ACETAMINOPHEN 650 MG: 325 TABLET ORAL at 01:32

## 2021-12-13 RX ADMIN — CARVEDILOL 6.25 MG: 6.25 TABLET, FILM COATED ORAL at 16:26

## 2021-12-13 RX ADMIN — ASPIRIN 81 MG: 81 TABLET, COATED ORAL at 08:13

## 2021-12-13 RX ADMIN — ACETAMINOPHEN 650 MG: 325 TABLET ORAL at 08:13

## 2021-12-13 RX ADMIN — FLUTICASONE FUROATE, UMECLIDINIUM BROMIDE AND VILANTEROL TRIFENATATE 1 PUFF: 100; 62.5; 25 POWDER RESPIRATORY (INHALATION) at 09:00

## 2021-12-13 RX ADMIN — APIXABAN 5 MG: 5 TABLET, FILM COATED ORAL at 17:17

## 2021-12-13 RX ADMIN — DICLOFENAC SODIUM 4 G: 10 GEL TOPICAL at 17:17

## 2021-12-13 RX ADMIN — DICLOFENAC SODIUM 4 G: 10 GEL TOPICAL at 08:13

## 2021-12-13 NOTE — PROGRESS NOTES
Senior Resident Admission Note     CC: Dizziness, HA    HPI:  Yuridia Beach is a 78 y.o. female w/ a PMHX of Posterior CVA, COPD, recurrent DVTs, HTN,  and obesity who presents to the ER complaining of dizziness. Pt rolled over to the L side trying to get out of bed today and had sudden onset severe dizziness to the point that she was unable to get out of bed. Presented to [facility] ED and had a CT head w/ contrast but she is not sure of the results. They said that they would be unable to do an MRI today so she left AMA hoping it could be done at [facility]. Currently she denies any falls but today has been very unstable on her feet. Denies h/o vertigo although per chart review has had repeat dizziness and headaches since her prior stroke. She states this of similar quality but more severe. Chart reviewed. Patient seen, examined, and discussed with Dr. Davi Hernandez (PGY-2). See H&P for more details. A/P: Admit to Neuro/Stroke. Code status: Full Code. 1.  Dizziness/ataxia- Concern for repeat posterior stroke. Last CVA 10/27/2021. Pt was on Eliquis and ASA   - Neuro c/s. Permissive HTN. MRI brain. Spoke with CT, they will request CTA records from [facility] performed today. Echo. B12, folate, RPR, HIV, TSH, UDS. Continue Eliquis and ASA. Zofran prn for symptoms. I agree with remaining assessment and plan as documented in Dr. Vance Oliva Full H&P. Pt discussed with Dr. Fahad Wilder (on-call attending physician).     Codi Vigil MD  Family Medicine Resident, PGY-3

## 2021-12-13 NOTE — PROGRESS NOTES
1200 Bedside and Verbal shift change report given to CHENG RN (oncoming nurse) by Marina Thomas RN (offgoing nurse). Report included the following information SBAR, Kardex, Intake/Output, MAR, Accordion, Recent Results and Med Rec Status. This patient was assisted with Intentional Toileting every 2 hours during this shift as appropriate. Documentation of ambulation and output reflected on Flowsheet as appropriate. Purposeful hourly rounding was completed using AIDET and 5Ps. Outcomes of PHR documented as they occurred. Bed alarm in use as appropriate. Dual Suction and ambubag in place. [facility] to let them know about patient's headache which has been unrelieved by tylenol. Awaiting orders. 1555 Notified MD of patient's climbing BPs, awaiting new orders. 1900 Bedside and Verbal shift change report given to [address] (oncoming nurse) by Prachi Couch RN (offgoing nurse). Report included the following information SBAR, Kardex, Intake/Output, MAR, Accordion, Recent Results and Med Rec Status.

## 2021-12-13 NOTE — PROGRESS NOTES
OCCUPATIONAL THERAPY EVALUATION/DISCHARGE  Patient: Ladonna Fay (89 y.o. female)  Date: 12/13/2021  Primary Diagnosis: Dizziness [R42]       Precautions: fall       ASSESSMENT  Based on the objective data described below, the patient presents with good overall activity tolerance following admission on 12/12 for dizziness and headache. Patient undergoing neuro work-up since admission; MRI was done today and negative for acute process. Patient performed transfers and ADLs without LOB or physical assistance needed. Patient with c/o mild dizziness today however did not impact functional performance. She was able to transfer into the bathroom and perform ADLs without complaint. Patient was educated on the signs and symptoms of stroke including BEFAST acronym and stroke risk factors. Patient has no further skilled OT needs and is cleared from OT services at this time. Current Level of Function (ADLs/self-care): Patient was SBA level for functional mobility. Patient was independent to mod I level for ADLs. Functional Outcome Measure: The patient scored Total A-D  Total A-D (Motor Function): 66/66 on the Fugl-Nance Assessment which is indicative of no impairment in upper extremity functional status. PLAN :  Recommendation for discharge: (in order for the patient to meet his/her long term goals)  No skilled occupational therapy/ follow up rehabilitation needs identified at this time. This discharge recommendation:  Has been made in collaboration with the attending provider and/or case management    IF patient discharges home will need the following DME:none       SUBJECTIVE:   Patient was agreeable to OT evaluation.       OBJECTIVE DATA SUMMARY:   HISTORY:   Past Medical History:   Diagnosis Date    Asthma     Bronchitis     Cancer ([facility] [address]     breast - left    COPD (chronic obstructive pulmonary disease) ([facility] [address]     [facility]; pulmonary nodule 7mm stable (found 2011)  Diabetes ([facility] [address]     Hepatitis age 12    High cholesterol     Hypertension     Obesity (BMI 30-39. 9)     Thromboembolus ([facility] [address]     L LE with PE     Past Surgical History:   Procedure Laterality Date    COLONOSCOPY N/A 5/26/2017    COLONOSCOPY- no info to   performed by Rock Richards MD at [address] [facility] HX GYN      ablation, R ovary removed, tubal ligation    HX HAMMER TOE REPAIR      HX HEENT      tonsillectomy    HX ORTHOPAEDIC      L hip, R leg, C4-C5 fusion, L foot    HX OTHER SURGICAL      xiphoid removal    IR PLC IVC FILTER  2/8/2019    WI BREAST SURGERY PROCEDURE UNLISTED      L mastectomy       Prior Level of Function/Environment/Context: Patient lives alone. Expanded or extensive additional review of patient history:     Home Situation  Home Environment: Private residence  One/Two Story Residence: One story  Living Alone: Yes  Support Systems: Child(cristo), Other Family Member(s)  Patient Expects to be Discharged to[de-identified] House  Current DME Used/Available at Home: None, Shower chair, Walker, rolling, Jr Bald, straight, Grab bars  Tub or Shower Type: Shower    Hand dominance: Right    EXAMINATION OF PERFORMANCE DEFICITS:  Cognitive/Behavioral Status:  Neurologic State: Alert  Orientation Level: Oriented X4  Cognition: Follows commands  Perception: Appears intact  Perseveration: No perseveration noted  Safety/Judgement: Awareness of environment    Skin: Intact in the uppers    Edema: None noted in the uppers    Hearing: Auditory  Auditory Impairment: None    Vision/Perceptual:    Tracking: Able to track stimulus in all quadrants w/o difficulty    Diplopia: No    Acuity: Within Defined Limits       Range of Motion:  AROM: Within functional limits in the uppers    Strength:   WDL in the uppers    Coordination:  Fine Motor Skills-Upper: Left Intact; Right Intact    Gross Motor Skills-Upper: Left Intact;  Right Intact    Tone & Sensation:  Tone: Normal  Sensation: Intact (to LT rosalinda; however reports burning rosalinda feet)     Balance:  Sitting: Intact  Standing: Intact; With support  Standing - Static: Fair; Constant support  Standing - Dynamic : Fair; Constant support    Functional Mobility and Transfers for ADLs:  Bed Mobility:  Rolling:  (pt was received up and remained up)  Supine to Sit: Stand-by assistance; Additional time  Scooting: Supervision    Transfers:  Sit to Stand: Stand-by assistance  Stand to Sit: Stand-by assistance  Bed to Chair: Stand-by assistance  Bathroom Mobility: Stand-by assistance  Toilet Transfer : Stand-by assistance    ADL Assessment:  Feeding: Independent    Oral Facial Hygiene/Grooming: Independent    Bathing: Modified independent    Upper Body Dressing: Independent    Lower Body Dressing: Modified independent    Toileting: Modified independent        Cognitive Retraining  Safety/Judgement: Awareness of environment    Functional Measure:  Fugl-Nance Assessment of Motor Recovery after Stroke:   Reflex Activity  Flexors/Biceps/Fingers: Can be elicited  Extensors/Triceps: Can be elicited  Reflex Subtotal: 4    Volitional Movement Within Synergies  Shoulder Retraction: Full  Shoulder Elevation: Full  Shoulder Abduction (90 degrees): Full  Shoulder External Rotation: Full  Elbow Flexion: Full  Forearm Supination: Full  Shoulder Adduction/Internal Rotation: Full  Elbow Extension: Full  Forearm Pronation: Full  Subtotal: 18    Volitional Movement Mixing Synergies  Hand to Lumbar Spine: Full  Shoulder Flexion (0-90 degrees): Full  Pronation-Supination: Full  Subtotal: 6    Volitional Movement With Little or No Synergy  Shoulder Abduction (0-90 degrees): Full  Shoulder Flexion ( degrees): Full  Pronation/Supination: Full  Subtotal : 6    Normal Reflex Activity  Biceps, Triceps, Finger Flexors:  Full  Subtotal : 2    Upper Extremity Total   Upper Extremity Total: 36    Wrist  Stability at 15 Degree Dorsiflexion: Full  Repeated Dorsiflexion/ Volar Flexion: Full  Stability at 15 Degree Dorsiflexion: Full  Repeated Dorsiflexion/ Volar Flexion: Full  Circumduction: Full  Wrist Total: 10    Hand  Mass Flexion: Full  Mass Extension: Full  Grasp A: Full  Grasp B: Full  Grasp C: Full  Grasp D: Full  Grasp E: Full  Hand Total: 14    Coordination/Speed  Tremor: None  Dysmetria: None  Time: <1s  Coordination/Speed Total : 6    Total A-D  Total A-D (Motor Function): 66/66     This is a reliable/valid measure of arm function after a neurological event. It has established value to characterize functional status and for measuring spontaneous and therapy-induced recovery; tests proximal and distal motor functions. Fugl-Nance Assessment  UE scores recorded between five and 30 days post neurologic event can be used to predict UE recovery at six months post neurologic event. Severe = 0-21 points   Moderately Severe = 22-33 points   Moderate = 34-47 points   Mild = 48-66 points  ADRIEN Pepe, DELL Campbell, & SHY Pope (1992). Measurement of motor recovery after stroke: Outcome assessment and sample size requirements.  Stroke, 23, pp. 3007-9929.   ------------------------------------------------------------------------------------------------------------------------------------------------------------------  MCID:  Stroke:   Maren Ba et al, 2001; n = 171; mean age 79 (5) years; assessed within 16 (12) days of stroke, Acute Stroke)  FMA Motor Scores from Admission to Discharge   10 point increase in FMA Upper Extremity = 1.5 change in discharge FIM   10 point increase in FMA Lower Extremity = 1.9 change in discharge FIM  MDC:   Stroke:   Andra Isaac et al, 2008, n = 14, mean age = 59.9 (14.6) years, assessed on average 14 (6.5) months post stroke, Chronic Stroke)   FMA = 5.2 points for the Upper Extremity portion of the assessment     Occupational Therapy Evaluation Charge Determination   History Examination Decision-Making   LOW Complexity : Brief history review  LOW Complexity : 1-3 performance deficits relating to physical, cognitive , or psychosocial skils that result in activity limitations and / or participation restrictions  LOW Complexity : No comorbidities that affect functional and no verbal or physical assistance needed to complete eval tasks       Based on the above components, the patient evaluation is determined to be of the following complexity level: LOW       Activity Tolerance:   Good    After treatment patient left in no apparent distress:    Sitting in chair, Call bell within reach and Bed / chair alarm activated    COMMUNICATION/EDUCATION:   The patients plan of care was discussed with: Physical therapist, Registered nurse and patient. .     Patient was educated regarding her deficit(s) on admission as this relates to her neuro work-up. She demonstrated Good understanding as evidenced by teach back. Patient and/or family was verbally educated on the BE FAST acronym for signs/symptoms of CVA and TIA. Informed patient to refer to the Stroke Binder for further BE FAST information. All questions answered with patient indicating good understanding.      Thank you for this referral.  Moni Kerr, OTR/L  Time Calculation: 34 mins

## 2021-12-13 NOTE — PROGRESS NOTES
Discussed outpatient vestibular PT vs HH PT with patient. She would benefit from outpatient vestibular PT for her dizziness. However, she does not have anyone who is able to drive her to outpatient therapy during the week and would prefer at least [place] PT/OT to keep her strength up. Per Wicho Mcdermott, multiple [place] agencies have declined patient based on her location. One agency has accepted her for next week and 2 are still pending. Starting [place] earlier than next week would be ideal but if the other agencies decline, the patient is ok with starting [place] next week. Will follow up with CM tomorrow regarding dispo. Plan to DC tomorrow.     Eleazar Tompkins, [facility] Medicine Resident

## 2021-12-13 NOTE — PROGRESS NOTES
0700: Bedside shift change report given to [address] (oncoming nurse) by Craig Pruitt RN (offgoing nurse). Report included the following information SBAR, Kardex, Procedure Summary, Intake/Output, MAR, Accordion and Cardiac Rhythm NSR.'    This patient was assisted with Intentional Toileting every 2 hours during this shift as appropriate. Documentation of ambulation and output reflected on Flowsheet as appropriate. Purposeful hourly rounding was completed using AIDET and 5Ps. Outcomes of PHR documented as they occurred. Bed alarm in use as appropriate. Dual Suction and ambubag in place. Stroke Education provided to patient and the following topics were discussed    1. Patients personal risk factors for stroke are hypertension, smoking and diabetes mellitus    2. Warning signs of Stroke:        * Sudden numbness or weakness of the face, arm or leg, especially on one side of          The body            * Sudden confusion, trouble speaking or understanding        * Sudden trouble seeing in one or both eyes        * Sudden trouble walking, dizziness, loss of balance or coordination        * Sudden severe headache with no known cause      3. Importance of activation Emergency Medical Services ( 9-1-1 ) immediately if experience any warning signs of stroke. 4. Be sure and schedule a follow-up appointment with your primary care doctor or any specialists as instructed. 5. You must take medicine every day to treat your risk factors for stroke. Be sure to take your medicines exactly as your doctor tells you: no more, no less. Know what your medicines are for , what they do. Anti-thrombotics /anticoagulants can help prevent strokes. You are taking the following medicine(s)  ASA, eliquis    6. Smoking and second-hand smoke greatly increase your risk of stroke, cardiovascular disease and death. Smoking history YES    7. Information provided was [facility] Stroke Education Binder    8.  Documentation of teaching completed in Patient Education Activity and on Care Plan with teaching response noted?   yes

## 2021-12-13 NOTE — H&P
Admission H&P     Name: Dane Pollard MRN: [id number]    Sex: Female   YOB: 1945  Age: 78 y.o. PCP: Jewel Lopez MD      Source of Information: patient, medical records    Chief complaint: dizziness, a HA and unsteadiness    History of Present Illness  Dane Pollard is a 78 y.o. 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 She states her symptoms are similar to when she had a stroke in 10/21 but not as severe. Her symptoms began this morning when she attempted to get out of bed and noticed she could not d/t severe dizziness and a 7-8/10 HA. She denies a Hx of vertigo, but states she has been having dizziness lately but not as severe as today. She endorses R blurry vision that is unchanged since her CVA in 10/21. She denies new vision changes/falls/slurred speech/CP/palp. Pt initially presented to a Hospital in [place] and left AMA after they notified her that they could not obtain a brain MRI today. She states they obtained a CT scan w/ IV contrast but she was not made aware of the results. Of note, pt did not take her home bp meds today while at home but states she was given her Coreg and another Bp med while at the [place] facility. COVID Questions:   Experiencing any of the following symptoms: fever, chills, cough, SOB, diarrhea, URI symptoms. NO  Any Sick contacts with fever, cough, diarrhea, SOB, URI symptoms. NO  Traveled out of state or out of country. NO  Lives alone. Has been staying at home.  YES    In the ED:  Vitals: Temp 97.9   /131-->144/77   HR 81   RR 18  SatO2  95 % on RA  Labs: CBC unremarkable, CMP w/ Cr 1.15 (BL 0.99)  Imaging: CT head neg  Treatment: none    EKG: nsr, rate 85, QTc 461, no ST changes, (unchanged c/w prior in 2/2019)    Patient Vitals for the past 12 hrs:   Temp Pulse Resp BP SpO2   12/12/21 2200 96.8 °F (36 °C) 83 22 (!) 136/99 93 %   12/12/21 2128  85 21 (!) 157/97 93 %   12/12/21 2100 97.2 °F (36.2 °C) 80 25 (!) 140/87 94 %   12/12/21 2053     95 %   12/12/21 2041  85 19 (!) 144/77 93 %   12/12/21 2038  78 26 (!) 147/119 97 %   12/12/21 2034    (!) 138/118    12/12/21 1902 97.9 °F (36.6 °C) 81 18 (!) 158/131 95 %       Review of Systems  Review of Systems   Constitutional: Negative for activity change, appetite change, fatigue, fever and unexpected weight change. HENT: Negative for hearing loss and voice change. Respiratory: Negative for chest tightness and shortness of breath. Cardiovascular: Negative for chest pain and palpitations. Gastrointestinal: Negative for abdominal pain. Genitourinary: Negative for dysuria. Musculoskeletal: Positive for back pain. Neurological: Positive for dizziness and headaches. Negative for syncope, speech difficulty and weakness. Home Medications  Prior to Admission medications    Medication Sig Start Date End Date Taking? Authorizing Provider   carvediloL (COREG) 6.25 mg tablet Take 1 Tablet by mouth two (2) times a day.  11/22/21   Brisa Horton MD   Eliquis 5 mg tablet TAKEONE TABLET BY MOUTH 2 TIMES A DAY 11/15/21   Brisa Horton MD   meclizine (ANTIVERT) 25 mg tablet  11/5/21   Provider, Historical   metFORMIN ER (GLUCOPHAGE XR) 500 mg tablet TAKE ONE TABLET BY MOUTH DAILY WITH BREAKFAST  Patient not taking: Reported on 11/11/2021 10/5/21   Brisa Horton MD   lovastatin (MEVACOR) 10 mg tablet TAKE ONE TABLET BY MOUTH NIGHTLY 7/7/21   Brisa Horton MD   budesonide-formoteroL (SYMBICORT) 80-4.5 mcg/actuation HFAA INHALE 2 PUFFS BY INHALATION DAILY  Patient not taking: Reported on 11/11/2021 6/18/21   Brisa Horton MD   montelukast (SINGULAIR) 10 mg tablet TAKE ONE TABLET BY MOUTH NIGHTLY 6/3/21   Onur Fitzpatrick MD   glucose blood VI test strips (FreeStyle Lite Strips) strip Patient is to check blood sugar once daily. Dx E11.9 5/21/21   Onur Fitzpatrick MD   albuterol (PROVENTIL VENTOLIN) 2.5 mg /3 mL (0.083 %) nebu 3 mL by Nebulization route every six (6) hours as needed for Wheezing. 5/12/21   Onur Fitzpatrick MD   Trelegy Ellipta 100-62.5-25 mcg inhaler Take 1 Puff by inhalation daily. 4/1/21   Onur Fitzpatrick MD   diclofenac (VOLTAREN) 1 % gel APPLY 1 GRAM TO AFFECTED AREA OF KNEES AND BACK 4 TIMES DAILY 3/1/21   Onur Fitzpatrick MD   guaiFENesin ER (MUCINEX) 600 mg ER tablet Take 1 Tab by mouth two (2) times a day. Patient taking differently: Take 600 mg by mouth as needed. 12/17/20   Onur Fitzpatrick MD   ProAir HFA 90 mcg/actuation inhaler Take 2 Puffs by inhalation every six (6) hours as needed for Wheezing. 11/18/20   Onur Fitzpatrick MD   fluticasone propionate (FLONASE) 50 mcg/actuation nasal spray 2 Sprays by Both Nostrils route daily. Patient taking differently: 2 Sprays by Both Nostrils route as needed. 4/1/20   Onur Fitzpatrick MD   acetaminophen (TYLENOL ARTHRITIS PAIN) 650 mg TbER Take 1 Tab by mouth every eight (8) hours. 3/12/19   Matt Ashby MD   multivitamin (ONE A DAY) tablet Take 1 Tab by mouth nightly. Provider, Historical   aspirin 81 mg tablet Take 81 mg by mouth daily.     Other, MD Akhil       Allergies  Allergies   Allergen Reactions    Crestor [Rosuvastatin] Other (comments)     Pain in left leg  Causes muscle spasms    Lipitor [Atorvastatin] Other (comments)     Left leg pain  Causes muscle spasms    Xarelto [Rivaroxaban] Other (comments)     Pt states it caused internal bleeding       Past Medical History  Past Medical History:   Diagnosis Date    Asthma     Bronchitis     Cancer ([facility]     breast - left    COPD (chronic obstructive pulmonary disease) ([facility]     [facility]; pulmonary nodule 7mm stable (found 2011)    Diabetes ([address]     Hepatitis age 12    High cholesterol     Hypertension     Obesity (BMI 30-39. 9)     Thromboembolus ([address]     L LE with PE       Previous Hospitalization(s)  Past Surgical History:   Procedure Laterality Date    COLONOSCOPY N/A 5/26/2017    COLONOSCOPY- no info to   performed by Matthew Velazquez MD at [address] [facility]      ablation, R ovary removed, tubal ligation    HX HAMMER TOE REPAIR      HX HEENT      tonsillectomy    HX ORTHOPAEDIC      L hip, R leg, C4-C5 fusion, L foot    HX OTHER SURGICAL      xiphoid removal    IR PLC IVC FILTER  2/8/2019    [place] BREAST SURGERY PROCEDURE UNLISTED      L mastectomy       Family History  Family History   Problem Relation Age of Onset    Heart Attack Father     Diabetes Mother        Social History  Alcohol history: Not at all  Smoking history: endorses years of tobacco abuse. States since she was Dgx w/ Breast cancer in 2009, she only smokes 1-2 cigarettes/month socially. Illicit drug history: Not at all  Living arrangement: patient lives alone. Ambulates: With cane and walker     Vital Signs  Visit Vitals  BP (!) 136/99 (BP 1 Location: Left upper arm, BP Patient Position: Sitting)   Pulse 83   Temp 96.8 °F (36 °C)   Resp 22   Ht 5' 2\" (1.575 m)   Wt 200 lb (90.7 kg)   SpO2 93%   BMI 36.58 kg/m²       Physical Exam  General: NO acute distress. Alert. Cooperative. Head: Normocephalic. Atraumatic. Eyes:              Conjunctiva pink. Sclera white. PERRL. Ears:              Hearing grossly intact. Nose:             Septum midline. Mucosa pink. No drainage. Throat: Mucosa pink. Moist mucous membranes. No tonsillar exudates or erythema. Palate movement equal bilaterally. Neck: Supple. Normal ROM. No stiffness. Respiratory: Diminished breath sounds BL. No w/r/r/c.   Cardiovascular: RRR. Normal S1,S2. No m/r/g. Pulses 2+ throughout. GI: + bowel sounds. Nontender.  No rebound tenderness or guarding. Nondistended. Extremities: Trace LE edema. Distal pulses intact. Musculoskeletal: Full ROM in all extremities. Skin: Warm, dry. No rashes. Neuro: CN II-XII grossly intact. Strength 5/5 in all extremities. limited flexion at L hip joint 2/2 chronic hip pain. LUIS Hallpike test neg. Normal finger-nose testing. Laboratory Data  Recent Results (from the past 8 hour(s))   CBC WITH AUTOMATED DIFF    Collection Time: 12/12/21  8:46 PM   Result Value Ref Range    WBC 8.2 3.6 - 11.0 K/uL    RBC 4.88 3.80 - 5.20 M/uL    HGB 12.8 11.5 - 16.0 g/dL    HCT 39.4 35.0 - 47.0 %    MCV 80.7 80.0 - 99.0 FL    MCH 26.2 26.0 - 34.0 PG    MCHC 32.5 30.0 - 36.5 g/dL    RDW 13.6 11.5 - 14.5 %    PLATELET 380 553 - 735 K/uL    MPV 9.8 8.9 - 12.9 FL    NRBC 0.0 0  WBC    ABSOLUTE NRBC 0.00 0.00 - 0.01 K/uL    NEUTROPHILS 61 32 - 75 %    LYMPHOCYTES 26 12 - 49 %    MONOCYTES 9 5 - 13 %    EOSINOPHILS 3 0 - 7 %    BASOPHILS 1 0 - 1 %    IMMATURE GRANULOCYTES 0 0.0 - 0.5 %    ABS. NEUTROPHILS 5.0 1.8 - 8.0 K/UL    ABS. LYMPHOCYTES 2.1 0.8 - 3.5 K/UL    ABS. MONOCYTES 0.7 0.0 - 1.0 K/UL    ABS. EOSINOPHILS 0.2 0.0 - 0.4 K/UL    ABS. BASOPHILS 0.1 0.0 - 0.1 K/UL    ABS. IMM. GRANS. 0.0 0.00 - 0.04 K/UL    DF AUTOMATED     METABOLIC PANEL, COMPREHENSIVE    Collection Time: 12/12/21  8:46 PM   Result Value Ref Range    Sodium 143 136 - 145 mmol/L    Potassium 4.6 3.5 - 5.1 mmol/L    Chloride 111 (H) 97 - 108 mmol/L    CO2 25 21 - 32 mmol/L    Anion gap 7 5 - 15 mmol/L    Glucose 106 (H) 65 - 100 mg/dL    BUN 19 6 - 20 MG/DL    Creatinine 1.15 (H) 0.55 - 1.02 MG/DL    BUN/Creatinine ratio 17 12 - 20      GFR est AA 56 (L) >60 ml/min/1.73m2    GFR est non-AA 46 (L) >60 ml/min/1.73m2    Calcium 8.7 8.5 - 10.1 MG/DL    Bilirubin, total 0.4 0.2 - 1.0 MG/DL    ALT (SGPT) 15 12 - 78 U/L    AST (SGOT) 24 15 - 37 U/L    Alk.  phosphatase 103 45 - 117 U/L    Protein, total 7.2 6.4 - 8.2 g/dL    Albumin 3.2 (L) 3.5 - 5.0 g/dL    Globulin 4.0 2.0 - 4.0 g/dL    A-G Ratio 0.8 (L) 1.1 - 2.2     SAMPLES BEING HELD    Collection Time: 12/12/21  8:46 PM   Result Value Ref Range    SAMPLES BEING HELD 1SST,1RED,1DRKGRN,1BLU     COMMENT        Add-on orders for these samples will be processed based on acceptable specimen integrity and analyte stability, which may vary by analyte. Imaging  CXR Results  (Last 48 hours)    None        CT Results  (Last 48 hours)               12/12/21 2006  CT HEAD WO CONT Final result    Impression:  No acute process seen       Narrative:  EXAM: CT HEAD WO CONT       INDICATION: Headache and dizziness       COMPARISON: None. CONTRAST: None. TECHNIQUE: Unenhanced CT of the head was performed using 5 mm images. Brain and   bone windows were generated. Coronal and sagittal reformats. CT dose reduction   was achieved through use of a standardized protocol tailored for this   examination and automatic exposure control for dose modulation. FINDINGS:   The ventricles and sulci are normal in size, shape and configuration. . There is   no significant white matter disease. There is no intracranial hemorrhage,   extra-axial collection, or mass effect. The basilar cisterns are open. No CT   evidence of acute infarct. The bone windows demonstrate no abnormalities. The visualized portions of the   paranasal sinuses and mastoid air cells are clear. Assessment and Plan     See Slaughter is a 78 y.o. 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 Dizziness w/ CVA/TIA rule out:  Pt presented w/ symptoms of dizziness, a frontal HA and gait instability, which is similar to when she was admitted w/ a posterior CVA at [address] [place] [address] in 10/21.  Not a candidate for tPA, out of window. CT head unremarkable and exam largely stable w/o deficits. Last A1c 7.1 (11/21). Will admit and r/o CVA/TIA given symptoms and Hx of CVA. - Admit to Neuro  - VS per unit protocol  - Permissive HTN for the first 24-48 hours SBP<220 and DBP<110 - can tx w/ Prn hydralazine in the interim. - cont home ASA   - MRI brain w wo cont  - Obtain CTA head and neck after confirmation that it was not done at [facility]   - Obtaining records from [facility] ED and [facility]   - Follow up labs: Mg, TSH, UDS, Ammonia, UA, RPR, B12, folate, HIV  - ECHO with bubble studies  - Tylenol for HA prn   - Consulted PT/OT for rehab eval, CM for disposition planning   - Neuro check q4h, telemetry at least 24 hours  - Neuro consult, appreciate recs  - NPO pending bedside swallow eval    Elevated Creatinine in CKD3a: POA Cr 1.15 (BL 0.99), GFR 46. Etiology may be 2/2 contrast received at [facility] ED. - Avoid nephrotoxic agents. - Strict I&O, monitor to keep urine output > 30 mL/hr    Hx of CVA w/ residual R eye blurry vision: Pt was admitted at [address] [place] [address] in 10/2021 for CVA. She states current symptoms similar to then but less severe. - see above plan  - obtain records from [address] [place] [address]    Hx of BL PE and DVT: in 2/2019. On Eliquis indefinitely   - cont eliqius 5mg BID    S/p IVC filter placement: on 2/8/19 as Xeralto at the time caused a GI bleed. - F/u OP: consider removal in the future as tolerating Eliquis well     Hx L Breast cancer: in remission s/p mastectomy and reconstruction in 2009. No radiation or chemotherapy. HTN: BP on admission 158/131-->144/77 w/o meds. - HOLD home coreg 6.25 mg BID   - Will continue to monitor at this time and readjust as BP's trend.     HLD:   Lab Results   Component Value Date/Time    Cholesterol, total 172 11/11/2021 02:20 PM    HDL Cholesterol 43 11/11/2021 02:20 PM    LDL,Direct 87 05/12/2021 04:30 PM    LDL, calculated 74.8 11/11/2021 02:20 PM    VLDL, calculated 54.2 11/11/2021 02:20 PM Triglyceride 271 (H) 11/11/2021 02:20 PM    CHOL/HDL Ratio 4.0 11/11/2021 02:20 PM   - resume home lovastatin 10mg qhs at [place] (pt allergic to Lipitor, which is only statin on formulary). COPD w/ known Pulm nodule: stable. 5/2019 CT Chest (7 x 2 mm RLL pulmonary nodule. decreased in size compared to May 2011). Follows w/ Dr. Meera Quick (Pulm). Exam notable for dimininshed breath sounds BL, otherwise stable. - cont Singulair 10mg daily, Trelegy inhaler daily, proAir q6H prn     DM2 diet controlled: Last A1c 7.1 (11/21). Used to take metformin 500mg daily  - Daily BMP      Tobacco Abuse: endorses years of tobacco abuse. States since she was Dgx w/ Breast cancer in 2009, she only smokes 1-2 cigarettes/month socially. - nicotene patch  - Encourage cessation and f/u in OP        Obesity: Body mass index is 36.58 kg/m². - Encouraging lifestyle modifications and further follow up outpatient. FEN/GI - NPO. Activity - Out of bed with assistance  DVT prophylaxis - Eliquis  GI prophylaxis - Not indicated at this time  Fall prophylaxis - Fall precautions ordered. Disposition - Admit to Neuro. Plan to d/c to Home. Consulting PT, OT and CM  Code Status - Full. Discussed with patient / caregivers. Next of Kin Name and Iam Cid,  Daughter - [phone]    Patient Margie Tucker will be discussed with Dr. Frederick Pineda.     10:18 PM, 12/12/21  Hardik Helton MD  Family Medicine Resident       For Billing    Chief Complaint   Patient presents with   [facility] Problems  Date Reviewed: 11/11/2021    None

## 2021-12-13 NOTE — ED NOTES
TRANSFER - OUT REPORT:    Verbal report given to Christa(name) on Ayde Cesar  being transferred to [facility](unit) for routine progression of care       Report consisted of patients Situation, Background, Assessment and   Recommendations(SBAR). Information from the following report(s) SBAR, Kardex, ED Summary, Procedure Summary and Intake/Output was reviewed with the receiving nurse. Lines:   Peripheral IV 12/12/21 Left Hand (Active)   Site Assessment Clean, dry, & intact 12/12/21 2051   Phlebitis Assessment 0 12/12/21 2051   Infiltration Assessment 0 12/12/21 2051   Dressing Status Clean, dry, & intact 12/12/21 2051   Hub Color/Line Status Pink 12/12/21 2051   Action Taken Blood drawn 12/12/21 2051   Alcohol Cap Used No 12/12/21 2051        Opportunity for questions and clarification was provided.       Patient transported with:   Monitor

## 2021-12-13 NOTE — PROGRESS NOTES
TRANSFER - IN REPORT:    Verbal report received from [facility] - ZACKARY DAVIS RN on Martinez Cullen  being received from ED for routine progression of care      Report consisted of patients Situation, Background, Assessment and   Recommendations(SBAR). Information from the following report(s) SBAR, Kardex, Intake/Output, MAR, Recent Results, Med Rec Status and Cardiac Rhythm . was reviewed with the receiving nurse. Opportunity for questions and clarification was provided. Assessment completed upon patients arrival to unit and care assumed. Stroke Education provided to patient and the following topics were discussed    1. Patients personal risk factors for stroke are hypertension, smoking and diabetes mellitus    2. Warning signs of Stroke:        * Sudden numbness or weakness of the face, arm or leg, especially on one side of          The body            * Sudden confusion, trouble speaking or understanding        * Sudden trouble seeing in one or both eyes        * Sudden trouble walking, dizziness, loss of balance or coordination        * Sudden severe headache with no known cause      3. Importance of activation Emergency Medical Services ( 9-1-1 ) immediately if experience any warning signs of stroke. 4. Be sure and schedule a follow-up appointment with your primary care doctor or any specialists as instructed. 5. You must take medicine every day to treat your risk factors for stroke. Be sure to take your medicines exactly as your doctor tells you: no more, no less. Know what your medicines are for , what they do. Anti-thrombotics /anticoagulants can help prevent strokes. You are taking the following medicine(s) Aspirin    6. Smoking and second-hand smoke greatly increase your risk of stroke, cardiovascular disease and death. Smoking history Yes    7. Information provided was [facility] Stroke Education Binder, Stroke Handouts or Verbal Education    8.  Documentation of teaching completed in Patient Education Activity and on Care Plan with teaching response noted? yes    This patient was assisted with intentional toileting every 2 hours during this shift as appropriate. Documentation of ambulation and output reflected on flow sheet as appropriate. Purposeful hourly rounding was completed using AIDET and 5 Ps. Outcomes of PHR documented as they occurred. Bed alarm in use as appropriate. Dual suction and ambubag in place. 0700 - Bedside and verbal shift change report given to Lencho Moore RN (oncoming nurse) by Daniel Downing RN (offgoing nurse). Report included the following information: SBAR, Kardex, Intake/Output, MAR, Recent Results, and Med Rec Status.

## 2021-12-13 NOTE — ED PROVIDER NOTES
HPI patient woke up this morning at 6 AM with right-sided headache, dizziness and ataxia. She was taken to the hospital in [place] and had a CT scan there, which apparently was normal.  She was told that she probably needed an MRI but they were unable to do an MRI so she left. She continues to feel a right-sided headache, dizziness and says that she feels very unsteady when she walks. She denies any fall. The patient says that on 10/27 she had similar symptoms, was admitted at another hospital, and told she had a stroke. She denies blurred vision, slurred speech, focal weakness, fever, cough, chest pain, syncope or other complaints. Past Medical History:   Diagnosis Date    Asthma     Bronchitis     Cancer ([address]     breast - left    COPD (chronic obstructive pulmonary disease) ([address]     [facility]; pulmonary nodule 7mm stable (found 2011)    Diabetes ([address]     Hepatitis age 11    High cholesterol     Hypertension     Obesity (BMI 30-39. 9)     Thromboembolus ([address]     L LE with PE       Past Surgical History:   Procedure Laterality Date    COLONOSCOPY N/A 5/26/2017    COLONOSCOPY- no info to   performed by Ruddy Burris MD at [address] [facility] HX GYN      ablation, R ovary removed, tubal ligation    HX HAMMER TOE REPAIR      HX HEENT      tonsillectomy    HX ORTHOPAEDIC      L hip, R leg, C4-C5 fusion, L foot    HX OTHER SURGICAL      xiphoid removal    IR PLC IVC FILTER  2/8/2019    [place] BREAST SURGERY PROCEDURE UNLISTED      L mastectomy         Family History:   Problem Relation Age of Onset    Heart Attack Father     Diabetes Mother        Social History     Socioeconomic History    Marital status: SINGLE     Spouse name: Not on file    Number of children: Not on file    Years of education: Not on file    Highest education level: Not on file   Occupational History    Not on file   Tobacco Use    Smoking status: Former Smoker     Quit date: 2009     Years since quittin.9    Smokeless tobacco: Never Used   Substance and Sexual Activity    Alcohol use: No    Drug use: No    Sexual activity: Never   Other Topics Concern    Caffeine Concern Not Asked    Back Care Not Asked    Exercise Not Asked    Occupational Exposure Not Asked    Sleep Concern Not Asked    Stress Concern Not Asked    Weight Concern Not Asked   Social History Narrative    Not on file     Social Determinants of Health     Financial Resource Strain:     Difficulty of Paying Living Expenses: Not on file   Food Insecurity:     Worried About Running Out of Food in the Last Year: Not on file    Jason of Food in the Last Year: Not on file   Transportation Needs:     Lack of Transportation (Medical): Not on file    Lack of Transportation (Non-Medical): Not on file   Physical Activity:     Days of Exercise per Week: Not on file    Minutes of Exercise per Session: Not on file   Stress:     Feeling of Stress : Not on file   Social Connections:     Frequency of Communication with Friends and Family: Not on file    Frequency of Social Gatherings with Friends and Family: Not on file    Attends Confucianism Services: Not on file    Active Member of [address] [place] [address] or Organizations: Not on file    Attends Club or Organization Meetings: Not on file    Marital Status: Not on file   Intimate Partner Violence:     Fear of Current or Ex-Partner: Not on file    Emotionally Abused: Not on file    Physically Abused: Not on file    Sexually Abused: Not on file   Housing Stability:     Unable to Pay for Housing in the Last Year: Not on file    Number of [place] in the Last Year: Not on file    Unstable Housing in the Last Year: Not on file         ALLERGIES: Crestor [rosuvastatin], Lipitor [atorvastatin], and Xarelto [rivaroxaban]    Review of Systems   Constitutional: Negative for fever. HENT: Negative for voice change. Eyes: Negative for visual disturbance.    Respiratory: Negative for cough and shortness of breath. Cardiovascular: Negative for chest pain. Gastrointestinal: Negative for abdominal pain, nausea and vomiting. Genitourinary: Negative for flank pain. Musculoskeletal: Positive for gait problem. Negative for back pain. Skin: Negative for rash. Neurological: Positive for dizziness and headaches. Negative for syncope, speech difficulty and weakness. Psychiatric/Behavioral: Negative for confusion. Vitals:    12/12/21 1902   BP: (!) 158/131   Pulse: 81   Resp: 18   Temp: 97.9 °F (36.6 °C)   SpO2: 95%   Weight: 90.7 kg (200 lb)   Height: 5' 2\" (1.575 m)            Physical Exam  Constitutional:       General: She is not in acute distress. Appearance: She is well-developed. HENT:      Head: Normocephalic. Eyes:      Extraocular Movements: Extraocular movements intact. Pupils: Pupils are equal, round, and reactive to light. Cardiovascular:      Rate and Rhythm: Normal rate. Heart sounds: No murmur heard. Pulmonary:      Effort: Pulmonary effort is normal.      Breath sounds: Normal breath sounds. Abdominal:      Palpations: Abdomen is soft. Tenderness: There is no abdominal tenderness. Musculoskeletal:         General: Normal range of motion. Cervical back: Normal range of motion and neck supple. Skin:     General: Skin is warm and dry. Capillary Refill: Capillary refill takes less than 2 seconds. Neurological:      General: No focal deficit present. Mental Status: She is alert and oriented to person, place, and time. Cranial Nerves: No cranial nerve deficit. Gait: Gait abnormal.      Comments: Patient's gait is slow and very unsteady. Psychiatric:         Behavior: Behavior normal.          MDM       Procedures ED EKG interpretation:  Rhythm: NSR, rate 85. No acute ST changes. This EKG was interpreted by Jimmie Dancer, MD,ED Provider.       Perfect Serve Consult for Admission  9:06 PM    ED Room Number: ER13/13  Patient Name and age:  Mercy Landaverde 78 y.o.  female  Working Diagnosis:   1. Dizziness    2. Ataxia        COVID-19 Suspicion:  no  Sepsis present:  no  Reassessment needed: no  Code Status:  Full Code  Readmission: no  Isolation Requirements:  no  Recommended Level of Care:  med/surg  Department:[facility] ED - [phone]  Other: Patient woke up around 6 AM this morning with dizziness, ataxia and right-sided headache. She was seen at the hospital in [place] but left there AMA. She was admitted on 10/27/2021 at [facility] with the same symptoms she is having now and was diagnosed with a CVA.

## 2021-12-13 NOTE — ED TRIAGE NOTES
Pt to ED for dizziness and headache this am. Pt called 911, brought to [facility] ED, had entire work up done and states \"im not better\" so I came here. Took meclizine with no relief.  Pt had CT and labs done at [facility]

## 2021-12-13 NOTE — PROGRESS NOTES
1706:  PT/OT home health recommended. Eleven agencies were unable to accept pt due to location or insurance. Family Practice notified. They would like her to have OP vestibular therapy. Pt to d/c to home transported by her dtr. Pt to f/u with providers as scheduled. Reason for Admission:  Dizziness                     RUR Score:      7%               Plan for utilizing home health: To be evaluated by PT/OT    PCP: First and Last name:  Naeem Aragon MD     Name of Practice:    Are you a current patient: Yes/No: yes   Approximate date of last visit: 2-3 weeks ago   Can you participate in a virtual visit with your PCP:  yes                    Current Advanced Directive/Advance Care Plan: Full Code  None; her daughter is her next of kin    Healthcare Decision Maker:   Click here to complete [address] including selection of the Healthcare Decision Maker Relationship (ie \"Primary\")                             Transition of Care Plan:    Pt stated she is independent with ADL's, drives and uses no DME. She lives in a 1 story house by herself; there are 3 entry steps. Support resources are available from family, friends and neighbors. Medications are from  [facility] and [facility] in [place]. 1. PT/OT consults  2. Neuro consult--now s/p MRI  3. Family to transport her home at d/c  4. Pt to f/u OP  5. CM following for any needs    Care Management Interventions  PCP Verified by CM: Yes  Mode of Transport at Discharge: Other (see comment)  Transition of Care Consult (CM Consult): Discharge Planning  Discharge Durable Medical Equipment:  (Pt has a cane and RW at home)  Physical Therapy Consult: Yes  Occupational Therapy Consult: Yes  Support Systems: Child(cristo), Friend/Neighbor, Other Family Member(s)  Confirm Follow Up Transport: Family  Discharge Location  Discharge Placement: Home with family assistance  L. Annamary Curling

## 2021-12-13 NOTE — PROGRESS NOTES
1900 - Bedside and verbal shift change report given to [place], RN (oncoming nurse) by Kendal Doe RN (offgoing nurse). Report included the following information: SBAR, Kardex, Intake/Output, MAR, Recent Results, and Med Rec Status. This patient was assisted with intentional toileting every 2 hours during this shift as appropriate. Documentation of ambulation and output reflected on flow sheet as appropriate. Purposeful hourly rounding was completed using AIDET and 5 Ps. Outcomes of PHR documented as they occurred. Bed alarm in use as appropriate. Dual suction and ambubag in place. 0700 - Bedside and verbal shift change report given to MANSOOR Rondon (oncoming nurse) by [place], RN (offgoing nurse). Report included the following information: SBAR, Kardex, Intake/Output, MAR, Recent Results, and Med Rec Status.

## 2021-12-13 NOTE — CONSULTS
NEUROLOGY CONSULT NOTE    Patient ID:  Eda Mdidleton  [id number]  59 y.o.  1945    Date of Consultation:  December 13, 2021    Referring Physician: Dr. Chano Tompkins    Reason for Consultation:  dizziness    History of Present Illness:     Patient Active Problem List    Diagnosis Date Noted    History of stroke 11/11/2021    COPD (chronic obstructive pulmonary disease) ([address]     History of GI bleed 04/16/2019    DCIS (ductal carcinoma in situ) of breast 03/08/2019    Chronic anticoagulation 03/01/2019    Severe obesity ([address] 03/01/2019    Acute deep vein thrombosis (DVT) of left lower extremity ([address] 02/07/2019    Debility 02/25/2012    Melena 02/24/2012    Acute posthemorrhagic anemia 02/24/2012    Type 2 diabetes mellitus without complication, without long-term current use of insulin ([address] 02/24/2012    HTN (hypertension) 02/24/2012    Other hyperlipidemia 02/24/2012     Past Medical History:   Diagnosis Date    Asthma     Bronchitis     Cancer ([address]     breast - left    COPD (chronic obstructive pulmonary disease) ([address]     [facility]; pulmonary nodule 7mm stable (found 2011)    Diabetes ([address]     Hepatitis age 12    High cholesterol     Hypertension     Obesity (BMI 30-39. 9)     Thromboembolus ([address]     L LE with PE      Past Surgical History:   Procedure Laterality Date    COLONOSCOPY N/A 5/26/2017    COLONOSCOPY- no info to   performed by Ruddy Burris MD at [address] [facility] HX GYN      ablation, R ovary removed, tubal ligation    HX HAMMER TOE REPAIR      HX HEENT      tonsillectomy    HX ORTHOPAEDIC      L hip, R leg, C4-C5 fusion, L foot    HX OTHER SURGICAL      xiphoid removal    IR PLC IVC FILTER  2/8/2019    [place] BREAST SURGERY PROCEDURE UNLISTED      L mastectomy      Prior to Admission medications    Medication Sig Start Date End Date Taking?  Authorizing Provider   carvediloL (COREG) 6.25 mg tablet Take 1 Tablet by mouth two (2) times a day. 11/22/21   Edna Thorpe MD   Eliquis 5 mg tablet TAKEONE TABLET BY MOUTH 2 TIMES A DAY 11/15/21   Edna Thorpe MD   meclizine (ANTIVERT) 25 mg tablet  11/5/21   Provider, Historical   metFORMIN ER (GLUCOPHAGE XR) 500 mg tablet TAKE ONE TABLET BY MOUTH DAILY WITH BREAKFAST  Patient not taking: Reported on 11/11/2021 10/5/21   Edna Thorpe MD   lovastatin (MEVACOR) 10 mg tablet TAKE ONE TABLET BY MOUTH NIGHTLY 7/7/21   Edna Thorpe MD   budesonide-formoteroL (SYMBICORT) 80-4.5 mcg/actuation HFAA INHALE 2 PUFFS BY INHALATION DAILY  Patient not taking: Reported on 11/11/2021 6/18/21   Edna Thorpe MD   montelukast (SINGULAIR) 10 mg tablet TAKE ONE TABLET BY MOUTH NIGHTLY 6/3/21   Edna Thorpe MD   glucose blood VI test strips (FreeStyle Lite Strips) strip Patient is to check blood sugar once daily. Dx E11.9 5/21/21   Edna Thorpe MD   albuterol (PROVENTIL VENTOLIN) 2.5 mg /3 mL (0.083 %) nebu 3 mL by Nebulization route every six (6) hours as needed for Wheezing. 5/12/21   Edna Thorpe MD   Trelesara Ellipta 100-62.5-25 mcg inhaler Take 1 Puff by inhalation daily. 4/1/21   Edna Thorpe MD   diclofenac (VOLTAREN) 1 % gel APPLY 1 GRAM TO AFFECTED AREA OF KNEES AND BACK 4 TIMES DAILY 3/1/21   Edna Thorpe MD   guaiFENesin ER (MUCINEX) 600 mg ER tablet Take 1 Tab by mouth two (2) times a day. Patient taking differently: Take 600 mg by mouth as needed. 12/17/20   Edna Thorpe MD   ProAir HFA 90 mcg/actuation inhaler Take 2 Puffs by inhalation every six (6) hours as needed for Wheezing. 11/18/20   Edna Thorpe MD   fluticasone propionate (FLONASE) 50 mcg/actuation nasal spray 2 Sprays by Both Nostrils route daily. Patient taking differently: 2 Sprays by Both Nostrils route as needed. 4/1/20   Edna Thorpe MD   acetaminophen (TYLENOL ARTHRITIS PAIN) 650 mg TbER Take 1 Tab by mouth every eight (8) hours. 3/12/19   Jose Francis MD   multivitamin (ONE A DAY) tablet Take 1 Tab by mouth nightly. Provider, Historical   aspirin 81 mg tablet Take 81 mg by mouth daily. Other, MD Akhil     Allergies   Allergen Reactions    Crestor [Rosuvastatin] Other (comments)     Pain in left leg  Causes muscle spasms    Lipitor [Atorvastatin] Other (comments)     Left leg pain  Causes muscle spasms    Xarelto [Rivaroxaban] Other (comments)     Pt states it caused internal bleeding      Social History     Tobacco Use    Smoking status: Former Smoker     Quit date:      Years since quittin.9    Smokeless tobacco: Never Used   Substance Use Topics    Alcohol use: No      Family History   Problem Relation Age of Onset    Heart Attack Father     Diabetes Mother         Subjective:      Ayde Cesar is a 78 y.o. obese RHWF with history of asthma, COPD, breast cancer, diabetes, hypercholesterolemia, hypertension, obesity, pulmonary embolism, DVT and recent stroke who was admitted from the ER for dizziness and wobbliness. Patient woke up the day of admission complaining of right-sided headache, dizziness and ataxia. She was taken to [facility] and recalls having a head CT done which reportedly was unremarkable. She was told that she probably needed MRI but they could not do it there so she left. She continued to have issues with right-sided headache and dizziness and unsteadiness. Took meclizine without relief. Denies any falls. In the ER blood pressure was 158/131. NIH stroke scale was 0. Labs revealed increased creatinine, decreased GFR, decreased albumin. Unremarkable CBC, magnesium, ammonia, urinalysis, urine drug screen, TSH, B12, folate and magnesium. Head CT without contrast did not reveal any acute process. Review of records revealed:  Patient was seen by her PCP 2021 for follow-up after a recent posterior stroke. Patient was admitted at [facility].   Apparently return to the ER 11/5/2021 for headache but did not require readmission. States she is about 85% better but still having issues with headache and stability. Hemoglobin A1c done was elevated at 7.1. Lipid panel done revealed elevated triglycerides at 271 and LDL of 74.8. When seen, patient reports symptoms are similar to her stroke but not as intense. She merely now has some lightheadedness and not vertigo which she had before. Outside reports reviewed: office notes, ER records, radiology reports, lab reports. Review of Systems:    Pertinent items are noted in HPI. Objective:     Patient Vitals for the past 8 hrs:   BP Temp Pulse Resp SpO2 Weight   12/13/21 0800   80      12/13/21 0734 132/71 98 °F (36.7 °C) 80 18 95 %    12/13/21 0334      92.5 kg (204 lb)   12/13/21 0329 (!) 147/68 97.6 °F (36.4 °C) 77 17 96 %      PHYSICAL EXAM:    NEUROLOGICAL EXAM:    Appearance: The patient is obese, provides a coherent history and is in no acute distress. Mental Status: Oriented to time, place and person. Fluent, no aphasia or dysarthria. Mood and affect appropriate. Cranial Nerves:   Intact visual fields. NADEGE, EOM's full, no nystagmus, no ptosis. Facial sensation is normal. Corneal reflexes are intact. Facial movement is symmetric. Hearing is normal bilaterally. Palate is midline with normal elevation. Sternocleidomastoid and trapezius muscles are normal. Tongue is midline. Motor:  5/5 strength in upper and lower proximal and distal muscles. Normal bulk and tone. No pronator drift. Reflexes:   Deep tendon reflexes 1+/4 and symmetrical. Downgoing toes. Sensory:   Normal to cold. Gait:  Not tested. Tremor:   No tremor noted. Cerebellar:  Intact FTN/MACEY/HTS.        Imaging  CT Head: reviewed    Lab Review    Recent Results (from the past 24 hour(s))   CBC WITH AUTOMATED DIFF    Collection Time: 12/12/21  8:46 PM   Result Value Ref Range    WBC 8.2 3.6 - 11.0 K/uL    RBC 4.88 3.80 - 5.20 M/uL HGB 12.8 11.5 - 16.0 g/dL    HCT 39.4 35.0 - 47.0 %    MCV 80.7 80.0 - 99.0 FL    MCH 26.2 26.0 - 34.0 PG    MCHC 32.5 30.0 - 36.5 g/dL    RDW 13.6 11.5 - 14.5 %    PLATELET 000 963 - 365 K/uL    MPV 9.8 8.9 - 12.9 FL    NRBC 0.0 0  WBC    ABSOLUTE NRBC 0.00 0.00 - 0.01 K/uL    NEUTROPHILS 61 32 - 75 %    LYMPHOCYTES 26 12 - 49 %    MONOCYTES 9 5 - 13 %    EOSINOPHILS 3 0 - 7 %    BASOPHILS 1 0 - 1 %    IMMATURE GRANULOCYTES 0 0.0 - 0.5 %    ABS. NEUTROPHILS 5.0 1.8 - 8.0 K/UL    ABS. LYMPHOCYTES 2.1 0.8 - 3.5 K/UL    ABS. MONOCYTES 0.7 0.0 - 1.0 K/UL    ABS. EOSINOPHILS 0.2 0.0 - 0.4 K/UL    ABS. BASOPHILS 0.1 0.0 - 0.1 K/UL    ABS. IMM. GRANS. 0.0 0.00 - 0.04 K/UL    DF AUTOMATED     METABOLIC PANEL, COMPREHENSIVE    Collection Time: 12/12/21  8:46 PM   Result Value Ref Range    Sodium 143 136 - 145 mmol/L    Potassium 4.6 3.5 - 5.1 mmol/L    Chloride 111 (H) 97 - 108 mmol/L    CO2 25 21 - 32 mmol/L    Anion gap 7 5 - 15 mmol/L    Glucose 106 (H) 65 - 100 mg/dL    BUN 19 6 - 20 MG/DL    Creatinine 1.15 (H) 0.55 - 1.02 MG/DL    BUN/Creatinine ratio 17 12 - 20      GFR est AA 56 (L) >60 ml/min/1.73m2    GFR est non-AA 46 (L) >60 ml/min/1.73m2    Calcium 8.7 8.5 - 10.1 MG/DL    Bilirubin, total 0.4 0.2 - 1.0 MG/DL    ALT (SGPT) 15 12 - 78 U/L    AST (SGOT) 24 15 - 37 U/L    Alk. phosphatase 103 45 - 117 U/L    Protein, total 7.2 6.4 - 8.2 g/dL    Albumin 3.2 (L) 3.5 - 5.0 g/dL    Globulin 4.0 2.0 - 4.0 g/dL    A-G Ratio 0.8 (L) 1.1 - 2.2     SAMPLES BEING HELD    Collection Time: 12/12/21  8:46 PM   Result Value Ref Range    SAMPLES BEING HELD 1SST,1RED,1DRKGRN,1BLU     COMMENT        Add-on orders for these samples will be processed based on acceptable specimen integrity and analyte stability, which may vary by analyte.    MAGNESIUM    Collection Time: 12/12/21  8:46 PM   Result Value Ref Range    Magnesium 1.9 1.6 - 2.4 mg/dL   TSH 3RD GENERATION    Collection Time: 12/12/21  8:46 PM   Result Value Ref Range    TSH 1.21 0.36 - 3.74 uIU/mL   AMMONIA    Collection Time: 12/12/21 10:49 PM   Result Value Ref Range    Ammonia 11 <32 UMOL/L   VITAMIN B12    Collection Time: 12/12/21 10:55 PM   Result Value Ref Range    Vitamin B12 487 193 - 986 pg/mL   FOLATE    Collection Time: 12/12/21 10:55 PM   Result Value Ref Range    Folate 6.4 5.0 - 21.0 ng/mL   DRUG SCREEN, URINE    Collection Time: 12/13/21 12:11 AM   Result Value Ref Range    AMPHETAMINES Negative NEG      BARBITURATES Negative NEG      BENZODIAZEPINES Negative NEG      COCAINE Negative NEG      METHADONE Negative NEG      OPIATES Negative NEG      PCP(PHENCYCLIDINE) Negative NEG      THC (TH-CANNABINOL) Negative NEG      Drug screen comment (NOTE)    URINALYSIS W/ REFLEX CULTURE    Collection Time: 12/13/21 12:11 AM    Specimen: Urine   Result Value Ref Range    Color YELLOW/STRAW      Appearance CLEAR CLEAR      Specific gravity 1.020 1.003 - 1.030      pH (UA) 5.5 5.0 - 8.0      Protein Negative NEG mg/dL    Glucose Negative NEG mg/dL    Ketone TRACE (A) NEG mg/dL    Bilirubin Negative NEG      Blood Negative NEG      Urobilinogen 0.2 0.2 - 1.0 EU/dL    Nitrites Negative NEG      Leukocyte Esterase Negative NEG      UA:UC IF INDICATED CULTURE NOT INDICATED BY UA RESULT CNI      WBC 0-4 0 - 4 /hpf    RBC 0-5 0 - 5 /hpf    Epithelial cells MODERATE (A) FEW /lpf    Bacteria Negative NEG /hpf    Hyaline cast 2-5 0 - 5 /lpf   CBC W/O DIFF    Collection Time: 12/13/21  2:09 AM   Result Value Ref Range    WBC 7.1 3.6 - 11.0 K/uL    RBC 4.66 3.80 - 5.20 M/uL    HGB 12.1 11.5 - 16.0 g/dL    HCT 38.3 35.0 - 47.0 %    MCV 82.2 80.0 - 99.0 FL    MCH 26.0 26.0 - 34.0 PG    MCHC 31.6 30.0 - 36.5 g/dL    RDW 13.5 11.5 - 14.5 %    PLATELET 997 092 - 386 K/uL    MPV 10.2 8.9 - 12.9 FL    NRBC 0.0 0  WBC    ABSOLUTE NRBC 0.00 0.00 - 8.19 K/uL   METABOLIC PANEL, BASIC    Collection Time: 12/13/21  2:09 AM   Result Value Ref Range    Sodium 144 136 - 145 mmol/L Potassium 3.7 3.5 - 5.1 mmol/L    Chloride 112 (H) 97 - 108 mmol/L    CO2 26 21 - 32 mmol/L    Anion gap 6 5 - 15 mmol/L    Glucose 121 (H) 65 - 100 mg/dL    BUN 17 6 - 20 MG/DL    Creatinine 1.00 0.55 - 1.02 MG/DL    BUN/Creatinine ratio 17 12 - 20      GFR est AA >60 >60 ml/min/1.73m2    GFR est non-AA 54 (L) >60 ml/min/1.73m2    Calcium 8.7 8.5 - 10.1 MG/DL   MAGNESIUM    Collection Time: 12/13/21  2:09 AM   Result Value Ref Range    Magnesium 2.1 1.6 - 2.4 mg/dL       Assessment:   Dizziness  Gait instability  Hypertension  History of CVA      Plan:   Neurological examination does not reveal any residual brainstem or cerebellar deficits. No new deficits seen. History of recent CVA unclear currently as to what location. Mention of posterior circulation stroke. Head CT without contrast did not reveal any acute process. Brain MRI is pending. LDL done by PCP recently was 74.8. Continue statin therapy. Continue Eliquis for stroke prophylaxis. Baseline evaluation by PT and OT. Obtain hospitalization records from [facility] to document where the prior stroke was in extent of work-up. Aggressive management of her blood pressure. Per DMV regulation patient cannot drive for 6 months from the last stroke. Further intervention be done pending results of testing. Thank you for the consult. This note was created using voice recognition software. Despite editing, there may be syntax errors.

## 2021-12-13 NOTE — PROGRESS NOTES
Problem: Mobility Impaired (Adult and Pediatric)  Goal: *Acute Goals and Plan of Care (Insert Text)  Description: FUNCTIONAL STATUS PRIOR TO ADMISSION: Patient was independent and active without use of DME.    HOME SUPPORT PRIOR TO ADMISSION: The patient lived alone with her daughter nearby. Physical Therapy Goals  Initiated 12/13/2021  1. Patient will move from supine to sit and sit to supine  in bed with independence within 7 day(s). 2.  Patient will transfer from bed to chair and chair to bed with modified independence using the least restrictive device within 7 day(s). 3.  Patient will perform sit to stand with modified independence within 7 day(s). 4.  Patient will ambulate with modified independence for 150 feet with the least restrictive device within 7 day(s). Outcome: Not Met   PHYSICAL THERAPY EVALUATION- NEURO POPULATION  Patient: Derrek Cowart (22 y.o. female)  Date: 12/13/2021  Primary Diagnosis: Dizziness [R42]        Precautions: fall         ASSESSMENT  Based on the objective data described below, the patient presents with impaired static and dynamic standing balance, WNL strength rosalinda LE, intact sensation to light touch rosalinda LE and overall reduced functional mobility in the setting of hospital admission for dizziness. Patient with CVA workup, MRI -. 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 Patient notes she has been told she has residual R sided eye/facial droop from 10/21 CVA, none noted by this PT. Today patient stands with unsteady balance, requiring Hand held support on PT and/or furniture to maintain balance. With Rw, patient's standing balance improved and no LOB or instability is noted with 50ft ambulation in hallways. Patient notes dizziness with R sided head turning today, denies room spinning. Recommend patient utilize RW with all upright mobility upon d/c. Recommend HHPT upon d/c.      Current Level of Function Impacting Discharge (mobility/balance): CGA with RW    Functional Outcome Measure: The patient scored Total: 31/56 on the Sandra Balance Assessment which is indicative of moderate fall risk. Other factors to consider for discharge: patient's daughter present at onset of PT session - per patient, her daughter works and would not be available to stay with patient at home during the day     Patient will benefit from skilled therapy intervention to address the above noted impairments. PLAN :  Recommendations and Planned Interventions: bed mobility training, transfer training, gait training, therapeutic exercises, patient and family training/education, and therapeutic activities      Frequency/Duration: Patient will be followed by physical therapy:  5 times a week to address goals. Recommendation for discharge: (in order for the patient to meet his/her long term goals)  Physical therapy at least 2 days/week in the home     This discharge recommendation:  Has not yet been discussed the attending provider and/or case management    IF patient discharges home will need the following DME: patient owns DME required for discharge         SUBJECTIVE:   Patient stated I know where it is.  re: PT asking patient if her RW is some place accessible. OBJECTIVE DATA SUMMARY:   HISTORY:    Past Medical History:   Diagnosis Date    Asthma     Bronchitis     Cancer ([facility] [address]     breast - left    COPD (chronic obstructive pulmonary disease) ([facility] [address] [facility]     [facility]; pulmonary nodule 7mm stable (found 2011)    Diabetes ([facility] [address]     Hepatitis age 11    High cholesterol     Hypertension     Obesity (BMI 30-39. 9)     Thromboembolus ([facility] [address]     L LE with PE     Past Surgical History:   Procedure Laterality Date    COLONOSCOPY N/A 5/26/2017    COLONOSCOPY- no info to   performed by Brandi Aguillon MD at [facility]    HX GYN      ablation, R ovary removed, tubal ligation    HX HAMMER TOE REPAIR      HX HEENT      tonsillectomy    HX ORTHOPAEDIC      L hip, R leg, C4-C5 fusion, L foot    HX OTHER SURGICAL      xiphoid removal    IR PLC IVC FILTER  2/8/2019    OH BREAST SURGERY PROCEDURE UNLISTED      L mastectomy       Personal factors and/or comorbidities impacting plan of care: CVA history    Home Situation  Home Environment: Private residence  One/Two Story Residence: One story  Living Alone: Yes  Support Systems: Child(cristo), Other Family Member(s)  Patient Expects to be Discharged to[de-identified] House  Current DME Used/Available at Home: None, Shower chair, Walker, rolling, [employer], straight, Grab bars  Tub or Shower Type: Shower    EXAMINATION/PRESENTATION/DECISION MAKING:   Critical Behavior:  Neurologic State: Alert  Orientation Level: Oriented X4  Cognition: Appropriate decision making, Follows commands     Hearing: Auditory  Auditory Impairment: None  Skin:  all observed intact  Edema: none apparent   Range Of Motion:  AROM: Within functional limits                       Strength:    Strength: Generally decreased, functional                    Tone & Sensation:   Tone: Normal              Sensation: Intact (to LT rosalinda; however reports burning rosalinda feet)               Coordination:  Coordination: Generally decreased, functional  Vision:      Functional Mobility:  Bed Mobility:     Supine to Sit: Stand-by assistance; Additional time     Scooting: Contact guard assistance  Transfers:  Sit to Stand: Contact guard assistance; Assist x1  Stand to Sit: Contact guard assistance; Assist x1        Bed to Chair: Stand-by assistance              Balance:   Sitting: Intact; With support  Standing: Impaired;  Without support  Standing - Static: Fair; Constant support  Standing - Dynamic : Fair; Constant support  Ambulation/Gait Training:  Distance (ft): 50 Feet (ft)  Assistive Device: Gait belt; Walker, rolling  Ambulation - Level of Assistance: Contact guard assistance; Assist x1                 Base of Support: Widened Speed/Lela: Pace decreased (<100 feet/min)  Step Length: Right shortened; Left shortened                     Functional Measure  Sandra Balance Test:    Sitting to Standing: 3  Standing Unsupported: 4  Sitting with Back Unsupported: 4  Standing to Sitting: 3  Transfers: 3  Standing Unsupported with Eyes Closed: 1  Standing Unsupported with Feet Together: 0  Reach Forward with Outstretched Arm: 3   Object: 3  Turn to Look Over Shoulders: 2  Turn 360 Degrees: 2  Alternate Foot on Step/Stool: 2  Standing Unsupported One Foot in Front: 0  Stand on One Le  Total: 31/56         56=Maximum possible score;   0-20=High fall risk  21-40=Moderate fall risk   41-56=Low fall risk        Physical Therapy Evaluation Charge Determination   History Examination Presentation Decision-Making   HIGH Complexity :3+ comorbidities / personal factors will impact the outcome/ POC  MEDIUM Complexity : 3 Standardized tests and measures addressing body structure, function, activity limitation and / or participation in recreation  MEDIUM Complexity : Evolving with changing characteristics  MEDIUM Complexity : FOTO score of 26-74      Based on the above components, the patient evaluation is determined to be of the following complexity level: MEDIUM    Pain Rating:  Patient with reports of headache; RN aware    Activity Tolerance:   Fair and requires rest breaks    After treatment patient left in no apparent distress:   Sitting in chair, Call bell within reach, and Bed / chair alarm activated    COMMUNICATION/EDUCATION:   The patients plan of care was discussed with: Occupational therapist and Registered nurse. Patient was educated regarding her deficit(s) of balance as this relates to her diagnosis of concern for CVA. She demonstrated Good understanding as evidenced by verbal feedback. Patient and/or family was verbally educated on the BE FAST acronym for signs/symptoms of CVA and TIA.  Informed patient to refer to the Stroke Binder for further BE FAST information. All questions answered with patient indicating good understanding. Fall prevention education was provided and the patient/caregiver indicated understanding. and Patient/family have participated as able in goal setting and plan of care.     Thank you for this referral.  Cherylene Bouton PT, DPT   Time Calculation: 32 mins

## 2021-12-13 NOTE — PROGRESS NOTES
[address] [place] [address]   Office [phone]  Fax [phone]          Assessment and [facility] Samuel Caraballo is a 78 y.o. female PMHx of CVA (in 10/21), DVT/PE (in 2019) on eliquis, COPD w/ known Pulm nodule, tobacco abuse, Diet controlled DM2, HTN, HLD, L breast cancer, IVC filter placement (2019) and Tobacco abuse who is admitted for dizziness and CTA/TIA r/o.     24 Hour Events: No acute events. Pt passed bedside swallow eval.     Dizziness w/ CVA/TIA rule out:  Pt presented w/ symptoms of dizziness, a frontal HA and gait instability, which is similar to when she was admitted w/ a posterior CVA at [address] [place] [address] in 10/21. Not a candidate for tPA, out of window. CT head unremarkable and exam largely stable w/o deficits. Last A1c 7.1 (11/21). NH4 low, Mag 1.9.   - Permissive HTN for the first 24-48 hours SBP<220 and DBP<110 - can tx w/ Prn hydralazine in the interim. - Neuro check q4h, telemetry at least 24 hours  - cont home ASA, eliquis   - F/u: TSH, UDS, UA, RPR, B12, folate, HIV   - F/u MRI brain w wo cont  - F/u ECHO with bubble studies   - Obtain CTA head and neck after confirmation that it was not done at [facility]   - Obtaining records from [facility] ED and    - Tylenol for HA prn   - Consulted PT/OT for rehab eval, CM for disposition planning   - Neuro cs: will appreciate recs    Elevated Creatinine in CKD3a: POA Cr 1.15 (BL 0.99), GFR 46. Etiology may be 2/2 contrast received at [facility] ED. - Avoid nephrotoxic agents. - Strict I&O, monitor to keep urine output > 30 mL/hr    Hx of CVA w/ residual R eye blurry vision: Pt was admitted at [address] [place] [address] in 10/2021 for CVA. She states current symptoms similar to then but less severe. - see above plan  - obtain records from [address] [place] [address] of BL PE and DVT: in 2/2019. On Eliquis indefinitely   - cont eliqius 5mg BID    S/p IVC filter placement: on 2/8/19 as Xeralto at the time caused a GI bleed.   - F/u OP: consider removal in the future as tolerating Eliquis well     Hx L Breast cancer: in remission s/p mastectomy and reconstruction in . No radiation or chemotherapy. HTN: BP on admission 158/131-->144/77 w/o meds. - HOLD home coreg 6.25 mg BID   - Will continue to monitor at this time and readjust as BP's trend. HLD: resume home lovastatin 10mg qhs at DC (pt allergic to Lipitor, which is only statin on formulary). COPD w/ known Pulm nodule: stable. 2019 CT Chest (7 x 2 mm RLL pulmonary nodule. decreased in size compared to May 2011). Follows w/ Dr. Vladimir Dias (Pulm). Exam notable for dimininshed breath sounds BL, otherwise stable. - cont Singulair 10mg daily, Trelegy inhaler daily, proAir q6H prn     DM2 diet controlled: Last A1c 7.1 (). Used to take metformin 500mg daily  - Daily BMP      Tobacco Abuse: endorses years of tobacco abuse. States since she was Dgx w/ Breast cancer in , she only smokes 1-2 cigarettes/month socially. - nicotene patch  - Encourage cessation and f/u in OP        Obesity: Body mass index is 36.58 kg/m². - Encouraging lifestyle modifications and further follow up outpatient. FEN/GI - Diabetic diet   Activity - Out of bed with assistance  DVT prophylaxis - Eliquis  GI prophylaxis - Not indicated at this time  Fall prophylaxis - Fall precautions ordered. Disposition - Admit to Neuro. Plan to d/c to Home. Consulting PT, OT and CM  Code Status - Full. Discussed with patient / caregivers. Next of Gamal 71 Name and [place],  Daughter - [phone]      I appreciate the opportunity to participate in the care of this patient,  Mariah Cordero MD  Family Medicine Resident         Subjective / Objective     Subjective Pt resting comfortably in bed. Denies dizziness when she doesn't move. States HA better. Denies CP/palp/SOB.      Temp (24hrs), Av.2 °F (36.2 °C), Min:96.8 °F (36 °C), Max:97.9 °F (36.6 °C)     Objective  Respiratory:   O2 Device: None (Room air)         Inpatient Medications  Current Facility-Administered Medications   Medication Dose Route Frequency    acetaminophen (TYLENOL) tablet 650 mg  650 mg Oral Q4H PRN    Or    acetaminophen (TYLENOL) solution 650 mg  650 mg Per NG tube Q4H PRN    Or    acetaminophen (TYLENOL) suppository 650 mg  650 mg Rectal Q4H PRN    aspirin delayed-release tablet 81 mg  81 mg Oral DAILY    diclofenac (VOLTAREN) 1 % topical gel 4 g  4 g Topical QID    apixaban (ELIQUIS) tablet 5 mg  5 mg Oral BID    fluticasone propionate (FLONASE) 50 mcg/actuation nasal spray 2 Spray  2 Spray Both Nostrils DAILY PRN    montelukast (SINGULAIR) tablet 10 mg  10 mg Oral QHS    fluticasone-umeclidinium-vilanterol (TRELEGY ELLIPTA) inhaler 1 Puff  1 Puff Inhalation DAILY    albuterol (PROVENTIL VENTOLIN) nebulizer solution 2.5 mg  2.5 mg Nebulization Q6H PRN    nicotine (NICODERM CQ) 14 mg/24 hr patch 1 Patch  1 Patch TransDERmal QHS     Current Outpatient Medications   Medication Sig    carvediloL (COREG) 6.25 mg tablet Take 1 Tablet by mouth two (2) times a day.  Eliquis 5 mg tablet TAKEONE TABLET BY MOUTH 2 TIMES A DAY    meclizine (ANTIVERT) 25 mg tablet     metFORMIN ER (GLUCOPHAGE XR) 500 mg tablet TAKE ONE TABLET BY MOUTH DAILY WITH BREAKFAST (Patient not taking: Reported on 11/11/2021)    lovastatin (MEVACOR) 10 mg tablet TAKE ONE TABLET BY MOUTH NIGHTLY    budesonide-formoteroL (SYMBICORT) 80-4.5 mcg/actuation HFAA INHALE 2 PUFFS BY INHALATION DAILY (Patient not taking: Reported on 11/11/2021)    montelukast (SINGULAIR) 10 mg tablet TAKE ONE TABLET BY MOUTH NIGHTLY    glucose blood VI test strips (FreeStyle Lite Strips) strip Patient is to check blood sugar once daily. Dx E11.9    albuterol (PROVENTIL VENTOLIN) 2.5 mg /3 mL (0.083 %) nebu 3 mL by Nebulization route every six (6) hours as needed for Wheezing.  Trelegy Ellipta 100-62.5-25 mcg inhaler Take 1 Puff by inhalation daily.     diclofenac (VOLTAREN) 1 % gel APPLY 1 GRAM TO AFFECTED AREA OF KNEES AND BACK 4 TIMES DAILY    guaiFENesin ER (MUCINEX) 600 mg ER tablet Take 1 Tab by mouth two (2) times a day. (Patient taking differently: Take 600 mg by mouth as needed.)    ProAir HFA 90 mcg/actuation inhaler Take 2 Puffs by inhalation every six (6) hours as needed for Wheezing.  fluticasone propionate (FLONASE) 50 mcg/actuation nasal spray 2 Sprays by Both Nostrils route daily. (Patient taking differently: 2 Sprays by Both Nostrils route as needed.)    acetaminophen (TYLENOL ARTHRITIS PAIN) 650 mg TbER Take 1 Tab by mouth every eight (8) hours.  multivitamin (ONE A DAY) tablet Take 1 Tab by mouth nightly.  aspirin 81 mg tablet Take 81 mg by mouth daily. Allergies  Allergies   Allergen Reactions    Crestor [Rosuvastatin] Other (comments)     Pain in left leg  Causes muscle spasms    Lipitor [Atorvastatin] Other (comments)     Left leg pain  Causes muscle spasms    Xarelto [Rivaroxaban] Other (comments)     Pt states it caused internal bleeding         CBC:  Recent Labs     12/12/21 2046   WBC 8.2   HGB 12.8   HCT 39.4          Metabolic Panel:  Recent Labs     12/12/21 2046      K 4.6   *   CO2 25   BUN 19   CREA 1.15*   *   CA 8.7   MG 1.9   ALB 3.2*   ALT 15         Physical Exam  General:   sleepy, cooperative, no acute distress   Head:   Atraumatic   Eyes:   Conjunctivae clear   ENT:  Oral mucosa normal   Neck:  Supple, trachea midline, no adenopathy   No JVD   Back:    No CVA tenderness    Lungs:   Diminished sounds BL. No w/r. Heart:   Regular rhythm, no murmur   Abdomen:    Soft, non-tender   No masses or organomegaly    Extremities:  trace LE edema.     Skin:  Warm and dry    No rashes or lesions   Neurologic:  Oriented   No focal deficits       Urinary catheter:  none     Imaging/procedures: none    EKG: none       For Billing    Chief Complaint   Patient presents with   [facility] Problems  Date Reviewed: 11/11/2021    None

## 2021-12-13 NOTE — PROGRESS NOTES
Physical therapy note    Orders received, chart reviewed. Attempted PT evaluation this morning at 10:15 AM, patient is currently off the floor for MRI. Will re-attempt when patient returned as able.      Thank you,  Justin Suresh, PT, DPT

## 2021-12-14 ENCOUNTER — APPOINTMENT (OUTPATIENT)
Dept: NON INVASIVE DIAGNOSTICS | Age: 76
DRG: 149 | End: 2021-12-14
Attending: STUDENT IN AN ORGANIZED HEALTH CARE EDUCATION/TRAINING PROGRAM
Payer: MEDICARE

## 2021-12-14 VITALS
HEART RATE: 86 BPM | SYSTOLIC BLOOD PRESSURE: 147 MMHG | RESPIRATION RATE: 17 BRPM | TEMPERATURE: 98.6 F | WEIGHT: 203.04 LBS | BODY MASS INDEX: 37.36 KG/M2 | HEIGHT: 62 IN | OXYGEN SATURATION: 95 % | DIASTOLIC BLOOD PRESSURE: 79 MMHG

## 2021-12-14 LAB
ANION GAP SERPL CALC-SCNC: 7 MMOL/L (ref 5–15)
ATRIAL RATE: 85 BPM
BUN SERPL-MCNC: 18 MG/DL (ref 6–20)
BUN/CREAT SERPL: 20 (ref 12–20)
CALCIUM SERPL-MCNC: 8.7 MG/DL (ref 8.5–10.1)
CALCULATED P AXIS, ECG09: 38 DEGREES
CALCULATED R AXIS, ECG10: 2 DEGREES
CALCULATED T AXIS, ECG11: 42 DEGREES
CHLORIDE SERPL-SCNC: 111 MMOL/L (ref 97–108)
CO2 SERPL-SCNC: 24 MMOL/L (ref 21–32)
CREAT SERPL-MCNC: 0.91 MG/DL (ref 0.55–1.02)
DIAGNOSIS, 93000: NORMAL
ECHO AO ASC DIAM: 2.9 CM
ECHO AO ASCENDING AORTA INDEX: 1.51 CM/M2
ECHO AV AREA PEAK VELOCITY: 2.1 CM2
ECHO AV AREA PEAK VELOCITY: 2.1 CM2
ECHO AV PEAK GRADIENT: 5 MMHG
ECHO AV PEAK VELOCITY: 1.1 M/S
ECHO AV VELOCITY RATIO: 0.82
ECHO LA DIAMETER INDEX: 1.98 CM/M2
ECHO LA DIAMETER: 3.8 CM
ECHO LA VOL 2C: 25 ML (ref 22–52)
ECHO LA VOL 4C: 21 ML (ref 22–52)
ECHO LA VOL BP: 24 ML (ref 22–52)
ECHO LA VOL BP: 24 ML (ref 22–52)
ECHO LA VOLUME AREA LENGTH: 24 ML
ECHO LA VOLUME AREA LENGTH: 27 ML
ECHO LA VOLUME AREA LENGTH: 28 ML
ECHO LA VOLUME INDEX A2C: 13 ML/M2 (ref 16–28)
ECHO LA VOLUME INDEX A4C: 11 ML/M2 (ref 16–28)
ECHO LV E' LATERAL VELOCITY: 6 CM/S
ECHO LV E' SEPTAL VELOCITY: 6 CM/S
ECHO LV FRACTIONAL SHORTENING: 40 % (ref 28–44)
ECHO LV INTERNAL DIMENSION DIASTOLE INDEX: 2.5 CM/M2
ECHO LV INTERNAL DIMENSION DIASTOLIC: 4.8 CM (ref 3.9–5.3)
ECHO LV INTERNAL DIMENSION SYSTOLIC INDEX: 1.51 CM/M2
ECHO LV INTERNAL DIMENSION SYSTOLIC: 2.9 CM
ECHO LV IVSD: 0.8 CM (ref 0.6–0.9)
ECHO LV MASS 2D: 126.7 G (ref 67–162)
ECHO LV MASS INDEX 2D: 66 G/M2 (ref 43–95)
ECHO LV POSTERIOR WALL DIASTOLIC: 0.8 CM (ref 0.6–0.9)
ECHO LV RELATIVE WALL THICKNESS RATIO: 0.33
ECHO LVOT AREA: 2.5 CM2
ECHO LVOT DIAM: 1.8 CM
ECHO LVOT MEAN GRADIENT: 2 MMHG
ECHO LVOT PEAK GRADIENT: 3 MMHG
ECHO LVOT PEAK VELOCITY: 0.9 M/S
ECHO LVOT STROKE VOLUME INDEX: 24.9 ML/M2
ECHO LVOT SV: 47.8 ML
ECHO LVOT VTI: 18.8 CM
ECHO MV A VELOCITY: 0.63 M/S
ECHO MV E DECELERATION TIME (DT): 298.4 MS
ECHO MV E VELOCITY: 0.51 M/S
ECHO MV E/A RATIO: 0.81
ECHO MV E/E' LATERAL: 8.5
ECHO MV E/E' RATIO (AVERAGED): 8.5
ECHO MV E/E' SEPTAL: 8.5
ECHO MV REGURGITANT PEAK GRADIENT: 112 MMHG
ECHO MV REGURGITANT PEAK VELOCITY: 5.3 M/S
ECHO PV MAX VELOCITY: 0.9 M/S
ECHO PV PEAK GRADIENT: 3 MMHG
ECHO RV FREE WALL PEAK S': 16 CM/S
ECHO RV INTERNAL DIMENSION: 2.8 CM
ECHO RV TAPSE: 2 CM (ref 1.5–2)
ECHO TV REGURGITANT MAX VELOCITY: 2.7 M/S
ECHO TV REGURGITANT PEAK GRADIENT: 29 MMHG
ERYTHROCYTE [DISTWIDTH] IN BLOOD BY AUTOMATED COUNT: 13.3 % (ref 11.5–14.5)
GLUCOSE SERPL-MCNC: 115 MG/DL (ref 65–100)
HCT VFR BLD AUTO: 39.1 % (ref 35–47)
HGB BLD-MCNC: 12.4 G/DL (ref 11.5–16)
MAGNESIUM SERPL-MCNC: 2.2 MG/DL (ref 1.6–2.4)
MCH RBC QN AUTO: 26.1 PG (ref 26–34)
MCHC RBC AUTO-ENTMCNC: 31.7 G/DL (ref 30–36.5)
MCV RBC AUTO: 82.1 FL (ref 80–99)
NRBC # BLD: 0 K/UL (ref 0–0.01)
NRBC BLD-RTO: 0 PER 100 WBC
P-R INTERVAL, ECG05: 138 MS
PLATELET # BLD AUTO: 286 K/UL (ref 150–400)
PMV BLD AUTO: 10.3 FL (ref 8.9–12.9)
POTASSIUM SERPL-SCNC: 4 MMOL/L (ref 3.5–5.1)
Q-T INTERVAL, ECG07: 388 MS
QRS DURATION, ECG06: 68 MS
QTC CALCULATION (BEZET), ECG08: 461 MS
RBC # BLD AUTO: 4.76 M/UL (ref 3.8–5.2)
SODIUM SERPL-SCNC: 142 MMOL/L (ref 136–145)
VENTRICULAR RATE, ECG03: 85 BPM
WBC # BLD AUTO: 6 K/UL (ref 3.6–11)

## 2021-12-14 PROCEDURE — 74011250637 HC RX REV CODE- 250/637: Performed by: STUDENT IN AN ORGANIZED HEALTH CARE EDUCATION/TRAINING PROGRAM

## 2021-12-14 PROCEDURE — 93306 TTE W/DOPPLER COMPLETE: CPT

## 2021-12-14 PROCEDURE — 74011250636 HC RX REV CODE- 250/636: Performed by: STUDENT IN AN ORGANIZED HEALTH CARE EDUCATION/TRAINING PROGRAM

## 2021-12-14 PROCEDURE — 85027 COMPLETE CBC AUTOMATED: CPT

## 2021-12-14 PROCEDURE — 83735 ASSAY OF MAGNESIUM: CPT

## 2021-12-14 PROCEDURE — 36415 COLL VENOUS BLD VENIPUNCTURE: CPT

## 2021-12-14 PROCEDURE — 93306 TTE W/DOPPLER COMPLETE: CPT | Performed by: INTERNAL MEDICINE

## 2021-12-14 PROCEDURE — 99238 HOSP IP/OBS DSCHRG MGMT 30/<: CPT | Performed by: STUDENT IN AN ORGANIZED HEALTH CARE EDUCATION/TRAINING PROGRAM

## 2021-12-14 PROCEDURE — 80048 BASIC METABOLIC PNL TOTAL CA: CPT

## 2021-12-14 PROCEDURE — 99231 SBSQ HOSP IP/OBS SF/LOW 25: CPT | Performed by: PSYCHIATRY & NEUROLOGY

## 2021-12-14 PROCEDURE — 94640 AIRWAY INHALATION TREATMENT: CPT

## 2021-12-14 RX ORDER — MECLIZINE HCL 12.5 MG 12.5 MG/1
25 TABLET ORAL ONCE
Status: COMPLETED | OUTPATIENT
Start: 2021-12-14 | End: 2021-12-14

## 2021-12-14 RX ORDER — CETIRIZINE HCL 10 MG
10 TABLET ORAL DAILY
Status: DISCONTINUED | OUTPATIENT
Start: 2021-12-14 | End: 2021-12-14 | Stop reason: HOSPADM

## 2021-12-14 RX ORDER — MECLIZINE HYDROCHLORIDE 25 MG/1
25 TABLET ORAL
Qty: 30 TABLET | Refills: 0 | Status: SHIPPED | OUTPATIENT
Start: 2021-12-14 | End: 2021-12-24

## 2021-12-14 RX ORDER — CETIRIZINE HCL 10 MG
10 TABLET ORAL DAILY
Qty: 30 TABLET | Refills: 0 | Status: SHIPPED | OUTPATIENT
Start: 2021-12-15 | End: 2022-04-27

## 2021-12-14 RX ORDER — MECLIZINE HCL 12.5 MG 12.5 MG/1
25 TABLET ORAL
Status: DISCONTINUED | OUTPATIENT
Start: 2021-12-14 | End: 2021-12-14 | Stop reason: HOSPADM

## 2021-12-14 RX ORDER — POLYETHYLENE GLYCOL 3350 17 G/17G
17 POWDER, FOR SOLUTION ORAL DAILY
Status: DISCONTINUED | OUTPATIENT
Start: 2021-12-14 | End: 2021-12-14 | Stop reason: HOSPADM

## 2021-12-14 RX ADMIN — DICLOFENAC SODIUM 4 G: 10 GEL TOPICAL at 08:19

## 2021-12-14 RX ADMIN — MECLIZINE 25 MG: 12.5 TABLET ORAL at 08:18

## 2021-12-14 RX ADMIN — CARVEDILOL 6.25 MG: 6.25 TABLET, FILM COATED ORAL at 17:40

## 2021-12-14 RX ADMIN — ASPIRIN 81 MG: 81 TABLET, COATED ORAL at 08:11

## 2021-12-14 RX ADMIN — FLUTICASONE FUROATE, UMECLIDINIUM BROMIDE AND VILANTEROL TRIFENATATE 1 PUFF: 100; 62.5; 25 POWDER RESPIRATORY (INHALATION) at 07:59

## 2021-12-14 RX ADMIN — CARVEDILOL 6.25 MG: 6.25 TABLET, FILM COATED ORAL at 08:11

## 2021-12-14 RX ADMIN — APIXABAN 5 MG: 5 TABLET, FILM COATED ORAL at 08:11

## 2021-12-14 RX ADMIN — APIXABAN 5 MG: 5 TABLET, FILM COATED ORAL at 17:40

## 2021-12-14 RX ADMIN — DICLOFENAC SODIUM 4 G: 10 GEL TOPICAL at 17:40

## 2021-12-14 RX ADMIN — CETIRIZINE HYDROCHLORIDE 10 MG: 10 TABLET, FILM COATED ORAL at 08:18

## 2021-12-14 RX ADMIN — DICLOFENAC SODIUM 4 G: 10 GEL TOPICAL at 13:20

## 2021-12-14 NOTE — PROGRESS NOTES
Physical Therapy  12/14/2021    Chart reviewed and cleared by RN. Attempted treatment session this AM though pt busy with RN and MD. Will follow up at a later time.     Thank Carol Dalton, PT, DPT

## 2021-12-14 NOTE — PROGRESS NOTES
CM Note:  [facility] accepted pt. Family Practice notified. Pt to be d/c'd today and transported home by her daughter. She will f/u with providers as scheduled.   Max MAX

## 2021-12-14 NOTE — DISCHARGE INSTRUCTIONS
HOME DISCHARGE INSTRUCTIONS    Fransico Stauffer / [id number] : 1945    Admission date: 2021 Discharge date: 2021 9:18 AM     Please bring this form with you to show your care provider at your follow-up appointment. Primary care provider:  Luis Amezcua MD    Discharging provider:  Jorge Yu MD  - Family Medicine Resident  Karen Vargas MD - Family Medicine Attending      You have been admitted to the hospital with the following diagnoses:    ACUTE DIAGNOSES:  Dizziness [R42]    . . . . . . . . . . . . . . . . . . . . . . . . . . . . . . . . . . . . . . . . . . . . . . . . . . . . . . . . . . . . . . . . . . . . . . . .   You are well enough to be discharged from the hospital. However, because you were inpatient in a hospital, you are at greater risk of having been exposed to the coronavirus. PLEASE stay inside and self-quarantine for 14 days to prevent further spread of the coronavirus. Because you have a recent history of stroke, you ARE NOT permitted to drive for at least 6 months. . . . . . . . . . . . . . . . . . . . . . . . . . . . . . . . . . . . . . . . . . . . . . . . . . . . . . . . . . . . . . . . . . . . . . . . . MEDICATION CHANGES  START  Meclizine 25mg 3 times a day as needed for dizziness  Zyrtec (cetirizine) 10mg daily    STOP  None    CHANGES  None    No other changes were made to your medications, please take all your other home medicines as previously prescribed. . . . . . . . . . . . . . . . . . . . . . . . . . . . . . . . . . . . . . . . . . . . . . . . . . . . . . . . . . . . . . . . . . . . . . . . Donald Tilley FOLLOW-UP CARE RECOMMENDATIONS:  - Please, stop smoking cigarettes to prevent further damage to your heart, lungs blood vessels and prevent cancer.    -Continue follow up with your neurologist outpatient. - Since you are already on anticoagulation you need your IVC filter to be removed outpatient.  We have included information for a vascular surgeon Dr. Vidya Cummings who can help with this procedure. - Talk to your PCP about setting up 'vestibular physical therapy' for your dizziness    - Monitor your blood pressure, if it is still elevated you may need to start additional blood pressure medications    Appointments    Follow-up Information     Follow up With Specialties Details Why Contact Info    Suraj Burch MD General and Vascular Surgery Schedule an appointment as soon as possible for a visit To discuss removing IVC filter [facility]  [address] [place] [address]      Adry Chavis MD Family Medicine Go on 12/15/2021 Hospital Follow UP at 4:00pm [address] [facility]  [address] [place] [address] Kassidy Moritz 723      Adrianna Valdovinos MD Neurology Schedule an appointment as soon as possible for a visit Follow up for dizziness and your hx of stroke [facility]   [phone]               Follow-up tests needed: None    Pending test results: At the time of your discharge the following test results are still pending: none. Please make sure you review these results with your outpatient follow-up provider(s). DIET/what to eat:  Diabetic Diet    ACTIVITY:  Activity as tolerated    Equipment needed:  None    Specific symptoms to watch for: chest pain, shortness of breath, fever, chills, nausea, vomiting, diarrhea, change in mentation, falling, weakness, bleeding. What to do if new or unexpected symptoms occur? If you experience any of the above symptoms (or should other concerns or questions arise after discharge) please call your primary care physician. Return to the emergency room if you cannot get hold of your doctor. · It is very important that you keep your follow-up appointment(s). qq  · Please bring discharge papers, medication list (and/or medication bottles) to your follow-up appointments for review by your outpatient provider(s).   · Please check the list of medications and be sure it includes every medication (even non-prescription medications) that your provider wants you to take. · It is important that you take the medication exactly as they are prescribed. · Keep your medication in the bottles provided by the pharmacist and keep a list of the medication names, dosages, and times to be taken in your wallet. · Do not take other medications without consulting your doctor. · If you have any questions about your medications or other instructions, please talk to your nurse or care provider before you leave the hospital.     Information obtained by:     I understand that if any problems occur once I am at home I am to contact my physician. These instructions were explained to me and I had the opportunity to ask questions. I understand and acknowledge receipt of the instructions indicated above. Physician's or R.N.'s Signature                                                                  Date/Time                                                                                                                                              Patient or Representative Signature                                                          Date/Time      Patient Education        Dizziness: Care Instructions  Your Care Instructions  Dizziness is the feeling of unsteadiness or fuzziness in your head. It is different than having vertigo, which is a feeling that the room is spinning or that you are moving or falling. It is also different from lightheadedness, which is the feeling that you are about to faint. It can be hard to know what causes dizziness. Some people feel dizzy when they have migraine headaches. Sometimes bouts of flu can make you feel dizzy.  Some medical conditions, such as heart problems or high blood pressure, can make you feel dizzy. Many medicines can cause dizziness, including medicines for high blood pressure, pain, or anxiety. If a medicine causes your symptoms, your doctor may recommend that you stop or change the medicine. If it is a problem with your heart, you may need medicine to help your heart work better. If there is no clear reason for your symptoms, your doctor may suggest watching and waiting for a while to see if the dizziness goes away on its own. Follow-up care is a key part of your treatment and safety. Be sure to make and go to all appointments, and call your doctor if you are having problems. It's also a good idea to know your test results and keep a list of the medicines you take. How can you care for yourself at home? · If your doctor recommends or prescribes medicine, take it exactly as directed. Call your doctor if you think you are having a problem with your medicine. · Do not drive while you feel dizzy. · Try to prevent falls. Steps you can take include:  ? Using nonskid mats, adding grab bars near the tub, and using night-lights. ? Clearing your home so that walkways are free of anything you might trip on.  ? Letting family and friends know that you have been feeling dizzy. This will help them know how to help you. When should you call for help? Call 911 anytime you think you may need emergency care. For example, call if:    · You passed out (lost consciousness).     · You have dizziness along with symptoms of a heart attack. These may include:  ? Chest pain or pressure, or a strange feeling in the chest.  ? Sweating. ? Shortness of breath. ? Nausea or vomiting. ? Pain, pressure, or a strange feeling in the back, neck, jaw, or upper belly or in one or both shoulders or arms. ? Lightheadedness or sudden weakness. ? A fast or irregular heartbeat.     · You have symptoms of a stroke.  These may include:  ? Sudden numbness, tingling, weakness, or loss of movement in your face, arm, or leg, especially on only one side of your body. ? Sudden vision changes. ? Sudden trouble speaking. ? Sudden confusion or trouble understanding simple statements. ? Sudden problems with walking or balance. ? A sudden, severe headache that is different from past headaches. Call your doctor now or seek immediate medical care if:    · You feel dizzy and have a fever, headache, or ringing in your ears.     · You have new or increased nausea and vomiting.     · Your dizziness does not go away or comes back. Watch closely for changes in your health, and be sure to contact your doctor if:    · You do not get better as expected. Where can you learn more? Go to http://www.gray.com/  Enter Q823 in the search box to learn more about \"Dizziness: Care Instructions. \"  Current as of: July 1, 2021               Content Version: 13.0  © 1559-9792 [employer]. Care instructions adapted under license by [employer] (which disclaims liability or warranty for this information). If you have questions about a medical condition or this instruction, always ask your healthcare professional. [address] any warranty or liability for your use of this information.

## 2021-12-14 NOTE — PROGRESS NOTES
0700 Bedside and Verbal shift change report given to CHENG RN (oncoming nurse) by [facility] RN (offgoing nurse). Report included the following information SBAR, Kardex, Intake/Output, MAR, Accordion, Recent Results and Med Rec Status. This patient was assisted with Intentional Toileting every 2 hours during this shift as appropriate. Documentation of ambulation and output reflected on Flowsheet as appropriate. Purposeful hourly rounding was completed using AIDET and 5Ps. Outcomes of PHR documented as they occurred. Bed alarm in use as appropriate. Dual Suction and ambubag in place. 1188 Notified MD of patient's elevated BP; No new orders. Will continue to monitor. 1700 I have reviewed discharge instructions with the patient. The patient verbalized understanding.

## 2021-12-14 NOTE — PROGRESS NOTES
Neurology Progress Note    Patient ID:  Mirtha Reynaga  [id number]  78 y.o.  1945    CC: dizziness    Subjective:      Patient reports some improvement. Less dizzy. Dizziness responds to medication. No new complaint. Brain MRI without contrast done revealed periventricular and subcortical white matter disease. No acute stroke or old strokes. Unremarkable CBC and BMP. Urine drug screen was negative. Current Facility-Administered Medications   Medication Dose Route Frequency    meclizine (ANTIVERT) tablet 25 mg  25 mg Oral TID PRN    cetirizine (ZYRTEC) tablet 10 mg  10 mg Oral DAILY    carvediloL (COREG) tablet 6.25 mg  6.25 mg Oral BID WITH MEALS    hydrALAZINE (APRESOLINE) 20 mg/mL injection 10 mg  10 mg IntraVENous Q4H PRN    acetaminophen (TYLENOL) tablet 650 mg  650 mg Oral Q4H PRN    Or    acetaminophen (TYLENOL) solution 650 mg  650 mg Per NG tube Q4H PRN    Or    acetaminophen (TYLENOL) suppository 650 mg  650 mg Rectal Q4H PRN    aspirin delayed-release tablet 81 mg  81 mg Oral DAILY    diclofenac (VOLTAREN) 1 % topical gel 4 g  4 g Topical QID    apixaban (ELIQUIS) tablet 5 mg  5 mg Oral BID    fluticasone propionate (FLONASE) 50 mcg/actuation nasal spray 2 Spray  2 Spray Both Nostrils DAILY PRN    montelukast (SINGULAIR) tablet 10 mg  10 mg Oral QHS    fluticasone-umeclidinium-vilanterol (TRELEGY ELLIPTA) inhaler 1 Puff  1 Puff Inhalation DAILY    albuterol (PROVENTIL VENTOLIN) nebulizer solution 2.5 mg  2.5 mg Nebulization Q6H PRN    nicotine (NICODERM CQ) 14 mg/24 hr patch 1 Patch  1 Patch TransDERmal QHS        Review of Systems:    Pertinent items are noted in HPI.     Objective:     Patient Vitals for the past 8 hrs:   BP Temp Pulse Resp SpO2 Weight   12/14/21 0734 (!) 166/68 97.5 °F (36.4 °C) 75 18 95 %    12/14/21 0705   80      12/14/21 0258 (!) 155/79 97.4 °F (36.3 °C) 77 18 95 %    12/14/21 0256      92.1 kg (203 lb 1.6 oz)       No intake/output data recorded. 12/12 1901 - 12/14 0700  In: 1060 [P.O.:1060]  Out: 950 [Urine:950]    Lab Review   Recent Results (from the past 24 hour(s))   CBC W/O DIFF    Collection Time: 12/14/21  3:12 AM   Result Value Ref Range    WBC 6.0 3.6 - 11.0 K/uL    RBC 4.76 3.80 - 5.20 M/uL    HGB 12.4 11.5 - 16.0 g/dL    HCT 39.1 35.0 - 47.0 %    MCV 82.1 80.0 - 99.0 FL    MCH 26.1 26.0 - 34.0 PG    MCHC 31.7 30.0 - 36.5 g/dL    RDW 13.3 11.5 - 14.5 %    PLATELET 477 390 - 731 K/uL    MPV 10.3 8.9 - 12.9 FL    NRBC 0.0 0  WBC    ABSOLUTE NRBC 0.00 0.00 - 5.75 K/uL   METABOLIC PANEL, BASIC    Collection Time: 12/14/21  3:12 AM   Result Value Ref Range    Sodium 142 136 - 145 mmol/L    Potassium 4.0 3.5 - 5.1 mmol/L    Chloride 111 (H) 97 - 108 mmol/L    CO2 24 21 - 32 mmol/L    Anion gap 7 5 - 15 mmol/L    Glucose 115 (H) 65 - 100 mg/dL    BUN 18 6 - 20 MG/DL    Creatinine 0.91 0.55 - 1.02 MG/DL    BUN/Creatinine ratio 20 12 - 20      GFR est AA >60 >60 ml/min/1.73m2    GFR est non-AA >60 >60 ml/min/1.73m2    Calcium 8.7 8.5 - 10.1 MG/DL   MAGNESIUM    Collection Time: 12/14/21  3:12 AM   Result Value Ref Range    Magnesium 2.2 1.6 - 2.4 mg/dL       PHYSICAL EXAM:     NEUROLOGICAL EXAM:     Appearance: The patient is obese, provides a coherent history and is in no acute distress. Mental Status: Oriented to time, place and person. Fluent, no aphasia or dysarthria. Mood and affect appropriate. Cranial Nerves:   Intact visual fields. NADEGE, EOM's full, no nystagmus, no ptosis. Facial sensation is normal. Corneal reflexes are intact. Facial movement is symmetric. Hearing is normal bilaterally. Palate is midline with normal elevation. Sternocleidomastoid and trapezius muscles are normal. Tongue is midline. Motor:  5/5 strength in upper and lower proximal and distal muscles. Normal bulk and tone. No pronator drift. Reflexes:   Deep tendon reflexes 1+/4 and symmetrical. Downgoing toes. Sensory:   Normal to cold.    Gait: Steady. Tremor:   No tremor noted. Cerebellar:  Intact FTN/MACEY/HTS. Assessment:   Dizziness  Hypertension  History of CVA    Plan:   Neurological examination does not reveal any residual brainstem or cerebellar deficits. No new deficits seen. Dizziness is likely more peripheral etiology or secondary to high blood pressure.     Head CT without contrast did not reveal any acute process. Brain MRI revealed periventricular and subcortical white matter disease. No acute stroke is seen. Given history of CVA, LDL should be less than 70. LDL done by PCP recently was 74.8. Continue statin therapy.     Continue Eliquis for stroke prophylaxis.     Continue as needed treatment for dizziness.      Aggressive management of her blood pressure.     Per DMV regulation patient cannot drive for 6 months from the last stroke. Patient is okay to be discharged from a neurological standpoint. Follow-up is ready been scheduled with Dr. Joseph Bains.       Signed:  Ifeanyi Anglin MD  12/14/2021  9:41 AM

## 2021-12-15 ENCOUNTER — PATIENT OUTREACH (OUTPATIENT)
Dept: CASE MANAGEMENT | Age: 76
End: 2021-12-15

## 2021-12-15 ENCOUNTER — OFFICE VISIT (OUTPATIENT)
Dept: FAMILY MEDICINE CLINIC | Age: 76
End: 2021-12-15
Payer: MEDICARE

## 2021-12-15 VITALS
TEMPERATURE: 97.8 F | HEIGHT: 62 IN | HEART RATE: 72 BPM | WEIGHT: 201.2 LBS | SYSTOLIC BLOOD PRESSURE: 126 MMHG | BODY MASS INDEX: 37.02 KG/M2 | DIASTOLIC BLOOD PRESSURE: 66 MMHG | RESPIRATION RATE: 20 BRPM

## 2021-12-15 DIAGNOSIS — Z72.0 TOBACCO USE: ICD-10-CM

## 2021-12-15 DIAGNOSIS — E11.65 TYPE 2 DIABETES MELLITUS WITH HYPERGLYCEMIA, WITHOUT LONG-TERM CURRENT USE OF INSULIN (HCC): ICD-10-CM

## 2021-12-15 DIAGNOSIS — I10 PRIMARY HYPERTENSION: ICD-10-CM

## 2021-12-15 DIAGNOSIS — Z09 HOSPITAL DISCHARGE FOLLOW-UP: ICD-10-CM

## 2021-12-15 DIAGNOSIS — R42 VERTIGO: Primary | ICD-10-CM

## 2021-12-15 DIAGNOSIS — J41.0 SIMPLE CHRONIC BRONCHITIS (HCC): ICD-10-CM

## 2021-12-15 DIAGNOSIS — E66.01 SEVERE OBESITY (HCC): ICD-10-CM

## 2021-12-15 PROCEDURE — G8427 DOCREV CUR MEDS BY ELIG CLIN: HCPCS | Performed by: FAMILY MEDICINE

## 2021-12-15 PROCEDURE — 99496 TRANSJ CARE MGMT HIGH F2F 7D: CPT | Performed by: FAMILY MEDICINE

## 2021-12-15 PROCEDURE — 1111F DSCHRG MED/CURRENT MED MERGE: CPT | Performed by: FAMILY MEDICINE

## 2021-12-15 RX ORDER — IBUPROFEN 200 MG
1 TABLET ORAL EVERY 24 HOURS
Qty: 30 PATCH | Refills: 1 | Status: SHIPPED | OUTPATIENT
Start: 2021-12-15 | End: 2022-01-19

## 2021-12-15 NOTE — PROGRESS NOTES
Care Transitions Initial Call    Call within 2 business days of discharge: Yes     Patient: Myrna Card Patient : 1945 MRN: [id number]    Last Discharge [facility] Facility       Complaint Diagnosis Description Type Department Provider    21 Dizziness Dizziness . .. ED to Hosp-Admission (Discharged) (ADMIT) Dax Barboza MD; Tomasa Lee... Was this an external facility discharge? No Discharge Facility: na    Challenges to be reviewed by the provider   Additional needs identified to be addressed with provider: no  none         Method of communication with provider : none    Discussed COVID-19 related testing which was not done at this time. Test results were not done. Patient informed of results, if available? yes     Advance Care Planning:   Does patient have an Advance Directive:  did not discuss on this telephone [facility]    Inpatient Readmission Risk score: Unplanned Readmit Risk Score: 7.2 ( )    Was this a readmission? no   Patient stated reason for the admission: pt states that she became dizzy as the room was spinning around and she called 911    Patients top risk factors for readmission: falls   Interventions to address risk factors: Obtained and reviewed discharge summary and/or continuity of care documents    Care Transition Nurse (CTN) contacted the patient by telephone to perform post hospital discharge assessment. Verified name and  with patient as identifiers. Provided introduction to self, and explanation of the CTN role. CTN reviewed discharge instructions, medical action plan and red flags with patient who verbalized understanding. Were discharge instructions available to patient? yes. Reviewed appropriate site of care based on symptoms and resources available to patient including: PCP, Specialist and When to call 911. Patient given an opportunity to ask questions and does not have any further questions or concerns at this time.  The patient agrees to contact the PCP office for questions related to their healthcare. Medication reconciliation was performed with patient, who verbalizes understanding of administration of home medications. Advised obtaining a 90-day supply of all daily and as-needed medications. Referral to Pharm D needed: no     Home Health/Outpatient orders at discharge: PT and [address]: [facility]  Date of initial visit: 12/15/2021    Covid Risk Education    Educated patient about risk for severe COVID-19 due to risk factors according to CDC guidelines. CTN reviewed discharge instructions, medical action plan and red flag symptoms with the patient who verbalized understanding. Discussed COVID vaccination status: pt reports that she has received both [place] vaccines and will discuss booster with PCP today. . Education provided on COVID-19 vaccination as appropriate. Discussed exposure protocols and quarantine with CDC Guidelines. Patient was given an opportunity to verbalize any questions and concerns and agrees to contact CTN or health care provider for questions related to their healthcare. Was patient discharged with a pulse oximeter? no. Discussed and confirmed pulse oximeter discharge instructions and when to notify provider or seek emergency care. Discussed follow-up appointments. If no appointment was previously scheduled, appointment scheduling offered: no. Is follow up appointment scheduled within 7 days of discharge? yes. [facility][1] follow up appointment(s):   Future Appointments   Date Time Provider Bill Ricardo   12/15/2021  4:00 PM Marcello Blue MD [facility]   1/19/2022  3:00 PM Chaya Lim  [facility]     Non-[facility] follow up appointment(s): na    Plan for follow-up call in 5-7 days based on severity of symptoms and risk factors.   Plan for next call: symptom management-will review any changing symptoms with patient, self management-will review safety information with pt, follow up appointment-will review any findings from follow up appts and medication management-will review medications for any changes. CTN provided contact information for future needs. Goals Addressed                 This Visit's Progress     Attends follow-up appointments as directed. 12/15/2021  Pt confirmed follow up appts and confirmed [facility] who arrived during our telephone encounter. AFP       Knowledge and adherence of prescribed medication (ie. action, side effects, missed dose, etc.).        12/15/2021  Performed medication reconciliation with pt. AFP       Understands red flags post discharge. 12/15/2021  Reviewed red flags with pt who states understanding that she is not to drive post CVA on 89/10/8161. We reviewed s/s of stroke and pt reports that she had extreme dizziness and balance issues with stroke in October 2021. Pt reports that the symptoms for this admission were not as bad. Pt explained that she was taken to [facility] and she went to [facility]. Pt reports that she was at [facility] for CVA in October. Pt is agreeable to a call in 7 days to review medications, COVID19 booster information, findings from follow up appts, and review medications.   AFP

## 2021-12-15 NOTE — PROGRESS NOTES
1. Have you been to the ER, urgent care clinic since your last visit? Hospitalized since your last visit? Yes When: [facility] 12/[address] [facility] left AMA, had stroke in October went from [address] to [facility]     2. Have you seen or consulted any other health care providers outside of the [address] [place] [address] since your last visit? Include any pap smears or colon screening. Reviewed record in preparation for visit and have necessary documentation  Pt did not bring medication to office visit for review  Patient is accompanied by self I have received verbal consent from Ladonna Fay to discuss any/all medical information while they are present in the room.     Goals that were addressed and/or need to be completed during or after this appointment include     Health Maintenance Due   Topic Date Due    DTaP/Tdap/Td series (1 - Tdap) 10/12/2011    Breast Cancer Screen Mammogram  07/14/2018    Eye Exam Retinal or Dilated  01/07/2021    COVID-19 Vaccine (3 - Booster for [place] series) 09/30/2021

## 2021-12-20 NOTE — PROGRESS NOTES
Progress Note    Patient: Evonne Morales MRN: [id number]  SSN: [id number]    YOB: 1945  Age: 78 y.o. Sex: female        Chief Complaint   Patient presents with   [facility] Follow Up     [facility]      she is a 78y.o. year old female who presents for hospital follow up. Patient admitted to [facility] on 12/12/21 and discharged on 12/14/21 with dx of dizziness. Patient with hx of T2D, HTN, HLD, chronic anticoagulation and obesity. She was prescribed meclizine which has helped with symptoms. She asks for nicotine patches to help with tobacco cessation. Encounter Diagnoses   Name Primary?  Vertigo Yes    Primary hypertension     Type 2 diabetes mellitus with hyperglycemia, without long-term current use of insulin (HCC)     Simple chronic bronchitis (HCC)     Tobacco use     Severe obesity ([facility] [address]    [facility] discharge follow-up      BP Readings from Last 3 Encounters:   12/15/21 126/66   12/14/21 (!) 147/79   11/11/21 (!) 142/82     Wt Readings from Last 3 Encounters:   12/15/21 201 lb 3.2 oz (91.3 kg)   12/14/21 203 lb 0.7 oz (92.1 kg)   11/11/21 201 lb (91.2 kg)     Body mass index is 36.8 kg/m².     Lab Results   Component Value Date/Time    WBC 6.0 12/14/2021 03:12 AM    HGB 12.4 12/14/2021 03:12 AM    Hemoglobin (POC) 7.1 (L) 02/24/2012 12:39 AM    HCT 39.1 12/14/2021 03:12 AM    Hematocrit (POC) 21 (L) 02/24/2012 12:39 AM    PLATELET 861 95/40/0225 03:12 AM    MCV 82.1 12/14/2021 03:12 AM     Lab Results   Component Value Date/Time    TSH 1.21 12/12/2021 08:46 PM      Lab Results   Component Value Date/Time    Sodium 142 12/14/2021 03:12 AM    Potassium 4.0 12/14/2021 03:12 AM    Chloride 111 (H) 12/14/2021 03:12 AM    CO2 24 12/14/2021 03:12 AM    Anion gap 7 12/14/2021 03:12 AM    Glucose 115 (H) 12/14/2021 03:12 AM    BUN 18 12/14/2021 03:12 AM    Creatinine 0.91 12/14/2021 03:12 AM    BUN/Creatinine ratio 20 12/14/2021 03:12 AM    GFR est AA >60 12/14/2021 03:12 AM    GFR est non-AA >60 2021 03:12 AM    Calcium 8.7 2021 03:12 AM    Bilirubin, total 0.4 2021 08:46 PM    ALT (SGPT) 15 2021 08:46 PM    Alk.  phosphatase 103 2021 08:46 PM    Protein, total 7.2 2021 08:46 PM    Albumin 3.2 (L) 2021 08:46 PM    Globulin 4.0 2021 08:46 PM    A-G Ratio 0.8 (L) 2021 08:46 PM        Patient Active Problem List   Diagnosis Code    Melena K92.1    Acute posthemorrhagic anemia D62    Type 2 diabetes mellitus without complication, without long-term current use of insulin (HCC) E11.9    HTN (hypertension) I10    Other hyperlipidemia E78.49    Debility R53.81    Acute deep vein thrombosis (DVT) of left lower extremity (HCC) I82.402    Chronic anticoagulation Z79.01    Severe obesity (HCC) E66.01    DCIS (ductal carcinoma in situ) of breast D05.10    History of GI bleed Z87.19    COPD (chronic obstructive pulmonary disease) ([facility]) J44.9    History of stroke Z86.73     Past Surgical History:   Procedure Laterality Date    COLONOSCOPY N/A 2017    COLONOSCOPY- no info to   performed by Keyanna Rivera MD at [facility] HX GYN      ablation, R ovary removed, tubal ligation    HX HAMMER TOE REPAIR      HX HEENT      tonsillectomy    HX ORTHOPAEDIC      L hip, R leg, C4-C5 fusion, L foot    HX OTHER SURGICAL      xiphoid removal    IR PLC IVC FILTER  2019    LA BREAST SURGERY PROCEDURE UNLISTED      L mastectomy     Social History     Socioeconomic History    Marital status: SINGLE     Spouse name: Not on file    Number of children: Not on file    Years of education: Not on file    Highest education level: Not on file   Occupational History    Not on file   Tobacco Use    Smoking status: Current Some Day Smoker     Types: Cigarettes     Last attempt to quit: 2009     Years since quittin.9    Smokeless tobacco: Never Used   Substance and Sexual Activity    Alcohol use: No    Drug use: No    Sexual activity: Never Other Topics Concern    Caffeine Concern Not Asked    Back Care Not Asked    Exercise Not Asked    Occupational Exposure Not Asked    Sleep Concern Not Asked    Stress Concern Not Asked    Weight Concern Not Asked   Social History Narrative    Not on file     Social Determinants of Health     Financial Resource Strain:     Difficulty of Paying Living Expenses: Not on file   Food Insecurity:     Worried About Running Out of Food in the Last Year: Not on file    Jason of Food in the Last Year: Not on file   Transportation Needs:     Lack of Transportation (Medical): Not on file    Lack of Transportation (Non-Medical): Not on file   Physical Activity:     Days of Exercise per Week: Not on file    Minutes of Exercise per Session: Not on file   Stress:     Feeling of Stress : Not on file   Social Connections:     Frequency of Communication with Friends and Family: Not on file    Frequency of Social Gatherings with Friends and Family: Not on file    Attends Restorationist Services: Not on file    Active Member of [address] [place] [address] or Organizations: Not on file    Attends Club or Organization Meetings: Not on file    Marital Status: Not on file   Intimate Partner Violence:     Fear of Current or Ex-Partner: Not on file    Emotionally Abused: Not on file    Physically Abused: Not on file    Sexually Abused: Not on file   Housing Stability:     Unable to Pay for Housing in the Last Year: Not on file    Number of [place] in the Last Year: Not on file    Unstable Housing in the Last Year: Not on file     Family History   Problem Relation Age of Onset    Heart Attack Father     Diabetes Mother      Current Outpatient Medications   Medication Sig    nicotine (NICODERM CQ) 14 mg/24 hr patch 1 Patch by TransDERmal route every twenty-four (24) hours.  meclizine (ANTIVERT) 25 mg tablet Take 1 Tablet by mouth three (3) times daily as needed for Dizziness for up to 10 days.     cetirizine (ZYRTEC) 10 mg tablet Take 1 Tablet by mouth daily.  carvediloL (COREG) 6.25 mg tablet Take 1 Tablet by mouth two (2) times a day.  Eliquis 5 mg tablet TAKEONE TABLET BY MOUTH 2 TIMES A DAY    lovastatin (MEVACOR) 10 mg tablet TAKE ONE TABLET BY MOUTH NIGHTLY    montelukast (SINGULAIR) 10 mg tablet TAKE ONE TABLET BY MOUTH NIGHTLY    glucose blood VI test strips (FreeStyle Lite Strips) strip Patient is to check blood sugar once daily. Dx E11.9    albuterol (PROVENTIL VENTOLIN) 2.5 mg /3 mL (0.083 %) nebu 3 mL by Nebulization route every six (6) hours as needed for Wheezing.  Trelegy Ellipta 100-62.5-25 mcg inhaler Take 1 Puff by inhalation daily.  diclofenac (VOLTAREN) 1 % gel APPLY 1 GRAM TO AFFECTED AREA OF KNEES AND BACK 4 TIMES DAILY    guaiFENesin ER (MUCINEX) 600 mg ER tablet Take 1 Tab by mouth two (2) times a day. (Patient taking differently: Take 600 mg by mouth as needed.)    fluticasone propionate (FLONASE) 50 mcg/actuation nasal spray 2 Sprays by Both Nostrils route daily. (Patient taking differently: 2 Sprays by Both Nostrils route as needed.)    acetaminophen (TYLENOL ARTHRITIS PAIN) 650 mg TbER Take 1 Tab by mouth every eight (8) hours.  multivitamin (ONE A DAY) tablet Take 1 Tab by mouth nightly.  aspirin 81 mg tablet Take 81 mg by mouth daily.  metFORMIN ER (GLUCOPHAGE XR) 500 mg tablet TAKE ONE TABLET BY MOUTH DAILY WITH BREAKFAST (Patient not taking: Reported on 11/11/2021)    budesonide-formoteroL (SYMBICORT) 80-4.5 mcg/actuation HFAA INHALE 2 PUFFS BY INHALATION DAILY (Patient not taking: Reported on 11/11/2021)    ProAir HFA 90 mcg/actuation inhaler Take 2 Puffs by inhalation every six (6) hours as needed for Wheezing. No current facility-administered medications for this visit.      Allergies   Allergen Reactions    Crestor [Rosuvastatin] Other (comments)     Pain in left leg  Causes muscle spasms    Lipitor [Atorvastatin] Other (comments)     Left leg pain  Causes muscle spasms    Xarelto [Rivaroxaban] Other (comments)     Pt states it caused internal bleeding       Review of Systems:  Constitutional: Negative for fatigue, malaise  Resp: Negative for cough, wheezing or SOB  CV: Negative for chest pain, dizziness or palpitations  GI: Negative for nausea or abdominal pain  MS: Negative for acute myalgias or arthralgias   Neuro: Negative for HA, weakness or paresthesia  Psych: Negative for depression or anxiety     Vitals:    12/15/21 1617   BP: 126/66   Pulse: 72   Resp: 20   Temp: 97.8 °F (36.6 °C)   TempSrc: Oral   Weight: 201 lb 3.2 oz (91.3 kg)   Height: 5' 2\" (1.575 m)       Physical Examination:  General: Well developed, well nourished, in no acute distress  Head: Normocephalic, atraumatic  Eyes: Sclera clear, EOMI  Neck: Normal range of motion  Respiratory: symmetrical, unlabored effort  Cardiovascular: Regular rate and rhythm  Extremities: Full range of motion, normal gait  Neurologic: No focal deficits  Psych: Active, alert and oriented. Affect appropriate       ICD-10-CM ICD-9-CM    1. Vertigo  R42 780.4    2. Primary hypertension  I10 401.9    3. Type 2 diabetes mellitus with hyperglycemia, without long-term current use of insulin (HCC)  E11.65 250.00      790.29    4. Simple chronic bronchitis (HCC)  J41.0 491.0    5. Tobacco use  Z72.0 305.1 nicotine (NICODERM CQ) 14 mg/24 hr patch   6. Severe obesity ([address]  E66.01 278.01    7. Hospital discharge follow-up  Z09 V67.59 [place] DISCHARGE MEDS RECONCILED W/ CURRENT OUTPATIENT MED LIST       Plan of care:  Diagnoses were discussed in detail with patient. Medications reviewed and appropriate. Patient to continue current prescribed medications as written. Medication risks/benefits/side effects discussed with patient. All of the patient's questions were addressed and answered to apparent satisfaction. The patient understands and agrees with our plan of care.   The patient knows to call back if they have questions about the plan of care or if symptoms change. The patient received an After-Visit Summary which contains VS, diagnoses, orders, allergy and medication lists. Future Appointments   Date Time Provider Bill Ricardo   1/19/2022  3:00 PM Mini Lim  [facility] BS AMB           Follow-up and Dispositions    · Return in about 4 weeks (around 1/12/2022), or if symptoms worsen or fail to improve.

## 2021-12-22 ENCOUNTER — PATIENT OUTREACH (OUTPATIENT)
Dept: CASE MANAGEMENT | Age: 76
End: 2021-12-22

## 2021-12-22 NOTE — PROGRESS NOTES
Care Transitions Outreach Attempt    Attempted to reach patient for transitions of care follow up. Unable to reach patient. Unable to LVM as VM is not set up. Pt has no current PHI on file. Will call in 11 days. Patient: Shey Leonard Patient : 1945 MRN: [id number]    Last Discharge [facility] Facility       Complaint Diagnosis Description Type Department Provider    21 Dizziness Dizziness . .. ED to Hosp-Admission (Discharged) (ADMIT) Parveen Salazar MD; Perri Chand...             Noted following upcoming appointments from discharge chart review:   [facility] follow up appointment(s):   Future Appointments   Date Time Provider Bill Ricardo   2022  3:00 PM Kulwant Lim MD [facility] BS AMB     Non-[facility] follow up appointment(s): abbie

## 2021-12-29 ENCOUNTER — TELEPHONE (OUTPATIENT)
Dept: FAMILY MEDICINE CLINIC | Age: 76
End: 2021-12-29

## 2021-12-29 NOTE — TELEPHONE ENCOUNTER
Lurdes states pt is having a headache on right side of head for a few weeks now that won't go away and tylenol is not helping. Please advise what else can be used.

## 2021-12-29 NOTE — TELEPHONE ENCOUNTER
Attempted to call [facility] nurse. No answer. Message left. Called patient. Advised her per Dr. Sandy Little:  Daiva Carry you tell them to try Excedrin first.  If that does not work I will try a prescription medication. \"  She verbalized understanding.

## 2022-01-03 ENCOUNTER — PATIENT OUTREACH (OUTPATIENT)
Dept: CASE MANAGEMENT | Age: 77
End: 2022-01-03

## 2022-01-03 NOTE — PROGRESS NOTES
Goals      Attends follow-up appointments as directed. 12/15/2021  Pt confirmed follow up appts and confirmed [facility] who arrived during our telephone encounter. AFP       Knowledge and adherence of prescribed medication (ie. action, side effects, missed dose, etc.).      12/15/2021  Performed medication reconciliation with pt. AFP       Understands red flags post discharge. 12/15/2021  Reviewed red flags with pt who states understanding that she is not to drive post CVA on 73/44/3933. We reviewed s/s of stroke and pt reports that she had extreme dizziness and balance issues with stroke in October 2021. Pt reports that the symptoms for this admission were not as bad. Pt explained that she was taken to [facility] and she went to [facility]. Pt reports that she was at [facility] for CVA in October. Pt is agreeable to a call in 7 days to review medications, COVID19 booster information, findings from follow up appts, and review medications. AFP    1/3/2022  Pt reports that she is treating headache with tylenol and aleve and has not been able to get excederin. Pt reports that headache is over her eyes, in sinus area and pt reports that she needs to follow up with eye doctor at the end of the month. Pt reports that BP has been well controlled and states no medical concerns at this time.   AFP

## 2022-01-07 ENCOUNTER — TELEPHONE (OUTPATIENT)
Dept: FAMILY MEDICINE CLINIC | Age: 77
End: 2022-01-07

## 2022-01-07 NOTE — TELEPHONE ENCOUNTER
Thank you for update.     MD JAGDISH Pillai & JERRY ORDONEZ [facility]  01/07/22

## 2022-01-11 ENCOUNTER — PATIENT OUTREACH (OUTPATIENT)
Dept: CASE MANAGEMENT | Age: 77
End: 2022-01-11

## 2022-01-11 NOTE — PROGRESS NOTES
Goals      Attends follow-up appointments as directed. 12/15/2021  Pt confirmed follow up appts and confirmed [facility] who arrived during our telephone encounter. AFP       Knowledge and adherence of prescribed medication (ie. action, side effects, missed dose, etc.).      12/15/2021  Performed medication reconciliation with pt. AFP       Understands red flags post discharge. 12/15/2021  Reviewed red flags with pt who states understanding that she is not to drive post CVA on 23/35/4155. We reviewed s/s of stroke and pt reports that she had extreme dizziness and balance issues with stroke in October 2021. Pt reports that the symptoms for this admission were not as bad. Pt explained that she was taken to [facility] and she went to [facility]. Pt reports that she was at [facility] for CVA in October. Pt is agreeable to a call in 7 days to review medications, COVID19 booster information, findings from follow up appts, and review medications. AFP    1/3/2022  Pt reports that she is treating headache with tylenol and aleve and has not been able to get excederin. Pt reports that headache is over her eyes, in sinus area and pt reports that she needs to follow up with eye doctor at the end of the month. Pt reports that BP has been well controlled and states no medical concerns at this time. AFP    1/11/22  pt reports that headache has improved with excederin. Pt reports that she has completed OT and PT will be back in a few weeks. Pt reports no concerns but states that there are times when she doesn't feel stable when standing. Pt states that her feet and eyes don't work together sometimes. Pt will follow up with neurology next week and is agreeable to a follow up call after that appt.   AFP

## 2022-01-19 ENCOUNTER — OFFICE VISIT (OUTPATIENT)
Dept: NEUROLOGY | Age: 77
End: 2022-01-19
Payer: MEDICARE

## 2022-01-19 VITALS
DIASTOLIC BLOOD PRESSURE: 79 MMHG | HEART RATE: 74 BPM | HEIGHT: 62 IN | SYSTOLIC BLOOD PRESSURE: 157 MMHG | RESPIRATION RATE: 14 BRPM | BODY MASS INDEX: 37.36 KG/M2 | WEIGHT: 203 LBS

## 2022-01-19 DIAGNOSIS — M54.81 OCCIPITAL NEURALGIA OF RIGHT SIDE: Primary | ICD-10-CM

## 2022-01-19 DIAGNOSIS — H81.10 BENIGN PAROXYSMAL POSITIONAL VERTIGO, UNSPECIFIED LATERALITY: ICD-10-CM

## 2022-01-19 DIAGNOSIS — E66.01 SEVERE OBESITY (BMI 35.0-35.9 WITH COMORBIDITY) (HCC): ICD-10-CM

## 2022-01-19 PROBLEM — G40.89 OTHER SEIZURES (HCC): Status: ACTIVE | Noted: 2022-01-19

## 2022-01-19 PROBLEM — R56.9 UNSPECIFIED CONVULSIONS (HCC): Status: ACTIVE | Noted: 2022-01-19

## 2022-01-19 PROCEDURE — G8417 CALC BMI ABV UP PARAM F/U: HCPCS | Performed by: PSYCHIATRY & NEUROLOGY

## 2022-01-19 PROCEDURE — 3017F COLORECTAL CA SCREEN DOC REV: CPT | Performed by: PSYCHIATRY & NEUROLOGY

## 2022-01-19 PROCEDURE — 99214 OFFICE O/P EST MOD 30 MIN: CPT | Performed by: PSYCHIATRY & NEUROLOGY

## 2022-01-19 PROCEDURE — G8754 DIAS BP LESS 90: HCPCS | Performed by: PSYCHIATRY & NEUROLOGY

## 2022-01-19 PROCEDURE — G8753 SYS BP > OR = 140: HCPCS | Performed by: PSYCHIATRY & NEUROLOGY

## 2022-01-19 PROCEDURE — G8536 NO DOC ELDER MAL SCRN: HCPCS | Performed by: PSYCHIATRY & NEUROLOGY

## 2022-01-19 PROCEDURE — G8510 SCR DEP NEG, NO PLAN REQD: HCPCS | Performed by: PSYCHIATRY & NEUROLOGY

## 2022-01-19 PROCEDURE — G8399 PT W/DXA RESULTS DOCUMENT: HCPCS | Performed by: PSYCHIATRY & NEUROLOGY

## 2022-01-19 PROCEDURE — G8427 DOCREV CUR MEDS BY ELIG CLIN: HCPCS | Performed by: PSYCHIATRY & NEUROLOGY

## 2022-01-19 PROCEDURE — 1101F PT FALLS ASSESS-DOCD LE1/YR: CPT | Performed by: PSYCHIATRY & NEUROLOGY

## 2022-01-19 PROCEDURE — 1090F PRES/ABSN URINE INCON ASSESS: CPT | Performed by: PSYCHIATRY & NEUROLOGY

## 2022-01-19 RX ORDER — IPRATROPIUM BROMIDE 0.5 MG/2.5ML
SOLUTION RESPIRATORY (INHALATION)
COMMUNITY
Start: 2021-11-02 | End: 2022-01-19

## 2022-01-19 RX ORDER — BUDESONIDE 0.5 MG/2ML
INHALANT ORAL
COMMUNITY
Start: 2021-11-02 | End: 2022-01-19

## 2022-01-19 RX ORDER — OXCARBAZEPINE 300 MG/1
300 TABLET, FILM COATED ORAL
Qty: 30 TABLET | Refills: 3 | Status: SHIPPED | OUTPATIENT
Start: 2022-01-19 | End: 2022-04-04 | Stop reason: SDUPTHER

## 2022-01-19 NOTE — PROGRESS NOTES
[address] [facility] and [address] at [facility] at [address] [facility], [address] [place] [address] [address] [facility], [address] [place] [address]   [phone]              Chief Complaint   Patient presents with   [facility] Patient     hosp follow up r/t Hx CVA, , R sided weakness, unsteadiness     Headache     daily HA since CVA in Oct // using PRN Tylenol, Aleve, Excedrin      Current Outpatient Medications   Medication Sig Dispense Refill    lovastatin (MEVACOR) 10 mg tablet TAKE ONE TABLET BY MOUTH NIGHTLY 90 Tablet 1    ProAir HFA 90 mcg/actuation inhaler TAKE 2 PUFFS EVERY 6 HOURS AS NEEDED FOR WHEEZING 8.5 g 2    Eliquis 5 mg tablet TAKE ONE TABLET BY MOUTH 2 TIMES A  Tablet 1    montelukast (SINGULAIR) 10 mg tablet TAKE ONE TABLET BY MOUTH NIGHTLY 90 Tablet 1    cetirizine (ZYRTEC) 10 mg tablet Take 1 Tablet by mouth daily. 30 Tablet 0    carvediloL (COREG) 6.25 mg tablet Take 1 Tablet by mouth two (2) times a day. 180 Tablet 1    glucose blood VI test strips (FreeStyle Lite Strips) strip Patient is to check blood sugar once daily. Dx E11.9 100 Strip 2    albuterol (PROVENTIL VENTOLIN) 2.5 mg /3 mL (0.083 %) nebu 3 mL by Nebulization route every six (6) hours as needed for Wheezing. 75 mL 4    Trelegy Ellipta 100-62.5-25 mcg inhaler Take 1 Puff by inhalation daily. 1 Inhaler 2    diclofenac (VOLTAREN) 1 % gel APPLY 1 GRAM TO AFFECTED AREA OF KNEES AND BACK 4 TIMES DAILY 300 g 5    guaiFENesin ER (MUCINEX) 600 mg ER tablet Take 1 Tab by mouth two (2) times a day. (Patient taking differently: Take 600 mg by mouth as needed.) 60 Tab 1    fluticasone propionate (FLONASE) 50 mcg/actuation nasal spray 2 Sprays by Both Nostrils route daily.  (Patient taking differently: 2 Sprays by Both Nostrils route as needed.) 1 Bottle 2    acetaminophen (TYLENOL ARTHRITIS PAIN) 650 mg TbER Take 1 Tab by mouth every eight (8) hours. 60 Tab 0    multivitamin (ONE A DAY) tablet Take 1 Tab by mouth nightly.  aspirin 81 mg tablet Take 81 mg by mouth daily. Allergies   Allergen Reactions    Crestor [Rosuvastatin] Other (comments)     Pain in left leg  Causes muscle spasms    Lipitor [Atorvastatin] Other (comments)     Left leg pain  Causes muscle spasms    Xarelto [Rivaroxaban] Other (comments)     Pt states it caused internal bleeding     Past Medical History:   Diagnosis Date    Asthma     Bronchitis     Cancer ([facility] [address]     breast - left    COPD (chronic obstructive pulmonary disease) ([facility] [address]     [facility]; pulmonary nodule 7mm stable (found )    Diabetes ([facility] [address]     Hepatitis age 11    High cholesterol     Hypertension     Obesity (BMI 30-39. 9)     Thromboembolus ([facility] [address]     L LE with PE     Social History     Tobacco Use    Smoking status: Former Smoker     Types: Cigarettes     Quit date:      Years since quittin.0    Smokeless tobacco: Never Used   Substance Use Topics    Alcohol use: Yes     Comment: social// rare    Drug use: No   68-year-old lady comes today for follow-up after being seen by our team when hospitalized at [facility] for dizziness and headache. She reports that she is getting home physical therapy and they are doing vestibular type exercises. She continues to have headaches that are unremitting. Right posterior head region radiating forward. Sometimes a sharp shooting pain. No focal deficits. Record review finds patient was seen by Dr. Iam Romero and hospitalized in 2021. MRI with nothing acute specifically no stroke. LDL was 75. She was continued on Eliquis and her statin. At that time she came in complaining of right-sided headache dizziness and ataxia. She had been to [facility] having a head CT that was unremarkable. She was given meclizine that did not help.     Laboratory analysis includes TSH normal  Toxicology screen negative  RPR nonreactive  Folate normal  B12 normal at 487    CT of the head unremarkable  MRI of the brain unremarkable      Examination  Visit Vitals  BP (!) 157/79 (BP 1 Location: Left upper arm, BP Patient Position: Sitting)   Pulse 74   Resp 14   Ht 5' 2\" (1.575 m)   Wt 92.1 kg (203 lb)   BMI 37.13 kg/m²     She is a very pleasant lady. She is awake alert and oriented. She has normal speech and normal language. Normal cognition. Cranial nerves are intact. No pronation drift or abnormal movement. Strength is full in the upper and lower extremities to direct testing. Reflexes symmetrical.  No ataxia. Palpation of the left occipital notch finds no discomfort. Palpation of the right occipital groove reproduces pain    Impression/Plan  Occipital neuralgia with symptoms worsened  Trileptal 300 mg nightly and escalate to 300 mg twice daily if needed    Dizziness likely benign positional vertigo  Continue with vestibular therapy    Follow-up in about 8 weeks    Suzi Godinez MD        This note was created using voice recognition software. Despite editing, there may be syntax errors.

## 2022-01-20 ENCOUNTER — PATIENT OUTREACH (OUTPATIENT)
Dept: CASE MANAGEMENT | Age: 77
End: 2022-01-20

## 2022-01-20 NOTE — PROGRESS NOTES
Patient has graduated from the Transitions of Care Coordination  program on 1/20/2022. Patient/family has the ability to self-manage at this time Care management goals have been completed. Patient was not referred to the [facility] team for further management. Goals Addressed                 This Visit's Progress     COMPLETED: Attends follow-up appointments as directed. 12/15/2021  Pt confirmed follow up appts and confirmed [place] who arrived during our telephone encounter. AFP    1/20/2022  per chart review, pt attended neurology follow up appt. AFP       COMPLETED: Knowledge and adherence of prescribed medication (ie. action, side effects, missed dose, etc.).        12/15/2021  Performed medication reconciliation with pt. AFP       COMPLETED: Understands red flags post discharge. 12/15/2021  Reviewed red flags with pt who states understanding that she is not to drive post CVA on 35/85/7629. We reviewed s/s of stroke and pt reports that she had extreme dizziness and balance issues with stroke in October 2021. Pt reports that the symptoms for this admission were not as bad. Pt explained that she was taken to [facility] and she went to [facility]. Pt reports that she was at [facility] for CVA in October. Pt is agreeable to a call in 7 days to review medications, COVID19 booster information, findings from follow up appts, and review medications. AFP    1/3/2022  Pt reports that she is treating headache with tylenol and aleve and has not been able to get excederin. Pt reports that headache is over her eyes, in sinus area and pt reports that she needs to follow up with eye doctor at the end of the month. Pt reports that BP has been well controlled and states no medical concerns at this time. AFP    1/11/22  pt reports that headache has improved with excederin. Pt reports that she has completed OT and PT will be back in a few weeks.   Pt reports no concerns but states that there are times when she doesn't feel stable when standing. Pt states that her feet and eyes don't work together sometimes. Pt will follow up with neurology next week and is agreeable to a follow up call after that appt. AFP            Patient has Care Transition Nurse's contact information for any further questions, concerns, or needs.   Patients upcoming visits:    Future Appointments   Date Time Provider Bill Ricardo   4/4/2022  3:30 PM Mariposa Lim MD [facility] BS AMB

## 2022-01-24 ENCOUNTER — PATIENT MESSAGE (OUTPATIENT)
Dept: FAMILY MEDICINE CLINIC | Age: 77
End: 2022-01-24

## 2022-01-24 RX ORDER — MECLIZINE HYDROCHLORIDE 25 MG/1
25 TABLET ORAL
Qty: 90 TABLET | Refills: 0 | Status: SHIPPED | OUTPATIENT
Start: 2022-01-24 | End: 2022-01-26 | Stop reason: SDUPTHER

## 2022-01-25 NOTE — TELEPHONE ENCOUNTER
From: Esha Carr  To: Louis Flores MD  Sent: 1/24/2022 12:20 PM EST  Subject: Meclizine    Dr ANTONIO please send a prescription for 25 mg Meclizine to [facility] for me.  Thanks

## 2022-01-26 ENCOUNTER — PATIENT MESSAGE (OUTPATIENT)
Dept: NEUROLOGY | Age: 77
End: 2022-01-26

## 2022-01-26 DIAGNOSIS — H81.10 BENIGN PAROXYSMAL POSITIONAL VERTIGO, UNSPECIFIED LATERALITY: Primary | ICD-10-CM

## 2022-01-26 RX ORDER — MECLIZINE HYDROCHLORIDE 25 MG/1
25 TABLET ORAL
Qty: 90 TABLET | Refills: 0 | Status: SHIPPED | OUTPATIENT
Start: 2022-01-26 | End: 2022-05-23 | Stop reason: SDUPTHER

## 2022-01-26 NOTE — TELEPHONE ENCOUNTER
Romie Rome MD 1/26/2022 10:43 AM EST    Break in half  Antivert is ok  ----- Message -----  From: Zenia López RN  Sent: 1/26/2022 10:12 AM EST  To: Jasmine Baumgarten, MD  Subject: Bryan Weber: Trileptal and Meclizine       ----- Message -----  From: Mallory Duncan  Sent: 1/26/2022 9:25 AM EST  To: [facility] Nurse [place]  Subject: Trileptal and Meclizine     The 300mg Trileptal that you prescribe to take one at night makes me so sleepy the next day all I wanna do is sleep. Should I continue to take the 300 mg or maybe try cutting it in half? Could you please send me a prescription for Meclizine 25 mg to [facility]. I asked about A prescription when I was there but it didnt come through with the new prescription.   Thank you

## 2022-02-05 ENCOUNTER — APPOINTMENT (OUTPATIENT)
Dept: GENERAL RADIOLOGY | Age: 77
End: 2022-02-05
Attending: EMERGENCY MEDICINE
Payer: MEDICARE

## 2022-02-05 ENCOUNTER — APPOINTMENT (OUTPATIENT)
Dept: CT IMAGING | Age: 77
End: 2022-02-05
Attending: EMERGENCY MEDICINE
Payer: MEDICARE

## 2022-02-05 ENCOUNTER — HOSPITAL ENCOUNTER (EMERGENCY)
Age: 77
Discharge: HOME | End: 2022-02-05
Attending: EMERGENCY MEDICINE
Payer: MEDICARE

## 2022-02-05 VITALS
BODY MASS INDEX: 35.44 KG/M2 | OXYGEN SATURATION: 92 % | RESPIRATION RATE: 24 BRPM | SYSTOLIC BLOOD PRESSURE: 182 MMHG | WEIGHT: 200 LBS | HEIGHT: 63 IN | HEART RATE: 95 BPM | TEMPERATURE: 97.7 F | DIASTOLIC BLOOD PRESSURE: 89 MMHG

## 2022-02-05 DIAGNOSIS — J44.1 COPD EXACERBATION (HCC): Primary | ICD-10-CM

## 2022-02-05 LAB
BASOPHILS # BLD: 0.1 K/UL (ref 0–0.1)
BASOPHILS NFR BLD: 1 % (ref 0–1)
BNP SERPL-MCNC: 1144 PG/ML
D DIMER PPP FEU-MCNC: 0.76 MG/L FEU (ref 0–0.65)
DIFFERENTIAL METHOD BLD: ABNORMAL
EOSINOPHIL # BLD: 0.3 K/UL (ref 0–0.4)
EOSINOPHIL NFR BLD: 4 % (ref 0–7)
ERYTHROCYTE [DISTWIDTH] IN BLOOD BY AUTOMATED COUNT: 13.8 % (ref 11.5–14.5)
HCT VFR BLD AUTO: 38.8 % (ref 35–47)
HGB BLD-MCNC: 12.5 G/DL (ref 11.5–16)
IMM GRANULOCYTES # BLD AUTO: 0 K/UL (ref 0–0.04)
IMM GRANULOCYTES NFR BLD AUTO: 0 % (ref 0–0.5)
LIPASE SERPL-CCNC: 63 U/L (ref 73–393)
LYMPHOCYTES # BLD: 0.8 K/UL (ref 0.8–3.5)
LYMPHOCYTES NFR BLD: 12 % (ref 12–49)
MCH RBC QN AUTO: 25.3 PG (ref 26–34)
MCHC RBC AUTO-ENTMCNC: 32.2 G/DL (ref 30–36.5)
MCV RBC AUTO: 78.4 FL (ref 80–99)
MONOCYTES # BLD: 0.3 K/UL (ref 0–1)
MONOCYTES NFR BLD: 5 % (ref 5–13)
NEUTS SEG # BLD: 5.2 K/UL (ref 1.8–8)
NEUTS SEG NFR BLD: 78 % (ref 32–75)
NRBC # BLD: 0 K/UL (ref 0–0.01)
NRBC BLD-RTO: 0 PER 100 WBC
PLATELET # BLD AUTO: 333 K/UL (ref 150–400)
PMV BLD AUTO: 9.8 FL (ref 8.9–12.9)
RBC # BLD AUTO: 4.95 M/UL (ref 3.8–5.2)
TROPONIN-HIGH SENSITIVITY: 5 NG/L (ref 0–51)
TROPONIN-HIGH SENSITIVITY: 8 NG/L (ref 0–51)
WBC # BLD AUTO: 6.6 K/UL (ref 3.6–11)

## 2022-02-05 PROCEDURE — 99283 EMERGENCY DEPT VISIT LOW MDM: CPT

## 2022-02-05 PROCEDURE — 85379 FIBRIN DEGRADATION QUANT: CPT

## 2022-02-05 PROCEDURE — 85025 COMPLETE CBC W/AUTO DIFF WBC: CPT

## 2022-02-05 PROCEDURE — 74011000636 HC RX REV CODE- 636: Performed by: STUDENT IN AN ORGANIZED HEALTH CARE EDUCATION/TRAINING PROGRAM

## 2022-02-05 PROCEDURE — 84484 ASSAY OF TROPONIN QUANT: CPT

## 2022-02-05 PROCEDURE — 83690 ASSAY OF LIPASE: CPT

## 2022-02-05 PROCEDURE — 71046 X-RAY EXAM CHEST 2 VIEWS: CPT

## 2022-02-05 PROCEDURE — 36415 COLL VENOUS BLD VENIPUNCTURE: CPT

## 2022-02-05 PROCEDURE — 80053 COMPREHEN METABOLIC PANEL: CPT

## 2022-02-05 PROCEDURE — 83880 ASSAY OF NATRIURETIC PEPTIDE: CPT

## 2022-02-05 PROCEDURE — 71275 CT ANGIOGRAPHY CHEST: CPT

## 2022-02-05 PROCEDURE — 83735 ASSAY OF MAGNESIUM: CPT

## 2022-02-05 RX ORDER — PREDNISONE 20 MG/1
40 TABLET ORAL DAILY
Qty: 10 TABLET | Refills: 0 | Status: SHIPPED | OUTPATIENT
Start: 2022-02-05 | End: 2022-02-10

## 2022-02-05 RX ORDER — ALBUTEROL SULFATE 0.83 MG/ML
2.5 SOLUTION RESPIRATORY (INHALATION)
Qty: 30 NEBULE | Refills: 0 | Status: SHIPPED | OUTPATIENT
Start: 2022-02-05 | End: 2022-03-07

## 2022-02-05 RX ADMIN — IOPAMIDOL 65 ML: 755 INJECTION, SOLUTION INTRAVENOUS at 17:04

## 2022-02-05 NOTE — ED TRIAGE NOTES
Pt seen at [facility] today was given Prednisone and sent home, she has had SOB for the past couple of days that is getting worse.

## 2022-02-05 NOTE — ED PROVIDER NOTES
21-year-old female with a history of COPD, PE, diabetes, hypertension, hypercholesterolemia and breast CA presents with a chief complaint of shortness of breath. Patient has been getting progressively short of breath over the last several days. She does use nebulizer machine at home. She was seen earlier today at the [facility] and discharged with a course of steroids. She denies fevers, chills, chest pain, abdominal pain, GI or urinary symptoms           Past Medical History:   Diagnosis Date    Asthma     Bronchitis     Cancer ([address]     breast - left    COPD (chronic obstructive pulmonary disease) ([address]     [facility]; pulmonary nodule 7mm stable (found )    Diabetes ([address]     Hepatitis age 11    High cholesterol     Hypertension     Obesity (BMI 30-39. 9)     Thromboembolus ([address]     L LE with PE       Past Surgical History:   Procedure Laterality Date    COLONOSCOPY N/A 2017    COLONOSCOPY- no info to   performed by Madie Lackey MD at [address] [place] [address] HX GYN      ablation, R ovary removed, tubal ligation    HX HAMMER TOE REPAIR      HX HEENT      tonsillectomy    HX ORTHOPAEDIC      L hip, R leg, C4-C5 fusion, L foot    HX OTHER SURGICAL      xiphoid removal    IR PLC IVC FILTER  2019    ME BREAST SURGERY PROCEDURE UNLISTED      L mastectomy         Family History:   Problem Relation Age of Onset    Heart Attack Father     Diabetes Mother        Social History     Socioeconomic History    Marital status: SINGLE     Spouse name: Not on file    Number of children: Not on file    Years of education: Not on file    Highest education level: Not on file   Occupational History    Not on file   Tobacco Use    Smoking status: Former Smoker     Types: Cigarettes     Quit date:      Years since quittin.1    Smokeless tobacco: Never Used   Substance and Sexual Activity    Alcohol use: Yes     Comment: social// rare    Drug use: No    Sexual activity: Never   Other Topics Concern    Caffeine Concern Not Asked    Back Care Not Asked    Exercise Not Asked    Occupational Exposure Not Asked    Sleep Concern Not Asked    Stress Concern Not Asked    Weight Concern Not Asked   Social History Narrative    Not on file     Social Determinants of Health     Financial Resource Strain:     Difficulty of Paying Living Expenses: Not on file   Food Insecurity:     Worried About Running Out of Food in the Last Year: Not on file    Jason of Food in the Last Year: Not on file   Transportation Needs:     Lack of Transportation (Medical): Not on file    Lack of Transportation (Non-Medical): Not on file   Physical Activity:     Days of Exercise per Week: Not on file    Minutes of Exercise per Session: Not on file   Stress:     Feeling of Stress : Not on file   Social Connections:     Frequency of Communication with Friends and Family: Not on file    Frequency of Social Gatherings with Friends and Family: Not on file    Attends Buddhist Services: Not on file    Active Member of [address] [place] [address] or Organizations: Not on file    Attends Club or Organization Meetings: Not on file    Marital Status: Not on file   Intimate Partner Violence:     Fear of Current or Ex-Partner: Not on file    Emotionally Abused: Not on file    Physically Abused: Not on file    Sexually Abused: Not on file   Housing Stability:     Unable to Pay for Housing in the Last Year: Not on file    Number of [place] in the Last Year: Not on file    Unstable Housing in the Last Year: Not on file         ALLERGIES: Crestor [rosuvastatin], Lipitor [atorvastatin], and Xarelto [rivaroxaban]    Review of Systems   Constitutional: Negative for fever. HENT: Negative for rhinorrhea. Respiratory: Positive for shortness of breath. Cardiovascular: Negative for chest pain. Gastrointestinal: Negative for abdominal pain. Genitourinary: Negative for dysuria.    Musculoskeletal: Negative for back pain. Skin: Negative for wound. Neurological: Negative for headaches. Psychiatric/Behavioral: Negative for confusion. There were no vitals filed for this visit. Physical Exam  Vitals and nursing note reviewed. Constitutional:       General: She is not in acute distress. Appearance: Normal appearance. She is well-developed. She is not ill-appearing, toxic-appearing or diaphoretic. HENT:      Head: Normocephalic and atraumatic. Eyes:      Extraocular Movements: Extraocular movements intact. Cardiovascular:      Rate and Rhythm: Tachycardia present. Pulses: Normal pulses. Pulmonary:      Effort: Pulmonary effort is normal. No respiratory distress. Breath sounds: Normal breath sounds. Abdominal:      General: There is no distension. Musculoskeletal:         General: Normal range of motion. Cervical back: Normal range of motion. Skin:     General: Skin is warm and dry. Neurological:      Mental Status: She is alert and oriented to person, place, and time. Psychiatric:         Mood and Affect: Mood normal.          MDM  Number of Diagnoses or Management Options  COPD exacerbation ([facility]  Diagnosis management comments: Patient presents with shortness of breath. Differentials include but are not limited to COPD exacerbation, pneumonia, PE. Labs were obtained and show elevated D-dimer. CTA shows no PE. Work-up is otherwise unremarkable. Patient will be treated as COPD exacerbation with p.o. steroids and breathing treatments. Patient advised to follow-up with her pulmonologist within the next week. Discussed my clinical impression(s), any labs and/or radiology results with the patient. I answered any questions and addressed any concerns. Discussed the importance of following up with their primary care physician and/or specialist(s). Discussed signs or symptoms that would warrant return back to the ER for further evaluation.  The patient is agreeable with discharge.        Amount and/or Complexity of Data Reviewed  Clinical lab tests: ordered and reviewed  Tests in the radiology section of CPT®: ordered and reviewed           Procedures

## 2022-02-06 LAB
ALBUMIN SERPL-MCNC: 3.3 G/DL (ref 3.5–5)
ALBUMIN/GLOB SERPL: 0.7 {RATIO} (ref 1.1–2.2)
ALP SERPL-CCNC: 123 U/L (ref 45–117)
ALT SERPL-CCNC: 15 U/L (ref 12–78)
ANION GAP SERPL CALC-SCNC: 8 MMOL/L (ref 5–15)
AST SERPL-CCNC: 15 U/L (ref 15–37)
BILIRUB SERPL-MCNC: 0.4 MG/DL (ref 0.2–1)
BUN SERPL-MCNC: 13 MG/DL (ref 6–20)
BUN/CREAT SERPL: 12 (ref 12–20)
CALCIUM SERPL-MCNC: 9.5 MG/DL (ref 8.5–10.1)
CHLORIDE SERPL-SCNC: 109 MMOL/L (ref 97–108)
CO2 SERPL-SCNC: 24 MMOL/L (ref 21–32)
CREAT SERPL-MCNC: 1.08 MG/DL (ref 0.55–1.02)
GLOBULIN SER CALC-MCNC: 4.7 G/DL (ref 2–4)
GLUCOSE SERPL-MCNC: 216 MG/DL (ref 65–100)
MAGNESIUM SERPL-MCNC: 2.3 MG/DL (ref 1.6–2.4)
POTASSIUM SERPL-SCNC: 4.2 MMOL/L (ref 3.5–5.1)
PROT SERPL-MCNC: 8 G/DL (ref 6.4–8.2)
SODIUM SERPL-SCNC: 141 MMOL/L (ref 136–145)

## 2022-02-06 NOTE — DISCHARGE INSTRUCTIONS
Thank you for allowing us to provide you with medical care today. We realize that you have many choices for your emergency care needs. We thank you for choosing [facility]. Please choose us in the future for any continued health care needs. The exam and treatment you received in the Emergency Department were for an emergent problem and are not intended as complete care. It is important that you follow up with a doctor, nurse practitioner, or physician's assistant for ongoing care. If your symptoms worsen or you do not improve as expected and you are unable to reach your usual health care provider, you should return to the Emergency Department. We are available 24 hours a day. Please make an appointment with your health care provider(s) for follow up of your Emergency Department visit. Take this sheet with you when you go to your follow-up visit.

## 2022-02-10 ENCOUNTER — PATIENT OUTREACH (OUTPATIENT)
Dept: CASE MANAGEMENT | Age: 77
End: 2022-02-10

## 2022-02-10 NOTE — PROGRESS NOTES
Ambulatory Care Management Note    Date/Time:  2/10/2022 3:32 PM    This patient was received as a referral from Daily assignment. Ambulatory Care Manager outreached to patient today to offer care management services. Introduction to self and role of care manager provided. Patient accepted care management services at this time. Follow up call scheduled at this time. Patient has Ambulatory Care Manager's contact number for for any questions or concerns.

## 2022-02-21 ENCOUNTER — PATIENT OUTREACH (OUTPATIENT)
Dept: CASE MANAGEMENT | Age: 77
End: 2022-02-21

## 2022-02-21 NOTE — PROGRESS NOTES
Ambulatory Care Management Note    Date/Time:  2/21/2022 4:19 PM    This Ambulatory Care Manager (ACM) reviewed and updated the following screenings during this call; general assessment, SDOH assessments, ACP assessment and note and medication reconciliation     Patient's challenges to self-management identified:   financial      Medication Management:  good adherence and good understanding  Med rec during this call  Current Outpatient Medications   Medication Sig    albuterol (PROVENTIL VENTOLIN) 2.5 mg /3 mL (0.083 %) nebu 3 mL by Nebulization route every four (4) hours as needed for Wheezing for up to 30 days.  meclizine (ANTIVERT) 25 mg tablet Take 1 Tablet by mouth three (3) times daily as needed for Dizziness.  lovastatin (MEVACOR) 10 mg tablet TAKE ONE TABLET BY MOUTH NIGHTLY    ProAir HFA 90 mcg/actuation inhaler TAKE 2 PUFFS EVERY 6 HOURS AS NEEDED FOR WHEEZING    Eliquis 5 mg tablet TAKE ONE TABLET BY MOUTH 2 TIMES A DAY    montelukast (SINGULAIR) 10 mg tablet TAKE ONE TABLET BY MOUTH NIGHTLY    carvediloL (COREG) 6.25 mg tablet Take 1 Tablet by mouth two (2) times a day.  glucose blood VI test strips (FreeStyle Lite Strips) strip Patient is to check blood sugar once daily. Dx E11.9    albuterol (PROVENTIL VENTOLIN) 2.5 mg /3 mL (0.083 %) nebu 3 mL by Nebulization route every six (6) hours as needed for Wheezing.  Trelegy Ellipta 100-62.5-25 mcg inhaler Take 1 Puff by inhalation daily.  diclofenac (VOLTAREN) 1 % gel APPLY 1 GRAM TO AFFECTED AREA OF KNEES AND BACK 4 TIMES DAILY    guaiFENesin ER (MUCINEX) 600 mg ER tablet Take 1 Tab by mouth two (2) times a day. (Patient taking differently: Take 600 mg by mouth as needed.)    acetaminophen (TYLENOL ARTHRITIS PAIN) 650 mg TbER Take 1 Tab by mouth every eight (8) hours.  multivitamin (ONE A DAY) tablet Take 1 Tab by mouth nightly.  aspirin 81 mg tablet Take 81 mg by mouth daily.     OXcarbazepine (TRILEPTAL) 300 mg tablet Take 1 Tablet by mouth nightly. (Patient not taking: Reported on 2/21/2022)    cetirizine (ZYRTEC) 10 mg tablet Take 1 Tablet by mouth daily. (Patient not taking: Reported on 2/21/2022)    fluticasone propionate (FLONASE) 50 mcg/actuation nasal spray 2 Sprays by Both Nostrils route daily. (Patient not taking: Reported on 2/21/2022)     No current facility-administered medications for this visit. There are no discontinued medications. NOTE regarding Trileptal   Patient reports that she is not currently taking; reports that this medication was prescribed by Dr. Mateo Noriega post-stroke for headaches and dizziness. Patient reports that she stopped taking this medication a couple of weeks ago due to causing fatigue; states, \"I just slept, that's just all I could do was sleep when I took that. \" Patient is advised to contact her Neurologist to notify. Advance Care Planning:   Does patient have an Advance Directive:  currently not on file; education provided . Patient is interested in additional information; literature sent to patient via [employer]. [employer], Referrals, and Durable Medical Equipment: [facility] [address] - SN, PT and OT.   DME: [employer]; primarily uses the cane with ambulation. She has a bedside commode, however she does not use it. Health Maintenance Due   Topic Date Due    DTaP/Tdap/Td series (1 - Tdap) 10/12/2011    Breast Cancer Screen Mammogram  07/14/2018    Eye Exam Retinal or Dilated  01/07/2021    COVID-19 Vaccine (3 - Booster for Pavithra Carr series) 08/30/2021     Health Maintenance Reviewed:   DTap  Breast Cancer Screen Mammogram: due; pt reports last completed several years ago. Eye Exam Retinal or Dilated: Pt reports completed on 1/28/22 at [facility] (Dr. Faith Duarte). COVID-19 Vaccine (3-Booster for Moderna series): Due; patient reports plan to schedule an appointment with her local pharmacy.         Goals Addressed                 This Visit's Progress       Chronic Disease     Advance Care Planning   On track     2/21/2022   ACP/AMD not on file. Patient is interested in additional information and completing an Advance Medical Directive; literature sent to patient via [employer] today.  Plan for next ACM outreach in approximately 7-14 days for follow-up.  Identification of barriers to adherence to a plan of care such as inability to afford medications, lack of insurance, lack of transportation, etc.   On track     2/22/2022   Referral to Ambulatory PharmD by this AC today. 2/21/2022   Trelegy Ellipta and Eliquis each have a copay of $160 per month when she is in the donut hole (she reports that she usually reaches the donut hole in April); her copay for these medications is usually $10 each for a one-month supply. Patient will be unable to take both of these medications once she reaches the donut hole due to cost barrier and reports that she will have to choose between these two medications. She notes that in the past she has had to choose which one of these medications to pick-up from the pharmacy and she chose the Eliquis; her pulmonologist gives her samples of Trelegy Ellipta, when available.  Patient reports that she does currently have both Trelegy Ellipta and Eliquis and is taking both medications as ordered.  Patient is agreeable to Referral to Ambulatory PharmD; re: Royce Pinto and Eliquis cost barrier   Plan for next ACM outreach in 7-14 days.               PCP/Specialist follow up:   Future Appointments   Date Time Provider Bill Ricardo   4/4/2022  3:30 PM Kelsea Lim MD [facility]

## 2022-02-21 NOTE — ACP (ADVANCE CARE PLANNING)
Does patient have an Advance Directive:  currently not on file; education provided . Patient is interested in additional information; literature sent to patient via [employer] today.

## 2022-03-04 ENCOUNTER — TELEPHONE (OUTPATIENT)
Dept: INTERNAL MEDICINE CLINIC | Age: 77
End: 2022-03-04

## 2022-03-04 NOTE — TELEPHONE ENCOUNTER
Called patient to discuss the cost of her medications. Patient states that she is unable to afford both Eliquis and Trelegy once she's in the donut hole and has traditionally just stopped taking the medications. Advised patient that there may be options for patient assistance. Patient states that she usually does not spend enough to qualify for these. Advised I would look into our options a little closer to see what could be done. Patient has her medication right now. Will touch base in ~ 1 week after I return from PTO with more information for patient.     Afsaneh Toro, PharmD, BCPS, [facility] [address] in place: No   Recommendation Provided To: Patient/Caregiver: 1 via Telephone   Intervention Detail: Patient Access Assistance/Sample Provided   Intervention Accepted By: Patient/Caregiver: 1   Time Spent (min): 15

## 2022-03-15 ENCOUNTER — TELEPHONE (OUTPATIENT)
Dept: FAMILY MEDICINE CLINIC | Age: 77
End: 2022-03-15

## 2022-03-15 NOTE — TELEPHONE ENCOUNTER
Pt is having 2 teeth removed, Jo Ann at Dr Suma Carrillo needs to know what meds she should stop and for how long. Please advise.

## 2022-03-16 NOTE — TELEPHONE ENCOUNTER
Patient will need to hold the eliquis for 2 days prior to the procedure and restart the day after the procedure. She is also on Aspirin. If the Dentist feels they need to hold this they can but they need to stop that 7 days before to get the best affect and restart that 1 day after the procedure as well.     MD JAGDISH Brown & JERRY ORDONEZ [facility]  03/16/22

## 2022-03-16 NOTE — TELEPHONE ENCOUNTER
Spoke with Sayra Townsend at Dr. Gud[facility] office. Informed her per . Patient will need to hold the eliquis for 2 days prior to the procedure and restart the day after the procedure.  She is also on Aspirin.  If the Dentist feels they need to hold this they can but they need to stop that 7 days before to get the best affect and restart that 1 day after the procedure as well.

## 2022-03-18 PROBLEM — Z79.01 CHRONIC ANTICOAGULATION: Status: ACTIVE | Noted: 2019-03-01

## 2022-03-18 PROBLEM — I82.402 ACUTE DEEP VEIN THROMBOSIS (DVT) OF LEFT LOWER EXTREMITY (HCC): Status: ACTIVE | Noted: 2019-02-07

## 2022-03-18 PROBLEM — E66.01 SEVERE OBESITY (HCC): Status: ACTIVE | Noted: 2019-03-01

## 2022-03-18 PROBLEM — R56.9 UNSPECIFIED CONVULSIONS (HCC): Status: ACTIVE | Noted: 2022-01-19

## 2022-03-19 PROBLEM — Z87.19 HISTORY OF GI BLEED: Status: ACTIVE | Noted: 2019-04-16

## 2022-03-19 PROBLEM — Z86.73 HISTORY OF STROKE: Status: ACTIVE | Noted: 2021-11-11

## 2022-03-19 PROBLEM — G40.89 OTHER SEIZURES (HCC): Status: ACTIVE | Noted: 2022-01-19

## 2022-03-19 PROBLEM — D05.10 DCIS (DUCTAL CARCINOMA IN SITU) OF BREAST: Status: ACTIVE | Noted: 2019-03-08

## 2022-03-21 ENCOUNTER — NURSE TRIAGE (OUTPATIENT)
Dept: OTHER | Facility: CLINIC | Age: 77
End: 2022-03-21

## 2022-03-21 NOTE — TELEPHONE ENCOUNTER
Received call from Joanne Luong at [facility] with Red Flag Complaint. Subjective: Caller states \"my blood sugar is running high because I have been on prednisone for a couple weeks. \"     Current Symptoms: BS was 180 this morning fasting, fatigue (chronic). She was on Prednisone for COPD. Her last dose was last week. Onset: 2 weeks ago; unchanged    Associated Symptoms: NA    Pain Severity: 0/10; Temperature: denies    What has been tried: NA    LMP: NA Pregnant: NA    Recommended disposition: Home Care- sending to PCP. Patient is wondering if she needs to be started on Metformin     Care advice provided, patient verbalizes understanding; denies any other questions or concerns; instructed to call back for any new or worsening symptoms. Attention Provider: Thank you for allowing me to participate in the care of your patient. The patient was connected to triage in response to information provided to the [facility]. Please do not respond through this encounter as the response is not directed to a shared pool.       Reason for Disposition   Blood glucose  mg/dL (3.9 -13.3 mmol/L)    Protocols used: DIABETES - HIGH BLOOD SUGAR-ADULT-AH

## 2022-03-22 ENCOUNTER — TELEPHONE (OUTPATIENT)
Dept: FAMILY MEDICINE CLINIC | Age: 77
End: 2022-03-22

## 2022-03-22 NOTE — TELEPHONE ENCOUNTER
Lurdes would like a verbal confirmation allowing her to drawl the Pt's Hemoglobin A1C. Please advise.

## 2022-03-23 NOTE — TELEPHONE ENCOUNTER
Call placed to [address] [place] [address] at [facility]. Message left. If she calls back tell her per      Please tell her to draw the labs.

## 2022-03-29 ENCOUNTER — PATIENT OUTREACH (OUTPATIENT)
Dept: CASE MANAGEMENT | Age: 77
End: 2022-03-29

## 2022-03-30 NOTE — PROGRESS NOTES
Ambulatory Care Management Note      Date/Time:  3/30/2022 4:31 PM    This patient was received as a referral from Daily assignment. Top Challenges reviewed with the provider   - patient reports that her fasting blood glucose readings over the past week have ranged from 130-145 mg/dL. She was recently taking Prednisone for COPD exacerbation (originally ordered at [facility] ED on 2/5/22 and then it was extended by her Pulmonologist at follow-up); her last dose was approximately two weeks ago. - Hgb A1c was drawn by home health on 3/28, per PCP order; per chart review, results not yet available. - patient does not have any future follow-up with PCP scheduled at this time; most recent OV was 11/11/2021. Patient will contact PCP office via [employer] for appointment request for routine follow-up. Ambulatory  contacted patient for discussion and case management of:   Summary of patients top problems:   1. Weight Loss (pt stated goal): Patient reports weight loss goal of 50 lb; \"I think my blood sugar would be better and my breathing would be better if I take some weight off. \" \"I know I need to cut bread and pastas out, but other than that I don't know what else to do. \" [place] anywhere that she can safely walk outside other than in her yard; she is unable to drive at present due to recent stroke in October 2021 and 6 month driving restriction post-CVA which presents a barrier to her ability to go to another location to walk or exercise. 2. Type 2 DM: Managed by PCP (Dr. Sherly Simental). Her most recent office visit with PCP was 11/11/2021 for hospital follow-up. Most recent Hemoglobin A1c 11/11/21 7.1%; compared to previous results of 9.2% 5/12/21 and 6.8% 11/12/2020. Patient is not currently taking any medications for management of her diabetes; she reports that metformin was discontinued at [facility] hospital discharge in October 2021.  She is checking her fasting blood glucose every other day. Patient reports that her fasting blood glucose readings have been elevated over the past week; reports fasting blood glucose has ranged from 130-145 mg/dL since recently taking Prednisone. She reports that she has completed Prednisone with her last dose being approximately two weeks ago, however her blood glucose levels have remained elevated. Per chart review, Hgb A1c was ordered by PCP on 3/23/22 to be drawn by home health; pt reports that lab was drawn by home health on Monday (3/28). 3. Advance Care Planning: Patient is interested in completing an Advance Medical Directive. ACP literature was previously sent to patient via [email or web address] by this AC. Medication Management:  Previously reviewed. Advance Care Planning:   Does patient have an Advance Directive: Previously reviewed. [employer], Referrals, and Durable Medical Equipment: [employer]. DME: Cresencio Gonzalez; primarily uses the cane with ambulation. She has a bedside commode, however she does not use it. PCP/Specialist follow up:   Future Appointments   Date Time Provider Bill Ricardo   4/4/2022  3:30 PM Ida Lim MD [facility]          Goals Addressed                    This Visit's Progress       Chronic Disease      Advance Care Planning   On track      3/30/2022   Patient has briefly reviewed ACP/AMD literature that was previously sent to her by this ACM. Patient will review literature in more detail before next [facility] outreach encounter. ACM will discuss referral to Internal ACP Clinical Specialist with patient during next [facility] outreach encounter. Plan for next [facility] outreach in approximately two weeks. 2/21/2022   ACP/AMD not on file. Patient is interested in additional information and completing an Advance Medical Directive; literature sent to patient via [email or web address] today.  Plan for next AC outreach in approximately 7-14 days for follow-up.         Develop action plan for weight loss. (pt-stated)   On track      3/30/2022  Patient states weight loss goal of 50 lbs  Patient will begin to identify current lifestyle choices and habits that she feels may be affecting her health and have room for improvement/modifications to aide in her weight loss effort. Plan for next ACM outreach in approximately two weeks.   Identification of barriers to adherence to a plan of care such as inability to afford medications, lack of insurance, lack of transportation, etc.   On track      3/30/2022   Ambulatory PharmD outreach to patient on 3/4 is noted; patient reports that she was unable to follow-up with Ambulatory PharmD at length during that time, however she has not received follow-up communication from Ambulatory PharmD. AC will route encounter to Ambulatory PharmD today to request patient outreach for continued follow-up. 2/22/2022   Referral to Ambulatory PharmD by this ACM today. 2/21/2022   Trelegy Ellipta and Eliquis each have a copay of $160 per month when she is in the donut hole (she reports that she usually reaches the donut hole in April); her copay for these medications is usually $10 each for a one-month supply. Patient will be unable to take both of these medications once she reaches the donut hole due to cost barrier and reports that she will have to choose between these two medications. She notes that in the past she has had to choose which one of these medications to pick-up from the pharmacy and she chose the Eliquis; her pulmonologist gives her samples of Trelegy Ellipta, when available.  Patient reports that she does currently have both Trelegy Ellipta and Eliquis and is taking both medications as ordered.  Patient is agreeable to Referral to Ambulatory PharmD; re: Marito Gonzalez and Eliquis cost barrier   Plan for next ACM outreach in 7-14 days. Patient verbalized understanding of all information discussed.  Patient has this Ambulatory Care Manager's contact information for any further questions, concerns, or needs.

## 2022-04-01 ENCOUNTER — TELEPHONE (OUTPATIENT)
Dept: FAMILY MEDICINE CLINIC | Age: 77
End: 2022-04-01

## 2022-04-01 NOTE — TELEPHONE ENCOUNTER
Call returned to [address] [place] [address] at [facility]. Call was unsuccessful,message left.     If she calls back please inform pt has been scheduled for office visit on 4/6/22

## 2022-04-04 ENCOUNTER — OFFICE VISIT (OUTPATIENT)
Dept: NEUROLOGY | Age: 77
End: 2022-04-04
Payer: MEDICARE

## 2022-04-04 ENCOUNTER — TELEPHONE (OUTPATIENT)
Dept: INTERNAL MEDICINE CLINIC | Age: 77
End: 2022-04-04

## 2022-04-04 VITALS
RESPIRATION RATE: 20 BRPM | HEART RATE: 64 BPM | TEMPERATURE: 97.8 F | SYSTOLIC BLOOD PRESSURE: 133 MMHG | WEIGHT: 200 LBS | DIASTOLIC BLOOD PRESSURE: 99 MMHG | BODY MASS INDEX: 35.43 KG/M2

## 2022-04-04 DIAGNOSIS — M54.81 OCCIPITAL NEURALGIA OF RIGHT SIDE: Primary | ICD-10-CM

## 2022-04-04 DIAGNOSIS — R20.9 SENSORY DISTURBANCE: ICD-10-CM

## 2022-04-04 DIAGNOSIS — R26.89 IMBALANCE: ICD-10-CM

## 2022-04-04 PROCEDURE — G8536 NO DOC ELDER MAL SCRN: HCPCS | Performed by: PSYCHIATRY & NEUROLOGY

## 2022-04-04 PROCEDURE — 3017F COLORECTAL CA SCREEN DOC REV: CPT | Performed by: PSYCHIATRY & NEUROLOGY

## 2022-04-04 PROCEDURE — G8427 DOCREV CUR MEDS BY ELIG CLIN: HCPCS | Performed by: PSYCHIATRY & NEUROLOGY

## 2022-04-04 PROCEDURE — G8399 PT W/DXA RESULTS DOCUMENT: HCPCS | Performed by: PSYCHIATRY & NEUROLOGY

## 2022-04-04 PROCEDURE — 1101F PT FALLS ASSESS-DOCD LE1/YR: CPT | Performed by: PSYCHIATRY & NEUROLOGY

## 2022-04-04 PROCEDURE — G8432 DEP SCR NOT DOC, RNG: HCPCS | Performed by: PSYCHIATRY & NEUROLOGY

## 2022-04-04 PROCEDURE — 1090F PRES/ABSN URINE INCON ASSESS: CPT | Performed by: PSYCHIATRY & NEUROLOGY

## 2022-04-04 PROCEDURE — 99214 OFFICE O/P EST MOD 30 MIN: CPT | Performed by: PSYCHIATRY & NEUROLOGY

## 2022-04-04 PROCEDURE — G8754 DIAS BP LESS 90: HCPCS | Performed by: PSYCHIATRY & NEUROLOGY

## 2022-04-04 PROCEDURE — G8417 CALC BMI ABV UP PARAM F/U: HCPCS | Performed by: PSYCHIATRY & NEUROLOGY

## 2022-04-04 PROCEDURE — G8752 SYS BP LESS 140: HCPCS | Performed by: PSYCHIATRY & NEUROLOGY

## 2022-04-04 RX ORDER — PENICILLIN V POTASSIUM 500 MG/1
TABLET, FILM COATED ORAL
COMMUNITY
Start: 2022-03-24 | End: 2022-04-27

## 2022-04-04 RX ORDER — OXCARBAZEPINE 300 MG/1
300 TABLET, FILM COATED ORAL
Qty: 30 TABLET | Refills: 2 | Status: SHIPPED | OUTPATIENT
Start: 2022-04-04 | End: 2022-04-27

## 2022-04-04 NOTE — TELEPHONE ENCOUNTER
Returned call to patient to discuss assistance with cost of medications. Left message for patient to return call to me at [phone].     Vida Morgan, PharmD, BCPS, CDCES

## 2022-04-04 NOTE — PROGRESS NOTES
[address] [facility] and [address] at [facility] at 42 [facility], [address] [place] [address] [address] [facility], [address] [place] [address]   [phone]              Chief Complaint   Patient presents with    Head Pain     occipital neuralgia has seemed to gotten better but still feels unstable at times. PCP suggested neuropathy     Current Outpatient Medications   Medication Sig Dispense Refill    Trelegy Ellipta 100-62.5-25 mcg inhaler INHALE 1 PUFF BY INHALATION DAILY 60 Each 2    Eliquis 5 mg tablet TAKE ONE TABLET BY MOUTH 2 TIMES A  Tablet 1    carvediloL (COREG) 6.25 mg tablet TAKE ONE TABLET BY MOUTH TWICE DAILY 180 Tablet 1    meclizine (ANTIVERT) 25 mg tablet Take 1 Tablet by mouth three (3) times daily as needed for Dizziness. 90 Tablet 0    OXcarbazepine (TRILEPTAL) 300 mg tablet Take 1 Tablet by mouth nightly. (Patient taking differently: Take 150 mg by mouth nightly.) 30 Tablet 3    lovastatin (MEVACOR) 10 mg tablet TAKE ONE TABLET BY MOUTH NIGHTLY 90 Tablet 1    ProAir HFA 90 mcg/actuation inhaler TAKE 2 PUFFS EVERY 6 HOURS AS NEEDED FOR WHEEZING 8.5 g 2    montelukast (SINGULAIR) 10 mg tablet TAKE ONE TABLET BY MOUTH NIGHTLY 90 Tablet 1    glucose blood VI test strips (FreeStyle Lite Strips) strip Patient is to check blood sugar once daily. Dx E11.9 100 Strip 2    albuterol (PROVENTIL VENTOLIN) 2.5 mg /3 mL (0.083 %) nebu 3 mL by Nebulization route every six (6) hours as needed for Wheezing. 75 mL 4    diclofenac (VOLTAREN) 1 % gel APPLY 1 GRAM TO AFFECTED AREA OF KNEES AND BACK 4 TIMES DAILY 300 g 5    guaiFENesin ER (MUCINEX) 600 mg ER tablet Take 1 Tab by mouth two (2) times a day. (Patient taking differently: Take 600 mg by mouth as needed.) 60 Tab 1    acetaminophen (TYLENOL ARTHRITIS PAIN) 650 mg TbER Take 1 Tab by mouth every eight (8) hours.  60 Tab 0    multivitamin (ONE A DAY) tablet Take 1 Tab by mouth nightly.  aspirin 81 mg tablet Take 81 mg by mouth daily.  penicillin v potassium (VEETID) 500 mg tablet       cetirizine (ZYRTEC) 10 mg tablet Take 1 Tablet by mouth daily. (Patient not taking: Reported on 2022) 30 Tablet 0    fluticasone propionate (FLONASE) 50 mcg/actuation nasal spray 2 Sprays by Both Nostrils route daily. (Patient not taking: Reported on 2022) 1 Bottle 2      Allergies   Allergen Reactions    Crestor [Rosuvastatin] Other (comments)     Pain in left leg  Causes muscle spasms    Lipitor [Atorvastatin] Other (comments)     Left leg pain  Causes muscle spasms    Xarelto [Rivaroxaban] Other (comments)     Pt states it caused internal bleeding     Social History     Tobacco Use    Smoking status: Former Smoker     Types: Cigarettes     Quit date:      Years since quittin.2    Smokeless tobacco: Never Used   Vaping Use    Vaping Use: Never used   Substance Use Topics    Alcohol use: Yes     Comment: social// rare    Drug use: No     29-year-old lady returns today for follow-up. She was last seen in January. She was hospitalized for dizziness and headache. She was getting vestibular therapy. MRI of the brain 2021 unremarkable with no sign of stroke  We started Trileptal and that has helped her occipital neuralgia  Today she reports the occipital neuralgia pain is gone. She is only using a half a tablet of Trileptal as when she used a full tablet it gave her side effects of being too sleepy the next day. She also has dizziness which was felt to be benign positional vertigo and she completed the vestibular therapy. She still has difficulty. She feels imbalance. Her feet are cold. She does have diabetes. She notes the dizziness seems to be worse. Most recent hemoglobin A1c was in November of last year and it was 7.1 down from 9.2 in May of last year.       Examination  Visit Vitals  BP (!) 177/66 (BP 1 Location: Left upper arm, BP Patient Position: Sitting, BP Cuff Size: Large adult)   Pulse 64   Temp 97.8 °F (36.6 °C)   Resp 20   Wt 90.7 kg (200 lb)   BMI 35.43 kg/m²     She is a very pleasant lady. She is awake alert and oriented. Speech and language normal.  Cognition normal.  She has no ankle jerk reflexes. No graded sensory loss. She has sway with Romberg    Impression/Plan  Occipital neuralgia doing well  Continue Trileptal and on her next visit if she remains pain-free we will discontinue    Dizziness/imbalance persistent and worsening  She does have diabetes and no ankle jerks are certainly she could diabetic peripheral neuropathy giving her sensory ataxia which would be perceived as dizziness  EMG of the bilateral lower extremities    Elevated blood pressure reading today  She endorses taking her blood pressure medicine as prescribed  We will recheck that pressure before she leaves    Olivier Arroyo MD        This note was created using voice recognition software. Despite editing, there may be syntax errors.

## 2022-04-05 NOTE — TELEPHONE ENCOUNTER
Patient returned call. Discussed patient assistance programs for both Eliquis and Trelegy. She does not qualify for Eliquis program until she spends ~1100 in out of pocket costs. She does not qualify for the Trelegy program at all. She does qualify for the [facility] program so I encouraged her to disucss this with her pulmonary doctor to see if it was an option. I will mail the patient both of these applications so that she has them for next steps. Yearly income is ~36k/household of 1 for purposes of looking into these programs. Patient to call me with any questions or concerns. Feels like if she can get 1 med for free this may allow her to pay for the other.      Brandt Alfaro, PharmD, BCPS, [facility] [address] in place: No   Recommendation Provided To: Patient/Caregiver: 1 via Telephone   Intervention Detail: Patient Access Assistance/Sample Provided   Intervention Accepted By: Patient/Caregiver: 1   Time Spent (min): 30

## 2022-04-06 ENCOUNTER — OFFICE VISIT (OUTPATIENT)
Dept: FAMILY MEDICINE CLINIC | Age: 77
End: 2022-04-06
Payer: MEDICARE

## 2022-04-06 VITALS
TEMPERATURE: 98.4 F | RESPIRATION RATE: 18 BRPM | HEIGHT: 63 IN | DIASTOLIC BLOOD PRESSURE: 72 MMHG | SYSTOLIC BLOOD PRESSURE: 138 MMHG | BODY MASS INDEX: 35.75 KG/M2 | HEART RATE: 92 BPM | WEIGHT: 201.8 LBS

## 2022-04-06 DIAGNOSIS — I82.412 ACUTE DEEP VEIN THROMBOSIS (DVT) OF FEMORAL VEIN OF LEFT LOWER EXTREMITY (HCC): ICD-10-CM

## 2022-04-06 DIAGNOSIS — E11.65 TYPE 2 DIABETES MELLITUS WITH HYPERGLYCEMIA, WITHOUT LONG-TERM CURRENT USE OF INSULIN (HCC): ICD-10-CM

## 2022-04-06 DIAGNOSIS — E11.9 TYPE 2 DIABETES MELLITUS WITHOUT COMPLICATION, WITHOUT LONG-TERM CURRENT USE OF INSULIN (HCC): Primary | ICD-10-CM

## 2022-04-06 DIAGNOSIS — I10 PRIMARY HYPERTENSION: ICD-10-CM

## 2022-04-06 DIAGNOSIS — E78.49 OTHER HYPERLIPIDEMIA: ICD-10-CM

## 2022-04-06 DIAGNOSIS — J41.0 SIMPLE CHRONIC BRONCHITIS (HCC): ICD-10-CM

## 2022-04-06 DIAGNOSIS — G40.89 OTHER SEIZURES (HCC): ICD-10-CM

## 2022-04-06 PROCEDURE — 99214 OFFICE O/P EST MOD 30 MIN: CPT | Performed by: FAMILY MEDICINE

## 2022-04-06 PROCEDURE — G8536 NO DOC ELDER MAL SCRN: HCPCS | Performed by: FAMILY MEDICINE

## 2022-04-06 PROCEDURE — 2022F DILAT RTA XM EVC RTNOPTHY: CPT | Performed by: FAMILY MEDICINE

## 2022-04-06 PROCEDURE — G8417 CALC BMI ABV UP PARAM F/U: HCPCS | Performed by: FAMILY MEDICINE

## 2022-04-06 PROCEDURE — 1090F PRES/ABSN URINE INCON ASSESS: CPT | Performed by: FAMILY MEDICINE

## 2022-04-06 PROCEDURE — 1101F PT FALLS ASSESS-DOCD LE1/YR: CPT | Performed by: FAMILY MEDICINE

## 2022-04-06 PROCEDURE — 3017F COLORECTAL CA SCREEN DOC REV: CPT | Performed by: FAMILY MEDICINE

## 2022-04-06 PROCEDURE — G8399 PT W/DXA RESULTS DOCUMENT: HCPCS | Performed by: FAMILY MEDICINE

## 2022-04-06 PROCEDURE — G8427 DOCREV CUR MEDS BY ELIG CLIN: HCPCS | Performed by: FAMILY MEDICINE

## 2022-04-06 PROCEDURE — 3046F HEMOGLOBIN A1C LEVEL >9.0%: CPT | Performed by: FAMILY MEDICINE

## 2022-04-06 PROCEDURE — G8510 SCR DEP NEG, NO PLAN REQD: HCPCS | Performed by: FAMILY MEDICINE

## 2022-04-06 PROCEDURE — G8754 DIAS BP LESS 90: HCPCS | Performed by: FAMILY MEDICINE

## 2022-04-06 PROCEDURE — G8752 SYS BP LESS 140: HCPCS | Performed by: FAMILY MEDICINE

## 2022-04-06 RX ORDER — METFORMIN HYDROCHLORIDE 500 MG/1
500 TABLET, EXTENDED RELEASE ORAL
Qty: 90 TABLET | Refills: 1 | Status: SHIPPED | OUTPATIENT
Start: 2022-04-06 | End: 2022-10-06

## 2022-04-06 RX ORDER — MINERAL OIL
180 ENEMA (ML) RECTAL DAILY
COMMUNITY

## 2022-04-06 NOTE — PATIENT INSTRUCTIONS
Your doctor has recommended that you have an eye exam done. You can contact one of the below offices to schedule your own appointment. Once you have the visit, please ask their office to send us a copy for your record here. Ruthy Yepez                     [phone]  Dr. Mendy Ridley                          [phone]  Dr. Elia Dubon                           [phone]  [place]  [address] [place] [address]             [phone]

## 2022-04-06 NOTE — PROGRESS NOTES
[address] [facility]    History of Present Illness:   Lisbeth Smith is a 78 y.o. female with history of HTN, PE, DVT, DM, COPD, HLD, DCIS, CVA, leg pain  CC: Follow up Diabetes  History provided by patient and Records    HPI:  Diabetes Follow up: Overall the patient feels well with good energy level. Current Medications:   Key Antihyperglycemic Medications             metFORMIN ER (GLUCOPHAGE XR) 500 mg tablet (Taking) Take 1 Tablet by mouth daily (with dinner). .   Insulin dependence: NO   Frequency of home glucose testing: prn   Blood Sugar range at home: >100    Last eye exam: In past 12 months. Last foot exam: This year. Polyuria, polyphagia or polydipsia: NO   Retinopathy: NO   Neuropathy SX: NO   Low blood sugar symptoms: NO   Dietary compliance: compliant most of the time   Medication compliance: compliant most of the time   On ASA: NO   Tobacco Use: NO   Depression: NO     Wt Readings from Last 3 Encounters:   04/06/22 201 lb 12.8 oz (91.5 kg)   04/04/22 200 lb (90.7 kg)   02/05/22 200 lb (90.7 kg)        Lab Results   Component Value Date/Time    Hemoglobin A1c 7.1 (H) 11/11/2021 02:20 PM    Hemoglobin A1c (POC) 6.6 05/26/2020 04:35 PM        Lab Results   Component Value Date/Time    Microalbumin/Creat ratio (mg/g creat) 6 11/11/2021 02:20 PM    Microalbumin,urine random 1.16 11/11/2021 02:20 PM         Lab Results   Component Value Date/Time    Creatinine (POC) 0.7 02/24/2012 12:39 AM    Creatinine 1.08 (H) 02/05/2022 03:17 PM        Hypertension Follow up:  Currently Taking:   Key CAD CHF Meds             Eliquis 5 mg tablet (Taking) TAKE ONE TABLET BY MOUTH 2 TIMES A DAY    carvediloL (COREG) 6.25 mg tablet (Taking) TAKE ONE TABLET BY MOUTH TWICE DAILY    lovastatin (MEVACOR) 10 mg tablet (Taking) TAKE ONE TABLET BY MOUTH NIGHTLY    aspirin 81 mg tablet (Taking) Take 81 mg by mouth daily.          The patient reports:  taking medications as instructed, no medication side effects noted, no TIA's, no chest pain on exertion, no dyspnea on exertion, no swelling of ankles, no orthostatic dizziness or lightheadedness, no orthopnea or paroxysmal nocturnal dyspnea. BP Readings from Last 3 Encounters:   04/06/22 138/72   04/04/22 (!) 133/99   02/05/22 (!) 182/89      COPD: Overall stable, no changes now, was recently on Prednisone for exacerbation    Hypertriglyceridemia Follow up:   Cardiovascular risks for her are: diabetic  hypertension  hyperlipidemia. Current Medications:  Key Antihyperlipidemia Meds             lovastatin (MEVACOR) 10 mg tablet (Taking) TAKE ONE TABLET BY MOUTH NIGHTLY          Compliance: YES   Myalgias: NO   Fatigue: NO   Other side effects: NO     Wt Readings from Last 3 Encounters:   04/06/22 201 lb 12.8 oz (91.5 kg)   04/04/22 200 lb (90.7 kg)   02/05/22 200 lb (90.7 kg)       Lab Results   Component Value Date/Time    Cholesterol, total 172 11/11/2021 02:20 PM    HDL Cholesterol 43 11/11/2021 02:20 PM    LDL,Direct 87 05/12/2021 04:30 PM    LDL, calculated 74.8 11/11/2021 02:20 PM    VLDL, calculated 54.2 11/11/2021 02:20 PM    Triglyceride 271 (H) 11/11/2021 02:20 PM    CHOL/HDL Ratio 4.0 11/11/2021 02:20 PM      Lab Results   Component Value Date/Time    ALT (SGPT) 15 02/05/2022 03:17 PM    AST (SGOT) 15 02/05/2022 03:17 PM    Alk. phosphatase 123 (H) 02/05/2022 03:17 PM    Bilirubin, total 0.4 02/05/2022 03:17 PM           DVT: Chronic anticoagulation with Eliquis    Health Maintenance  Health Maintenance Due   Topic Date Due    DTaP/Tdap/Td series (1 - Tdap) 10/12/2011    Breast Cancer Screen Mammogram  07/14/2018    Eye Exam Retinal or Dilated  01/07/2021    COVID-19 Vaccine (3 - Booster for Betzaida Coup series) 08/30/2021       Past Medical, Family, and Social History:     Current Outpatient Medications on File Prior to Visit   Medication Sig Dispense Refill    fexofenadine (ALLEGRA) 180 mg tablet Take 180 mg by mouth daily.       Trelegy Ellipta 100-62.5-25 mcg inhaler INHALE 1 PUFF BY INHALATION DAILY 60 Each 2    Eliquis 5 mg tablet TAKE ONE TABLET BY MOUTH 2 TIMES A  Tablet 1    carvediloL (COREG) 6.25 mg tablet TAKE ONE TABLET BY MOUTH TWICE DAILY 180 Tablet 1    meclizine (ANTIVERT) 25 mg tablet Take 1 Tablet by mouth three (3) times daily as needed for Dizziness. 90 Tablet 0    lovastatin (MEVACOR) 10 mg tablet TAKE ONE TABLET BY MOUTH NIGHTLY 90 Tablet 1    montelukast (SINGULAIR) 10 mg tablet TAKE ONE TABLET BY MOUTH NIGHTLY 90 Tablet 1    albuterol (PROVENTIL VENTOLIN) 2.5 mg /3 mL (0.083 %) nebu 3 mL by Nebulization route every six (6) hours as needed for Wheezing. 75 mL 4    diclofenac (VOLTAREN) 1 % gel APPLY 1 GRAM TO AFFECTED AREA OF KNEES AND BACK 4 TIMES DAILY 300 g 5    acetaminophen (TYLENOL ARTHRITIS PAIN) 650 mg TbER Take 1 Tab by mouth every eight (8) hours. 60 Tab 0    multivitamin (ONE A DAY) tablet Take 1 Tab by mouth nightly.  aspirin 81 mg tablet Take 81 mg by mouth daily.  penicillin v potassium (VEETID) 500 mg tablet  (Patient not taking: Reported on 4/6/2022)      OXcarbazepine (TRILEPTAL) 300 mg tablet Take 1 Tablet by mouth nightly. (Patient not taking: Reported on 4/6/2022) 30 Tablet 2    ProAir HFA 90 mcg/actuation inhaler TAKE 2 PUFFS EVERY 6 HOURS AS NEEDED FOR WHEEZING (Patient not taking: Reported on 4/6/2022) 8.5 g 2    cetirizine (ZYRTEC) 10 mg tablet Take 1 Tablet by mouth daily. (Patient not taking: Reported on 2/21/2022) 30 Tablet 0    glucose blood VI test strips (FreeStyle Lite Strips) strip Patient is to check blood sugar once daily. Dx E11.9 100 Strip 2    guaiFENesin ER (MUCINEX) 600 mg ER tablet Take 1 Tab by mouth two (2) times a day. (Patient not taking: Reported on 4/6/2022) 60 Tab 1    fluticasone propionate (FLONASE) 50 mcg/actuation nasal spray 2 Sprays by Both Nostrils route daily.  (Patient not taking: Reported on 2/21/2022) 1 Bottle 2     No current facility-administered medications on file prior to visit. Patient Active Problem List   Diagnosis Code    Melena K92.1    Acute posthemorrhagic anemia D62    Type 2 diabetes mellitus without complication, without long-term current use of insulin ([facility]) E11.9    HTN (hypertension) I10    Other hyperlipidemia E78.49    Debility R53.81    Acute deep vein thrombosis (DVT) of left lower extremity ([facility]) I82.402    Chronic anticoagulation Z79.01    Severe obesity ([facility]) E66.01    DCIS (ductal carcinoma in situ) of breast D05.10    History of GI bleed Z87.19    COPD (chronic obstructive pulmonary disease) ([facility]) J44.9    History of stroke Z86.73    Unspecified convulsions R56.9    Other seizures G40.89       Social History     Socioeconomic History    Marital status: SINGLE   Tobacco Use    Smoking status: Former Smoker     Types: Cigarettes     Quit date:      Years since quittin.2    Smokeless tobacco: Never Used   Vaping Use    Vaping Use: Never used   Substance and Sexual Activity    Alcohol use: Yes     Comment: social// rare    Drug use: No    Sexual activity: Never        Review of Systems   Review of Systems   Constitutional: Negative for chills and fever. HENT: Negative for congestion and nosebleeds. Cardiovascular: Negative for chest pain and palpitations. Gastrointestinal: Negative for abdominal pain, nausea and vomiting. Objective:     Visit Vitals  /72 (BP 1 Location: Right arm, BP Patient Position: Sitting, BP Cuff Size: Large adult)   Pulse 92   Temp 98.4 °F (36.9 °C) (Oral)   Resp 18   Ht 5' 3\" (1.6 m)   Wt 201 lb 12.8 oz (91.5 kg)   BMI 35.75 kg/m²        Physical Exam  Vitals and nursing note reviewed. Constitutional:       Appearance: Normal appearance. HENT:      Head: Normocephalic and atraumatic. Cardiovascular:      Rate and Rhythm: Normal rate and regular rhythm. Pulses: Normal pulses. Heart sounds: Normal heart sounds.    Pulmonary:      Effort: Pulmonary effort is normal.      Breath sounds: Normal breath sounds. Abdominal:      General: Abdomen is flat. Bowel sounds are normal.      Palpations: Abdomen is soft. Musculoskeletal:      Cervical back: Normal range of motion and neck supple. Skin:     General: Skin is warm and dry. Neurological:      Mental Status: She is alert. Pertinent Labs/Studies:      Assessment and orders:       ICD-10-CM ICD-9-CM    1. Type 2 diabetes mellitus without complication, without long-term current use of insulin ([facility])  E11.9 250.00 metFORMIN ER (GLUCOPHAGE XR) 500 mg tablet   2. Primary hypertension  I10 401.9    3. Simple chronic bronchitis ([facility])  J41.0 491.0    4. Type 2 diabetes mellitus with hyperglycemia, without long-term current use of insulin ([facility])  E11.65 250.00      790.29    5. Acute deep vein thrombosis (DVT) of femoral vein of left lower extremity ([facility])  I82.412 453.41    6. Other seizures ([address]  G40.89 780.39    7. Other hyperlipidemia  E78.49 272.4      Diagnoses and all orders for this visit:    1. Type 2 diabetes mellitus without complication, without long-term current use of insulin [facility]): Discussed with patient today that the goal for their diabetes is to have a HgA1C<7 and ideally as close to 6.5 as possible. We discussed diet and medications. The goal for the cholesterol LDL is less than 70 and HDL>40. Patient is aware of the need for yearly eye exams and to take care of their feet daily. Discussed with patient that blood pressure should be less than 130/80 and watching salt intake is very important. Restarting Metformin  -     metFORMIN ER (GLUCOPHAGE XR) 500 mg tablet; Take 1 Tablet by mouth daily (with dinner). 2. Primary hypertension: Our goal is to normalize the blood pressure to decrease the risks of strokes and heart attacks. The patient is in agreement with the plan. 3. Simple chronic bronchitis (HCC):b Stable    4.  Type 2 diabetes mellitus with hyperglycemia, without long-term current use of insulin ([facility]    5. Acute deep vein thrombosis (DVT) of femoral vein of left lower extremity ([address]): Eliquis life long. 6. Other seizures ([facility]    7. Other hyperlipidemia: The patient is aware of our goal to reduce or eliminate the long term problems (such as strokes and heart attacks) related to poorly controlled Triglycerides, LDL, Cholesterol. Follow-up and Dispositions    · Return in about 3 months (around 7/6/2022). I have discussed the diagnosis with the patient and the intended plan as seen in the above orders. Social history, medical history, and labs were reviewed. The patient has received an after-visit summary and questions were answered concerning future plans. I have discussed medication side effects and warnings with the patient as well.     MD JAGDISH Henriquez & JERRY ORDONEZ [facility]  04/06/22

## 2022-04-15 ENCOUNTER — PATIENT OUTREACH (OUTPATIENT)
Dept: CASE MANAGEMENT | Age: 77
End: 2022-04-15

## 2022-04-15 NOTE — PROGRESS NOTES
4/15/2022  1:25 PM    Patient outreach attempt by this ACM today to perform CCM follow-up (evaluate goal progress). ACM was unable to reach the patient by telephone today; lvm requesting a return phone call to this ACM.

## 2022-04-27 ENCOUNTER — OFFICE VISIT (OUTPATIENT)
Dept: NEUROLOGY | Age: 77
End: 2022-04-27
Payer: MEDICARE

## 2022-04-27 DIAGNOSIS — E11.42 DIABETIC POLYNEUROPATHY ASSOCIATED WITH TYPE 2 DIABETES MELLITUS (HCC): Primary | ICD-10-CM

## 2022-04-27 PROCEDURE — 95910 NRV CNDJ TEST 7-8 STUDIES: CPT | Performed by: PSYCHIATRY & NEUROLOGY

## 2022-04-27 PROCEDURE — 95886 MUSC TEST DONE W/N TEST COMP: CPT | Performed by: PSYCHIATRY & NEUROLOGY

## 2022-04-27 NOTE — PROCEDURES
EMG/ NCS Report  DRUG REHABILITATION  - [facility]  [facility]  [facility] system, [address] [place] [address] [address], [address]   Ph: [phone]   FAX: [phone]/ [phone]    Test Date:  2022    Patient: Laura Ling : 1945 Physician: Bran Olvera MD   ID#: [id number] SEX: Female Ref. Phys: Kasia Loza MD   Tech: Toshia Giron    Patient History / Exam:  Patient with history of DM complaining of cold feel and imbalance. Exam reveals no ankle jerks. Assess for neuropathy. Patient is on Eliquis. EMG & NCV Findings:  Unable to obtain bilateral superficial peroneal and left sural sensory response. Decrease amplitude right sural nerve action potential.  Decrease right peroneal (recording to the EDB and tibialis anterior muscles) motor compound muscle action potential amplitudes with slowing of the conduction velocity. Slowing of the bilateral tibial motor conduction velocities. All F Wave latencies were within normal limits. All F Wave left vs. right side latency differences were within normal limits. Limited disposable concentric needle EMG (as indicated in the table) showed no evidence of electrical instability. Refrained from doing the right lower extremity due to easy bruising and oral anti-coagulation. Impressions: This study is abnormal.  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 There is no evidence of a myopathy. To rule out a lumbar radiculopathy, recommend lumbar spine imaging if clinically indicated. Thank you for the consult.      Bran Olvera MD    Nerve Conduction Studies  Anti Sensory Summary Table     Stim Site NR Peak (ms) Norm Peak (ms) P-T Amp (µV) Norm P-T Amp Site1 Site2 Dist (cm)   Left Sup Fibular Anti Sensory (Lat ankle)  28.7 °C   Lower leg NR  <4.6  >4 Lower leg Lat ankle 10.0   Site 2 NR          Site 3 NR          Site 4 NR          Right Sup Fibular Anti Sensory (Lat ankle)  29.2 °C   Lower leg NR  <4.6  >4 Lower leg Lat ankle 10.0   Site 2 NR          Site 3 NR          Left Sural Anti Sensory (Lat Mall)  28.5 °C   Calf NR  <4.5  >4.0 Calf Lat Mall 14.0   Site 2 NR          Right Sural Anti Sensory (Lat Mall)  30.3 °C   Calf    3.8 <4.5 3.2 >4.0 Calf Lat Mall 14.0     Motor Summary Table     Stim Site NR Onset (ms) Norm Onset (ms) O-P Amp (mV) Norm O-P Amp Amp (Prev) (%) Site1 Site2 Dist (cm) Sam (m/s) Norm Sam (m/s)   Left Fibular Motor (Ext Dig Brev)  28.7 °C   Ankle    4.6 <6.5 3.2 >1.1 100.0 Ankle Ext Dig Brev 8.0     B Fib    11.8  3.1  96.9 B Fib Ankle 29.0 40 >38   Poplt    13.5  3.1  100.0 Poplt B Fib 10.0 59 >42   Right Fibular Motor (Ext Dig Brev)  29 °C   Ankle    5.2 <6.5 0.9 >1.1 100.0 Ankle Ext Dig Brev 8.0     B Fib    14.2  0.6  66.7 B Fib Ankle 30.0 33 >38   Poplt    15.5  1.0  166.7 Poplt B Fib 10.0 77 >42   Right Fibular TA Motor (Tib Ant)  28.1 °C   Fib Head    2.7 <4.5 2.6 >3.0 100.0 Fib Head Tib Ant 10.0     Poplit    4.8 <5.7 1.8  69.2 Poplit Fib Head 10.0 48 >40   Left Tibial Motor (Abd Travis Brev)  28.6 °C   Ankle    4.2 <6.1 5.8 >1.1 100.0 Ankle Abd Travis Brev 8.0     Knee    13.2  2.5  43.1 Knee Ankle 32.0 36 >39   Right Tibial Motor (Abd Travis Brev)  28.7 °C   Ankle    4.1 <6.1 6.2 >1.1 100.0 Ankle Abd Travis Brev 8.0     Knee    14.6  2.6  41.9 Knee Ankle 33.0 31 >39     F Wave Studies     NR F-Lat (ms) Lat Norm (ms) L-R F-Lat (ms) L-R Lat Norm   Left Tibial (Mrkrs) (Abd Hallucis)  28.5 °C      55.29 <56 1.47 <5.7   Right Tibial (Mrkrs) (Abd Hallucis)  28.6 °C      53.82 <56 1.47 <5.7     EMG     Side Muscle Nerve Root Ins Act Fibs Psw Recrt Duration Amp Poly Comment   Left VastusLat Femoral L2-4 Nml Nml Nml Nml Nml Nml Nml    Left VastusMed Femoral L2-4 Nml Nml Nml Nml Nml Nml Nml    Left MedGastroc Tibial S1-2 Nml Nml Nml Nml Nml Nml Nml    Left AntTibialis Dp Br Peron L4-5 Nml Nml Nml Nml Nml Nml Nml Left Peroneus Long   Nml Nml Nml Nml Nml Nml Nml      Waveforms:

## 2022-05-16 ENCOUNTER — PATIENT OUTREACH (OUTPATIENT)
Dept: CASE MANAGEMENT | Age: 77
End: 2022-05-16

## 2022-05-16 NOTE — PROGRESS NOTES
5/16/2022  4:51 PM    Patient outreach attempt by this ACM today to perform CCM follow-up (evaluate goal progress). ACM was unable to reach the patient by telephone today; lvm requesting a return phone call to this ACM.

## 2022-05-20 ENCOUNTER — PATIENT MESSAGE (OUTPATIENT)
Dept: NEUROLOGY | Age: 77
End: 2022-05-20

## 2022-05-20 DIAGNOSIS — H81.10 BENIGN PAROXYSMAL POSITIONAL VERTIGO, UNSPECIFIED LATERALITY: ICD-10-CM

## 2022-05-23 RX ORDER — MECLIZINE HYDROCHLORIDE 25 MG/1
25 TABLET ORAL
Qty: 90 TABLET | Refills: 0 | Status: SHIPPED | OUTPATIENT
Start: 2022-05-23

## 2022-05-23 NOTE — TELEPHONE ENCOUNTER
----- Message from Rowan Khan LPN sent at 9/64/0573 11:58 AM EDT -----  Regarding: FW: Meclizine Prescription   Raphael patient   ----- Message -----  From: Jaret Hayes: 5/20/2022   1:11 PM EDT  To: Anayeli Barton  Subject: Meclizine Prescription                           Please send a prescription for 25mg Meclizine to [facility].    Thank You

## 2022-05-25 ENCOUNTER — PATIENT OUTREACH (OUTPATIENT)
Dept: CASE MANAGEMENT | Age: 77
End: 2022-05-25

## 2022-05-25 NOTE — PROGRESS NOTES
5/25/2022  11:10 AM    Patient outreach attempt by this ACM today to perform CCM follow-up (evaluate goal progress). ACM was unable to reach the patient by telephone today; lvm requesting a return phone call to this ACM. Patient has graduated from the Complex Case Management  program on 5/25/2022 due to ACM inability to reach the patient to provide continued CCM services. Care management goals have been completed. No further Ambulatory Care Manager follow up scheduled. Goals Addressed                    This Visit's Progress       Chronic Disease      COMPLETED: Advance Care Planning         5/25/2022  Unable to reach patient today. Goal progress is unknown by ACM at present due to inability to reach the patient to evaluate goal progress. Goal completed and CCM episode resolved today due to ACM inability to reach the patient to provide continued CCM services. -EMPERATRIZ RN    5/16/22  Unable to reach patient today. -EMPERATRIZ RN    4/15/22  Unable to reach patient today. -EMPERATRIZ RN    3/30/2022   Patient has briefly reviewed ACP/AMD literature that was previously sent to her by this ACM. Patient will review literature in more detail before next [facility] outreach encounter. ACM will discuss referral to Internal ACP Clinical Specialist with patient during next [facility] outreach encounter. Plan for next ACM outreach in approximately two weeks. 2/21/2022   ACP/AMD not on file. Patient is interested in additional information and completing an Advance Medical Directive; literature sent to patient via [employer] today.  Plan for next ACM outreach in approximately 7-14 days for follow-up.   COMPLETED: Develop action plan for weight loss. (pt-stated)         5/25/2022  Unable to reach patient today. Goal progress is unknown by ACM at present due to inability to reach the patient to evaluate goal progress. Goal completed and CCM episode resolved today due to ACM inability to reach the patient to provide continued CCM services.  -EMPERATRIZ RN    5/16/22  Unable to reach patient today. -MANSOOR AWAN    4/15/22  Unable to reach patient today. -MANSOOR AWAN    3/30/2022  Patient states weight loss goal of 50 lbs  Patient will begin to identify current lifestyle choices and habits that she feels may be affecting her health and have room for improvement/modifications to aide in her weight loss effort. Plan for next ACM outreach in approximately two weeks.   COMPLETED: Identification of barriers to adherence to a plan of care such as inability to afford medications, lack of insurance, lack of transportation, etc.         5/25/2022  Unable to reach patient today. Goal progress is unknown by ACM at present due to inability to reach the patient to evaluate goal progress. Goal completed and CCM episode resolved today due to ACM inability to reach the patient to provide continued CCM services. -MANSOOR AWAN    5/16/22  Unable to reach patient today. -MANSOOR AWAN    4/15/2022   Ambulatory PharmD pt outreach on 4/4/22 is noted. Documentation reviewed by this ACM today; per note, patient does not qualify for Trelegy PAP at all and she does not qualify for Eliquis PAP until she spends $1100 out of pocket. Patient does qualify for Breztri PAP and was advised by Ambulatory PharmD to contact her Pulmonologist to inquire as to whether Yohannes Resides would be an option for her. Patient was sent Eliquis and Breztri PAP applications by Ambulatory PharmD.  ACM was unable to reach the patient today. -MANSOOR AWAN    3/30/2022   Ambulatory PharmD outreach to patient on 3/4 is noted; patient reports that she was unable to follow-up with Ambulatory PharmD at length during that time, however she has not received follow-up communication from Ambulatory PharmD. ACM will route encounter to Ambulatory PharmD today to request patient outreach for continued follow-up. 2/22/2022   Referral to Ambulatory PharmD by this ACM today.     2/21/2022   Trelegy Ellipta and Eliquis each have a copay of $160 per month when she is in the donut hole (she reports that she usually reaches the donut hole in April); her copay for these medications is usually $10 each for a one-month supply. Patient will be unable to take both of these medications once she reaches the donut hole due to cost barrier and reports that she will have to choose between these two medications. She notes that in the past she has had to choose which one of these medications to pick-up from the pharmacy and she chose the Eliquis; her pulmonologist gives her samples of Trelegy Ellipta, when available.  Patient reports that she does currently have both Trelegy Ellipta and Eliquis and is taking both medications as ordered.  Patient is agreeable to Referral to Ambulatory PharmD; re: Terral Sicard and Eliquis cost barrier   Plan for next ACM outreach in 7-14 days. Patient has Ambulatory Care Manager's contact information for any further questions, concerns, or needs.   Patients upcoming visits:    Future Appointments   Date Time Provider Bill Ricardo   6/3/2022  2:30 PM Landon Lemon, [facility]

## 2022-05-27 ENCOUNTER — PATIENT OUTREACH (OUTPATIENT)
Dept: CASE MANAGEMENT | Age: 77
End: 2022-05-27

## 2022-06-06 ENCOUNTER — TELEPHONE (OUTPATIENT)
Dept: FAMILY MEDICINE CLINIC | Age: 77
End: 2022-06-06

## 2022-06-06 ENCOUNTER — NURSE TRIAGE (OUTPATIENT)
Dept: OTHER | Facility: CLINIC | Age: 77
End: 2022-06-06

## 2022-06-06 NOTE — TELEPHONE ENCOUNTER
Pt adelaida loose and runny BM. Usually in the morning. Not sure if it is from the Metformin she takes at night. About a month ago she was also on antibiotics. Please give directions.  THanks

## 2022-06-06 NOTE — TELEPHONE ENCOUNTER
Received call from Maggi Hernandez at [facility] with Red Flag Complaint. Subjective: Caller states having loose/sometimes watery  BM's first thing in the morning, 2-3 times per day. Not sure if from Metformin that she takes in the evening. Few weeks ago saw small amt bright red blood when wiping,  thinks from hemorrhoids. Took antibiotics 1 month ago    Current Symptoms: Loose BM today, brown,usually 2-3 in the morning     Onset: 2-3 weeks ago     Associated Symptoms: NA    Pain Severity: Denies    Temperature: Denies    What has been tried:     LMP: NA Pregnant: No    Recommended disposition: Call back by PCP today . Pt. Advised to call back if no return call from office today. Care advice provided, patient verbalizes understanding; denies any other questions or concerns; instructed to call back for any new or worsening symptoms. Outcome:Spoke to Pat at [facility] regarding above c/o's loose/watery BM's. She states she will call pt. Back today. Attention Provider: Thank you for allowing me to participate in the care of your patient. The patient was connected to triage in response to information provided to the [facility]. Please do not respond through this encounter as the response is not directed to a shared pool.     Reason for Disposition   Recent antibiotic therapy (i.e., within last 2 months) and diarrhea present > 3 days since antibiotic was stopped    Protocols used: DIARRHEA-ADULT-OH

## 2022-06-06 NOTE — TELEPHONE ENCOUNTER
Call returned to pt and informed per  to start a probiotic over the counter and some yogurt. Pt states that she has been having symptoms over a week and denies any pain. Informed if symptoms don't improve or worsens to give us a call back    Pt verbalized understanding

## 2022-06-21 ENCOUNTER — NURSE TRIAGE (OUTPATIENT)
Dept: OTHER | Facility: CLINIC | Age: 77
End: 2022-06-21

## 2022-06-21 ENCOUNTER — TELEPHONE (OUTPATIENT)
Dept: FAMILY MEDICINE CLINIC | Age: 77
End: 2022-06-21

## 2022-06-21 NOTE — TELEPHONE ENCOUNTER
Call made to pt. Pt made aware we could do a VV but it would be better to evaluate her in person due to her symptoms. Pt stated someone could bring her.  Appt scheduled for tomorrow

## 2022-06-21 NOTE — TELEPHONE ENCOUNTER
Received call from [address] at [facility] with Red Flag Complaint. Subjective: Caller states \"The vertigo just started this morning. I went to go to the bathroom and I was really dizzy. \"     Current Symptoms: dizzy, diarrhea 2x this AM,     Onset:  0 days, started this morning    Pain Severity: 0/10; ;     Temperature: None     What has been tried: Meclizine     Recommended disposition: See in Office Today - caller canceled appointment this AM. States she is unable to drive. Recommending a virtual visit. Care advice provided, patient verbalizes understanding; denies any other questions or concerns; instructed to call back for any new or worsening symptoms. Patient/Caller agrees with recommended disposition; writer provided warm transfer to 's at [facility] for appointment scheduling    Attention Provider: Thank you for allowing me to participate in the care of your patient. The patient was connected to triage in response to information provided to the [facility]. Please do not respond through this encounter as the response is not directed to a shared pool.     Reason for Disposition   MODERATE dizziness (e.g., interferes with normal activities) (Exception: dizziness caused by heat exposure, sudden standing, or poor fluid intake)    Protocols used: DIZZINESS-ADULT-OH

## 2022-06-21 NOTE — TELEPHONE ENCOUNTER
PT had appt for today but cancelled it. Pt is unable to drive due to diziness. Pt wanted to do a virtual, but none were available. Pt states she is still taking her meds and they are not helping. Please advise.

## 2022-06-22 ENCOUNTER — OFFICE VISIT (OUTPATIENT)
Dept: FAMILY MEDICINE CLINIC | Age: 77
End: 2022-06-22
Payer: MEDICARE

## 2022-06-22 VITALS
HEART RATE: 54 BPM | SYSTOLIC BLOOD PRESSURE: 166 MMHG | RESPIRATION RATE: 22 BRPM | BODY MASS INDEX: 35.97 KG/M2 | OXYGEN SATURATION: 93 % | TEMPERATURE: 97.3 F | HEIGHT: 63 IN | WEIGHT: 203 LBS | DIASTOLIC BLOOD PRESSURE: 87 MMHG

## 2022-06-22 DIAGNOSIS — I10 PRIMARY HYPERTENSION: ICD-10-CM

## 2022-06-22 DIAGNOSIS — B37.0 THRUSH: ICD-10-CM

## 2022-06-22 DIAGNOSIS — E11.9 TYPE 2 DIABETES MELLITUS WITHOUT COMPLICATION, WITHOUT LONG-TERM CURRENT USE OF INSULIN (HCC): ICD-10-CM

## 2022-06-22 DIAGNOSIS — E78.49 OTHER HYPERLIPIDEMIA: ICD-10-CM

## 2022-06-22 DIAGNOSIS — R42 DIZZINESS: Primary | ICD-10-CM

## 2022-06-22 PROCEDURE — 3046F HEMOGLOBIN A1C LEVEL >9.0%: CPT | Performed by: FAMILY MEDICINE

## 2022-06-22 PROCEDURE — 1090F PRES/ABSN URINE INCON ASSESS: CPT | Performed by: FAMILY MEDICINE

## 2022-06-22 PROCEDURE — 3017F COLORECTAL CA SCREEN DOC REV: CPT | Performed by: FAMILY MEDICINE

## 2022-06-22 PROCEDURE — 1101F PT FALLS ASSESS-DOCD LE1/YR: CPT | Performed by: FAMILY MEDICINE

## 2022-06-22 PROCEDURE — G8427 DOCREV CUR MEDS BY ELIG CLIN: HCPCS | Performed by: FAMILY MEDICINE

## 2022-06-22 PROCEDURE — 1123F ACP DISCUSS/DSCN MKR DOCD: CPT | Performed by: FAMILY MEDICINE

## 2022-06-22 PROCEDURE — G8754 DIAS BP LESS 90: HCPCS | Performed by: FAMILY MEDICINE

## 2022-06-22 PROCEDURE — G8399 PT W/DXA RESULTS DOCUMENT: HCPCS | Performed by: FAMILY MEDICINE

## 2022-06-22 PROCEDURE — 99214 OFFICE O/P EST MOD 30 MIN: CPT | Performed by: FAMILY MEDICINE

## 2022-06-22 PROCEDURE — G8510 SCR DEP NEG, NO PLAN REQD: HCPCS | Performed by: FAMILY MEDICINE

## 2022-06-22 PROCEDURE — G8536 NO DOC ELDER MAL SCRN: HCPCS | Performed by: FAMILY MEDICINE

## 2022-06-22 PROCEDURE — 2022F DILAT RTA XM EVC RTNOPTHY: CPT | Performed by: FAMILY MEDICINE

## 2022-06-22 PROCEDURE — G8753 SYS BP > OR = 140: HCPCS | Performed by: FAMILY MEDICINE

## 2022-06-22 PROCEDURE — G8417 CALC BMI ABV UP PARAM F/U: HCPCS | Performed by: FAMILY MEDICINE

## 2022-06-22 RX ORDER — ALBUTEROL SULFATE 90 UG/1
AEROSOL, METERED RESPIRATORY (INHALATION)
COMMUNITY
Start: 2022-05-04

## 2022-06-22 RX ORDER — NYSTATIN 100000 [USP'U]/ML
SUSPENSION ORAL
COMMUNITY
Start: 2022-05-27 | End: 2022-07-14

## 2022-06-22 RX ORDER — OXCARBAZEPINE 300 MG/1
150 TABLET, FILM COATED ORAL DAILY
COMMUNITY
Start: 2022-05-04 | End: 2022-08-01 | Stop reason: ALTCHOICE

## 2022-06-22 NOTE — PROGRESS NOTES
[facility]    History of Present Illness:   Golden Camacho is a 78 y.o. female with history of HTN, PE, DVT, DM, COPD, HLD, DCIS, CVA, leg pain  CC: Follow up  History provided by patient and Records    HPI:  Thrush: Patient with recent thrush, has been on Nystatin, and seems to have cleared up. Lightheadedness: Patient developed significant Lightheadedness and feeling wobbly, but improved after several seconds or minutes. Took meclizine and improved. History of CVA with similar symptoms, though in that case was persistent. Has not checked BP. Noting right ear feels full with episodes    Health Maintenance  Health Maintenance Due   Topic Date Due    DTaP/Tdap/Td series (1 - Tdap) 10/12/2011    Breast Cancer Screen Mammogram  07/14/2018    Eye Exam Retinal or Dilated  01/07/2021    COVID-19 Vaccine (3 - Booster for [employer] Lucks series) 08/30/2021    Medicare Yearly Exam  06/29/2022       Past Medical, Family, and Social History:     Current Outpatient Medications on File Prior to Visit   Medication Sig Dispense Refill    ProAir HFA 90 mcg/actuation inhaler       budesonide/glycopyr/formoterol (BREZTRI AEROSPHERE IN) Take  by inhalation.  OXcarbazepine (TRILEPTAL) 300 mg tablet Take 150 mg by mouth daily.  nystatin (MYCOSTATIN) 100,000 unit/mL suspension       diclofenac (VOLTAREN) 1 % gel APPLY 1 GRAM TO AFFECTED AREA OF KNEES AND BACK 4 TIMES DAILY 300 g 1    montelukast (SINGULAIR) 10 mg tablet TAKE ONE TABLET BY MOUTH NIGHTLY 90 Tablet 1    meclizine (ANTIVERT) 25 mg tablet Take 1 Tablet by mouth three (3) times daily as needed for Dizziness. 90 Tablet 0    fexofenadine (ALLEGRA) 180 mg tablet Take 180 mg by mouth daily.  metFORMIN ER (GLUCOPHAGE XR) 500 mg tablet Take 1 Tablet by mouth daily (with dinner).  90 Tablet 1    Eliquis 5 mg tablet TAKE ONE TABLET BY MOUTH 2 TIMES A  Tablet 1    carvediloL (COREG) 6.25 mg tablet TAKE ONE TABLET BY MOUTH TWICE DAILY 180 Tablet 1    lovastatin (MEVACOR) 10 mg tablet TAKE ONE TABLET BY MOUTH NIGHTLY 90 Tablet 1    glucose blood VI test strips (FreeStyle Lite Strips) strip Patient is to check blood sugar once daily. Dx E11.9 100 Strip 2    albuterol (PROVENTIL VENTOLIN) 2.5 mg /3 mL (0.083 %) nebu 3 mL by Nebulization route every six (6) hours as needed for Wheezing. 75 mL 4    acetaminophen (TYLENOL ARTHRITIS PAIN) 650 mg TbER Take 1 Tab by mouth every eight (8) hours. 60 Tab 0    multivitamin (ONE A DAY) tablet Take 1 Tab by mouth nightly.  aspirin 81 mg tablet Take 81 mg by mouth daily.  Trelegy Ellipta 100-62.5-25 mcg inhaler INHALE 1 PUFF BY INHALATION DAILY (Patient not taking: Reported on 2022) 60 Each 2    guaiFENesin ER (MUCINEX) 600 mg ER tablet Take 1 Tab by mouth two (2) times a day. (Patient not taking: Reported on 2022) 60 Tab 1     No current facility-administered medications on file prior to visit.        Patient Active Problem List   Diagnosis Code    Melena K92.1    Acute posthemorrhagic anemia D62    Type 2 diabetes mellitus without complication, without long-term current use of insulin ([facility]) E11.9    HTN (hypertension) I10    Other hyperlipidemia E78.49    Debility R53.81    Acute deep vein thrombosis (DVT) of left lower extremity ([facility]) I82.402    Chronic anticoagulation Z79.01    Severe obesity ([facility]) E66.01    DCIS (ductal carcinoma in situ) of breast D05.10    History of GI bleed Z87.19    COPD (chronic obstructive pulmonary disease) ([facility]) J44.9    History of stroke Z86.73    Unspecified convulsions R56.9    Other seizures G40.89       Social History     Socioeconomic History    Marital status: SINGLE   Tobacco Use    Smoking status: Former Smoker     Types: Cigarettes     Quit date:      Years since quittin.4    Smokeless tobacco: Never Used   Vaping Use    Vaping Use: Never used   Substance and Sexual Activity    Alcohol use: Yes     Comment: social// rare    Drug use: No    Sexual activity: Never        Review of Systems   Review of Systems   Constitutional: Negative for chills and fever. Cardiovascular: Negative for chest pain and palpitations. Gastrointestinal: Negative for heartburn and nausea. Genitourinary: Negative for dysuria and urgency. Objective:     Visit Vitals  BP (!) 166/87 (BP 1 Location: Right arm, BP Patient Position: Sitting, BP Cuff Size: Adult)   Pulse (!) 54   Temp 97.3 °F (36.3 °C) (Oral)   Resp 22   Ht 5' 3\" (1.6 m)   Wt 203 lb (92.1 kg)   SpO2 93%   BMI 35.96 kg/m²        Physical Exam  Vitals and nursing note reviewed. Constitutional:       Appearance: Normal appearance. HENT:      Head: Normocephalic and atraumatic. Mouth/Throat:      Mouth: Mucous membranes are moist.      Pharynx: Oropharynx is clear. Cardiovascular:      Rate and Rhythm: Normal rate and regular rhythm. Pulses: Normal pulses. Heart sounds: Normal heart sounds. Pulmonary:      Effort: Pulmonary effort is normal.      Breath sounds: Normal breath sounds. Abdominal:      General: Abdomen is flat. Bowel sounds are normal.      Palpations: Abdomen is soft. Musculoskeletal:         General: Normal range of motion. Cervical back: Normal range of motion and neck supple. Skin:     General: Skin is warm and dry. Neurological:      General: No focal deficit present. Mental Status: She is alert and oriented to person, place, and time. Cranial Nerves: No cranial nerve deficit. Sensory: No sensory deficit. Pertinent Labs/Studies:      Assessment and orders:       ICD-10-CM ICD-9-CM    1. Dizziness  R42 780.4    2. Primary hypertension  I10 401.9 CBC W/O DIFF      METABOLIC PANEL, COMPREHENSIVE   3. Type 2 diabetes mellitus without complication, without long-term current use of insulin (HCC)  E11.9 250.00 HEMOGLOBIN A1C WITH EAG   4. Other hyperlipidemia  E78.49 272.4 LIPID PANEL   5.  Thrush  B37.0 112.0      Diagnoses and all orders for this visit:    1. Dizziness: Possibly orthostatic or possibly eustachian. Normal ear though. Advised to check BP now and to do epley maneuvers at hime as well. Can also use Meclizine. 2. Primary hypertension: Our goal is to normalize the blood pressure to decrease the risks of strokes and heart attacks. The patient is in agreement with the plan. -     CBC W/O DIFF; Future  -     METABOLIC PANEL, COMPREHENSIVE; Future    3. Type 2 diabetes mellitus without complication, without long-term current use of insulin [facility]): Discussed with patient today that the goal for their diabetes is to have a HgA1C<7 and ideally as close to 6.5 as possible. We discussed diet and medications. The goal for the cholesterol LDL is less than 70 and HDL>40. Patient is aware of the need for yearly eye exams and to take care of their feet daily. Discussed with patient that blood pressure should be less than 130/80 and watching salt intake is very important.    -     HEMOGLOBIN A1C WITH EAG; Future    4. Other hyperlipidemia: The patient is aware of our goal to reduce or eliminate the long term problems (such as strokes and heart attacks) related to poorly controlled Triglycerides, LDL, Cholesterol.   -     LIPID PANEL; Future    5. Thrush: Appears cleared. Follow-up and Dispositions    · Return in about 3 months (around 9/22/2022). I have discussed the diagnosis with the patient and the intended plan as seen in the above orders. Social history, medical history, and labs were reviewed. The patient has received an after-visit summary and questions were answered concerning future plans. I have discussed medication side effects and warnings with the patient as well.     MD JAGDISH Curtis & JERRY ORDONEZ [facility]  06/22/22

## 2022-06-24 ENCOUNTER — TELEPHONE (OUTPATIENT)
Dept: FAMILY MEDICINE CLINIC | Age: 77
End: 2022-06-24

## 2022-06-24 NOTE — TELEPHONE ENCOUNTER
Spoke briefly about results and findings. Will follow up with Neurology to consider further imaging and possible treatment.     MD JAGDISH Willis & JERRY ODRONEZ [facility]  06/24/22

## 2022-06-29 ENCOUNTER — PATIENT OUTREACH (OUTPATIENT)
Dept: CASE MANAGEMENT | Age: 77
End: 2022-06-29

## 2022-06-29 NOTE — PROGRESS NOTES
6/29/2022  3:29 PM    Goals Addressed                    This Visit's Progress       Chronic Disease      Advance Care Planning   On track      6/29/2022   Patient has not reviewed the ACP literature that was previously sent to her by this AC. Patient is still interested in completing an Advance Medical Directive. Discussed referral to Internal ACP Clinical Specialist with the patient today for assistance with completing an Advance Medical Directive; patient is agreeable to this referral.   Referral to Internal ACP Clinical Specialist today by this ACM. -MANSOOR AAWN ([facility])    5/27/2022   Patient has not had an opportunity to review the ACP literature that was sent to her by this AC; she will review this information at her earliest convenience. Plan for next AC outreach in approximately 7-14 days; will discuss referral to Internal ACP Clinical Specialist at that time if patient is interested in completing an Advance Medical Directive. -MH, RN    5/25/2022  Unable to reach patient today. Goal progress is unknown by ACM at present due to inability to reach the patient to evaluate goal progress. Goal completed and CCM episode resolved today due to ACM inability to reach the patient to provide continued CCM services. -EMPERATRIZ RN    5/16/22  Unable to reach patient today. -MANSOOR AWAN    4/15/22  Unable to reach patient today. -EMPERATRIZ RN    3/30/2022   Patient has briefly reviewed ACP/AMD literature that was previously sent to her by this AC. Patient will review literature in more detail [facility]. ACM will discuss referral to Internal ACP Clinical Specialist with patient during [facility] encounter. Plan for next ACM outreach in approximately two weeks. 2/21/2022   ACP/AMD not on file. Patient is interested in additional information and completing an Advance Medical Directive; literature sent to patient via [employer] today.  Plan for next AC outreach in approximately 7-14 days for follow-up.   Develop action plan for weight loss. (pt-stated)   On track      6/29/2022   Patient is now using low-carb tortilla wraps as an alternative to bread; she is making an effort to decrease her consumption of bread and pasta.  She is not weighing herself at home to [facility] her weight loss. Patient reports that her weight on 6/23/22 was 203lb; she has not weighed herself this week.  Patient reports no prior history of completing a diabetes education program/class (diabetic diet); she is not interested in this at this time. Note that most recent hemoglobin a1c was 7.1% on 11/11/21; compare to previous result of 9.2% on 5/12/21. She is due to have repeat hemoglobin a1c and reports plan to have this lab drawn next week (order is noted in EMR today/ordered by PCP on 6/22/22).  Patient has not explored available free food tracking apps for her smartphone as of present. She continues to identify that tracking her daily food intake may be beneficial to her in meeting her weight loss goal; states, \"I know that's a lot of my problem is the carbs that I'm intaking. \" Patient is encouraged to explore the available free smartphone apps for food tracking over the next week; she is agreeable to this plan.  Patient reports plan to start riding her stationary bike for a few minutes each day and to gradually increase her exercise time as her activity tolerance improves. ACM encouraged patient to set a 'start date' for exercise and a goal for how many days and minutes each time that she would like to plan to ride the stationary bike during her first week; patient reports plan to start riding the stationary bike on Monday, 7/4.  Plan for next ACM outreach in approximately 7-14 days. -EMPERATRIZ RN (ACM)    5/27/2022   Patient reports that she is making an effort to reduce her carbohydrate intake; she has switched to low-carb bread.  She is not currently exercising.    Discussed keeping a daily food diary; patient does feel that this may be beneficial to her, however she notes that having to handwrite everything may lead to inconsistency in tracking. She does have a smartphone; educated patient on the availability of food diary apps that can be downloaded  to her smartphone and used for free as this may be more convenient for her to consistently track her daily intake. Patient will make an effort to explore available smartphone apps for nutrition/food tracking.  Plan for next ACM outreach in approximately 7-14 days. -MH, RN    5/25/2022  Unable to reach patient today. Goal progress is unknown by ACM at present due to inability to reach the patient to evaluate goal progress. Goal completed and CCM episode resolved today due to ACM inability to reach the patient to provide continued CCM services. -MANSOOR AWAN    5/16/22  Unable to reach patient today. -MANSOOR AWAN    4/15/22  Unable to reach patient today. -MANSOOR AWAN    3/30/2022  Patient states weight loss goal of 50 lbs  Patient will begin to identify current lifestyle choices and habits that she feels may be affecting her health and have room for improvement/modifications to aide in her weight loss effort. Plan for next ACM outreach in approximately two weeks.   COMPLETED: Identification of barriers to adherence to a plan of care such as inability to afford medications, lack of insurance, lack of transportation, etc.   On track      6/29/2022   Patient has not completed the Eliquis PAP application; she is not comfortable providing the income documents (W-2s) that are required to submit this application. Eliquis costs her $141.45 per month (out-of-pocket); she reports plan to continue to pay out-of-pocket for this medication. She does have the Eliquis PAP application if she decides to complete it in the future.  Patient denies any additional questions/concerns at this time.  Patient has made progress towards this goal and has been provided with the necessary resources/support to successfully meet this goal. -MANSOOR AWAN (ACM)    5/27/2022  Patient reports that she completed the PAP forms for Breztri and Eliquis. Breztri PAP application was submitted to [employer] by her Pulmonologist (per patient); she reports that she received a letter from [address] [place] [address] that her application was approved with effective dates of 5/10/22-12/31/22. She has already received her first shipment of three Breztri inhalers from [facility]. She will take the PAP application for Eliquis to her PCP for completion of the provider section; of the application she is still paying for Eliquis out-of-pocket at present and reports medication adherence. Plan for next ACM outreach in approximately 7-14 days. -MH, RN    5/25/2022  Unable to reach patient today. Goal progress is unknown by ACM at present due to inability to reach the patient to evaluate goal progress. Goal completed and CCM episode resolved today due to ACM inability to reach the patient to provide continued CCM services. -MANSOOR AWAN    5/16/22  Unable to reach patient today. -MANSOOR AWAN    4/15/2022   Ambulatory PharmD pt outreach on 4/4/22 is noted. Documentation reviewed by this ACM today; per note, patient does not qualify for Trelegy PAP at all and she does not qualify for Eliquis PAP until she spends $1100 out of pocket. Patient does qualify for Breztri PAP and was advised by Ambulatory PharmD to contact her Pulmonologist to inquire as to whether Jose Raul Newton would be an option for her. Patient was sent Eliquis and Breztri PAP applications by Ambulatory PharmD.  ACM was unable to reach the patient today. -MANSOOR AWAN    3/30/2022   Ambulatory PharmD outreach to patient on 3/4 is noted; patient reports that she was unable to follow-up with Ambulatory PharmD at length during that time, however she has not received follow-up communication from Ambulatory PharmD. AC will route encounter to Ambulatory PharmD today to request patient outreach for continued follow-up. 2/22/2022   Referral to Ambulatory PharmD by this ACM today. 2/21/2022   Trelegy Ellipta and Eliquis each have a copay of $160 per month when she is in the donut hole (she reports that she usually reaches the donut hole in April); her copay for these medications is usually $10 each for a one-month supply. Patient will be unable to take both of these medications once she reaches the donut hole due to cost barrier and reports that she will have to choose between these two medications. She notes that in the past she has had to choose which one of these medications to pick-up from the pharmacy and she chose the Eliquis; her pulmonologist gives her samples of Trelegy Ellipta, when available.  Patient reports that she does currently have both Trelegy Ellipta and Eliquis and is taking both medications as ordered.  Patient is agreeable to Referral to Ambulatory PharmD; re: Maxcine Rein and Eliquis cost barrier   Plan for next AC outreach in 7-14 days. Patient has Ambulatory Care Manager's contact information for any further questions, concerns, or needs.   Patients upcoming visits:    Future Appointments   Date Time Provider Bill Ricardo   8/1/2022 10:20 AM Sallie Lemon NP [facility]

## 2022-07-07 ENCOUNTER — PATIENT OUTREACH (OUTPATIENT)
Dept: CASE MANAGEMENT | Age: 77
End: 2022-07-07

## 2022-07-07 NOTE — PROGRESS NOTES
7/7/2022  3:05 PM    Patient outreach by this AC today to perform CCM follow-up (evaluate goal progress) and to assess readiness for care management graduation. Two patient identifiers verified. Patient just returned home from a pulmonology appointment; she requests ACM outreach on 7/8.

## 2022-07-08 ENCOUNTER — TRANSCRIBE ORDER (OUTPATIENT)
Dept: SCHEDULING | Age: 77
End: 2022-07-08

## 2022-07-08 DIAGNOSIS — Z87.891 FORMER SMOKER: Primary | ICD-10-CM

## 2022-07-08 NOTE — PROGRESS NOTES
7/8/2022  3:20 PM    Patient outreach by this [facility] today to perform CCM follow-up (evaluate goal progress). Two patient identifiers verified. Patient reports that she attended an office visit with her Pulmonologist on 7/7/22 for COPD exacerbation and was prescribed an oral antibiotic (doxycyline 100 mg twice daily x 7 days), prednisone and duo-neb every 4 hours as needed; she picked up these new prescriptions from the pharmacy this morning. She reports that her symptoms were not improved this morning, so she contacted her Pulmonologist's office by telephone to notify; she reports that she was advised to continue with treatment plan as ordered and she has a follow-up appointment scheduled with her pulmonologist on 8/30/22. Patient's residential zip code is outside of the [facility]; unable to utilize as a resource. Patient is advised to take medications as ordered by her Pulmonologist and to continue to monitor her symptoms; she is advised to contact EMS if she experiences any severe difficulty breathing.

## 2022-07-11 ENCOUNTER — PATIENT OUTREACH (OUTPATIENT)
Dept: CASE MANAGEMENT | Age: 77
End: 2022-07-11

## 2022-07-11 NOTE — ACP (ADVANCE CARE PLANNING)
Outbound call made to patient who is requesting docs be emailed to [email or web address]. follow up scheduled for 7/18/22.

## 2022-07-13 ENCOUNTER — PATIENT OUTREACH (OUTPATIENT)
Dept: CASE MANAGEMENT | Age: 77
End: 2022-07-13

## 2022-07-14 RX ORDER — NYSTATIN 100000 [USP'U]/ML
SUSPENSION ORAL
Qty: 280 ML | Refills: 0 | Status: SHIPPED | OUTPATIENT
Start: 2022-07-14

## 2022-07-14 NOTE — PROGRESS NOTES
7/14/2022  2:05 PM    Patient outreach attempt by this ACM today to perform CCM follow-up (evaluate goal progress) and to evaluate readiness for graduation from care management services. ACM was unable to reach the patient by telephone today; lvm requesting a return phone call to this ACM.

## 2022-07-15 ENCOUNTER — TRANSCRIBE ORDER (OUTPATIENT)
Dept: SCHEDULING | Age: 77
End: 2022-07-15

## 2022-07-15 DIAGNOSIS — Z12.31 SCREENING MAMMOGRAM FOR HIGH-RISK PATIENT: Primary | ICD-10-CM

## 2022-07-18 ENCOUNTER — PATIENT OUTREACH (OUTPATIENT)
Dept: CASE MANAGEMENT | Age: 77
End: 2022-07-18

## 2022-07-22 ENCOUNTER — PATIENT OUTREACH (OUTPATIENT)
Dept: CASE MANAGEMENT | Age: 77
End: 2022-07-22

## 2022-07-26 ENCOUNTER — PATIENT OUTREACH (OUTPATIENT)
Dept: CASE MANAGEMENT | Age: 77
End: 2022-07-26

## 2022-07-26 NOTE — DISCHARGE SUMMARY
[facility] [address]   Office [phone]  Fax [phone]       Discharge / Transfer / Off-Service Note     Name: Esperanza Guerra MRN: [id number]  Sex: Female   YOB: 1945  Age: 78 y.o. PCP: Marycarmen Gonzalez MD     Date of admission: 12/12/2021  Date of discharge/transfer: 12/14/2021    Attending physician at admission: Dr. Hilaria Marin. Attending physician at discharge/transfer: Reinier Vidal MD  Resident physician at discharge/transfer: Krystina Cabral MD     Consultants during hospitalization  IP CONSULT TO [address]     Admission diagnoses   Dizziness [R42]    Recommended follow-up after discharge    1. David Bartholomew MD  2. Vascular surgery - Dr. Noah Alarcon  3. Neurology - Dr. Manuel Siegel     Things to follow up on with PCP:   -Vestibular PT for dizziness  -Monitor BP, elevated BP in the hospital - may benefit from BP meds  -F/u w/ vascular surgery for removal of IVC filter  -Per neurology pt should not drive for 6mo after last stroke (10/2021)  -LDL not at goal w/ hx stroke, consider increasing statin dose if able  ----------------------------------------------------------------------------------------------------------------------------  The patient was well enough to be discharged from the hospital. However, because they were inpatient in a hospital, they are at greater risk of having been exposed to the coronavirus. PLEASE stay inside and self-quarantine for 14 days to prevent further spread of the coronavirus.  ------------------------------------------------------------------------------------------------------------------    Medication Changes:  START   Meclizine 25mg TID PRN  Zyrtec 10mg daily     STOP   None     CHANGES   None     No other changes were made to your medications, please take all your other home medicines as previously prescribed.      History of Present Illness    As per admitting provider:     [facility] PHYSICAL THERAPY - DAILY TREATMENT NOTE    Patient Name: Mikel Oglesby        Date: 2022  : 1959   YES Patient  Verified  Visit #:   3   of   8  Insurance: Payor: [facility] MEDICAID / Plan: [facility] [employer] / Product Type: Managed Care Medicaid /      In time: 12:40  Out time: 1:40   Total Treatment Time: 60     TREATMENT AREA =  Lymphedema, not elsewhere classified [I89.0]    SUBJECTIVE    Pain Level (on 0 to 10 scale):  3  / 10   Medication Changes/New allergies or changes in medical history, any new surgeries or procedures? NO     If yes, update Summary List   Subjective Functional Status/Changes:  [x]  No changes reported     OBJECTIVE    8 min Therapeutic Exercise:  [x]  See flow sheet : Decongestive exercise to maximize lymphatic return via muscle joint pump action   Rationale:      Decrease edema to improve the patients ability to perform functional mobility with increased independence and safety and perform ADLs with increased ease. 8 min Therapeutic Activity: WC<>plinthe transfer, long<>short sit with bandages donned   Rationale: To increase safety and efficiency with ADLs. 27 min Manual Therapy: Non-specific skin care, washing with patient's personal Hib[place]anse    Multilayer compression bandaging to rick LE to maximize lymphatic flow as follows:  - Aquaphor  - Stockinette  - toe wraps  - 0.4 cm soft foam  - 6 cm bandage in modified sandle pattern  - 10 cm + 12 cm bandage in herringbone pattern ankle to popliteus   The manual therapy interventions were performed at a separate and distinct time from the therapeutic activities interventions. Rationale:      increase tissue extensibility and decrease edema  to improve patient's ability to perform ADLs and functional mobility with improved ease and comfort    8 min Self-Care: Review of prognosis for edema and role in maintaining skin integrity via edema management   Rationale:     To promote carryover of lymphedema Rock Meade is a 78 y.o. 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 She states her symptoms are similar to when she had a stroke in 10/21 but not as severe. Her symptoms began this morning when she attempted to get out of bed and noticed she could not d/t severe dizziness and a 7-8/10 HA. She denies a Hx of vertigo, but states she has been having dizziness lately but not as severe as today. She endorses R blurry vision that is unchanged since her CVA in 10/21. She denies new vision changes/falls/slurred speech/CP/palp.     Pt initially presented to a Hospital in [place] and left AMA after they notified her that they could not obtain a brain MRI today. She states they obtained a CT scan w/ IV contrast but she was not made aware of the results.      Of note, pt did not take her home bp meds today while at home but states she was given her Coreg and another Bp med while at the [place] facility.      COVID Questions:   Experiencing any of the following symptoms: fever, chills, cough, SOB, diarrhea, URI symptoms. NO  Any Sick contacts with fever, cough, diarrhea, SOB, URI symptoms. NO  Traveled out of state or out of country. NO  Lives alone. Has been staying at home. YES     In the ED:  Vitals: Temp 97.9   /131-->144/77   HR 81   RR 18  SatO2  95 % on RA  Labs: CBC unremarkable, CMP w/ Cr 1.15 (BL 0.99)  Imaging: CT head neg  Treatment: none     EKG: nsr, rate 85, QTc 461, no ST changes, (unchanged c/w prior in 2/2019)      Hospital course    Dizziness w/ CVA/TIA rule out:  Pt presented w/ symptoms of dizziness, a frontal HA and gait instability, which is similar to when she was admitted w/ a posterior CVA at [facility] in 10/21.  CT head unremarkable and exam treatment protocol and increase independence with lymphedema management. Patient Education: Self Care     Other Objective/Functional Measures:    Girth (cm): Right LE  6/7/2022 Left LE    Right LE  6/28/2022   Right LE  7/12/2022 Left LE Right LE  7/19/22 Left LE Right LE  7/26/22 Left LE   MTP: 24.0 22.0 24.6 23.2 23.4 27.1 24.5 22.4 23.9 22.4   Midfoot/navicular: 25.2 23.6 25.4 24.8 24.0 24.2 23.5 23.1 25.0 24.7   Ankle: (7 cm from floor) 26.8 27.5 27.1 26.9 24.5 25.0 24.3 24.3 25.4 25.3   Calf     (15 cm from floor) 36.8 35.7 32.3 36.0 29.7 31.0 27.0 26.7 30.3 35.8   Calf:      (30 cm from floor) 50.3 51.2 47.5 50.0 44.0 45.5 44.1 46.1 43.9 47.1   Knee:    (patella) 46.7 45.7 49.4 51.0 48.5 45.4 47.3 43.2 47.6 45.1   Thigh      55.5 55.0 58.4 57.6 57.0 56.5 57.1 56.3 56.1 56.0   TOTAL 265.3 260.7 264.7 269.5 251.1 254.7 247.8 242.1 253.2 256. 4    Abdomen = 151.4 cm    Right LE = -12.1 cm = 4.6 % reduction  Left LE = -4.3 cm = 1.6 % reduction         Post Treatment Pain Level (on 0 to 10) scale:   3  / 10     ASSESSMENT    Assessment/Changes in Function: Patient with increased edema since last measured but reduced overall. Skin integrity improving significantly with no drainage present. Continuing to await garments. Pt acknowledges understanding to all educaiton provided. []  See Progress Note/Recertification   Patient will continue to benefit from skilled PT services to modify and progress therapeutic interventions, address functional mobility deficits via edema reduction and AROM/strength improvement, procure/train in necessary compression garments, and facilitate independence in long term management. Progress toward goals / Updated goals:  1. Patient to be independent in decongestive HEP to maximize therapeutic benefit of muscle pump with compression bandages donned. PN: To be initiated  Current: 7/19/2022 - Progressing - initiated this date     2.  Patient to be independent in self manual lymph drainage (MLD) and/or diaphragmatic breathing for bilateral lower extremities to reroute lymphatic fluid for reduction in symptoms. PN:  to be initiated     3. Patient to independently don/doff compression garments for maintenance of symptoms and increase ADL tolerance. PN:  measured and order submitted     4. Patient to demonstrate an overall 8% reduction in rick LE for improved standing and walking ability.   PN:  Right LE = 14.2 cm = 5.4% reduction , Left LE =-6 cm = 2.3% reduction  Current: 7/19/2022 - Progressing - Right LE = 17.5 cm = 6.6 % reduction , Left LE =18.6 cm = 7.1 % reduction     PLAN    [x]  Upgrade activities as tolerated YES Continue plan of care   []  Discharge due to :    []  Other:      Therapist: Lizette Ji, PT, PT, DPT, CLT    Date: 7/26/2022 Time: 7:29 AM     Future Appointments   Date Time Provider Mary Alice Randolph   7/26/2022 12:45 PM Bina Caprice, PT [facility]   7/29/2022 11:30 AM Bina Caprice, PT [facility]   8/1/2022 12:15 PM Cody Martin MD [facility] BS AMB   8/2/2022 11:15 AM Bina Caprice, PT [facility]   8/4/2022  1:15 PM Chayo Dominguez MD [facility] BS AMB   8/9/2022 12:45 PM Bina Caprice, PT [facility]   8/16/2022 12:45 PM Bina Caprice, PT [facility]   8/19/2022 11:30 AM Bina Caprice, PT [facility]   8/23/2022 12:45 PM Bina Caprice, PT [facility]   8/26/2022 11:30 AM Bina Caprice, PT [facility]   8/30/2022 12:45 PM Eve Fraser, PT [facility] largely stable w/o deficits. MRI brain w/o contrast w/ no acute stroke, no sign of prior stroke on imaging.  -Dizziness thought to be more peripheral in etiology, possibly BPPV, pt would benefit from vestibular PT  -Start meclizine PRN  -Start zyrtec 10mg daily      Elevated Creatinine in CKD3a: POA Cr 1.15 (BL 0.99), GFR 46. Etiology may be 2/2 contrast received at [place] [facility].  - Creatinine came back to baseline, f/u OP     Hx of L pontine lacunar infarct w/ residual R eye blurry vision: Pt was admitted at [address] [place] [address] in 10/2021 for CVA. She states current symptoms similar to then but less severe. MRI brain at the time showed a late acute to early subacute left pontine lacunar infarct. - Per neurology pt should not drive for 6 months after her last stroke (10/2021)  - F/u neurology OP     Hx of BL PE and DVT: in 2/2019. On Eliquis indefinitely   - cont eliqius 5mg BID     S/p IVC filter placement: on 2/8/19 as Xeralto at the time caused a GI bleed. - F/u OP w/ vascular surgery for removal of IVC filter     Hx L Breast cancer: in remission s/p mastectomy and reconstruction in 2009. No radiation or chemotherapy.     HTN: BP on admission 158/131-->144/77 w/o meds. - Continue home coreg 6.25 mg BID   - Pt w/ elevated BP in hospital, consider addition of more BP meds as indicated     HLD: resume home lovastatin 10mg qhs at DC (pt allergic to Lipitor, which is only statin on formulary). - Consider increasing statin dose as able, LDL not at goal     COPD w/ known Pulm nodule: stable. 5/2019 CT Chest (7 x 2 mm RLL pulmonary nodule. decreased in size compared to May 2011). Follows w/ Dr. Meera Quick (Pulm). Exam notable for dimininshed breath sounds BL, otherwise stable. - cont Singulair 10mg daily, Trelegy inhaler daily, proAir q6H prn     DM2 diet controlled: Last A1c 7.1 (11/21). Used to take metformin 500mg daily     Tobacco Abuse: endorses years of tobacco abuse.  States since she was Dgx w/ Breast cancer in 2009, she only smokes 1-2 cigarettes/month socially. - Encourage cessation and f/u in OP         Obesity: Body mass index is 36.58 kg/m². - Encouraging lifestyle modifications and further follow up outpatient. Physical exam at discharge:    Vitals Reviewed. Patient Vitals for the past 12 hrs:   Temp Pulse Resp BP SpO2   12/14/21 1557 98.6 °F (37 °C) 86 17 (!) 147/79 95 %   12/14/21 1155    (!) 141/69    12/14/21 1043 98.4 °F (36.9 °C) 75 18 (!) 141/69 96 %   12/14/21 0734 97.5 °F (36.4 °C) 75 18 (!) 166/68 95 %   12/14/21 0705  80           General:   sleepy, cooperative, no acute distress. Head:   Atraumatic. Ear canals patent, TM w/ good cone of light b/l, no effusions. Eyes:   Conjunctivae clear   ENT:  Oral mucosa normal   Neck:  Supple, trachea midline, no adenopathy   No JVD   Back:    No CVA tenderness    Lungs:   Diminished sounds BL. No w/r. Heart:   Regular rhythm, no murmur   Abdomen:    Soft, non-tender   No masses or organomegaly    Extremities:  trace LE edema. Skin:  Warm and dry    No rashes or lesions   Neurologic:  Oriented   CN II-XII intact, no focal defecits. Condition at discharge: Stable. Labs  Recent Labs     12/14/21  0312 12/13/21  0209 12/12/21 2046   WBC 6.0 7.1 8.2   HGB 12.4 12.1 12.8   HCT 39.1 38.3 39.4    261 277     Recent Labs     12/14/21  0312 12/13/21  0209 12/12/21 2046    144 143   K 4.0 3.7 4.6   * 112* 111*   CO2 24 26 25   BUN 18 17 19   CREA 0.91 1.00 1. 15*   * 121* 106*   CA 8.7 8.7 8.7   MG 2.2 2.1 1.9     Recent Labs     12/12/21 2046   ALT 15      TBILI 0.4   TP 7.2   ALB 3.2*   GLOB 4.0     No results for input(s): PH, PCO2, PO2, TNIPOC, TROIQ, INR, PTP, APTT, FE, TIBC, PSAT, FERR, GLUCPOC, INREXT, INREXT in the last 72 hours.     No lab exists for component: GLPOC    Micro:  Lab Results   Component Value Date/Time    Culture result: KLEBSIELLA PNEUMONIAE  ESCHERICHIA COLI 03/10/2013 12:41 PM    Culture result:  06/17/2010 04:22 PM     NO GROWTH 14 DAYS, ON SOLID MEDIA  STAPHYLOCOCCUS SPECIES, COAGULASE NEGATIVE ISOLATED FROM THIO BROTH ONLY    Culture result:  06/17/2010 04:22 PM     NO GROWTH 14 DAYS, ON SOLID MEDIA  STAPHYLOCOCCUS SPECIES, COAGULASE NEGATIVE , ISOLATED FROM THIO BROTH ONLY . PLATES HELD 3 DAYS. CALL MICROBIOLOGY LAB IF SENSITIVITIES ARE NEEDED. Imaging:  MRI BRAIN WO CONT    Result Date: 12/13/2021  EXAM: MRI BRAIN WO CONT INDICATION: Dizziness w/ Hx of CVA COMPARISON: None. CONTRAST: None. TECHNIQUE:  Multiplanar multisequence acquisition without contrast of the brain. FINDINGS: Periventricular and subcortical white matter T2/FLAIR hyperintensity is nonspecific but most likely reflects chronic small vessel ischemic disease The ventricles are normal in size and position. There is no acute infarct, hemorrhage, extra-axial fluid collection, or mass effect. There is no cerebellar tonsillar herniation. Expected arterial flow-voids are present. The paranasal sinuses, mastoid air cells, and middle ears are clear. The orbital contents are within normal limits. No significant osseous or scalp lesions are identified. No acute intracranial abnormality. Sequela of chronic small vessel ischemic disease. CT HEAD WO CONT    Result Date: 12/12/2021  EXAM: CT HEAD WO CONT INDICATION: Headache and dizziness COMPARISON: None. CONTRAST: None. TECHNIQUE: Unenhanced CT of the head was performed using 5 mm images. Brain and bone windows were generated. Coronal and sagittal reformats. CT dose reduction was achieved through use of a standardized protocol tailored for this examination and automatic exposure control for dose modulation. FINDINGS: The ventricles and sulci are normal in size, shape and configuration. . There is no significant white matter disease. There is no intracranial hemorrhage, extra-axial collection, or mass effect. The basilar cisterns are open. No CT evidence of acute infarct.  The bone windows demonstrate no abnormalities. The visualized portions of the paranasal sinuses and mastoid air cells are clear. No acute process seen      Procedures / Diagnostic Studies  none    Chronic diagnoses   Problem List as of 12/14/2021 Date Reviewed: 11/11/2021          Codes Class Noted - Resolved    History of stroke ICD-10-CM: Z86.73  ICD-9-CM: V12.54  11/11/2021 - Present        COPD (chronic obstructive pulmonary disease) ([facility] ICD-10-CM: J44.9  ICD-9-CM: 152  Unknown - Present    Overview Signed 1/2/2020  2:46 PM by Ángel Ruiz NP     [facility]             History of GI bleed ICD-10-CM: Z87.19  ICD-9-CM: V12.79  4/16/2019 - Present        DCIS (ductal carcinoma in situ) of breast ICD-10-CM: D05.10  ICD-9-CM: 233.0  3/8/2019 - Present    Overview Signed 3/8/2019  9:33 AM by Cris Schaeffer MD     Left breast s/p masectomy 1/2008             Chronic anticoagulation ICD-10-CM: Z79.01  ICD-9-CM: V58.61  3/1/2019 - Present    Overview Addendum 12/12/2021 10:37 PM by Malcolm Boyle MD     12/2019-Dr. Vega (heme/onc) recommends indefinite AC.               Severe obesity ([facility] ICD-10-CM: E66.01  ICD-9-CM: 278.01  3/1/2019 - Present        Acute deep vein thrombosis (DVT) of left lower extremity ([facility] ICD-10-CM: I82.402  ICD-9-CM: 453.40  2/7/2019 - Present        Debility ICD-10-CM: R53.81  ICD-9-CM: 799.3  2/25/2012 - Present        Melena ICD-10-CM: K92.1  ICD-9-CM: 578.1  2/24/2012 - Present        Acute posthemorrhagic anemia ICD-10-CM: D62  ICD-9-CM: 285.1  2/24/2012 - Present        Type 2 diabetes mellitus without complication, without long-term current use of insulin (HCC) ICD-10-CM: E11.9  ICD-9-CM: 250.00  2/24/2012 - Present        HTN (hypertension) ICD-10-CM: I10  ICD-9-CM: 401.9  2/24/2012 - Present        Other hyperlipidemia ICD-10-CM: E78.49  ICD-9-CM: 272.4  2/24/2012 - Present        RESOLVED: Acute pulmonary embolism ([facility] ICD-10-CM: I26.99  ICD-9-CM: 415.19  2/8/2019 - 5/12/2021        RESOLVED: Pulmonary embolism ([facility] ICD-10-CM: I26.99  ICD-9-CM: 415.19  2/7/2019 - 5/12/2021        RESOLVED: GI bleeding ICD-10-CM: K92.2  ICD-9-CM: 578.9  2/25/2012 - 2/25/2012        RESOLVED: Edema ICD-10-CM: R60.9  ICD-9-CM: 782.3  2/24/2012 - 2/25/2012        RESOLVED: Hypokalemia ICD-10-CM: E87.6  ICD-9-CM: 276.8  2/24/2012 - 2/25/2012        RESOLVED: Hypomagnesemia ICD-10-CM: L72.03  ICD-9-CM: 275.2  2/24/2012 - 2/25/2012              Current Discharge Medication List      START taking these medications    Details   cetirizine (ZYRTEC) 10 mg tablet Take 1 Tablet by mouth daily. Qty: 30 Tablet, Refills: 0  Start date: 12/15/2021         CONTINUE these medications which have CHANGED    Details   meclizine (ANTIVERT) 25 mg tablet Take 1 Tablet by mouth three (3) times daily as needed for Dizziness for up to 10 days. Qty: 30 Tablet, Refills: 0  Start date: 12/14/2021, End date: 12/24/2021         CONTINUE these medications which have NOT CHANGED    Details   carvediloL (COREG) 6.25 mg tablet Take 1 Tablet by mouth two (2) times a day. Qty: 180 Tablet, Refills: 1      Eliquis 5 mg tablet TAKEONE TABLET BY MOUTH 2 TIMES A DAY  Qty: 180 Tablet, Refills: 1    Associated Diagnoses: Acute saddle pulmonary embolism without acute cor pulmonale ([facility] Acute deep vein thrombosis (DVT) of femoral vein of left lower extremity (HCC)      lovastatin (MEVACOR) 10 mg tablet TAKE ONE TABLET BY MOUTH NIGHTLY  Qty: 90 Tablet, Refills: 1      montelukast (SINGULAIR) 10 mg tablet TAKE ONE TABLET BY MOUTH NIGHTLY  Qty: 90 Tablet, Refills: 1      glucose blood VI test strips (FreeStyle Lite Strips) strip Patient is to check blood sugar once daily.  Dx E11.9  Qty: 100 Strip, Refills: 2    Associated Diagnoses: Type 2 diabetes mellitus without complication, without long-term current use of insulin ([facility])      Trelegy Ellipta 100-62.5-25 mcg inhaler Take 1 Puff by inhalation daily.  Qty: 1 Inhaler, Refills: 2    Associated Diagnoses: Simple chronic bronchitis (HCC)      diclofenac (VOLTAREN) 1 % gel APPLY 1 GRAM TO AFFECTED AREA OF KNEES AND BACK 4 TIMES DAILY  Qty: 300 g, Refills: 5    Associated Diagnoses: Arthritis      guaiFENesin ER (MUCINEX) 600 mg ER tablet Take 1 Tab by mouth two (2) times a day. Qty: 60 Tab, Refills: 1    Associated Diagnoses: COPD exacerbation (HCC)      fluticasone propionate (FLONASE) 50 mcg/actuation nasal spray 2 Sprays by Both Nostrils route daily. Qty: 1 Bottle, Refills: 2    Associated Diagnoses: Bacterial sinusitis      acetaminophen (TYLENOL ARTHRITIS PAIN) 650 mg TbER Take 1 Tab by mouth every eight (8) hours. Qty: 60 Tab, Refills: 0      multivitamin (ONE A DAY) tablet Take 1 Tab by mouth nightly. aspirin 81 mg tablet Take 81 mg by mouth daily. metFORMIN ER (GLUCOPHAGE XR) 500 mg tablet TAKE ONE TABLET BY MOUTH DAILY WITH BREAKFAST  Qty: 90 Tablet, Refills: 1    Associated Diagnoses: Elevated hemoglobin A1c      budesonide-formoteroL (SYMBICORT) 80-4.5 mcg/actuation HFAA INHALE 2 PUFFS BY INHALATION DAILY  Qty: 1 Inhaler, Refills: 5    Associated Diagnoses: Simple chronic bronchitis (HCC)      albuterol (PROVENTIL VENTOLIN) 2.5 mg /3 mL (0.083 %) nebu 3 mL by Nebulization route every six (6) hours as needed for Wheezing. Qty: 75 mL, Refills: 4    Associated Diagnoses: Simple chronic bronchitis (HCC)      ProAir HFA 90 mcg/actuation inhaler Take 2 Puffs by inhalation every six (6) hours as needed for Wheezing. Qty: 1 Inhaler, Refills: 3              Diet:  Regular diet.     Activity:  As tolerated     Disposition: Home or Self Care    Discharge instructions to patient/family  Please seek medical attention for any new or worsening symptoms particularly fever, chest pain, shortness of breath, abdominal pain, nausea, vomiting    Follow up plans/appointments  Follow-up Information     Follow up With Specialties Details Why Contact Info Ricki Hawkins MD General and Vascular Surgery Schedule an appointment as soon as possible for a visit To discuss removing IVC filter [address] [facility]  [address]      Cholo Valentin MD Family Medicine Go on 12/15/2021 Hospital Follow UP at 4:00pm [address] [facility] Chris Kassidy[address]23      Brodie Dance, MD Neurology Schedule an appointment as soon as possible for a visit Follow up for dizziness and your hx of stroke [facility] 170      [address] [facility]  PT/OT [address] [facility] [address] [place] [address]    Amy Sanders MD Family Medicine   [address] [place] [address]  [phone]             Peyton Chamorro MD  Family Medicine Resident       For Billing    Chief Complaint   Patient presents with   [facility] Problems  Date Reviewed: 11/11/2021    None

## 2022-07-26 NOTE — PROGRESS NOTES
7/26/2022  3:45 PM    Patient outreach attempt by this [facility] today to perform CCM follow-up (evaluate goal progress) and to evaluate readiness for graduation from care management services. AC was unable to reach the patient by telephone today; m requesting a return phone call to this AC. Patient has graduated from the Complex Case Management  program on 7/26/2022 due to ACM inability to reach and/or successfully follow-up with patient during most recent outreach attempts. Care management goals have been completed. No further Ambulatory Care Manager follow up scheduled. Goals Addressed                      This Visit's Progress       Chronic Disease      COMPLETED: Advance Care Planning         7/26/2022  Unable to reach patient today. Internal ACP Facilitator has been unable to reach patient. Goal not met at this time. ACM has been unable to reach and/or successfully follow-up with patient during most recent outreach attempts; goal completed and complex case management episode resolved at this time. -MANSOOR AWAN ([facility])    7/14/2022  Unable to reach patient. -MH, RN ([facility])    7/8/2022  Follow-up on goal progress deferred today; patient with COPD exacerbation. Plan for AC outreach on 7/11/22. -MANSOOR AWAN ([facility])    7/7/22  Patient is unable to follow-up with AC today. -MANSOOR AWAN ([facility])    6/29/2022  Patient has not reviewed the ACP literature that was previously sent to her by this [facility]. Patient is still interested in completing an Advance Medical Directive. Discussed referral to Internal ACP Clinical Specialist with the patient today for assistance with completing an Advance Medical Directive; patient is agreeable to this referral.  Referral to Internal ACP Clinical Specialist today by this [facility]. -MANSOOR AWAN ([facility])    5/27/2022  Patient has not had an opportunity to review the ACP literature that was sent to her by this [facility]; she will review this information at her earliest convenience.  Plan for next AC outreach in approximately 7-14 days; will discuss referral to Internal ACP Clinical Specialist at that time if patient is interested in completing an Advance Medical Directive. -MH, RN    5/25/2022  Unable to reach patient today. Goal progress is unknown by ACM at present due to inability to reach the patient to evaluate goal progress. Goal completed and CCM episode resolved today due to ACM inability to reach the patient to provide continued CCM services. -MANSOOR AWAN    5/16/22  Unable to reach patient today. -MANSOOR AWAN    4/15/22  Unable to reach patient today. -MANSOOR AWAN    3/30/2022  Patient has briefly reviewed ACP/AMD literature that was previously sent to her by this ACM. Patient will review literature in more detail before next [facility] outreach encounter. ACM will discuss referral to Internal ACP Clinical Specialist with patient during next [facility] outreach encounter. Plan for next AC outreach in approximately two weeks. 2/21/2022  ACP/AMD not on file. Patient is interested in additional information and completing an Advance Medical Directive; literature sent to patient via [employer] today. Plan for next AC outreach in approximately 7-14 days for follow-up. COMPLETED: Develop action plan for weight loss. (pt-stated)         7/26/2022  Unable to reach patient today. Current goal progress unknown due to ACM has been unable to reach and/or successfully follow-up with patient during most recent outreach attempts; goal completed and complex case management episode resolved at this time. -MANSOOR AWAN ([facility])    7/14/2022  Unable to reach patient. -MH, RN ([facility])    7/8/2022  Follow-up on goal progress deferred today; patient with COPD exacerbation. Plan for ACM outreach on 7/11/22. -MANSOOR AWAN (AC)    7/7/22  Patient is unable to follow-up with ACM today. -MANSOOR AWAN ([facility])    6/29/2022  Patient is now using low-carb tortilla wraps as an alternative to bread; she is making an effort to decrease her consumption of bread and pasta.   She is not weighing herself at home to [facility] her weight loss. Patient reports that her weight on 6/23/22 was 203lb; she has not weighed herself this week. Patient reports no prior history of completing a diabetes education program/class (diabetic diet); she is not interested in this at this time. Note that most recent hemoglobin a1c was 7.1% on 11/11/21; compare to previous result of 9.2% on 5/12/21. She is due to have repeat hemoglobin a1c and reports plan to have this lab drawn next week (order is noted in EMR today/ordered by PCP on 6/22/22). Patient has not explored available free food tracking apps for her smartphone as of present. She continues to identify that tracking her daily food intake may be beneficial to her in meeting her weight loss goal; states, \"I know that's a lot of my problem is the carbs that I'm intaking. \" Patient is encouraged to explore the available free smartphone apps for food tracking over the next week; she is agreeable to this plan. Patient reports plan to start riding her stationary bike for a few minutes each day and to gradually increase her exercise time as her activity tolerance improves. ACM encouraged patient to set a 'start date' for exercise and a goal for how many days and minutes each time that she would like to plan to ride the stationary bike during her first week; patient reports plan to start riding the stationary bike on Monday, 7/4. Plan for next ACM outreach in approximately 7-14 days. -MANSOOR AWAN (AC)    5/27/2022  Patient reports that she is making an effort to reduce her carbohydrate intake; she has switched to low-carb bread. She is not currently exercising. Discussed keeping a daily food diary; patient does feel that this may be beneficial to her, however she notes that having to handwrite everything may lead to inconsistency in tracking.  She does have a smartphone; educated patient on the availability of food diary apps that can be downloaded  to her smartphone and used for free as this may be more convenient for her to consistently track her daily intake. Patient will make an effort to explore available smartphone apps for nutrition/food tracking. Plan for next ACM outreach in approximately 7-14 days. -EMPERATRIZ RN    5/25/2022  Unable to reach patient today. Goal progress is unknown by ACM at present due to inability to reach the patient to evaluate goal progress. Goal completed and CCM episode resolved today due to ACM inability to reach the patient to provide continued CCM services. -EMPERATRIZ RN    5/16/22  Unable to reach patient today. -MANSOOR AWAN    4/15/22  Unable to reach patient today. -EMPERATRIZ RN    3/30/2022  Patient states weight loss goal of 50 lbs  Patient will begin to identify current lifestyle choices and habits that she feels may be affecting her health and have room for improvement/modifications to aide in her weight loss effort. Plan for next ACM outreach in approximately two weeks. Patient has Ambulatory Care Manager's contact information for any further questions, concerns, or needs. Patients upcoming visits:    Future Appointments   Date Time Provider Bill Ricardo   8/1/2022 10:20 AM Hyacinth Lemon NP [facility] AMB   8/8/2022  1:00 PM [facility] 1 [facility] [address] Belkis Erickson

## 2022-08-01 ENCOUNTER — OFFICE VISIT (OUTPATIENT)
Dept: NEUROLOGY | Age: 77
End: 2022-08-01
Payer: MEDICARE

## 2022-08-01 VITALS — DIASTOLIC BLOOD PRESSURE: 88 MMHG | HEART RATE: 72 BPM | SYSTOLIC BLOOD PRESSURE: 144 MMHG | TEMPERATURE: 97.2 F

## 2022-08-01 DIAGNOSIS — R94.131 ABNORMAL EMG: ICD-10-CM

## 2022-08-01 DIAGNOSIS — M54.16 LUMBAR RADICULOPATHY: ICD-10-CM

## 2022-08-01 DIAGNOSIS — E11.42 DIABETIC POLYNEUROPATHY ASSOCIATED WITH TYPE 2 DIABETES MELLITUS (HCC): Primary | ICD-10-CM

## 2022-08-01 PROCEDURE — 1090F PRES/ABSN URINE INCON ASSESS: CPT | Performed by: NURSE PRACTITIONER

## 2022-08-01 PROCEDURE — 99214 OFFICE O/P EST MOD 30 MIN: CPT | Performed by: NURSE PRACTITIONER

## 2022-08-01 PROCEDURE — G8753 SYS BP > OR = 140: HCPCS | Performed by: NURSE PRACTITIONER

## 2022-08-01 PROCEDURE — G8536 NO DOC ELDER MAL SCRN: HCPCS | Performed by: NURSE PRACTITIONER

## 2022-08-01 PROCEDURE — 1101F PT FALLS ASSESS-DOCD LE1/YR: CPT | Performed by: NURSE PRACTITIONER

## 2022-08-01 PROCEDURE — G8399 PT W/DXA RESULTS DOCUMENT: HCPCS | Performed by: NURSE PRACTITIONER

## 2022-08-01 PROCEDURE — 1123F ACP DISCUSS/DSCN MKR DOCD: CPT | Performed by: NURSE PRACTITIONER

## 2022-08-01 PROCEDURE — G8432 DEP SCR NOT DOC, RNG: HCPCS | Performed by: NURSE PRACTITIONER

## 2022-08-01 PROCEDURE — 2022F DILAT RTA XM EVC RTNOPTHY: CPT | Performed by: NURSE PRACTITIONER

## 2022-08-01 PROCEDURE — G8428 CUR MEDS NOT DOCUMENT: HCPCS | Performed by: NURSE PRACTITIONER

## 2022-08-01 PROCEDURE — G8754 DIAS BP LESS 90: HCPCS | Performed by: NURSE PRACTITIONER

## 2022-08-01 PROCEDURE — G8417 CALC BMI ABV UP PARAM F/U: HCPCS | Performed by: NURSE PRACTITIONER

## 2022-08-01 PROCEDURE — 3017F COLORECTAL CA SCREEN DOC REV: CPT | Performed by: NURSE PRACTITIONER

## 2022-08-01 PROCEDURE — 3052F HG A1C>EQUAL 8.0%<EQUAL 9.0%: CPT | Performed by: NURSE PRACTITIONER

## 2022-08-01 RX ORDER — CYCLOBENZAPRINE HCL 10 MG
10 TABLET ORAL 2 TIMES DAILY
Qty: 60 TABLET | Refills: 5 | Status: SHIPPED | OUTPATIENT
Start: 2022-08-01

## 2022-08-01 RX ORDER — PREGABALIN 50 MG/1
50 CAPSULE ORAL 2 TIMES DAILY
Qty: 60 CAPSULE | Refills: 5 | Status: SHIPPED | OUTPATIENT
Start: 2022-08-01

## 2022-08-01 NOTE — PROGRESS NOTES
Yuridia Beach is a 78 y.o. female who presents with the following  Chief Complaint   Patient presents with    Follow-up     Testing/ occipital neuralgia/ imbalance/ neuralgia       HPI    FU EMG , neuralgia  She continues to have spasms, leg pain   She has failed Gabapentin- made her feel bad. Never used Lyrica  She was taking Flexeril for spasms before her stroke. Is interested in getting back onto this. She feels like her balance is a little off. She has not fallen. She is not sleeping very well. Trileptal 150 mg neuralgia  Wants to come off. Will do this. Allergies   Allergen Reactions    Crestor [Rosuvastatin] Other (comments)     Pain in left leg  Causes muscle spasms    Lipitor [Atorvastatin] Other (comments)     Left leg pain  Causes muscle spasms    Xarelto [Rivaroxaban] Other (comments)     Pt states it caused internal bleeding       Current Outpatient Medications   Medication Sig    pregabalin (LYRICA) 50 mg capsule Take 1 Capsule by mouth two (2) times a day. Max Daily Amount: 100 mg.    cyclobenzaprine (FLEXERIL) 10 mg tablet Take 1 Tablet by mouth two (2) times a day. PRN    lovastatin (MEVACOR) 10 mg tablet TAKE ONE TABLET BY MOUTH NIGHTLY    Eliquis 5 mg tablet TAKE ONE TABLET BY MOUTH 2 TIMES A DAY    nystatin (MYCOSTATIN) 100,000 unit/mL suspension USE 5 ML BY MOUTH 4 TIMES A DAY AS DIRECTED    ProAir HFA 90 mcg/actuation inhaler     budesonide/glycopyr/formoterol (BREZTRI AEROSPHERE IN) Take  by inhalation. diclofenac (VOLTAREN) 1 % gel APPLY 1 GRAM TO AFFECTED AREA OF KNEES AND BACK 4 TIMES DAILY    montelukast (SINGULAIR) 10 mg tablet TAKE ONE TABLET BY MOUTH NIGHTLY    meclizine (ANTIVERT) 25 mg tablet Take 1 Tablet by mouth three (3) times daily as needed for Dizziness. fexofenadine (ALLEGRA) 180 mg tablet Take 180 mg by mouth daily. metFORMIN ER (GLUCOPHAGE XR) 500 mg tablet Take 1 Tablet by mouth daily (with dinner).     carvediloL (COREG) 6.25 mg tablet TAKE ONE TABLET BY MOUTH TWICE DAILY    glucose blood VI test strips (FreeStyle Lite Strips) strip Patient is to check blood sugar once daily. Dx E11.9    albuterol (PROVENTIL VENTOLIN) 2.5 mg /3 mL (0.083 %) nebu 3 mL by Nebulization route every six (6) hours as needed for Wheezing. guaiFENesin ER (MUCINEX) 600 mg ER tablet Take 1 Tab by mouth two (2) times a day. (Patient taking differently: Take 600 mg by mouth two (2) times daily as needed.)    acetaminophen (TYLENOL ARTHRITIS PAIN) 650 mg TbER Take 1 Tab by mouth every eight (8) hours. multivitamin (ONE A DAY) tablet Take 1 Tab by mouth nightly. aspirin 81 mg tablet Take 81 mg by mouth daily. Trelegy Ellipta 100-62.5-25 mcg inhaler INHALE 1 PUFF BY INHALATION DAILY (Patient not taking: No sig reported)     No current facility-administered medications for this visit. Social History     Tobacco Use   Smoking Status Former    Types: Cigarettes    Quit date:     Years since quittin.5   Smokeless Tobacco Never       Past Medical History:   Diagnosis Date    Asthma     Bronchitis     Cancer ([facility]     breast - left    COPD (chronic obstructive pulmonary disease) ([facility]     [facility]; pulmonary nodule 7mm stable (found )    Diabetes ([address]     Hepatitis age 11    High cholesterol     Hypertension     Obesity (BMI 30-39. 9)     Thromboembolus ([address]     L LE with PE       Past Surgical History:   Procedure Laterality Date    COLONOSCOPY N/A 2017    COLONOSCOPY- no info to   performed by Halie Hummel MD at [facility] [address]    HX GYN      ablation, R ovary removed, tubal ligation    HX HAMMER TOE REPAIR      HX HEENT      tonsillectomy    HX ORTHOPAEDIC      L hip, R leg, C4-C5 fusion, L foot    HX OTHER SURGICAL      xiphoid removal    IR PLC IVC FILTER  2019    KS BREAST SURGERY PROCEDURE UNLISTED      L mastectomy       Family History   Problem Relation Age of Onset    Heart Attack Father     Diabetes Mother Social History     Socioeconomic History    Marital status: SINGLE   Tobacco Use    Smoking status: Former     Types: Cigarettes     Quit date:      Years since quittin.5    Smokeless tobacco: Never   Vaping Use    Vaping Use: Never used   Substance and Sexual Activity    Alcohol use: Yes     Comment: social// rare    Drug use: No    Sexual activity: Never       Review of Systems   Eyes:  Negative for blurred vision, double vision and photophobia. Respiratory:  Negative for shortness of breath and wheezing. Cardiovascular:  Negative for chest pain and palpitations. Gastrointestinal:  Negative for nausea and vomiting. Musculoskeletal:  Positive for back pain. Negative for falls and joint pain. Neurological:  Positive for tingling, sensory change and weakness. Negative for speech change, focal weakness, seizures and loss of consciousness. Remainder of comprehensive review is negative. Physical Exam :    Visit Vitals  BP (!) 144/88   Pulse 72   Temp 97.2 °F (36.2 °C)       General: Well defined, nourished, and groomed individual in no acute distress. Neck: Supple, nontender, no bruits, no pain with resistance to active range of motion. Musculoskeletal: Extremities revealed no edema and had full range of motion of joints. Psych: Good mood and bright affect    NEUROLOGICAL EXAMINATION:    Mental Status: Alert and oriented to person, place, and time    Cranial Nerves:    II, III, IV, VI: Visual acuity grossly intact. Visual fields are normal.    Pupils are equal, round, and reactive to light and accommodation. Extra-ocular movements are full and fluid. Fundoscopic exam was benign, no ptosis or nystagmus. V-XII: Hearing is grossly intact. Facial features are symmetric, with normal sensation and strength. The palate rises symmetrically and the tongue protrudes midline. Sternocleidomastoids 5/5.      Motor Examination: Normal tone, bulk, and strength, 3/5 muscle strength throughout bilateral LE     Coordination: Finger to nose was normal. No resting or intention tremor    Gait and Station: Steady while walking, shuffled. Reflexes: DTRs 1+ throughout. Neurosensory Exam:  Stocking glove sensory loss to temperature, vibration, and pinprick to the thighs. Results for orders placed or performed in visit on 07/18/22   HEMOGLOBIN A1C WITH EAG   Result Value Ref Range    Hemoglobin A1c 8.0 (H) 4.0 - 5.6 %    Est. average glucose 183 mg/dL   LIPID PANEL   Result Value Ref Range    Cholesterol, total 177 <200 MG/DL    Triglyceride 291 (H) <150 MG/DL    HDL Cholesterol 46 MG/DL    LDL, calculated 72.8 0 - 100 MG/DL    VLDL, calculated 58.2 MG/DL    CHOL/HDL Ratio 3.8 0.0 - 5.0     METABOLIC PANEL, COMPREHENSIVE   Result Value Ref Range    Sodium 141 136 - 145 mmol/L    Potassium 4.5 3.5 - 5.1 mmol/L    Chloride 110 (H) 97 - 108 mmol/L    CO2 25 21 - 32 mmol/L    Anion gap 6 5 - 15 mmol/L    Glucose 158 (H) 65 - 100 mg/dL    BUN 19 6 - 20 MG/DL    Creatinine 1.02 0.55 - 1.02 MG/DL    BUN/Creatinine ratio 19 12 - 20      GFR est AA >60 >60 ml/min/1.73m2    GFR est non-AA 53 (L) >60 ml/min/1.73m2    Calcium 8.9 8.5 - 10.1 MG/DL    Bilirubin, total 0.3 0.2 - 1.0 MG/DL    ALT (SGPT) 13 12 - 78 U/L    AST (SGOT) 13 (L) 15 - 37 U/L    Alk.  phosphatase 109 45 - 117 U/L    Protein, total 6.7 6.4 - 8.2 g/dL    Albumin 3.4 (L) 3.5 - 5.0 g/dL    Globulin 3.3 2.0 - 4.0 g/dL    A-G Ratio 1.0 (L) 1.1 - 2.2     CBC W/O DIFF   Result Value Ref Range    WBC 8.1 3.6 - 11.0 K/uL    RBC 4.98 3.80 - 5.20 M/uL    HGB 11.8 11.5 - 16.0 g/dL    HCT 39.8 35.0 - 47.0 %    MCV 79.9 (L) 80.0 - 99.0 FL    MCH 23.7 (L) 26.0 - 34.0 PG    MCHC 29.6 (L) 30.0 - 36.5 g/dL    RDW 16.3 (H) 11.5 - 14.5 %    PLATELET 613 286 - 302 K/uL    MPV 11.3 8.9 - 12.9 FL    NRBC 0.0 0  WBC    ABSOLUTE NRBC 0.00 0.00 - 0.01 K/uL       Orders Placed This Encounter    MRI LUMB SPINE WO CONT     Standing Status:   Future Standing Expiration Date:   9/1/2023    pregabalin (LYRICA) 50 mg capsule     Sig: Take 1 Capsule by mouth two (2) times a day. Max Daily Amount: 100 mg. Dispense:  60 Capsule     Refill:  5    cyclobenzaprine (FLEXERIL) 10 mg tablet     Sig: Take 1 Tablet by mouth two (2) times a day. PRN     Dispense:  60 Tablet     Refill:  5       1. Diabetic polyneuropathy associated with type 2 diabetes mellitus ([facility]    2. Abnormal EMG    3. Lumbar radiculopathy      Discussed EMG in full  Will need MRI lumbar spine to evaluate radiculopathy closer as cause. Try Lyrica 50 mg BID for symptom management   Can try Flexeril again nightly and BID PRN for spasms. Will use Baclofen if needed next   FU after MRI   Keep active.                This note will not be viewable in [employer]

## 2022-08-23 ENCOUNTER — OFFICE VISIT (OUTPATIENT)
Dept: FAMILY MEDICINE CLINIC | Age: 77
End: 2022-08-23
Payer: MEDICARE

## 2022-08-23 VITALS
RESPIRATION RATE: 22 BRPM | WEIGHT: 211.2 LBS | HEART RATE: 64 BPM | OXYGEN SATURATION: 97 % | SYSTOLIC BLOOD PRESSURE: 136 MMHG | BODY MASS INDEX: 37.42 KG/M2 | TEMPERATURE: 98.9 F | DIASTOLIC BLOOD PRESSURE: 63 MMHG | HEIGHT: 63 IN

## 2022-08-23 DIAGNOSIS — R05.9 COUGH: Primary | ICD-10-CM

## 2022-08-23 DIAGNOSIS — Z20.822 EXPOSURE TO 2019 NOVEL CORONAVIRUS: ICD-10-CM

## 2022-08-23 DIAGNOSIS — J44.1 COPD EXACERBATION (HCC): ICD-10-CM

## 2022-08-23 PROCEDURE — 1123F ACP DISCUSS/DSCN MKR DOCD: CPT | Performed by: FAMILY MEDICINE

## 2022-08-23 PROCEDURE — 99212 OFFICE O/P EST SF 10 MIN: CPT | Performed by: FAMILY MEDICINE

## 2022-08-23 NOTE — PROGRESS NOTES
1. \"Have you been to the ER, urgent care clinic since your last visit? Hospitalized since your last visit? \" No    2. \"Have you seen or consulted any other health care providers outside of the [address] [place] [address] since your last visit? \" No     3. For patients aged 39-70: Has the patient had a colonoscopy / FIT/ Cologuard? Yes - no Care Gap present      If the patient is female:    4. For patients aged 41-77: Has the patient had a mammogram within the past 2 years? NA - based on age or sex      11. For patients aged 21-65: Has the patient had a pap smear?  NA - based on age or sex    Health Maintenance Due   Topic Date Due    DTaP/Tdap/Td series (1 - Tdap) 10/12/2011    Eye Exam Retinal or Dilated  01/07/2021    COVID-19 Vaccine (3 - Booster for Moderna series) 08/30/2021    Medicare Yearly Exam  06/29/2022

## 2022-08-23 NOTE — PROGRESS NOTES
[facility]    History of Present Illness:   Merle Toth is a 78 y.o. female here for   Chief Complaint   Patient presents with    URI     Wheezing since . Spoke with pulmonology before she came in today and they are sending in [address] [place] [address]. Want to have her lungs listened to. HPI:  C/o cough, wheezing, fatigue x 2 days. No fever, n/v/d. She was at a  Thursday and Friday. Symptoms started 2 days later. She has a history of COPD and spoke with pulmonology before coming in. They are calling in a Z-Ritesh as well as a prednisone taper. She has been taking prednisone 10 mg for the past week. She is vaccinated but has not had her booster shot. She did a neb at 8 AM and then again at 12. Health Maintenance  Health Maintenance Due   Topic Date Due    DTaP/Tdap/Td series (1 - Tdap) 10/12/2011    Eye Exam Retinal or Dilated  2021    COVID-19 Vaccine (3 - Booster for Jackquline Thuan series) 2021    Medicare Yearly Exam  2022       Past Medical, Family, and Social History:     Past Medical History:   Diagnosis Date    Asthma     Bronchitis     Cancer ([address]     breast - left    COPD (chronic obstructive pulmonary disease) ([address]     [facility]; pulmonary nodule 7mm stable (found )    Diabetes ([address]     Hepatitis age 11    High cholesterol     Hypertension     Obesity (BMI 30-39. 9)     Thromboembolus ([address]     L LE with PE      Past Surgical History:   Procedure Laterality Date    COLONOSCOPY N/A 2017    COLONOSCOPY- no info to   performed by Eder Hathaway MD at [facility] 10    HX GYN      ablation, R ovary removed, tubal ligation    HX HAMMER TOE REPAIR      HX HEENT      tonsillectomy    HX ORTHOPAEDIC      L hip, R leg, C4-C5 fusion, L foot    HX OTHER SURGICAL      xiphoid removal    IR [facility] IVC FILTER  2019    [place] BREAST SURGERY PROCEDURE UNLISTED      L mastectomy       Current Outpatient Medications on File Prior to Visit Medication Sig Dispense Refill    pregabalin (LYRICA) 50 mg capsule Take 1 Capsule by mouth two (2) times a day. Max Daily Amount: 100 mg. 60 Capsule 5    cyclobenzaprine (FLEXERIL) 10 mg tablet Take 1 Tablet by mouth two (2) times a day. PRN 60 Tablet 5    lovastatin (MEVACOR) 10 mg tablet TAKE ONE TABLET BY MOUTH NIGHTLY 90 Tablet 1    Eliquis 5 mg tablet TAKE ONE TABLET BY MOUTH 2 TIMES A  Tablet 1    nystatin (MYCOSTATIN) 100,000 unit/mL suspension USE 5 ML BY MOUTH 4 TIMES A DAY AS DIRECTED 280 mL 0    budesonide/glycopyr/formoterol (BREZTRI AEROSPHERE IN) Take  by inhalation. diclofenac (VOLTAREN) 1 % gel APPLY 1 GRAM TO AFFECTED AREA OF KNEES AND BACK 4 TIMES DAILY 300 g 1    montelukast (SINGULAIR) 10 mg tablet TAKE ONE TABLET BY MOUTH NIGHTLY 90 Tablet 1    meclizine (ANTIVERT) 25 mg tablet Take 1 Tablet by mouth three (3) times daily as needed for Dizziness. 90 Tablet 0    fexofenadine (ALLEGRA) 180 mg tablet Take 180 mg by mouth daily. metFORMIN ER (GLUCOPHAGE XR) 500 mg tablet Take 1 Tablet by mouth daily (with dinner). 90 Tablet 1    carvediloL (COREG) 6.25 mg tablet TAKE ONE TABLET BY MOUTH TWICE DAILY 180 Tablet 1    glucose blood VI test strips (FreeStyle Lite Strips) strip Patient is to check blood sugar once daily. Dx E11.9 100 Strip 2    albuterol (PROVENTIL VENTOLIN) 2.5 mg /3 mL (0.083 %) nebu 3 mL by Nebulization route every six (6) hours as needed for Wheezing. 75 mL 4    acetaminophen (TYLENOL ARTHRITIS PAIN) 650 mg TbER Take 1 Tab by mouth every eight (8) hours. 60 Tab 0    multivitamin (ONE A DAY) tablet Take 1 Tab by mouth nightly. aspirin 81 mg tablet Take 81 mg by mouth daily.       ProAir HFA 90 mcg/actuation inhaler  (Patient not taking: Reported on 8/23/2022)      Trelegy Ellipta 100-62.5-25 mcg inhaler INHALE 1 PUFF BY INHALATION DAILY (Patient not taking: No sig reported) 60 Each 2    guaiFENesin ER (MUCINEX) 600 mg ER tablet Take 1 Tab by mouth two (2) times a day. (Patient not taking: Reported on 2022) 60 Tab 1     No current facility-administered medications on file prior to visit. Patient Active Problem List   Diagnosis Code    Melena K92.1    Acute posthemorrhagic anemia D62    Type 2 diabetes mellitus without complication, without long-term current use of insulin (HCC) E11.9    HTN (hypertension) I10    Other hyperlipidemia E78.49    Debility R53.81    Acute deep vein thrombosis (DVT) of left lower extremity ([facility]) I82.402    Chronic anticoagulation Z79.01    Severe obesity ([facility]) E66.01    DCIS (ductal carcinoma in situ) of breast D05.10    History of GI bleed Z87.19    COPD (chronic obstructive pulmonary disease) ([facility]) J44.9    History of stroke Z86.73    Unspecified convulsions R56.9    Other seizures G40.89       Social History     Socioeconomic History    Marital status: SINGLE   Tobacco Use    Smoking status: Former     Types: Cigarettes     Quit date:      Years since quittin.6    Smokeless tobacco: Never   Vaping Use    Vaping Use: Never used   Substance and Sexual Activity    Alcohol use: Yes     Comment: social// rare    Drug use: No    Sexual activity: Never     Social Determinants of Health     Financial Resource Strain: Low Risk     Difficulty of Paying Living Expenses: Not hard at all   Food Insecurity: No Food Insecurity    Worried About Running Out of Food in the Last Year: Never true    Ran Out of Food in the Last Year: Never true        Review of Systems   Review of Systems   Constitutional:  Negative for chills and fever. Respiratory:  Positive for cough, shortness of breath and wheezing. Gastrointestinal:  Negative for diarrhea, nausea and vomiting.      Objective:   Visit Vitals  /63 (BP 1 Location: Right upper arm, BP Patient Position: Sitting, BP Cuff Size: Large adult)   Pulse 64   Temp 98.9 °F (37.2 °C) (Oral)   Resp 22   Ht 5' 3\" (1.6 m)   Wt 211 lb 3.2 oz (95.8 kg)   SpO2 97%   BMI 37.41 kg/m²        Physical Exam  PHYSICAL EXAM:  Gen: Pt sitting in chair, in NAD  Head: Normocephalic, atraumatic  Eyes: Sclera anicteric, EOM grossly intact,  Ears: TM's pearly with good light reflex b/l  Throat: MMM, normal lips, tongue, teeth and gums  CVS: Normal S1, S2, no m/r/g  Resp: +wheezing, no rhonchi or rales  Neuro: Alert, oriented, appropriate    Pertinent Labs/Studies:  3 most recent PHQ Screens 6/22/2022   Little interest or pleasure in doing things Not at all   Feeling down, depressed, irritable, or hopeless Not at all   Total Score PHQ 2 0   Trouble falling or staying asleep, or sleeping too much -   Feeling tired or having little energy -   Poor appetite, weight loss, or overeating -   Feeling bad about yourself - or that you are a failure or have let yourself or your family down -   Trouble concentrating on things such as school, work, reading, or watching TV -   Moving or speaking so slowly that other people could have noticed; or the opposite being so fidgety that others notice -   Thoughts of being better off dead, or hurting yourself in some way -   PHQ 9 Score -   How difficult have these problems made it for you to do your work, take care of your home and get along with others -          Assessment and orders:       ICD-10-CM ICD-9-CM    1. Cough  R05.9 786.2 NOVEL CORONAVIRUS (COVID-19)      2. Exposure to 2019 novel coronavirus  Z20.822 V01.79 NOVEL CORONAVIRUS (COVID-19)      3. COPD exacerbation ([facility]  J44.1 491.21 NOVEL CORONAVIRUS (COVID-19)        Diagnoses and all orders for this visit:    1. Cough  -     NOVEL CORONAVIRUS (COVID-19)    2. Exposure to 2019 novel coronavirus  -     NOVEL CORONAVIRUS (COVID-19)    3. COPD exacerbation ([facility]  -     NOVEL CORONAVIRUS (COVID-19)      Follow-up and Dispositions    Return if symptoms worsen or fail to improve, for wellness, Mauri Hensley. ZAHIRA test for COVID-19 ordered. NP swab obtained.   Patient instructed in supportive measures- extra fluids, OTC antipyretics, OTC cough and cold medications. Patient instructed to f/u for any worsening symptoms or any concerns of shortness of breath. Special instructions given to patient to self-quarantine until results of testing are known. Patient instructed that the results can take at least 72 hours. Advised patient to follow current CDC guidelines. She has prednisone and a Z-Ritesh waiting at the pharmacy for her. I advised her if her symptoms worsen to seek medical attention. I have discussed the diagnosis with the patient and the intended plan as seen in the above orders. Social history, medical history, and labs were reviewed. The patient has received an after-visit summary and questions were answered concerning future plans. I have discussed medication side effects and warnings with the patient as well. Patient/guardian verbalized understanding and accepts plan & risks. Please note that this dictation was completed with [employer], the computer voice recognition software. Quite often unanticipated grammatical, syntax, homophones, and other interpretive errors are inadvertently transcribed by the computer software. Please disregard these errors. Please excuse any errors that have escaped final proofreading. Thank you.      MD JAGDISH Becker & JERRY ORDONEZ [facility] & [facility]  08/23/22

## 2022-08-23 NOTE — PATIENT INSTRUCTIONS
Coronavirus (TWJEG-90): Care Instructions  Overview  The coronavirus disease (COVID-19) is caused by a virus. Symptoms may include a fever, a cough, and shortness of breath. It can spread through droplets from coughing and sneezing, breathing, and singing. The virus also can spread when people are in close contact with someone who is infected. Most people have mild symptoms and can take care of themselves at home with medicine to reduce symptoms. Talk to your doctor. They might have you take medicine to help prevent serious illness. If your symptoms get worse, you may need care in a hospital. Treatment may include medicines, plus breathing support such as oxygen therapy or a ventilator. It's important to not spread the virus to others. If you have COVID-19, wear a mask anytime you are around other people. Isolate yourself while you are sick. Leave your home only if you need to get medical care or testing. Follow-up care is a key part of your treatment and safety. Be sure to make and go to all appointments, and call your doctor if you are having problems. It's also a good idea to know your test results and keep a list of the medicines you take. How can you care for yourself at home? Get extra rest. It can help you feel better. Drink plenty of fluids. This helps replace fluids lost from fever. Fluids may also help ease a scratchy throat. You can take acetaminophen (Tylenol) or ibuprofen (Advil, Motrin) to reduce a fever. It may also help with muscle and body aches. Read and follow all instructions on the label. Use petroleum jelly on sore skin. This can help if the skin around your nose and lips becomes sore from rubbing a lot with tissues. If you use oxygen, use a water-based product instead of petroleum jelly. Keep track of symptoms such as fever and shortness of breath. This can help you know if you need to call your doctor. It can also help you know when it's safe to be around other people.   In some cases, your doctor might suggest that you get a pulse oximeter. How can you self-isolate when you have COVID-19? If you have COVID-19, there are things you can do to help avoid spreading the virus to others. Limit contact with people in your home. If possible, stay in a separate bedroom and use a separate bathroom. Wear a mask when you are around other people. If you have to leave home, avoid crowds and try to stay at least 6 feet away from other people. Avoid contact with pets and other animals. Cover your mouth and nose with a tissue when you cough or sneeze. Then throw it in the trash right away. Wash your hands often, especially after you cough or sneeze. Use soap and water, and scrub for at least 20 seconds. If soap and water aren't available, use an alcohol-based hand . Don't share personal household items. These include bedding, towels, cups and glasses, and eating utensils. [address] in the warmest water allowed for the fabric type, and dry it completely. It's okay to wash other people's laundry with yours. Clean and disinfect your home. Use household  and disinfectant wipes or sprays. When can you end self-isolation for COVID-19? If you know or think that you have the virus, you will need to self-isolate. When you can be around other people you live with and leave home depends on if you have symptoms. Important: Day 0 is the day your symptoms started or the day you tested positive. Day 1 is the day after your symptoms first started or your test was positive. Isolation starts on Day 0. If you have symptoms, you can end isolation at the end of Day 5 if you haven't had a fever for 24 hours while not taking medicines to lower the fever and your symptoms are getting better. If you tested positive but have NO symptoms, you can end isolation at the end of Day 5. But if you start to have symptoms, follow the steps above. Use Day 0 as your first day of symptoms.   You may take a COVID-19 test at the end of Day 5. If it is positive, continue to isolate until the end of Day 10. This is especially important if you have a weakened immune system. When should you call for help? Call 911 anytime you think you may need emergency care. For example, call if you have life-threatening symptoms, such as: You have severe trouble breathing. (You can't talk at all.)     You have constant chest pain or pressure. You are severely dizzy or lightheaded. You are confused or can't think clearly. You have pale, gray, or blue-colored skin or lips. You pass out (lose consciousness) or are very hard to wake up. You have loss of balance or trouble walking. You have trouble seeing out of one or both eyes. You have weakness or drooping on one side of the face. You have weakness or numbness in an arm or a leg. You have trouble speaking. You have a severe headache. You have a seizure. Call your doctor now or seek immediate medical care if:    You have moderate trouble breathing. (You can't speak a full sentence.)     You are coughing up blood. You have signs of low blood pressure. These include feeling lightheaded; being too weak to stand; and having cold, pale, clammy skin. Watch closely for changes in your health, and be sure to contact your doctor if:    Your symptoms get worse. You are not getting better as expected. You have new or worse symptoms of anxiety, depression, nightmares, or flashbacks. Call before you go to the doctor's office. Follow their instructions. And wear a mask. Where can you learn more? Go to http://www.gray.com/  Enter C007 in the search box to learn more about \"Coronavirus (COVID-19): Care Instructions. \"  Current as of: July 1, 2021               Content Version: 13.2  © 4024-3218 [employer].    Care instructions adapted under license by [employer] (which disclaims liability or warranty for this information). If you have questions about a medical condition or this instruction, always ask your healthcare professional. [place] [address] any warranty or liability for your use of this information.

## 2022-08-25 LAB
SARS-COV-2, NAA 2 DAY TAT: NORMAL
SARS-COV-2, NAA: NOT DETECTED

## 2022-08-26 ENCOUNTER — TELEPHONE (OUTPATIENT)
Dept: FAMILY MEDICINE CLINIC | Age: 77
End: 2022-08-26

## 2022-08-26 NOTE — TELEPHONE ENCOUNTER
----- Message from [employer], LPN sent at 8/52/2731  2:44 PM EDT -----    ----- Message -----  From: Jonelle Calderón Lab Results In  Sent: 8/25/2022   4:35 PM EDT  To: Keisha Contreras MD

## 2022-08-26 NOTE — TELEPHONE ENCOUNTER
Spoke to patient, discussed negative COVID testing, continue Prednisone. Azithromycin from Pulm not very Effective.     MD JAGDISH Ma & JERRY ORDONEZ [facility] & [facility]  08/26/22

## 2022-09-26 ENCOUNTER — TELEPHONE (OUTPATIENT)
Dept: FAMILY MEDICINE CLINIC | Age: 77
End: 2022-09-26

## 2022-09-26 NOTE — TELEPHONE ENCOUNTER
Spoke with patient and advised to trial Nucalla at this time for her COPD, Benefits outweight the risks most likely.     MD JAGDISH Rutledge & JERRY ORDONEZ [facility] & [facility]  09/26/22

## 2022-09-26 NOTE — TELEPHONE ENCOUNTER
Pt would like to talk to Dr NIA Marquis about her pulmonologist appt. Pt states they want her to start the nucala injeciton. Pt has some questions, please advise.

## 2022-09-30 ENCOUNTER — OFFICE VISIT (OUTPATIENT)
Dept: NEUROLOGY | Age: 77
End: 2022-09-30

## 2022-09-30 DIAGNOSIS — E11.42 DIABETIC POLYNEUROPATHY ASSOCIATED WITH TYPE 2 DIABETES MELLITUS (HCC): Primary | ICD-10-CM

## 2022-09-30 PROBLEM — N18.30 CHRONIC RENAL DISEASE, STAGE III (HCC): Status: ACTIVE | Noted: 2022-09-30

## 2022-10-04 ENCOUNTER — TELEPHONE (OUTPATIENT)
Dept: FAMILY MEDICINE CLINIC | Age: 77
End: 2022-10-04

## 2022-10-06 DIAGNOSIS — E11.9 TYPE 2 DIABETES MELLITUS WITHOUT COMPLICATION, WITHOUT LONG-TERM CURRENT USE OF INSULIN (HCC): ICD-10-CM

## 2022-10-06 RX ORDER — METFORMIN HYDROCHLORIDE 500 MG/1
TABLET, EXTENDED RELEASE ORAL
Qty: 90 TABLET | Refills: 0 | Status: SHIPPED | OUTPATIENT
Start: 2022-10-06

## 2022-11-07 ENCOUNTER — HOSPITAL ENCOUNTER (OUTPATIENT)
Dept: MAMMOGRAPHY | Age: 77
Discharge: HOME | End: 2022-11-07
Attending: FAMILY MEDICINE
Payer: MEDICARE

## 2022-11-07 ENCOUNTER — HOSPITAL ENCOUNTER (OUTPATIENT)
Dept: MRI IMAGING | Age: 77
Discharge: HOME | End: 2022-11-07
Attending: NURSE PRACTITIONER
Payer: MEDICARE

## 2022-11-07 DIAGNOSIS — M54.16 LUMBAR RADICULOPATHY: ICD-10-CM

## 2022-11-07 DIAGNOSIS — R94.131 ABNORMAL EMG: ICD-10-CM

## 2022-11-07 DIAGNOSIS — Z12.31 SCREENING MAMMOGRAM FOR HIGH-RISK PATIENT: ICD-10-CM

## 2022-11-07 PROCEDURE — 72148 MRI LUMBAR SPINE W/O DYE: CPT

## 2022-11-07 PROCEDURE — 77067 SCR MAMMO BI INCL CAD: CPT

## 2022-11-10 ENCOUNTER — OFFICE VISIT (OUTPATIENT)
Dept: FAMILY MEDICINE CLINIC | Age: 77
End: 2022-11-10
Payer: MEDICARE

## 2022-11-10 VITALS
HEIGHT: 63 IN | RESPIRATION RATE: 24 BRPM | TEMPERATURE: 98 F | OXYGEN SATURATION: 94 % | BODY MASS INDEX: 36.14 KG/M2 | DIASTOLIC BLOOD PRESSURE: 72 MMHG | SYSTOLIC BLOOD PRESSURE: 132 MMHG | HEART RATE: 72 BPM | WEIGHT: 204 LBS

## 2022-11-10 DIAGNOSIS — B37.0 THRUSH, ORAL: ICD-10-CM

## 2022-11-10 DIAGNOSIS — J44.1 COPD EXACERBATION (HCC): Primary | ICD-10-CM

## 2022-11-10 DIAGNOSIS — E11.9 TYPE 2 DIABETES MELLITUS WITHOUT COMPLICATION, WITHOUT LONG-TERM CURRENT USE OF INSULIN (HCC): ICD-10-CM

## 2022-11-10 DIAGNOSIS — E78.49 OTHER HYPERLIPIDEMIA: ICD-10-CM

## 2022-11-10 DIAGNOSIS — I10 PRIMARY HYPERTENSION: ICD-10-CM

## 2022-11-10 PROCEDURE — 3017F COLORECTAL CA SCREEN DOC REV: CPT | Performed by: FAMILY MEDICINE

## 2022-11-10 PROCEDURE — G8752 SYS BP LESS 140: HCPCS | Performed by: FAMILY MEDICINE

## 2022-11-10 PROCEDURE — G8427 DOCREV CUR MEDS BY ELIG CLIN: HCPCS | Performed by: FAMILY MEDICINE

## 2022-11-10 PROCEDURE — G8754 DIAS BP LESS 90: HCPCS | Performed by: FAMILY MEDICINE

## 2022-11-10 PROCEDURE — G8417 CALC BMI ABV UP PARAM F/U: HCPCS | Performed by: FAMILY MEDICINE

## 2022-11-10 PROCEDURE — 3074F SYST BP LT 130 MM HG: CPT | Performed by: FAMILY MEDICINE

## 2022-11-10 PROCEDURE — 1101F PT FALLS ASSESS-DOCD LE1/YR: CPT | Performed by: FAMILY MEDICINE

## 2022-11-10 PROCEDURE — G8510 SCR DEP NEG, NO PLAN REQD: HCPCS | Performed by: FAMILY MEDICINE

## 2022-11-10 PROCEDURE — 1123F ACP DISCUSS/DSCN MKR DOCD: CPT | Performed by: FAMILY MEDICINE

## 2022-11-10 PROCEDURE — 2022F DILAT RTA XM EVC RTNOPTHY: CPT | Performed by: FAMILY MEDICINE

## 2022-11-10 PROCEDURE — 99214 OFFICE O/P EST MOD 30 MIN: CPT | Performed by: FAMILY MEDICINE

## 2022-11-10 PROCEDURE — 1090F PRES/ABSN URINE INCON ASSESS: CPT | Performed by: FAMILY MEDICINE

## 2022-11-10 PROCEDURE — 3078F DIAST BP <80 MM HG: CPT | Performed by: FAMILY MEDICINE

## 2022-11-10 PROCEDURE — G8536 NO DOC ELDER MAL SCRN: HCPCS | Performed by: FAMILY MEDICINE

## 2022-11-10 PROCEDURE — G8399 PT W/DXA RESULTS DOCUMENT: HCPCS | Performed by: FAMILY MEDICINE

## 2022-11-10 PROCEDURE — 3052F HG A1C>EQUAL 8.0%<EQUAL 9.0%: CPT | Performed by: FAMILY MEDICINE

## 2022-11-10 RX ORDER — ALBUTEROL SULFATE 90 UG/1
2 AEROSOL, METERED RESPIRATORY (INHALATION)
Qty: 1 EACH | Refills: 1 | Status: SHIPPED | OUTPATIENT
Start: 2022-11-10 | End: 2023-11-05

## 2022-11-10 RX ORDER — AMOXICILLIN AND CLAVULANATE POTASSIUM 875; 125 MG/1; MG/1
1 TABLET, FILM COATED ORAL EVERY 12 HOURS
Qty: 14 TABLET | Refills: 0 | Status: SHIPPED | OUTPATIENT
Start: 2022-11-10 | End: 2022-11-17

## 2022-11-10 RX ORDER — NYSTATIN 100000 [USP'U]/ML
SUSPENSION ORAL
Qty: 473 ML | Refills: 2 | Status: SHIPPED | OUTPATIENT
Start: 2022-11-10

## 2022-11-10 RX ORDER — GUAIFENESIN 600 MG/1
600 TABLET, EXTENDED RELEASE ORAL 2 TIMES DAILY
Qty: 60 TABLET | Refills: 1 | Status: SHIPPED | OUTPATIENT
Start: 2022-11-10

## 2022-11-10 RX ORDER — PREDNISONE 20 MG/1
40 TABLET ORAL
Qty: 10 TABLET | Refills: 0 | Status: SHIPPED | OUTPATIENT
Start: 2022-11-10 | End: 2022-11-15

## 2022-11-10 RX ORDER — MINERAL OIL
180 ENEMA (ML) RECTAL DAILY
Qty: 90 TABLET | Refills: 1 | Status: SHIPPED | OUTPATIENT
Start: 2022-11-10

## 2022-11-10 NOTE — PROGRESS NOTES
Chief Complaint   Patient presents with    Thrush    Wheezing     ABT x2 weeks ago per pulm. 1. \"Have you been to the ER, urgent care clinic since your last visit? Hospitalized since your last visit? \" No    2. \"Have you seen or consulted any other health care providers outside of the [address] [place] [address] since your last visit? \" Yes Telephone encounter with Cony Maier. 3. For patients aged 39-70: Has the patient had a colonoscopy / FIT/ Cologuard? Yes      If the patient is female:    4. For patients aged 41-77: Has the patient had a mammogram within the past 2 years? Yes - no Care Gap present      5. For patients aged 21-65: Has the patient had a pap smear?  NA - based on age or sex    Health Maintenance Due   Topic Date Due    DTaP/Tdap/Td series (1 - Tdap) 10/12/2011    Eye Exam Retinal or Dilated  01/07/2021    COVID-19 Vaccine (3 - Booster for Moderna series) 05/25/2021    Medicare Yearly Exam  06/29/2022    Flu Vaccine (1) 08/01/2022    Foot Exam Q1  11/11/2022    MICROALBUMIN Q1  11/11/2022

## 2022-11-10 NOTE — LETTER
To Dr. Monika Gerber,    Please fax us the most recent office visit notes so that we may update the patient's records for continuity of care.      Our fax number: [phone]    Patient:   Esha Carr  1945

## 2022-11-10 NOTE — PROGRESS NOTES
[facility]    History of Present Illness:   Zachary Simental is a 78 y.o. female with history of HTN, PE, DVT, DM, COPD, HLD, DCIS, CVA, leg pain  CC: Follow up  History provided by patient and Records    HPI:  COPD Exacerbation:  Patient reports exacerbation of COPD. Exacerbation started 4 days ago. Trigger appears to be: Cold air and Smoke. Current medication compliance has been Poor.  - Acute dyspnea: severity = fairly severe: course since onset of episode: gradually worsening.  - Change in cough/Sputum: severity: moderate: sputum : brown: moderate. - Increased wheezing, current severity is moderate to severe and rescue inhalers are minimally effective. - Patient also endorses congestion, sneezing, and productive cough. - Oxygen: she currently is not on home oxygen therapy. .  This is unchanged from baseline. Pulmonologist:   Dr. Jean Claude Leung Maintenance Due   Topic Date Due    DTaP/Tdap/Td series (1 - Tdap) 10/12/2011    Eye Exam Retinal or Dilated  01/07/2021    COVID-19 Vaccine (3 - Booster for Moderna series) 05/25/2021    Medicare Yearly Exam  06/29/2022    Flu Vaccine (1) 08/01/2022    Foot Exam Q1  11/11/2022    MICROALBUMIN Q1  11/11/2022       Past Medical, Family, and Social History:     Current Outpatient Medications on File Prior to Visit   Medication Sig Dispense Refill    diclofenac (VOLTAREN) 1 % gel APPLY 1 GRAM TO AFFECTED AREA OF KNEES AND BACK 4 TIMES DAILY 300 g 1    Eliquis 5 mg tablet TAKE ONE TABLET BY MOUTH 2 TIMES A  Tablet 1    metFORMIN ER (GLUCOPHAGE XR) 500 mg tablet TAKE 1 TABLET BY MOUTH DAILY WITH DINNER 90 Tablet 0    carvediloL (COREG) 6.25 mg tablet TAKE ONE TABLET BY MOUTH TWICE DAILY 180 Tablet 1    pregabalin (LYRICA) 50 mg capsule Take 1 Capsule by mouth two (2) times a day. Max Daily Amount: 100 mg. 60 Capsule 5    cyclobenzaprine (FLEXERIL) 10 mg tablet Take 1 Tablet by mouth two (2) times a day.  PRN 60 Tablet 5    lovastatin (MEVACOR) 10 mg tablet TAKE ONE TABLET BY MOUTH NIGHTLY 90 Tablet 1    nystatin (MYCOSTATIN) 100,000 unit/mL suspension USE 5 ML BY MOUTH 4 TIMES A DAY AS DIRECTED 280 mL 0    ProAir HFA 90 mcg/actuation inhaler       montelukast (SINGULAIR) 10 mg tablet TAKE ONE TABLET BY MOUTH NIGHTLY 90 Tablet 1    meclizine (ANTIVERT) 25 mg tablet Take 1 Tablet by mouth three (3) times daily as needed for Dizziness. 90 Tablet 0    Trelegy Ellipta 100-62.5-25 mcg inhaler INHALE 1 PUFF BY INHALATION DAILY 60 Each 2    glucose blood VI test strips (FreeStyle Lite Strips) strip Patient is to check blood sugar once daily. Dx E11.9 100 Strip 2    albuterol (PROVENTIL VENTOLIN) 2.5 mg /3 mL (0.083 %) nebu 3 mL by Nebulization route every six (6) hours as needed for Wheezing. 75 mL 4    acetaminophen (TYLENOL ARTHRITIS PAIN) 650 mg TbER Take 1 Tab by mouth every eight (8) hours. 60 Tab 0    multivitamin (ONE A DAY) tablet Take 1 Tab by mouth nightly. aspirin 81 mg tablet Take 81 mg by mouth daily. [DISCONTINUED] budesonide/glycopyr/formoterol (BREZTRI AEROSPHERE IN) Take  by inhalation. (Patient not taking: Reported on 11/10/2022)      [DISCONTINUED] fexofenadine (ALLEGRA) 180 mg tablet Take 180 mg by mouth daily. (Patient not taking: Reported on 11/10/2022)      [DISCONTINUED] guaiFENesin ER (MUCINEX) 600 mg ER tablet Take 1 Tab by mouth two (2) times a day. (Patient not taking: No sig reported) 60 Tab 1     No current facility-administered medications on file prior to visit.        Patient Active Problem List   Diagnosis Code    Melena K92.1    Acute posthemorrhagic anemia D62    Type 2 diabetes mellitus without complication, without long-term current use of insulin (HCC) E11.9    HTN (hypertension) I10    Other hyperlipidemia E78.49    Debility R53.81    Acute deep vein thrombosis (DVT) of left lower extremity (HCC) I82.402    Chronic anticoagulation Z79.01    Severe obesity (HCC) E66.01    DCIS (ductal carcinoma in situ) of breast D05.10    History of GI bleed Z87.19    COPD (chronic obstructive pulmonary disease) (HCC) J44.9    History of stroke Z86.73    Unspecified convulsions R56.9    Other seizures G40.89    Chronic renal disease, stage III N18.30       Social History     Socioeconomic History    Marital status: SINGLE   Tobacco Use    Smoking status: Former     Types: Cigarettes     Quit date:      Years since quittin.8    Smokeless tobacco: Never   Vaping Use    Vaping Use: Never used   Substance and Sexual Activity    Alcohol use: Yes     Comment: social// rare    Drug use: No    Sexual activity: Never     Social Determinants of Health     Financial Resource Strain: Low Risk     Difficulty of Paying Living Expenses: Not hard at all   Food Insecurity: No Food Insecurity    Worried About Running Out of Food in the Last Year: Never true    Ran Out of Food in the Last Year: Never true        Review of Systems   Review of Systems   Constitutional:  Negative for chills and fever. Respiratory:  Positive for cough, shortness of breath and wheezing. Cardiovascular:  Negative for chest pain and palpitations. Gastrointestinal:  Negative for nausea and vomiting. Objective:   Visit Vitals  /72 (BP 1 Location: Right arm, BP Patient Position: Sitting, BP Cuff Size: Large adult)   Pulse 72   Temp 98 °F (36.7 °C) (Oral)   Resp 24   Ht 5' 3\" (1.6 m)   Wt 204 lb (92.5 kg)   SpO2 94%   BMI 36.14 kg/m²        Physical Exam  Vitals and nursing note reviewed. Constitutional:       Appearance: Normal appearance. HENT:      Head: Normocephalic and atraumatic. Cardiovascular:      Rate and Rhythm: Normal rate and regular rhythm. Pulses: Normal pulses. Heart sounds: Normal heart sounds. No murmur heard. No friction rub. No gallop. Pulmonary:      Breath sounds: Wheezing and rhonchi present. Abdominal:      General: Abdomen is flat. Palpations: Abdomen is soft. Musculoskeletal:         General: Normal range of motion. Cervical back: Normal range of motion and neck supple. Skin:     General: Skin is warm and dry. Neurological:      General: No focal deficit present. Mental Status: She is alert and oriented to person, place, and time. Pertinent Labs/Studies:      Assessment and orders:       ICD-10-CM ICD-9-CM    1. COPD exacerbation ([facility])  J44.1 491.21 predniSONE (DELTASONE) 20 mg tablet      amoxicillin-clavulanate (AUGMENTIN) 875-125 mg per tablet      fexofenadine (ALLEGRA) 180 mg tablet      guaiFENesin ER (MUCINEX) 600 mg ER tablet      albuterol (PROVENTIL HFA, VENTOLIN HFA, PROAIR HFA) 90 mcg/actuation inhaler      2. Type 2 diabetes mellitus without complication, without long-term current use of insulin ([facility])  E11.9 250.00 HEMOGLOBIN A1C WITH EAG      MICROALBUMIN, UR, RAND W/ MICROALB/CREAT RATIO      3. Primary hypertension  I10 401.9 CBC W/O DIFF      METABOLIC PANEL, COMPREHENSIVE      4. Other hyperlipidemia  E78.49 272.4 LIPID PANEL        Diagnoses and all orders for this visit:    1. COPD exacerbation ([facility] [address]): COPD is exacerbated at this time. Increasing breathing treatments now and will add Augmentin. Starting patient on a Prednisone 40 mg daily. Strict ER precautions discussed. -     predniSONE (DELTASONE) 20 mg tablet; Take 2 Tablets by mouth daily (with breakfast) for 5 days. -     amoxicillin-clavulanate (AUGMENTIN) 875-125 mg per tablet; Take 1 Tablet by mouth every twelve (12) hours for 7 days. -     fexofenadine (ALLEGRA) 180 mg tablet; Take 1 Tablet by mouth daily.  -     guaiFENesin ER (MUCINEX) 600 mg ER tablet; Take 1 Tablet by mouth two (2) times a day. -     albuterol (PROVENTIL HFA, VENTOLIN HFA, PROAIR HFA) 90 mcg/actuation inhaler; Take 2 Puffs by inhalation every four (4) hours as needed for Wheezing for up to 360 days.     2. Type 2 diabetes mellitus without complication, without long-term current use of insulin ([facility] [address]  -     HEMOGLOBIN A1C WITH EAG; Future  -     MICROALBUMIN, UR, RAND W/ MICROALB/CREAT RATIO; Future    3. Primary hypertension  -     CBC W/O DIFF; Future  -     METABOLIC PANEL, COMPREHENSIVE; Future    4. Other hyperlipidemia  -     LIPID PANEL; Future    Follow-up and Dispositions    Return in about 3 weeks (around 12/1/2022) for Follow up. I have discussed the diagnosis with the patient and the intended plan as seen in the above orders. Social history, medical history, and labs were reviewed. The patient has received an after-visit summary and questions were answered concerning future plans. I have discussed medication side effects and warnings with the patient as well.     MD JAGDISH Swain & JERRY ORDONEZ [facility]  11/10/22

## 2022-12-12 ENCOUNTER — TRANSCRIBE ORDER (OUTPATIENT)
Dept: SCHEDULING | Age: 77
End: 2022-12-12

## 2022-12-12 DIAGNOSIS — R93.89 ABNORMAL CHEST CT: Primary | ICD-10-CM

## 2022-12-23 ENCOUNTER — OFFICE VISIT (OUTPATIENT)
Dept: FAMILY MEDICINE CLINIC | Age: 77
End: 2022-12-23
Payer: MEDICARE

## 2022-12-23 VITALS
TEMPERATURE: 98.1 F | DIASTOLIC BLOOD PRESSURE: 57 MMHG | WEIGHT: 203.2 LBS | RESPIRATION RATE: 16 BRPM | BODY MASS INDEX: 36 KG/M2 | HEIGHT: 63 IN | SYSTOLIC BLOOD PRESSURE: 119 MMHG | HEART RATE: 83 BPM | OXYGEN SATURATION: 91 %

## 2022-12-23 DIAGNOSIS — Z09 HOSPITAL DISCHARGE FOLLOW-UP: ICD-10-CM

## 2022-12-23 DIAGNOSIS — Z00.00 MEDICARE ANNUAL WELLNESS VISIT, SUBSEQUENT: Primary | ICD-10-CM

## 2022-12-23 DIAGNOSIS — H69.81 DYSFUNCTION OF RIGHT EUSTACHIAN TUBE: ICD-10-CM

## 2022-12-23 DIAGNOSIS — J96.01 ACUTE RESPIRATORY FAILURE WITH HYPOXIA (HCC): ICD-10-CM

## 2022-12-23 DIAGNOSIS — J11.1 INFLUENZA: ICD-10-CM

## 2022-12-23 DIAGNOSIS — N18.31 STAGE 3A CHRONIC KIDNEY DISEASE (HCC): ICD-10-CM

## 2022-12-23 DIAGNOSIS — T78.40XD ALLERGIC REACTION, SUBSEQUENT ENCOUNTER: ICD-10-CM

## 2022-12-23 DIAGNOSIS — J44.1 COPD EXACERBATION (HCC): ICD-10-CM

## 2022-12-23 RX ORDER — FLUTICASONE PROPIONATE 50 MCG
2 SPRAY, SUSPENSION (ML) NASAL DAILY
Qty: 1 EACH | Refills: 1 | Status: SHIPPED | OUTPATIENT
Start: 2022-12-23

## 2022-12-23 RX ORDER — PREDNISONE 20 MG/1
20 TABLET ORAL
Qty: 5 TABLET | Refills: 0 | Status: SHIPPED | OUTPATIENT
Start: 2022-12-23

## 2022-12-23 NOTE — PROGRESS NOTES
1. Have you been to the ER, urgent care clinic since your last visit? Hospitalized since your last visit? Yes When: u [facility]     2. Have you seen or consulted any other health care providers outside of the [address] [place] [address] since your last visit? Include any pap smears or colon screening. No    3. For patients aged 39-70: Has the patient had a colonoscopy / FIT/ Cologuard? No    If the patient is female:    4. For patients aged 41-77: Has the patient had a mammogram within the past 2 years? 12/2022 [facility]       5. For patients aged 21-65: Has the patient had a pap smear? NA - based on age or sex     Reviewed record in preparation for visit and have necessary documentation  Pt did not bring medication to office visit for review  Patient is accompanied by self I have received verbal consent from Andrea Landry to discuss any/all medical information while they are present in the room.     Goals that were addressed and/or need to be completed during or after this appointment include     Health Maintenance Due   Topic Date Due    DTaP/Tdap/Td series (1 - Tdap) 10/12/2011    Eye Exam Retinal or Dilated  01/07/2021    COVID-19 Vaccine (3 - Booster for [employer] Poisson series) 05/25/2021    Medicare Yearly Exam  06/29/2022    Flu Vaccine (1) 08/01/2022    Foot Exam Q1  11/11/2022    MICROALBUMIN Q1  11/11/2022

## 2022-12-23 NOTE — PROGRESS NOTES
This is the Subsequent Medicare Annual Wellness Exam, performed 12 months or more after the Initial AWV or the last Subsequent AWV    I have reviewed the patient's medical history in detail and updated the computerized patient record. History     Past Medical History:   Diagnosis Date    Asthma     Bronchitis     Cancer ([facility] [address]     breast - left    COPD (chronic obstructive pulmonary disease) ([facility] [address]     [facility]; pulmonary nodule 7mm stable (found )    Diabetes ([facility] [address]     Hepatitis age 11    High cholesterol     Hypertension     Obesity (BMI 30-39. 9)     Thromboembolus ([address]     L LE with PE      Past Surgical History:   Procedure Laterality Date    COLONOSCOPY N/A 2017    COLONOSCOPY- no info to   performed by Anastasiia Delgado MD at [facility] 2    HX GYN      ablation, R ovary removed, tubal ligation    HX HAMMER TOE REPAIR      HX HEENT      tonsillectomy    HX MASTECTOMY Left     HX ORTHOPAEDIC      L hip, R leg, C4-C5 fusion, L foot    HX OTHER SURGICAL      xiphoid removal    IR [facility] IVC FILTER  2019    [place] BREAST SURGERY PROCEDURE UNLISTED      L mastectomy     Allergies   Allergen Reactions    Nucala [Mepolizumab] Anaphylaxis    Crestor [Rosuvastatin] Other (comments)     Pain in left leg  Causes muscle spasms    Lipitor [Atorvastatin] Other (comments)     Left leg pain  Causes muscle spasms    Xarelto [Rivaroxaban] Other (comments)     Pt states it caused internal bleeding     Family History   Problem Relation Age of Onset    Heart Attack Father     Diabetes Mother      Social History     Tobacco Use    Smoking status: Former     Types: Cigarettes     Quit date:      Years since quittin.9    Smokeless tobacco: Never   Substance Use Topics    Alcohol use: Yes     Comment: social// rare     Depression Risk Factor Screening:     3 most recent PHQ Screens 2022   Little interest or pleasure in doing things Not at all   Feeling down, depressed, irritable, or hopeless Not at all   Total Score PHQ 2 0   Trouble falling or staying asleep, or sleeping too much -   Feeling tired or having little energy -   Poor appetite, weight loss, or overeating -   Feeling bad about yourself - or that you are a failure or have let yourself or your family down -   Trouble concentrating on things such as school, work, reading, or watching TV -   Moving or speaking so slowly that other people could have noticed; or the opposite being so fidgety that others notice -   Thoughts of being better off dead, or hurting yourself in some way -   PHQ 9 Score -   How difficult have these problems made it for you to do your work, take care of your home and get along with others -     Alcohol Risk Factor Screening:    Do you average more than 1 drink per night or more than 7 drinks a week: No    In the past three months have you have had more than 4 drinks containing alcohol on one occasion: No  Functional Ability and Level of Safety:   Hearing Loss  Hearing is good. Activities of Daily Living  The home contains: grab bars  Patient does total self care  ADL Assessment 6/28/2021   Feeding yourself No Help Needed   Getting from bed to chair No Help Needed   Getting dressed No Help Needed   Bathing or showering No Help Needed   Walk across the room (includes cane/walker) No Help Needed   Using the telphone No Help Needed   Taking your medications No Help Needed   Preparing meals No Help Needed   Managing money (expenses/bills) No Help Needed   Moderately strenuous housework (laundry) No Help Needed   Shopping for personal items (toiletries/medicines) No Help Needed   Shopping for groceries No Help Needed   Driving No Help Needed   Climbing a flight of stairs No Help Needed   Getting to places beyond walking distances No Help Needed       Ambulation: with no difficulty    Fall Risk  Fall Risk Assessment, last 12 mths 12/23/2022   Able to walk? Yes   Fall in past 12 months? 1   Do you feel unsteady?  0 Are you worried about falling 0   Is the gait abnormal? 1   Number of falls in past 12 months 1   Fall with injury? 0       Abuse Screen  Abuse Screening Questionnaire 5/12/2021   Do you ever feel afraid of your partner? N   Are you in a relationship with someone who physically or mentally threatens you? N   Is it safe for you to go home? Y     Cognitive Screening   Evaluation of Cognitive Function:  Has your family/caregiver stated any concerns about your memory: no  Normal, MMSE  No flowsheet data found. Patient Care Team   Patient Care Team:  Thom Butcher MD as PCP - General (Family Medicine)  Thom Butcher MD as PCP - [facility] Provider  Rosalind Bernard MD (Hematology and Oncology)  Merle Miller MD (Gastroenterology)  Sachi Hoyos MD (Gynecology)  Sabine Russell MD (Ophthalmology)  Bryn Carreno MD (Pulmonary Disease)  Assessment/Plan   Education and counseling provided:  Are appropriate based on today's review and evaluation  End-of-Life planning (with patient's consent)    Diagnoses and all orders for this visit:    1. Medicare annual wellness visit, subsequent    2. COPD exacerbation (HCC)  -     predniSONE (DELTASONE) 20 mg tablet; Take 1 Tablet by mouth daily (with breakfast). 3. Stage 3a chronic kidney disease ([facility] [address]    4. Influenza    5. Allergic reaction, subsequent encounter    6. Acute respiratory failure with hypoxia (HCC)    7. Hospital discharge follow-up  -     MO DISCHARGE MEDS RECONCILED W/ CURRENT OUTPATIENT MED LIST    8. Dysfunction of right eustachian tube  -     fluticasone propionate (FLONASE) 50 mcg/actuation nasal spray; 2 Sprays by Both Nostrils route daily.       Health Maintenance Topics with due status: Overdue       Topic Date Due    DTaP/Tdap/Td series 10/12/2011    Eye Exam Retinal or Dilated 01/07/2021    COVID-19 Vaccine 05/25/2021    Flu Vaccine 08/01/2022    Foot Exam Q1 11/11/2022    MICROALBUMIN Q1 11/11/2022     Health Maintenance Topics with due status: Not Due       Topic Last Completion Date    Colorectal Cancer Screening Combo 05/26/2017    A1C test (Diabetic or Prediabetic) 07/18/2022    Lipid Screen 07/18/2022    Depression Screen 11/10/2022    Medicare Yearly Exam 12/23/2022     Health Maintenance Topics with due status: Completed       Topic Last Completion Date    Bone Densitometry (Dexa) Screening 08/22/2007    Pneumococcal 65+ years 10/11/2016    Hepatitis C Screening 10/24/2019    Shingles Vaccine 08/03/2021

## 2022-12-23 NOTE — PROGRESS NOTES
[address]  [phone]        Transition of Care Visit    Patient: Abel Garay MRN: [id number]  SSN: [id number]    YOB: 1945  Age: 78 y.o. Sex: female      Hospital: [address] [place] [address]  Dates of admission: 11/12/22-11/16/22. Discharge diagnoses: COPD Exacerbation, Hypoxic respiratory failure, Influenza, Allergic reaction  State of health at discharge: Improved  Surgical or invasive procedures done: None    Amount and/or Complexity of Data Reviewed:   Clinical lab tests: Reviewed or ordered   Tests in the radiology section: reviewed or ordered  Discussion of test results with the patient: yes  Obtain previous medical records or obtain history from someone other than the patient: Yes  Obtain history from someone other than the patient: No  Review and address past medical records: Yes  Discuss the patient with another provider: no  Independant visualization of image, tracing, or specimen: no    Risk of Significant Complications, Morbidity, and/or Mortality:   Presenting problems: High   Diagnostic procedures: Moderate   Management options: Moderate     Transition of Care time spent:   Total time providing care and documentation: 40-60 minutes   Progress at discharge:   Stable on Discharge      Progress Note    Patient: Abel Garay MRN: [id number]  SSN: [id number]    YOB: 1945  Age: 78 y.o. Sex: female        Chief Complaint   Patient presents with    Hospital Follow Up     [facility] 11/2022    Dizziness     vertigo         Subjective:     Encounter Diagnoses   Name Primary?     COPD exacerbation (HCC) Yes    Stage 3a chronic kidney disease ([facility] [address]     Influenza     Allergic reaction, subsequent encounter     Acute respiratory failure with hypoxia [facility])     Hospital discharge follow-up     Dysfunction of right eustachian tube      Patient 1 month ago had an allergic reaction to Nucala injection and in hospital developed COPD exacerbation in the setting on Influenza. Developed Hypoxic respiratory failure and required home oxygen until yesterday. [facility]. On that aspect she is getting close to normal again. Noting mildly elevated blood sugar, but not terribly. Paitent also noting about a week of morning vertigo, and noting right sided head congestion, ear fullness, sinus pressure without drainage. Has not used Flonase or Decongestants. Overall feels fine though. Current and past medical information:    Current Medications after this visit[de-identified]     Current Outpatient Medications   Medication Sig    predniSONE (DELTASONE) 20 mg tablet Take 1 Tablet by mouth daily (with breakfast). fluticasone propionate (FLONASE) 50 mcg/actuation nasal spray 2 Sprays by Both Nostrils route daily. Eliquis 5 mg tablet TAKE ONE TABLET BY MOUTH 2 TIMES A DAY    fexofenadine (ALLEGRA) 180 mg tablet Take 1 Tablet by mouth daily. guaiFENesin ER (MUCINEX) 600 mg ER tablet Take 1 Tablet by mouth two (2) times a day. albuterol (PROVENTIL HFA, VENTOLIN HFA, PROAIR HFA) 90 mcg/actuation inhaler Take 2 Puffs by inhalation every four (4) hours as needed for Wheezing for up to 360 days. nystatin (MYCOSTATIN) 100,000 unit/mL suspension USE 5 ML BY MOUTH 4 TIMES A DAY AS DIRECTED    diclofenac (VOLTAREN) 1 % gel APPLY 1 GRAM TO AFFECTED AREA OF KNEES AND BACK 4 TIMES DAILY    metFORMIN ER (GLUCOPHAGE XR) 500 mg tablet TAKE 1 TABLET BY MOUTH DAILY WITH DINNER    carvediloL (COREG) 6.25 mg tablet TAKE ONE TABLET BY MOUTH TWICE DAILY    pregabalin (LYRICA) 50 mg capsule Take 1 Capsule by mouth two (2) times a day. Max Daily Amount: 100 mg.    cyclobenzaprine (FLEXERIL) 10 mg tablet Take 1 Tablet by mouth two (2) times a day.  PRN    lovastatin (MEVACOR) 10 mg tablet TAKE ONE TABLET BY MOUTH NIGHTLY    ProAir HFA 90 mcg/actuation inhaler     montelukast (SINGULAIR) 10 mg tablet TAKE ONE TABLET BY MOUTH NIGHTLY    meclizine (ANTIVERT) 25 mg tablet Take 1 Tablet by mouth three (3) times daily as needed for Dizziness. Trelegy Ellipta 100-62.5-25 mcg inhaler INHALE 1 PUFF BY INHALATION DAILY    glucose blood VI test strips (FreeStyle Lite Strips) strip Patient is to check blood sugar once daily. Dx E11.9    albuterol (PROVENTIL VENTOLIN) 2.5 mg /3 mL (0.083 %) nebu 3 mL by Nebulization route every six (6) hours as needed for Wheezing. acetaminophen (TYLENOL ARTHRITIS PAIN) 650 mg TbER Take 1 Tab by mouth every eight (8) hours. multivitamin (ONE A DAY) tablet Take 1 Tab by mouth nightly. aspirin 81 mg tablet Take 81 mg by mouth daily. No current facility-administered medications for this visit.        Health Maintenance Due   Topic Date Due    DTaP/Tdap/Td series (1 - Tdap) 10/12/2011    Eye Exam Retinal or Dilated  01/07/2021    COVID-19 Vaccine (3 - Booster for [employer] series) 05/25/2021    Medicare Yearly Exam  06/29/2022    Flu Vaccine (1) 08/01/2022    Foot Exam Q1  11/11/2022    MICROALBUMIN Q1  11/11/2022       Patient Active Problem List    Diagnosis Date Noted    Chronic renal disease, stage III 09/30/2022    Unspecified convulsions 01/19/2022    Other seizures 01/19/2022    History of stroke 11/11/2021    COPD (chronic obstructive pulmonary disease) ([facility] [address]     History of GI bleed 04/16/2019    DCIS (ductal carcinoma in situ) of breast 03/08/2019    Chronic anticoagulation 03/01/2019    Severe obesity ([address] 03/01/2019    Acute deep vein thrombosis (DVT) of left lower extremity ([address] 02/07/2019    Debility 02/25/2012    Melena 02/24/2012    Acute posthemorrhagic anemia 02/24/2012    Type 2 diabetes mellitus without complication, without long-term current use of insulin ([address] 02/24/2012    HTN (hypertension) 02/24/2012    Other hyperlipidemia 02/24/2012       Past Medical History:   Diagnosis Date    Asthma     Bronchitis     Cancer ([address]     breast - left    COPD (chronic obstructive pulmonary disease) [facility])     [facility]; pulmonary nodule 7mm stable (found )    Diabetes ([facility] [address]     Hepatitis age 11    High cholesterol     Hypertension     Obesity (BMI 30-39. 9)     Thromboembolus ([facility] [address]     L LE with PE       Allergies   Allergen Reactions    Nucala [Mepolizumab] Anaphylaxis    Crestor [Rosuvastatin] Other (comments)     Pain in left leg  Causes muscle spasms    Lipitor [Atorvastatin] Other (comments)     Left leg pain  Causes muscle spasms    Xarelto [Rivaroxaban] Other (comments)     Pt states it caused internal bleeding       Past Surgical History:   Procedure Laterality Date    COLONOSCOPY N/A 2017    COLONOSCOPY- no info to   performed by Ebenezer Covington MD at [facility] ENDOSCOPY    HX GYN      ablation, R ovary removed, tubal ligation    HX HAMMER TOE REPAIR      HX HEENT      tonsillectomy    HX MASTECTOMY Left 2008    HX ORTHOPAEDIC      L hip, R leg, C4-C5 fusion, L foot    HX OTHER SURGICAL      xiphoid removal    IR [facility] IVC FILTER  2019    RI BREAST SURGERY PROCEDURE UNLISTED      L mastectomy       Social History     Socioeconomic History    Marital status: SINGLE   Tobacco Use    Smoking status: Former     Types: Cigarettes     Quit date:      Years since quittin.9    Smokeless tobacco: Never   Vaping Use    Vaping Use: Never used   Substance and Sexual Activity    Alcohol use: Yes     Comment: social// rare    Drug use: No    Sexual activity: Never     Social Determinants of Health     Financial Resource Strain: Low Risk     Difficulty of Paying Living Expenses: Not hard at all   Food Insecurity: No Food Insecurity    Worried About Running Out of Food in the Last Year: Never true    Ran Out of Food in the Last Year: Never true         Review of Systems   Review of Systems   Constitutional:  Negative for chills and fever. HENT:  Positive for congestion. Cardiovascular:  Negative for chest pain and palpitations.    Neurological:  Negative for dizziness and headaches. Objective:     Vitals:    12/23/22 1333   BP: (!) 119/57   Pulse: 83   Resp: 16   Temp: 98.1 °F (36.7 °C)   TempSrc: Oral   SpO2: 91%   Weight: 203 lb 3.2 oz (92.2 kg)   Height: 5' 3\" (1.6 m)      Body mass index is 36 kg/m². Physical Exam  Vitals and nursing note reviewed. Constitutional:       Appearance: Normal appearance. HENT:      Head: Normocephalic and atraumatic. Cardiovascular:      Rate and Rhythm: Normal rate and regular rhythm. Pulses: Normal pulses. Heart sounds: Normal heart sounds. No murmur heard. No friction rub. No gallop. Pulmonary:      Effort: Pulmonary effort is normal.      Breath sounds: Normal breath sounds. Abdominal:      General: Abdomen is flat. Bowel sounds are normal.      Palpations: Abdomen is soft. Musculoskeletal:         General: Normal range of motion. Cervical back: Normal range of motion and neck supple. Skin:     General: Skin is warm and dry. Neurological:      Mental Status: She is alert. Assessment and orders:       ICD-10-CM ICD-9-CM    1. COPD exacerbation ([facility])  J44.1 491.21 predniSONE (DELTASONE) 20 mg tablet      2. Stage 3a chronic kidney disease ([facility])  N18.31 585.3       3. Influenza  J11.1 487.1       4. Allergic reaction, subsequent encounter  T78.40XD V58.89      995.3       5. Acute respiratory failure with hypoxia ([facility])  J96.01 518.81       6. Hospital discharge follow-up  Z09 V67.59       7. Dysfunction of right eustachian tube  H69.81 381.81 fluticasone propionate (FLONASE) 50 mcg/actuation nasal spray        Diagnoses and all orders for this visit:    1. COPD exacerbation ([address]): Improving at this time. Plan for Prednisone just in case for cold snap upcoming  -     predniSONE (DELTASONE) 20 mg tablet; Take 1 Tablet by mouth daily (with breakfast). 2. Stage 3a chronic kidney disease ([address]    3. Influenza    4. Allergic reaction, subsequent encounter: Nucalla injection    5. Acute respiratory failure with hypoxia ([facility] [address]): Off of oxygen, overall doing well now. 6. Hospital discharge follow-up    7. Dysfunction of right eustachian tube  -     fluticasone propionate (FLONASE) 50 mcg/actuation nasal spray; 2 Sprays by Both Nostrils route daily. Follow-up and Dispositions    Return in about 4 weeks (around 1/20/2023). Plan of care:  Discussed diagnoses in detail with patient. Medication risks/benefits/side effects discussed with patient. All of the patient's questions were addressed. The patient understands and agrees with our plan of care. The patient knows to call back if they are unsure of or forget any changes we discussed today or if the symptoms change. The patient received an After-Visit Summary which contains VS, orders, medication list and allergy list. This can be used as a \"mini-medical record\" should they have to seek medical care while out of town. Follow-up and Dispositions    Return in about 4 weeks (around 1/20/2023).          Future Appointments   Date Time Provider Bill Ricardo   2/9/2023  2:00 PM Casey Lemon  [facility] BS AMB       Signed By: Bard Audrey MD     December 23, 2022

## 2022-12-23 NOTE — PATIENT INSTRUCTIONS
- Flonase and Mucinex-DM  Medicare Wellness Visit, Female     The best way to live healthy is to have a lifestyle where you eat a well-balanced diet, exercise regularly, limit alcohol use, and quit all forms of tobacco/nicotine, if applicable. Regular preventive services are another way to keep healthy. Preventive services (vaccines, screening tests, monitoring & exams) can help personalize your care plan, which helps you manage your own care. Screening tests can find health problems at the earliest stages, when they are easiest to treat. [facility] follows the current, evidence-based guidelines published by the [facility] ([facility]) when recommending preventive services for our patients. Because we follow these guidelines, sometimes recommendations change over time as research supports it. (For example, mammograms used to be recommended annually. Even though Medicare will still pay for an annual mammogram, the newer guidelines recommend a mammogram every two years for women of average risk). Of course, you and your doctor may decide to screen more often for some diseases, based on your risk and your co-morbidities (chronic disease you are already diagnosed with). Preventive services for you include:  - Medicare offers their members a free annual wellness visit, which is time for you and your primary care provider to discuss and plan for your preventive service needs.  Take advantage of this benefit every year!    -Over the age of 72 should receive the recommended pneumonia vaccines.    -All adults should have a flu vaccine yearly.  -All adults should have a tetanus vaccine every 10 years.   -Over the age 48 should receive the shingles vaccines.        -All adults should be screened once for Hepatitis C.  -All adults age 38-68 who are overweight should have a diabetes screening test once every three years.   -Other screening tests and preventive services for persons with diabetes include: an eye exam to screen for diabetic retinopathy, a kidney function test, a foot exam, and stricter control over your cholesterol.   -Cardiovascular screening for adults with routine risk involves an electrocardiogram (ECG) at intervals determined by your doctor.     -Colorectal cancer screenings should be done for adults age 39-70 with no increased risk factors for colorectal cancer. There are a number of acceptable methods of screening for this type of cancer. Each test has its own benefits and drawbacks. Discuss with your doctor what is most appropriate for you during your annual wellness visit. The different tests include: colonoscopy (considered the best screening method), a fecal occult blood test, a fecal DNA test, and sigmoidoscopy.    -Lung cancer screening is recommended annually with a low dose CT scan for adults between age 54 and 68, who have smoked at least 30 pack years (equivalent of 1 pack per day for 30 days), and who is a current smoker or quit less than 15 years ago.    -A bone mass density test is recommended when a woman turns 65 to screen for osteoporosis. This test is only recommended one time, as a screening. Some providers will use this same test as a disease monitoring tool if you already have osteoporosis. -Breast cancer screenings are recommended every other year for women of normal risk, age 54-69.    -Cervical cancer screenings for women over age 72 are only recommended with certain risk factors.      Here is a list of your current Health Maintenance items (your personalized list of preventive services) with a due date:  Health Maintenance Due   Topic Date Due    DTaP/Tdap/Td  (1 - Tdap) 10/12/2011    Eye Exam  01/07/2021    COVID-19 Vaccine (3 - Booster for Moderna series) 05/25/2021    Yearly Flu Vaccine (1) 08/01/2022    Diabetic Foot Care  11/11/2022    Albumin Urine Test  11/11/2022

## 2023-01-23 ENCOUNTER — OFFICE VISIT (OUTPATIENT)
Dept: FAMILY MEDICINE CLINIC | Age: 78
End: 2023-01-23
Payer: MEDICARE

## 2023-01-23 VITALS
OXYGEN SATURATION: 100 % | TEMPERATURE: 98.6 F | HEART RATE: 90 BPM | RESPIRATION RATE: 22 BRPM | BODY MASS INDEX: 35.26 KG/M2 | DIASTOLIC BLOOD PRESSURE: 69 MMHG | SYSTOLIC BLOOD PRESSURE: 126 MMHG | WEIGHT: 199 LBS | HEIGHT: 63 IN

## 2023-01-23 DIAGNOSIS — J44.1 COPD WITH ACUTE EXACERBATION (HCC): Primary | ICD-10-CM

## 2023-01-23 PROCEDURE — 1123F ACP DISCUSS/DSCN MKR DOCD: CPT | Performed by: FAMILY MEDICINE

## 2023-01-23 PROCEDURE — G8432 DEP SCR NOT DOC, RNG: HCPCS | Performed by: FAMILY MEDICINE

## 2023-01-23 PROCEDURE — 3017F COLORECTAL CA SCREEN DOC REV: CPT | Performed by: FAMILY MEDICINE

## 2023-01-23 PROCEDURE — G8427 DOCREV CUR MEDS BY ELIG CLIN: HCPCS | Performed by: FAMILY MEDICINE

## 2023-01-23 PROCEDURE — 99214 OFFICE O/P EST MOD 30 MIN: CPT | Performed by: FAMILY MEDICINE

## 2023-01-23 PROCEDURE — 1101F PT FALLS ASSESS-DOCD LE1/YR: CPT | Performed by: FAMILY MEDICINE

## 2023-01-23 PROCEDURE — G8417 CALC BMI ABV UP PARAM F/U: HCPCS | Performed by: FAMILY MEDICINE

## 2023-01-23 PROCEDURE — G8399 PT W/DXA RESULTS DOCUMENT: HCPCS | Performed by: FAMILY MEDICINE

## 2023-01-23 PROCEDURE — G8536 NO DOC ELDER MAL SCRN: HCPCS | Performed by: FAMILY MEDICINE

## 2023-01-23 PROCEDURE — 1090F PRES/ABSN URINE INCON ASSESS: CPT | Performed by: FAMILY MEDICINE

## 2023-01-23 PROCEDURE — 3078F DIAST BP <80 MM HG: CPT | Performed by: FAMILY MEDICINE

## 2023-01-23 PROCEDURE — 94640 AIRWAY INHALATION TREATMENT: CPT | Performed by: FAMILY MEDICINE

## 2023-01-23 PROCEDURE — 3074F SYST BP LT 130 MM HG: CPT | Performed by: FAMILY MEDICINE

## 2023-01-23 RX ORDER — AMOXICILLIN AND CLAVULANATE POTASSIUM 875; 125 MG/1; MG/1
1 TABLET, FILM COATED ORAL EVERY 12 HOURS
Qty: 14 TABLET | Refills: 0 | Status: SHIPPED | OUTPATIENT
Start: 2023-01-23 | End: 2023-01-30

## 2023-01-23 RX ORDER — PREDNISONE 20 MG/1
20 TABLET ORAL
Qty: 5 TABLET | Refills: 0 | Status: SHIPPED | OUTPATIENT
Start: 2023-01-23 | End: 2023-01-23

## 2023-01-23 RX ORDER — PREDNISONE 20 MG/1
40 TABLET ORAL
Qty: 10 TABLET | Refills: 0 | Status: SHIPPED | OUTPATIENT
Start: 2023-01-23 | End: 2023-01-28

## 2023-01-23 RX ORDER — IPRATROPIUM BROMIDE AND ALBUTEROL SULFATE 2.5; .5 MG/3ML; MG/3ML
3 SOLUTION RESPIRATORY (INHALATION)
Status: COMPLETED | OUTPATIENT
Start: 2023-01-23 | End: 2023-01-23

## 2023-01-23 RX ADMIN — IPRATROPIUM BROMIDE AND ALBUTEROL SULFATE 3 ML: 2.5; .5 SOLUTION RESPIRATORY (INHALATION) at 16:39

## 2023-01-23 NOTE — PROGRESS NOTES
[facility]    History of Present Illness:   Fernando Greer is a 78 y.o. female with history of HTN, PE, DVT, DM, COPD, HLD, DCIS, CVA, leg pain  CC: COPD Exacerbation  History provided by patient and Records    HPI:  COPD Exacerbation:  Patient reports exacerbation of COPD. Exacerbation started 2 days ago. Trigger appears to be: Cold air and Smoke. Current medication compliance has been Poor.  - Acute dyspnea: severity = fairly severe: course since onset of episode: gradually worsening.  - Change in cough/Sputum: severity: moderate: sputum : brown: moderate. - Increased wheezing, current severity is moderate to severe and rescue inhalers are minimally effective. - Patient also endorses congestion, sneezing, and productive cough. - Oxygen: she currently is not on home oxygen therapy. .  This is unchanged from baseline. Pulmonologist:   Dr. Adriane Fairbanks Maintenance Due   Topic Date Due    Low dose CT lung screening  Never done    DTaP/Tdap/Td series (1 - Tdap) 10/12/2011    Eye Exam Retinal or Dilated  01/07/2021    COVID-19 Vaccine (3 - Booster for Moderna series) 05/25/2021    Flu Vaccine (1) 08/01/2022    Foot Exam Q1  11/11/2022    A1C test (Diabetic or Prediabetic)  01/18/2023       Past Medical, Family, and Social History:     Current Outpatient Medications on File Prior to Visit   Medication Sig Dispense Refill    lovastatin (MEVACOR) 10 mg tablet TAKE ONE TABLET BY MOUTH NIGHTLY 90 Tablet 1    metFORMIN ER (GLUCOPHAGE XR) 500 mg tablet TAKE 1 TABLET BY MOUTH DAILY WITH DINNER 90 Tablet 1    fluticasone propionate (FLONASE) 50 mcg/actuation nasal spray 2 Sprays by Both Nostrils route daily. 1 Each 1    Eliquis 5 mg tablet TAKE ONE TABLET BY MOUTH 2 TIMES A  Tablet 1    fexofenadine (ALLEGRA) 180 mg tablet Take 1 Tablet by mouth daily. 90 Tablet 1    guaiFENesin ER (MUCINEX) 600 mg ER tablet Take 1 Tablet by mouth two (2) times a day.  60 Tablet 1 albuterol (PROVENTIL HFA, VENTOLIN HFA, PROAIR HFA) 90 mcg/actuation inhaler Take 2 Puffs by inhalation every four (4) hours as needed for Wheezing for up to 360 days. 1 Each 1    nystatin (MYCOSTATIN) 100,000 unit/mL suspension USE 5 ML BY MOUTH 4 TIMES A DAY AS DIRECTED 473 mL 2    diclofenac (VOLTAREN) 1 % gel APPLY 1 GRAM TO AFFECTED AREA OF KNEES AND BACK 4 TIMES DAILY (Patient taking differently: Apply 1 g to affected area four (4) times daily as needed.) 300 g 1    carvediloL (COREG) 6.25 mg tablet TAKE ONE TABLET BY MOUTH TWICE DAILY 180 Tablet 1    pregabalin (LYRICA) 50 mg capsule Take 1 Capsule by mouth two (2) times a day. Max Daily Amount: 100 mg. 60 Capsule 5    cyclobenzaprine (FLEXERIL) 10 mg tablet Take 1 Tablet by mouth two (2) times a day. PRN 60 Tablet 5    montelukast (SINGULAIR) 10 mg tablet TAKE ONE TABLET BY MOUTH NIGHTLY 90 Tablet 1    meclizine (ANTIVERT) 25 mg tablet Take 1 Tablet by mouth three (3) times daily as needed for Dizziness. 90 Tablet 0    Trelegy Ellipta 100-62.5-25 mcg inhaler INHALE 1 PUFF BY INHALATION DAILY 60 Each 2    glucose blood VI test strips (FreeStyle Lite Strips) strip Patient is to check blood sugar once daily. Dx E11.9 100 Strip 2    albuterol (PROVENTIL VENTOLIN) 2.5 mg /3 mL (0.083 %) nebu 3 mL by Nebulization route every six (6) hours as needed for Wheezing. 75 mL 4    acetaminophen (TYLENOL ARTHRITIS PAIN) 650 mg TbER Take 1 Tab by mouth every eight (8) hours. 60 Tab 0    multivitamin (ONE A DAY) tablet Take 1 Tab by mouth nightly. aspirin 81 mg tablet Take 81 mg by mouth daily. [DISCONTINUED] predniSONE (DELTASONE) 20 mg tablet Take 1 Tablet by mouth daily (with breakfast). (Patient not taking: Reported on 1/23/2023) 5 Tablet 0    [DISCONTINUED] ProAir HFA 90 mcg/actuation inhaler        No current facility-administered medications on file prior to visit.        Patient Active Problem List   Diagnosis Code    Melena K92.1    Acute posthemorrhagic anemia D62    Type 2 diabetes mellitus without complication, without long-term current use of insulin (HCC) E11.9    HTN (hypertension) I10    Other hyperlipidemia E78.49    Debility R53.81    Acute deep vein thrombosis (DVT) of left lower extremity ([facility]) I82.402    Chronic anticoagulation Z79.01    Severe obesity ([facility]) E66.01    DCIS (ductal carcinoma in situ) of breast D05.10    History of GI bleed Z87.19    COPD (chronic obstructive pulmonary disease) ([facility]) J44.9    History of stroke Z86.73    Unspecified convulsions R56.9    Other seizures G40.89    Chronic renal disease, stage III N18.30       Social History     Socioeconomic History    Marital status: SINGLE   Tobacco Use    Smoking status: Former     Packs/day: 1.00     Years: 40.00     Pack years: 40.00     Types: Cigarettes     Quit date:      Years since quittin.0    Smokeless tobacco: Never   Vaping Use    Vaping Use: Never used   Substance and Sexual Activity    Alcohol use: Yes     Comment: social// rare    Drug use: No    Sexual activity: Never     Social Determinants of Health     Financial Resource Strain: Low Risk     Difficulty of Paying Living Expenses: Not hard at all   Food Insecurity: No Food Insecurity    Worried About Running Out of Food in the Last Year: Never true    Ran Out of Food in the Last Year: Never true        Review of Systems   Review of Systems   Constitutional:  Positive for malaise/fatigue. Negative for chills and fever. HENT:  Negative for congestion. Respiratory:  Positive for cough and wheezing. Cardiovascular:  Negative for chest pain and palpitations. Objective:   Visit Vitals  /69 (BP 1 Location: Right arm, BP Patient Position: Sitting, BP Cuff Size: Large adult)   Pulse 90   Temp 98.6 °F (37 °C) (Oral)   Resp 22   Ht 5' 3\" (1.6 m)   Wt 199 lb (90.3 kg)   SpO2 100%   BMI 35.25 kg/m²        Physical Exam  Vitals and nursing note reviewed.    Constitutional:       General: She is not in acute distress. Appearance: She is ill-appearing. HENT:      Head: Normocephalic and atraumatic. Cardiovascular:      Rate and Rhythm: Normal rate and regular rhythm. Pulses: Normal pulses. Heart sounds: Normal heart sounds. No murmur heard. No friction rub. No gallop. Pulmonary:      Effort: Tachypnea and prolonged expiration present. No respiratory distress. Breath sounds: Decreased air movement present. Abdominal:      General: Abdomen is flat. Bowel sounds are normal.      Palpations: Abdomen is soft. Musculoskeletal:      Cervical back: Normal range of motion and neck supple. Skin:     General: Skin is warm and dry. Neurological:      Mental Status: She is alert. Pertinent Labs/Studies:      Assessment and orders:       ICD-10-CM ICD-9-CM    1. COPD with acute exacerbation ([facility])  J44.1 491.21 albuterol-ipratropium (DUO-NEB) 2.5 MG-0.5 MG/3 ML      amoxicillin-clavulanate (AUGMENTIN) 875-125 mg per tablet      predniSONE (DELTASONE) 20 mg tablet      DISCONTINUED: predniSONE (DELTASONE) 20 mg tablet        Diagnoses and all orders for this visit:    1. COPD with acute exacerbation ([facility] [address]): COPD is exacerbated at this time. Increasing breathing treatments now. Starting patient on a Prednisone Burst.  Strict ER precautions discussed. -     albuterol-ipratropium (DUO-NEB) 2.5 MG-0.5 MG/3 ML  -     predniSONE (DELTASONE) 20 mg tablet; Take 1 Tablet by mouth daily (with breakfast). -     amoxicillin-clavulanate (AUGMENTIN) 875-125 mg per tablet; Take 1 Tablet by mouth every twelve (12) hours for 7 days. Follow-up and Dispositions    Return in about 3 months (around 4/23/2023). I have discussed the diagnosis with the patient and the intended plan as seen in the above orders. Social history, medical history, and labs were reviewed. The patient has received an after-visit summary and questions were answered concerning future plans.   I have discussed medication side effects and warnings with the patient as well.     MD JAGDISH Garza & JERRY ORDONEZ [facility]  01/23/23

## 2023-01-23 NOTE — PROGRESS NOTES
Chief Complaint   Patient presents with    Cough     Wheezing, dyspnea. 1. \"Have you been to the ER, urgent care clinic since your last visit? Hospitalized since your last visit? \" No    2. \"Have you seen or consulted any other health care providers outside of the [address] [place] [address] since your last visit? \" No     3. For patients aged 39-70: Has the patient had a colonoscopy / FIT/ Cologuard? Yes - no Care Gap present      If the patient is female:    4. For patients aged 41-77: Has the patient had a mammogram within the past 2 years? NA - based on age or sex      11. For patients aged 21-65: Has the patient had a pap smear?  NA - based on age or sex    Health Maintenance Due   Topic Date Due    DTaP/Tdap/Td series (1 - Tdap) 10/12/2011    Eye Exam Retinal or Dilated  01/07/2021    COVID-19 Vaccine (3 - Booster for Moderna series) 05/25/2021    Flu Vaccine (1) 08/01/2022    Foot Exam Q1  11/11/2022    A1C test (Diabetic or Prediabetic)  01/18/2023

## 2023-01-24 ENCOUNTER — PATIENT MESSAGE (OUTPATIENT)
Dept: FAMILY MEDICINE CLINIC | Age: 78
End: 2023-01-24

## 2023-01-24 RX ORDER — FLUCONAZOLE 150 MG/1
150 TABLET ORAL
Qty: 2 TABLET | Refills: 0 | Status: SHIPPED | OUTPATIENT
Start: 2023-01-24 | End: 2023-01-28

## 2023-01-24 NOTE — TELEPHONE ENCOUNTER
From: Denise Low  To: Jennifer Light MD  Sent: 1/24/2023 8:29 AM EST  Subject: Prescription     I forgot to ask for a prescription for Diflucan because of the antibiotic.  Thanks

## 2023-02-03 ENCOUNTER — PATIENT OUTREACH (OUTPATIENT)
Dept: CASE MANAGEMENT | Age: 78
End: 2023-02-03

## 2023-02-06 NOTE — PROGRESS NOTES
Ambulatory Care Management Note    Date/Time:  2/6/2023 3:32 PM    This patient was received as a referral from Daily assignment. Ambulatory Care Manager outreached to patient today to offer care management services. Introduction to self and role of care manager provided. Patient accepted care management services at this time. Follow up call scheduled at this time. Patient has Ambulatory Care Manager's contact number for any questions or concern   Goals Addressed                   This Visit's Progress     Instruct on red flags (ie. fever, increased productive cough, SOB, and chest pain) and when to seek urgent medical treatment. 02/06/23  Patient reports that she has noted some wheezing today with cough/congestion. Reports no SOB or fever at this time. She saw her PCP on 1/23/23 and requested I reach out to him to see if appt needed. I routed information to Dr Cuate Jackson.   In the meantime I educated patient on s/s to monitor for and report and what would warrant an emergency, will follow up after hear back from MD. JAMES

## 2023-02-07 ENCOUNTER — TELEPHONE (OUTPATIENT)
Dept: FAMILY MEDICINE CLINIC | Age: 78
End: 2023-02-07

## 2023-02-07 ENCOUNTER — OFFICE VISIT (OUTPATIENT)
Dept: FAMILY MEDICINE CLINIC | Age: 78
End: 2023-02-07
Payer: MEDICARE

## 2023-02-07 VITALS
TEMPERATURE: 97.8 F | DIASTOLIC BLOOD PRESSURE: 71 MMHG | WEIGHT: 196 LBS | HEIGHT: 63 IN | HEART RATE: 88 BPM | SYSTOLIC BLOOD PRESSURE: 120 MMHG | OXYGEN SATURATION: 99 % | RESPIRATION RATE: 20 BRPM | BODY MASS INDEX: 34.73 KG/M2

## 2023-02-07 DIAGNOSIS — J44.1 COPD EXACERBATION (HCC): Primary | ICD-10-CM

## 2023-02-07 RX ORDER — PREDNISONE 10 MG/1
10 TABLET ORAL
Qty: 5 TABLET | Refills: 0 | Status: SHIPPED | OUTPATIENT
Start: 2023-02-07 | End: 2023-02-12

## 2023-02-07 NOTE — PROGRESS NOTES
Subjective  Annette Durand is an 78 y.o. female who presents for follow up on COPD exacerbation. She states she was diagnosed with a COPD exacerbation, was started on pred burst (20mg) and augmentin. Her symptoms of cough, wheezing and sputum production had resolved but they returned as soon as she was done with her pred burst and antibiotics. No fevers or chills. No CP or SOB. She has been compliant with her maintenance inhalers. She is not requiring albuterol. Allergies - reviewed: Allergies   Allergen Reactions    Nucala [Mepolizumab] Anaphylaxis    Crestor [Rosuvastatin] Other (comments)     Pain in left leg  Causes muscle spasms    Lipitor [Atorvastatin] Other (comments)     Left leg pain  Causes muscle spasms    Xarelto [Rivaroxaban] Other (comments)     Pt states it caused internal bleeding         Medications - reviewed:   Current Outpatient Medications   Medication Sig    lovastatin (MEVACOR) 10 mg tablet TAKE ONE TABLET BY MOUTH NIGHTLY    metFORMIN ER (GLUCOPHAGE XR) 500 mg tablet TAKE 1 TABLET BY MOUTH DAILY WITH DINNER    fluticasone propionate (FLONASE) 50 mcg/actuation nasal spray 2 Sprays by Both Nostrils route daily. Eliquis 5 mg tablet TAKE ONE TABLET BY MOUTH 2 TIMES A DAY    fexofenadine (ALLEGRA) 180 mg tablet Take 1 Tablet by mouth daily. guaiFENesin ER (MUCINEX) 600 mg ER tablet Take 1 Tablet by mouth two (2) times a day. albuterol (PROVENTIL HFA, VENTOLIN HFA, PROAIR HFA) 90 mcg/actuation inhaler Take 2 Puffs by inhalation every four (4) hours as needed for Wheezing for up to 360 days.     nystatin (MYCOSTATIN) 100,000 unit/mL suspension USE 5 ML BY MOUTH 4 TIMES A DAY AS DIRECTED    diclofenac (VOLTAREN) 1 % gel APPLY 1 GRAM TO AFFECTED AREA OF KNEES AND BACK 4 TIMES DAILY (Patient taking differently: Apply 1 g to affected area four (4) times daily as needed.)    carvediloL (COREG) 6.25 mg tablet TAKE ONE TABLET BY MOUTH TWICE DAILY    pregabalin (LYRICA) 50 mg capsule Take 1 Capsule by mouth two (2) times a day. Max Daily Amount: 100 mg.    cyclobenzaprine (FLEXERIL) 10 mg tablet Take 1 Tablet by mouth two (2) times a day. PRN    montelukast (SINGULAIR) 10 mg tablet TAKE ONE TABLET BY MOUTH NIGHTLY    meclizine (ANTIVERT) 25 mg tablet Take 1 Tablet by mouth three (3) times daily as needed for Dizziness. Trelegy Ellipta 100-62.5-25 mcg inhaler INHALE 1 PUFF BY INHALATION DAILY    albuterol (PROVENTIL VENTOLIN) 2.5 mg /3 mL (0.083 %) nebu 3 mL by Nebulization route every six (6) hours as needed for Wheezing. acetaminophen (TYLENOL ARTHRITIS PAIN) 650 mg TbER Take 1 Tab by mouth every eight (8) hours. multivitamin (ONE A DAY) tablet Take 1 Tab by mouth nightly. aspirin 81 mg tablet Take 81 mg by mouth daily. predniSONE (DELTASONE) 10 mg tablet Take 10 mg by mouth daily (with breakfast). glucose blood VI test strips (FreeStyle Lite Strips) strip Patient is to check blood sugar once daily. Dx E11.9     No current facility-administered medications for this visit. Past Medical History - reviewed:  Past Medical History:   Diagnosis Date    Asthma     Bronchitis     Cancer ([facility]     breast - left    COPD (chronic obstructive pulmonary disease) ([facility]     [facility]; pulmonary nodule 7mm stable (found 2011)    Diabetes ([address]     Hepatitis age 11    High cholesterol     Hypertension     Obesity (BMI 30-39. 9)     Thromboembolus ([facility] [facility]     L LE with PE         Past Surgical History - reviewed:   Past Surgical History:   Procedure Laterality Date    COLONOSCOPY N/A 05/26/2017    COLONOSCOPY- no info to   performed by Junior Chavez MD at [address]    HX GYN      ablation, R ovary removed, tubal ligation    HX HAMMER TOE REPAIR      HX HEENT      tonsillectomy    HX MASTECTOMY Left 2008    HX ORTHOPAEDIC      L hip, R leg, C4-C5 fusion, L foot    HX OTHER SURGICAL      xiphoid removal    IR PLC IVC FILTER  02/08/2019    UT UNLISTED PROCEDURE BREAST      L mastectomy         Social History - reviewed:  Social History     Socioeconomic History    Marital status: SINGLE     Spouse name: Not on file    Number of children: Not on file    Years of education: Not on file    Highest education level: Not on file   Occupational History    Not on file   Tobacco Use    Smoking status: Former     Packs/day: 1.00     Years: 40.00     Pack years: 40.00     Types: Cigarettes     Quit date:      Years since quittin.1    Smokeless tobacco: Never   Vaping Use    Vaping Use: Never used   Substance and Sexual Activity    Alcohol use: Yes     Comment: social// rare    Drug use: No    Sexual activity: Never   Other Topics Concern    Caffeine Concern Not Asked    Back Care Not Asked    Exercise Not Asked    Occupational Exposure Not Asked    Sleep Concern Not Asked    Stress Concern Not Asked    Weight Concern Not Asked   Social History Narrative    Not on file     Social Determinants of Health     Financial Resource Strain: Low Risk     Difficulty of Paying Living Expenses: Not hard at all   Food Insecurity: No Food Insecurity    Worried About [address] in the Last Year: Never true    [address] [employer] TEAM in the Last Year: Never true   Transportation Needs: No Transportation Needs    Lack of Transportation (Medical): No    Lack of Transportation (Non-Medical): No   Physical Activity: Inactive    Days of Exercise per Week: 0 days    Minutes of Exercise per Session: 0 min   Stress: No Stress Concern Present    Feeling of Stress :  Only a little   Social Connections: Socially Isolated    Frequency of Communication with Friends and Family: More than three times a week    Frequency of Social Gatherings with Friends and Family: Once a week    Attends [employer] Services: Never    Active Member of Clubs or Organizations: No    Attends Club or Organization Meetings: Never    Marital Status:    Intimate Partner Violence: Not At Risk    Fear of Current or Ex-Partner: No    Emotionally Abused: No    Physically Abused: No    Sexually Abused: No   Housing Stability: Low Risk     Unable to Pay for Housing in the Last Year: No    Number of Places Lived in the Last Year: 1    Unstable Housing in the Last Year: No         Family History - reviewed:  Family History   Problem Relation Age of Onset    Heart Attack Father     Diabetes Mother          Immunizations - reviewed:   Immunization History   Administered Date(s) Administered    COVID-19, MODERNA BLUE border, Primary or Immunocompromised, (age 18y+), IM, 100 mcg/0.5mL 03/02/2021, 03/30/2021    Influenza Vaccine 10/01/2018    Influenza Vaccine (Tri) Adjuvanted (>65 Yrs FLUAD TRI [id number]) 10/24/2019    Influenza, FLUAD, (age 72 y+), Adjuvanted 10/07/2020, 11/11/2021    Pneumococcal Conjugate (PCV-13) 10/11/2016    Pneumococcal Polysaccharide (PPSV-23) 12/17/2014    Td 10/11/2011    Zoster Recombinant 11/17/2020, 08/03/2021    Zoster Vaccine, Live 12/07/2010         ROS  In hPI      Physical Exam  Visit Vitals  /71   Pulse 88   Temp 97.8 °F (36.6 °C)   Resp 20   Ht 5' 3\" (1.6 m)   Wt 196 lb (88.9 kg)   SpO2 99%   BMI 34.72 kg/m²       General appearance - alert, well appearing, and in no distress  Mouth - mucous membranes moist, pharynx normal without lesions  Neck - supple, no significant adenopathy  Chest - clear to auscultation, no wheezes, rales or rhonchi, symmetric air entry  Heart - normal rate, regular rhythm, normal S1, S2, no murmurs, rubs, clicks or gallops  Neurological - alert, oriented, normal speech, no focal findings or movement disorder noted  Extremities - peripheral pulses normal, no pedal edema, no clubbing or cyanosis  Skin - normal coloration and turgor, no rashes, no suspicious skin lesions noted      Assessment/Plan    ICD-10-CM ICD-9-CM    1. COPD exacerbation ([facility])  J44.1 491.21 XR CHEST PA LAT      predniSONE (DELTASONE) 10 mg tablet      Following up on COPD exacerbation.  No red flags and exam is benign. The fact that she's nor requiring albuterol is also reassuring. This may represent a new baseline for the patients COPD or maybe she needs to be on steroids for a little longer to manage this exacerbation. NHUNG is on the Ddx especially since she was initially on the mend, and then her symptoms worsened. - CXR to rule out PNA - neg  - Pred 10mg for 5 more days  - Return precautions      Follow-up and Dispositions    Return in about 1 week (around 2/14/2023) for COPD exas followup. I have discussed the diagnosis with the patient and the intended plan as seen in the above orders. Patient verbalized understanding of the plan and agrees with the plan. The patient has received an after-visit summary and questions were answered concerning future plans. I have discussed medication side effects and warnings with the patient as well. Informed patient to return to the office if new symptoms arise.         Eugene Najera MD  Family Medicine Resident

## 2023-02-07 NOTE — TELEPHONE ENCOUNTER
----- Message from Kayleen Scott MD sent at 2/6/2023 11:12 PM EST -----  Regarding: FW: cough/wheezing  Can you get her seen in the office? I saw her only 2 weeks ago, and she may need to be treated for something different if she is getting sick less than a week coming off antibiotics and barely a week after steroids as well. May need chest XR and possibly a different david of antibiotic course as well. Thanks, Tomas Romo    ----- Message -----  From: Lamonte Simon  Sent: 2/6/2023   3:05 PM EST  To: Kayleen Scott MD  Subject: cough/wheezing                                   Dr Dhaval Coulter-    I just spoke with Ms Carl Marquis and she said she is wheezing today and coughing more- she's coughing up a yellow sputum. She says no shortness of breath or fever at this time, but she feels she may need an antibiotic and/or steroid. She said she can come see you if you need her to but she just had an appt with you 1/23/23 and said you're very familiar with her case. She's trying to avoid going to [facility]. Let me know what you think, thanks!     Bethany Ferro RN- [facility]- [phone]

## 2023-02-09 ENCOUNTER — OFFICE VISIT (OUTPATIENT)
Dept: NEUROLOGY | Age: 78
End: 2023-02-09
Payer: MEDICARE

## 2023-02-09 VITALS
DIASTOLIC BLOOD PRESSURE: 94 MMHG | TEMPERATURE: 97 F | HEART RATE: 65 BPM | SYSTOLIC BLOOD PRESSURE: 156 MMHG | OXYGEN SATURATION: 95 %

## 2023-02-09 DIAGNOSIS — R94.131 ABNORMAL EMG: Primary | ICD-10-CM

## 2023-02-09 DIAGNOSIS — M54.16 LUMBAR RADICULOPATHY: ICD-10-CM

## 2023-02-10 NOTE — PROGRESS NOTES
Philip Correa is a 78 y.o. female who presents with the following  Chief Complaint   Patient presents with    Follow-up    Results       HPI    FU MRI. She has been dealing with a lot of pulmonary issues. RECAP>   She continues to have spasms, leg pain   She has failed Gabapentin- made her feel bad. Never used Lyrica  She was taking Flexeril for spasms before her stroke. Is interested in getting back onto this. She feels like her balance is a little off. She has not fallen. She is not sleeping very well. Allergies   Allergen Reactions    Nucala [Mepolizumab] Anaphylaxis    Crestor [Rosuvastatin] Other (comments)     Pain in left leg  Causes muscle spasms    Lipitor [Atorvastatin] Other (comments)     Left leg pain  Causes muscle spasms    Xarelto [Rivaroxaban] Other (comments)     Pt states it caused internal bleeding       Current Outpatient Medications   Medication Sig    predniSONE (DELTASONE) 10 mg tablet Take 10 mg by mouth daily (with breakfast) for 5 days. lovastatin (MEVACOR) 10 mg tablet TAKE ONE TABLET BY MOUTH NIGHTLY    metFORMIN ER (GLUCOPHAGE XR) 500 mg tablet TAKE 1 TABLET BY MOUTH DAILY WITH DINNER    fluticasone propionate (FLONASE) 50 mcg/actuation nasal spray 2 Sprays by Both Nostrils route daily. Eliquis 5 mg tablet TAKE ONE TABLET BY MOUTH 2 TIMES A DAY    fexofenadine (ALLEGRA) 180 mg tablet Take 1 Tablet by mouth daily. guaiFENesin ER (MUCINEX) 600 mg ER tablet Take 1 Tablet by mouth two (2) times a day. albuterol (PROVENTIL HFA, VENTOLIN HFA, PROAIR HFA) 90 mcg/actuation inhaler Take 2 Puffs by inhalation every four (4) hours as needed for Wheezing for up to 360 days.     nystatin (MYCOSTATIN) 100,000 unit/mL suspension USE 5 ML BY MOUTH 4 TIMES A DAY AS DIRECTED    diclofenac (VOLTAREN) 1 % gel APPLY 1 GRAM TO AFFECTED AREA OF KNEES AND BACK 4 TIMES DAILY (Patient taking differently: Apply 1 g to affected area four (4) times daily as needed.)    carvediloL (COREG) 6.25 mg tablet TAKE ONE TABLET BY MOUTH TWICE DAILY    pregabalin (LYRICA) 50 mg capsule Take 1 Capsule by mouth two (2) times a day. Max Daily Amount: 100 mg.    cyclobenzaprine (FLEXERIL) 10 mg tablet Take 1 Tablet by mouth two (2) times a day. PRN    montelukast (SINGULAIR) 10 mg tablet TAKE ONE TABLET BY MOUTH NIGHTLY    meclizine (ANTIVERT) 25 mg tablet Take 1 Tablet by mouth three (3) times daily as needed for Dizziness. Trelegy Ellipta 100-62.5-25 mcg inhaler INHALE 1 PUFF BY INHALATION DAILY    glucose blood VI test strips (FreeStyle Lite Strips) strip Patient is to check blood sugar once daily. Dx E11.9    albuterol (PROVENTIL VENTOLIN) 2.5 mg /3 mL (0.083 %) nebu 3 mL by Nebulization route every six (6) hours as needed for Wheezing. acetaminophen (TYLENOL ARTHRITIS PAIN) 650 mg TbER Take 1 Tab by mouth every eight (8) hours. multivitamin (ONE A DAY) tablet Take 1 Tab by mouth nightly. aspirin 81 mg tablet Take 81 mg by mouth daily. No current facility-administered medications for this visit. Social History     Tobacco Use   Smoking Status Former    Packs/day: 1.00    Years: 40.00    Pack years: 40.00    Types: Cigarettes    Quit date:     Years since quittin.1   Smokeless Tobacco Never       Past Medical History:   Diagnosis Date    Asthma     Bronchitis     Cancer ([facility] [address]     breast - left    COPD (chronic obstructive pulmonary disease) ([facility] [address]     [facility]; pulmonary nodule 7mm stable (found )    Diabetes ([address]     Hepatitis age 11    High cholesterol     Hypertension     Obesity (BMI 30-39. 9)     Thromboembolus ([address]     L LE with PE       Past Surgical History:   Procedure Laterality Date    COLONOSCOPY N/A 2017    COLONOSCOPY- no info to   performed by Felicita Albright MD at [address] GYN      ablation, R ovary removed, tubal ligation    HX HAMMER TOE REPAIR      HX HEENT      tonsillectomy    HX MASTECTOMY Left     HX ORTHOPAEDIC      L hip, R leg, C4-C5 fusion, L foot    HX OTHER SURGICAL      xiphoid removal    IR PLC IVC FILTER  2019    IN UNLISTED PROCEDURE BREAST      L mastectomy       Family History   Problem Relation Age of Onset    Heart Attack Father     Diabetes Mother        Social History     Socioeconomic History    Marital status: SINGLE   Tobacco Use    Smoking status: Former     Packs/day: 1.00     Years: 40.00     Pack years: 40.00     Types: Cigarettes     Quit date:      Years since quittin.1    Smokeless tobacco: Never   Vaping Use    Vaping Use: Never used   Substance and Sexual Activity    Alcohol use: Yes     Comment: social// rare    Drug use: No    Sexual activity: Never     Social Determinants of Health     Financial Resource Strain: Low Risk     Difficulty of Paying Living Expenses: Not hard at all   Food Insecurity: No Food Insecurity    Worried About Running Out of Food in the Last Year: Never true    Ran Out of Food in the Last Year: Never true       Review of Systems   Eyes:  Negative for blurred vision, double vision and photophobia. Gastrointestinal:  Negative for nausea and vomiting. Neurological:  Positive for tingling, sensory change and weakness. Negative for dizziness, seizures, loss of consciousness and headaches. Remainder of comprehensive review is negative. Physical Exam :    Visit Vitals  BP (!) 156/94 (BP 1 Location: Left upper arm, BP Patient Position: Sitting, BP Cuff Size: Adult)   Pulse 65   Temp 97 °F (36.1 °C)   SpO2 95%           Results for orders placed or performed in visit on 22   NOVEL CORONAVIRUS (COVID-19)   Result Value Ref Range    SARS-CoV-2, ZAHIRA Not Detected Not Detected   SARS-COV-2, ZAHIRA 2 DAY TAT   Result Value Ref Range    SARS-CoV-2, ZAHIRA 2 DAY TAT Performed        No orders of the defined types were placed in this encounter. 1. Abnormal EMG    2.  Lumbar radiculopathy      Discussed MRI   Discussed treatment in full   She will This note will not be viewable in [employer]

## 2023-02-13 ENCOUNTER — NURSE TRIAGE (OUTPATIENT)
Dept: OTHER | Facility: CLINIC | Age: 78
End: 2023-02-13

## 2023-02-13 ENCOUNTER — PATIENT OUTREACH (OUTPATIENT)
Dept: CASE MANAGEMENT | Age: 78
End: 2023-02-13

## 2023-02-13 RX ORDER — AZITHROMYCIN 250 MG/1
TABLET, FILM COATED ORAL
Qty: 6 TABLET | Refills: 0 | Status: SHIPPED | OUTPATIENT
Start: 2023-02-13 | End: 2023-02-18

## 2023-02-13 NOTE — TELEPHONE ENCOUNTER
Location of patient: [address] isabel from DAVID at [facility] with Ecal. Current Symptoms: productive cough with yellow sputum,  wheeze    Speaking in complete sentecnes, speech is     States was seen on 2/7/23 for similar symptoms and given prednisone    Onset: 1 week ago;     Pain Severity: 0/10; Temperature: denies     What has been tried: mucinex        Recommended disposition: Go to Office Now    Care advice provided, patient verbalizes understanding; denies any other questions or concerns; instructed to call back for any new or worsening symptoms. Patient/Caller agrees with recommended disposition; writer provided warm transfer to Lalo Montgomery at [facility] for appointment scheduling    Attention Provider: Thank you for allowing me to participate in the care of your patient. The patient was connected to triage in response to information provided to the [facility]. Please do not respond through this encounter as the response is not directed to a shared pool.       Reason for Disposition   Wheezing is present    Protocols used: Cough-ADULT-OH

## 2023-02-13 NOTE — PROGRESS NOTES
Ambulatory Care Management Note    Date/Time:  2/13/2023 2:44 PM    Patient Current Location: [place]    This [facility] (ACM) reviewed and updated the following screenings during this call; general assessment, disease specific assessment , self management assessment, and ACP assessment and note    Patient's challenges to self-management identified:   lack of knowledge about disease, medical condition, medication management, and support system      Medication Management:  good adherence and good understanding  Not reconciled this outreach    Advance Care Planning:   Does patient have an Advance Directive:  currently not on file; education provided     [employer], Referrals, and Durable Medical Equipment: none      Health Maintenance Due   Topic Date Due    Low dose CT lung screening  Never done    DTaP/Tdap/Td series (1 - Tdap) 10/12/2011    Eye Exam Retinal or Dilated  01/07/2021    COVID-19 Vaccine (3 - Booster for Moderna series) 05/25/2021    Flu Vaccine (1) 08/01/2022    Foot Exam Q1  11/11/2022    A1C test (Diabetic or Prediabetic)  01/18/2023     Health Maintenance Reviewed: Not reviewed this outreach. Patient was asked to consider health care goals that they would like to focus on with this ACM. ACM will follow up with patient to discuss goals and establish care plan in the next 7-14 days. PCP/Specialist follow up:   Future Appointments   Date Time Provider Bill Ricardo   2/15/2023  2:00 PM Roxie Mccray MD [facility]       Goals Addressed                   This Visit's Progress     Instruct on red flags (ie. fever, increased productive cough, SOB, and chest pain) and when to seek urgent medical treatment. 02/13/23  Patient called today and reports that symptoms have not improved- productive cough, wheezing, sob- she has an appt with PCP 2/15/23 but is worried about waiting. She reports that she completed prednisone prescribed at visit 2/7/23.  I routed information to PCP. ACM will follow up. TS    02/06/23  Patient reports that she has noted some wheezing today with cough/congestion. Reports no SOB or fever at this time. She saw her PCP on 1/23/23 and requested I reach out to him to see if appt needed. I routed information to Dr Paulino Russ.   In the meantime I educated patient on s/s to monitor for and report and what would warrant an emergency, will follow up after hear back from MD.  TS

## 2023-02-14 ENCOUNTER — TELEPHONE (OUTPATIENT)
Dept: FAMILY MEDICINE CLINIC | Age: 78
End: 2023-02-14

## 2023-02-15 ENCOUNTER — OFFICE VISIT (OUTPATIENT)
Dept: FAMILY MEDICINE CLINIC | Age: 78
End: 2023-02-15
Payer: MEDICARE

## 2023-02-15 VITALS
OXYGEN SATURATION: 97 % | WEIGHT: 194.6 LBS | BODY MASS INDEX: 34.48 KG/M2 | RESPIRATION RATE: 20 BRPM | SYSTOLIC BLOOD PRESSURE: 140 MMHG | HEART RATE: 74 BPM | DIASTOLIC BLOOD PRESSURE: 70 MMHG | HEIGHT: 63 IN | TEMPERATURE: 97.5 F

## 2023-02-15 DIAGNOSIS — J41.0 SIMPLE CHRONIC BRONCHITIS (HCC): Primary | ICD-10-CM

## 2023-02-15 DIAGNOSIS — E11.42 DIABETIC POLYNEUROPATHY ASSOCIATED WITH TYPE 2 DIABETES MELLITUS (HCC): ICD-10-CM

## 2023-02-15 DIAGNOSIS — N18.31 STAGE 3A CHRONIC KIDNEY DISEASE (HCC): ICD-10-CM

## 2023-02-15 DIAGNOSIS — E11.9 TYPE 2 DIABETES MELLITUS WITHOUT COMPLICATION, WITHOUT LONG-TERM CURRENT USE OF INSULIN (HCC): ICD-10-CM

## 2023-02-15 DIAGNOSIS — G40.89 OTHER SEIZURES (HCC): ICD-10-CM

## 2023-02-15 DIAGNOSIS — I82.412 ACUTE DEEP VEIN THROMBOSIS (DVT) OF FEMORAL VEIN OF LEFT LOWER EXTREMITY (HCC): ICD-10-CM

## 2023-02-15 PROCEDURE — 3017F COLORECTAL CA SCREEN DOC REV: CPT | Performed by: FAMILY MEDICINE

## 2023-02-15 PROCEDURE — 2022F DILAT RTA XM EVC RTNOPTHY: CPT | Performed by: FAMILY MEDICINE

## 2023-02-15 PROCEDURE — 1123F ACP DISCUSS/DSCN MKR DOCD: CPT | Performed by: FAMILY MEDICINE

## 2023-02-15 PROCEDURE — G8427 DOCREV CUR MEDS BY ELIG CLIN: HCPCS | Performed by: FAMILY MEDICINE

## 2023-02-15 PROCEDURE — 3046F HEMOGLOBIN A1C LEVEL >9.0%: CPT | Performed by: FAMILY MEDICINE

## 2023-02-15 PROCEDURE — 99214 OFFICE O/P EST MOD 30 MIN: CPT | Performed by: FAMILY MEDICINE

## 2023-02-15 PROCEDURE — 3077F SYST BP >= 140 MM HG: CPT | Performed by: FAMILY MEDICINE

## 2023-02-15 PROCEDURE — 3078F DIAST BP <80 MM HG: CPT | Performed by: FAMILY MEDICINE

## 2023-02-15 PROCEDURE — G8417 CALC BMI ABV UP PARAM F/U: HCPCS | Performed by: FAMILY MEDICINE

## 2023-02-15 PROCEDURE — 1101F PT FALLS ASSESS-DOCD LE1/YR: CPT | Performed by: FAMILY MEDICINE

## 2023-02-15 PROCEDURE — G8536 NO DOC ELDER MAL SCRN: HCPCS | Performed by: FAMILY MEDICINE

## 2023-02-15 PROCEDURE — G8510 SCR DEP NEG, NO PLAN REQD: HCPCS | Performed by: FAMILY MEDICINE

## 2023-02-15 PROCEDURE — G8399 PT W/DXA RESULTS DOCUMENT: HCPCS | Performed by: FAMILY MEDICINE

## 2023-02-15 PROCEDURE — 1090F PRES/ABSN URINE INCON ASSESS: CPT | Performed by: FAMILY MEDICINE

## 2023-02-15 RX ORDER — BLOOD-GLUCOSE METER
KIT MISCELLANEOUS
Qty: 100 STRIP | Refills: 2 | Status: SHIPPED | OUTPATIENT
Start: 2023-02-15

## 2023-02-15 RX ORDER — FLUCONAZOLE 150 MG/1
150 TABLET ORAL
Qty: 2 TABLET | Refills: 0 | Status: SHIPPED | OUTPATIENT
Start: 2023-02-15 | End: 2023-02-19

## 2023-02-15 RX ORDER — PREDNISONE 10 MG/1
10 TABLET ORAL
Qty: 14 TABLET | Refills: 0 | Status: SHIPPED | OUTPATIENT
Start: 2023-02-15

## 2023-02-15 NOTE — PROGRESS NOTES
[facility]    History of Present Illness:   Danielle Emerson is a 78 y.o. female with history of HTN, PE, DVT, DM, COPD, HLD, DCIS, CVA, leg pain  CC: Cough, wheezing, COPD  History provided by patient and Records    HPI:  Patient has now had recurrent/back to back COPD flares/wheezing and breathing issue for almost 3 months starting in November 2022. Has had multiple course of Prednisone, and Augmentin with initial improvements followed by relapsing. States she feels like the antibiotics do not \"kill\" off the bacteria. CXR last week was negative for any pneumonia. Started Azithromycin today. States that Prednisone does seem to help. Noting some yellow production this morning. Diabetes Follow up: Overall the patient feels well with good energy level. Current Medications:   Key Antihyperglycemic Medications               metFORMIN ER (GLUCOPHAGE XR) 500 mg tablet (Taking) TAKE 1 TABLET BY MOUTH DAILY WITH DINNER        . Insulin dependence: NO   Frequency of home glucose testing: Monday, Wednesday, and Friday   Blood Sugar range at home: 160-200   Last eye exam: In past 12 months. Last foot exam: This year.    Polyuria, polyphagia or polydipsia: NO   Retinopathy: NO   Neuropathy SX: YES   Low blood sugar symptoms: NO   Dietary compliance: compliant most of the time   Medication compliance: compliant most of the time   On ASA: YES   Tobacco Use: NO   Depression: NO     Wt Readings from Last 3 Encounters:   02/15/23 194 lb 9.6 oz (88.3 kg)   02/07/23 196 lb (88.9 kg)   01/23/23 199 lb (90.3 kg)        Lab Results   Component Value Date/Time    Hemoglobin A1c 8.0 (H) 07/18/2022 09:12 AM    Hemoglobin A1c (POC) 6.6 05/26/2020 04:35 PM        Lab Results   Component Value Date/Time    Microalbumin/Creat ratio (mg/g creat) 6 11/11/2021 02:20 PM    Microalbumin,urine random 1.16 11/11/2021 02:20 PM         Lab Results   Component Value Date/Time    Creatinine (POC) 0.7 02/24/2012 12:39 AM    Creatinine 1.02 07/18/2022 09:12 AM      History of DVT: is taking Eliquis at this time      Health Maintenance  Health Maintenance Due   Topic Date Due    Low dose CT lung screening  Never done    DTaP/Tdap/Td series (1 - Tdap) 10/12/2011    Eye Exam Retinal or Dilated  01/07/2021    COVID-19 Vaccine (3 - Booster for Moderna series) 05/25/2021    Flu Vaccine (1) 08/01/2022    A1C test (Diabetic or Prediabetic)  01/18/2023       Past Medical, Family, and Social History:     Current Outpatient Medications on File Prior to Visit   Medication Sig Dispense Refill    azithromycin (ZITHROMAX) 250 mg tablet Take 2 tablets today, then take 1 tablet daily 6 Tablet 0    lovastatin (MEVACOR) 10 mg tablet TAKE ONE TABLET BY MOUTH NIGHTLY 90 Tablet 1    metFORMIN ER (GLUCOPHAGE XR) 500 mg tablet TAKE 1 TABLET BY MOUTH DAILY WITH DINNER 90 Tablet 1    fluticasone propionate (FLONASE) 50 mcg/actuation nasal spray 2 Sprays by Both Nostrils route daily. 1 Each 1    Eliquis 5 mg tablet TAKE ONE TABLET BY MOUTH 2 TIMES A  Tablet 1    fexofenadine (ALLEGRA) 180 mg tablet Take 1 Tablet by mouth daily. 90 Tablet 1    guaiFENesin ER (MUCINEX) 600 mg ER tablet Take 1 Tablet by mouth two (2) times a day. 60 Tablet 1    albuterol (PROVENTIL HFA, VENTOLIN HFA, PROAIR HFA) 90 mcg/actuation inhaler Take 2 Puffs by inhalation every four (4) hours as needed for Wheezing for up to 360 days. 1 Each 1    nystatin (MYCOSTATIN) 100,000 unit/mL suspension USE 5 ML BY MOUTH 4 TIMES A DAY AS DIRECTED 473 mL 2    diclofenac (VOLTAREN) 1 % gel APPLY 1 GRAM TO AFFECTED AREA OF KNEES AND BACK 4 TIMES DAILY (Patient taking differently: Apply 1 g to affected area four (4) times daily as needed.) 300 g 1    carvediloL (COREG) 6.25 mg tablet TAKE ONE TABLET BY MOUTH TWICE DAILY 180 Tablet 1    pregabalin (LYRICA) 50 mg capsule Take 1 Capsule by mouth two (2) times a day.  Max Daily Amount: 100 mg. 60 Capsule 5    cyclobenzaprine (FLEXERIL) 10 mg tablet Take 1 Tablet by mouth two (2) times a day. PRN 60 Tablet 5    montelukast (SINGULAIR) 10 mg tablet TAKE ONE TABLET BY MOUTH NIGHTLY 90 Tablet 1    meclizine (ANTIVERT) 25 mg tablet Take 1 Tablet by mouth three (3) times daily as needed for Dizziness. 90 Tablet 0    glucose blood VI test strips (FreeStyle Lite Strips) strip Patient is to check blood sugar once daily. Dx E11.9 100 Strip 2    albuterol (PROVENTIL VENTOLIN) 2.5 mg /3 mL (0.083 %) nebu 3 mL by Nebulization route every six (6) hours as needed for Wheezing. 75 mL 4    acetaminophen (TYLENOL ARTHRITIS PAIN) 650 mg TbER Take 1 Tab by mouth every eight (8) hours. 60 Tab 0    multivitamin (ONE A DAY) tablet Take 1 Tab by mouth nightly. aspirin 81 mg tablet Take 81 mg by mouth daily. Trelegy Ellipta 100-62.5-25 mcg inhaler INHALE 1 PUFF BY INHALATION DAILY 60 Each 2     No current facility-administered medications on file prior to visit.        Patient Active Problem List   Diagnosis Code    Melena K92.1    Acute posthemorrhagic anemia D62    Type 2 diabetes mellitus without complication, without long-term current use of insulin ([facility]) E11.9    HTN (hypertension) I10    Other hyperlipidemia E78.49    Debility R53.81    Acute deep vein thrombosis (DVT) of left lower extremity ([facility]) I82.402    Chronic anticoagulation Z79.01    Severe obesity ([facility]) E66.01    DCIS (ductal carcinoma in situ) of breast D05.10    History of GI bleed Z87.19    COPD (chronic obstructive pulmonary disease) ([facility]) J44.9    History of stroke Z86.73    Unspecified convulsions R56.9    Other seizures G40.89    Chronic renal disease, stage III N18.30    Diabetic polyneuropathy associated with type 2 diabetes mellitus ([facility]) E11.42       Social History     Socioeconomic History    Marital status: SINGLE   Tobacco Use    Smoking status: Former     Packs/day: 1.00     Years: 40.00     Pack years: 40.00     Types: Cigarettes     Quit date:      Years since quittin.1 Smokeless tobacco: Never   Vaping Use    Vaping Use: Never used   Substance and Sexual Activity    Alcohol use: Yes     Comment: social// rare    Drug use: No    Sexual activity: Never     Social Determinants of Health     Financial Resource Strain: Low Risk     Difficulty of Paying Living Expenses: Not hard at all   Food Insecurity: No Food Insecurity    Worried About Running Out of Food in the Last Year: Never true    Ran Out of Food in the Last Year: Never true        Review of Systems   Review of Systems   Constitutional:  Negative for chills and fever. Respiratory:  Positive for cough and wheezing. Cardiovascular:  Negative for chest pain and palpitations. Objective:   Visit Vitals  BP (!) 140/70 (BP 1 Location: Right arm, BP Patient Position: Sitting, BP Cuff Size: Large adult)   Pulse 74   Temp 97.5 °F (36.4 °C) (Oral)   Resp 20   Ht 5' 3\" (1.6 m)   Wt 194 lb 9.6 oz (88.3 kg)   SpO2 97%   BMI 34.47 kg/m²        Physical Exam  Vitals and nursing note reviewed. Constitutional:       Appearance: Normal appearance. HENT:      Head: Normocephalic and atraumatic. Cardiovascular:      Rate and Rhythm: Normal rate and regular rhythm. Pulses: Normal pulses. Heart sounds: Normal heart sounds. No murmur heard. No friction rub. No gallop. Pulmonary:      Effort: Pulmonary effort is normal.      Breath sounds: Wheezing present. Abdominal:      General: Abdomen is flat. Bowel sounds are normal.      Palpations: Abdomen is soft. Musculoskeletal:      Cervical back: Normal range of motion and neck supple. Skin:     General: Skin is warm and dry. Neurological:      Mental Status: She is alert.      Diabetic foot exam:     Left Foot:   Visual Exam: normal    Pulse DP: 1+ (weak)   Filament test: reduced sensation    Vibratory sensation: diminished      Right Foot:   Visual Exam: normal    Pulse DP: 1+ (weak)   Filament test: reduced sensation    Vibratory sensation: diminished Pertinent Labs/Studies:      Assessment and orders:       ICD-10-CM ICD-9-CM    1. Simple chronic bronchitis (HCC)  J41.0 491.0 predniSONE (DELTASONE) 10 mg tablet      2. Diabetic polyneuropathy associated with type 2 diabetes mellitus (HCC)  E11.42 250.60 CBC W/O DIFF     282.4 METABOLIC PANEL, COMPREHENSIVE      LIPID PANEL      HEMOGLOBIN A1C WITH EAG      HM DIABETES FOOT EXAM      3. Acute deep vein thrombosis (DVT) of femoral vein of left lower extremity (HCC)  I82.412 453.41       4. Other seizures ([facility]  G40.89 780.39       5. Stage 3a chronic kidney disease (HCC)  N18.31 585. 3         Diagnoses and all orders for this visit:    1. Simple chronic bronchitis ([facility] [address]): Chronic excaerbation, trial of low dose extended dtime of Prednisone. Advised to get CT scan one sooner than later. -     predniSONE (DELTASONE) 10 mg tablet; Take 10 mg by mouth daily (with breakfast). 2. Diabetic polyneuropathy associated with type 2 diabetes mellitus ([facility] [address]): labs to be completed soon  -     CBC W/O DIFF; Future  -     METABOLIC PANEL, COMPREHENSIVE; Future  -     LIPID PANEL; Future  -     HEMOGLOBIN A1C WITH EAG; Future    3. Acute deep vein thrombosis (DVT) of femoral vein of left lower extremity (HCC):Is taking Blood thinner    4. Other seizures ([facility]    5. Stage 3a chronic kidney disease ([facility] [facility]    Other orders  -     fluconazole (DIFLUCAN) 150 mg tablet; Take 1 Tablet by mouth every seventy-two (72) hours for 2 doses. FDA advises cautious prescribing of oral fluconazole in pregnancy. Follow-up and Dispositions    Return in about 3 months (around 5/15/2023). I have discussed the diagnosis with the patient and the intended plan as seen in the above orders. Social history, medical history, and labs were reviewed. The patient has received an after-visit summary and questions were answered concerning future plans. I have discussed medication side effects and warnings with the patient as well.     Domenico Lechuga MD [facility] Gerber [facility]  02/15/23

## 2023-02-15 NOTE — PROGRESS NOTES
1. Have you been to the ER, urgent care clinic since your last visit? Hospitalized since your last visit? No    2. Have you seen or consulted any other health care providers outside of the [address] [place] [address] since your last visit? Including any pap smears or colon screening.  No      Health Maintenance Due   Topic Date Due    Low dose CT lung screening  Never done    DTaP/Tdap/Td series (1 - Tdap) 10/12/2011    Eye Exam Retinal or Dilated  01/07/2021    COVID-19 Vaccine (3 - Booster for Moderna series) 05/25/2021    Flu Vaccine (1) 08/01/2022    Foot Exam Q1  11/11/2022    A1C test (Diabetic or Prediabetic)  01/18/2023

## 2023-02-15 NOTE — PATIENT INSTRUCTIONS
Your doctor has recommended that you have an eye exam done. You can contact one of the below offices to schedule your own appointment. Once you have the visit, please ask their office to send us a copy for your record here. Olga Pate                     [phone]  Dr. Lesley Billy                          [phone]  Dr. Telly Lopez                           [phone]  [place]  [address] [place] [address]             [phone]

## 2023-02-17 ENCOUNTER — PATIENT OUTREACH (OUTPATIENT)
Dept: CASE MANAGEMENT | Age: 78
End: 2023-02-17

## 2023-02-17 NOTE — PROGRESS NOTES
Ambulatory Care Management Note    Date/Time:  2/17/2023 12:13 PM    Patient Current Location: [address] [place] [address]    This [address] [place] [address]) reviewed and updated the following screenings during this call; general assessment, disease specific assessment , self management assessment, SDOH assessments, ACP assessment and note, and medication reconciliation     Patient's challenges to self-management identified:   functional physical ability, lack of knowledge about disease, medical condition, medication management, polypharmacy, and support system      Medication Management:  good adherence and good understanding  Current Outpatient Medications   Medication Sig    predniSONE (DELTASONE) 10 mg tablet Take 10 mg by mouth daily (with breakfast). fluconazole (DIFLUCAN) 150 mg tablet Take 1 Tablet by mouth every seventy-two (72) hours for 2 doses. FDA advises cautious prescribing of oral fluconazole in pregnancy. glucose blood VI test strips (FreeStyle Lite Strips) strip Patient is to check blood sugar once daily. Dx E11.9    azithromycin (ZITHROMAX) 250 mg tablet Take 2 tablets today, then take 1 tablet daily    lovastatin (MEVACOR) 10 mg tablet TAKE ONE TABLET BY MOUTH NIGHTLY    metFORMIN ER (GLUCOPHAGE XR) 500 mg tablet TAKE 1 TABLET BY MOUTH DAILY WITH DINNER    fluticasone propionate (FLONASE) 50 mcg/actuation nasal spray 2 Sprays by Both Nostrils route daily. Eliquis 5 mg tablet TAKE ONE TABLET BY MOUTH 2 TIMES A DAY    fexofenadine (ALLEGRA) 180 mg tablet Take 1 Tablet by mouth daily. guaiFENesin ER (MUCINEX) 600 mg ER tablet Take 1 Tablet by mouth two (2) times a day. albuterol (PROVENTIL HFA, VENTOLIN HFA, PROAIR HFA) 90 mcg/actuation inhaler Take 2 Puffs by inhalation every four (4) hours as needed for Wheezing for up to 360 days.     nystatin (MYCOSTATIN) 100,000 unit/mL suspension USE 5 ML BY MOUTH 4 TIMES A DAY AS DIRECTED    diclofenac (VOLTAREN) 1 % gel APPLY 1 GRAM TO AFFECTED AREA OF KNEES AND BACK 4 TIMES DAILY (Patient taking differently: Apply 1 g to affected area four (4) times daily as needed.)    carvediloL (COREG) 6.25 mg tablet TAKE ONE TABLET BY MOUTH TWICE DAILY    pregabalin (LYRICA) 50 mg capsule Take 1 Capsule by mouth two (2) times a day. Max Daily Amount: 100 mg.    cyclobenzaprine (FLEXERIL) 10 mg tablet Take 1 Tablet by mouth two (2) times a day. PRN    montelukast (SINGULAIR) 10 mg tablet TAKE ONE TABLET BY MOUTH NIGHTLY    meclizine (ANTIVERT) 25 mg tablet Take 1 Tablet by mouth three (3) times daily as needed for Dizziness. Trelegy Ellipta 100-62.5-25 mcg inhaler INHALE 1 PUFF BY INHALATION DAILY    albuterol (PROVENTIL VENTOLIN) 2.5 mg /3 mL (0.083 %) nebu 3 mL by Nebulization route every six (6) hours as needed for Wheezing. acetaminophen (TYLENOL ARTHRITIS PAIN) 650 mg TbER Take 1 Tab by mouth every eight (8) hours. multivitamin (ONE A DAY) tablet Take 1 Tab by mouth nightly. aspirin 81 mg tablet Take 81 mg by mouth daily. No current facility-administered medications for this visit.          Advance Care Planning:   Does patient have an Advance Directive:  health care decision makers updated and currently not on file; education provided     [employer], Referrals, and Durable Medical Equipment: none      Health Maintenance Due   Topic Date Due    Low dose CT lung screening  Never done    DTaP/Tdap/Td series (1 - Tdap) 10/12/2011    Eye Exam Retinal or Dilated  01/07/2021    COVID-19 Vaccine (3 - Booster for Moderna series) 05/25/2021    Flu Vaccine (1) 08/01/2022    A1C test (Diabetic or Prediabetic)  01/18/2023     Health Maintenance Reviewed:   CT- ordered by pulmonology- patient needs to schedule  Tdap- has not had recently  Eye exam- needs to schedule  Covid booster- had #3 booster 05/22  Flu- had for [id number] season at [facility]  A1C- ordered, doesn't want to get until off prednisone    Patient was asked to consider health care goals that they would like to focus on with this ACM. ACM will follow up with patient to discuss goals and establish care plan in the next 7-14 days. Goals Addressed                   This Visit's Progress     Instruct on red flags (ie. fever, increased productive cough, SOB, and chest pain) and when to seek urgent medical treatment. 02/17/23  MD started patient on Rolin Lover then she went to see him and he put her on a 2 week course of Prednsione. She reports today she's feeling better, symptoms reduced. She is encouraged to complete medication as prescribed and monitor for s/s that we originally reviewed. ACM will follow up in about 2 weeks. TS    02/13/23  Patient called today and reports that symptoms have not improved- productive cough, wheezing, sob- she has an appt with PCP 2/15/23 but is worried about waiting. She reports that she completed prednisone prescribed at visit 2/7/23. I routed information to PCP. ACM will follow up. TS    02/06/23  Patient reports that she has noted some wheezing today with cough/congestion. Reports no SOB or fever at this time. She saw her PCP on 1/23/23 and requested I reach out to him to see if appt needed. I routed information to Dr Luis Garner. In the meantime I educated patient on s/s to monitor for and report and what would warrant an emergency, will follow up after hear back from MD.  TS              PCP/Specialist follow up: No future appointments.

## 2023-03-01 ENCOUNTER — PATIENT MESSAGE (OUTPATIENT)
Dept: FAMILY MEDICINE CLINIC | Age: 78
End: 2023-03-01

## 2023-03-03 ENCOUNTER — PATIENT OUTREACH (OUTPATIENT)
Dept: CASE MANAGEMENT | Age: 78
End: 2023-03-03

## 2023-03-03 NOTE — PROGRESS NOTES
03/03/23  2:08 PM  Attempted ACM outreach for CCM outreach, no answer- lvm for return call. Will attempt another outreach in 1-2 weeks.

## 2023-03-16 RX ORDER — CYCLOBENZAPRINE HCL 10 MG
10 TABLET ORAL 2 TIMES DAILY
Qty: 60 TABLET | Refills: 5 | Status: SHIPPED | OUTPATIENT
Start: 2023-03-16

## 2023-03-17 NOTE — TELEPHONE ENCOUNTER
Requesting refill:  Requested Prescriptions      No prescriptions requested or ordered in this encounter   Refills for     Requested Prescriptions     Pending Prescriptions Disp Refills    cyclobenzaprine (FLEXERIL) 10 mg tablet 60 Tablet 5     Sig: Take 1 Tablet by mouth two (2) times a day.  PRN

## 2023-03-23 DIAGNOSIS — E11.42 DIABETIC POLYNEUROPATHY ASSOCIATED WITH TYPE 2 DIABETES MELLITUS (HCC): ICD-10-CM

## 2023-03-23 RX ORDER — PREGABALIN 50 MG/1
CAPSULE ORAL
Qty: 60 CAPSULE | Refills: 5 | Status: SHIPPED | OUTPATIENT
Start: 2023-03-23

## 2023-03-24 ENCOUNTER — PATIENT MESSAGE (OUTPATIENT)
Dept: FAMILY MEDICINE CLINIC | Age: 78
End: 2023-03-24

## 2023-03-24 DIAGNOSIS — E11.9 TYPE 2 DIABETES MELLITUS WITHOUT COMPLICATION, WITHOUT LONG-TERM CURRENT USE OF INSULIN (HCC): ICD-10-CM

## 2023-03-24 RX ORDER — CYCLOBENZAPRINE HCL 10 MG
10 TABLET ORAL 2 TIMES DAILY
Qty: 60 TABLET | Refills: 5 | Status: SHIPPED | OUTPATIENT
Start: 2023-03-24

## 2023-03-24 RX ORDER — METFORMIN HYDROCHLORIDE 500 MG/1
500 TABLET, EXTENDED RELEASE ORAL 2 TIMES DAILY WITH MEALS
Qty: 180 TABLET | Refills: 1 | Status: SHIPPED | OUTPATIENT
Start: 2023-03-24

## 2023-03-24 NOTE — TELEPHONE ENCOUNTER
Refill sent in.     MD [facility] Leonard [facility]  03/24/23

## 2023-03-24 NOTE — TELEPHONE ENCOUNTER
From: Isela Casillas  To: Sadia Richards MD  Sent: 3/24/2023 2:05 PM EDT  Subject: Metformin    Dr Shira Qurales, if I am going be on 2 a day of Metformin for a period of time I need a new prescription sent to [facility].  Thanks

## 2023-03-29 ENCOUNTER — PATIENT OUTREACH (OUTPATIENT)
Dept: CASE MANAGEMENT | Age: 78
End: 2023-03-29

## 2023-03-29 NOTE — PROGRESS NOTES
Ambulatory Care Management Note      Date/Time:  3/29/2023 1:45 PM    Patient Current Location: [place]     This patient was received as a referral from Daily assignment. Top Challenges reviewed with the provider   - ongoing cough/congestion, appt with pulmonary 3/31/23  - blood sugars >200 (400 at times), started on metformin 2x/day about a week ago. Ambulatory  contacted patient for discussion and case management. Summary of patients top problems:   COPD-  Is followed by Pulmonary for COPD as well as Dr Luis Garner her PCP, has Pulmonary appt 3/31/23. She has been having persistent exacerbations since 11/2022, she most recently went to [facility] and was admitted for this 11/12/22. Since then she's been on a antibiotics  (Augmentin and Z pack) and steroids and is still having the cough and congestion- yellow sputum. Chest xray 2/7/23 was negative. She takes Prednisone 10mg daily, Guaifenesin ER twice daily, Albuterol inhaler PRN, Trelegy inhaler, Albuterol via nebulizer. DM2- Patient's diabetes is managed by PCP Dr Luis Garner, was last seen 2/15/23. Last HgA1C was 8.0 on 7/18/22 (previously 7.1 on 11/11/21). She checks her blood sugars daily and usually gets less than 200, but recently reports blood sugars between 200-400. She's currently taking Metformin ER 500mg twice daily (was just increased 3/24/23). She reports to following low sugar diet. Needs eye exam, foot exam up to date. Lumbar radiculopathy-  Patient is followed by Neurology Dr Pawan Narayan- last seen 2/9/23. She reports leg pain/spasms. She has an MRI done 11/7/22 which was abnormal. She's now taking Lyrica with some improvement. She reports unsteady/off balance at times and has some trouble sleeping.     Patient's challenges to self management identified:   depression, functional physical ability, ineffective coping, lack of knowledge about disease, level of motivation, and support system      Medication Management:  good adherence and good understanding  Current Outpatient Medications   Medication Sig    cyclobenzaprine (FLEXERIL) 10 mg tablet Take 1 Tablet by mouth two (2) times a day. PRN    metFORMIN ER (GLUCOPHAGE XR) 500 mg tablet Take 1 Tablet by mouth two (2) times daily (with meals). pregabalin (LYRICA) 50 mg capsule TAKE ONE CAPSULE BY MOUTH TWICE DAILY *MAX DAILY AMOUNT 100 MG*    carvediloL (COREG) 6.25 mg tablet TAKE ONE TABLET BY MOUTH TWICE DAILY    predniSONE (DELTASONE) 10 mg tablet Take 10 mg by mouth daily (with breakfast). glucose blood VI test strips (FreeStyle Lite Strips) strip Patient is to check blood sugar once daily. Dx E11.9    lovastatin (MEVACOR) 10 mg tablet TAKE ONE TABLET BY MOUTH NIGHTLY    fluticasone propionate (FLONASE) 50 mcg/actuation nasal spray 2 Sprays by Both Nostrils route daily. Eliquis 5 mg tablet TAKE ONE TABLET BY MOUTH 2 TIMES A DAY    fexofenadine (ALLEGRA) 180 mg tablet Take 1 Tablet by mouth daily. guaiFENesin ER (MUCINEX) 600 mg ER tablet Take 1 Tablet by mouth two (2) times a day. albuterol (PROVENTIL HFA, VENTOLIN HFA, PROAIR HFA) 90 mcg/actuation inhaler Take 2 Puffs by inhalation every four (4) hours as needed for Wheezing for up to 360 days. nystatin (MYCOSTATIN) 100,000 unit/mL suspension USE 5 ML BY MOUTH 4 TIMES A DAY AS DIRECTED    diclofenac (VOLTAREN) 1 % gel APPLY 1 GRAM TO AFFECTED AREA OF KNEES AND BACK 4 TIMES DAILY (Patient taking differently: Apply 1 g to affected area four (4) times daily as needed.)    montelukast (SINGULAIR) 10 mg tablet TAKE ONE TABLET BY MOUTH NIGHTLY    meclizine (ANTIVERT) 25 mg tablet Take 1 Tablet by mouth three (3) times daily as needed for Dizziness. Trelegy Ellipta 100-62.5-25 mcg inhaler INHALE 1 PUFF BY INHALATION DAILY    albuterol (PROVENTIL VENTOLIN) 2.5 mg /3 mL (0.083 %) nebu 3 mL by Nebulization route every six (6) hours as needed for Wheezing.     acetaminophen (TYLENOL ARTHRITIS PAIN) 650 mg TbER Take 1 Tab by mouth every eight (8) hours. multivitamin (ONE A DAY) tablet Take 1 Tab by mouth nightly. aspirin 81 mg tablet Take 81 mg by mouth daily. No current facility-administered medications for this visit. Goals Addressed                   This Visit's Progress     Instruct on red flags (ie. fever, increased productive cough, SOB, and chest pain) and when to seek urgent medical treatment. 03/29/23  Patient reports that she just finished a prednisone taper, she said she's still having a persistent/productive cough w/ yellow sputum. She said the wheezing has lessened but she made an appt to see her pulmonary doctor 3/31/23. ACM reiterated the red flags signs to monitor for and report, patient agrees to monitor. Will follow up in about 2 weeks. TS    02/17/23  MD started patient on Herman Mean then she went to see him and he put her on a 2 week course of Prednsione. She reports today she's feeling better, symptoms reduced. She is encouraged to complete medication as prescribed and monitor for s/s that we originally reviewed. AC will follow up in about 2 weeks. TS    02/13/23  Patient called today and reports that symptoms have not improved- productive cough, wheezing, sob- she has an appt with PCP 2/15/23 but is worried about waiting. She reports that she completed prednisone prescribed at visit 2/7/23. I routed information to PCP. AC will follow up. TS    02/06/23  Patient reports that she has noted some wheezing today with cough/congestion. Reports no SOB or fever at this time. She saw her PCP on 1/23/23 and requested I reach out to him to see if appt needed. I routed information to Dr Mary Howard.   In the meantime I educated patient on s/s to monitor for and report and what would warrant an emergency, will follow up after hear back from MD.  JACOB                Advance Care Planning:   Does patient have an Advance Directive:  currently not on file; education provided ACP nurse has attempted to assist in the past with no answer, will address at next outreach. [employer], Referrals, and Durable Medical Equipment: none    PCP/Specialist follow up: No future appointments. Patient verbalized understanding of all information discussed. Patient has this Ambulatory Care Manager's contact information for any further questions, concerns, or needs.

## 2023-04-04 RX ORDER — APIXABAN 5 MG/1
TABLET, FILM COATED ORAL
Qty: 180 TABLET | Refills: 1 | Status: SHIPPED
Start: 2023-04-04

## 2023-04-26 ENCOUNTER — OFFICE VISIT (OUTPATIENT)
Dept: FAMILY MEDICINE CLINIC | Age: 78
End: 2023-04-26
Payer: MEDICARE

## 2023-04-26 VITALS
RESPIRATION RATE: 20 BRPM | BODY MASS INDEX: 34.55 KG/M2 | SYSTOLIC BLOOD PRESSURE: 133 MMHG | WEIGHT: 195 LBS | HEIGHT: 63 IN | HEART RATE: 80 BPM | DIASTOLIC BLOOD PRESSURE: 87 MMHG | TEMPERATURE: 98 F | OXYGEN SATURATION: 98 %

## 2023-04-26 DIAGNOSIS — E11.42 DIABETIC POLYNEUROPATHY ASSOCIATED WITH TYPE 2 DIABETES MELLITUS (HCC): ICD-10-CM

## 2023-04-26 DIAGNOSIS — I10 PRIMARY HYPERTENSION: ICD-10-CM

## 2023-04-26 DIAGNOSIS — E11.9 TYPE 2 DIABETES MELLITUS WITHOUT COMPLICATION, WITHOUT LONG-TERM CURRENT USE OF INSULIN (HCC): ICD-10-CM

## 2023-04-26 DIAGNOSIS — J41.0 SIMPLE CHRONIC BRONCHITIS (HCC): Primary | ICD-10-CM

## 2023-04-26 DIAGNOSIS — E78.49 OTHER HYPERLIPIDEMIA: ICD-10-CM

## 2023-04-26 DIAGNOSIS — R05.3 CHRONIC COUGH: ICD-10-CM

## 2023-04-26 PROCEDURE — G8399 PT W/DXA RESULTS DOCUMENT: HCPCS | Performed by: FAMILY MEDICINE

## 2023-04-26 PROCEDURE — 1090F PRES/ABSN URINE INCON ASSESS: CPT | Performed by: FAMILY MEDICINE

## 2023-04-26 PROCEDURE — 1123F ACP DISCUSS/DSCN MKR DOCD: CPT | Performed by: FAMILY MEDICINE

## 2023-04-26 PROCEDURE — G8417 CALC BMI ABV UP PARAM F/U: HCPCS | Performed by: FAMILY MEDICINE

## 2023-04-26 PROCEDURE — 99214 OFFICE O/P EST MOD 30 MIN: CPT | Performed by: FAMILY MEDICINE

## 2023-04-26 PROCEDURE — 2022F DILAT RTA XM EVC RTNOPTHY: CPT | Performed by: FAMILY MEDICINE

## 2023-04-26 PROCEDURE — G8427 DOCREV CUR MEDS BY ELIG CLIN: HCPCS | Performed by: FAMILY MEDICINE

## 2023-04-26 PROCEDURE — 1101F PT FALLS ASSESS-DOCD LE1/YR: CPT | Performed by: FAMILY MEDICINE

## 2023-04-26 PROCEDURE — 3017F COLORECTAL CA SCREEN DOC REV: CPT | Performed by: FAMILY MEDICINE

## 2023-04-26 PROCEDURE — G8536 NO DOC ELDER MAL SCRN: HCPCS | Performed by: FAMILY MEDICINE

## 2023-04-26 PROCEDURE — 3075F SYST BP GE 130 - 139MM HG: CPT | Performed by: FAMILY MEDICINE

## 2023-04-26 PROCEDURE — 3046F HEMOGLOBIN A1C LEVEL >9.0%: CPT | Performed by: FAMILY MEDICINE

## 2023-04-26 PROCEDURE — G8432 DEP SCR NOT DOC, RNG: HCPCS | Performed by: FAMILY MEDICINE

## 2023-04-26 PROCEDURE — 3079F DIAST BP 80-89 MM HG: CPT | Performed by: FAMILY MEDICINE

## 2023-04-26 RX ORDER — METFORMIN HYDROCHLORIDE 500 MG/1
1000 TABLET, EXTENDED RELEASE ORAL 2 TIMES DAILY WITH MEALS
Qty: 360 TABLET | Refills: 1 | Status: SHIPPED | OUTPATIENT
Start: 2023-04-26

## 2023-04-26 NOTE — PROGRESS NOTES
[facility] [place]    History of Present Illness:   Marely Alicia is a 78 y.o. female with history of HTN, PE, DVT, DM, COPD, HLD, DCIS, CVA, leg pain  CC: Follow up at this time  History provided by patient and Records    HPI:  Diabetes Follow up: Overall the patient feels well with good energy level. Current Medications:   Key Antihyperglycemic Medications               metFORMIN ER (GLUCOPHAGE XR) 500 mg tablet (Taking) Take 2 Tablets by mouth two (2) times daily (with meals). .   Insulin dependence: NO   Frequency of home glucose testing: Daily   Blood Sugar range at home:     Last eye exam: In past 12 months. Last foot exam: This year. Polyuria, polyphagia or polydipsia: NO   Retinopathy: NO   Neuropathy SX: NO   Low blood sugar symptoms: NO   Dietary compliance: compliant most of the time   Medication compliance: compliant most of the time   On ASA: YES   Tobacco Use: NO   Depression: NO     Wt Readings from Last 3 Encounters:   04/26/23 195 lb (88.5 kg)   02/15/23 194 lb 9.6 oz (88.3 kg)   02/07/23 196 lb (88.9 kg)      Lab Results   Component Value Date/Time    Hemoglobin A1c 8.0 (H) 07/18/2022 09:12 AM    Hemoglobin A1c (POC) 6.6 05/26/2020 04:35 PM      Lab Results   Component Value Date/Time    Microalbumin/Creat ratio (mg/g creat) 6 11/11/2021 02:20 PM    Microalbumin,urine random 1.16 11/11/2021 02:20 PM      Lab Results   Component Value Date/Time    Creatinine (POC) 0.7 02/24/2012 12:39 AM    Creatinine 1.02 07/18/2022 09:12 AM      COPD: Patient has had repeat sets of prednisone and antibiotics with persistent symptoms despite this. Per patient, They want her to do a sputum culture before considering further antibiotics. Sees Dr. Tristin Mccoy.     Health Maintenance  Health Maintenance Due   Topic Date Due    Low dose CT lung screening  Never done    DTaP/Tdap/Td series (1 - Tdap) 10/12/2011    Eye Exam Retinal or Dilated  01/07/2021    COVID-19 Vaccine (3 - Booster for Moderna series) 05/25/2021    A1C test (Diabetic or Prediabetic)  01/18/2023       Past Medical, Family, and Social History:     Current Outpatient Medications on File Prior to Visit   Medication Sig Dispense Refill    meclizine (ANTIVERT) 25 mg tablet TAKE 1 TABLET BY MOUTH 3 TIMES A DAY AS NEEDED FOR DIZZINESS 90 Tablet 0    Eliquis 5 mg tablet TAKE ONE TABLET BY MOUTH 2 TIMES A  Tablet 1    cyclobenzaprine (FLEXERIL) 10 mg tablet Take 1 Tablet by mouth two (2) times a day. PRN 60 Tablet 5    pregabalin (LYRICA) 50 mg capsule TAKE ONE CAPSULE BY MOUTH TWICE DAILY *MAX DAILY AMOUNT 100 MG* 60 Capsule 5    carvediloL (COREG) 6.25 mg tablet TAKE ONE TABLET BY MOUTH TWICE DAILY 180 Tablet 1    predniSONE (DELTASONE) 10 mg tablet Take 10 mg by mouth daily (with breakfast). 14 Tablet 0    glucose blood VI test strips (FreeStyle Lite Strips) strip Patient is to check blood sugar once daily. Dx E11.9 100 Strip 2    lovastatin (MEVACOR) 10 mg tablet TAKE ONE TABLET BY MOUTH NIGHTLY 90 Tablet 1    fluticasone propionate (FLONASE) 50 mcg/actuation nasal spray 2 Sprays by Both Nostrils route daily. 1 Each 1    fexofenadine (ALLEGRA) 180 mg tablet Take 1 Tablet by mouth daily. 90 Tablet 1    guaiFENesin ER (MUCINEX) 600 mg ER tablet Take 1 Tablet by mouth two (2) times a day. 60 Tablet 1    albuterol (PROVENTIL HFA, VENTOLIN HFA, PROAIR HFA) 90 mcg/actuation inhaler Take 2 Puffs by inhalation every four (4) hours as needed for Wheezing for up to 360 days.  1 Each 1    nystatin (MYCOSTATIN) 100,000 unit/mL suspension USE 5 ML BY MOUTH 4 TIMES A DAY AS DIRECTED 473 mL 2    diclofenac (VOLTAREN) 1 % gel APPLY 1 GRAM TO AFFECTED AREA OF KNEES AND BACK 4 TIMES DAILY (Patient taking differently: Apply 1 g to affected area four (4) times daily as needed.) 300 g 1    montelukast (SINGULAIR) 10 mg tablet TAKE ONE TABLET BY MOUTH NIGHTLY 90 Tablet 1    Trelegy Ellipta 100-62.5-25 mcg inhaler INHALE 1 PUFF BY INHALATION DAILY 60 Each 2    albuterol (PROVENTIL VENTOLIN) 2.5 mg /3 mL (0.083 %) nebu 3 mL by Nebulization route every six (6) hours as needed for Wheezing. 75 mL 4    acetaminophen (TYLENOL ARTHRITIS PAIN) 650 mg TbER Take 1 Tab by mouth every eight (8) hours. 60 Tab 0    multivitamin (ONE A DAY) tablet Take 1 Tablet by mouth nightly. aspirin 81 mg tablet Take 1 Tablet by mouth daily. [DISCONTINUED] metFORMIN ER (GLUCOPHAGE XR) 500 mg tablet Take 1 Tablet by mouth two (2) times daily (with meals). 180 Tablet 1     No current facility-administered medications on file prior to visit.        Patient Active Problem List   Diagnosis Code    Melena K92.1    Acute posthemorrhagic anemia D62    Type 2 diabetes mellitus with diabetic polyneuropathy, without long-term current use of insulin ([facility]) E11.42    HTN (hypertension) I10    Other hyperlipidemia E78.49    Debility R53.81    Acute deep vein thrombosis (DVT) of left lower extremity ([facility]) I82.402    Chronic anticoagulation Z79.01    Severe obesity ([facility]) E66.01    DCIS (ductal carcinoma in situ) of breast D05.10    History of GI bleed Z87.19    COPD (chronic obstructive pulmonary disease) ([facility]) J44.9    History of stroke Z86.73    Unspecified convulsions R56.9    Other seizures G40.89    Chronic renal disease, stage III N18.30    Diabetic polyneuropathy associated with type 2 diabetes mellitus ([facility]) E11.42       Social History     Socioeconomic History    Marital status: SINGLE   Tobacco Use    Smoking status: Former     Packs/day: 1.00     Years: 40.00     Pack years: 40.00     Types: Cigarettes     Quit date:      Years since quittin.3    Smokeless tobacco: Never   Vaping Use    Vaping Use: Never used   Substance and Sexual Activity    Alcohol use: Yes     Comment: social// rare    Drug use: No    Sexual activity: Never     Social Determinants of Health     Financial Resource Strain: Low Risk     Difficulty of Paying Living Expenses: Not hard at all   Food Insecurity: No Food Insecurity    Worried About Running Out of Food in the Last Year: Never true    Ran Out of Food in the Last Year: Never true   Transportation Needs: No Transportation Needs    Lack of Transportation (Medical): No    Lack of Transportation (Non-Medical): No   Physical Activity: Inactive    Days of Exercise per Week: 0 days    Minutes of Exercise per Session: 0 min   Stress: No Stress Concern Present    Feeling of Stress : Only a little   Social Connections: Socially Isolated    Frequency of Communication with Friends and Family: More than three times a week    Frequency of Social Gatherings with Friends and Family: Once a week    Attends Adventist Services: Never    Active Member of Clubs or Organizations: No    Attends Club or Organization Meetings: Never    Marital Status:    Housing Stability: [address] to Pay for Housing in the Last Year: No    Number of [place] in the Last Year: 1    Unstable Housing in the Last Year: No        Review of Systems   Review of Systems   Constitutional:  Negative for chills and fever. Cardiovascular:  Negative for chest pain and palpitations. Gastrointestinal:  Negative for heartburn and nausea. Neurological:  Negative for dizziness, tingling and headaches. Objective:   Visit Vitals  /87   Pulse 80   Temp 98 °F (36.7 °C)   Resp 20   Ht 5' 3\" (1.6 m)   Wt 195 lb (88.5 kg)   SpO2 98%   BMI 34.54 kg/m²        Physical Exam  Vitals and nursing note reviewed. Constitutional:       Appearance: Normal appearance. HENT:      Head: Normocephalic and atraumatic. Cardiovascular:      Rate and Rhythm: Normal rate and regular rhythm. Pulses: Normal pulses. Heart sounds: Normal heart sounds. No murmur heard. No friction rub. No gallop. Pulmonary:      Effort: Pulmonary effort is normal.      Breath sounds: Normal breath sounds. Abdominal:      General: Abdomen is flat. Bowel sounds are normal.      Palpations: Abdomen is soft. Musculoskeletal:      Cervical back: Normal range of motion and neck supple. Skin:     General: Skin is warm and dry. Neurological:      Mental Status: She is alert. Pertinent Labs/Studies:      Assessment and orders:       ICD-10-CM ICD-9-CM    1. Simple chronic bronchitis ([facility])  J41.0 491.0 CULTURE, RESPIRATORY/SPUTUM/BRONCH W GRAM STAIN      2. Diabetic polyneuropathy associated with type 2 diabetes mellitus ([facility])  E11.42 250.60 HEMOGLOBIN A1C WITH EAG     357.2       3. Primary hypertension  I10 401.9 CBC W/O DIFF      METABOLIC PANEL, COMPREHENSIVE      4. Other hyperlipidemia  E78.49 272.4 LIPID PANEL      5. Chronic cough  R05.3 786. 2 CULTURE, RESPIRATORY/SPUTUM/BRONCH W GRAM STAIN      6. Type 2 diabetes mellitus without complication, without long-term current use of insulin ([facility])  E11.9 250.00 metFORMIN ER (GLUCOPHAGE XR) 500 mg tablet        Diagnoses and all orders for this visit:    1. Simple chronic bronchitis ([address]): See if particular bacteria is present  -     CULTURE, RESPIRATORY/SPUTUM/BRONCH W GRAM STAIN; Future    2. Diabetic polyneuropathy associated with type 2 diabetes mellitus [facility]): Discussed with patient today that the goal for their diabetes is to have a HgA1C<7 and ideally as close to 6.5 as possible. We discussed diet and medications. The goal for the cholesterol LDL is less than 70 and HDL>40. Patient is aware of the need for yearly eye exams and to take care of their feet daily. Discussed with patient that blood pressure should be less than 130/80 and watching salt intake is very important.    -     HEMOGLOBIN A1C WITH EAG; Future    3. Primary hypertension: Our goal is to normalize the blood pressure to decrease the risks of strokes and heart attacks. The patient is in agreement with the plan. -     CBC W/O DIFF; Future  -     METABOLIC PANEL, COMPREHENSIVE; Future    4.  Other hyperlipidemia: The patient is aware of our goal to reduce or eliminate the long term problems (such as strokes and heart attacks) related to poorly controlled Triglycerides, LDL, Cholesterol.   -     LIPID PANEL; Future    5. Chronic cough  -     CULTURE, RESPIRATORY/SPUTUM/BRONCH W GRAM STAIN; Future    6. Type 2 diabetes mellitus without complication, without long-term current use of insulin (HCC)  -     metFORMIN ER (GLUCOPHAGE XR) 500 mg tablet; Take 2 Tablets by mouth two (2) times daily (with meals). Follow-up and Dispositions    Return in about 4 weeks (around 5/24/2023). I have discussed the diagnosis with the patient and the intended plan as seen in the above orders. Social history, medical history, and labs were reviewed. The patient has received an after-visit summary and questions were answered concerning future plans. I have discussed medication side effects and warnings with the patient as well.     MD JAGDISH Giron & JERRY ORDONEZ [facility]  04/26/23

## 2023-04-26 NOTE — PROGRESS NOTES
1. \"Have you been to the ER, urgent care clinic since your last visit? Hospitalized since your last visit? \" No    2. \"Have you seen or consulted any other health care providers outside of the [address] [place] [address] since your last visit? \" No     3. For patients aged 39-70: Has the patient had a colonoscopy / FIT/ Cologuard? Yes - no Care Gap present      If the patient is female:    4. For patients aged 41-77: Has the patient had a mammogram within the past 2 years? NA - based on age or sex      11. For patients aged 21-65: Has the patient had a pap smear?  NA - based on age or sex    Health Maintenance Due   Topic Date Due    Low dose CT lung screening  Never done    DTaP/Tdap/Td series (1 - Tdap) 10/12/2011    Eye Exam Retinal or Dilated  01/07/2021    COVID-19 Vaccine (3 - Booster for Moderna series) 05/25/2021    A1C test (Diabetic or Prediabetic)  01/18/2023

## 2023-04-27 LAB
ALBUMIN SERPL-MCNC: 3.5 G/DL (ref 3.5–5)
ALBUMIN/GLOB SERPL: 1.2 (ref 1.1–2.2)
ALP SERPL-CCNC: 88 U/L (ref 45–117)
ALT SERPL-CCNC: 17 U/L (ref 12–78)
ANION GAP SERPL CALC-SCNC: 4 MMOL/L (ref 5–15)
AST SERPL-CCNC: 14 U/L (ref 15–37)
BILIRUB SERPL-MCNC: 0.3 MG/DL (ref 0.2–1)
BUN SERPL-MCNC: 18 MG/DL (ref 6–20)
BUN/CREAT SERPL: 20 (ref 12–20)
CALCIUM SERPL-MCNC: 8.7 MG/DL (ref 8.5–10.1)
CHLORIDE SERPL-SCNC: 109 MMOL/L (ref 97–108)
CHOLEST SERPL-MCNC: 156 MG/DL
CO2 SERPL-SCNC: 25 MMOL/L (ref 21–32)
CREAT SERPL-MCNC: 0.91 MG/DL (ref 0.55–1.02)
ERYTHROCYTE [DISTWIDTH] IN BLOOD BY AUTOMATED COUNT: 18.7 % (ref 11.5–14.5)
EST. AVERAGE GLUCOSE BLD GHB EST-MCNC: 295 MG/DL
GLOBULIN SER CALC-MCNC: 3 G/DL (ref 2–4)
GLUCOSE SERPL-MCNC: 182 MG/DL (ref 65–100)
HBA1C MFR BLD: 11.9 % (ref 4–5.6)
HCT VFR BLD AUTO: 30.2 % (ref 35–47)
HDLC SERPL-MCNC: 46 MG/DL
HDLC SERPL: 3.4 (ref 0–5)
HGB BLD-MCNC: 8 G/DL (ref 11.5–16)
LDLC SERPL CALC-MCNC: 51.4 MG/DL (ref 0–100)
MCH RBC QN AUTO: 19 PG (ref 26–34)
MCHC RBC AUTO-ENTMCNC: 26.5 G/DL (ref 30–36.5)
MCV RBC AUTO: 71.6 FL (ref 80–99)
NRBC # BLD: 0.02 K/UL (ref 0–0.01)
NRBC BLD-RTO: 0.2 PER 100 WBC
PLATELET # BLD AUTO: 286 K/UL (ref 150–400)
PMV BLD AUTO: 11.2 FL (ref 8.9–12.9)
POTASSIUM SERPL-SCNC: 5 MMOL/L (ref 3.5–5.1)
PROT SERPL-MCNC: 6.5 G/DL (ref 6.4–8.2)
RBC # BLD AUTO: 4.22 M/UL (ref 3.8–5.2)
SODIUM SERPL-SCNC: 138 MMOL/L (ref 136–145)
TRIGL SERPL-MCNC: 293 MG/DL (ref ?–150)
VLDLC SERPL CALC-MCNC: 58.6 MG/DL
WBC # BLD AUTO: 9.4 K/UL (ref 3.6–11)

## 2023-04-29 ENCOUNTER — TELEPHONE (OUTPATIENT)
Dept: FAMILY MEDICINE CLINIC | Age: 78
End: 2023-04-29

## 2023-04-29 DIAGNOSIS — R73.09 ELEVATED HEMOGLOBIN A1C: Primary | ICD-10-CM

## 2023-04-29 DIAGNOSIS — D64.9 ANEMIA, UNSPECIFIED TYPE: ICD-10-CM

## 2023-04-29 NOTE — TELEPHONE ENCOUNTER
Labs ordered for abnormal labs.     MD JAGDISH Meyer & JERRY ORDONEZ [facility]  04/29/23

## 2023-05-01 ENCOUNTER — TELEPHONE (OUTPATIENT)
Dept: FAMILY MEDICINE CLINIC | Age: 78
End: 2023-05-01

## 2023-05-01 NOTE — TELEPHONE ENCOUNTER
Patient noting a bruise runs on the back side of the right leg from below the knee up to the hip. Notes looks similar to blood clot that she had years ago, but patient is on anticoagulation at this time. Denies any trauma. Will get labs and if possible look at leg tomorrow.     MD JAGDISH Benites & JERRY ORDONEZ [facility]  05/01/23

## 2023-05-01 NOTE — TELEPHONE ENCOUNTER
Want to discuss the lab results and also the bruising on the back of her legs. She does not know where they are coming from.

## 2023-05-03 ENCOUNTER — PATIENT OUTREACH (OUTPATIENT)
Dept: CASE MANAGEMENT | Age: 78
End: 2023-05-03

## 2023-05-04 ENCOUNTER — TELEPHONE (OUTPATIENT)
Dept: FAMILY MEDICINE CLINIC | Age: 78
End: 2023-05-04

## 2023-05-04 ENCOUNTER — OFFICE VISIT (OUTPATIENT)
Dept: FAMILY MEDICINE CLINIC | Age: 78
End: 2023-05-04
Payer: MEDICARE

## 2023-05-04 VITALS
TEMPERATURE: 97 F | OXYGEN SATURATION: 99 % | RESPIRATION RATE: 16 BRPM | HEART RATE: 87 BPM | DIASTOLIC BLOOD PRESSURE: 52 MMHG | BODY MASS INDEX: 34.12 KG/M2 | WEIGHT: 192.6 LBS | SYSTOLIC BLOOD PRESSURE: 128 MMHG | HEIGHT: 63 IN

## 2023-05-04 DIAGNOSIS — R19.5 POSITIVE FECAL OCCULT BLOOD TEST: ICD-10-CM

## 2023-05-04 DIAGNOSIS — D64.9 ANEMIA, UNSPECIFIED TYPE: Primary | ICD-10-CM

## 2023-05-04 DIAGNOSIS — Z86.718 HISTORY OF DVT (DEEP VEIN THROMBOSIS): Chronic | ICD-10-CM

## 2023-05-04 PROBLEM — I82.402 ACUTE DEEP VEIN THROMBOSIS (DVT) OF LEFT LOWER EXTREMITY (HCC): Status: RESOLVED | Noted: 2019-02-07 | Resolved: 2023-05-04

## 2023-05-04 PROCEDURE — 3078F DIAST BP <80 MM HG: CPT | Performed by: FAMILY MEDICINE

## 2023-05-04 PROCEDURE — 99214 OFFICE O/P EST MOD 30 MIN: CPT | Performed by: FAMILY MEDICINE

## 2023-05-04 PROCEDURE — G8427 DOCREV CUR MEDS BY ELIG CLIN: HCPCS | Performed by: FAMILY MEDICINE

## 2023-05-04 PROCEDURE — G8417 CALC BMI ABV UP PARAM F/U: HCPCS | Performed by: FAMILY MEDICINE

## 2023-05-04 PROCEDURE — G8399 PT W/DXA RESULTS DOCUMENT: HCPCS | Performed by: FAMILY MEDICINE

## 2023-05-04 PROCEDURE — G8510 SCR DEP NEG, NO PLAN REQD: HCPCS | Performed by: FAMILY MEDICINE

## 2023-05-04 PROCEDURE — 3074F SYST BP LT 130 MM HG: CPT | Performed by: FAMILY MEDICINE

## 2023-05-04 PROCEDURE — G8536 NO DOC ELDER MAL SCRN: HCPCS | Performed by: FAMILY MEDICINE

## 2023-05-04 PROCEDURE — 3017F COLORECTAL CA SCREEN DOC REV: CPT | Performed by: FAMILY MEDICINE

## 2023-05-04 PROCEDURE — 1090F PRES/ABSN URINE INCON ASSESS: CPT | Performed by: FAMILY MEDICINE

## 2023-05-04 PROCEDURE — 1101F PT FALLS ASSESS-DOCD LE1/YR: CPT | Performed by: FAMILY MEDICINE

## 2023-05-04 PROCEDURE — 1123F ACP DISCUSS/DSCN MKR DOCD: CPT | Performed by: FAMILY MEDICINE

## 2023-05-05 LAB
BACTERIA SPEC CULT: NORMAL
GRAM STN SPEC: NORMAL
SERVICE CMNT-IMP: NORMAL

## 2023-05-12 ENCOUNTER — PATIENT MESSAGE (OUTPATIENT)
Facility: CLINIC | Age: 78
End: 2023-05-12

## 2023-05-12 RX ORDER — PREDNISONE 20 MG/1
20 TABLET ORAL DAILY
Qty: 10 TABLET | Refills: 0 | Status: SHIPPED | OUTPATIENT
Start: 2023-05-12

## 2023-05-12 NOTE — TELEPHONE ENCOUNTER
From: Deangelo Martinez  To: Dr. Stephen Casarez  Sent: 5/12/2023 10:03 AM EDT  Subject: Sputum test    Have you gotten the results of the sputum test? I really want to get some antibiotics so I can stop coughing up so much. Also some prednisone so I can stop this wheezing. Since I cant get an appointment with Dr. Rosaura Galindo until August are you going to recommend that I stay off of the blood thinner until then?   Thank you

## 2023-05-22 RX ORDER — ALBUTEROL SULFATE 90 UG/1
AEROSOL, METERED RESPIRATORY (INHALATION)
Qty: 18 G | Refills: 1 | Status: SHIPPED | OUTPATIENT
Start: 2023-05-22

## 2023-06-22 ENCOUNTER — TELEPHONE (OUTPATIENT)
Facility: CLINIC | Age: 78
End: 2023-06-22

## 2023-08-29 ENCOUNTER — PATIENT MESSAGE (OUTPATIENT)
Facility: CLINIC | Age: 78
End: 2023-08-29

## 2023-08-29 DIAGNOSIS — D64.9 ANEMIA, UNSPECIFIED TYPE: ICD-10-CM

## 2023-08-29 DIAGNOSIS — E11.42 TYPE 2 DIABETES MELLITUS WITH DIABETIC POLYNEUROPATHY, WITHOUT LONG-TERM CURRENT USE OF INSULIN (HCC): ICD-10-CM

## 2023-08-29 DIAGNOSIS — E78.49 OTHER HYPERLIPIDEMIA: ICD-10-CM

## 2023-08-29 DIAGNOSIS — I10 PRIMARY HYPERTENSION: Primary | ICD-10-CM

## 2023-08-29 DIAGNOSIS — E11.42 DIABETIC POLYNEUROPATHY ASSOCIATED WITH TYPE 2 DIABETES MELLITUS (HCC): ICD-10-CM

## 2023-09-06 ENCOUNTER — NURSE ONLY (OUTPATIENT)
Facility: CLINIC | Age: 78
End: 2023-09-06

## 2023-09-06 DIAGNOSIS — E11.42 TYPE 2 DIABETES MELLITUS WITH DIABETIC POLYNEUROPATHY, WITHOUT LONG-TERM CURRENT USE OF INSULIN (HCC): ICD-10-CM

## 2023-09-06 DIAGNOSIS — I10 PRIMARY HYPERTENSION: ICD-10-CM

## 2023-09-06 DIAGNOSIS — E78.49 OTHER HYPERLIPIDEMIA: ICD-10-CM

## 2023-09-06 DIAGNOSIS — E11.42 DIABETIC POLYNEUROPATHY ASSOCIATED WITH TYPE 2 DIABETES MELLITUS (HCC): ICD-10-CM

## 2023-09-06 DIAGNOSIS — D64.9 ANEMIA, UNSPECIFIED TYPE: ICD-10-CM

## 2023-09-07 LAB
ALBUMIN SERPL-MCNC: 3.6 G/DL (ref 3.5–5)
ALBUMIN/GLOB SERPL: 1.1 (ref 1.1–2.2)
ALP SERPL-CCNC: 74 U/L (ref 45–117)
ALT SERPL-CCNC: 20 U/L (ref 12–78)
ANION GAP SERPL CALC-SCNC: 7 MMOL/L (ref 5–15)
AST SERPL-CCNC: 12 U/L (ref 15–37)
BILIRUB SERPL-MCNC: 0.3 MG/DL (ref 0.2–1)
BUN SERPL-MCNC: 21 MG/DL (ref 6–20)
BUN/CREAT SERPL: 19 (ref 12–20)
CALCIUM SERPL-MCNC: 8.7 MG/DL (ref 8.5–10.1)
CHLORIDE SERPL-SCNC: 108 MMOL/L (ref 97–108)
CHOLEST SERPL-MCNC: 177 MG/DL
CO2 SERPL-SCNC: 26 MMOL/L (ref 21–32)
CREAT SERPL-MCNC: 1.09 MG/DL (ref 0.55–1.02)
ERYTHROCYTE [DISTWIDTH] IN BLOOD BY AUTOMATED COUNT: 26.3 % (ref 11.5–14.5)
EST. AVERAGE GLUCOSE BLD GHB EST-MCNC: 163 MG/DL
FERRITIN SERPL-MCNC: 19 NG/ML (ref 8–252)
GLOBULIN SER CALC-MCNC: 3.3 G/DL (ref 2–4)
GLUCOSE SERPL-MCNC: 154 MG/DL (ref 65–100)
HBA1C MFR BLD: 7.3 % (ref 4–5.6)
HCT VFR BLD AUTO: 42.1 % (ref 35–47)
HDLC SERPL-MCNC: 56 MG/DL
HDLC SERPL: 3.2 (ref 0–5)
HGB BLD-MCNC: 11.3 G/DL (ref 11.5–16)
IRON SATN MFR SERPL: 36 % (ref 20–50)
IRON SERPL-MCNC: 125 UG/DL (ref 35–150)
LDLC SERPL CALC-MCNC: 75.4 MG/DL (ref 0–100)
MCH RBC QN AUTO: 20.9 PG (ref 26–34)
MCHC RBC AUTO-ENTMCNC: 26.8 G/DL (ref 30–36.5)
MCV RBC AUTO: 78 FL (ref 80–99)
NRBC # BLD: 0 K/UL (ref 0–0.01)
NRBC BLD-RTO: 0 PER 100 WBC
PLATELET # BLD AUTO: 273 K/UL (ref 150–400)
POTASSIUM SERPL-SCNC: 4.4 MMOL/L (ref 3.5–5.1)
PROT SERPL-MCNC: 6.9 G/DL (ref 6.4–8.2)
RBC # BLD AUTO: 5.4 M/UL (ref 3.8–5.2)
SODIUM SERPL-SCNC: 141 MMOL/L (ref 136–145)
TIBC SERPL-MCNC: 351 UG/DL (ref 250–450)
TRIGL SERPL-MCNC: 228 MG/DL
VIT B12 SERPL-MCNC: 352 PG/ML (ref 193–986)
VLDLC SERPL CALC-MCNC: 45.6 MG/DL
WBC # BLD AUTO: 9.5 K/UL (ref 3.6–11)

## 2023-10-05 RX ORDER — LOVASTATIN 10 MG/1
TABLET ORAL
Qty: 90 TABLET | Refills: 1 | Status: SHIPPED | OUTPATIENT
Start: 2023-10-05

## 2023-10-23 ENCOUNTER — OFFICE VISIT (OUTPATIENT)
Facility: CLINIC | Age: 78
End: 2023-10-23
Payer: MEDICARE

## 2023-10-23 VITALS
HEIGHT: 63 IN | BODY MASS INDEX: 33.88 KG/M2 | WEIGHT: 191.2 LBS | HEART RATE: 77 BPM | RESPIRATION RATE: 16 BRPM | TEMPERATURE: 97.3 F | OXYGEN SATURATION: 99 % | DIASTOLIC BLOOD PRESSURE: 74 MMHG | SYSTOLIC BLOOD PRESSURE: 136 MMHG

## 2023-10-23 DIAGNOSIS — D64.9 ANEMIA, UNSPECIFIED TYPE: Primary | ICD-10-CM

## 2023-10-23 DIAGNOSIS — K62.5 RECTAL BLEEDING: ICD-10-CM

## 2023-10-23 DIAGNOSIS — Z01.818 PRE-OP TESTING: ICD-10-CM

## 2023-10-23 PROCEDURE — 3078F DIAST BP <80 MM HG: CPT | Performed by: FAMILY MEDICINE

## 2023-10-23 PROCEDURE — 93000 ELECTROCARDIOGRAM COMPLETE: CPT | Performed by: FAMILY MEDICINE

## 2023-10-23 PROCEDURE — 3075F SYST BP GE 130 - 139MM HG: CPT | Performed by: FAMILY MEDICINE

## 2023-10-23 PROCEDURE — 1123F ACP DISCUSS/DSCN MKR DOCD: CPT | Performed by: FAMILY MEDICINE

## 2023-10-23 PROCEDURE — 99212 OFFICE O/P EST SF 10 MIN: CPT | Performed by: FAMILY MEDICINE

## 2023-10-23 RX ORDER — PREDNISONE 5 MG/1
5 TABLET ORAL
COMMUNITY
Start: 2023-10-04

## 2023-10-23 SDOH — ECONOMIC STABILITY: FOOD INSECURITY: WITHIN THE PAST 12 MONTHS, YOU WORRIED THAT YOUR FOOD WOULD RUN OUT BEFORE YOU GOT MONEY TO BUY MORE.: NEVER TRUE

## 2023-10-23 SDOH — ECONOMIC STABILITY: HOUSING INSECURITY
IN THE LAST 12 MONTHS, WAS THERE A TIME WHEN YOU DID NOT HAVE A STEADY PLACE TO SLEEP OR SLEPT IN A SHELTER (INCLUDING NOW)?: NO

## 2023-10-23 SDOH — ECONOMIC STABILITY: INCOME INSECURITY: HOW HARD IS IT FOR YOU TO PAY FOR THE VERY BASICS LIKE FOOD, HOUSING, MEDICAL CARE, AND HEATING?: NOT HARD AT ALL

## 2023-10-23 NOTE — PROGRESS NOTES
1. \"Have you been to the ER, urgent care clinic since your last visit? Hospitalized since your last visit? \" no    2. \"Have you seen or consulted any other health care providers outside of the [address] [place] [address] since your last visit? \"  [facility]        Health Maintenance Due   Topic Date Due    Low dose CT lung screening &/or counseling  Never done    DEXA (modify frequency per FRAX score)  Never done    Hepatitis B vaccine (1 of 3 - Risk 3-dose series) Never done    DTaP/Tdap/Td vaccine (1 - Tdap) 10/12/2011    Diabetic retinal exam  01/07/2021    COVID-19 Vaccine (3 - Moderna series) 05/25/2021    Flu vaccine (1) 08/01/2023
Drug use: No    Sexual activity: Not on file   Other Topics Concern    Not on file   Social History Narrative    Not on file     Social Determinants of Health     Financial Resource Strain: Low Risk  (10/23/2023)    Overall Financial Resource Strain (CARDIA)     Difficulty of Paying Living Expenses: Not hard at all   Food Insecurity: Unknown (10/23/2023)    Hunger Vital Sign     Worried About Running Out of Food in the Last Year: Never true     Ran Out of Food in the Last Year: Not on file   Transportation Needs: No Transportation Needs (10/23/2023)    PRAPARE - Transportation     Lack of Transportation (Medical): No     Lack of Transportation (Non-Medical): No   Physical Activity: Inactive (2/17/2023)    Exercise Vital Sign     Days of Exercise per Week: 0 days     Minutes of Exercise per Session: 0 min   Stress: No Stress Concern Present (2/17/2023)    [facility]     Feeling of Stress :  Only a little   Social Connections: Socially Isolated (2/17/2023)    Social Connection and Isolation Panel [NHANES]     Frequency of Communication with Friends and Family: More than three times a week     Frequency of Social Gatherings with Friends and Family: Once a week     Attends Mandaeism Services: Never     Active Member of Clubs or Organizations: No     Attends Club or Organization Meetings: Never     Marital Status:    Intimate Partner Violence: Not At Risk (2/17/2023)    Humiliation, Afraid, Rape, and Kick questionnaire     Fear of Current or Ex-Partner: No     Emotionally Abused: No     Physically Abused: No     Sexually Abused: No   Housing Stability: Low Risk  (10/23/2023)    Housing Stability Vital Sign     Unable to Pay for Housing in the Last Year: No     Number of Places Lived in the Last Year: 1     Unstable Housing in the Last Year: No       Family History   Problem Relation Age of Onset    Heart Attack Father     Diabetes Mother

## 2023-10-31 NOTE — PERIOP NOTE
Attempted to call patient for precall instructions for procedure with Dr. Lj Horne. Voicemail box reached. Left detailed message to follow prep instructions, clear liquid diet today, the need to have a  here the whole time and time of arrival of 1100 and to come to the second floor registration desk to register. Our # left if any questions.

## 2023-11-01 ENCOUNTER — HOSPITAL ENCOUNTER (OUTPATIENT)
Facility: HOSPITAL | Age: 78
Setting detail: OUTPATIENT SURGERY
Discharge: HOME | End: 2023-11-01
Attending: INTERNAL MEDICINE | Admitting: INTERNAL MEDICINE
Payer: MEDICARE

## 2023-11-01 ENCOUNTER — ANESTHESIA (OUTPATIENT)
Facility: HOSPITAL | Age: 78
End: 2023-11-01
Payer: MEDICARE

## 2023-11-01 ENCOUNTER — ANESTHESIA EVENT (OUTPATIENT)
Facility: HOSPITAL | Age: 78
End: 2023-11-01
Payer: MEDICARE

## 2023-11-01 VITALS
RESPIRATION RATE: 20 BRPM | HEART RATE: 72 BPM | BODY MASS INDEX: 33.36 KG/M2 | OXYGEN SATURATION: 96 % | WEIGHT: 188.27 LBS | SYSTOLIC BLOOD PRESSURE: 166 MMHG | HEIGHT: 63 IN | DIASTOLIC BLOOD PRESSURE: 77 MMHG | TEMPERATURE: 98.4 F

## 2023-11-01 LAB
GLUCOSE BLD STRIP.AUTO-MCNC: 147 MG/DL (ref 65–117)
SERVICE CMNT-IMP: ABNORMAL

## 2023-11-01 PROCEDURE — 3600007512: Performed by: INTERNAL MEDICINE

## 2023-11-01 PROCEDURE — 88342 IMHCHEM/IMCYTCHM 1ST ANTB: CPT

## 2023-11-01 PROCEDURE — 3600007502: Performed by: INTERNAL MEDICINE

## 2023-11-01 PROCEDURE — 88305 TISSUE EXAM BY PATHOLOGIST: CPT

## 2023-11-01 PROCEDURE — 2709999900 HC NON-CHARGEABLE SUPPLY: Performed by: INTERNAL MEDICINE

## 2023-11-01 PROCEDURE — 7100000010 HC PHASE II RECOVERY - FIRST 15 MIN: Performed by: INTERNAL MEDICINE

## 2023-11-01 PROCEDURE — 6360000002 HC RX W HCPCS: Performed by: NURSE ANESTHETIST, CERTIFIED REGISTERED

## 2023-11-01 PROCEDURE — C1726 CATH, BAL DIL, NON-VASCULAR: HCPCS | Performed by: INTERNAL MEDICINE

## 2023-11-01 PROCEDURE — 3700000001 HC ADD 15 MINUTES (ANESTHESIA): Performed by: INTERNAL MEDICINE

## 2023-11-01 PROCEDURE — 82962 GLUCOSE BLOOD TEST: CPT

## 2023-11-01 PROCEDURE — 2500000003 HC RX 250 WO HCPCS: Performed by: NURSE ANESTHETIST, CERTIFIED REGISTERED

## 2023-11-01 PROCEDURE — 7100000011 HC PHASE II RECOVERY - ADDTL 15 MIN: Performed by: INTERNAL MEDICINE

## 2023-11-01 PROCEDURE — 2580000003 HC RX 258: Performed by: NURSE ANESTHETIST, CERTIFIED REGISTERED

## 2023-11-01 PROCEDURE — 3700000000 HC ANESTHESIA ATTENDED CARE: Performed by: INTERNAL MEDICINE

## 2023-11-01 PROCEDURE — 6360000002 HC RX W HCPCS

## 2023-11-01 RX ORDER — PROPOFOL 10 MG/ML
INJECTION, EMULSION INTRAVENOUS CONTINUOUS PRN
Status: DISCONTINUED | OUTPATIENT
Start: 2023-11-01 | End: 2023-11-01 | Stop reason: SDUPTHER

## 2023-11-01 RX ORDER — SODIUM CHLORIDE 0.9 % (FLUSH) 0.9 %
5-40 SYRINGE (ML) INJECTION PRN
Status: DISCONTINUED | OUTPATIENT
Start: 2023-11-01 | End: 2023-11-01 | Stop reason: HOSPADM

## 2023-11-01 RX ORDER — SODIUM CHLORIDE 0.9 % (FLUSH) 0.9 %
5-40 SYRINGE (ML) INJECTION EVERY 12 HOURS SCHEDULED
Status: DISCONTINUED | OUTPATIENT
Start: 2023-11-01 | End: 2023-11-01 | Stop reason: HOSPADM

## 2023-11-01 RX ORDER — SODIUM CHLORIDE 9 MG/ML
25 INJECTION, SOLUTION INTRAVENOUS PRN
Status: DISCONTINUED | OUTPATIENT
Start: 2023-11-01 | End: 2023-11-01 | Stop reason: HOSPADM

## 2023-11-01 RX ORDER — LABETALOL HYDROCHLORIDE 5 MG/ML
INJECTION, SOLUTION INTRAVENOUS
Status: COMPLETED
Start: 2023-11-01 | End: 2023-11-01

## 2023-11-01 RX ORDER — 0.9 % SODIUM CHLORIDE 0.9 %
INTRAVENOUS SOLUTION INTRAVENOUS PRN
Status: DISCONTINUED | OUTPATIENT
Start: 2023-11-01 | End: 2023-11-01 | Stop reason: SDUPTHER

## 2023-11-01 RX ORDER — LABETALOL HYDROCHLORIDE 5 MG/ML
10 INJECTION, SOLUTION INTRAVENOUS EVERY 10 MIN PRN
Status: DISCONTINUED | OUTPATIENT
Start: 2023-11-01 | End: 2023-11-01 | Stop reason: HOSPADM

## 2023-11-01 RX ADMIN — LABETALOL HYDROCHLORIDE 10 MG: 5 INJECTION, SOLUTION INTRAVENOUS at 15:01

## 2023-11-01 RX ADMIN — PROPOFOL 400 MCG/KG/MIN: 10 INJECTION, EMULSION INTRAVENOUS at 13:33

## 2023-11-01 RX ADMIN — LIDOCAINE HYDROCHLORIDE 40 MG: 20 INJECTION, SOLUTION INFILTRATION; PERINEURAL at 13:31

## 2023-11-01 RX ADMIN — SODIUM CHLORIDE 100 ML: 9 INJECTION, SOLUTION INTRAVENOUS at 13:25

## 2023-11-01 RX ADMIN — LIDOCAINE HYDROCHLORIDE 60 MG: 20 INJECTION, SOLUTION INFILTRATION; PERINEURAL at 13:28

## 2023-11-01 ASSESSMENT — PAIN - FUNCTIONAL ASSESSMENT: PAIN_FUNCTIONAL_ASSESSMENT: 0-10

## 2023-11-01 NOTE — PROCEDURES
Raffi Leung M.D.  [phone]            2023          Colonoscopy Operative Report  Mariah Cifuentes  :  1945  [facility] Medical Record Number:  [id number]      Indications:   ANDRE      :  Amor Tai MD    Assistant -- None  Implants -- None    Referring Provider: Anais Montgomery MD    Sedation:  MAC anesthesia Propofol    Pre-Procedural Exam:      Airway: clear,  No airway problems anticipated  Heart: RRR, without gallops or rubs  Lungs: clear bilaterally without wheezes, crackles, or rhonchi  Abdomen: soft, nontender, nondistended, bowel sounds present  Mental Status: awake, alert and oriented to person, place and time     Procedure Details:  After informed consent was obtained with all risks and benefits of procedure explained and preoperative exam completed, the patient was taken to the endoscopy suite and placed in the left lateral decubitus position. Upon sequential sedation as per above, a digital rectal exam was performed. The Olympus videocolonoscope  was inserted in the rectum and carefully advanced to the terminal ileum. The quality of preparation was good. The colonoscope was slowly withdrawn with careful inspection and evaluation between folds. Retroflexion in the rectum was performed. Findings:   Terminal Ileum: normal  Cecum: normal  Ascending Colon: normal  Transverse Colon: 1  Sessile polyp(s), the largest 8 mm in size; Descending Colon:     -Diverticulosis  Sigmoid:     -Diverticulosis  Rectum: normal    Interventions:  1 complete polypectomy were performed using cold snare  and the polyps were  retrieved    Specimen Removed:  specimen #1, 8 mm in size, located in the transverse colon removed by cold snare and retrieved for pathology    Complications: None. EBL:  None.     Impression:  1 polyp removed as above                        Moderate left colonic diverticulosis noted    Recommendations:  -Await pathology.  -If

## 2023-11-01 NOTE — PROCEDURES
Petar Londono M.D.  [phone]           2023                EGD Operative Report  Surjit Acevedo  :  1945  [facility] Medical Record Number:  [id number]      Indication: Dysphagia     : Aurelio Rao MD    Assistant -- None  Implants -- None    Referring Provider:  Jesu Hernandez MD      Anesthesia/Sedation:  MAC anesthesia Propofol    Airway assessment: No airway problems anticipated    Pre-Procedural Exam:      Airway: clear, no airway problems anticipated  Heart: RRR, without gallops or rubs  Lungs: clear bilaterally without wheezes, crackles, or rhonchi  Abdomen: soft, nontender, nondistended, bowel sounds present  Mental Status: awake, alert and oriented to person, place and time       Procedure Details     After infomed consent was obtained for the procedure, with all risks and benefits of procedure explained the patient was taken to the endoscopy suite and placed in the left lateral decubitus position. Following sequential administration of sedation as per above, the endoscope was inserted into the mouth and advanced under direct vision to second portion of the duodenum. A careful inspection was made as the gastroscope was withdrawn, including a retroflexed view of the proximal stomach; findings and interventions are described below. Findings:   Esophagus:normal  Stomach: normal   Duodenum/jejunum: normal    Therapies:  esophageal dilation with 15-18mm sized balloon  biopsy of stomach  - r/o H.pylori  biopsy of duodenal - r/o celiac disease    Specimens:  as above           Complications:   None; patient tolerated the procedure well. EBL:  None. Impression:    Normal EGD      Recommendations:    -Await pathology.   -proceed with colonoscopy today as planned    Aurelio Rao MD

## 2023-11-01 NOTE — ANESTHESIA POSTPROCEDURE EVALUATION
Department of Anesthesiology  Postprocedure Note    Patient: Hung Lyn  MRN: [id number]  YOB: 1945  Date of evaluation: 11/1/2023      Procedure Summary     Date: 11/01/23 Room / Location: [facility] ENDO 03 / [facility] ENDOSCOPY    Anesthesia Start: 1324 Anesthesia Stop: 3901    Procedures:       COLONOSCOPY (Lower GI Region)      ESOPHAGOGASTRODUODENOSCOPY (Upper GI Region)      EGD DILATION BALLOON (Upper GI Region)      COLONOSCOPY POLYPECTOMY SNARE/COLD BIOPSY (Lower GI Region) Diagnosis:       Anemia, unspecified type      (Anemia, unspecified type [D64.9])    Surgeons: Cassandra Rodriguez MD Responsible Provider: Juan José Shaikh DO    Anesthesia Type: MAC ASA Status: 4          Anesthesia Type: No value filed. Ilda Phase I: Ilda Score: 10    Ilda Phase II: Ilda Score: 10      Anesthesia Post Evaluation    Patient location during evaluation: PACU  Patient participation: complete - patient participated  Level of consciousness: awake and alert  Pain score: 0  Airway patency: patent  Nausea & Vomiting: no vomiting and no nausea  Complications: no  Cardiovascular status: hypertensive and blood pressure returned to baseline (Typical BP response per patient)  Respiratory status: acceptable  Hydration status: stable  Comments: Patient seen and examined. Ready for discharge from PACU.   Pain management: adequate

## 2023-11-01 NOTE — DISCHARGE INSTRUCTIONS
[address] Franki Breaux M.D.  [phone]                 COLON and EGD DISCHARGE INSTRUCTIONS    2023    Greta Velez  :  1945  [facility] Medical Record Number:  [id number]      DISCOMFORT:  Sore throat- throat lozenges or warm salt water gargle  Redness at IV site- apply warm compress to area; if redness or soreness persist- contact your physician  There may be a slight amount of blood passed from the rectum  Gaseous discomfort- walking, belching will help relieve any discomfort  You may not operate a vehicle for 12 hours  You may not engage in an occupation involving machinery or appliances for rest of today  You may not drink alcoholic beverages for at least 12 hours  Avoid making any critical decisions for at least 24 hour  DIET:   regular   - however -  remember your colon is empty and a heavy meal will produce gas. Avoid these foods:  vegetables, fried / greasy foods, carbonated drinks for today     ACTIVITY:  You may  resume your normal daily activities it is recommended that you spend the remainder of the day resting -  avoid any strenuous activity and driving. CALL M.D. ANY SIGN OF:   Increasing pain, nausea, vomiting  Abdominal distension (swelling)  New increased bleeding (oral or rectal)  Fever (chills)  Pain in chest area  Bloody discharge from nose or mouth  Shortness of breath      Follow-up Instructions:   Call Dr. Martin Wilson if any questions at [phone]. Results of procedure / biopsy in 7 to 10 days, we will call you with these results.   Your endoscopy showed normal exam   Your colonoscopy showed 1 polyp and diverticulosis

## 2023-11-01 NOTE — PROGRESS NOTES
Yves Jeff  1945  [id number]    Situation:  Verbal report received from: Marcelyn Galeazzi RN   Procedure: Procedure(s):  COLONOSCOPY  ESOPHAGOGASTRODUODENOSCOPY  EGD DILATION BALLOON  COLONOSCOPY POLYPECTOMY SNARE/COLD BIOPSY    Background:    Preoperative diagnosis: Anemia, unspecified type [D64.9]  Postoperative diagnosis: * No post-op diagnosis entered *    :  Dr. Carlos Newby  Assistant(s): Circulator: Sapna Webb RN  Circulator Assist: Willaim Favre, RN    Specimens:   ID Type Source Tests Collected by Time Destination   1 : Duodenum biopsies Tissue Duodenum SURGICAL PATHOLOGY Tripp Miller MD 11/1/2023 1337    2 : Gastric bx Tissue Gastric SURGICAL PATHOLOGY Tripp Miller MD 11/1/2023 1343    3 : Transverse colon polyp Tissue Colon-Transverse SURGICAL PATHOLOGY Tripp Miller MD 11/1/2023 1350      H. Pylori  No    Assessment:    Anesthesia gave intra-procedure sedation and medications, see anesthesia flow sheet No    Intravenous fluids: NS@ KVO     Vital signs stable     Abdominal assessment: round and soft     Recommendation:  Discharge patient per MD order.   Return to floor  Family or Friend   Permission to share finding with family or friend Yes

## 2023-11-01 NOTE — H&P
Larissa Chen M.D.  [phone]            History and Physical       NAME:  Kiersten Singleton   :   1945   MRN:   [id number]       Referring Physician:  Carole Horton MD      Consult Date: 2023 1:24 PM    Chief Complaint:  ANDRE and dysphagia    History of Present Illness:  C.W. 79 y/o female presents to the office today for reflux and anemia  Recent labs with PCP () Hgb 7.8, MCV 69.4, MCH 18.5; pt was stopped on blood thinner Eliquis. Per patient she was started on Eliquis for hx. DVT (5 years ago). Last colonoscopy 2017-moderate diverticulosis, grade 2 internal hemorrhoids, repeat in 10 years    Denies overt bleeding. Denies abdominal pain. Reports BM daily sometimes every other day. Reports 1 episode of BRBPR 1 month ago, she thought was caused by her hemorrhoids. This has since resolved. Admits to feeling fatigued. Denies N/V, heartburn. Reports dysphagia with pills and solids (meats and breads), sx almost daily with pills, and 1x/week with food. She will at times need to regurgitate to get food down. She has been taking prevacid daily, however, having breakthrough symptoms daily. Denies cardiac hx. Followed by pulmonary for COPD, reports well controlled and at her baseline. Denies CP or SOB. Denies alcohol, tobacco, or drug use. Denies NSAID/Blood thinner use. Reports hx. DM-on metformin. PMH:  Past Medical History:   Diagnosis Date    Asthma     Bronchitis     Cancer ([address])     breast - left    Cerebral artery occlusion with cerebral infarction ([address])     COPD (chronic obstructive pulmonary disease) ([address])     [facility]; pulmonary nodule 7mm stable (found )    Diabetes ([address])     Hepatitis age 11    High cholesterol     History of blood transfusion     Hx of blood clots     Hypertension     Obesity (BMI 30-39. 9)     Thromboembolus ([address])     L LE with PE    TIA (transient ischemic attack) 10/27/2021

## 2023-11-01 NOTE — PROGRESS NOTES
Endoscopy discharge instructions have been reviewed and given to patient. The patient verbalized understanding and acceptance of instructions. Dr. Marshall Goss discussed with patient procedure findings and next steps.

## 2023-11-01 NOTE — ANESTHESIA PRE PROCEDURE
Department of Anesthesiology  Preprocedure Note       Name:  Edvin Sheikh   Age:  70 y.o.  :  1945                                          MRN:  [id number]         Date:  2023      Surgeon: Debbie Huynh):  Mook Arevalo MD    Procedure: Procedure(s):  COLONOSCOPY  ESOPHAGOGASTRODUODENOSCOPY    Medications prior to admission:   Prior to Admission medications    Medication Sig Start Date End Date Taking? Authorizing Provider   predniSONE (DELTASONE) 5 MG tablet 1 tablet 10/4/23   Provider, MD Lucrecia   lovastatin (MEVACOR) 10 MG tablet TAKE ONE TABLET BY MOUTH NIGHTLY 10/5/23   Nicolas Schneider MD   diclofenac sodium (VOLTAREN) 1 % GEL APPLY 1 GRAM TO AFFECTED AREA OF KNEES AND BACK 4 TIMES DAILY 23   Nicolas Schneider MD   albuterol sulfate HFA (PROVENTIL;VENTOLIN;PROAIR) 108 (90 Base) MCG/ACT inhaler INHALE 2 PUFFS BY INHALATION EVERY 4 HOURS AS NEEDED FOR WHEEZING 23   Nicolas Schneider MD   predniSONE (DELTASONE) 20 MG tablet Take 1 tablet by mouth daily  Patient not taking: Reported on 10/23/2023 5/12/23   Nicolas Schneider MD   acetaminophen (TYLENOL) 650 MG extended release tablet Take 1 tablet by mouth in the morning and 1 tablet at noon and 1 tablet in the evening.  3/12/19   Automatic Reconciliation, Ar   albuterol (PROVENTIL) (2.5 MG/3ML) 0.083% nebulizer solution Inhale 3 mLs into the lungs every 6 hours as needed 21   Automatic Reconciliation, Ar   apixaban (ELIQUIS) 5 MG TABS tablet TAKE ONE TABLET BY MOUTH 2 TIMES A DAY  Patient not taking: Reported on 10/23/2023 12/14/22   Automatic Reconciliation, Ar   carvedilol (COREG) 6.25 MG tablet TAKE ONE TABLET BY MOUTH TWICE DAILY 3/2/23   Automatic Reconciliation, Ar   cyclobenzaprine (FLEXERIL) 10 MG tablet Take 1 tablet by mouth 2 times daily 3/16/23   Automatic Reconciliation, Ar   fexofenadine (ALLEGRA) 180 MG tablet Take 1 tablet by mouth daily 11/10/22   Automatic Reconciliation, Ar   fluticasone

## 2023-11-01 NOTE — PERIOP NOTE
[id number] Patient has been evaluated by anesthesia pre-procedure. Patient alert and oriented. Vital signs will not be charted by the Endoscopy nurse. All vitals, airway, and loc are monitored by anesthesia staff John Mattson, throughout procedure. [id number]   Endoscope was pre-cleaned at bedside immediately following procedure by endo techLouLincolnHealthAntonio.    1403   Patient tolerated procedure. Abdomen soft and patient arousable and voices no complaints. Patient transported to endoscopy recovery area.    Report given to post procedure RN, Alvin Marquez

## 2023-11-15 ENCOUNTER — PATIENT MESSAGE (OUTPATIENT)
Age: 78
End: 2023-11-15

## 2023-11-15 DIAGNOSIS — M54.16 RADICULOPATHY, LUMBAR REGION: Primary | ICD-10-CM

## 2023-11-15 NOTE — TELEPHONE ENCOUNTER
From: Betty Area  To: Alysa Peters  Sent: 11/15/2023 10:32 AM EST  Subject: Pregabalin 50mg    Baldev Birch has sent requests for a prescription for my Pregabalin and have not had response from you. Would appreciate if a prescription  Could be sent as the one on file has . I am only taking one pill at night.  Thank

## 2023-11-16 DIAGNOSIS — E11.9 TYPE 2 DIABETES MELLITUS WITHOUT COMPLICATIONS (HCC): ICD-10-CM

## 2023-11-16 RX ORDER — PREGABALIN 50 MG/1
50 CAPSULE ORAL 2 TIMES DAILY
Qty: 60 CAPSULE | Refills: 3 | Status: SHIPPED | OUTPATIENT
Start: 2023-11-16 | End: 2024-03-15

## 2023-11-16 RX ORDER — CARVEDILOL 6.25 MG/1
TABLET ORAL
Qty: 180 TABLET | Refills: 1 | Status: SHIPPED | OUTPATIENT
Start: 2023-11-16

## 2023-11-16 RX ORDER — METFORMIN HYDROCHLORIDE 500 MG/1
TABLET, EXTENDED RELEASE ORAL
Qty: 360 TABLET | Refills: 1 | Status: SHIPPED | OUTPATIENT
Start: 2023-11-16

## 2024-01-03 ENCOUNTER — TELEPHONE (OUTPATIENT)
Age: 79
End: 2024-01-03

## 2024-01-03 NOTE — TELEPHONE ENCOUNTER
Patient is requesting a refill on her medication Meclizine stated she has about 10 pills left.    Follow up appointment 03/22/24

## 2024-01-04 RX ORDER — MECLIZINE HYDROCHLORIDE 25 MG/1
25 TABLET ORAL 3 TIMES DAILY PRN
Qty: 90 TABLET | Refills: 1 | Status: SHIPPED | OUTPATIENT
Start: 2024-01-04

## 2024-02-26 ENCOUNTER — PATIENT MESSAGE (OUTPATIENT)
Facility: CLINIC | Age: 79
End: 2024-02-26

## 2024-02-26 DIAGNOSIS — E78.49 OTHER HYPERLIPIDEMIA: ICD-10-CM

## 2024-02-26 DIAGNOSIS — I10 PRIMARY HYPERTENSION: Primary | ICD-10-CM

## 2024-02-26 DIAGNOSIS — E11.42 TYPE 2 DIABETES MELLITUS WITH DIABETIC POLYNEUROPATHY, WITHOUT LONG-TERM CURRENT USE OF INSULIN (HCC): ICD-10-CM

## 2024-02-26 DIAGNOSIS — N18.31 CHRONIC KIDNEY DISEASE, STAGE 3A (HCC): ICD-10-CM

## 2024-02-26 NOTE — TELEPHONE ENCOUNTER
From: Lesley Lauren  To: Dr. Juan David Zamorano  Sent: 2/26/2024 1:50 PM EST  Subject: Lab    Dr MENDOZA. I have an order from Zain at [facility] for blood work to check for Iron deficiency anemia. I know I need an A1C and anything else that you want to check if you can put the order for the lab and I’ll come in one day this week and get it done, then I will schedule an appointment with you. Thanks

## 2024-02-28 ENCOUNTER — NURSE ONLY (OUTPATIENT)
Facility: CLINIC | Age: 79
End: 2024-02-28

## 2024-02-28 DIAGNOSIS — I10 PRIMARY HYPERTENSION: ICD-10-CM

## 2024-02-28 DIAGNOSIS — E78.49 OTHER HYPERLIPIDEMIA: ICD-10-CM

## 2024-02-28 DIAGNOSIS — E11.42 TYPE 2 DIABETES MELLITUS WITH DIABETIC POLYNEUROPATHY, WITHOUT LONG-TERM CURRENT USE OF INSULIN (HCC): ICD-10-CM

## 2024-02-28 DIAGNOSIS — N18.31 CHRONIC KIDNEY DISEASE, STAGE 3A (HCC): ICD-10-CM

## 2024-02-28 LAB
COMMENT:: NORMAL
SPECIMEN HOLD: NORMAL

## 2024-02-29 LAB
25(OH)D3 SERPL-MCNC: 46.8 NG/ML (ref 30–100)
CHOLEST SERPL-MCNC: 178 MG/DL
EST. AVERAGE GLUCOSE BLD GHB EST-MCNC: 263 MG/DL
HBA1C MFR BLD: 10.8 % (ref 4–5.6)
HDLC SERPL-MCNC: 49 MG/DL
HDLC SERPL: 3.6 (ref 0–5)
LDLC SERPL CALC-MCNC: 67.4 MG/DL (ref 0–100)
TRIGL SERPL-MCNC: 308 MG/DL
VIT B12 SERPL-MCNC: 635 PG/ML (ref 193–986)
VLDLC SERPL CALC-MCNC: 61.6 MG/DL

## 2024-03-08 ENCOUNTER — OFFICE VISIT (OUTPATIENT)
Facility: CLINIC | Age: 79
End: 2024-03-08

## 2024-03-08 VITALS
HEIGHT: 63 IN | TEMPERATURE: 98.4 F | HEART RATE: 50 BPM | SYSTOLIC BLOOD PRESSURE: 134 MMHG | WEIGHT: 182 LBS | RESPIRATION RATE: 20 BRPM | DIASTOLIC BLOOD PRESSURE: 73 MMHG | BODY MASS INDEX: 32.25 KG/M2 | OXYGEN SATURATION: 94 %

## 2024-03-08 DIAGNOSIS — I10 PRIMARY HYPERTENSION: ICD-10-CM

## 2024-03-08 DIAGNOSIS — J44.1 CHRONIC OBSTRUCTIVE PULMONARY DISEASE WITH (ACUTE) EXACERBATION (HCC): Primary | ICD-10-CM

## 2024-03-08 DIAGNOSIS — R05.8 PRODUCTIVE COUGH: ICD-10-CM

## 2024-03-08 LAB
INFLUENZA A ANTIGEN, POC: NEGATIVE
INFLUENZA B ANTIGEN, POC: NEGATIVE
VALID INTERNAL CONTROL, POC: YES

## 2024-03-08 RX ORDER — DOXYCYCLINE HYCLATE 100 MG
100 TABLET ORAL 2 TIMES DAILY
Qty: 10 TABLET | Refills: 0 | Status: SHIPPED | OUTPATIENT
Start: 2024-03-08 | End: 2024-03-13

## 2024-03-08 RX ORDER — GUAIFENESIN/DEXTROMETHORPHAN 100-10MG/5
5 SYRUP ORAL 3 TIMES DAILY PRN
Qty: 120 ML | Refills: 0 | Status: SHIPPED | OUTPATIENT
Start: 2024-03-08 | End: 2024-03-18

## 2024-03-08 RX ORDER — PREDNISONE 50 MG/1
50 TABLET ORAL DAILY
Qty: 5 TABLET | Refills: 0 | Status: SHIPPED | OUTPATIENT
Start: 2024-03-08 | End: 2024-03-13

## 2024-03-08 ASSESSMENT — PATIENT HEALTH QUESTIONNAIRE - PHQ9
SUM OF ALL RESPONSES TO PHQ QUESTIONS 1-9: 0
1. LITTLE INTEREST OR PLEASURE IN DOING THINGS: 0

## 2024-03-08 NOTE — PROGRESS NOTES
Chief Complaint   Patient presents with    Cough     Sneezing. Productive yellow sputum. Denies fever.        \"Have you been to the ER, urgent care clinic since your last visit?  Hospitalized since your last visit?\"    NO    “Have you seen or consulted any other health care providers outside of [facility] since your last visit?”    [facility] 3/5/34- covid and flu negative.             Health Maintenance Due   Topic Date Due    DEXA (modify frequency per FRAX score)  Never done    Respiratory Syncytial Virus (RSV) Pregnant or age 60 yrs+ (1 - 1-dose 60+ series) Never done    DTaP/Tdap/Td vaccine (1 - Tdap) 10/12/2011    Diabetic retinal exam  01/07/2021    COVID-19 Vaccine (3 - 2023-24 season) 09/01/2023    Annual Wellness Visit (Medicare Advantage)  01/01/2024    Diabetic foot exam  02/15/2024

## 2024-03-08 NOTE — PROGRESS NOTES
Chief Complaint   Patient presents with    Cough     Sneezing. Productive yellow sputum. Denies fever.        History of Present Illness:  Lesley Lauren is a 78 y.o. female w/ PMHx of COPD, asthma, CKD, CVA, breast cancer, DM2, HTN, HLD, PE, obesity who presents to clinic for evaluation of cough.  - Symptoms started 4-5 days ago.  - Cough. Productive of clear to dark yellow sputum.  - Rhinorrhea.  - A little wheezing.  - Denies SOB, fevers.  - Eating and drinking.  - Was seen by by outside provider four days ago. Did not receive any treatments.  - Taking Trelegy once a day. Has not used albuterol inhaler.  - Not currently taking any cough and cold medicine.    Asthma / COPD:  - Pt f/b [facility].  - Prescribed Trelegy Ellipta as controller, albuterol PRN, and Singulair.  - Pt on chronic prednisone therapy per pulmonologist (5 mg daily). Notes decreased in GEETHA use on this therapy.    Hx of PE:  - Pt reports that she stopped Eliquis in the past due to concern for GI bleed. Pt states colonoscopy was negative. Pt states that she has not gone back on blood thinner.  - F/b Dr. Lerner.    CKD:  - Last creatinine 1.09 (eGFR 53) on 9/6/23.      Past Medical History:   Diagnosis Date    Asthma     Bronchitis     Cancer (HCC)     breast - left    Cerebral artery occlusion with cerebral infarction (HCC)     COPD (chronic obstructive pulmonary disease) (HCC)     [facility]; pulmonary nodule 7mm stable (found 2011)    Diabetes (HCC)     Hepatitis age 9    High cholesterol     History of blood transfusion     Hx of blood clots     Hypertension     Obesity (BMI 30-39.9)     Thromboembolus (HCC)     L LE with PE    TIA (transient ischemic attack) 10/27/2021       Current Outpatient Medications   Medication Sig Dispense Refill    predniSONE (DELTASONE) 50 MG tablet Take 1 tablet by mouth daily for 5 days 5 tablet 0    doxycycline hyclate (VIBRA-TABS) 100 MG tablet Take 1 tablet by

## 2024-03-12 ENCOUNTER — PATIENT MESSAGE (OUTPATIENT)
Facility: CLINIC | Age: 79
End: 2024-03-12

## 2024-03-12 DIAGNOSIS — T36.95XD: Primary | ICD-10-CM

## 2024-03-12 LAB — SARS-COV-2 RNA RESP QL NAA+PROBE: NOT DETECTED

## 2024-03-12 RX ORDER — FLUCONAZOLE 150 MG/1
150 TABLET ORAL ONCE
Qty: 1 TABLET | Refills: 0 | Status: SHIPPED | OUTPATIENT
Start: 2024-03-12 | End: 2024-03-12

## 2024-03-13 NOTE — TELEPHONE ENCOUNTER
Received [employer] message.    Prescription for Diflucan sent to pharmacy.    [employer] message sent.

## 2024-03-14 NOTE — PROGRESS NOTES
1. Have you been to the ER, urgent care clinic since your last visit? Hospitalized since your last visit? No    2. Have you seen or consulted any other health care providers outside of the [address] [place] [address] since your last visit? Include any pap smears or colon screening. No  Reviewed record in preparation for visit and have necessary documentation  Pt did not bring medication to office visit for review  opportunity was given for questions      3. For patients aged 39-70: Has the patient had a colonoscopy / FIT/ Cologuard? Yes - no Care Gap present      If the patient is female:    4. For patients aged 41-77: Has the patient had a mammogram within the past 2 years? NA - based on age or sex      11. For patients aged 21-65: Has the patient had a pap smear?  NA - based on age or sex      Goals that were addressed and/or need to be completed during or after this appointment include   Health Maintenance Due   Topic Date Due    Low dose CT lung screening  Never done    DTaP/Tdap/Td series (1 - Tdap) 10/12/2011    Eye Exam Retinal or Dilated  01/07/2021    COVID-19 Vaccine (3 - Booster for Moderna series) 05/25/2021    Flu Vaccine (1) 08/01/2022    Foot Exam Q1  11/11/2022    A1C test (Diabetic or Prediabetic)  01/18/2023 Tetracycline Counseling: Patient counseled regarding possible photosensitivity and increased risk for sunburn.  Patient instructed to avoid sunlight, if possible.  When exposed to sunlight, patients should wear protective clothing, sunglasses, and sunscreen.  The patient was instructed to call the office immediately if the following severe adverse effects occur:  hearing changes, easy bruising/bleeding, severe headache, or vision changes.  The patient verbalized understanding of the proper use and possible adverse effects of tetracycline.  All of the patient's questions and concerns were addressed. Patient understands to avoid pregnancy while on therapy due to potential birth defects. Tazorac Pregnancy And Lactation Text: This medication is not safe during pregnancy. It is unknown if this medication is excreted in breast milk. Tetracycline Pregnancy And Lactation Text: This medication is Pregnancy Category D and not consider safe during pregnancy. It is also excreted in breast milk. Erythromycin Pregnancy And Lactation Text: This medication is Pregnancy Category B and is considered safe during pregnancy. It is also excreted in breast milk. Bactrim Counseling:  I discussed with the patient the risks of sulfa antibiotics including but not limited to GI upset, allergic reaction, drug rash, diarrhea, dizziness, photosensitivity, and yeast infections.  Rarely, more serious reactions can occur including but not limited to aplastic anemia, agranulocytosis, methemoglobinemia, blood dyscrasias, liver or kidney failure, lung infiltrates or desquamative/blistering drug rashes. Cleanser Recommendations: CeraVe creamy wash with BP. Azithromycin Pregnancy And Lactation Text: This medication is considered safe during pregnancy and is also secreted in breast milk. Topical Clindamycin Pregnancy And Lactation Text: This medication is Pregnancy Category B and is considered safe during pregnancy. It is unknown if it is excreted in breast milk. Isotretinoin Counseling: Patient should get monthly blood tests, not donate blood, not drive at night if vision affected, not share medication, and not undergo elective surgery for 6 months after tx completed. Side effects reviewed, pt to contact office should one occur. Dapsone Counseling: I discussed with the patient the risks of dapsone including but not limited to hemolytic anemia, agranulocytosis, rashes, methemoglobinemia, kidney failure, peripheral neuropathy, headaches, GI upset, and liver toxicity.  Patients who start dapsone require monitoring including baseline LFTs and weekly CBCs for the first month, then every month thereafter.  The patient verbalized understanding of the proper use and possible adverse effects of dapsone.  All of the patient's questions and concerns were addressed. Include Pregnancy/Lactation Warning?: No Tazorac Counseling:  Patient advised that medication is irritating and drying.  Patient may need to apply sparingly and wash off after an hour before eventually leaving it on overnight.  The patient verbalized understanding of the proper use and possible adverse effects of tazorac.  All of the patient's questions and concerns were addressed. Erythromycin Counseling:  I discussed with the patient the risks of erythromycin including but not limited to GI upset, allergic reaction, drug rash, diarrhea, increase in liver enzymes, and yeast infections. Spironolactone Pregnancy And Lactation Text: This medication can cause feminization of the male fetus and should be avoided during pregnancy. The active metabolite is also found in breast milk. High Dose Vitamin A Pregnancy And Lactation Text: High dose vitamin A therapy is contraindicated during pregnancy and breast feeding. Azelaic Acid Counseling: Patient counseled that medicine may cause skin irritation and to avoid applying near the eyes.  In the event of skin irritation, the patient was advised to reduce the amount of the drug applied or use it less frequently.   The patient verbalized understanding of the proper use and possible adverse effects of azelaic acid.  All of the patient's questions and concerns were addressed. Benzoyl Peroxide Pregnancy And Lactation Text: This medication is Pregnancy Category C. It is unknown if benzoyl peroxide is excreted in breast milk. Doxycycline Counseling:  Patient counseled regarding possible photosensitivity and increased risk for sunburn.  Patient instructed to avoid sunlight, if possible.  When exposed to sunlight, patients should wear protective clothing, sunglasses, and sunscreen.  The patient was instructed to call the office immediately if the following severe adverse effects occur:  hearing changes, easy bruising/bleeding, severe headache, or vision changes.  The patient verbalized understanding of the proper use and possible adverse effects of doxycycline.  All of the patient's questions and concerns were addressed. Doxycycline Pregnancy And Lactation Text: This medication is Pregnancy Category D and not consider safe during pregnancy. It is also excreted in breast milk but is considered safe for shorter treatment courses. Benzoyl Peroxide Counseling: Patient counseled that medicine may cause skin irritation and bleach clothing.  In the event of skin irritation, the patient was advised to reduce the amount of the drug applied or use it less frequently.   The patient verbalized understanding of the proper use and possible adverse effects of benzoyl peroxide.  All of the patient's questions and concerns were addressed. Topical Sulfur Applications Pregnancy And Lactation Text: This medication is Pregnancy Category C and has an unknown safety profile during pregnancy. It is unknown if this topical medication is excreted in breast milk. Isotretinoin Pregnancy And Lactation Text: This medication is Pregnancy Category X and is considered extremely dangerous during pregnancy. It is unknown if it is excreted in breast milk. Winlevi Counseling:  I discussed with the patient the risks of topical clascoterone including but not limited to erythema, scaling, itching, and stinging. Patient voiced their understanding. Spironolactone Counseling: Patient advised regarding risks of diarrhea, abdominal pain, hyperkalemia, birth defects (for female patients), liver toxicity and renal toxicity. The patient may need blood work to monitor liver and kidney function and potassium levels while on therapy. The patient verbalized understanding of the proper use and possible adverse effects of spironolactone.  All of the patient's questions and concerns were addressed. Azelaic Acid Pregnancy And Lactation Text: This medication is considered safe during pregnancy and breast feeding. Winlevi Pregnancy And Lactation Text: This medication is considered safe during pregnancy and breastfeeding. High Dose Vitamin A Counseling: Side effects reviewed, pt to contact office should one occur. Topical Clindamycin Counseling: Patient counseled that this medication may cause skin irritation or allergic reactions.  In the event of skin irritation, the patient was advised to reduce the amount of the drug applied or use it less frequently.   The patient verbalized understanding of the proper use and possible adverse effects of clindamycin.  All of the patient's questions and concerns were addressed. Bactrim Pregnancy And Lactation Text: This medication is Pregnancy Category D and is known to cause fetal risk.  It is also excreted in breast milk. Azithromycin Counseling:  I discussed with the patient the risks of azithromycin including but not limited to GI upset, allergic reaction, drug rash, diarrhea, and yeast infections. Topical Retinoid counseling:  Patient advised to apply a pea-sized amount only at bedtime and wait 30 minutes after washing their face before applying.  If too drying, patient may add a non-comedogenic moisturizer. The patient verbalized understanding of the proper use and possible adverse effects of retinoids.  All of the patient's questions and concerns were addressed. Detail Level: Zone Aklief counseling:  Patient advised to apply a pea-sized amount only at bedtime and wait 30 minutes after washing their face before applying.  If too drying, patient may add a non-comedogenic moisturizer.  The most commonly reported side effects including irritation, redness, scaling, dryness, stinging, burning, itching, and increased risk of sunburn.  The patient verbalized understanding of the proper use and possible adverse effects of retinoids.  All of the patient's questions and concerns were addressed. Aklief Pregnancy And Lactation Text: It is unknown if this medication is safe to use during pregnancy.  It is unknown if this medication is excreted in breast milk.  Breastfeeding women should use the topical cream on the smallest area of the skin for the shortest time needed while breastfeeding.  Do not apply to nipple and areola. Dapsone Pregnancy And Lactation Text: This medication is Pregnancy Category C and is not considered safe during pregnancy or breast feeding. Birth Control Pills Counseling: Birth Control Pill Counseling: I discussed with the patient the potential side effects of OCPs including but not limited to increased risk of stroke, heart attack, thrombophlebitis, deep venous thrombosis, hepatic adenomas, breast changes, GI upset, headaches, and depression.  The patient verbalized understanding of the proper use and possible adverse effects of OCPs. All of the patient's questions and concerns were addressed. Birth Control Pills Pregnancy And Lactation Text: This medication should be avoided if pregnant and for the first 30 days post-partum. Minocycline Counseling: Patient advised regarding possible photosensitivity and discoloration of the teeth, skin, lips, tongue and gums.  Patient instructed to avoid sunlight, if possible.  When exposed to sunlight, patients should wear protective clothing, sunglasses, and sunscreen.  The patient was instructed to call the office immediately if the following severe adverse effects occur:  hearing changes, easy bruising/bleeding, severe headache, or vision changes.  The patient verbalized understanding of the proper use and possible adverse effects of minocycline.  All of the patient's questions and concerns were addressed. Topical Sulfur Applications Counseling: Topical Sulfur Counseling: Patient counseled that this medication may cause skin irritation or allergic reactions.  In the event of skin irritation, the patient was advised to reduce the amount of the drug applied or use it less frequently.   The patient verbalized understanding of the proper use and possible adverse effects of topical sulfur application.  All of the patient's questions and concerns were addressed. Topical Retinoid Pregnancy And Lactation Text: This medication is Pregnancy Category C. It is unknown if this medication is excreted in breast milk. Sarecycline Counseling: Patient advised regarding possible photosensitivity and discoloration of the teeth, skin, lips, tongue and gums.  Patient instructed to avoid sunlight, if possible.  When exposed to sunlight, patients should wear protective clothing, sunglasses, and sunscreen.  The patient was instructed to call the office immediately if the following severe adverse effects occur:  hearing changes, easy bruising/bleeding, severe headache, or vision changes.  The patient verbalized understanding of the proper use and possible adverse effects of sarecycline.  All of the patient's questions and concerns were addressed.

## 2024-03-15 ENCOUNTER — HOSPITAL ENCOUNTER (OUTPATIENT)
Facility: HOSPITAL | Age: 79
End: 2024-03-15
Attending: INTERNAL MEDICINE
Payer: MEDICARE

## 2024-03-15 ENCOUNTER — HOSPITAL ENCOUNTER (OUTPATIENT)
Facility: HOSPITAL | Age: 79
End: 2024-03-15
Payer: MEDICARE

## 2024-03-15 ENCOUNTER — HOSPITAL ENCOUNTER (OUTPATIENT)
Facility: HOSPITAL | Age: 79
End: 2024-03-15
Attending: FAMILY MEDICINE
Payer: MEDICARE

## 2024-03-15 VITALS — BODY MASS INDEX: 32.25 KG/M2 | WEIGHT: 182 LBS | HEIGHT: 63 IN

## 2024-03-15 DIAGNOSIS — Z87.891 PERSONAL HISTORY OF TOBACCO USE: ICD-10-CM

## 2024-03-15 DIAGNOSIS — Z12.31 VISIT FOR SCREENING MAMMOGRAM: ICD-10-CM

## 2024-03-15 DIAGNOSIS — F17.211 CIGARETTE NICOTINE DEPENDENCE IN REMISSION: ICD-10-CM

## 2024-03-15 DIAGNOSIS — R13.10 DYSPHAGIA, UNSPECIFIED TYPE: ICD-10-CM

## 2024-03-15 PROCEDURE — 74220 X-RAY XM ESOPHAGUS 1CNTRST: CPT

## 2024-03-15 PROCEDURE — 77063 BREAST TOMOSYNTHESIS BI: CPT

## 2024-03-15 PROCEDURE — 71271 CT THORAX LUNG CANCER SCR C-: CPT

## 2024-03-18 ENCOUNTER — OFFICE VISIT (OUTPATIENT)
Facility: CLINIC | Age: 79
End: 2024-03-18
Payer: MEDICARE

## 2024-03-18 VITALS
TEMPERATURE: 97.3 F | HEART RATE: 77 BPM | WEIGHT: 179.4 LBS | BODY MASS INDEX: 31.79 KG/M2 | OXYGEN SATURATION: 96 % | DIASTOLIC BLOOD PRESSURE: 72 MMHG | SYSTOLIC BLOOD PRESSURE: 151 MMHG | RESPIRATION RATE: 16 BRPM | HEIGHT: 63 IN

## 2024-03-18 DIAGNOSIS — I10 PRIMARY HYPERTENSION: ICD-10-CM

## 2024-03-18 DIAGNOSIS — I49.1 PAC (PREMATURE ATRIAL CONTRACTION): ICD-10-CM

## 2024-03-18 DIAGNOSIS — G40.89 OTHER SEIZURES (HCC): ICD-10-CM

## 2024-03-18 DIAGNOSIS — N18.31 CHRONIC KIDNEY DISEASE, STAGE 3A (HCC): ICD-10-CM

## 2024-03-18 DIAGNOSIS — Z86.718 HISTORY OF DVT (DEEP VEIN THROMBOSIS): ICD-10-CM

## 2024-03-18 DIAGNOSIS — E11.42 TYPE 2 DIABETES MELLITUS WITH DIABETIC POLYNEUROPATHY, WITHOUT LONG-TERM CURRENT USE OF INSULIN (HCC): Primary | ICD-10-CM

## 2024-03-18 DIAGNOSIS — J41.0 SIMPLE CHRONIC BRONCHITIS (HCC): ICD-10-CM

## 2024-03-18 PROCEDURE — 1123F ACP DISCUSS/DSCN MKR DOCD: CPT | Performed by: FAMILY MEDICINE

## 2024-03-18 PROCEDURE — 3078F DIAST BP <80 MM HG: CPT | Performed by: FAMILY MEDICINE

## 2024-03-18 PROCEDURE — 3046F HEMOGLOBIN A1C LEVEL >9.0%: CPT | Performed by: FAMILY MEDICINE

## 2024-03-18 PROCEDURE — 3077F SYST BP >= 140 MM HG: CPT | Performed by: FAMILY MEDICINE

## 2024-03-18 PROCEDURE — 99214 OFFICE O/P EST MOD 30 MIN: CPT | Performed by: FAMILY MEDICINE

## 2024-03-18 RX ORDER — GLIPIZIDE 5 MG/1
5 TABLET ORAL 2 TIMES DAILY
Qty: 60 TABLET | Refills: 1 | Status: SHIPPED | OUTPATIENT
Start: 2024-03-18

## 2024-03-18 ASSESSMENT — ENCOUNTER SYMPTOMS
ABDOMINAL PAIN: 0
CHEST TIGHTNESS: 0

## 2024-03-18 NOTE — PROGRESS NOTES
1. \"Have you been to the ER, urgent care clinic since your last visit?  Hospitalized since your last visit?\" no    2. \"Have you seen or consulted any other health care providers outside of the [facility] since your last visit?\" Yes [facility]     Health Maintenance Due   Topic Date Due    DEXA (modify frequency per FRAX score)  Never done    Respiratory Syncytial Virus (RSV) Pregnant or age 60 yrs+ (1 - 1-dose 60+ series) Never done    DTaP/Tdap/Td vaccine (1 - Tdap) 10/12/2011    Diabetic retinal exam  01/07/2021    COVID-19 Vaccine (3 - 2023-24 season) 09/01/2023    Annual Wellness Visit (Medicare Advantage)  01/01/2024    Diabetic foot exam  02/15/2024

## 2024-03-22 ENCOUNTER — OFFICE VISIT (OUTPATIENT)
Age: 79
End: 2024-03-22
Payer: MEDICARE

## 2024-03-22 VITALS — RESPIRATION RATE: 20 BRPM | DIASTOLIC BLOOD PRESSURE: 86 MMHG | SYSTOLIC BLOOD PRESSURE: 160 MMHG

## 2024-03-22 DIAGNOSIS — M54.16 RADICULOPATHY, LUMBAR REGION: Primary | ICD-10-CM

## 2024-03-22 DIAGNOSIS — E11.42 TYPE 2 DIABETES MELLITUS WITH DIABETIC POLYNEUROPATHY, WITHOUT LONG-TERM CURRENT USE OF INSULIN (HCC): ICD-10-CM

## 2024-03-22 PROCEDURE — 3079F DIAST BP 80-89 MM HG: CPT

## 2024-03-22 PROCEDURE — 1123F ACP DISCUSS/DSCN MKR DOCD: CPT

## 2024-03-22 PROCEDURE — 3046F HEMOGLOBIN A1C LEVEL >9.0%: CPT

## 2024-03-22 PROCEDURE — 99214 OFFICE O/P EST MOD 30 MIN: CPT

## 2024-03-22 PROCEDURE — 3077F SYST BP >= 140 MM HG: CPT

## 2024-03-22 NOTE — PROGRESS NOTES
Dizziness- has been doing well hasn't had ot use the meclizine as much     
no acute distress.    Neck: Supple, nontender, no bruits, no pain with resistance to active range of motion.    Musculoskeletal: Extremities revealed no edema and had full range of motion of joints.    Psych: Good mood and bright affect    NEUROLOGICAL EXAMINATION:    Mental Status: Alert and oriented to person, place, and time    Cranial Nerves:    II, III, IV, VI: Visual acuity grossly intact. Visual fields are normal.    Pupils are equal, round, and reactive to light and accommodation.    Extra-ocular movements are full and fluid. Fundoscopic exam was benign, no ptosis or nystagmus.    V-XII: Hearing is grossly intact. Facial features are symmetric, with normal sensation and strength. The palate rises symmetrically and the tongue protrudes midline.     Motor Examination: Normal tone, bulk, and strength, 4/5 muscle strength throughout.     Coordination: No resting or intention tremor    Gait and Station: Steady while walking. Normal arm swing. No pronator drift. No muscle wasting or fasiculations noted.     Neurosensory Exam: Stocking glove sensory loss to temperature, vibration, and pinprick to the thighs.       Orders Placed This Encounter    Omeprazole Magnesium (PRILOSEC PO)     Sig: Take 40 mg by mouth daily        1. Radiculopathy, lumbar region    2. Type 2 diabetes mellitus with diabetic polyneuropathy, without long-term current use of insulin (HCC)    Patient will continue to use Lyrica 50 mg daily for neuropathy and radiculopathy in the lumbar spine.  She will continue to use meclizine 25 mg as needed for dizziness.  Discussed the option of doing ANS testing however, the patient said she would like to think about it.    Return in about 6 months (around 9/22/2024).

## 2024-03-28 RX ORDER — LOVASTATIN 10 MG/1
TABLET ORAL
Qty: 90 TABLET | Refills: 1 | Status: SHIPPED | OUTPATIENT
Start: 2024-03-28

## 2024-04-21 ENCOUNTER — PATIENT MESSAGE (OUTPATIENT)
Facility: CLINIC | Age: 79
End: 2024-04-21

## 2024-04-21 DIAGNOSIS — E11.42 TYPE 2 DIABETES MELLITUS WITH DIABETIC POLYNEUROPATHY, WITHOUT LONG-TERM CURRENT USE OF INSULIN (HCC): Primary | ICD-10-CM

## 2024-04-22 RX ORDER — BLOOD-GLUCOSE METER
KIT MISCELLANEOUS
Qty: 300 EACH | Refills: 3 | Status: SHIPPED | OUTPATIENT
Start: 2024-04-22

## 2024-04-22 NOTE — TELEPHONE ENCOUNTER
From: Lesley Lauren  To: Dr. Juan David Zamorano  Sent: 4/21/2024 1:15 PM EDT  Subject: Test Strips     Please send a RX to [facility] for Free Style test.   Thanks

## 2024-05-05 DIAGNOSIS — E11.42 TYPE 2 DIABETES MELLITUS WITH DIABETIC POLYNEUROPATHY, WITHOUT LONG-TERM CURRENT USE OF INSULIN (HCC): ICD-10-CM

## 2024-05-06 RX ORDER — GLIPIZIDE 5 MG/1
5 TABLET ORAL 2 TIMES DAILY
Qty: 180 TABLET | Refills: 1 | Status: SHIPPED | OUTPATIENT
Start: 2024-05-06

## 2024-05-16 ENCOUNTER — TELEPHONE (OUTPATIENT)
Age: 79
End: 2024-05-16

## 2024-05-16 RX ORDER — CARVEDILOL 6.25 MG/1
TABLET ORAL
Qty: 180 TABLET | Refills: 1 | Status: SHIPPED | OUTPATIENT
Start: 2024-05-16

## 2024-05-17 DIAGNOSIS — M54.16 RADICULOPATHY, LUMBAR REGION: Primary | ICD-10-CM

## 2024-05-17 RX ORDER — CYCLOBENZAPRINE HCL 10 MG
10 TABLET ORAL 2 TIMES DAILY
Qty: 60 TABLET | Refills: 1 | Status: SHIPPED | OUTPATIENT
Start: 2024-05-17

## 2024-05-24 DIAGNOSIS — M54.16 RADICULOPATHY, LUMBAR REGION: ICD-10-CM

## 2024-05-24 RX ORDER — CYCLOBENZAPRINE HCL 10 MG
TABLET ORAL
Qty: 60 TABLET | Refills: 5 | OUTPATIENT
Start: 2024-05-24

## 2024-06-02 DIAGNOSIS — M54.16 RADICULOPATHY, LUMBAR REGION: ICD-10-CM

## 2024-06-04 RX ORDER — PREGABALIN 50 MG/1
CAPSULE ORAL
Qty: 60 CAPSULE | Refills: 3 | Status: SHIPPED | OUTPATIENT
Start: 2024-06-04 | End: 2024-10-02

## 2024-06-05 ENCOUNTER — OFFICE VISIT (OUTPATIENT)
Facility: CLINIC | Age: 79
End: 2024-06-05
Payer: MEDICARE

## 2024-06-05 VITALS
HEIGHT: 63 IN | DIASTOLIC BLOOD PRESSURE: 74 MMHG | TEMPERATURE: 98.6 F | RESPIRATION RATE: 20 BRPM | HEART RATE: 89 BPM | SYSTOLIC BLOOD PRESSURE: 112 MMHG | WEIGHT: 188 LBS | OXYGEN SATURATION: 98 % | BODY MASS INDEX: 33.31 KG/M2

## 2024-06-05 DIAGNOSIS — Z00.00 MEDICARE ANNUAL WELLNESS VISIT, SUBSEQUENT: Primary | ICD-10-CM

## 2024-06-05 DIAGNOSIS — R53.81 DEBILITY: ICD-10-CM

## 2024-06-05 DIAGNOSIS — G40.89 OTHER SEIZURES (HCC): ICD-10-CM

## 2024-06-05 DIAGNOSIS — Z79.01 CHRONIC ANTICOAGULATION: ICD-10-CM

## 2024-06-05 DIAGNOSIS — E11.42 TYPE 2 DIABETES MELLITUS WITH DIABETIC POLYNEUROPATHY, WITHOUT LONG-TERM CURRENT USE OF INSULIN (HCC): ICD-10-CM

## 2024-06-05 DIAGNOSIS — J41.1 MUCOPURULENT CHRONIC BRONCHITIS (HCC): ICD-10-CM

## 2024-06-05 DIAGNOSIS — N18.30 STAGE 3 CHRONIC KIDNEY DISEASE, UNSPECIFIED WHETHER STAGE 3A OR 3B CKD (HCC): ICD-10-CM

## 2024-06-05 DIAGNOSIS — E78.49 OTHER HYPERLIPIDEMIA: ICD-10-CM

## 2024-06-05 DIAGNOSIS — E11.42 DIABETIC POLYNEUROPATHY ASSOCIATED WITH TYPE 2 DIABETES MELLITUS (HCC): ICD-10-CM

## 2024-06-05 DIAGNOSIS — I10 PRIMARY HYPERTENSION: ICD-10-CM

## 2024-06-05 PROCEDURE — 3046F HEMOGLOBIN A1C LEVEL >9.0%: CPT | Performed by: FAMILY MEDICINE

## 2024-06-05 PROCEDURE — 3078F DIAST BP <80 MM HG: CPT | Performed by: FAMILY MEDICINE

## 2024-06-05 PROCEDURE — 99214 OFFICE O/P EST MOD 30 MIN: CPT | Performed by: FAMILY MEDICINE

## 2024-06-05 PROCEDURE — G0439 PPPS, SUBSEQ VISIT: HCPCS | Performed by: FAMILY MEDICINE

## 2024-06-05 PROCEDURE — 1123F ACP DISCUSS/DSCN MKR DOCD: CPT | Performed by: FAMILY MEDICINE

## 2024-06-05 PROCEDURE — 3074F SYST BP LT 130 MM HG: CPT | Performed by: FAMILY MEDICINE

## 2024-06-05 RX ORDER — AMOXICILLIN AND CLAVULANATE POTASSIUM 875; 125 MG/1; MG/1
1 TABLET, FILM COATED ORAL 2 TIMES DAILY
Qty: 14 TABLET | Refills: 0 | Status: SHIPPED | OUTPATIENT
Start: 2024-06-05 | End: 2024-06-12

## 2024-06-05 RX ORDER — OMEPRAZOLE 40 MG/1
40 CAPSULE, DELAYED RELEASE ORAL 2 TIMES DAILY
COMMUNITY
Start: 2024-04-29

## 2024-06-05 SDOH — HEALTH STABILITY: PHYSICAL HEALTH: ON AVERAGE, HOW MANY DAYS PER WEEK DO YOU ENGAGE IN MODERATE TO STRENUOUS EXERCISE (LIKE A BRISK WALK)?: 0 DAYS

## 2024-06-05 SDOH — HEALTH STABILITY: PHYSICAL HEALTH: ON AVERAGE, HOW MANY MINUTES DO YOU ENGAGE IN EXERCISE AT THIS LEVEL?: 0 MIN

## 2024-06-05 ASSESSMENT — LIFESTYLE VARIABLES
HOW MANY STANDARD DRINKS CONTAINING ALCOHOL DO YOU HAVE ON A TYPICAL DAY: 1
HOW OFTEN DO YOU HAVE A DRINK CONTAINING ALCOHOL: MONTHLY OR LESS
HOW MANY STANDARD DRINKS CONTAINING ALCOHOL DO YOU HAVE ON A TYPICAL DAY: 1 OR 2
HOW OFTEN DO YOU HAVE SIX OR MORE DRINKS ON ONE OCCASION: 1
HOW OFTEN DO YOU HAVE A DRINK CONTAINING ALCOHOL: 2

## 2024-06-05 ASSESSMENT — ENCOUNTER SYMPTOMS
CHEST TIGHTNESS: 0
BACK PAIN: 0
WHEEZING: 1

## 2024-06-05 ASSESSMENT — PATIENT HEALTH QUESTIONNAIRE - PHQ9
SUM OF ALL RESPONSES TO PHQ9 QUESTIONS 1 & 2: 2
2. FEELING DOWN, DEPRESSED OR HOPELESS: SEVERAL DAYS
SUM OF ALL RESPONSES TO PHQ QUESTIONS 1-9: 2
1. LITTLE INTEREST OR PLEASURE IN DOING THINGS: SEVERAL DAYS
SUM OF ALL RESPONSES TO PHQ QUESTIONS 1-9: 2

## 2024-06-05 NOTE — PROGRESS NOTES
Chief Complaint   Patient presents with    Medicare AWV         \"Have you been to the ER, urgent care clinic since your last visit?  Hospitalized since your last visit?\"    NO    “Have you seen or consulted any other health care providers outside of [facility] since your last visit?”    NO            Click Here for Release of Records Request     Health Maintenance Due   Topic Date Due    DEXA (modify frequency per FRAX score)  Never done    Respiratory Syncytial Virus (RSV) Pregnant or age 60 yrs+ (1 - 1-dose 60+ series) Never done    DTaP/Tdap/Td vaccine (1 - Tdap) 10/12/2011    Diabetic retinal exam  01/07/2021    COVID-19 Vaccine (3 - 2023-24 season) 09/01/2023    Annual Wellness Visit (Medicare Advantage)  01/01/2024    Diabetic foot exam  02/15/2024       
or unsecured carpets or rugs?: (!) Yes  Interventions:  Discussed     Advanced Directives:  Do you have a Living Will?: (!) No    Intervention:  has NO advanced directive - information provided       Objective   Vitals:    06/05/24 1441   BP: 112/74   Site: Right Upper Arm   Position: Sitting   Cuff Size: Medium Adult   Pulse: 89   Resp: 20   Temp: 98.6 °F (37 °C)   TempSrc: Oral   SpO2: 98%   Weight: 85.3 kg (188 lb)   Height: 1.6 m (5' 3\")      Body mass index is 33.3 kg/m².             Allergies   Allergen Reactions    Mepolizumab Anaphylaxis    Atorvastatin Other (See Comments)     Left leg pain  Causes muscle spasms    Ciprofloxacin     Levofloxacin      Other reaction(s): Muscle pain (finding)    Metformin Diarrhea    Rivaroxaban Other (See Comments)     Pt states it caused internal bleeding    Rosuvastatin Other (See Comments)     Pain in left leg  Causes muscle spasms     Prior to Visit Medications    Medication Sig Taking? Authorizing Provider   omeprazole (PRILOSEC) 40 MG delayed release capsule Take 1 capsule by mouth in the morning and at bedtime Yes Lucrecia Kaplan MD   amoxicillin-clavulanate (AUGMENTIN) 875-125 MG per tablet Take 1 tablet by mouth 2 times daily for 7 days Yes Juan David Zamorano MD   pregabalin (LYRICA) 50 MG capsule TAKE ONE CAPSULE BY MOUTH TWICE A DAY. MAX DAILY AMOUNT: 100 MG Yes Henrry Gaming Jr., MD   cyclobenzaprine (FLEXERIL) 10 MG tablet Take 1 tablet by mouth 2 times daily Do not fill any other muscle relaxants Yes Faisal Peters, APRN - NP   carvedilol (COREG) 6.25 MG tablet TAKE ONE TABLET BY MOUTH TWICE DAILY Yes Juan David Zamorano MD   glipiZIDE (GLUCOTROL) 5 MG tablet TAKE ONE TABLET BY MOUTH TWICE DAILY Yes Juan David Zamorano MD   blood glucose test strips (FREESTYLE LITE) strip Use to check Blood Sugar 4 times daily.  Dx E11.9.  Fill whichever works with current Glucometer. Yes Juan David Zamorano MD   lovastatin (MEVACOR) 10 MG tablet TAKE 
Practice  06/05/24

## 2024-06-05 NOTE — PATIENT INSTRUCTIONS
Learning About Being Active as an Older Adult  Why is being active important as you get older?     Being active is one of the best things you can do for your health. And it's never too late to start. Being active--or getting active, if you aren't already--has definite benefits. It can:  Give you more energy,  Keep your mind sharp.  Improve balance to reduce your risk of falls.  Help you manage chronic illness with fewer medicines.  No matter how old you are, how fit you are, or what health problems you have, there is a form of activity that will work for you. And the more physical activity you can do, the better your overall health will be.  What kinds of activity can help you stay healthy?  Being more active will make your daily activities easier. Physical activity includes planned exercise and things you do in daily life. There are four types of activity:  Aerobic.  Doing aerobic activity makes your heart and lungs strong.  Includes walking, dancing, and gardening.  Aim for at least 2½ hours spread throughout the week.  It improves your energy and can help you sleep better.  Muscle-strengthening.  This type of activity can help maintain muscle and strengthen bones.  Includes climbing stairs, using resistance bands, and lifting or carrying heavy loads.  Aim for at least twice a week.  It can help protect the knees and other joints.  Stretching.  Stretching gives you better range of motion in joints and muscles.  Includes upper arm stretches, calf stretches, and gentle yoga.  Aim for at least twice a week, preferably after your muscles are warmed up from other activities.  It can help you function better in daily life.  Balancing.  This helps you stay coordinated and have good posture.  Includes heel-to-toe walking, eddi chi, and certain types of yoga.  Aim for at least 3 days a week.  It can reduce your risk of falling.  Even if you have a hard time meeting the recommendations, it's better to be more active

## 2024-06-06 LAB
ALBUMIN SERPL-MCNC: 3.7 G/DL (ref 3.5–5)
ALBUMIN/GLOB SERPL: 1.1 (ref 1.1–2.2)
ALP SERPL-CCNC: 97 U/L (ref 45–117)
ALT SERPL-CCNC: 20 U/L (ref 12–78)
ANION GAP SERPL CALC-SCNC: 7 MMOL/L (ref 5–15)
AST SERPL-CCNC: 22 U/L (ref 15–37)
BILIRUB SERPL-MCNC: 0.3 MG/DL (ref 0.2–1)
BUN SERPL-MCNC: 21 MG/DL (ref 6–20)
BUN/CREAT SERPL: 19 (ref 12–20)
CALCIUM SERPL-MCNC: 9.1 MG/DL (ref 8.5–10.1)
CHLORIDE SERPL-SCNC: 108 MMOL/L (ref 97–108)
CHOLEST SERPL-MCNC: 178 MG/DL
CO2 SERPL-SCNC: 23 MMOL/L (ref 21–32)
CREAT SERPL-MCNC: 1.11 MG/DL (ref 0.55–1.02)
ERYTHROCYTE [DISTWIDTH] IN BLOOD BY AUTOMATED COUNT: 15.1 % (ref 11.5–14.5)
EST. AVERAGE GLUCOSE BLD GHB EST-MCNC: 183 MG/DL
GLOBULIN SER CALC-MCNC: 3.5 G/DL (ref 2–4)
GLUCOSE SERPL-MCNC: 195 MG/DL (ref 65–100)
HBA1C MFR BLD: 8 % (ref 4–5.6)
HCT VFR BLD AUTO: 40.6 % (ref 35–47)
HDLC SERPL-MCNC: 61 MG/DL
HDLC SERPL: 2.9 (ref 0–5)
HGB BLD-MCNC: 12.9 G/DL (ref 11.5–16)
LDLC SERPL CALC-MCNC: 74 MG/DL (ref 0–100)
MCH RBC QN AUTO: 25.7 PG (ref 26–34)
MCHC RBC AUTO-ENTMCNC: 31.8 G/DL (ref 30–36.5)
MCV RBC AUTO: 80.9 FL (ref 80–99)
NRBC # BLD: 0 K/UL (ref 0–0.01)
NRBC BLD-RTO: 0 PER 100 WBC
PLATELET # BLD AUTO: 269 K/UL (ref 150–400)
PMV BLD AUTO: 11.1 FL (ref 8.9–12.9)
POTASSIUM SERPL-SCNC: 4.6 MMOL/L (ref 3.5–5.1)
PROT SERPL-MCNC: 7.2 G/DL (ref 6.4–8.2)
RBC # BLD AUTO: 5.02 M/UL (ref 3.8–5.2)
SODIUM SERPL-SCNC: 138 MMOL/L (ref 136–145)
TRIGL SERPL-MCNC: 215 MG/DL
VLDLC SERPL CALC-MCNC: 43 MG/DL
WBC # BLD AUTO: 11.3 K/UL (ref 3.6–11)

## 2024-06-09 ENCOUNTER — TELEPHONE (OUTPATIENT)
Facility: CLINIC | Age: 79
End: 2024-06-09

## 2024-06-09 DIAGNOSIS — E11.9 TYPE 2 DIABETES MELLITUS WITHOUT COMPLICATIONS (HCC): Primary | ICD-10-CM

## 2024-06-09 DIAGNOSIS — B37.31 YEAST VAGINITIS: ICD-10-CM

## 2024-06-09 RX ORDER — METFORMIN HYDROCHLORIDE 500 MG/1
500 TABLET, EXTENDED RELEASE ORAL 2 TIMES DAILY
Qty: 360 TABLET | Refills: 1
Start: 2024-06-09

## 2024-06-09 RX ORDER — FLUCONAZOLE 150 MG/1
150 TABLET ORAL
Qty: 2 TABLET | Refills: 0 | Status: SHIPPED | OUTPATIENT
Start: 2024-06-09 | End: 2024-06-15

## 2024-06-09 RX ORDER — SEMAGLUTIDE 1.34 MG/ML
0.25 INJECTION, SOLUTION SUBCUTANEOUS DAILY
Qty: 1.5 ML | Refills: 0 | Status: SHIPPED | OUTPATIENT
Start: 2024-06-09

## 2024-06-21 ENCOUNTER — PATIENT MESSAGE (OUTPATIENT)
Facility: CLINIC | Age: 79
End: 2024-06-21

## 2024-06-21 DIAGNOSIS — T36.95XD: ICD-10-CM

## 2024-06-21 RX ORDER — FLUCONAZOLE 150 MG/1
150 TABLET ORAL
Qty: 2 TABLET | Refills: 0 | Status: SHIPPED | OUTPATIENT
Start: 2024-06-21 | End: 2024-06-25

## 2024-06-21 NOTE — TELEPHONE ENCOUNTER
From: Lesley Lauren  To: Dr. Juan David Zamorano  Sent: 6/21/2024 12:32 PM EDT  Subject: Fluconazole    I have completed the Amox-clav prescription and have taken both of the Fluconazole, I still have the   Yeast infection please send another prescription to [facility] for Fluconazole. Thank you    carly

## 2024-07-08 DIAGNOSIS — E11.42 TYPE 2 DIABETES MELLITUS WITH DIABETIC POLYNEUROPATHY, WITHOUT LONG-TERM CURRENT USE OF INSULIN (HCC): ICD-10-CM

## 2024-07-08 DIAGNOSIS — E11.9 TYPE 2 DIABETES MELLITUS WITHOUT COMPLICATIONS (HCC): ICD-10-CM

## 2024-07-08 RX ORDER — GLIPIZIDE 5 MG/1
5 TABLET ORAL 2 TIMES DAILY
Qty: 180 TABLET | Refills: 1 | Status: SHIPPED | OUTPATIENT
Start: 2024-07-08

## 2024-07-08 RX ORDER — SEMAGLUTIDE 0.68 MG/ML
INJECTION, SOLUTION SUBCUTANEOUS
Qty: 3 ML | Refills: 1 | Status: SHIPPED | OUTPATIENT
Start: 2024-07-08

## 2024-07-17 ENCOUNTER — PATIENT MESSAGE (OUTPATIENT)
Facility: CLINIC | Age: 79
End: 2024-07-17

## 2024-07-17 NOTE — TELEPHONE ENCOUNTER
From: Lesley Lauren  To: Dr. Juan David Zamorano  Sent: 7/17/2024 1:51 PM EDT  Subject: Report from [place].      Have you received the report about my kidney stone?

## 2024-07-18 RX ORDER — TAMSULOSIN HYDROCHLORIDE 0.4 MG/1
0.4 CAPSULE ORAL DAILY
Qty: 30 CAPSULE | Refills: 0 | Status: SHIPPED | OUTPATIENT
Start: 2024-07-18

## 2024-08-07 ENCOUNTER — PATIENT MESSAGE (OUTPATIENT)
Facility: CLINIC | Age: 79
End: 2024-08-07

## 2024-08-07 NOTE — TELEPHONE ENCOUNTER
From: Lesley Lauren  To: Dr. Juan David Zamorano  Sent: 8/7/2024 9:12 AM EDT  Subject: Letter    I have had a letter on file at the post office since I had my hip replaced in 2012 allowing me to have my mailbox in my yard because I was not able to walk down my driveway to get my mail. The post office now wants an update on why I should be allowing to continue to have my mailbox in my yard. Please write a letter for me that do to asthma and the problem with my legs i am unable to walk down the driveway to get my mail.   I have attached a copy of the post office letter should you have any questions.  Please let me know when you have the letter for me and I will come and pick it up and take it to the post office. Thank you very much.

## 2024-08-13 DIAGNOSIS — N20.0 KIDNEY STONE: Primary | ICD-10-CM

## 2024-08-13 RX ORDER — TAMSULOSIN HYDROCHLORIDE 0.4 MG/1
0.4 CAPSULE ORAL DAILY
Qty: 30 CAPSULE | Refills: 2 | Status: SHIPPED | OUTPATIENT
Start: 2024-08-13

## 2024-08-23 DIAGNOSIS — E11.9 TYPE 2 DIABETES MELLITUS WITHOUT COMPLICATIONS (HCC): ICD-10-CM

## 2024-08-23 RX ORDER — SEMAGLUTIDE 0.68 MG/ML
INJECTION, SOLUTION SUBCUTANEOUS
Qty: 3 ML | Refills: 1 | Status: SHIPPED | OUTPATIENT
Start: 2024-08-23

## 2024-08-26 ENCOUNTER — OFFICE VISIT (OUTPATIENT)
Facility: CLINIC | Age: 79
End: 2024-08-26
Payer: MEDICARE

## 2024-08-26 VITALS
HEART RATE: 76 BPM | RESPIRATION RATE: 16 BRPM | TEMPERATURE: 97.5 F | WEIGHT: 196.4 LBS | DIASTOLIC BLOOD PRESSURE: 80 MMHG | HEIGHT: 63 IN | SYSTOLIC BLOOD PRESSURE: 122 MMHG | BODY MASS INDEX: 34.8 KG/M2

## 2024-08-26 DIAGNOSIS — I10 PRIMARY HYPERTENSION: ICD-10-CM

## 2024-08-26 DIAGNOSIS — M79.89 LEG SWELLING: Primary | ICD-10-CM

## 2024-08-26 DIAGNOSIS — J41.1 MUCOPURULENT CHRONIC BRONCHITIS (HCC): ICD-10-CM

## 2024-08-26 PROCEDURE — 3074F SYST BP LT 130 MM HG: CPT | Performed by: FAMILY MEDICINE

## 2024-08-26 PROCEDURE — 99214 OFFICE O/P EST MOD 30 MIN: CPT | Performed by: FAMILY MEDICINE

## 2024-08-26 PROCEDURE — 3079F DIAST BP 80-89 MM HG: CPT | Performed by: FAMILY MEDICINE

## 2024-08-26 PROCEDURE — 1123F ACP DISCUSS/DSCN MKR DOCD: CPT | Performed by: FAMILY MEDICINE

## 2024-08-26 RX ORDER — ALBUTEROL SULFATE 90 UG/1
2 AEROSOL, METERED RESPIRATORY (INHALATION) EVERY 4 HOURS PRN
Qty: 18 G | Refills: 1 | Status: SHIPPED | OUTPATIENT
Start: 2024-08-26

## 2024-08-26 RX ORDER — FUROSEMIDE 20 MG
20 TABLET ORAL DAILY
Qty: 30 TABLET | Refills: 0 | Status: SHIPPED | OUTPATIENT
Start: 2024-08-26

## 2024-08-26 RX ORDER — FUROSEMIDE 20 MG
20 TABLET ORAL DAILY
Qty: 60 TABLET | Refills: 3 | Status: SHIPPED | OUTPATIENT
Start: 2024-08-26 | End: 2024-08-26

## 2024-08-26 ASSESSMENT — ENCOUNTER SYMPTOMS
CHEST TIGHTNESS: 0
APNEA: 0
CHOKING: 0

## 2024-08-26 NOTE — PROGRESS NOTES
[facility]    History of Present Illness:   Lesley Lauren is a 79 y.o. female with history of HTN, PE, DVT, DM, COPD, HLD, DCIS, CVA, leg pain   CC: Swelling  History provided by patient and Records    HPI:  Leg swelling: Patient noting has dee developing leg swelling over the last several weeks, concerned may be related to Ozempic so last dose was 2 weeks ago and has noted some improvement in the swelling.  Noting increased urinary volume though not frequency.  Also managed to scratch left leg recently as well and noting some translucent fluid production and some bleeding.      COPD: Overall controlled, denies cough or congestion    Hypertension Follow up:  The patient reports:  taking medications as instructed, no medication side effects noted, no TIA's, no chest pain on exertion, no dyspnea on exertion, noting swelling of ankles, no orthostatic dizziness or lightheadedness, no orthopnea or paroxysmal nocturnal dyspnea.     BP Readings from Last 3 Encounters:   08/26/24 122/80   06/05/24 112/74   03/22/24 (!) 160/86        Health Maintenance  Health Maintenance Due   Topic Date Due    DEXA (modify frequency per FRAX score)  Never done    Respiratory Syncytial Virus (RSV) Pregnant or age 60 yrs+ (1 - 1-dose 60+ series) Never done    DTaP/Tdap/Td vaccine (1 - Tdap) 10/12/2011    Diabetic retinal exam  01/07/2021    COVID-19 Vaccine (3 - 2023-24 season) 09/01/2023    Flu vaccine (1) 08/01/2024       Past Medical, Family, and Social History:     Current Outpatient Medications on File Prior to Visit   Medication Sig Dispense Refill    tamsulosin (FLOMAX) 0.4 MG capsule Take 1 capsule by mouth daily 30 capsule 2    diclofenac sodium (VOLTAREN) 1 % GEL APPLY 1 GRAM TO AFFECTED AREA OF KNEES AND BACK 4 TIMES DAILY 300 g 1    glipiZIDE (GLUCOTROL) 5 MG tablet TAKE ONE TABLET BY MOUTH TWICE DAILY 180 tablet 1    omeprazole (PRILOSEC) 40 MG delayed release capsule Take 1 capsule by mouth in the  diabetes mellitus with diabetic polyneuropathy, without long-term current use of insulin (HCC)    History of DVT (deep vein thrombosis)       Social History     Socioeconomic History    Marital status: Single     Spouse name: None    Number of children: None    Years of education: None    Highest education level: None   Tobacco Use    Smoking status: Former     Current packs/day: 1.00     Average packs/day: 1 pack/day for 15.0 years (15.0 ttl pk-yrs)     Types: Cigarettes    Smokeless tobacco: Never   Vaping Use    Vaping status: Never Used   Substance and Sexual Activity    Alcohol use: Yes     Comment: rarely    Drug use: No    Sexual activity: Not Currently     Social Determinants of Health     Financial Resource Strain: Low Risk  (10/23/2023)    Overall Financial Resource Strain (CARDIA)     Difficulty of Paying Living Expenses: Not hard at all   Transportation Needs: Unknown (10/23/2023)    PRAPARE - Transportation     Lack of Transportation (Non-Medical): No   Physical Activity: Inactive (6/5/2024)    Exercise Vital Sign     Days of Exercise per Week: 0 days     Minutes of Exercise per Session: 0 min   Stress: No Stress Concern Present (2/17/2023)    [employer] [place] [employer] - Occupational Stress Questionnaire     Feeling of Stress : Only a little   Social Connections: Socially Isolated (2/17/2023)    Social Connection and Isolation Panel [NHANES]     Frequency of Communication with Friends and Family: More than three times a week     Frequency of Social Gatherings with Friends and Family: Once a week     Attends Gnosticism Services: Never     Active Member of Clubs or Organizations: No     Attends Club or Organization Meetings: Never     Marital Status:    Intimate Partner Violence: Not At Risk (2/17/2023)    Humiliation, Afraid, Rape, and Kick questionnaire     Fear of Current or Ex-Partner: No     Emotionally Abused: No     Physically Abused: No     Sexually Abused: No   Housing

## 2024-08-26 NOTE — PROGRESS NOTES
\"Have you been to the ER, urgent care clinic since your last visit?  Hospitalized since your last visit?\"    NO    “Have you seen or consulted any other health care providers outside of [facility] since your last visit?”    NO            Click Here for Release of Records Request

## 2024-08-29 DIAGNOSIS — E11.9 TYPE 2 DIABETES MELLITUS WITHOUT COMPLICATIONS (HCC): ICD-10-CM

## 2024-08-30 RX ORDER — METFORMIN HCL 500 MG
TABLET, EXTENDED RELEASE 24 HR ORAL
Qty: 360 TABLET | Refills: 1 | Status: SHIPPED | OUTPATIENT
Start: 2024-08-30

## 2024-09-06 ENCOUNTER — PATIENT MESSAGE (OUTPATIENT)
Facility: CLINIC | Age: 79
End: 2024-09-06

## 2024-09-06 DIAGNOSIS — L03.90 CELLULITIS, UNSPECIFIED CELLULITIS SITE: Primary | ICD-10-CM

## 2024-09-06 RX ORDER — DOXYCYCLINE 100 MG/1
100 CAPSULE ORAL 2 TIMES DAILY
Qty: 14 CAPSULE | Refills: 0 | Status: SHIPPED | OUTPATIENT
Start: 2024-09-06 | End: 2024-09-13

## 2024-09-12 ENCOUNTER — TELEPHONE (OUTPATIENT)
Age: 79
End: 2024-09-12

## 2024-09-12 DIAGNOSIS — M54.16 RADICULOPATHY, LUMBAR REGION: ICD-10-CM

## 2024-09-18 RX ORDER — CYCLOBENZAPRINE HCL 10 MG
10 TABLET ORAL 2 TIMES DAILY
Qty: 60 TABLET | Refills: 0 | Status: SHIPPED | OUTPATIENT
Start: 2024-09-18

## 2024-09-26 ENCOUNTER — OFFICE VISIT (OUTPATIENT)
Age: 79
End: 2024-09-26
Payer: MEDICARE

## 2024-09-26 VITALS
DIASTOLIC BLOOD PRESSURE: 78 MMHG | BODY MASS INDEX: 34.73 KG/M2 | WEIGHT: 196 LBS | RESPIRATION RATE: 14 BRPM | SYSTOLIC BLOOD PRESSURE: 129 MMHG | HEIGHT: 63 IN

## 2024-09-26 DIAGNOSIS — R42 DIZZY: ICD-10-CM

## 2024-09-26 DIAGNOSIS — E11.42 TYPE 2 DIABETES MELLITUS WITH DIABETIC POLYNEUROPATHY, WITHOUT LONG-TERM CURRENT USE OF INSULIN (HCC): Primary | ICD-10-CM

## 2024-09-26 DIAGNOSIS — M54.16 RADICULOPATHY, LUMBAR REGION: ICD-10-CM

## 2024-09-26 PROCEDURE — 3078F DIAST BP <80 MM HG: CPT | Performed by: PSYCHIATRY & NEUROLOGY

## 2024-09-26 PROCEDURE — 3074F SYST BP LT 130 MM HG: CPT | Performed by: PSYCHIATRY & NEUROLOGY

## 2024-09-26 PROCEDURE — 1123F ACP DISCUSS/DSCN MKR DOCD: CPT | Performed by: PSYCHIATRY & NEUROLOGY

## 2024-09-26 PROCEDURE — 99214 OFFICE O/P EST MOD 30 MIN: CPT | Performed by: PSYCHIATRY & NEUROLOGY

## 2024-09-26 PROCEDURE — 3052F HG A1C>EQUAL 8.0%<EQUAL 9.0%: CPT | Performed by: PSYCHIATRY & NEUROLOGY

## 2024-09-26 RX ORDER — PREGABALIN 50 MG/1
CAPSULE ORAL
Qty: 60 CAPSULE | Refills: 5 | Status: SHIPPED | OUTPATIENT
Start: 2024-09-26 | End: 2026-09-16

## 2024-09-26 RX ORDER — MECLIZINE HYDROCHLORIDE 25 MG/1
25 TABLET ORAL 3 TIMES DAILY PRN
Qty: 90 TABLET | Refills: 1 | Status: SHIPPED | OUTPATIENT
Start: 2024-09-26

## 2024-09-26 RX ORDER — CYCLOBENZAPRINE HCL 10 MG
10 TABLET ORAL 2 TIMES DAILY
Qty: 60 TABLET | Refills: 5 | Status: SHIPPED | OUTPATIENT
Start: 2024-09-26

## 2024-10-07 RX ORDER — LOVASTATIN 10 MG
TABLET ORAL
Qty: 90 TABLET | Refills: 1 | Status: SHIPPED | OUTPATIENT
Start: 2024-10-07

## 2024-10-27 DIAGNOSIS — N20.0 KIDNEY STONE: ICD-10-CM

## 2024-10-28 RX ORDER — TAMSULOSIN HYDROCHLORIDE 0.4 MG/1
0.4 CAPSULE ORAL DAILY
Qty: 90 CAPSULE | Refills: 1 | Status: SHIPPED | OUTPATIENT
Start: 2024-10-28

## 2024-11-06 ENCOUNTER — OFFICE VISIT (OUTPATIENT)
Facility: CLINIC | Age: 79
End: 2024-11-06

## 2024-11-06 VITALS
HEIGHT: 66 IN | BODY MASS INDEX: 30.02 KG/M2 | RESPIRATION RATE: 16 BRPM | WEIGHT: 186.8 LBS | DIASTOLIC BLOOD PRESSURE: 59 MMHG | TEMPERATURE: 97.2 F | OXYGEN SATURATION: 97 % | HEART RATE: 87 BPM | SYSTOLIC BLOOD PRESSURE: 110 MMHG

## 2024-11-06 DIAGNOSIS — Z23 IMMUNIZATION DUE: ICD-10-CM

## 2024-11-06 DIAGNOSIS — R30.0 DYSURIA: Primary | ICD-10-CM

## 2024-11-06 RX ORDER — NITROFURANTOIN 25; 75 MG/1; MG/1
100 CAPSULE ORAL 2 TIMES DAILY
Qty: 10 CAPSULE | Refills: 0 | Status: SHIPPED | OUTPATIENT
Start: 2024-11-06 | End: 2024-11-11

## 2024-11-06 ASSESSMENT — ENCOUNTER SYMPTOMS
APNEA: 0
CHEST TIGHTNESS: 0

## 2024-11-06 NOTE — PROGRESS NOTES
\"Have you been to the ER, urgent care clinic since your last visit?  Hospitalized since your last visit?\"    NO    “Have you seen or consulted any other health care providers outside of [facility] since your last visit?”    NO            Click Here for Release of Records Request   
Marital status: Single     Spouse name: None    Number of children: None    Years of education: None    Highest education level: None   Tobacco Use    Smoking status: Former     Current packs/day: 1.00     Average packs/day: 1 pack/day for 15.0 years (15.0 ttl pk-yrs)     Types: Cigarettes    Smokeless tobacco: Never   Vaping Use    Vaping status: Never Used   Substance and Sexual Activity    Alcohol use: Yes     Comment: rarely    Drug use: No    Sexual activity: Not Currently     Social Determinants of Health     Financial Resource Strain: Low Risk  (10/23/2023)    Overall Financial Resource Strain (CARDIA)     Difficulty of Paying Living Expenses: Not hard at all   Transportation Needs: Unknown (10/23/2023)    PRAPARE - Transportation     Lack of Transportation (Non-Medical): No   Physical Activity: Inactive (6/5/2024)    Exercise Vital Sign     Days of Exercise per Week: 0 days     Minutes of Exercise per Session: 0 min   Stress: No Stress Concern Present (2/17/2023)    [employer] [place] [employer]ational [employer] - Occupational Stress Questionnaire     Feeling of Stress : Only a little   Social Connections: Socially Isolated (2/17/2023)    Social Connection and Isolation Panel [NHANES]     Frequency of Communication with Friends and Family: More than three times a week     Frequency of Social Gatherings with Friends and Family: Once a week     Attends Jainism Services: Never     Active Member of Clubs or Organizations: No     Attends Club or Organization Meetings: Never     Marital Status:    Intimate Partner Violence: Not At Risk (2/17/2023)    Humiliation, Afraid, Rape, and Kick questionnaire     Fear of Current or Ex-Partner: No     Emotionally Abused: No     Physically Abused: No     Sexually Abused: No   Housing Stability: Unknown (10/23/2023)    Housing Stability Vital Sign     Unstable Housing in the Last Year: No        Review of Systems   Review of Systems   Constitutional:  Negative for activity

## 2024-11-07 ENCOUNTER — PATIENT MESSAGE (OUTPATIENT)
Facility: CLINIC | Age: 79
End: 2024-11-07

## 2024-11-07 DIAGNOSIS — R30.0 DYSURIA: Primary | ICD-10-CM

## 2024-11-07 DIAGNOSIS — R30.0 DYSURIA: ICD-10-CM

## 2024-11-08 LAB
APPEARANCE UR: ABNORMAL
BACTERIA URNS QL MICRO: ABNORMAL /HPF
BILIRUB UR QL: NEGATIVE
COLOR UR: ABNORMAL
EPITH CASTS URNS QL MICRO: ABNORMAL /LPF
GLUCOSE UR STRIP.AUTO-MCNC: NEGATIVE MG/DL
HGB UR QL STRIP: ABNORMAL
HYALINE CASTS URNS QL MICRO: ABNORMAL /LPF (ref 0–5)
KETONES UR QL STRIP.AUTO: ABNORMAL MG/DL
LEUKOCYTE ESTERASE UR QL STRIP.AUTO: ABNORMAL
NITRITE UR QL STRIP.AUTO: NEGATIVE
PH UR STRIP: 5 (ref 5–8)
PROT UR STRIP-MCNC: 30 MG/DL
RBC #/AREA URNS HPF: ABNORMAL /HPF (ref 0–5)
SP GR UR REFRACTOMETRY: 1.02 (ref 1–1.03)
UROBILINOGEN UR QL STRIP.AUTO: 0.2 EU/DL (ref 0.2–1)
WBC URNS QL MICRO: >100 /HPF (ref 0–4)

## 2024-11-08 RX ORDER — FLUCONAZOLE 150 MG/1
150 TABLET ORAL
Qty: 2 TABLET | Refills: 0 | Status: SHIPPED | OUTPATIENT
Start: 2024-11-08 | End: 2024-11-14

## 2024-11-10 LAB
BACTERIA SPEC CULT: ABNORMAL
CC UR VC: ABNORMAL
SERVICE CMNT-IMP: ABNORMAL

## 2024-11-15 RX ORDER — CARVEDILOL 6.25 MG/1
TABLET ORAL
Qty: 180 TABLET | Refills: 1 | Status: SHIPPED | OUTPATIENT
Start: 2024-11-15

## 2025-01-05 DIAGNOSIS — E11.42 TYPE 2 DIABETES MELLITUS WITH DIABETIC POLYNEUROPATHY, WITHOUT LONG-TERM CURRENT USE OF INSULIN (HCC): ICD-10-CM

## 2025-01-06 RX ORDER — GLIPIZIDE 5 MG/1
5 TABLET ORAL 2 TIMES DAILY
Qty: 180 TABLET | Refills: 1 | Status: SHIPPED | OUTPATIENT
Start: 2025-01-06

## 2025-01-17 ENCOUNTER — PATIENT MESSAGE (OUTPATIENT)
Facility: CLINIC | Age: 80
End: 2025-01-17

## 2025-01-17 DIAGNOSIS — J41.1 MUCOPURULENT CHRONIC BRONCHITIS (HCC): Primary | ICD-10-CM

## 2025-01-17 RX ORDER — MONTELUKAST SODIUM 10 MG/1
10 TABLET ORAL NIGHTLY
Qty: 90 TABLET | Refills: 1 | Status: SHIPPED | OUTPATIENT
Start: 2025-01-17

## 2025-02-03 ENCOUNTER — OFFICE VISIT (OUTPATIENT)
Facility: CLINIC | Age: 80
End: 2025-02-03
Payer: MEDICARE

## 2025-02-03 VITALS
DIASTOLIC BLOOD PRESSURE: 48 MMHG | HEIGHT: 63 IN | RESPIRATION RATE: 16 BRPM | HEART RATE: 77 BPM | SYSTOLIC BLOOD PRESSURE: 126 MMHG | WEIGHT: 186.2 LBS | BODY MASS INDEX: 32.99 KG/M2 | TEMPERATURE: 97.4 F

## 2025-02-03 DIAGNOSIS — L03.115 CELLULITIS OF RIGHT LOWER EXTREMITY: Primary | ICD-10-CM

## 2025-02-03 PROCEDURE — 1159F MED LIST DOCD IN RCRD: CPT | Performed by: FAMILY MEDICINE

## 2025-02-03 PROCEDURE — 3078F DIAST BP <80 MM HG: CPT | Performed by: FAMILY MEDICINE

## 2025-02-03 PROCEDURE — 1090F PRES/ABSN URINE INCON ASSESS: CPT | Performed by: FAMILY MEDICINE

## 2025-02-03 PROCEDURE — 1036F TOBACCO NON-USER: CPT | Performed by: FAMILY MEDICINE

## 2025-02-03 PROCEDURE — G8417 CALC BMI ABV UP PARAM F/U: HCPCS | Performed by: FAMILY MEDICINE

## 2025-02-03 PROCEDURE — G8400 PT W/DXA NO RESULTS DOC: HCPCS | Performed by: FAMILY MEDICINE

## 2025-02-03 PROCEDURE — 1160F RVW MEDS BY RX/DR IN RCRD: CPT | Performed by: FAMILY MEDICINE

## 2025-02-03 PROCEDURE — 99214 OFFICE O/P EST MOD 30 MIN: CPT | Performed by: FAMILY MEDICINE

## 2025-02-03 PROCEDURE — 1123F ACP DISCUSS/DSCN MKR DOCD: CPT | Performed by: FAMILY MEDICINE

## 2025-02-03 PROCEDURE — 3074F SYST BP LT 130 MM HG: CPT | Performed by: FAMILY MEDICINE

## 2025-02-03 PROCEDURE — G8427 DOCREV CUR MEDS BY ELIG CLIN: HCPCS | Performed by: FAMILY MEDICINE

## 2025-02-03 RX ORDER — DOXYCYCLINE HYCLATE 100 MG
100 TABLET ORAL 2 TIMES DAILY
Qty: 20 TABLET | Refills: 0 | Status: SHIPPED | OUTPATIENT
Start: 2025-02-03 | End: 2025-02-13

## 2025-02-03 RX ORDER — FLUCONAZOLE 150 MG/1
150 TABLET ORAL
Qty: 4 TABLET | Refills: 0 | Status: SHIPPED | OUTPATIENT
Start: 2025-02-03 | End: 2025-02-13

## 2025-02-03 SDOH — ECONOMIC STABILITY: FOOD INSECURITY: WITHIN THE PAST 12 MONTHS, THE FOOD YOU BOUGHT JUST DIDN'T LAST AND YOU DIDN'T HAVE MONEY TO GET MORE.: NEVER TRUE

## 2025-02-03 SDOH — ECONOMIC STABILITY: FOOD INSECURITY: WITHIN THE PAST 12 MONTHS, YOU WORRIED THAT YOUR FOOD WOULD RUN OUT BEFORE YOU GOT MONEY TO BUY MORE.: NEVER TRUE

## 2025-02-03 ASSESSMENT — PATIENT HEALTH QUESTIONNAIRE - PHQ9
SUM OF ALL RESPONSES TO PHQ QUESTIONS 1-9: 0
SUM OF ALL RESPONSES TO PHQ9 QUESTIONS 1 & 2: 0
1. LITTLE INTEREST OR PLEASURE IN DOING THINGS: NOT AT ALL
2. FEELING DOWN, DEPRESSED OR HOPELESS: NOT AT ALL
SUM OF ALL RESPONSES TO PHQ QUESTIONS 1-9: 0

## 2025-02-03 ASSESSMENT — ENCOUNTER SYMPTOMS
CHEST TIGHTNESS: 0
APNEA: 0

## 2025-02-16 ENCOUNTER — PATIENT MESSAGE (OUTPATIENT)
Facility: CLINIC | Age: 80
End: 2025-02-16

## 2025-02-16 DIAGNOSIS — J41.1 MUCOPURULENT CHRONIC BRONCHITIS (HCC): Primary | ICD-10-CM

## 2025-02-16 DIAGNOSIS — L03.115 CELLULITIS OF RIGHT LOWER EXTREMITY: ICD-10-CM

## 2025-02-17 RX ORDER — DOXYCYCLINE HYCLATE 100 MG
100 TABLET ORAL 2 TIMES DAILY
Qty: 20 TABLET | Refills: 0 | Status: SHIPPED | OUTPATIENT
Start: 2025-02-17 | End: 2025-02-27

## 2025-02-17 RX ORDER — PREDNISONE 5 MG/1
5 TABLET ORAL DAILY
Qty: 30 TABLET | Refills: 1 | Status: SHIPPED | OUTPATIENT
Start: 2025-02-17

## 2025-02-17 RX ORDER — FLUCONAZOLE 150 MG/1
150 TABLET ORAL
Qty: 4 TABLET | Refills: 0 | Status: SHIPPED | OUTPATIENT
Start: 2025-02-17 | End: 2025-02-27

## 2025-02-17 RX ORDER — FLUTICASONE FUROATE, UMECLIDINIUM BROMIDE AND VILANTEROL TRIFENATATE 100; 62.5; 25 UG/1; UG/1; UG/1
1 POWDER RESPIRATORY (INHALATION) DAILY
Qty: 60 EACH | Refills: 1 | Status: SHIPPED | OUTPATIENT
Start: 2025-02-17

## 2025-02-24 ENCOUNTER — TELEPHONE (OUTPATIENT)
Facility: CLINIC | Age: 80
End: 2025-02-24

## 2025-02-24 NOTE — TELEPHONE ENCOUNTER
Called patient. She was advised per Dr Zamorano:\"If it is not changing then it should be able to wait for now.\" Patient verbalized understanding and stated her toes are red and cold.

## 2025-02-24 NOTE — TELEPHONE ENCOUNTER
Pt called concerned about her foot, scheduled for 2/28.Pt states it's not getting worse but is not getting better. Pt is questioning if she should be seen before Friday?    Please advise...

## 2025-02-24 NOTE — TELEPHONE ENCOUNTER
Spoke to patient.  Advised her to monitor her feet and take pictures.  Wound on leg is healing on patient.    Dr. Guerrero

## 2025-02-28 ENCOUNTER — OFFICE VISIT (OUTPATIENT)
Facility: CLINIC | Age: 80
End: 2025-02-28
Payer: MEDICARE

## 2025-02-28 VITALS
OXYGEN SATURATION: 97 % | SYSTOLIC BLOOD PRESSURE: 137 MMHG | HEIGHT: 63 IN | RESPIRATION RATE: 19 BRPM | HEART RATE: 77 BPM | TEMPERATURE: 97.6 F | BODY MASS INDEX: 33.13 KG/M2 | WEIGHT: 187 LBS | DIASTOLIC BLOOD PRESSURE: 74 MMHG

## 2025-02-28 DIAGNOSIS — J41.1 MUCOPURULENT CHRONIC BRONCHITIS (HCC): ICD-10-CM

## 2025-02-28 DIAGNOSIS — N18.30 STAGE 3 CHRONIC KIDNEY DISEASE, UNSPECIFIED WHETHER STAGE 3A OR 3B CKD (HCC): ICD-10-CM

## 2025-02-28 DIAGNOSIS — E11.42 TYPE 2 DIABETES MELLITUS WITH DIABETIC POLYNEUROPATHY, WITHOUT LONG-TERM CURRENT USE OF INSULIN (HCC): ICD-10-CM

## 2025-02-28 DIAGNOSIS — R13.10 DYSPHAGIA, UNSPECIFIED TYPE: ICD-10-CM

## 2025-02-28 DIAGNOSIS — R13.12 OROPHARYNGEAL DYSPHAGIA: ICD-10-CM

## 2025-02-28 DIAGNOSIS — R39.89 URINE DISCOLORATION: ICD-10-CM

## 2025-02-28 DIAGNOSIS — R09.89 CHOKING EPISODE: ICD-10-CM

## 2025-02-28 DIAGNOSIS — I10 PRIMARY HYPERTENSION: Primary | ICD-10-CM

## 2025-02-28 DIAGNOSIS — G40.89 OTHER SEIZURES (HCC): ICD-10-CM

## 2025-02-28 DIAGNOSIS — E78.49 OTHER HYPERLIPIDEMIA: ICD-10-CM

## 2025-02-28 PROCEDURE — 1090F PRES/ABSN URINE INCON ASSESS: CPT | Performed by: FAMILY MEDICINE

## 2025-02-28 PROCEDURE — 3075F SYST BP GE 130 - 139MM HG: CPT | Performed by: FAMILY MEDICINE

## 2025-02-28 PROCEDURE — 1123F ACP DISCUSS/DSCN MKR DOCD: CPT | Performed by: FAMILY MEDICINE

## 2025-02-28 PROCEDURE — G8400 PT W/DXA NO RESULTS DOC: HCPCS | Performed by: FAMILY MEDICINE

## 2025-02-28 PROCEDURE — G8417 CALC BMI ABV UP PARAM F/U: HCPCS | Performed by: FAMILY MEDICINE

## 2025-02-28 PROCEDURE — 3023F SPIROM DOC REV: CPT | Performed by: FAMILY MEDICINE

## 2025-02-28 PROCEDURE — G8428 CUR MEDS NOT DOCUMENT: HCPCS | Performed by: FAMILY MEDICINE

## 2025-02-28 PROCEDURE — 3078F DIAST BP <80 MM HG: CPT | Performed by: FAMILY MEDICINE

## 2025-02-28 PROCEDURE — 99214 OFFICE O/P EST MOD 30 MIN: CPT | Performed by: FAMILY MEDICINE

## 2025-02-28 PROCEDURE — 1036F TOBACCO NON-USER: CPT | Performed by: FAMILY MEDICINE

## 2025-02-28 ASSESSMENT — ENCOUNTER SYMPTOMS
CHEST TIGHTNESS: 0
ABDOMINAL PAIN: 0
APNEA: 0
ABDOMINAL DISTENTION: 0

## 2025-02-28 NOTE — PROGRESS NOTES
Chief Complaint   Patient presents with    Follow-up         \"Have you been to the ER, urgent care clinic since your last visit?  Hospitalized since your last visit?\"    NO    “Have you seen or consulted any other health care providers outside of [facility] since your last visit?”    NO            Click Here for Release of Records Request     Health Maintenance Due   Topic Date Due    DTaP/Tdap/Td vaccine (1 - Tdap) 10/12/2011    Diabetic retinal exam  01/07/2021    Respiratory Syncytial Virus (RSV) Pregnant or age 60 yrs+ (1 - 1-dose 75+ series) Never done    Diabetic Alb to Cr ratio (uACR) test  11/11/2022    Flu vaccine (1) 08/01/2024    COVID-19 Vaccine (3 - 2024-25 season) 09/01/2024    A1C test (Diabetic or Prediabetic)  12/05/2024    Annual Wellness Visit (Medicare Advantage)  01/01/2025       
  Return in about 3 months (around 5/28/2025).           I have discussed the diagnosis with the patient and the intended plan as seen in the above orders.  Social history, medical history, and labs were reviewed.  The patient has received an after-visit summary and questions were answered concerning future plans.  I have discussed medication side effects and warnings with the patient as well.    Juan David Zamorano MD  [facility]  02/28/25

## 2025-03-01 LAB
ALBUMIN SERPL-MCNC: 3.5 G/DL (ref 3.5–5)
ALBUMIN/GLOB SERPL: 1.1 (ref 1.1–2.2)
ALP SERPL-CCNC: 126 U/L (ref 45–117)
ALT SERPL-CCNC: 12 U/L (ref 12–78)
ANION GAP SERPL CALC-SCNC: 7 MMOL/L (ref 2–12)
AST SERPL-CCNC: 17 U/L (ref 15–37)
BILIRUB SERPL-MCNC: 0.5 MG/DL (ref 0.2–1)
BUN SERPL-MCNC: 23 MG/DL (ref 6–20)
BUN/CREAT SERPL: 23 (ref 12–20)
CALCIUM SERPL-MCNC: 8.8 MG/DL (ref 8.5–10.1)
CHLORIDE SERPL-SCNC: 106 MMOL/L (ref 97–108)
CHOLEST SERPL-MCNC: 185 MG/DL
CO2 SERPL-SCNC: 27 MMOL/L (ref 21–32)
CREAT SERPL-MCNC: 0.99 MG/DL (ref 0.55–1.02)
ERYTHROCYTE [DISTWIDTH] IN BLOOD BY AUTOMATED COUNT: 16.3 % (ref 11.5–14.5)
EST. AVERAGE GLUCOSE BLD GHB EST-MCNC: 255 MG/DL
GLOBULIN SER CALC-MCNC: 3.3 G/DL (ref 2–4)
GLUCOSE SERPL-MCNC: 239 MG/DL (ref 65–100)
HBA1C MFR BLD: 10.5 % (ref 4–5.6)
HCT VFR BLD AUTO: 41.2 % (ref 35–47)
HDLC SERPL-MCNC: 52 MG/DL
HDLC SERPL: 3.6 (ref 0–5)
HGB BLD-MCNC: 12.2 G/DL (ref 11.5–16)
LDLC SERPL CALC-MCNC: 87.2 MG/DL (ref 0–100)
MCH RBC QN AUTO: 23.1 PG (ref 26–34)
MCHC RBC AUTO-ENTMCNC: 29.6 G/DL (ref 30–36.5)
MCV RBC AUTO: 78 FL (ref 80–99)
NRBC # BLD: 0 K/UL (ref 0–0.01)
NRBC BLD-RTO: 0 PER 100 WBC
PLATELET # BLD AUTO: 165 K/UL (ref 150–400)
PMV BLD AUTO: 11.7 FL (ref 8.9–12.9)
POTASSIUM SERPL-SCNC: 4.6 MMOL/L (ref 3.5–5.1)
PROT SERPL-MCNC: 6.8 G/DL (ref 6.4–8.2)
RBC # BLD AUTO: 5.28 M/UL (ref 3.8–5.2)
SODIUM SERPL-SCNC: 140 MMOL/L (ref 136–145)
TRIGL SERPL-MCNC: 229 MG/DL
VLDLC SERPL CALC-MCNC: 45.8 MG/DL
WBC # BLD AUTO: 9.6 K/UL (ref 3.6–11)

## 2025-03-05 DIAGNOSIS — R39.89 URINE DISCOLORATION: Primary | ICD-10-CM

## 2025-03-05 DIAGNOSIS — E11.42 TYPE 2 DIABETES MELLITUS WITH DIABETIC POLYNEUROPATHY, WITHOUT LONG-TERM CURRENT USE OF INSULIN (HCC): ICD-10-CM

## 2025-03-05 DIAGNOSIS — E11.42 DIABETIC POLYNEUROPATHY ASSOCIATED WITH TYPE 2 DIABETES MELLITUS (HCC): ICD-10-CM

## 2025-03-07 ENCOUNTER — PATIENT MESSAGE (OUTPATIENT)
Facility: CLINIC | Age: 80
End: 2025-03-07

## 2025-03-07 DIAGNOSIS — J41.1 MUCOPURULENT CHRONIC BRONCHITIS (HCC): Primary | ICD-10-CM

## 2025-03-07 RX ORDER — PREDNISONE 20 MG/1
20 TABLET ORAL DAILY
Qty: 5 TABLET | Refills: 0 | Status: SHIPPED | OUTPATIENT
Start: 2025-03-07 | End: 2025-03-12

## 2025-03-18 ENCOUNTER — TELEPHONE (OUTPATIENT)
Facility: CLINIC | Age: 80
End: 2025-03-18

## 2025-03-18 NOTE — TELEPHONE ENCOUNTER
Care Transitions Initial Follow Up Call    Outreach made within 2 business days of discharge: Yes    Patient: Lesley Lauren Patient : 1945   MRN: [id number]  Reason for Admission: blood clots  Discharge Date: 3/16/25       Spoke with: patient    Discharge department/facility: Replaced by [facility] Interactive Patient Contact:  Was patient able to fill all prescriptions: Yes  Was patient instructed to bring all medications to the follow-up visit: Yes  Is patient taking all medications as directed in the discharge summary? Yes  Does patient understand their discharge instructions: Yes  Does patient have questions or concerns that need addressed prior to 7-14 day follow up office visit: no    Additional needs identified to be addressed with provider  Records requested             Scheduled appointment with PCP within 7-14 days    Follow Up  Future Appointments   Date Time Provider Department Center   3/31/2025  1:00 PM [facility]   4/3/2025  9:45 AM Natasha Thomson MD [id number]       MARLO LEACH RN

## 2025-03-20 ENCOUNTER — PATIENT MESSAGE (OUTPATIENT)
Facility: CLINIC | Age: 80
End: 2025-03-20

## 2025-03-20 DIAGNOSIS — L03.119 CELLULITIS OF LOWER EXTREMITY, UNSPECIFIED LATERALITY: Primary | ICD-10-CM

## 2025-03-20 RX ORDER — DOXYCYCLINE HYCLATE 100 MG
100 TABLET ORAL 2 TIMES DAILY
Qty: 14 TABLET | Refills: 0 | Status: SHIPPED | OUTPATIENT
Start: 2025-03-20 | End: 2025-03-27

## 2025-03-21 DIAGNOSIS — L03.115 CELLULITIS OF RIGHT LOWER EXTREMITY: ICD-10-CM

## 2025-03-21 DIAGNOSIS — M54.16 RADICULOPATHY, LUMBAR REGION: ICD-10-CM

## 2025-03-21 RX ORDER — CYCLOBENZAPRINE HCL 10 MG
10 TABLET ORAL 2 TIMES DAILY
Qty: 60 TABLET | Refills: 5 | Status: SHIPPED | OUTPATIENT
Start: 2025-03-21

## 2025-03-21 RX ORDER — FLUCONAZOLE 150 MG/1
TABLET ORAL
Qty: 4 TABLET | Refills: 0 | Status: SHIPPED | OUTPATIENT
Start: 2025-03-21

## 2025-03-21 RX ORDER — FLUCONAZOLE 150 MG/1
150 TABLET ORAL
Qty: 2 TABLET | Refills: 0 | Status: SHIPPED | OUTPATIENT
Start: 2025-03-21 | End: 2025-03-27

## 2025-03-27 ENCOUNTER — OFFICE VISIT (OUTPATIENT)
Facility: CLINIC | Age: 80
End: 2025-03-27

## 2025-03-27 VITALS
BODY MASS INDEX: 33.45 KG/M2 | WEIGHT: 188.8 LBS | HEART RATE: 77 BPM | SYSTOLIC BLOOD PRESSURE: 136 MMHG | HEIGHT: 63 IN | TEMPERATURE: 97.5 F | RESPIRATION RATE: 18 BRPM | DIASTOLIC BLOOD PRESSURE: 64 MMHG | OXYGEN SATURATION: 97 %

## 2025-03-27 DIAGNOSIS — E11.42 TYPE 2 DIABETES MELLITUS WITH DIABETIC POLYNEUROPATHY, WITHOUT LONG-TERM CURRENT USE OF INSULIN (HCC): ICD-10-CM

## 2025-03-27 DIAGNOSIS — Z09 HOSPITAL DISCHARGE FOLLOW-UP: ICD-10-CM

## 2025-03-27 DIAGNOSIS — I26.99 ACUTE PULMONARY EMBOLISM WITHOUT ACUTE COR PULMONALE, UNSPECIFIED PULMONARY EMBOLISM TYPE (HCC): Primary | ICD-10-CM

## 2025-03-27 DIAGNOSIS — I10 PRIMARY HYPERTENSION: ICD-10-CM

## 2025-03-27 DIAGNOSIS — S91.301D OPEN WOUND OF RIGHT FOOT, SUBSEQUENT ENCOUNTER: ICD-10-CM

## 2025-03-27 DIAGNOSIS — Z86.718 HISTORY OF DVT (DEEP VEIN THROMBOSIS): ICD-10-CM

## 2025-03-27 DIAGNOSIS — R53.81 DEBILITY: ICD-10-CM

## 2025-03-27 DIAGNOSIS — J41.1 MUCOPURULENT CHRONIC BRONCHITIS (HCC): ICD-10-CM

## 2025-03-27 RX ORDER — APIXABAN 5 MG/1
TABLET, FILM COATED ORAL
COMMUNITY
Start: 2025-03-17 | End: 2025-03-27

## 2025-03-27 RX ORDER — LOVASTATIN 10 MG/1
10 TABLET ORAL NIGHTLY
Qty: 90 TABLET | Refills: 1 | Status: SHIPPED | OUTPATIENT
Start: 2025-03-27

## 2025-03-27 ASSESSMENT — ENCOUNTER SYMPTOMS: SHORTNESS OF BREATH: 1

## 2025-03-27 NOTE — PROGRESS NOTES
\"Have you been to the ER, urgent care clinic since your last visit?  Hospitalized since your last visit?\"    Yes/ here today for hospital follow up    “Have you seen or consulted any other health care providers outside our system since your last visit?”    NO      “Have you had a diabetic eye exam?”    NO     Date of last diabetic eye exam: 1/7/2020

## 2025-03-27 NOTE — PROGRESS NOTES
[facility] [address] [address]  [phone]        Transition of Care Visit    Patient: Lesley Lauren MRN: [id number]  SSN: [id number]    YOB: 1945  Age: 79 y.o.  Sex: female      Hospital: [facility]  Dates of admission: 3/14/25 through 3/16/25  Discharge diagnoses: Pulmonary Embolism  State of health at discharge: Stable  Surgical or invasive procedures done: None  Transition Care Management contact Date: 03/18/2025     Amount and/or Complexity of Data Reviewed:   Clinical lab tests: Reviewed or ordered   Tests in the radiology section: reviewed or ordered  Discussion of test results with the patient: yes  Obtain previous medical records or obtain history from someone other than the patient: Yes  Obtain history from someone other than the patient: Yes  Review and address past medical records: Yes  Discuss the patient with another provider: no  Independant visualization of image, tracing, or specimen: no    Risk of Significant Complications, Morbidity, and/or Mortality:   Presenting problems: High   Diagnostic procedures: Moderate   Management options: Moderate     Transition of Care time spent:   Total time providing care and documentation:  40  minutes    Progress at discharge:   Stable on Discharge      Progress Note    Patient: Lesley Lauren MRN: [id number]  SSN: [id number]    YOB: 1945  Age: 79 y.o.  Sex: female        Chief Complaint   Patient presents with    Follow-Up from Hospital         Subjective:     Encounter Diagnoses   Name Primary?    Acute pulmonary embolism without acute cor pulmonale, unspecified pulmonary embolism type (HCC) Yes    History of DVT (deep vein thrombosis)     Mucopurulent chronic bronchitis (HCC)     Debility     Hospital discharge follow-up     Type 2 diabetes mellitus with diabetic polyneuropathy, without long-term current use of insulin (HCC)     Primary

## 2025-03-28 ENCOUNTER — TELEPHONE (OUTPATIENT)
Age: 80
End: 2025-03-28

## 2025-03-28 NOTE — TELEPHONE ENCOUNTER
Patient returned call that she missed. Requested a call back to schedule NP appt.     # [phone]

## 2025-03-28 NOTE — TELEPHONE ENCOUNTER
Called patient to schedule NP appointment no answer LVM     NP/Acute pulmonary embolism/Dr. Zamorano     N/A with Yann/Carla

## 2025-04-03 ENCOUNTER — OFFICE VISIT (OUTPATIENT)
Age: 80
End: 2025-04-03
Payer: MEDICARE

## 2025-04-03 VITALS — HEART RATE: 88 BPM | SYSTOLIC BLOOD PRESSURE: 142 MMHG | DIASTOLIC BLOOD PRESSURE: 82 MMHG | RESPIRATION RATE: 16 BRPM

## 2025-04-03 DIAGNOSIS — R42 DIZZY: ICD-10-CM

## 2025-04-03 DIAGNOSIS — E11.42 TYPE 2 DIABETES MELLITUS WITH DIABETIC POLYNEUROPATHY, WITHOUT LONG-TERM CURRENT USE OF INSULIN (HCC): Primary | ICD-10-CM

## 2025-04-03 DIAGNOSIS — M54.16 RADICULOPATHY, LUMBAR REGION: ICD-10-CM

## 2025-04-03 PROCEDURE — G8427 DOCREV CUR MEDS BY ELIG CLIN: HCPCS | Performed by: PSYCHIATRY & NEUROLOGY

## 2025-04-03 PROCEDURE — 99214 OFFICE O/P EST MOD 30 MIN: CPT | Performed by: PSYCHIATRY & NEUROLOGY

## 2025-04-03 PROCEDURE — 1159F MED LIST DOCD IN RCRD: CPT | Performed by: PSYCHIATRY & NEUROLOGY

## 2025-04-03 PROCEDURE — G8417 CALC BMI ABV UP PARAM F/U: HCPCS | Performed by: PSYCHIATRY & NEUROLOGY

## 2025-04-03 PROCEDURE — 3077F SYST BP >= 140 MM HG: CPT | Performed by: PSYCHIATRY & NEUROLOGY

## 2025-04-03 PROCEDURE — 1123F ACP DISCUSS/DSCN MKR DOCD: CPT | Performed by: PSYCHIATRY & NEUROLOGY

## 2025-04-03 PROCEDURE — 1126F AMNT PAIN NOTED NONE PRSNT: CPT | Performed by: PSYCHIATRY & NEUROLOGY

## 2025-04-03 PROCEDURE — 3079F DIAST BP 80-89 MM HG: CPT | Performed by: PSYCHIATRY & NEUROLOGY

## 2025-04-03 PROCEDURE — 1090F PRES/ABSN URINE INCON ASSESS: CPT | Performed by: PSYCHIATRY & NEUROLOGY

## 2025-04-03 PROCEDURE — 3046F HEMOGLOBIN A1C LEVEL >9.0%: CPT | Performed by: PSYCHIATRY & NEUROLOGY

## 2025-04-03 PROCEDURE — 1036F TOBACCO NON-USER: CPT | Performed by: PSYCHIATRY & NEUROLOGY

## 2025-04-03 PROCEDURE — G8400 PT W/DXA NO RESULTS DOC: HCPCS | Performed by: PSYCHIATRY & NEUROLOGY

## 2025-04-03 RX ORDER — CYCLOBENZAPRINE HCL 10 MG
10 TABLET ORAL 2 TIMES DAILY
Qty: 60 TABLET | Refills: 5 | Status: SHIPPED | OUTPATIENT
Start: 2025-04-03

## 2025-04-03 RX ORDER — PREGABALIN 50 MG/1
CAPSULE ORAL
Qty: 180 CAPSULE | Refills: 1 | Status: SHIPPED | OUTPATIENT
Start: 2025-04-03 | End: 2027-03-24

## 2025-04-03 RX ORDER — MECLIZINE HYDROCHLORIDE 25 MG/1
25 TABLET ORAL 3 TIMES DAILY PRN
Qty: 90 TABLET | Refills: 1 | Status: SHIPPED | OUTPATIENT
Start: 2025-04-03

## 2025-04-03 NOTE — PROGRESS NOTES
[facility] at [facility] at [address] [place] [address] [address] [place] [address] [phone]              Chief Complaint   Patient presents with    diabetic peripheral neuropathy stable     Lyrica - worsening balance concerns - more unsteadiness - no falls     Intermittent dizziness stable     PRN Antivert// mostly happens 1st thing in the morning x twice weekly     Lumbar pain controlled      PRN Flexeril - helps     Current Outpatient Medications   Medication Sig Dispense Refill    lovastatin (MEVACOR) 10 MG tablet TAKE ONE TABLET BY MOUTH NIGHTLY 90 tablet 1    apixaban (ELIQUIS) 5 MG TABS tablet Take 1 tablet by mouth 2 times daily 180 tablet 0    cyclobenzaprine (FLEXERIL) 10 MG tablet TAKE ONE TABLET BY MOUTH TWICE A DAY 60 tablet 5    predniSONE (DELTASONE) 5 MG tablet Take 1 tablet by mouth daily 30 tablet 1    montelukast (SINGULAIR) 10 MG tablet Take 1 tablet by mouth nightly 90 tablet 1    glipiZIDE (GLUCOTROL) 5 MG tablet TAKE ONE TABLET BY MOUTH TWICE DAILY 180 tablet 1    carvedilol (COREG) 6.25 MG tablet TAKE ONE TABLET BY MOUTH TWICE DAILY 180 tablet 1    tamsulosin (FLOMAX) 0.4 MG capsule TAKE ONE CAPSULE BY MOUTH DAILY 90 capsule 1    pregabalin (LYRICA) 50 MG capsule TAKE ONE CAPSULE BY MOUTH TWICE A DAY. MAX DAILY AMOUNT: 100 MG 60 capsule 5    meclizine (ANTIVERT) 25 MG tablet Take 1 tablet by mouth 3 times daily as needed for Dizziness 90 tablet 1    albuterol sulfate HFA (PROVENTIL;VENTOLIN;PROAIR) 108 (90 Base) MCG/ACT inhaler Inhale 2 puffs into the lungs every 4 hours as needed for Wheezing 18 g 1    furosemide (LASIX) 20 MG tablet Take 1 tablet by mouth daily (Patient taking differently: Take 1 tablet by mouth as needed) 30 tablet 0    diclofenac sodium (VOLTAREN) 1 % GEL APPLY 1 GRAM TO AFFECTED AREA OF KNEES AND BACK 4 TIMES DAILY 300

## 2025-04-04 ENCOUNTER — TELEPHONE (OUTPATIENT)
Dept: PHARMACY | Facility: CLINIC | Age: 80
End: 2025-04-04

## 2025-04-04 NOTE — TELEPHONE ENCOUNTER
[facility] CLINICAL PHARMACY: ADHERENCE REVIEW  Identified care gap per [employer] fills with  [facility]  Pharmacy: Diabetes adherence    Medicare Group Retiree - MR[employer]  MA-PCPi  Per insurer report, LIS-0 - co-pays are based on tiers and patient is subject to coverage gap.  Patient also appears to be prescribed: Statin    ASSESSMENT  DIABETES ADHERENCE    Insurance Records claims through  25  (Prior Year PDC = 98% - PASSED ; YTD PDC = 85%; Potential Fail Date: 25):   GLIPIZIDE TAB 5 MG last filled on 02.10.25 for 30 day supply. Next refill due: 25    Prescribed si tablet/capsule twice daily    Per Reconcile Dispense History and Insurer Portal: last filled on 25 for 30 day supply.     Per [facility] Pharmacy:  3 refills remaining.    Lab Results   Component Value Date    LABA1C 10.5 (H) 2025    LABA1C 8.0 (H) 2024    LABA1C 10.8 (H) 2024       STATIN ADHERENCE    Insurance Records claims through  25  (Prior Year PDC = 96% - PASSED ; YTD PDC = FIRST FILL; Potential Fail Date: 25):   LOVASTATIN TAB 10 MG last filled on 25 for 90 day supply. Next refill due: 25    Prescribed si tablet/capsule daily    Per Reconcile Dispense History and Insurer Portal: last filled on 25 for 90 day supply.     Per [facility] Pharmacy:  2 refills remaining.    Lab Results   Component Value Date    CHOL 185 2025    TRIG 229 (H) 2025    HDL 52 2025    LDLDIRECT 87 2021     Lab Results   Component Value Date    LDL 87.2 2025    LDLDIRECT 87 2021      ALT   Date Value Ref Range Status   2025 12 12 - 78 U/L Final     AST   Date Value Ref Range Status   2025 17 15 - 37 U/L Final     The ASCVD Risk score (Selena LUNDBERG, et al., 2019) failed to calculate for the following reasons:    Risk score cannot be calculated because patient has a medical history suggesting prior/existing ASCVD

## 2025-04-13 DIAGNOSIS — J41.1 MUCOPURULENT CHRONIC BRONCHITIS (HCC): ICD-10-CM

## 2025-04-14 RX ORDER — PREDNISONE 5 MG/1
5 TABLET ORAL DAILY
Qty: 30 TABLET | Refills: 1 | Status: SHIPPED | OUTPATIENT
Start: 2025-04-14

## 2025-04-17 ENCOUNTER — OFFICE VISIT (OUTPATIENT)
Facility: CLINIC | Age: 80
End: 2025-04-17
Payer: MEDICARE

## 2025-04-17 VITALS
DIASTOLIC BLOOD PRESSURE: 73 MMHG | BODY MASS INDEX: 32.6 KG/M2 | HEIGHT: 63 IN | RESPIRATION RATE: 16 BRPM | WEIGHT: 184 LBS | TEMPERATURE: 97.6 F | SYSTOLIC BLOOD PRESSURE: 138 MMHG | OXYGEN SATURATION: 96 % | HEART RATE: 76 BPM

## 2025-04-17 DIAGNOSIS — S91.109A OPEN TOE WOUND, INITIAL ENCOUNTER: ICD-10-CM

## 2025-04-17 DIAGNOSIS — S91.001D ANKLE WOUND, RIGHT, SUBSEQUENT ENCOUNTER: Primary | ICD-10-CM

## 2025-04-17 PROCEDURE — 1036F TOBACCO NON-USER: CPT

## 2025-04-17 PROCEDURE — 1090F PRES/ABSN URINE INCON ASSESS: CPT

## 2025-04-17 PROCEDURE — G8417 CALC BMI ABV UP PARAM F/U: HCPCS

## 2025-04-17 PROCEDURE — 1159F MED LIST DOCD IN RCRD: CPT

## 2025-04-17 PROCEDURE — G8400 PT W/DXA NO RESULTS DOC: HCPCS

## 2025-04-17 PROCEDURE — 1126F AMNT PAIN NOTED NONE PRSNT: CPT

## 2025-04-17 PROCEDURE — 3075F SYST BP GE 130 - 139MM HG: CPT

## 2025-04-17 PROCEDURE — 1123F ACP DISCUSS/DSCN MKR DOCD: CPT

## 2025-04-17 PROCEDURE — G8427 DOCREV CUR MEDS BY ELIG CLIN: HCPCS

## 2025-04-17 PROCEDURE — 99214 OFFICE O/P EST MOD 30 MIN: CPT

## 2025-04-17 PROCEDURE — 3078F DIAST BP <80 MM HG: CPT

## 2025-04-17 NOTE — PROGRESS NOTES
Chief Complaint   Patient presents with    Skin Problem     Wound to right ankle and right great toe          \"Have you been to the ER, urgent care clinic since your last visit?  Hospitalized since your last visit?\"    NO    “Have you seen or consulted any other health care providers outside of [facility] since your last visit?”    NO            Click Here for Release of Records Request     Health Maintenance Due   Topic Date Due    DTaP/Tdap/Td vaccine (1 - Tdap) 10/12/2011    Diabetic retinal exam  01/07/2021    Respiratory Syncytial Virus (RSV) Pregnant or age 60 yrs+ (1 - 1-dose 75+ series) Never done    Diabetic Alb to Cr ratio (uACR) test  11/11/2022    COVID-19 Vaccine (3 - 2024-25 season) 09/01/2024    Annual Wellness Visit (Medicare Advantage)  01/01/2025

## 2025-04-17 NOTE — PROGRESS NOTES
[place] [address]  Phone: [phone]  Fax: [phone]      Chief Complaint:     Chief Complaint   Patient presents with    Skin Problem     Wound to right ankle and right great toe        Subjective:   HPI:  Lesley Lauren is a 79 y.o. female that presents for the above complaint.     Right ankle/great toe wounds  Here today with concern for infection in chronic right ankle wound and new wound on right great toe.  Wound on right ankle has been present for about a month, previously with a scab, reportedly unroofed spontaneously from using the yellow hospital socks from recent admission.  Reportedly home health wound care has been caring for it twice a week with [facility].  Patient denies outright pain, bleeding, but has noticed some redness surrounding the wound.  Denies noticing any new discharge aside from what seems to be discoloration from [facility].  Reports that as per wound care, she has a new wound on her right great toe which has been worsening since a week ago.  New wound likewise without pain.  Denies fever.      Review of Systems   All other systems reviewed and are negative.    Current Medications  Current Outpatient Medications   Medication Sig    predniSONE (DELTASONE) 5 MG tablet TAKE ONE TABLET BY MOUTH EVERY DAY    pregabalin (LYRICA) 50 MG capsule TAKE ONE CAPSULE BY MOUTH TWICE A DAY. MAX DAILY AMOUNT: 100 MG    meclizine (ANTIVERT) 25 MG tablet Take 1 tablet by mouth 3 times daily as needed for Dizziness    cyclobenzaprine (FLEXERIL) 10 MG tablet Take 1 tablet by mouth 2 times daily    lovastatin (MEVACOR) 10 MG tablet TAKE ONE TABLET BY MOUTH NIGHTLY    apixaban (ELIQUIS) 5 MG TABS tablet Take 1 tablet by mouth 2 times daily    montelukast (SINGULAIR) 10 MG tablet Take 1 tablet by mouth nightly    glipiZIDE (GLUCOTROL) 5 MG tablet TAKE ONE TABLET BY MOUTH TWICE DAILY    carvedilol (COREG) 6.25 MG tablet TAKE ONE TABLET BY

## 2025-04-18 ENCOUNTER — OFFICE VISIT (OUTPATIENT)
Age: 80
End: 2025-04-18
Payer: MEDICARE

## 2025-04-18 VITALS
OXYGEN SATURATION: 96 % | HEIGHT: 63 IN | SYSTOLIC BLOOD PRESSURE: 121 MMHG | RESPIRATION RATE: 16 BRPM | DIASTOLIC BLOOD PRESSURE: 76 MMHG | BODY MASS INDEX: 32.6 KG/M2 | HEART RATE: 88 BPM | TEMPERATURE: 98 F | WEIGHT: 184 LBS

## 2025-04-18 DIAGNOSIS — Z86.000 HISTORY OF DUCTAL CARCINOMA IN SITU (DCIS) OF BREAST: ICD-10-CM

## 2025-04-18 DIAGNOSIS — Z86.73 HISTORY OF STROKE: ICD-10-CM

## 2025-04-18 DIAGNOSIS — I26.94 MULTIPLE SUBSEGMENTAL PULMONARY EMBOLI WITHOUT ACUTE COR PULMONALE (HCC): Primary | ICD-10-CM

## 2025-04-18 DIAGNOSIS — J41.1 MUCOPURULENT CHRONIC BRONCHITIS (HCC): ICD-10-CM

## 2025-04-18 DIAGNOSIS — Z86.718 HISTORY OF DVT (DEEP VEIN THROMBOSIS): ICD-10-CM

## 2025-04-18 DIAGNOSIS — E11.42 DIABETIC POLYNEUROPATHY ASSOCIATED WITH TYPE 2 DIABETES MELLITUS (HCC): ICD-10-CM

## 2025-04-18 DIAGNOSIS — E11.42 TYPE 2 DIABETES MELLITUS WITH DIABETIC POLYNEUROPATHY, WITHOUT LONG-TERM CURRENT USE OF INSULIN (HCC): ICD-10-CM

## 2025-04-18 DIAGNOSIS — Z87.19 HISTORY OF GI BLEED: ICD-10-CM

## 2025-04-18 DIAGNOSIS — N18.30 STAGE 3 CHRONIC KIDNEY DISEASE, UNSPECIFIED WHETHER STAGE 3A OR 3B CKD (HCC): ICD-10-CM

## 2025-04-18 PROCEDURE — 3046F HEMOGLOBIN A1C LEVEL >9.0%: CPT | Performed by: NURSE PRACTITIONER

## 2025-04-18 PROCEDURE — 1090F PRES/ABSN URINE INCON ASSESS: CPT | Performed by: NURSE PRACTITIONER

## 2025-04-18 PROCEDURE — 3074F SYST BP LT 130 MM HG: CPT | Performed by: NURSE PRACTITIONER

## 2025-04-18 PROCEDURE — 1126F AMNT PAIN NOTED NONE PRSNT: CPT | Performed by: NURSE PRACTITIONER

## 2025-04-18 PROCEDURE — G8400 PT W/DXA NO RESULTS DOC: HCPCS | Performed by: NURSE PRACTITIONER

## 2025-04-18 PROCEDURE — 99205 OFFICE O/P NEW HI 60 MIN: CPT | Performed by: NURSE PRACTITIONER

## 2025-04-18 PROCEDURE — 1123F ACP DISCUSS/DSCN MKR DOCD: CPT | Performed by: NURSE PRACTITIONER

## 2025-04-18 PROCEDURE — 1036F TOBACCO NON-USER: CPT | Performed by: NURSE PRACTITIONER

## 2025-04-18 PROCEDURE — 1159F MED LIST DOCD IN RCRD: CPT | Performed by: NURSE PRACTITIONER

## 2025-04-18 PROCEDURE — 1160F RVW MEDS BY RX/DR IN RCRD: CPT | Performed by: NURSE PRACTITIONER

## 2025-04-18 PROCEDURE — 3023F SPIROM DOC REV: CPT | Performed by: NURSE PRACTITIONER

## 2025-04-18 PROCEDURE — G8427 DOCREV CUR MEDS BY ELIG CLIN: HCPCS | Performed by: NURSE PRACTITIONER

## 2025-04-18 PROCEDURE — G8417 CALC BMI ABV UP PARAM F/U: HCPCS | Performed by: NURSE PRACTITIONER

## 2025-04-18 PROCEDURE — 3078F DIAST BP <80 MM HG: CPT | Performed by: NURSE PRACTITIONER

## 2025-04-18 ASSESSMENT — PATIENT HEALTH QUESTIONNAIRE - PHQ9
SUM OF ALL RESPONSES TO PHQ QUESTIONS 1-9: 0
2. FEELING DOWN, DEPRESSED OR HOPELESS: NOT AT ALL
SUM OF ALL RESPONSES TO PHQ QUESTIONS 1-9: 0
1. LITTLE INTEREST OR PLEASURE IN DOING THINGS: NOT AT ALL

## 2025-04-18 NOTE — PROGRESS NOTES
Lesley Lauren is a 79 y.o. female follow up for         1. Have you been to the ER, urgent care clinic since your last visit?  Hospitalized since your last visit? New Patient    2. Have you seen or consulted any other health care providers outside of the [facility] since your last visit?  Include any pap smears or colon screening. New Patient

## 2025-04-18 NOTE — PROGRESS NOTES
[facility]  Medical Oncology at [place]  [phone]    Hematology / Oncology Consult    Reason for Visit:   Lesley Lauren is a 79 y.o. female who is seen in consultation at the request of Dr. Zamorano for evaluation of Pulmonary Embolism.      History of Present Illness:   Lesley Lauren is a 79 y.o. female who presents for evaluation of pulmonary embolism.     Patient was evaluated at [facility] 3/13/25 for worsening SOB after several days of worsening cough and feeling poorly and was found to have bilateral PE, admitted 3/13 -3/16. She was started on Eliquis during that time and continues 5mg BID. Hospitalist and PCP have recommended lifelong anticoagulation.     Prior LLE DVT and PE in 2019 and was on anticoagulation after.  She states she had dental work done later in 2019 which required her to hold anticoagulation and she was told she didn't need to resume it.  Had IVC filter placed at that time. (D/C Summary from [facility] reports she was on anticoagulation until about a year ago and new PCP didn't continue it for unknown reason). She is not aware of the cause of prior DVT.  She states she had a GI bleed while she was on anticoagulation previously (including in reading from IVC filter 2/8/19 as prior to that incident of clot)  but this was due to a medication she was taking though she is not sure of the name (allergy list includes Rivaroxaban with internal bleeding listed).  Denies melena, BRBPR, hematochezia, bleeding.     Follows with wound care for slow healing wounds to right foot.  Has diabetic neuropathy, followed by neurology, on Lyrica. Denies falls within the last 6 months but does occasionally have dizziness.  Denies recent surgery, travel, debility requiring bedbound status, fractures. She is a current everyday smoker.     She has prior history of DVT, TIA and Cerebral artery occlusion.     She is accompanied by her daughter today.    Past Medical History:

## 2025-04-24 ENCOUNTER — RESULTS FOLLOW-UP (OUTPATIENT)
Facility: CLINIC | Age: 80
End: 2025-04-24

## 2025-04-28 ENCOUNTER — TELEPHONE (OUTPATIENT)
Facility: CLINIC | Age: 80
End: 2025-04-28

## 2025-04-28 ENCOUNTER — OFFICE VISIT (OUTPATIENT)
Facility: CLINIC | Age: 80
End: 2025-04-28
Payer: MEDICARE

## 2025-04-28 VITALS
DIASTOLIC BLOOD PRESSURE: 91 MMHG | TEMPERATURE: 97.2 F | RESPIRATION RATE: 16 BRPM | SYSTOLIC BLOOD PRESSURE: 135 MMHG | OXYGEN SATURATION: 96 % | BODY MASS INDEX: 32.06 KG/M2 | WEIGHT: 181 LBS

## 2025-04-28 DIAGNOSIS — S91.109D OPEN WOUND OF TOE, SUBSEQUENT ENCOUNTER: ICD-10-CM

## 2025-04-28 DIAGNOSIS — E11.42 TYPE 2 DIABETES MELLITUS WITH DIABETIC POLYNEUROPATHY, WITHOUT LONG-TERM CURRENT USE OF INSULIN (HCC): Primary | ICD-10-CM

## 2025-04-28 DIAGNOSIS — I10 PRIMARY HYPERTENSION: ICD-10-CM

## 2025-04-28 DIAGNOSIS — J41.1 MUCOPURULENT CHRONIC BRONCHITIS (HCC): ICD-10-CM

## 2025-04-28 DIAGNOSIS — E11.42 DIABETIC POLYNEUROPATHY ASSOCIATED WITH TYPE 2 DIABETES MELLITUS (HCC): ICD-10-CM

## 2025-04-28 DIAGNOSIS — S91.001D ANKLE WOUND, RIGHT, SUBSEQUENT ENCOUNTER: ICD-10-CM

## 2025-04-28 PROBLEM — S91.109A OPEN WOUND OF TOE: Status: ACTIVE | Noted: 2025-04-28

## 2025-04-28 PROCEDURE — 3023F SPIROM DOC REV: CPT | Performed by: FAMILY MEDICINE

## 2025-04-28 PROCEDURE — 1123F ACP DISCUSS/DSCN MKR DOCD: CPT | Performed by: FAMILY MEDICINE

## 2025-04-28 PROCEDURE — 3046F HEMOGLOBIN A1C LEVEL >9.0%: CPT | Performed by: FAMILY MEDICINE

## 2025-04-28 PROCEDURE — 1090F PRES/ABSN URINE INCON ASSESS: CPT | Performed by: FAMILY MEDICINE

## 2025-04-28 PROCEDURE — G2211 COMPLEX E/M VISIT ADD ON: HCPCS | Performed by: FAMILY MEDICINE

## 2025-04-28 PROCEDURE — G8417 CALC BMI ABV UP PARAM F/U: HCPCS | Performed by: FAMILY MEDICINE

## 2025-04-28 PROCEDURE — 3075F SYST BP GE 130 - 139MM HG: CPT | Performed by: FAMILY MEDICINE

## 2025-04-28 PROCEDURE — G8400 PT W/DXA NO RESULTS DOC: HCPCS | Performed by: FAMILY MEDICINE

## 2025-04-28 PROCEDURE — 99214 OFFICE O/P EST MOD 30 MIN: CPT | Performed by: FAMILY MEDICINE

## 2025-04-28 PROCEDURE — 1036F TOBACCO NON-USER: CPT | Performed by: FAMILY MEDICINE

## 2025-04-28 PROCEDURE — 3080F DIAST BP >= 90 MM HG: CPT | Performed by: FAMILY MEDICINE

## 2025-04-28 PROCEDURE — 1159F MED LIST DOCD IN RCRD: CPT | Performed by: FAMILY MEDICINE

## 2025-04-28 PROCEDURE — G8427 DOCREV CUR MEDS BY ELIG CLIN: HCPCS | Performed by: FAMILY MEDICINE

## 2025-04-28 RX ORDER — GLIPIZIDE 10 MG/1
10 TABLET ORAL 2 TIMES DAILY
Qty: 180 TABLET | Refills: 1 | Status: SHIPPED | OUTPATIENT
Start: 2025-04-28

## 2025-04-28 RX ORDER — GLIPIZIDE 10 MG/1
10 TABLET ORAL 2 TIMES DAILY
Qty: 60 TABLET | Refills: 3 | Status: SHIPPED | OUTPATIENT
Start: 2025-04-28 | End: 2025-04-28

## 2025-04-28 ASSESSMENT — ENCOUNTER SYMPTOMS
APNEA: 0
CHEST TIGHTNESS: 0

## 2025-04-28 NOTE — PROGRESS NOTES
\"Have you been to the ER, urgent care clinic since your last visit?  Hospitalized since your last visit?\"    NO    “Have you seen or consulted any other health care providers outside of [facility] since your last visit?”    NO            Click Here for Release of Records Request

## 2025-04-28 NOTE — PROGRESS NOTES
[facility]    History of Present Illness:   Lesley Lauren is a 79 y.o. female with history of v  CC: Follow up  History provided by patient and Records    HPI:  COPD:See[facility] Dr. Badillo in [facility].    Right Foot Wounds: Noting that wound on the right great toe still hurts and has some swelling but has improved with discharge decreased now.  Persistent ankle wound that patient has HH Wound care that is helping    Diabetes: Diabetes remains uncontrolled.  Reports is eating more vegetables and meat and BG in the [id number]'s primarily but last A1C was 10+.  Is taking Glipizide  mg BID currently    Hypertension Follow up:  The patient reports:  taking medications as instructed, no medication side effects noted, no TIA's, no chest pain on exertion, no dyspnea on exertion, no swelling of ankles.     BP Readings from Last 3 Encounters:   04/28/25 (!) 135/91   04/18/25 121/76   04/17/25 138/73        Health Maintenance  Health Maintenance Due   Topic Date Due    DTaP/Tdap/Td vaccine (1 - Tdap) 10/12/2011    Diabetic retinal exam  01/07/2021    Respiratory Syncytial Virus (RSV) Pregnant or age 60 yrs+ (1 - 1-dose 75+ series) Never done    Diabetic Alb to Cr ratio (uACR) test  11/11/2022    COVID-19 Vaccine (3 - 2024-25 season) 09/01/2024    Annual Wellness Visit (Medicare Advantage)  01/01/2025       Past Medical, Family, and Social History:     Current Outpatient Medications on File Prior to Visit   Medication Sig Dispense Refill    predniSONE (DELTASONE) 5 MG tablet TAKE ONE TABLET BY MOUTH EVERY DAY 30 tablet 1    pregabalin (LYRICA) 50 MG capsule TAKE ONE CAPSULE BY MOUTH TWICE A DAY. MAX DAILY AMOUNT: 100  capsule 1    meclizine (ANTIVERT) 25 MG tablet Take 1 tablet by mouth 3 times daily as needed for Dizziness 90 tablet 1    cyclobenzaprine (FLEXERIL) 10 MG tablet Take 1 tablet by mouth 2 times daily 60 tablet 5    lovastatin (MEVACOR) 10 MG tablet TAKE ONE TABLET BY MOUTH NIGHTLY 90

## 2025-04-28 NOTE — TELEPHONE ENCOUNTER
Pt states she will be driving herself to her 2:40 appt today. States she will need a wheelchair when she arrive. Please advise.

## 2025-05-05 ENCOUNTER — TELEPHONE (OUTPATIENT)
Facility: CLINIC | Age: 80
End: 2025-05-05

## 2025-05-06 ENCOUNTER — TELEPHONE (OUTPATIENT)
Facility: CLINIC | Age: 80
End: 2025-05-06

## 2025-05-06 NOTE — TELEPHONE ENCOUNTER
Care Transitions Initial Follow Up Call    Outreach made within 2 business days of discharge: Yes    Patient: Lesley Lauren Patient : 1945   MRN: [id number]  Reason for Admission: fall  Discharge Date: 25       Spoke with: patient    Discharge department/facility: [facility] Interactive Patient Contact:  Was patient able to fill all prescriptions: Yes  Was patient instructed to bring all medications to the follow-up visit: Yes  Is patient taking all medications as directed in the discharge summary? Yes  Does patient understand their discharge instructions: Yes  Does patient have questions or concerns that need addressed prior to 7-14 day follow up office visit: no    Additional needs identified to be addressed with provider  No needs identified             Scheduled appointment with PCP within 7-14 days    Follow Up  Future Appointments   Date Time Provider Department Center   2025  3:40 PM Astrid Mccabe MD [facility]   2025  2:00 PM Carmen Ellsworth MD [facility] DEP   2025 11:45 AM Natasha Thomson MD [facility]       MARLO LEACH RN

## 2025-05-07 ENCOUNTER — TELEPHONE (OUTPATIENT)
Facility: CLINIC | Age: 80
End: 2025-05-07

## 2025-05-07 NOTE — TELEPHONE ENCOUNTER
Pt daughter Lesley states she will be dropping off FMLA forms for pt. States the forms are for Lesley  since she will be taking care of the pt. Lesley HUNT is asking for forms to be completed for  at tomorrows OV. Lesley HUNT states if pcp have any questions please give her a call. Please advise.

## 2025-05-07 NOTE — TELEPHONE ENCOUNTER
Petra states she did not know the hospital put pt on Oxycodone. States when she went to go put in that she gave the pt Oxycodone, it pop on her Medical records that this med can interact with Diclofenac. Petra states pt is not taking Diclofenac at the moment. Please advise.

## 2025-05-08 ENCOUNTER — OFFICE VISIT (OUTPATIENT)
Facility: CLINIC | Age: 80
End: 2025-05-08

## 2025-05-08 VITALS
OXYGEN SATURATION: 97 % | HEIGHT: 63 IN | WEIGHT: 181.8 LBS | BODY MASS INDEX: 32.21 KG/M2 | TEMPERATURE: 98.1 F | DIASTOLIC BLOOD PRESSURE: 69 MMHG | RESPIRATION RATE: 18 BRPM | HEART RATE: 80 BPM | SYSTOLIC BLOOD PRESSURE: 124 MMHG
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
## 2025-05-08 DIAGNOSIS — S22.49XS CLOSED FRACTURE OF MULTIPLE RIBS, UNSPECIFIED LATERALITY, SEQUELA: Primary | ICD-10-CM

## 2025-05-08 RX ORDER — FLUTICASONE FUROATE, UMECLIDINIUM BROMIDE AND VILANTEROL TRIFENATATE 200; 62.5; 25 UG/1; UG/1; UG/1
POWDER RESPIRATORY (INHALATION)
COMMUNITY
Start: 2025-04-29

## 2025-05-08 RX ORDER — OXYCODONE HYDROCHLORIDE 5 MG/1
5 TABLET ORAL 2 TIMES DAILY PRN
Qty: 12 TABLET | Refills: 0 | Status: SHIPPED | OUTPATIENT
Start: 2025-05-08 | End: 2025-05-14

## 2025-05-08 RX ORDER — DILTIAZEM HYDROCHLORIDE 90 MG/1
90 CAPSULE, EXTENDED RELEASE ORAL 2 TIMES DAILY
COMMUNITY
Start: 2025-05-02

## 2025-05-08 RX ORDER — OXYCODONE HYDROCHLORIDE 5 MG/1
TABLET ORAL
COMMUNITY
Start: 2025-05-02

## 2025-05-08 RX ORDER — POLYETHYLENE GLYCOL 3350 17 G/17G
17 POWDER, FOR SOLUTION ORAL DAILY PRN
Qty: 1530 G | Refills: 1 | Status: SHIPPED | OUTPATIENT
Start: 2025-05-08 | End: 2025-11-04

## 2025-05-08 NOTE — PROGRESS NOTES
Have you been to the ER, urgent care clinic since your last visit?  Hospitalized since your last visit?   NO    Have you seen or consulted any other health care providers outside our system since your last visit?   NO      “Have you had a diabetic eye exam?”    NO     Date of last diabetic eye exam: 1/7/2020           
  Abdominal:      General: Abdomen is flat. Bowel sounds are normal.      Palpations: Abdomen is soft.   Musculoskeletal:         General: Normal range of motion.      Cervical back: Normal range of motion.      Right lower leg: No edema.      Left lower leg: No edema.   Skin:     Comments: Chronic R heel wound with clean bandage.    Large 6 cm superficial wound with granulation tissue, healing appropriately.    See media   Neurological:      General: No focal deficit present.      Mental Status: She is alert.   Psychiatric:         Mood and Affect: Mood normal.         No results found for this or any previous visit (from the past 12 hours).  All imaging and labs ordered in clinic personally reviewed by me.     Assessment and Plan:    1. Closed fracture of multiple ribs, unspecified laterality, sequela  Outside records reviewed in chart, not all available including serial CT images. CXR during admission NAP, no evidence of pneumo or PNA. Decreased BL lung sounds at bases but still moving appropriately, inspiration limited d/t pain. Low c/f PNA. Encouraged daily spirometry. Pain regimen of Tylenol 1000 mg TID with prn oxy 2.5/5 q nightly, decrease as tolerated. Miralax qd. Return precautions given including fevers, chills, worsening cough, congestion.  -     oxyCODONE (ROXICODONE) 5 MG immediate release tablet; Take 1 tablet by mouth 2 times daily as needed for Pain for up to 6 days. Intended supply: 3 days. Take lowest dose possible to manage pain Max Daily Amount: 10 mg, Disp-12 tablet, R-0Normal  -     External Referral To Physical Therapy  2. Wound LUE  Healing appropriately. Continue xeroform with ABD dressings. Home health nursing changing dressing 3x weekly.   - Continue wound care per home health recs  3. Anticoagulation  Taking Eliquis 5 mg BID. Avoid NSAIDs for pain. No evidence of overt bleeding or abnormal healing on exam.    Patient's past medical history, including but not limited to problem list,

## 2025-05-11 DIAGNOSIS — N20.0 KIDNEY STONE: ICD-10-CM

## 2025-05-12 RX ORDER — TAMSULOSIN HYDROCHLORIDE 0.4 MG/1
0.4 CAPSULE ORAL DAILY
Qty: 90 CAPSULE | Refills: 1 | Status: SHIPPED | OUTPATIENT
Start: 2025-05-12

## 2025-05-21 ENCOUNTER — TELEPHONE (OUTPATIENT)
Facility: CLINIC | Age: 80
End: 2025-05-21

## 2025-05-21 NOTE — TELEPHONE ENCOUNTER
Petra states pt slid out of her chair onto the floor today. Also pt has been having a little confusion can Dr. Vincent do a urine sample at  tomorrow. Please advise.

## 2025-05-22 ENCOUNTER — OFFICE VISIT (OUTPATIENT)
Facility: CLINIC | Age: 80
End: 2025-05-22
Payer: MEDICARE

## 2025-05-22 VITALS
WEIGHT: 177.6 LBS | HEIGHT: 63 IN | TEMPERATURE: 97.8 F | BODY MASS INDEX: 31.47 KG/M2 | DIASTOLIC BLOOD PRESSURE: 70 MMHG | SYSTOLIC BLOOD PRESSURE: 111 MMHG | OXYGEN SATURATION: 96 % | RESPIRATION RATE: 18 BRPM | HEART RATE: 70 BPM

## 2025-05-22 DIAGNOSIS — E11.65 TYPE 2 DIABETES MELLITUS WITH HYPERGLYCEMIA, WITHOUT LONG-TERM CURRENT USE OF INSULIN (HCC): ICD-10-CM

## 2025-05-22 DIAGNOSIS — S81.801D OPEN WOUND OF RIGHT LOWER EXTREMITY, SUBSEQUENT ENCOUNTER: ICD-10-CM

## 2025-05-22 DIAGNOSIS — R09.89: ICD-10-CM

## 2025-05-22 DIAGNOSIS — L03.115 CELLULITIS OF RIGHT LOWER EXTREMITY: Primary | ICD-10-CM

## 2025-05-22 PROCEDURE — 1159F MED LIST DOCD IN RCRD: CPT

## 2025-05-22 PROCEDURE — 1090F PRES/ABSN URINE INCON ASSESS: CPT

## 2025-05-22 PROCEDURE — 1126F AMNT PAIN NOTED NONE PRSNT: CPT

## 2025-05-22 PROCEDURE — G8400 PT W/DXA NO RESULTS DOC: HCPCS

## 2025-05-22 PROCEDURE — 3046F HEMOGLOBIN A1C LEVEL >9.0%: CPT

## 2025-05-22 PROCEDURE — G8417 CALC BMI ABV UP PARAM F/U: HCPCS

## 2025-05-22 PROCEDURE — 3078F DIAST BP <80 MM HG: CPT

## 2025-05-22 PROCEDURE — 99214 OFFICE O/P EST MOD 30 MIN: CPT

## 2025-05-22 PROCEDURE — G8427 DOCREV CUR MEDS BY ELIG CLIN: HCPCS

## 2025-05-22 PROCEDURE — 1123F ACP DISCUSS/DSCN MKR DOCD: CPT

## 2025-05-22 PROCEDURE — 3074F SYST BP LT 130 MM HG: CPT

## 2025-05-22 PROCEDURE — 1036F TOBACCO NON-USER: CPT

## 2025-05-22 RX ORDER — SULFAMETHOXAZOLE AND TRIMETHOPRIM 800; 160 MG/1; MG/1
1 TABLET ORAL 2 TIMES DAILY
Qty: 14 TABLET | Refills: 0 | Status: SHIPPED | OUTPATIENT
Start: 2025-05-22 | End: 2025-05-29

## 2025-05-22 RX ORDER — BLOOD-GLUCOSE METER
1 KIT MISCELLANEOUS 4 TIMES DAILY
Qty: 1 KIT | Refills: 0 | Status: SHIPPED | OUTPATIENT
Start: 2025-05-22

## 2025-05-22 RX ORDER — INSULIN GLARGINE 100 [IU]/ML
10 INJECTION, SOLUTION SUBCUTANEOUS NIGHTLY
Qty: 5 ADJUSTABLE DOSE PRE-FILLED PEN SYRINGE | Refills: 3 | Status: SHIPPED | OUTPATIENT
Start: 2025-05-22

## 2025-05-22 NOTE — PROGRESS NOTES
I saw and evaluated the patient, performing the key elements of the service on the date of service.  I discussed the findings, assessment and plan with the resident and agree with the resident's findings and plan as documented in the resident's note. See picture of wound in media. Agree with labs today, namely the ESR/ CRP and CBC, to further eval if we need a higher suspicion for osteomyelitis. VS stable in office, agree with PO abx for now. Poorly controlled T2DM and suspected PVD contributing to presentation.     Paula Tam MD 5/22/2025

## 2025-05-22 NOTE — PROGRESS NOTES
History of Present Illness:    Lesley Lauren is a 79 y.o. female who presents for wound evaluation    RLE wound, initially noticed it in March during hositalization at [facility]  Started as a small skin break from her ankle bracelet, slowly enlarging since then   Home health has been following, few weeks ago they stared applying Santyl ointment    recommended office evaluation due to prolonged healing   Clear discharge from wound and has noticed increased erythema       Physical Examination:     /70 (BP Site: Right Upper Arm, Patient Position: Sitting, BP Cuff Size: Large Adult)   Pulse 70   Temp 97.8 °F (36.6 °C) (Oral)   Resp 18   Ht 1.6 m (5' 3\")   Wt 80.6 kg (177 lb 9.6 oz)   SpO2 96%   BMI 31.46 kg/m²     GEN: No apparent distress. Alert and oriented and responds to all questions appropriately.  EYES:  Conjunctiva clear; extraocular movements are intact  EAR: External ears are normal.         LUNGS: Respirations unlabored   NEUROLOGIC:  No focal neurologic deficits. Coordination and gait grossly intact.   EXT: Well perfused. No edema.  SKIN: open wound right lateral malleolus, nonpurulent, serous discharge, surrounding warmth and erythema     Diabetic foot exam:  Sensation: abnormal sensation all toes of RLE. Sensation does not start until midfoot.   Pulses: nonpalpable  Fungal nails: unable to assess due to varnish            Past Medical History:   Diagnosis Date    Asthma     Bronchitis     Cancer (HCC)     breast - left    Cerebral artery occlusion with cerebral infarction (HCC)     COPD (chronic obstructive pulmonary disease) (HCC)     [facility]; pulmonary nodule 7mm stable (found 2011)    Diabetes (HCC)     Hepatitis age 9    High cholesterol     History of blood transfusion     Hx of blood clots     Hypertension     Obesity (BMI 30-39.9)     Thromboembolus (HCC)     L LE with PE    TIA (transient ischemic attack) 10/27/2021       Current Outpatient

## 2025-05-22 NOTE — PROGRESS NOTES
Have you been to the ER, urgent care clinic since your last visit?  Hospitalized since your last visit?   NO    Have you seen or consulted any other health care providers outside our system since your last visit?   NO      “Have you had a diabetic eye exam?”    NO

## 2025-05-23 ENCOUNTER — RESULTS FOLLOW-UP (OUTPATIENT)
Facility: CLINIC | Age: 80
End: 2025-05-23

## 2025-05-23 DIAGNOSIS — R39.89 URINE DISCOLORATION: ICD-10-CM

## 2025-05-23 DIAGNOSIS — E11.42 TYPE 2 DIABETES MELLITUS WITH DIABETIC POLYNEUROPATHY, WITHOUT LONG-TERM CURRENT USE OF INSULIN (HCC): ICD-10-CM

## 2025-05-23 LAB
CRP SERPL-MCNC: 4.15 MG/DL (ref 0–0.3)
ERYTHROCYTE [DISTWIDTH] IN BLOOD BY AUTOMATED COUNT: 16.2 % (ref 11.5–14.5)
ERYTHROCYTE [SEDIMENTATION RATE] IN BLOOD: 67 MM/HR (ref 0–30)
HCT VFR BLD AUTO: 37.6 % (ref 35–47)
HGB BLD-MCNC: 11 G/DL (ref 11.5–16)
MCH RBC QN AUTO: 23.3 PG (ref 26–34)
MCHC RBC AUTO-ENTMCNC: 29.3 G/DL (ref 30–36.5)
MCV RBC AUTO: 79.5 FL (ref 80–99)
NRBC # BLD: 0 K/UL (ref 0–0.01)
NRBC BLD-RTO: 0 PER 100 WBC
PLATELET # BLD AUTO: 370 K/UL (ref 150–400)
PMV BLD AUTO: 11.4 FL (ref 8.9–12.9)
RBC # BLD AUTO: 4.73 M/UL (ref 3.8–5.2)
WBC # BLD AUTO: 10.3 K/UL (ref 3.6–11)

## 2025-05-23 NOTE — RESULT ENCOUNTER NOTE
Normal WBC  ESR and CRP sig elevated.     Moderate to high suspicion for ankle osteomyelitis as patient has a chronic open wound overlying right lateral malleolus. Spoke with patient over the phone and recommended she go to the ER tonight for MRI and potentially IV abx / ortho referral. Patient stated she does not have a ride tonight but will plan to go to [facility] ER first thing in the morning.

## 2025-05-24 LAB
CREAT UR-MCNC: 85.8 MG/DL
MICROALBUMIN UR-MCNC: 18.7 MG/DL
MICROALBUMIN/CREAT UR-RTO: 218 MG/G (ref 0–30)

## 2025-05-26 LAB
BACTERIA SPEC CULT: ABNORMAL
BACTERIA SPEC CULT: ABNORMAL
CC UR VC: ABNORMAL
SERVICE CMNT-IMP: ABNORMAL

## 2025-05-27 NOTE — RESULT ENCOUNTER NOTE
Patient returned call, notified per Dr. Vincent-    \"Normal WBC   ESR and CRP sig elevated.     Moderate to high suspicion for ankle osteomyelitis as patient has a chronic open wound overlying right lateral malleolus. Spoke with patient over the phone and recommended she go to the ER tonight for MRI and potentially IV abx / ortho referral. Patient stated she does not have a ride tonight but will plan to go to [facility] ER first thing in the morning.\"    Reviewed seriousness of osteomyelitis if this is the case and importance of being seen in the ED. Stated she will not be going to ED as her PCP is not the one who notified her to do so. Stated she will be waiting to discuss with PCP at NOV.

## 2025-05-28 ENCOUNTER — HOSPITAL ENCOUNTER (INPATIENT)
Facility: HOSPITAL | Age: 80
LOS: 8 days | Discharge: NURSING, REHAB OR LONG-TERM CARE | DRG: 629 | End: 2025-06-05
Attending: EMERGENCY MEDICINE | Admitting: FAMILY MEDICINE
Payer: MEDICARE

## 2025-05-28 ENCOUNTER — APPOINTMENT (OUTPATIENT)
Facility: HOSPITAL | Age: 80
DRG: 629 | End: 2025-05-28
Payer: MEDICARE

## 2025-05-28 DIAGNOSIS — E11.65 TYPE 2 DIABETES MELLITUS WITH HYPERGLYCEMIA (HCC): ICD-10-CM

## 2025-05-28 DIAGNOSIS — S91.001A ANKLE WOUND, RIGHT, INITIAL ENCOUNTER: Primary | ICD-10-CM

## 2025-05-28 DIAGNOSIS — E11.65 TYPE 2 DIABETES MELLITUS WITH HYPERGLYCEMIA, WITHOUT LONG-TERM CURRENT USE OF INSULIN (HCC): ICD-10-CM

## 2025-05-28 DIAGNOSIS — L08.9 DIABETIC FOOT INFECTION (HCC): ICD-10-CM

## 2025-05-28 DIAGNOSIS — I73.9 PAD (PERIPHERAL ARTERY DISEASE): ICD-10-CM

## 2025-05-28 DIAGNOSIS — E11.628 DIABETIC FOOT INFECTION (HCC): ICD-10-CM

## 2025-05-28 LAB
ALBUMIN SERPL-MCNC: 3.4 G/DL (ref 3.5–5)
ALBUMIN/GLOB SERPL: 0.8 (ref 1.1–2.2)
ALP SERPL-CCNC: 217 U/L (ref 45–117)
ALT SERPL-CCNC: 14 U/L (ref 12–78)
ANION GAP SERPL CALC-SCNC: 5 MMOL/L (ref 2–12)
AST SERPL-CCNC: 11 U/L (ref 15–37)
BASOPHILS # BLD: 0.06 K/UL (ref 0–0.1)
BASOPHILS NFR BLD: 0.7 % (ref 0–1)
BILIRUB SERPL-MCNC: 0.3 MG/DL (ref 0.2–1)
BUN SERPL-MCNC: 22 MG/DL (ref 6–20)
BUN/CREAT SERPL: 17 (ref 12–20)
CALCIUM SERPL-MCNC: 9.1 MG/DL (ref 8.5–10.1)
CHLORIDE SERPL-SCNC: 105 MMOL/L (ref 97–108)
CO2 SERPL-SCNC: 25 MMOL/L (ref 21–32)
COMMENT:: NORMAL
CREAT SERPL-MCNC: 1.32 MG/DL (ref 0.55–1.02)
CRP SERPL-MCNC: 4.42 MG/DL (ref 0–0.3)
DIFFERENTIAL METHOD BLD: ABNORMAL
EOSINOPHIL # BLD: 0.37 K/UL (ref 0–0.4)
EOSINOPHIL NFR BLD: 4.1 % (ref 0–7)
ERYTHROCYTE [DISTWIDTH] IN BLOOD BY AUTOMATED COUNT: 15.9 % (ref 11.5–14.5)
ERYTHROCYTE [SEDIMENTATION RATE] IN BLOOD: 72 MM/HR (ref 0–30)
GLOBULIN SER CALC-MCNC: 4.2 G/DL (ref 2–4)
GLUCOSE SERPL-MCNC: 254 MG/DL (ref 65–100)
HCT VFR BLD AUTO: 35.1 % (ref 35–47)
HGB BLD-MCNC: 11.1 G/DL (ref 11.5–16)
IMM GRANULOCYTES # BLD AUTO: 0.04 K/UL (ref 0–0.04)
IMM GRANULOCYTES NFR BLD AUTO: 0.4 % (ref 0–0.5)
LACTATE BLD-SCNC: 1.25 MMOL/L (ref 0.4–2)
LYMPHOCYTES # BLD: 1.72 K/UL (ref 0.8–3.5)
LYMPHOCYTES NFR BLD: 19.1 % (ref 12–49)
MCH RBC QN AUTO: 23 PG (ref 26–34)
MCHC RBC AUTO-ENTMCNC: 31.6 G/DL (ref 30–36.5)
MCV RBC AUTO: 72.8 FL (ref 80–99)
MONOCYTES # BLD: 0.68 K/UL (ref 0–1)
MONOCYTES NFR BLD: 7.6 % (ref 5–13)
NEUTS SEG # BLD: 6.12 K/UL (ref 1.8–8)
NEUTS SEG NFR BLD: 68.1 % (ref 32–75)
NRBC # BLD: 0 K/UL (ref 0–0.01)
NRBC BLD-RTO: 0 PER 100 WBC
PLATELET # BLD AUTO: 309 K/UL (ref 150–400)
PMV BLD AUTO: 10.4 FL (ref 8.9–12.9)
POTASSIUM SERPL-SCNC: 4.5 MMOL/L (ref 3.5–5.1)
PROT SERPL-MCNC: 7.6 G/DL (ref 6.4–8.2)
RBC # BLD AUTO: 4.82 M/UL (ref 3.8–5.2)
SODIUM SERPL-SCNC: 135 MMOL/L (ref 136–145)
SPECIMEN HOLD: NORMAL
WBC # BLD AUTO: 9 K/UL (ref 3.6–11)

## 2025-05-28 PROCEDURE — 87077 CULTURE AEROBIC IDENTIFY: CPT

## 2025-05-28 PROCEDURE — 87186 SC STD MICRODIL/AGAR DIL: CPT

## 2025-05-28 PROCEDURE — 2580000003 HC RX 258

## 2025-05-28 PROCEDURE — 86140 C-REACTIVE PROTEIN: CPT

## 2025-05-28 PROCEDURE — 6360000002 HC RX W HCPCS

## 2025-05-28 PROCEDURE — 1100000000 HC RM PRIVATE

## 2025-05-28 PROCEDURE — 36415 COLL VENOUS BLD VENIPUNCTURE: CPT

## 2025-05-28 PROCEDURE — 83605 ASSAY OF LACTIC ACID: CPT

## 2025-05-28 PROCEDURE — 85652 RBC SED RATE AUTOMATED: CPT

## 2025-05-28 PROCEDURE — 87205 SMEAR GRAM STAIN: CPT

## 2025-05-28 PROCEDURE — 80053 COMPREHEN METABOLIC PANEL: CPT

## 2025-05-28 PROCEDURE — 87040 BLOOD CULTURE FOR BACTERIA: CPT

## 2025-05-28 PROCEDURE — 99285 EMERGENCY DEPT VISIT HI MDM: CPT

## 2025-05-28 PROCEDURE — 96374 THER/PROPH/DIAG INJ IV PUSH: CPT

## 2025-05-28 PROCEDURE — 73610 X-RAY EXAM OF ANKLE: CPT

## 2025-05-28 PROCEDURE — 87070 CULTURE OTHR SPECIMN AEROBIC: CPT

## 2025-05-28 PROCEDURE — 85025 COMPLETE CBC W/AUTO DIFF WBC: CPT

## 2025-05-28 RX ORDER — DEXTROSE MONOHYDRATE 100 MG/ML
INJECTION, SOLUTION INTRAVENOUS CONTINUOUS PRN
Status: DISCONTINUED | OUTPATIENT
Start: 2025-05-28 | End: 2025-06-05 | Stop reason: HOSPADM

## 2025-05-28 RX ORDER — CETIRIZINE HYDROCHLORIDE 10 MG/1
10 TABLET ORAL DAILY
Status: DISCONTINUED | OUTPATIENT
Start: 2025-05-29 | End: 2025-06-05 | Stop reason: HOSPADM

## 2025-05-28 RX ORDER — ACETAMINOPHEN 325 MG/1
650 TABLET ORAL EVERY 6 HOURS PRN
Status: DISCONTINUED | OUTPATIENT
Start: 2025-05-28 | End: 2025-05-29

## 2025-05-28 RX ORDER — CARVEDILOL 6.25 MG/1
6.25 TABLET ORAL 2 TIMES DAILY
Status: DISCONTINUED | OUTPATIENT
Start: 2025-05-28 | End: 2025-06-05 | Stop reason: HOSPADM

## 2025-05-28 RX ORDER — SODIUM CHLORIDE 9 MG/ML
INJECTION, SOLUTION INTRAVENOUS PRN
Status: DISCONTINUED | OUTPATIENT
Start: 2025-05-28 | End: 2025-06-05 | Stop reason: HOSPADM

## 2025-05-28 RX ORDER — PREDNISONE 5 MG/1
5 TABLET ORAL DAILY
Status: DISCONTINUED | OUTPATIENT
Start: 2025-05-29 | End: 2025-06-05 | Stop reason: HOSPADM

## 2025-05-28 RX ORDER — PETROLATUM,WHITE
OINTMENT IN PACKET (GRAM) TOPICAL PRN
Status: DISCONTINUED | OUTPATIENT
Start: 2025-05-28 | End: 2025-06-05 | Stop reason: HOSPADM

## 2025-05-28 RX ORDER — ENOXAPARIN SODIUM 100 MG/ML
40 INJECTION SUBCUTANEOUS DAILY
Status: COMPLETED | OUTPATIENT
Start: 2025-05-28 | End: 2025-05-28

## 2025-05-28 RX ORDER — METRONIDAZOLE 500 MG/100ML
500 INJECTION, SOLUTION INTRAVENOUS EVERY 6 HOURS SCHEDULED
Status: DISCONTINUED | OUTPATIENT
Start: 2025-05-28 | End: 2025-05-29

## 2025-05-28 RX ORDER — CYCLOBENZAPRINE HCL 10 MG
10 TABLET ORAL 2 TIMES DAILY
Status: DISCONTINUED | OUTPATIENT
Start: 2025-05-28 | End: 2025-06-05 | Stop reason: HOSPADM

## 2025-05-28 RX ORDER — PANTOPRAZOLE SODIUM 40 MG/1
40 TABLET, DELAYED RELEASE ORAL
Status: DISCONTINUED | OUTPATIENT
Start: 2025-05-29 | End: 2025-06-05 | Stop reason: HOSPADM

## 2025-05-28 RX ORDER — SODIUM CHLORIDE 0.9 % (FLUSH) 0.9 %
5-40 SYRINGE (ML) INJECTION EVERY 12 HOURS SCHEDULED
Status: DISCONTINUED | OUTPATIENT
Start: 2025-05-28 | End: 2025-06-05 | Stop reason: HOSPADM

## 2025-05-28 RX ORDER — POLYETHYLENE GLYCOL 3350 17 G/17G
17 POWDER, FOR SOLUTION ORAL DAILY PRN
Status: DISCONTINUED | OUTPATIENT
Start: 2025-05-28 | End: 2025-06-05 | Stop reason: HOSPADM

## 2025-05-28 RX ORDER — ARFORMOTEROL TARTRATE 15 UG/2ML
15 SOLUTION RESPIRATORY (INHALATION)
Status: DISCONTINUED | OUTPATIENT
Start: 2025-05-29 | End: 2025-06-05 | Stop reason: HOSPADM

## 2025-05-28 RX ORDER — PREGABALIN 50 MG/1
50 CAPSULE ORAL 2 TIMES DAILY
Status: DISCONTINUED | OUTPATIENT
Start: 2025-05-28 | End: 2025-06-05 | Stop reason: HOSPADM

## 2025-05-28 RX ORDER — MONTELUKAST SODIUM 10 MG/1
10 TABLET ORAL NIGHTLY
Status: DISCONTINUED | OUTPATIENT
Start: 2025-05-28 | End: 2025-06-05 | Stop reason: HOSPADM

## 2025-05-28 RX ORDER — DILTIAZEM HYDROCHLORIDE 90 MG/1
90 CAPSULE, EXTENDED RELEASE ORAL 2 TIMES DAILY
Status: DISCONTINUED | OUTPATIENT
Start: 2025-05-28 | End: 2025-06-05 | Stop reason: HOSPADM

## 2025-05-28 RX ORDER — ACETAMINOPHEN 650 MG/1
650 SUPPOSITORY RECTAL EVERY 6 HOURS PRN
Status: DISCONTINUED | OUTPATIENT
Start: 2025-05-28 | End: 2025-05-29

## 2025-05-28 RX ORDER — ATORVASTATIN CALCIUM 20 MG/1
20 TABLET, FILM COATED ORAL NIGHTLY
Status: DISCONTINUED | OUTPATIENT
Start: 2025-05-28 | End: 2025-05-29

## 2025-05-28 RX ORDER — INSULIN LISPRO 100 [IU]/ML
0-8 INJECTION, SOLUTION INTRAVENOUS; SUBCUTANEOUS
Status: DISCONTINUED | OUTPATIENT
Start: 2025-05-28 | End: 2025-06-03

## 2025-05-28 RX ORDER — TAMSULOSIN HYDROCHLORIDE 0.4 MG/1
0.4 CAPSULE ORAL DAILY
Status: DISCONTINUED | OUTPATIENT
Start: 2025-05-29 | End: 2025-06-05 | Stop reason: HOSPADM

## 2025-05-28 RX ORDER — BUDESONIDE 0.5 MG/2ML
0.5 INHALANT ORAL
Status: DISCONTINUED | OUTPATIENT
Start: 2025-05-29 | End: 2025-05-29

## 2025-05-28 RX ORDER — SODIUM CHLORIDE 0.9 % (FLUSH) 0.9 %
5-40 SYRINGE (ML) INJECTION PRN
Status: DISCONTINUED | OUTPATIENT
Start: 2025-05-28 | End: 2025-06-05 | Stop reason: HOSPADM

## 2025-05-28 RX ADMIN — CEFEPIME 2000 MG: 2 INJECTION, POWDER, FOR SOLUTION INTRAVENOUS at 21:00

## 2025-05-28 RX ADMIN — VANCOMYCIN HYDROCHLORIDE 2000 MG: 10 INJECTION, POWDER, LYOPHILIZED, FOR SOLUTION INTRAVENOUS at 19:49

## 2025-05-28 RX ADMIN — ENOXAPARIN SODIUM 40 MG: 100 INJECTION SUBCUTANEOUS at 21:00

## 2025-05-28 NOTE — RESULT ENCOUNTER NOTE
Patient called questioning need for referral. Notified no referral needed to go to ED. Notified PCP Dr. Zamorano in agreement with going to ED. Verbalizes understanding and thanked for information.

## 2025-05-28 NOTE — PROGRESS NOTES
[facility] Dosing Services: Antimicrobial Stewardship Daily Doc  Consult for antibiotic dosing of vancomycin by Dr. Calle  Indication: Bone & Joint Infection  Day of Therapy: 1    Ht Readings from Last 1 Encounters:   05/28/25 1.6 m (5' 3\")        Wt Readings from Last 1 Encounters:   05/28/25 80.3 kg (177 lb)      Vancomycin therapy:  Loading dose: Vancomycin 2,000 mg x1 dose scheduled 5/28 at 1910  Maintenance dose: Vancomycin 1,000 mg IV q24 to start 5/29 ~2000  Dose calculated to approximate a           a. Target AUC/MARCELINO of 400-600          b. Trough of 15-20 mcg/mL     Last level: N/A mcg/mL    Plan:   - Loading dose scheduled for 5/28 evening  - Maintenance dose to start evening 5/29  - 1,000mg q24 predicted -> AUCss 525, Ctr 13.8    Other Antimicrobial   (not dosed by pharmacist) None so far   Cultures 5/28 Blood x2: in process  5/28 Wound, foot: in process   Serum Creatinine Lab Results   Component Value Date/Time    CREATININE 1.32 05/28/2025 05:23 PM        Creatinine Clearance Estimated Creatinine Clearance: 36 mL/min (A) (based on SCr of 1.32 mg/dL (H)).     Temp Temp: 98.1 °F (36.7 °C)       WBC Lab Results   Component Value Date/Time    WBC 9.0 05/28/2025 05:23 PM        Procalcitonin No results found for: \"PROCAL\"   For Antifungals, Metronidazole and Nafcillin: Lab Results   Component Value Date/Time    ALT 14 05/28/2025 05:23 PM    AST 11 05/28/2025 05:23 PM        Yee Martin, PharmD  [phone]

## 2025-05-28 NOTE — ED TRIAGE NOTES
Patient arrives via wheelchair for a wound to her right ankle for 2-3 months which has not been healing. Has had home health. Referred by her PCP for concern for worsening infection.    Denies fever, chills, chest pain, shortness of breath.    Takes eliquis for history of PE.     PMH Diabetes, peripheral neuropathy.

## 2025-05-28 NOTE — ED PROVIDER NOTES
[facility]  EMERGENCY DEPARTMENT ENCOUNTER      Date: 5/28/2025  Patient Name: Lesley Lauren  MRN: [id number]  Birthdate 1945  Date of evaluation: 5/28/2025  Provider: SONDRA Garcia NP   Note Started: 4:21 PM EDT 5/28/25    CHIEF COMPLAINT     Chief Complaint   Patient presents with    Wound Infection       HISTORY OF PRESENT ILLNESS  (Onset, Location, Duration, Character, Alleviating/Aggravating, Radiation, Timing, Severity)   Note limiting factors.   History Provided By: Patient and daughter    HPI: Lesley Lauren is a 79 y.o. female with a history of asthma, bronchitis, breast cancer, CVA, COPD, DM, hypercholesteremia, HTN, PE, TIA, anticoagulation presents with right ankle wound.  Ongoing over the last 2 months.  Has been seen by home health.  Had labs drawn at her PCP who sent her here with concerns for bone infection.  Denies fevers, numbness, weakness, chest pain, difficulty breathing, abdominal pain, nausea, vomiting, diarrhea, urinary symptoms.    Rare ETOH. Quit smoking in 2012.  Continued secondhand exposure.  Denies vaping or marijuana use.      Nursing Notes and triage vitals were reviewed.  PCP: Juan David Zamorano MD      PAST MEDICAL HISTORY   Past Medical History:  Past Medical History:   Diagnosis Date    Asthma     Bronchitis     Cancer (HCC)     breast - left    Cerebral artery occlusion with cerebral infarction (HCC)     COPD (chronic obstructive pulmonary disease) (HCC)     [facility]; pulmonary nodule 7mm stable (found 2011)    Diabetes (HCC)     Hepatitis age 9    High cholesterol     History of blood transfusion     Hx of blood clots     Hypertension     Obesity (BMI 30-39.9)     Thromboembolus (HCC)     L LE with PE    TIA (transient ischemic attack) 10/27/2021       Past Surgical History:  Past Surgical History:   Procedure Laterality Date    BREAST SURGERY      L mastectomy    COLONOSCOPY N/A 05/26/2017            DISPOSITION / PLAN     DISPOSITION Admitted 05/28/2025 08:03:36 PM         Admit Note: Pt is being admitted by Hospitalist . The results of their tests and reason(s) for their admission have been discussed with pt and/or available family. They convey agreement and understanding for the need to be admitted and for the admission diagnosis.    (Please note that parts of this dictation were completed with voice recognition software. Quite often unanticipated grammatical, syntax, homophones, and other interpretive errors are inadvertently transcribed by the computer software. Efforts were made to edit the dictation but occasionally words remain mis-transcribed.)    SONDRA Garcia NP (electronically signed)  Emergency Attending Physician / Physician Assistant / Nurse Practitioner     Meghna Calle APRN - NP  05/29/25 0053

## 2025-05-29 ENCOUNTER — APPOINTMENT (OUTPATIENT)
Facility: HOSPITAL | Age: 80
DRG: 629 | End: 2025-05-29
Payer: MEDICARE

## 2025-05-29 ENCOUNTER — APPOINTMENT (OUTPATIENT)
Dept: VASCULAR SURGERY | Facility: HOSPITAL | Age: 80
DRG: 629 | End: 2025-05-29
Payer: MEDICARE

## 2025-05-29 PROBLEM — E11.65 UNCONTROLLED TYPE 2 DIABETES MELLITUS WITH HYPERGLYCEMIA (HCC): Status: ACTIVE | Noted: 2025-05-29

## 2025-05-29 PROBLEM — S91.001A ANKLE WOUND, RIGHT, INITIAL ENCOUNTER: Status: ACTIVE | Noted: 2025-04-28

## 2025-05-29 PROBLEM — E66.09 CLASS 1 OBESITY DUE TO EXCESS CALORIES WITH SERIOUS COMORBIDITY AND BODY MASS INDEX (BMI) OF 31.0 TO 31.9 IN ADULT: Status: ACTIVE | Noted: 2025-05-29

## 2025-05-29 PROBLEM — K21.9 GASTROESOPHAGEAL REFLUX DISEASE WITHOUT ESOPHAGITIS: Status: ACTIVE | Noted: 2025-05-29

## 2025-05-29 PROBLEM — E66.811 CLASS 1 OBESITY DUE TO EXCESS CALORIES WITH SERIOUS COMORBIDITY AND BODY MASS INDEX (BMI) OF 31.0 TO 31.9 IN ADULT: Status: ACTIVE | Noted: 2025-05-29

## 2025-05-29 LAB
ALBUMIN SERPL-MCNC: 3 G/DL (ref 3.5–5)
ALBUMIN/GLOB SERPL: 1 (ref 1.1–2.2)
ALP SERPL-CCNC: 189 U/L (ref 45–117)
ALT SERPL-CCNC: 11 U/L (ref 12–78)
ANION GAP SERPL CALC-SCNC: 6 MMOL/L (ref 2–12)
AST SERPL-CCNC: 12 U/L (ref 15–37)
BASOPHILS # BLD: 0.07 K/UL (ref 0–0.1)
BASOPHILS NFR BLD: 0.8 % (ref 0–1)
BILIRUB SERPL-MCNC: 0.4 MG/DL (ref 0.2–1)
BUN SERPL-MCNC: 15 MG/DL (ref 6–20)
BUN/CREAT SERPL: 15 (ref 12–20)
CALCIUM SERPL-MCNC: 8.7 MG/DL (ref 8.5–10.1)
CHLORIDE SERPL-SCNC: 110 MMOL/L (ref 97–108)
CO2 SERPL-SCNC: 24 MMOL/L (ref 21–32)
CREAT SERPL-MCNC: 1.01 MG/DL (ref 0.55–1.02)
DIFFERENTIAL METHOD BLD: ABNORMAL
ECHO BSA: 1.89 M2
EOSINOPHIL # BLD: 0.39 K/UL (ref 0–0.4)
EOSINOPHIL NFR BLD: 4.5 % (ref 0–7)
ERYTHROCYTE [DISTWIDTH] IN BLOOD BY AUTOMATED COUNT: 15.9 % (ref 11.5–14.5)
EST. AVERAGE GLUCOSE BLD GHB EST-MCNC: 275 MG/DL
GLOBULIN SER CALC-MCNC: 3.1 G/DL (ref 2–4)
GLUCOSE BLD STRIP.AUTO-MCNC: 169 MG/DL (ref 65–117)
GLUCOSE BLD STRIP.AUTO-MCNC: 192 MG/DL (ref 65–117)
GLUCOSE BLD STRIP.AUTO-MCNC: 199 MG/DL (ref 65–117)
GLUCOSE BLD STRIP.AUTO-MCNC: 212 MG/DL (ref 65–117)
GLUCOSE BLD STRIP.AUTO-MCNC: 251 MG/DL (ref 65–117)
GLUCOSE BLD STRIP.AUTO-MCNC: 259 MG/DL (ref 65–117)
GLUCOSE SERPL-MCNC: 116 MG/DL (ref 65–100)
HBA1C MFR BLD: 11.2 % (ref 4–5.6)
HCT VFR BLD AUTO: 32.3 % (ref 35–47)
HGB BLD-MCNC: 10.1 G/DL (ref 11.5–16)
IMM GRANULOCYTES # BLD AUTO: 0.03 K/UL (ref 0–0.04)
IMM GRANULOCYTES NFR BLD AUTO: 0.3 % (ref 0–0.5)
LYMPHOCYTES # BLD: 2.05 K/UL (ref 0.8–3.5)
LYMPHOCYTES NFR BLD: 23.7 % (ref 12–49)
MCH RBC QN AUTO: 23 PG (ref 26–34)
MCHC RBC AUTO-ENTMCNC: 31.3 G/DL (ref 30–36.5)
MCV RBC AUTO: 73.6 FL (ref 80–99)
MONOCYTES # BLD: 0.8 K/UL (ref 0–1)
MONOCYTES NFR BLD: 9.2 % (ref 5–13)
NEUTS SEG # BLD: 5.31 K/UL (ref 1.8–8)
NEUTS SEG NFR BLD: 61.5 % (ref 32–75)
NRBC # BLD: 0 K/UL (ref 0–0.01)
NRBC BLD-RTO: 0 PER 100 WBC
PLATELET # BLD AUTO: 275 K/UL (ref 150–400)
PMV BLD AUTO: 10.5 FL (ref 8.9–12.9)
POTASSIUM SERPL-SCNC: 4.3 MMOL/L (ref 3.5–5.1)
PROT SERPL-MCNC: 6.1 G/DL (ref 6.4–8.2)
RBC # BLD AUTO: 4.39 M/UL (ref 3.8–5.2)
SERVICE CMNT-IMP: ABNORMAL
SODIUM SERPL-SCNC: 140 MMOL/L (ref 136–145)
VAS LEFT ABI: 0.98
VAS LEFT ARM BP: 160 MMHG
VAS LEFT DORSALIS PEDIS BP: 153 MMHG
VAS LEFT PTA BP: 157 MMHG
VAS RIGHT ABI: 0.43
VAS RIGHT PTA BP: 68 MMHG
WBC # BLD AUTO: 8.7 K/UL (ref 3.6–11)

## 2025-05-29 PROCEDURE — 6370000000 HC RX 637 (ALT 250 FOR IP)

## 2025-05-29 PROCEDURE — 6360000002 HC RX W HCPCS

## 2025-05-29 PROCEDURE — 94761 N-INVAS EAR/PLS OXIMETRY MLT: CPT

## 2025-05-29 PROCEDURE — 73030 X-RAY EXAM OF SHOULDER: CPT

## 2025-05-29 PROCEDURE — 2580000003 HC RX 258: Performed by: FAMILY MEDICINE

## 2025-05-29 PROCEDURE — A9579 GAD-BASE MR CONTRAST NOS,1ML: HCPCS | Performed by: RADIOLOGY

## 2025-05-29 PROCEDURE — 94640 AIRWAY INHALATION TREATMENT: CPT

## 2025-05-29 PROCEDURE — 2580000003 HC RX 258

## 2025-05-29 PROCEDURE — 73723 MRI JOINT LWR EXTR W/O&W/DYE: CPT

## 2025-05-29 PROCEDURE — 1100000000 HC RM PRIVATE

## 2025-05-29 PROCEDURE — 82962 GLUCOSE BLOOD TEST: CPT

## 2025-05-29 PROCEDURE — 93922 UPR/L XTREMITY ART 2 LEVELS: CPT

## 2025-05-29 PROCEDURE — 6360000002 HC RX W HCPCS: Performed by: FAMILY MEDICINE

## 2025-05-29 PROCEDURE — 6360000004 HC RX CONTRAST MEDICATION: Performed by: RADIOLOGY

## 2025-05-29 PROCEDURE — 99223 1ST HOSP IP/OBS HIGH 75: CPT | Performed by: FAMILY MEDICINE

## 2025-05-29 PROCEDURE — 36415 COLL VENOUS BLD VENIPUNCTURE: CPT

## 2025-05-29 PROCEDURE — 80053 COMPREHEN METABOLIC PANEL: CPT

## 2025-05-29 PROCEDURE — 83036 HEMOGLOBIN GLYCOSYLATED A1C: CPT

## 2025-05-29 PROCEDURE — 85025 COMPLETE CBC W/AUTO DIFF WBC: CPT

## 2025-05-29 RX ORDER — ENOXAPARIN SODIUM 100 MG/ML
40 INJECTION SUBCUTANEOUS DAILY
Status: DISCONTINUED | OUTPATIENT
Start: 2025-05-29 | End: 2025-05-29

## 2025-05-29 RX ORDER — BUDESONIDE 0.5 MG/2ML
0.5 INHALANT ORAL
Status: DISCONTINUED | OUTPATIENT
Start: 2025-05-29 | End: 2025-06-05 | Stop reason: HOSPADM

## 2025-05-29 RX ORDER — ACETAMINOPHEN 650 MG/1
650 SUPPOSITORY RECTAL EVERY 6 HOURS PRN
Status: DISCONTINUED | OUTPATIENT
Start: 2025-05-29 | End: 2025-06-05 | Stop reason: HOSPADM

## 2025-05-29 RX ORDER — GADOTERIDOL 279.3 MG/ML
16 INJECTION INTRAVENOUS
Status: COMPLETED | OUTPATIENT
Start: 2025-05-29 | End: 2025-05-29

## 2025-05-29 RX ORDER — OXYCODONE HYDROCHLORIDE 5 MG/1
2.5 TABLET ORAL
Refills: 0 | Status: COMPLETED | OUTPATIENT
Start: 2025-05-29 | End: 2025-05-29

## 2025-05-29 RX ORDER — TIZANIDINE 2 MG/1
4 TABLET ORAL ONCE
Status: DISCONTINUED | OUTPATIENT
Start: 2025-05-29 | End: 2025-05-29

## 2025-05-29 RX ORDER — ACETAMINOPHEN 500 MG
1000 TABLET ORAL EVERY 6 HOURS PRN
Status: DISCONTINUED | OUTPATIENT
Start: 2025-05-29 | End: 2025-06-05 | Stop reason: HOSPADM

## 2025-05-29 RX ORDER — METRONIDAZOLE 500 MG/100ML
500 INJECTION, SOLUTION INTRAVENOUS EVERY 8 HOURS
Status: DISCONTINUED | OUTPATIENT
Start: 2025-05-29 | End: 2025-06-04

## 2025-05-29 RX ORDER — SODIUM CHLORIDE 9 MG/ML
INJECTION, SOLUTION INTRAVENOUS CONTINUOUS
Status: DISCONTINUED | OUTPATIENT
Start: 2025-05-29 | End: 2025-05-29

## 2025-05-29 RX ORDER — LIDOCAINE 4 G/G
1 PATCH TOPICAL DAILY
Status: DISCONTINUED | OUTPATIENT
Start: 2025-05-30 | End: 2025-06-05 | Stop reason: HOSPADM

## 2025-05-29 RX ADMIN — CYCLOBENZAPRINE 10 MG: 10 TABLET, FILM COATED ORAL at 21:10

## 2025-05-29 RX ADMIN — PREGABALIN 50 MG: 50 CAPSULE ORAL at 09:28

## 2025-05-29 RX ADMIN — METRONIDAZOLE 500 MG: 500 INJECTION, SOLUTION INTRAVENOUS at 21:04

## 2025-05-29 RX ADMIN — ACETAMINOPHEN 1000 MG: 500 TABLET ORAL at 14:38

## 2025-05-29 RX ADMIN — ARFORMOTEROL TARTRATE 15 MCG: 15 SOLUTION RESPIRATORY (INHALATION) at 20:17

## 2025-05-29 RX ADMIN — DILTIAZEM HYDROCHLORIDE 90 MG: 90 CAPSULE, EXTENDED RELEASE ORAL at 01:03

## 2025-05-29 RX ADMIN — TAMSULOSIN HYDROCHLORIDE 0.4 MG: 0.4 CAPSULE ORAL at 09:28

## 2025-05-29 RX ADMIN — CEFEPIME 2000 MG: 2 INJECTION, POWDER, FOR SOLUTION INTRAVENOUS at 09:21

## 2025-05-29 RX ADMIN — DICLOFENAC SODIUM 4 G: 10 GEL TOPICAL at 21:12

## 2025-05-29 RX ADMIN — PANTOPRAZOLE SODIUM 40 MG: 40 TABLET, DELAYED RELEASE ORAL at 06:35

## 2025-05-29 RX ADMIN — INSULIN LISPRO 2 UNITS: 100 INJECTION, SOLUTION INTRAVENOUS; SUBCUTANEOUS at 00:46

## 2025-05-29 RX ADMIN — PREDNISONE 5 MG: 5 TABLET ORAL at 09:28

## 2025-05-29 RX ADMIN — CETIRIZINE HYDROCHLORIDE 10 MG: 10 TABLET, FILM COATED ORAL at 09:28

## 2025-05-29 RX ADMIN — BUDESONIDE 500 MCG: 0.5 INHALANT RESPIRATORY (INHALATION) at 08:05

## 2025-05-29 RX ADMIN — DILTIAZEM HYDROCHLORIDE 90 MG: 90 CAPSULE, EXTENDED RELEASE ORAL at 12:37

## 2025-05-29 RX ADMIN — PREGABALIN 50 MG: 50 CAPSULE ORAL at 21:07

## 2025-05-29 RX ADMIN — CARVEDILOL 6.25 MG: 6.25 TABLET, FILM COATED ORAL at 09:28

## 2025-05-29 RX ADMIN — CARVEDILOL 6.25 MG: 6.25 TABLET, FILM COATED ORAL at 21:09

## 2025-05-29 RX ADMIN — INSULIN LISPRO 4 UNITS: 100 INJECTION, SOLUTION INTRAVENOUS; SUBCUTANEOUS at 18:00

## 2025-05-29 RX ADMIN — SODIUM CHLORIDE 1500 MG: 0.9 INJECTION, SOLUTION INTRAVENOUS at 17:59

## 2025-05-29 RX ADMIN — PREGABALIN 50 MG: 50 CAPSULE ORAL at 00:36

## 2025-05-29 RX ADMIN — CARVEDILOL 6.25 MG: 6.25 TABLET, FILM COATED ORAL at 00:36

## 2025-05-29 RX ADMIN — ACETAMINOPHEN 1000 MG: 500 TABLET ORAL at 21:07

## 2025-05-29 RX ADMIN — DILTIAZEM HYDROCHLORIDE 90 MG: 90 CAPSULE, EXTENDED RELEASE ORAL at 23:30

## 2025-05-29 RX ADMIN — BUDESONIDE 500 MCG: 0.5 INHALANT RESPIRATORY (INHALATION) at 20:17

## 2025-05-29 RX ADMIN — METRONIDAZOLE 500 MG: 500 INJECTION, SOLUTION INTRAVENOUS at 12:23

## 2025-05-29 RX ADMIN — METRONIDAZOLE 500 MG: 500 INJECTION, SOLUTION INTRAVENOUS at 06:34

## 2025-05-29 RX ADMIN — MONTELUKAST 10 MG: 10 TABLET, FILM COATED ORAL at 21:07

## 2025-05-29 RX ADMIN — CYCLOBENZAPRINE 10 MG: 10 TABLET, FILM COATED ORAL at 09:28

## 2025-05-29 RX ADMIN — GADOTERIDOL 16 ML: 279.3 INJECTION, SOLUTION INTRAVENOUS at 23:04

## 2025-05-29 RX ADMIN — ARFORMOTEROL TARTRATE 15 MCG: 15 SOLUTION RESPIRATORY (INHALATION) at 08:05

## 2025-05-29 RX ADMIN — METRONIDAZOLE 500 MG: 500 INJECTION, SOLUTION INTRAVENOUS at 00:50

## 2025-05-29 RX ADMIN — CYCLOBENZAPRINE 10 MG: 10 TABLET, FILM COATED ORAL at 00:36

## 2025-05-29 RX ADMIN — PANTOPRAZOLE SODIUM 40 MG: 40 TABLET, DELAYED RELEASE ORAL at 18:00

## 2025-05-29 RX ADMIN — INSULIN LISPRO 2 UNITS: 100 INJECTION, SOLUTION INTRAVENOUS; SUBCUTANEOUS at 12:24

## 2025-05-29 RX ADMIN — OXYCODONE 2.5 MG: 5 TABLET ORAL at 16:22

## 2025-05-29 RX ADMIN — INSULIN LISPRO 2 UNITS: 100 INJECTION, SOLUTION INTRAVENOUS; SUBCUTANEOUS at 21:40

## 2025-05-29 RX ADMIN — SODIUM CHLORIDE: 0.9 INJECTION, SOLUTION INTRAVENOUS at 12:23

## 2025-05-29 RX ADMIN — CEFEPIME 2000 MG: 2 INJECTION, POWDER, FOR SOLUTION INTRAVENOUS at 21:06

## 2025-05-29 RX ADMIN — DICLOFENAC SODIUM 4 G: 10 GEL TOPICAL at 09:28

## 2025-05-29 RX ADMIN — MONTELUKAST 10 MG: 10 TABLET, FILM COATED ORAL at 00:36

## 2025-05-29 ASSESSMENT — PAIN DESCRIPTION - LOCATION
LOCATION: SHOULDER
LOCATION: SHOULDER

## 2025-05-29 ASSESSMENT — PAIN DESCRIPTION - DESCRIPTORS: DESCRIPTORS: ACHING;THROBBING

## 2025-05-29 ASSESSMENT — PAIN SCALES - GENERAL
PAINLEVEL_OUTOF10: 6
PAINLEVEL_OUTOF10: 7

## 2025-05-29 ASSESSMENT — PAIN DESCRIPTION - ORIENTATION
ORIENTATION: RIGHT
ORIENTATION: RIGHT

## 2025-05-29 NOTE — PROGRESS NOTES
[facility] Residency    Office [phone], Fax [phone]     Senior Resident Admission Note     CC:   Chief Complaint   Patient presents with    Wound Infection       HPI:  Lesley Lauren is a 79 y.o. female w/ a PMHx of type 2 diabetes, CVA, COPD, hypertension, left lower extremity DVT, PE, asthma who presents to the ER complaining with the above complaint.     Chart reviewed. Patient seen, examined, and discussed with Dr. Maguire (PGY-1). See H&P for more details.    A/P: Admit to med/surg. Code status: Full.    Diabetic Foot Wound: sent by PCP. Elevated inflammatory markers, xray no osteo. ABIs ordered outpatient, not yet done.  - Empiric antibiotics; Vancomycin, Cefepime, Flagyl  - Blood and wound cultures pending; de-escalate antibiotics appropriately  - Consult podiatry and wound care; consider vascular surgery consult  - MRI ankle with contrast to assess for osteomyelitis or subdermal involvement   - Diet: NPO after MN for ortho to see  - ABIs    I agree with remaining assessment and plan as documented in Dr. Maguire's Full H&P.    Pt discussed with Dr. Zion Dc (on-call attending physician).      Sajan Torres MD  Family Medicine Resident

## 2025-05-29 NOTE — CARE COORDINATION
5/29/25  1:20 PM        Care Management Initial Assessment  5/29/2025 1:20 PM  If patient is discharged prior to next notation, then this note serves as note for discharge by case management.    Reason for Admission:   Diabetic foot infection (HCC) [E11.628, L08.9]  Ankle wound, right, initial encounter [S91.001A]         Patient Admission Status: Inpatient  Date Admitted to INP: 5/28/25  RUR: Readmission Risk Score: 15.9    Hospitalization in the last 30 days (Readmission):  No        Advance Care Planning:  Code Status: Full Code  Primary Healthcare Decision Maker: Legal Next of Kin  Primary Decision Maker: Lesley Robin  Child - [phone]   Advance Directive: No AMD     __________________________________________________________________________  Assessment:      05/29/25 1223   Service Assessment   Patient Orientation Alert and Oriented   Cognition Alert   History Provided By Patient   Primary Caregiver Self   Support Systems Children   Patient's Healthcare Decision Maker is: Legal Next of Kin   PCP Verified by CM Yes   Last Visit to PCP Within last 3 months   Prior Functional Level Independent in ADLs/IADLs   Can patient return to prior living arrangement Yes   Ability to make needs known: Good   Family able to assist with home care needs: Yes   Would you like for me to discuss the discharge plan with any other family members/significant others, and if so, who? No   Financial Resources Medicare   Social/Functional History   Lives With Alone   Type of Home House   Home Layout One level   Home Access Stairs to enter with rails   Entrance Stairs - Number of Steps 3 or 4   Bathroom Equipment Grab bars in shower;Shower chair   Home Equipment Cane;Walker - Rolling   Receives Help From Family   Discharge Planning   Type of Residence House   Living Arrangements Alone   Current Services Prior To Admission Oxygen Therapy   Potential Assistance Needed Home Care   Type of Home Care Services PT;Nursing Services   Patient                     PCP: Juan David Zamorano MD   Address: [address] [place] [address] / [facility] [address]   Phone number: [phone]    Pharmacy:   [facility]. [place], [place] - [address] [facility] [phone] - F [phone]  [address] [facility] [address]  Phone: [phone] Fax: [phone]    [place] Transport: (P) Family       Transition of care plan:    [x]Unable to determine at this time. Awaiting clinical progress, and disposition recommendations. Patient is open with HH with [facility]- will need a resumption order if goes home at [place].     [] Home. No assistance required.     [] Home. Pt refused recommended services.    [] Home with family assistance as needed, and outpatient follow-up.    [] Home with Outpatient PT and outpatient follow-up   Pt aware of OP appt? []Yes, Provider:   []Not scheduled   Transport provider:     [] Home with outpatient services.    Specify:    [] Home with Home Health   - [place] of Choice offered? [] Yes, Preference:   [] NA    []SNF/IPR   -[]Freedom of Choice offered, and preferences given:   []Listing provided and preferences requested   -Status: []Pending []Accepted:    -Auth required: []Yes []No    -Auth initiated date:   -3 midnight stay required: []Yes []No  Date satisfied:     [] LTC:     [] Home with Hospice   - [place] of Choice offered? [] Yes, Preference:   [] NA    [] [facility] information provided.     [] Other:       Juanita Perez  Case Management Department  For questions or concerns, please PerfectServe

## 2025-05-29 NOTE — ED NOTES
TRANSFER - OUT REPORT:    Verbal report given to SAMINA Joya on Lesley Lauren  being transferred for routine progression of patient care       Report consisted of patient's Situation, Background, Assessment and   Recommendations(SBAR).     Information from the following report(s) Nurse Handoff Report, Index, ED Encounter Summary, ED SBAR, Adult Overview, Surgery Report, Intake/Output, MAR, Recent Results, Med Rec Status, Cardiac Rhythm NSR, Alarm Parameters, Pre Procedure Checklist, Procedure Verification, Quality Measures, Neuro Assessment, and Event Log was reviewed with the receiving nurse.    [place] Fall Assessment:    Presents to emergency department  because of falls (Syncope, seizure, or loss of consciousness): No  Age > 70: Yes  Altered Mental Status, Intoxication with alcohol or substance confusion (Disorientation, impaired judgment, poor safety awaremess, or inability to follow instructions): No  Impaired Mobility: Ambulates or transfers with assistive devices or assistance; Unable to ambulate or transer.: Yes  Nursing Judgement: Yes          Lines:   Peripheral IV 05/28/25 Right;Ventral Forearm (Active)       Peripheral IV 05/28/25 Left Antecubital (Active)        Opportunity for questions and clarification was provided.      Patient transported with:  Tech

## 2025-05-29 NOTE — H&P
Days of Exercise per Week: 0 days    • Minutes of Exercise per Session: 0 min   Stress: No Stress Concern Present (2/17/2023)    [employer] - Occupational Stress Questionnaire    • Feeling of Stress : Only a little   Social Connections: Socially Isolated (2/17/2023)    Social Connection and Isolation Panel [NHANES]    • Frequency of Communication with Friends and Family: More than three times a week    • Frequency of Social Gatherings with Friends and Family: Once a week    • Attends Yarsani Services: Never    • Active Member of Clubs or Organizations: No    • Attends Club or Organization Meetings: Never    • Marital Status:    Intimate Partner Violence: Not At Risk (2/17/2023)    Humiliation, Afraid, Rape, and Kick questionnaire    • Fear of Current or Ex-Partner: No    • Emotionally Abused: No    • Physically Abused: No    • Sexually Abused: No   Housing Stability: Low Risk  (2/3/2025)    Housing Stability Vital Sign    • Unable to Pay for Housing in the Last Year: No    • Number of Times Moved in the Last Year: 0    • Homeless in the Last Year: No       Alcohol history: Rarely  Smoking history: Former smoker, smoked 1 ppd x 15 years, quit over 10 years ago  Illicit drug history: Not at all    Living arrangement: patient lives alone.  Ambulates: Assisted walker    Review of Systems:   Review of Systems   Constitutional:  Negative for chills, diaphoresis and fever.   Respiratory:  Negative for cough, shortness of breath and wheezing.    Cardiovascular:  Negative for chest pain and leg swelling.   Gastrointestinal:  Negative for abdominal pain, constipation, diarrhea, nausea and vomiting.   Musculoskeletal:  Positive for arthralgias.   Skin:  Positive for wound.   All other systems reviewed and are negative.       Physical Exam  /75   Pulse 71   Temp 98.1 °F (36.7 °C)   Resp 20   Ht 1.6 m (5' 3\")   Wt 80.3 kg (177 lb)   SpO2 92%   BMI 31.35 kg/m²         General:  not follow with podiatry. Admitting for IV antibiotics, further imaging, and evaluation by Podiatry.  - Admit to Med Surg  - Monitor vitals per unit protocol  - strict I&O  - IV Vanc, Cefepime, Flagyl  - Daily CBC, CMP  - F/U Bcx, Wound cx  - Consult Podiatry  - Consult wound care  - MRI R ankle  - JOSE J   - Diabetic, then NPO MN  - received lovenox x1 in ER; now SCDs    Elevated Cr  POA Cr 1.32 (bl: 0.9), BUN/Cr: 17; likely 2/2 IVVD  - encourage po intake, no indication for fluids at this time  - monitor on daily labs  - If does not improve, consider urine lytes  - Avoid nephrotoxic meds    Hx of DVT/PE: first in 2012. On eliquis 5mg bid.   - holding eliquis for possible procedure  - SCDs    Hypertension: POA BP 96/80.  - Continuing home medications of coreg 6.25mg bid, diltiazem 90 mg bid.   - Will continue to monitor at this time and readjust as BP's trend.     Diabetes Mellitus T2  Neuropathy: only on glipizide currently, but Lantus 10u was prescribed last week but pt hasn't started.  Hemoglobin A1C   Date Value Ref Range Status   02/28/2025 10.5 (H) 4.0 - 5.6 % Final     Comment:     (NOTE)  HbA1C Interpretive Ranges  <5.7              Normal  5.7 - 6.4         Consider Prediabetes  >6.5              Consider Diabetes       - Holding home glipizide.  - continue home Lyrica 50mg bid  - Insulin Sliding Scale normal sensitivity with AC&HS glucose checks.  - Hypoglycemia protocols ordered.    COPD: chronic, stable. Not in exacerbation.   - sub home Trelegy for Brovana/Pulmicort  - continue home prednisone 5mg qd, singulair 10mg qhs, zyrtec qd for home allegra    Muscle spasms/chronic pain: chronic, stable.   - continue home flexeril 10mg bid    GERD: chronic, stable  - sub home prilosec for protonix 40mg bid    Hx of kidney stones: not currently an issue, but was told to stay on flomax indefinitely.  - continue home flomax 0.4mg qd     Hyperlipidemia: on mevacor 10mg qhs  Lab Results   Component Value Date    CHOL

## 2025-05-29 NOTE — PROGRESS NOTES
[address] [place] [address]   Office [phone]  Fax [phone]          Subjective / Objective     Subjective  Overnight Events: NAEO. Complains of right shoulder pain since yesterday.   Patient seen and examined at bedside. Reports feeling well this AM. Denies CP, SOB, N/V, dysuria.    Respiratory: RA  BP (!) 121/55   Pulse 72   Temp 97.9 °F (36.6 °C) (Oral)   Resp 18   Ht 1.6 m (5' 3\")   Wt 80.3 kg (177 lb)   SpO2 93%   BMI 31.35 kg/m²    Physical Examination:   General appearance - alert, well appearing, and in no distress  Chest - clear to auscultation, no wheezes, rales or rhonchi, symmetric air entry  Heart - normal rate, regular rhythm, normal S1, S2, no murmurs, rubs, clicks or gallops,   Abdomen - soft, nontender, nondistended, no masses or organomegaly  Neurological - alert, oriented, normal speech, no focal findings  Skin - warm, dry. No notable rashes  Extremities - peripheral pulses normal, no pedal edema, no clubbing or cyanosis. Dressing over right ankle and foot.   Psychiatric - normal speech and thought processes    I/O:  No intake/output data recorded.  Inpatient Medications  Current Facility-Administered Medications   Medication Dose Route Frequency    vancomycin (VANCOCIN) 1,000 mg in sodium chloride 0.9 % 250 mL IVPB ([id number])  1,000 mg IntraVENous Q24H    sodium chloride flush 0.9 % injection 5-40 mL  5-40 mL IntraVENous 2 times per day    sodium chloride flush 0.9 % injection 5-40 mL  5-40 mL IntraVENous PRN    0.9 % sodium chloride infusion   IntraVENous PRN    polyethylene glycol (GLYCOLAX) packet 17 g  17 g Oral Daily PRN    acetaminophen (TYLENOL) tablet 650 mg  650 mg Oral Q6H PRN    Or    acetaminophen (TYLENOL) suppository 650 mg  650 mg Rectal Q6H PRN    ceFEPIme (MAXIPIME) 2,000 mg in sodium chloride 0.9 % 100 mL IVPB (addEASE)  2,000 mg IntraVENous Q12H    metroNIDAZOLE (FLAGYL) 500 mg in 0.9% NaCl 100 mL IVPB premix  500 mg IntraVENous 4 times per

## 2025-05-29 NOTE — CONSULTS
Podiatry Consult Note    Subjective:         Date of Consultation: May 29, 2025     Referring Physician: Paula Tam MD     Patient is a 79 y.o.  female who is being seen for chronic healing right leg ulcer.   Patient has a hx of  COPD, T2DM, HLD, HTN, hx of DVT, who presents to the ER complaining of R foot wound and infection. Patient has been seeing  PCP for wound and she was referred to the ER due to increase redness.    Patient Active Problem List    Diagnosis Date Noted    Uncontrolled type 2 diabetes mellitus with hyperglycemia (HCC) 05/29/2025    Gastroesophageal reflux disease without esophagitis 05/29/2025    Class 1 obesity due to excess calories with serious comorbidity and body mass index (BMI) of 31.0 to 31.9 in adult 05/29/2025    Diabetic foot infection (HCC) 05/28/2025    Ankle wound, right, initial encounter 04/28/2025    Open wound of toe 04/28/2025    History of DVT (deep vein thrombosis) 05/04/2023    Diabetic polyneuropathy associated with type 2 diabetes mellitus (HCC) 02/15/2023    Chronic renal disease, stage III ([facility]) 09/30/2022    Unspecified convulsions (HCC) 01/19/2022    Other seizures ([facility]) 01/19/2022    History of stroke 11/11/2021    COPD (chronic obstructive pulmonary disease) ([facility])     History of GI bleed 04/16/2019    DCIS (ductal carcinoma in situ) of breast 03/08/2019    Chronic anticoagulation 03/01/2019    Severe obesity ([facility]) 03/01/2019    Debility 02/25/2012    Melena 02/24/2012    Other hyperlipidemia 02/24/2012    HTN (hypertension) 02/24/2012    Type 2 diabetes mellitus with diabetic polyneuropathy, without long-term current use of insulin (HCC) 02/24/2012     Past Medical History:   Diagnosis Date    Asthma     Bronchitis     Cancer ([facility])     breast - left    Cerebral artery occlusion with cerebral infarction (HCC)     COPD (chronic obstructive pulmonary disease) ([facility])     [facility]; pulmonary nodule 7mm stable (found 2011)    Diabetes  MG/DL    BUN/Creatinine Ratio 15 12 - 20      Est, Glom Filt Rate 57 (L) >60 ml/min/1.73m2    Calcium 8.7 8.5 - 10.1 MG/DL    Total Bilirubin 0.4 0.2 - 1.0 MG/DL    ALT 11 (L) 12 - 78 U/L    AST 12 (L) 15 - 37 U/L    Alk Phosphatase 189 (H) 45 - 117 U/L    Total Protein 6.1 (L) 6.4 - 8.2 g/dL    Albumin 3.0 (L) 3.5 - 5.0 g/dL    Globulin 3.1 2.0 - 4.0 g/dL    Albumin/Globulin Ratio 1.0 (L) 1.1 - 2.2     CBC with Auto Differential    Collection Time: 05/29/25  4:53 AM   Result Value Ref Range    WBC 8.7 3.6 - 11.0 K/uL    RBC 4.39 3.80 - 5.20 M/uL    Hemoglobin 10.1 (L) 11.5 - 16.0 g/dL    Hematocrit 32.3 (L) 35.0 - 47.0 %    MCV 73.6 (L) 80.0 - 99.0 FL    MCH 23.0 (L) 26.0 - 34.0 PG    MCHC 31.3 30.0 - 36.5 g/dL    RDW 15.9 (H) 11.5 - 14.5 %    Platelets 275 150 - 400 K/uL    MPV 10.5 8.9 - 12.9 FL    Nucleated RBCs 0.0 0  WBC    nRBC 0.00 0.00 - 0.01 K/uL    Neutrophils % 61.5 32.0 - 75.0 %    Lymphocytes % 23.7 12.0 - 49.0 %    Monocytes % 9.2 5.0 - 13.0 %    Eosinophils % 4.5 0.0 - 7.0 %    Basophils % 0.8 0.0 - 1.0 %    Immature Granulocytes % 0.3 0.0 - 0.5 %    Neutrophils Absolute 5.31 1.80 - 8.00 K/UL    Lymphocytes Absolute 2.05 0.80 - 3.50 K/UL    Monocytes Absolute 0.80 0.00 - 1.00 K/UL    Eosinophils Absolute 0.39 0.00 - 0.40 K/UL    Basophils Absolute 0.07 0.00 - 0.10 K/UL    Immature Granulocytes Absolute 0.03 0.00 - 0.04 K/UL    Differential Type AUTOMATED     POCT Glucose    Collection Time: 05/29/25  8:41 AM   Result Value Ref Range    POC Glucose 169 (H) 65 - 117 mg/dL    Performed by: Andrea Willis    Vascular ankle brachial index (JOSE J) PROCEDURE ORDER DONE IN [facility]    Collection Time: 05/29/25 11:12 AM   Result Value Ref Range    Left dorsalis pedis  mmHg    Left posterior tibial 157 mmHg    Right posterior tibial 68 mmHg    Left arm  mmHg    Body Surface Area 1.89 m2    Right JOSE J 0.43     Left JOSE J 0.98    POCT Glucose    Collection Time: 05/29/25 11:50 AM   Result Value Ref

## 2025-05-30 ENCOUNTER — TELEPHONE (OUTPATIENT)
Facility: CLINIC | Age: 80
End: 2025-05-30

## 2025-05-30 ENCOUNTER — APPOINTMENT (OUTPATIENT)
Facility: HOSPITAL | Age: 80
DRG: 629 | End: 2025-05-30
Payer: MEDICARE

## 2025-05-30 PROBLEM — E11.65 TYPE 2 DIABETES MELLITUS WITH HYPERGLYCEMIA (HCC): Status: ACTIVE | Noted: 2025-05-30

## 2025-05-30 PROBLEM — E11.65 TYPE 2 DIABETES MELLITUS WITH HYPERGLYCEMIA, WITHOUT LONG-TERM CURRENT USE OF INSULIN (HCC): Status: ACTIVE | Noted: 2025-05-30

## 2025-05-30 PROBLEM — M86.9 OSTEOMYELITIS OF RIGHT FIBULA (HCC): Status: ACTIVE | Noted: 2025-05-30

## 2025-05-30 PROBLEM — Z87.09 HISTORY OF COPD: Status: ACTIVE | Noted: 2025-05-30

## 2025-05-30 LAB
ALBUMIN SERPL-MCNC: 2.7 G/DL (ref 3.5–5)
ALBUMIN/GLOB SERPL: 0.9 (ref 1.1–2.2)
ALP SERPL-CCNC: 164 U/L (ref 45–117)
ALT SERPL-CCNC: 9 U/L (ref 12–78)
ANION GAP SERPL CALC-SCNC: 4 MMOL/L (ref 2–12)
AST SERPL-CCNC: 9 U/L (ref 15–37)
BASOPHILS # BLD: 0.06 K/UL (ref 0–0.1)
BASOPHILS NFR BLD: 0.8 % (ref 0–1)
BILIRUB SERPL-MCNC: 0.5 MG/DL (ref 0.2–1)
BUN SERPL-MCNC: 17 MG/DL (ref 6–20)
BUN/CREAT SERPL: 16 (ref 12–20)
CALCIUM SERPL-MCNC: 8.6 MG/DL (ref 8.5–10.1)
CHLORIDE SERPL-SCNC: 112 MMOL/L (ref 97–108)
CO2 SERPL-SCNC: 24 MMOL/L (ref 21–32)
CREAT SERPL-MCNC: 1.09 MG/DL (ref 0.55–1.02)
DIFFERENTIAL METHOD BLD: ABNORMAL
EOSINOPHIL # BLD: 0.34 K/UL (ref 0–0.4)
EOSINOPHIL NFR BLD: 4.5 % (ref 0–7)
ERYTHROCYTE [DISTWIDTH] IN BLOOD BY AUTOMATED COUNT: 16.3 % (ref 11.5–14.5)
GLOBULIN SER CALC-MCNC: 3.1 G/DL (ref 2–4)
GLUCOSE BLD STRIP.AUTO-MCNC: 184 MG/DL (ref 65–117)
GLUCOSE SERPL-MCNC: 163 MG/DL (ref 65–100)
HCT VFR BLD AUTO: 31.4 % (ref 35–47)
HGB BLD-MCNC: 9.7 G/DL (ref 11.5–16)
IMM GRANULOCYTES # BLD AUTO: 0.04 K/UL (ref 0–0.04)
IMM GRANULOCYTES NFR BLD AUTO: 0.5 % (ref 0–0.5)
LYMPHOCYTES # BLD: 1.73 K/UL (ref 0.8–3.5)
LYMPHOCYTES NFR BLD: 22.7 % (ref 12–49)
MCH RBC QN AUTO: 22.8 PG (ref 26–34)
MCHC RBC AUTO-ENTMCNC: 30.9 G/DL (ref 30–36.5)
MCV RBC AUTO: 73.9 FL (ref 80–99)
MONOCYTES # BLD: 0.79 K/UL (ref 0–1)
MONOCYTES NFR BLD: 10.4 % (ref 5–13)
NEUTS SEG # BLD: 4.65 K/UL (ref 1.8–8)
NEUTS SEG NFR BLD: 61.1 % (ref 32–75)
NRBC # BLD: 0 K/UL (ref 0–0.01)
NRBC BLD-RTO: 0 PER 100 WBC
PLATELET # BLD AUTO: 271 K/UL (ref 150–400)
PMV BLD AUTO: 10.5 FL (ref 8.9–12.9)
POTASSIUM SERPL-SCNC: 4.4 MMOL/L (ref 3.5–5.1)
PROT SERPL-MCNC: 5.8 G/DL (ref 6.4–8.2)
RBC # BLD AUTO: 4.25 M/UL (ref 3.8–5.2)
SERVICE CMNT-IMP: ABNORMAL
SODIUM SERPL-SCNC: 140 MMOL/L (ref 136–145)
WBC # BLD AUTO: 7.6 K/UL (ref 3.6–11)

## 2025-05-30 PROCEDURE — 6370000000 HC RX 637 (ALT 250 FOR IP)

## 2025-05-30 PROCEDURE — 80053 COMPREHEN METABOLIC PANEL: CPT

## 2025-05-30 PROCEDURE — 2500000003 HC RX 250 WO HCPCS

## 2025-05-30 PROCEDURE — 6360000002 HC RX W HCPCS

## 2025-05-30 PROCEDURE — 6360000002 HC RX W HCPCS: Performed by: FAMILY MEDICINE

## 2025-05-30 PROCEDURE — 1100000000 HC RM PRIVATE

## 2025-05-30 PROCEDURE — 99221 1ST HOSP IP/OBS SF/LOW 40: CPT

## 2025-05-30 PROCEDURE — 97535 SELF CARE MNGMENT TRAINING: CPT

## 2025-05-30 PROCEDURE — 94640 AIRWAY INHALATION TREATMENT: CPT

## 2025-05-30 PROCEDURE — 2580000003 HC RX 258

## 2025-05-30 PROCEDURE — 97530 THERAPEUTIC ACTIVITIES: CPT

## 2025-05-30 PROCEDURE — 85025 COMPLETE CBC W/AUTO DIFF WBC: CPT

## 2025-05-30 PROCEDURE — 99232 SBSQ HOSP IP/OBS MODERATE 35: CPT | Performed by: FAMILY MEDICINE

## 2025-05-30 PROCEDURE — 97166 OT EVAL MOD COMPLEX 45 MIN: CPT

## 2025-05-30 PROCEDURE — 82962 GLUCOSE BLOOD TEST: CPT

## 2025-05-30 PROCEDURE — 99222 1ST HOSP IP/OBS MODERATE 55: CPT

## 2025-05-30 PROCEDURE — 97161 PT EVAL LOW COMPLEX 20 MIN: CPT

## 2025-05-30 PROCEDURE — 97116 GAIT TRAINING THERAPY: CPT

## 2025-05-30 PROCEDURE — 2580000003 HC RX 258: Performed by: FAMILY MEDICINE

## 2025-05-30 RX ORDER — INSULIN LISPRO 100 [IU]/ML
0.05 INJECTION, SOLUTION INTRAVENOUS; SUBCUTANEOUS
Status: DISCONTINUED | OUTPATIENT
Start: 2025-05-30 | End: 2025-06-05 | Stop reason: HOSPADM

## 2025-05-30 RX ORDER — INSULIN GLARGINE 100 [IU]/ML
5 INJECTION, SOLUTION SUBCUTANEOUS DAILY
Status: DISCONTINUED | OUTPATIENT
Start: 2025-05-30 | End: 2025-05-30

## 2025-05-30 RX ORDER — INSULIN GLARGINE 100 [IU]/ML
16 INJECTION, SOLUTION SUBCUTANEOUS DAILY
Status: DISCONTINUED | OUTPATIENT
Start: 2025-05-30 | End: 2025-06-02

## 2025-05-30 RX ADMIN — PREDNISONE 5 MG: 5 TABLET ORAL at 12:31

## 2025-05-30 RX ADMIN — DICLOFENAC SODIUM 4 G: 10 GEL TOPICAL at 12:41

## 2025-05-30 RX ADMIN — INSULIN LISPRO 2 UNITS: 100 INJECTION, SOLUTION INTRAVENOUS; SUBCUTANEOUS at 17:23

## 2025-05-30 RX ADMIN — PANTOPRAZOLE SODIUM 40 MG: 40 TABLET, DELAYED RELEASE ORAL at 12:57

## 2025-05-30 RX ADMIN — INSULIN GLARGINE 16 UNITS: 100 INJECTION, SOLUTION SUBCUTANEOUS at 12:30

## 2025-05-30 RX ADMIN — BUDESONIDE 500 MCG: 0.5 INHALANT RESPIRATORY (INHALATION) at 22:16

## 2025-05-30 RX ADMIN — OLANZAPINE 2.5 MG: 10 INJECTION, POWDER, FOR SOLUTION INTRAMUSCULAR at 05:25

## 2025-05-30 RX ADMIN — CARVEDILOL 6.25 MG: 6.25 TABLET, FILM COATED ORAL at 23:21

## 2025-05-30 RX ADMIN — METRONIDAZOLE 500 MG: 500 INJECTION, SOLUTION INTRAVENOUS at 20:07

## 2025-05-30 RX ADMIN — INSULIN LISPRO 4 UNITS: 100 INJECTION, SOLUTION INTRAVENOUS; SUBCUTANEOUS at 12:29

## 2025-05-30 RX ADMIN — APIXABAN 5 MG: 5 TABLET, FILM COATED ORAL at 23:21

## 2025-05-30 RX ADMIN — CYCLOBENZAPRINE 10 MG: 10 TABLET, FILM COATED ORAL at 23:22

## 2025-05-30 RX ADMIN — INSULIN LISPRO 2 UNITS: 100 INJECTION, SOLUTION INTRAVENOUS; SUBCUTANEOUS at 12:30

## 2025-05-30 RX ADMIN — METRONIDAZOLE 500 MG: 500 INJECTION, SOLUTION INTRAVENOUS at 04:38

## 2025-05-30 RX ADMIN — CEFEPIME 2000 MG: 2 INJECTION, POWDER, FOR SOLUTION INTRAVENOUS at 13:00

## 2025-05-30 RX ADMIN — CARVEDILOL 6.25 MG: 6.25 TABLET, FILM COATED ORAL at 12:31

## 2025-05-30 RX ADMIN — SODIUM CHLORIDE, PRESERVATIVE FREE 10 ML: 5 INJECTION INTRAVENOUS at 12:32

## 2025-05-30 RX ADMIN — CEFEPIME 2000 MG: 2 INJECTION, POWDER, FOR SOLUTION INTRAVENOUS at 20:08

## 2025-05-30 RX ADMIN — TAMSULOSIN HYDROCHLORIDE 0.4 MG: 0.4 CAPSULE ORAL at 12:31

## 2025-05-30 RX ADMIN — INSULIN LISPRO 2 UNITS: 100 INJECTION, SOLUTION INTRAVENOUS; SUBCUTANEOUS at 23:25

## 2025-05-30 RX ADMIN — CETIRIZINE HYDROCHLORIDE 10 MG: 10 TABLET, FILM COATED ORAL at 12:31

## 2025-05-30 RX ADMIN — CYCLOBENZAPRINE 10 MG: 10 TABLET, FILM COATED ORAL at 12:31

## 2025-05-30 RX ADMIN — DILTIAZEM HYDROCHLORIDE 90 MG: 90 CAPSULE, EXTENDED RELEASE ORAL at 12:31

## 2025-05-30 RX ADMIN — PREGABALIN 50 MG: 50 CAPSULE ORAL at 23:22

## 2025-05-30 RX ADMIN — PANTOPRAZOLE SODIUM 40 MG: 40 TABLET, DELAYED RELEASE ORAL at 17:25

## 2025-05-30 RX ADMIN — OLANZAPINE 2.5 MG: 10 INJECTION, POWDER, FOR SOLUTION INTRAMUSCULAR at 21:18

## 2025-05-30 RX ADMIN — MONTELUKAST 10 MG: 10 TABLET, FILM COATED ORAL at 23:22

## 2025-05-30 RX ADMIN — METRONIDAZOLE 500 MG: 500 INJECTION, SOLUTION INTRAVENOUS at 12:44

## 2025-05-30 RX ADMIN — VANCOMYCIN HYDROCHLORIDE 1250 MG: 1.25 INJECTION, POWDER, LYOPHILIZED, FOR SOLUTION INTRAVENOUS at 21:49

## 2025-05-30 RX ADMIN — ARFORMOTEROL TARTRATE 15 MCG: 15 SOLUTION RESPIRATORY (INHALATION) at 22:16

## 2025-05-30 RX ADMIN — DILTIAZEM HYDROCHLORIDE 90 MG: 90 CAPSULE, EXTENDED RELEASE ORAL at 23:22

## 2025-05-30 RX ADMIN — PREGABALIN 50 MG: 50 CAPSULE ORAL at 12:56

## 2025-05-30 ASSESSMENT — PAIN DESCRIPTION - LOCATION
LOCATION: BACK
LOCATION: BACK

## 2025-05-30 ASSESSMENT — PAIN DESCRIPTION - DESCRIPTORS: DESCRIPTORS: ACHING

## 2025-05-30 ASSESSMENT — PAIN SCALES - GENERAL: PAINLEVEL_OUTOF10: 2

## 2025-05-30 ASSESSMENT — PAIN DESCRIPTION - ORIENTATION: ORIENTATION: LOWER

## 2025-05-30 NOTE — PROGRESS NOTES
4.3 4.4    110* 112*   CO2 25 24 24   BUN 22* 15 17   ALT 14 11* 9*     Imaging/procedures:   Vascular ankle brachial index (JOSE J) PROCEDURE ORDER DONE IN [facility] LAB  Limited study  The right ankle/brachial index is 0.42 Findings are consistent with   moderate  arterial disease in the tibioperoneal segment. Segmental   pressures could not be obtained due to patient involuntary movement and   inability to withstand pressure cuffs. More proximal involvement cannot be   excluded.   The left ankle/brachial index is 0.96. Findings are consistent with   borderline abnormal  arterial disease.  The right toe signals could not be obtained. This is consistent with   severe disease.   The left toe/brachial index is could not be obtained due to patient   movement. However the waveforms appear normal.    XR SHOULDER RIGHT (MIN 2 VIEWS)  Narrative: EXAM: XR SHOULDER RIGHT (MIN 2 VIEWS)    INDICATION: Right shoulder pain.    COMPARISON: None.    FINDINGS: Three views of the right shoulder demonstrate no acute fracture or  dislocation. The joint spaces are maintained. The soft tissues are unremarkable.  Impression: No acute abnormality.    Electronically signed by Balta Schmid            Assessment and Plan     Lesleyberlin Lauren is a 79 y.o. female with a PMHx of T2DM, COPD, HTN, HLD, hx of DVT/PE who is admitted for R diabetic foot infection with concern for osteomyelitis.     R Diabetic foot infection w/ concern for Osteo:   Presents with nonhealing R foot wound on lateral malleolus. ESR & CRP elevated. Failed outpatient abx (bactrim). XR without e/o osteomyelitis.  - IV Vanc, Cefepime, Flagyl (5/28-)  - Blood cx: NG2D   - Wound cx: Light staph aureus   - MRI R ankle pending   - Podiatry   - JOSE J with moderate arterial disease of right calf, ankle, metatarsal region    - Vascular surgery consulted: Will arrange outpatient arteriogram  - SCDs  - Remain NPO in case of possible procedure; Podiatry following     Elevated Cr  (improving): POA Cr 1.32 (bl: 0.9), BUN/Cr: 17; likely 2/2 IVVD  - encourage po intake, no indication for fluids at this time  - monitor on daily labs  - Avoid nephrotoxic meds     Hx of DVT/PE: first in 2012. On eliquis 5mg bid.   - holding eliquis for possible procedure  - SCDs     Hypertension: POA BP 96/80.  - Continuing home medications of coreg 6.25mg bid, diltiazem 90 mg bid.   - Will continue to monitor at this time and readjust as BP's trend.     Diabetes Mellitus T2  Neuropathy: Only on glipizide currently. Lantus 10u was prescribed last week but pt hasn't started. A1c: 10.5 (2/2025)  - Holding home glipizide.  - continue home Lyrica 50mg bid  - Insulin Sliding Scale normal sensitivity with AC&HS glucose checks.  - Hypoglycemia protocols ordered.     COPD: chronic, stable. Not in exacerbation.   - sub home Trelegy for Brovana/Pulmicort  - continue home prednisone 5mg qd, singulair 10mg qhs, zyrtec qd for home allegra     Muscle spasms/chronic pain: chronic, stable.   - continue home flexeril 10mg bid     GERD: chronic, stable  - sub home prilosec for protonix 40mg bid     Hx of kidney stones: not currently an issue, but was told to stay on flomax indefinitely.  - continue home flomax 0.4mg qd     Hyperlipidemia: on mevacor 10mg qhs  -sub lipitor 20mg for home mevacor         Obesity BMI Body mass index is 31.35 kg/m².  - Encouraging lifestyle modifications and further follow up outpatient.      FEN/GI - NPO from midnight  Activity - Out of bed with assistance  DVT prophylaxis - SCDs  GI prophylaxis - Protonix  Fall prophylaxis - Not indicated at this time.  Disposition -  Plan to d/c to Home.   Code Status - Full, discussed with patient / caregivers.  Next of Kin Name and Contact   Lesley Robin (Child)  [phone] (Mobile)     Patient discussed with Dr. Claudio Cox DO  Family Medicine Resident       For Billing    Chief Complaint   Patient presents with    Wound Infection

## 2025-05-30 NOTE — TELEPHONE ENCOUNTER
Pt is currently admitted to [facility]. Pt daughter states pt won't be discharged today states they want to run more test on pt. Pt daughter will give us a call when pt is discharged for a hospital f/u. Please advise.

## 2025-05-30 NOTE — CONSULTS
Vascular Surgery Consult Note  5/30/2025    Subjective:     Lesley Lauren is a 79 y.o.  with a chronic wound to her right ankle.  This has been present for multiple months.  MRI consistent with possible osteomyelitis.   Noninvasive vascular testing consistent with moderate occlussive disease.  We were asked to see her for same.  She denies any antecedent history of claudication or vasculogenic rest pain.  She is a bit confused    Past Medical History:   Diagnosis Date    Asthma     Bronchitis     Cancer (HCC)     breast - left    Cerebral artery occlusion with cerebral infarction (HCC)     COPD (chronic obstructive pulmonary disease) (HCC)     [facility]; pulmonary nodule 7mm stable (found 2011)    Diabetes (HCC)     Hepatitis age 9    High cholesterol     History of blood transfusion     Hx of blood clots     Hypertension     Obesity (BMI 30-39.9)     Thromboembolus (HCC)     L LE with PE    TIA (transient ischemic attack) 10/27/2021      Past Surgical History:   Procedure Laterality Date    BREAST SURGERY      L mastectomy    COLONOSCOPY N/A 05/26/2017    COLONOSCOPY- no info to   performed by Polo Shanks MD at [facility] ENDOSCOPY    COLONOSCOPY N/A 11/1/2023    COLONOSCOPY performed by Isra Wells MD at [facility] ENDOSCOPY    COLONOSCOPY N/A 11/1/2023    COLONOSCOPY POLYPECTOMY SNARE/COLD BIOPSY performed by Isra Wells MD at [facility] ENDOSCOPY    GYN      ablation, R ovary removed, tubal ligation    HAMMER TOE SURGERY      HEENT      tonsillectomy    IR GUIDED IVC FILTER PLACEMENT  2/8/2019    IR IVC FILTER PLACEMENT W IMAGING 2/8/2019 [facility] RAD ANGIO IR    IR GUIDED IVC FILTER PLACEMENT  02/08/2019    MASTECTOMY Left 2008    ORTHOPEDIC SURGERY      L hip, R leg, C4-C5 fusion, L foot    OTHER SURGICAL HISTORY      xiphoid removal    UPPER GASTROINTESTINAL ENDOSCOPY N/A 11/1/2023    ESOPHAGOGASTRODUODENOSCOPY performed by Isra Wells MD at [facility] ENDOSCOPY    UPPER GASTROINTESTINAL

## 2025-05-30 NOTE — PLAN OF CARE
Problem: Physical Therapy - Adult  Goal: By Discharge: Performs mobility at highest level of function for planned discharge setting.  See evaluation for individualized goals.  Description: FUNCTIONAL STATUS PRIOR TO ADMISSION: Patient was modified independent using a rolling walker for functional mobility in the last few months since development of the R ankle wound.  Patient also had a fall in late April and fractured several ribs per daughter.    HOME SUPPORT PRIOR TO ADMISSION: The patient lived alone with daughter locally to provide assistance as needed.    Physical Therapy Goals  Initiated 5/30/2025  1.  Patient will move from supine to sit and sit to supine, scoot up and down, and roll side to side in bed with modified independence within 7 day(s).    2.  Patient will perform sit to stand with modified independence within 7 day(s).  3.  Patient will transfer from bed to chair and chair to bed with modified independence using the least restrictive device within 7 day(s).  4.  Patient will ambulate with modified independence for 50 feet with the least restrictive device within 7 day(s).   5.  Patient will sit up in chair 2 hours at a time to improve OOB tolerance within 7 day(s).        Outcome: Progressing   PHYSICAL THERAPY EVALUATION    Patient: Lesley Lauren (79 y.o. female)  Date: 5/30/2025  Primary Diagnosis: Diabetic foot infection (HCC) [E11.628, L08.9]  Ankle wound, right, initial encounter [S91.001A]  Procedure(s) (LRB):  DEBRIDEMENT OF NONVIABLE TISSUE AND BONE RIGHT ANKLE (MAC W/LOCAL) (N/A)     Precautions: Restrictions/Precautions  Restrictions/Precautions: Fall Risk            ASSESSMENT :   DEFICITS/IMPAIRMENTS:   The patient is limited by decreased functional mobility, ROM, strength, activity tolerance, endurance, balance, increased pain levels, anxiety, decreased attention and gait dysfunction s/p admission for R ankle diabetic foot infection. Patient oriented x 4 this afternoon after  having period of agitation and confusion where she was only oriented to name. However, patient highly labile throughout session and demonstrated very poor insight into her deficits and safety awareness. Required MOD A to come to stand with safe transfer technique with RW and returned herself to sitting quickly stating her foot was too painful.  Patient then requested to \"get up my way\" and stood with MIN A via pulling up on RW (stabilized by OT) with increased weight bearing tolerance noted on RLE.  Able to progress to short distance gait with MIN A and heavy cueing for RW safety to reach [facility] and then after toileting to walk short distance to recliner (2 feet). Of note, patient also with SOB with very minimal activity, SpO2 around 90% after each bout of activity, rebounded quickly to 95%. Left with alarm and all needs in reach.    Based on the impairments listed above, patient is well below recent and prior baseline (indep without device 3 months ago) mobility level, at very high fall risk and demos very poor insight into deficits. Not safe for d/c home alone and family unable to provide current care needs.  Highly recommend moderate intensity rehab at d/c to maximize mobility independence and safety prior to return to previous living environment.    Patient will benefit from skilled intervention to address the above impairments.    Functional Outcome Measure:  The patient scored 15 on the [facility] outcome measure.     PLAN :  Recommendations and Planned Interventions:   bed mobility training, transfer training, gait training, therapeutic exercises, neuromuscular re-education, patient and family training/education, and therapeutic activities    Frequency/Duration: Patient will be followed by physical therapy to address goals, PT Plan of Care: 5 times/week to address goals.    Recommendations for staff mobility and toileting assistance:  Recommend that staff completes patient mobility with assist x1 using gait belt and

## 2025-05-30 NOTE — PROGRESS NOTES
Consult received  Will visit later today  Will arrange arteriogram as an outpatient  Ok to feed patient.

## 2025-05-30 NOTE — PROGRESS NOTES
Podiatry Progress Note    Date:2025       Room:[facility]  Patient Name:Lesley Lauren     YOB: 1945     Age:79 y.o.    Subjective:     Patient is a 79 y.o. female who is being seen at bedside for chronic healing right leg ulcer.        Objective:     Vitals Last 24 Hours:  TEMPERATURE:  Temp  Av.9 °F (36.6 °C)  Min: 97.2 °F (36.2 °C)  Max: 98.6 °F (37 °C)  RESPIRATIONS RANGE: Resp  Av.5  Min: 12  Max: 20  PULSE OXIMETRY RANGE: SpO2  Av.5 %  Min: 91 %  Max: 99 %  PULSE RANGE: Pulse  Av.5  Min: 64  Max: 80  BLOOD PRESSURE RANGE: Systolic (24hrs), Av , Min:108 , Max:127        Diastolic (24hrs), Av, Min:64, Max:73        I/O (24Hr):    Intake/Output Summary (Last 24 hours) at 2025 1644  Last data filed at 2025 1830  Gross per 24 hour   Intake --   Output 200 ml   Net -200 ml       Physical Exam:    GEN: Pt is AAOx4 and in NAD.   DERM: Wound lateral right ankle. Sonia wound erythema noted No dry skin noted to B/L LE.   VASC: Pedal pulses (DP/PT) diminished to B/L LE. CFT<3sec to all digits of B/L LE. No pedal hair growth noted to the level of the digits for B/L LE. Skin temp is warm to warm from proximal to distal for B/L LE. Neg homans/venus signs to B/L LE. No varicosities or telangectasias noted to B/L LE.  NEURO: Protective and epicritic sensations grossly absent to B/L LE  MSK: (-) POP, No gross deformities. Good muscle tone and bulk noted to B/L LE.  PSYCH: Cooperative with normal mood and affect       Labs/Imaging/Diagnostics:     Labs:  CBC:  Recent Labs     25  1723 25  0453 25  0411   WBC 9.0 8.7 7.6   RBC 4.82 4.39 4.25   HGB 11.1* 10.1* 9.7*   HCT 35.1 32.3* 31.4*   MCV 72.8* 73.6* 73.9*   RDW 15.9* 15.9* 16.3*    275 271     CHEMISTRIES:  Recent Labs     25  1723 25  0453 25  0411   * 140 140   K 4.5 4.3 4.4    110* 112*   CO2 25 24 24   BUN 22* 15 17   CREATININE 1.32* 1.01 1.09*    GLUCOSE 254* 116* 163*   CALCIUM 9.1 8.7 8.6     PT/INR:  No results for input(s): \"PROTIME\", \"INR\", \"INREXT\" in the last 72 hours.  APTT:  No results for input(s): \"APTT\" in the last 72 hours.  LIVER PROFILE:  Recent Labs     05/28/25  1723 05/29/25  0453 05/30/25  0411   AST 11* 12* 9*   ALT 14 11* 9*   BILITOT 0.3 0.4 0.5   ALKPHOS 217* 189* 164*     Lab Results   Component Value Date/Time    ALT 9 05/30/2025 04:11 AM    AST 9 05/30/2025 04:11 AM    ALKPHOS 164 05/30/2025 04:11 AM    ALKPHOS 88 04/26/2023 11:50 AM    BILITOT 0.5 05/30/2025 04:11 AM       Imaging Last 24 Hours:  MRI Result (most recent):  MRI ANKLE RIGHT W WO CONTRAST 05/29/2025    Narrative  EXAM: MRI ANKLE RIGHT W WO CONTRAST    INDICATION: Lateral right ankle infection, diabetes, evaluate for osteomyelitis.    COMPARISON: Right ankle views on 5/20/2025.    TECHNIQUE: Axial T1, T2 and proton density fat-saturated; coronal T2  fat-saturated and T1 fat-saturated; sagittal T1, T2 fat-saturated, and spoiled  gradient-echo MRI of the right ankle.    CONTRAST: Pre and post IV 16 ml Prohance    FINDINGS: Bone Marrow: Cortical volume loss and underlying bone marrow edema in  the distal fibula is incompletely imaged and measures at least 8 cm in  craniocaudal dimension. No intraosseous abscess. Degenerative subchondral cyst  in the navicular measures 1.0 cm. No other evidence of osteomyelitis. No  fracture or osteonecrosis.    Joint fluid: Small ankle joint effusions. No significant synovial enhancement.    Tendons: Mild peroneus tenosynovitis may be infected given the close  approximation to the soft tissue wound and osteomyelitis as well as the  tenosynovial mild thickening. Anterior extensor, medial flexor, and Achilles  tendons are intact.    Muscles: Moderate atrophy. Mild edema of the flexor hallucis longus muscle  adjacent to the fibula.    Lateral ligaments: intact.    Deltoid and spring ligaments: intact.    Sinus tarsi: within normal

## 2025-05-30 NOTE — PLAN OF CARE
COLONOSCOPY N/A 05/26/2017    COLONOSCOPY- no info to   performed by Polo Shanks MD at [facility] ENDOSCOPY    COLONOSCOPY N/A 11/1/2023    COLONOSCOPY performed by Isra Wells MD at [facility] ENDOSCOPY    COLONOSCOPY N/A 11/1/2023    COLONOSCOPY POLYPECTOMY SNARE/COLD BIOPSY performed by Isra Wells MD at [facility] ENDOSCOPY    GYN      ablation, R ovary removed, tubal ligation    HAMMER TOE SURGERY      HEENT      tonsillectomy    IR GUIDED IVC FILTER PLACEMENT  2/8/2019    IR IVC FILTER PLACEMENT W IMAGING 2/8/2019 [facility] RAD ANGIO IR    IR GUIDED IVC FILTER PLACEMENT  02/08/2019    MASTECTOMY Left 2008    ORTHOPEDIC SURGERY      L hip, R leg, C4-C5 fusion, L foot    OTHER SURGICAL HISTORY      xiphoid removal    UPPER GASTROINTESTINAL ENDOSCOPY N/A 11/1/2023    ESOPHAGOGASTRODUODENOSCOPY performed by Isra Wells MD at [facility] ENDOSCOPY    UPPER GASTROINTESTINAL ENDOSCOPY  11/1/2023    EGD DILATION BALLOON performed by Isra Wells MD at [facility] ENDOSCOPY          Expanded or extensive additional review of patient history:   Social/Functional History  Lives With: Alone  Type of Home: House  Home Layout: One level  Home Access: Stairs to enter with rails  Entrance Stairs - Number of Steps: 3 or 4  Bathroom Shower/Tub: Walk-in shower  Bathroom Equipment: Grab bars in shower, Shower chair, Hand-held shower  Home Equipment: Cane, Walker - Rolling  Has the patient had two or more falls in the past year or any fall with injury in the past year?: Yes  Receives Help From: Family  Prior Level of Assist for ADLs: Independent  Prior Level of Assist for Homemaking: Independent  Prior Level of Assist for Ambulation: Independent community ambulator, with or without device  Prior Level of Assist for Transfers: Independent  Active : No        EXAMINATION OF PERFORMANCE DEFICITS:    Cognitive/Behavioral Status:  Orientation  Overall Orientation Status: Within Normal Limits  Orientation Level: Oriented X4         Range of Motion:   AROM:  Back  Barriers to Learning: Cognition  Education Outcome: Continued education needed    Thank you for this referral.  Gali Mckeon OT  Minutes: 34    Occupational Therapy Evaluation Charge Determination   History Examination Decision-Making   MEDIUM Complexity : Expanded review of history including physical, cognitive and psychocial history  MEDIUM Complexity: 3-5 Performance deficits relating to physical, cognitive, or psychosocial skills that result in activity limitations and/or participation restrictions MEDIUM Complexity: Patient may present with comorbidities that affect occupational performance. Minimal to moderate modifications of tasks or assist (eg. physical or verbal) with assist is necessary to enable pt to complete eval   Based on the above components, the patient evaluation is determined to be of the following complexity level: Medium

## 2025-05-30 NOTE — PROGRESS NOTES
Perfect served Family Medicine team regarding patients confusion and refusing morning vitals/blood sugar check.

## 2025-05-30 NOTE — CONSULTS
Infectious Disease Consult    Impression/Plan     79 y.o. female with past medical Hx of COPD, T2DM, HLD, HTN, DVT who presented to the ED with right ankle wound with concerns for infection, admitted for diabetic foot wound and is being seen for osteomyelitis of right distal fibula.     Right distal tibia osteomyelitis  Right malleolus ulcer  PAD  DM II  Hx COPD  Blood cx 5/28 NGTD    Appreciate podiatry efforts for source control with plans for debridement on Monday.    Evaluated by vascular surgery with recommendation for outpatient vascular intervention.  However, delay of vascular intervention will significantly decrease wound healing and delivery of antibiotics for osteomyelitis.  Therefore would recommend inpatient vascular intervention. D/w Dr. Mcfarlane who will discuss with vascular Dr. Pete.     Continue vancomycin, cefepime, Flagyl    Superficial wound culture with prelim MSSA, multiple colony types and mixed skin laron therefore may not be useful to guide antibiotic treatment    on 5 mg daily prednisone, suspect for COPD but will need to confirm with patient    A1c 11.2, uncontrolled, defer to primary team    D/w Dr. Muñoz, pt, Dr. Mcfarlane                   Anti-infectives:   Vancomycin  Cefepime  flagyl    Subjective:   Date of Consultation:  May 30, 2025  Date of Admission: 5/28/2025   Referring Physician: Becky Zimmerman    Patient is a 79 y.o. female with past medical Hx of COPD, T2DM, HLD, HTN, DVT who presented to the ED with right ankle wound with concerns for infection, admitted for diabetic foot wound and is being seen for osteomyelitis of right distal fibula.     Patient reports that right ankle wound started about 2 months ago due to an ankle bracelet rubbing her skin which worsened during recent admission at another hospital.  Was prescribed Bactrim about one weeks ago without improvement.  ED workup without leukocytosis, afebrile, elevated creatinine.  Blood cultures without growth.   Superficial wound culture was collected with probable MSSA and multiple colony types and mixed skin laron.  MRI of right ankle shows osteomyelitis of the distal fibula and lateral ankle soft tissue ulceration with underlying cellulitis, peroneus tenosynovitis may be infected.  Vascular study shows right peripheral artery disease with JOES J of 0.43 and left 0.98.  Empirically started on vancomycin, cefepime, Flagyl.  Of note she is on chronic prednisone 5 mg daily I suspect for COPD but would have to confirm with patient.   On examination delayed cap refill bilateral lower extremities, no pedal hair growth, ruddiness.  Right malleolus ulcer with purulent drainage.      Patient Active Problem List   Diagnosis    Unspecified convulsions (HCC)    COPD (chronic obstructive pulmonary disease) ([facility])    Melena    Other hyperlipidemia    Chronic anticoagulation    Severe obesity ([facility])    History of stroke    DCIS (ductal carcinoma in situ) of breast    Debility    Other seizures ([facility])    History of GI bleed    HTN (hypertension)    Chronic renal disease, stage III ([facility])    Diabetic polyneuropathy associated with type 2 diabetes mellitus ([facility])    Type 2 diabetes mellitus with diabetic polyneuropathy, without long-term current use of insulin ([facility])    History of DVT (deep vein thrombosis)    Ankle wound, right, initial encounter    Open wound of toe    Diabetic foot infection ([facility])    Uncontrolled type 2 diabetes mellitus with hyperglycemia ([facility])    Gastroesophageal reflux disease without esophagitis    Class 1 obesity due to excess calories with serious comorbidity and body mass index (BMI) of 31.0 to 31.9 in adult    Type 2 diabetes mellitus with hyperglycemia, without long-term current use of insulin ([facility])     Past Medical History:   Diagnosis Date    Asthma     Bronchitis     Cancer ([facility])     breast - left    Cerebral artery occlusion with cerebral infarction ([facility])     COPD (chronic obstructive pulmonary disease) ([facility])

## 2025-05-30 NOTE — CONSULTS
Antihyperglycemic Agent and Dosing Additional Comments   Biguanide  Metformin - GI intolerance    Sulfonylureas Glipizide 10 mg bid with meals    DPP4 inhibitors     Thiazolidinediones     SGLT-2 inhibitors     GLP-1 [place]     Insulin Lantus 10 units nightly - had not started yet      Diabetes self-management practices:   Eating pattern   [x] Not eating a carbohydrate-controlled meal plan  [x] Breakfast Coffee with cream and regular sugar   [x] Lunch  1/2 bagel with cream cheese   [x] Dinner  Varies; casseroles, spaghetti and meatballs, etc     [x] Snacks Occasional ice cream; a few alayna crackers with PB   [x] Beverages One regular Pepsi a day, water, Crystal light  [] Dentition status   Physical activity pattern   [x] Not employing a physical activity program to control BG    Monitoring pattern   [x] Testing BGs sufficiently to inform self-management adjustments  BG ranging mostly in the 200s; sometimes as high as 400    Taking medications pattern  [x] Consistent administration?  [x] Affordable    Social determinants of health impacting diabetes self-management practices    Concerned that you need to know more about how to stay healthy with diabetes  Daughter at bedside and helps patient at home  Overall evaluation:    [x] Not achieving A1c target with drug therapy & self-care practices    Subjective   ”I guess I'm just facing all the realities of everything.”     Objective   Physical exam  General  Female with obesity  in no acute distress. Conversant and cooperative  Neuro  Alert, oriented   Vital Signs   Vitals:    05/30/25 0325   BP: 127/73   Pulse: 64   Resp: 12   Temp: 97.2 °F (36.2 °C)   SpO2: 91%       Diabetic foot exam:    See podiatry note    Laboratory  Recent Labs     05/28/25  1723 05/29/25  0453 05/30/25  0411   WBC 9.0 8.7 7.6   HGB 11.1* 10.1* 9.7*   HCT 35.1 32.3* 31.4*   MCV 72.8* 73.6* 73.9*    275 271     Recent Labs     05/28/25  1723 05/29/25  0453 05/30/25  0411   * 140 140   K  4.5 4.3 4.4    110* 112*   CO2 25 24 24   BUN 22* 15 17   CREATININE 1.32* 1.01 1.09*     Lab Results   Component Value Date    ALT 9 (L) 05/30/2025    AST 9 (L) 05/30/2025    ALKPHOS 164 (H) 05/30/2025    BILITOT 0.5 05/30/2025     Lab Results   Component Value Date    TSH 1.21 12/12/2021         Factors impacting BG management  Factor Dose Comments   Nutrition:  Standard meals   60 grams/meal    Pain PRN    Infection Cefepime, Flagyl, Vanc    Kidney function MARIANA    Liver function Normal       Billing Code(s)   [x] 10305 IP initial hospital care - 40 minutes       Before making these care recommendations, I personally reviewed the hospitalization record, including notes, laboratory & diagnostic data and current medications, and examined the patient at the bedside.  Total minutes: 40    SONDRA DOTY - CNS   Diabetes Clinical Nurse Specialist   [facility]  Access via [employer]
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
## 2025-05-30 NOTE — PLAN OF CARE
Problem: Chronic Conditions and Co-morbidities  Goal: Patient's chronic conditions and co-morbidity symptoms are monitored and maintained or improved  Outcome: Progressing     Problem: Discharge Planning  Goal: Discharge to home or other facility with appropriate resources  Outcome: Progressing     Problem: Safety - Adult  Goal: Free from fall injury  Outcome: Progressing     Problem: Skin/Tissue Integrity  Goal: Skin integrity remains intact  Outcome: Progressing     Problem: Respiratory - Adult  Goal: Achieves optimal ventilation and oxygenation  Outcome: Progressing     Problem: Pain  Goal: Verbalizes/displays adequate comfort level or baseline comfort level  Outcome: Progressing

## 2025-05-30 NOTE — PROGRESS NOTES
UPDATE:  CT head ordered for worsening confusion, agitation overnight. Received call ~0900 that patient refusing to go down to CT machine.   I went and evaluated with patient. Patient understands she's in the hospital for lower extremity wound and needs IV antibiotics for possible bone infection. However, states she doesn't need to scan her head for a foot infection and she doesn't believe we are \"real doctors\". Patient refusing examination at this time, but gross movement normal of upper and lower extremities. No facial droop noted. Patient denies focal deficits weakness, numbness or tingling at this time.    A/P:  Concern for delirium in agitation due to hospitalization. At this time, low concern for intracranial abnormality such as bleed or stroke, however unable to complete full neurological exam due to agitation. Patient is oriented to person and place and visual exam of neurological systems was normal. Patient denies neurological symptoms.  - D/c CT head order at this time  - continue neurologic checks  - Reach out to family. Might help with reorientation for family to be at bedside.  - Start delirium precautions  Lights on and blinds open during the day, lights off with minimal stimulation (no TV, music) at night.    Complete patient baths and major nursing interventions prior to 10p or after 5am when possible.   Frequent reorientation of patient to location, date, and time. Have date updated on whiteboard and clock visible to patient.   Encourage caregiver involvement in care and reorientation of patient.   Encourage patient up and out of bed if possible, ambulate TID as able.

## 2025-05-30 NOTE — PLAN OF CARE
Assist within 7 day(s).  2.  Patient will perform bathing with Stand by Assist within 7 day(s).  3.  Patient will perform lower body dressing with Stand by Assist using AE prn within 7 day(s).  4.  Patient will perform toilet transfers with Stand by Assist  within 7 day(s).  5.  Patient will perform all aspects of toileting with Stand by Assist within 7 day(s).  5/30/2025 1519 by Gali Mckeon OT  Outcome: Progressing     Problem: Physical Therapy - Adult  Goal: By Discharge: Performs mobility at highest level of function for planned discharge setting.  See evaluation for individualized goals.  Description: FUNCTIONAL STATUS PRIOR TO ADMISSION: Patient was modified independent using a rolling walker for functional mobility in the last few months since development of the R ankle wound.  Patient also had a fall in late April and fractured several ribs per daughter.    HOME SUPPORT PRIOR TO ADMISSION: The patient lived alone with daughter locally to provide assistance as needed.    Physical Therapy Goals  Initiated 5/30/2025  1.  Patient will move from supine to sit and sit to supine, scoot up and down, and roll side to side in bed with modified independence within 7 day(s).    2.  Patient will perform sit to stand with modified independence within 7 day(s).  3.  Patient will transfer from bed to chair and chair to bed with modified independence using the least restrictive device within 7 day(s).  4.  Patient will ambulate with modified independence for 50 feet with the least restrictive device within 7 day(s).   5.  Patient will sit up in chair 2 hours at a time to improve OOB tolerance within 7 day(s).        5/30/2025 1538 by Melissa Kelly, PT  Outcome: Progressing
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
## 2025-05-30 NOTE — CARE COORDINATION
Care Management Progress Note        Reason for Admission:   Diabetic foot infection (HCC) [E11.628, L08.9]  Ankle wound, right, initial encounter [S91.001A]  Procedure(s) (LRB):  DEBRIDEMENT OF NONVIABLE TISSUE AND BONE RIGHT ANKLE (MAC W/LOCAL) (N/A)         Patient Admission Status: Inpatient  RUR: 17%  Hospitalization in the last 30 days (Readmission):  No        Transition of care plan:  Patient was discussed in IDR and continues to be medically managed.    Dispo: Patient lives alone PTA receiving HH services through [facility]. Will need HH resumption order if recommended at discharge. CM following for discharge needs.     Discharge plan communicated with patient and/or discharge caregiver: Yes      Date 1st IMM letter given: 5/28/25.    Outpatient follow-up.    Transport at discharge: family.           ___________________________________________   Christina Willett RN Case Manager  5/30/2025   2:14 PM

## 2025-05-31 LAB
ALBUMIN SERPL-MCNC: 2.8 G/DL (ref 3.5–5)
ALBUMIN/GLOB SERPL: 0.8 (ref 1.1–2.2)
ALP SERPL-CCNC: 172 U/L (ref 45–117)
ALT SERPL-CCNC: 10 U/L (ref 12–78)
ANION GAP SERPL CALC-SCNC: 7 MMOL/L (ref 2–12)
AST SERPL-CCNC: 13 U/L (ref 15–37)
BACTERIA SPEC CULT: ABNORMAL
BACTERIA SPEC CULT: ABNORMAL
BASOPHILS # BLD: 0.07 K/UL (ref 0–0.1)
BASOPHILS NFR BLD: 0.8 % (ref 0–1)
BILIRUB SERPL-MCNC: 0.4 MG/DL (ref 0.2–1)
BUN SERPL-MCNC: 18 MG/DL (ref 6–20)
BUN/CREAT SERPL: 18 (ref 12–20)
CALCIUM SERPL-MCNC: 8.5 MG/DL (ref 8.5–10.1)
CHLORIDE SERPL-SCNC: 111 MMOL/L (ref 97–108)
CO2 SERPL-SCNC: 23 MMOL/L (ref 21–32)
CREAT SERPL-MCNC: 0.98 MG/DL (ref 0.55–1.02)
DIFFERENTIAL METHOD BLD: ABNORMAL
EOSINOPHIL # BLD: 0.42 K/UL (ref 0–0.4)
EOSINOPHIL NFR BLD: 4.6 % (ref 0–7)
ERYTHROCYTE [DISTWIDTH] IN BLOOD BY AUTOMATED COUNT: 16.3 % (ref 11.5–14.5)
GLOBULIN SER CALC-MCNC: 3.5 G/DL (ref 2–4)
GLUCOSE BLD STRIP.AUTO-MCNC: 118 MG/DL (ref 65–117)
GLUCOSE BLD STRIP.AUTO-MCNC: 141 MG/DL (ref 65–117)
GLUCOSE BLD STRIP.AUTO-MCNC: 173 MG/DL (ref 65–117)
GLUCOSE BLD STRIP.AUTO-MCNC: 220 MG/DL (ref 65–117)
GLUCOSE SERPL-MCNC: 154 MG/DL (ref 65–100)
GRAM STN SPEC: ABNORMAL
GRAM STN SPEC: ABNORMAL
HCT VFR BLD AUTO: 33.1 % (ref 35–47)
HGB BLD-MCNC: 10.3 G/DL (ref 11.5–16)
IMM GRANULOCYTES # BLD AUTO: 0.04 K/UL (ref 0–0.04)
IMM GRANULOCYTES NFR BLD AUTO: 0.4 % (ref 0–0.5)
LYMPHOCYTES # BLD: 2.14 K/UL (ref 0.8–3.5)
LYMPHOCYTES NFR BLD: 23.5 % (ref 12–49)
MCH RBC QN AUTO: 23 PG (ref 26–34)
MCHC RBC AUTO-ENTMCNC: 31.1 G/DL (ref 30–36.5)
MCV RBC AUTO: 74 FL (ref 80–99)
MONOCYTES # BLD: 0.86 K/UL (ref 0–1)
MONOCYTES NFR BLD: 9.5 % (ref 5–13)
NEUTS SEG # BLD: 5.56 K/UL (ref 1.8–8)
NEUTS SEG NFR BLD: 61.2 % (ref 32–75)
NRBC # BLD: 0 K/UL (ref 0–0.01)
NRBC BLD-RTO: 0 PER 100 WBC
PLATELET # BLD AUTO: 289 K/UL (ref 150–400)
PMV BLD AUTO: 10.8 FL (ref 8.9–12.9)
POTASSIUM SERPL-SCNC: 4.1 MMOL/L (ref 3.5–5.1)
PROT SERPL-MCNC: 6.3 G/DL (ref 6.4–8.2)
RBC # BLD AUTO: 4.47 M/UL (ref 3.8–5.2)
SERVICE CMNT-IMP: ABNORMAL
SODIUM SERPL-SCNC: 141 MMOL/L (ref 136–145)
WBC # BLD AUTO: 9.1 K/UL (ref 3.6–11)

## 2025-05-31 PROCEDURE — 6360000002 HC RX W HCPCS: Performed by: FAMILY MEDICINE

## 2025-05-31 PROCEDURE — 6370000000 HC RX 637 (ALT 250 FOR IP)

## 2025-05-31 PROCEDURE — 1100000000 HC RM PRIVATE

## 2025-05-31 PROCEDURE — 80053 COMPREHEN METABOLIC PANEL: CPT

## 2025-05-31 PROCEDURE — 99232 SBSQ HOSP IP/OBS MODERATE 35: CPT | Performed by: FAMILY MEDICINE

## 2025-05-31 PROCEDURE — 2580000003 HC RX 258

## 2025-05-31 PROCEDURE — 2580000003 HC RX 258: Performed by: FAMILY MEDICINE

## 2025-05-31 PROCEDURE — 94761 N-INVAS EAR/PLS OXIMETRY MLT: CPT

## 2025-05-31 PROCEDURE — 94640 AIRWAY INHALATION TREATMENT: CPT

## 2025-05-31 PROCEDURE — 85025 COMPLETE CBC W/AUTO DIFF WBC: CPT

## 2025-05-31 PROCEDURE — 6360000002 HC RX W HCPCS

## 2025-05-31 PROCEDURE — 97116 GAIT TRAINING THERAPY: CPT

## 2025-05-31 PROCEDURE — 82962 GLUCOSE BLOOD TEST: CPT

## 2025-05-31 RX ORDER — QUETIAPINE FUMARATE 25 MG/1
12.5 TABLET, FILM COATED ORAL NIGHTLY
Status: DISCONTINUED | OUTPATIENT
Start: 2025-05-31 | End: 2025-06-02

## 2025-05-31 RX ORDER — QUETIAPINE FUMARATE 25 MG/1
25 TABLET, FILM COATED ORAL NIGHTLY
Status: DISCONTINUED | OUTPATIENT
Start: 2025-05-31 | End: 2025-05-31

## 2025-05-31 RX ADMIN — INSULIN LISPRO 4 UNITS: 100 INJECTION, SOLUTION INTRAVENOUS; SUBCUTANEOUS at 16:57

## 2025-05-31 RX ADMIN — CEFEPIME 2000 MG: 2 INJECTION, POWDER, FOR SOLUTION INTRAVENOUS at 08:26

## 2025-05-31 RX ADMIN — CARVEDILOL 6.25 MG: 6.25 TABLET, FILM COATED ORAL at 08:27

## 2025-05-31 RX ADMIN — METRONIDAZOLE 500 MG: 500 INJECTION, SOLUTION INTRAVENOUS at 03:15

## 2025-05-31 RX ADMIN — INSULIN LISPRO 4 UNITS: 100 INJECTION, SOLUTION INTRAVENOUS; SUBCUTANEOUS at 11:54

## 2025-05-31 RX ADMIN — ARFORMOTEROL TARTRATE 15 MCG: 15 SOLUTION RESPIRATORY (INHALATION) at 07:41

## 2025-05-31 RX ADMIN — CYCLOBENZAPRINE 10 MG: 10 TABLET, FILM COATED ORAL at 20:08

## 2025-05-31 RX ADMIN — ACETAMINOPHEN 1000 MG: 500 TABLET ORAL at 11:57

## 2025-05-31 RX ADMIN — METRONIDAZOLE 500 MG: 500 INJECTION, SOLUTION INTRAVENOUS at 20:40

## 2025-05-31 RX ADMIN — QUETIAPINE FUMARATE 12.5 MG: 25 TABLET ORAL at 20:08

## 2025-05-31 RX ADMIN — BUDESONIDE 500 MCG: 0.5 INHALANT RESPIRATORY (INHALATION) at 19:59

## 2025-05-31 RX ADMIN — DICLOFENAC SODIUM 4 G: 10 GEL TOPICAL at 08:27

## 2025-05-31 RX ADMIN — DILTIAZEM HYDROCHLORIDE 90 MG: 90 CAPSULE, EXTENDED RELEASE ORAL at 21:46

## 2025-05-31 RX ADMIN — PREDNISONE 5 MG: 5 TABLET ORAL at 08:26

## 2025-05-31 RX ADMIN — MONTELUKAST 10 MG: 10 TABLET, FILM COATED ORAL at 20:07

## 2025-05-31 RX ADMIN — TAMSULOSIN HYDROCHLORIDE 0.4 MG: 0.4 CAPSULE ORAL at 08:26

## 2025-05-31 RX ADMIN — METRONIDAZOLE 500 MG: 500 INJECTION, SOLUTION INTRAVENOUS at 11:11

## 2025-05-31 RX ADMIN — DILTIAZEM HYDROCHLORIDE 90 MG: 90 CAPSULE, EXTENDED RELEASE ORAL at 08:27

## 2025-05-31 RX ADMIN — PREGABALIN 50 MG: 50 CAPSULE ORAL at 21:46

## 2025-05-31 RX ADMIN — APIXABAN 5 MG: 5 TABLET, FILM COATED ORAL at 08:26

## 2025-05-31 RX ADMIN — PREGABALIN 50 MG: 50 CAPSULE ORAL at 08:27

## 2025-05-31 RX ADMIN — ARFORMOTEROL TARTRATE 15 MCG: 15 SOLUTION RESPIRATORY (INHALATION) at 19:59

## 2025-05-31 RX ADMIN — CYCLOBENZAPRINE 10 MG: 10 TABLET, FILM COATED ORAL at 08:27

## 2025-05-31 RX ADMIN — INSULIN GLARGINE 16 UNITS: 100 INJECTION, SOLUTION SUBCUTANEOUS at 08:27

## 2025-05-31 RX ADMIN — CEFEPIME 2000 MG: 2 INJECTION, POWDER, FOR SOLUTION INTRAVENOUS at 19:58

## 2025-05-31 RX ADMIN — BUDESONIDE 500 MCG: 0.5 INHALANT RESPIRATORY (INHALATION) at 07:41

## 2025-05-31 RX ADMIN — APIXABAN 5 MG: 5 TABLET, FILM COATED ORAL at 20:07

## 2025-05-31 RX ADMIN — CETIRIZINE HYDROCHLORIDE 10 MG: 10 TABLET, FILM COATED ORAL at 08:27

## 2025-05-31 RX ADMIN — PANTOPRAZOLE SODIUM 40 MG: 40 TABLET, DELAYED RELEASE ORAL at 16:59

## 2025-05-31 RX ADMIN — INSULIN LISPRO 2 UNITS: 100 INJECTION, SOLUTION INTRAVENOUS; SUBCUTANEOUS at 16:57

## 2025-05-31 RX ADMIN — INSULIN LISPRO 4 UNITS: 100 INJECTION, SOLUTION INTRAVENOUS; SUBCUTANEOUS at 08:27

## 2025-05-31 RX ADMIN — VANCOMYCIN HYDROCHLORIDE 1250 MG: 1.25 INJECTION, POWDER, LYOPHILIZED, FOR SOLUTION INTRAVENOUS at 20:02

## 2025-05-31 RX ADMIN — CARVEDILOL 6.25 MG: 6.25 TABLET, FILM COATED ORAL at 21:46

## 2025-05-31 ASSESSMENT — PAIN SCALES - GENERAL
PAINLEVEL_OUTOF10: 3
PAINLEVEL_OUTOF10: 1

## 2025-05-31 ASSESSMENT — PAIN DESCRIPTION - DESCRIPTORS: DESCRIPTORS: ACHING

## 2025-05-31 ASSESSMENT — PAIN DESCRIPTION - LOCATION
LOCATION: HEAD
LOCATION: BACK

## 2025-05-31 ASSESSMENT — PAIN DESCRIPTION - ORIENTATION: ORIENTATION: LOWER

## 2025-05-31 NOTE — PLAN OF CARE
Problem: Respiratory - Adult  Goal: Achieves optimal ventilation and oxygenation  5/30/2025 2221 by Latoya Lynch RCP  Outcome: Progressing  5/30/2025 1933 by Mendoza Huang, RN  Outcome: Progressing  5/30/2025 0911 by Nathalie Garcia, RT  Outcome: Progressing

## 2025-05-31 NOTE — PROGRESS NOTES
Comments noted  Will benefit from revascularization  Does not need to precede debridement or IV antibiotics  Will be done as an outpatient soon.
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
## 2025-05-31 NOTE — PLAN OF CARE
Problem: Chronic Conditions and Co-morbidities  Goal: Patient's chronic conditions and co-morbidity symptoms are monitored and maintained or improved  Outcome: Progressing     Problem: Discharge Planning  Goal: Discharge to home or other facility with appropriate resources  Outcome: Progressing     Problem: Safety - Adult  Goal: Free from fall injury  Outcome: Progressing     Problem: Skin/Tissue Integrity  Goal: Skin integrity remains intact  Description: 1.  Monitor for areas of redness and/or skin breakdown2.  Assess vascular access sites hourly3.  Every 4-6 hours minimum:  Change oxygen saturation probe site4.  Every 4-6 hours:  If on nasal continuous positive airway pressure, respiratory therapy assess nares and determine need for appliance change or resting period  Outcome: Progressing     Problem: Respiratory - Adult  Goal: Achieves optimal ventilation and oxygenation  5/31/2025 0958 by Adriano Doshi RN  Outcome: Progressing  5/31/2025 0744 by Nathalie Garcia, RT  Outcome: Progressing  5/30/2025 2221 by Latoya Lynch RCP  Outcome: Progressing     Problem: Pain  Goal: Verbalizes/displays adequate comfort level or baseline comfort level  Outcome: Progressing     Problem: ABCDS Injury Assessment  Goal: Absence of physical injury  Outcome: Progressing

## 2025-05-31 NOTE — PROGRESS NOTES
home allegra     Muscle spasms/chronic pain: chronic, stable.   - continue home flexeril 10mg bid     GERD: chronic, stable  - sub home prilosec for protonix 40mg bid     Hx of kidney stones: not currently an issue, but was told to stay on flomax indefinitely.  - continue home flomax 0.4mg qd     Hyperlipidemia: on mevacor 10mg qhs  -sub lipitor 20mg for home mevacor         Obesity BMI Body mass index is 31.35 kg/m².  - Encouraging lifestyle modifications and further follow up outpatient.      FEN/GI - Diabetic diet  Activity - Out of bed with assistance  DVT prophylaxis - SCDs  GI prophylaxis - Protonix  Fall prophylaxis - Not indicated at this time.  Disposition -  Plan to d/c to Home.   Code Status - Full, discussed with patient / caregivers.  Next of Kin Name and Contact   Lesley Robin (Child)  [phone] (Mobile)     Patient discussed with Dr. Claudio Duarte MD  Family Medicine Resident       For Billing    Chief Complaint   Patient presents with    Wound Infection
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
## 2025-06-01 LAB
ALBUMIN SERPL-MCNC: 2.6 G/DL (ref 3.5–5)
ALBUMIN/GLOB SERPL: 0.8 (ref 1.1–2.2)
ALP SERPL-CCNC: 156 U/L (ref 45–117)
ALT SERPL-CCNC: 12 U/L (ref 12–78)
ANION GAP SERPL CALC-SCNC: 3 MMOL/L (ref 2–12)
AST SERPL-CCNC: 18 U/L (ref 15–37)
BASOPHILS # BLD: 0.07 K/UL (ref 0–0.1)
BASOPHILS NFR BLD: 0.7 % (ref 0–1)
BILIRUB SERPL-MCNC: 0.4 MG/DL (ref 0.2–1)
BUN SERPL-MCNC: 21 MG/DL (ref 6–20)
BUN/CREAT SERPL: 23 (ref 12–20)
CALCIUM SERPL-MCNC: 8.7 MG/DL (ref 8.5–10.1)
CHLORIDE SERPL-SCNC: 114 MMOL/L (ref 97–108)
CO2 SERPL-SCNC: 24 MMOL/L (ref 21–32)
CREAT SERPL-MCNC: 0.93 MG/DL (ref 0.55–1.02)
DIFFERENTIAL METHOD BLD: ABNORMAL
EOSINOPHIL # BLD: 0.47 K/UL (ref 0–0.4)
EOSINOPHIL NFR BLD: 4.9 % (ref 0–7)
ERYTHROCYTE [DISTWIDTH] IN BLOOD BY AUTOMATED COUNT: 16.4 % (ref 11.5–14.5)
GLOBULIN SER CALC-MCNC: 3.2 G/DL (ref 2–4)
GLUCOSE BLD STRIP.AUTO-MCNC: 115 MG/DL (ref 65–117)
GLUCOSE BLD STRIP.AUTO-MCNC: 182 MG/DL (ref 65–117)
GLUCOSE BLD STRIP.AUTO-MCNC: 218 MG/DL (ref 65–117)
GLUCOSE BLD STRIP.AUTO-MCNC: 239 MG/DL (ref 65–117)
GLUCOSE SERPL-MCNC: 172 MG/DL (ref 65–100)
HCT VFR BLD AUTO: 31.5 % (ref 35–47)
HGB BLD-MCNC: 9.6 G/DL (ref 11.5–16)
IMM GRANULOCYTES # BLD AUTO: 0.03 K/UL (ref 0–0.04)
IMM GRANULOCYTES NFR BLD AUTO: 0.3 % (ref 0–0.5)
LYMPHOCYTES # BLD: 2.14 K/UL (ref 0.8–3.5)
LYMPHOCYTES NFR BLD: 22.1 % (ref 12–49)
MCH RBC QN AUTO: 22.8 PG (ref 26–34)
MCHC RBC AUTO-ENTMCNC: 30.5 G/DL (ref 30–36.5)
MCV RBC AUTO: 74.8 FL (ref 80–99)
MONOCYTES # BLD: 0.91 K/UL (ref 0–1)
MONOCYTES NFR BLD: 9.4 % (ref 5–13)
NEUTS SEG # BLD: 6.06 K/UL (ref 1.8–8)
NEUTS SEG NFR BLD: 62.6 % (ref 32–75)
NRBC # BLD: 0 K/UL (ref 0–0.01)
NRBC BLD-RTO: 0 PER 100 WBC
PLATELET # BLD AUTO: 275 K/UL (ref 150–400)
PMV BLD AUTO: 10.3 FL (ref 8.9–12.9)
POTASSIUM SERPL-SCNC: 4.3 MMOL/L (ref 3.5–5.1)
PROT SERPL-MCNC: 5.8 G/DL (ref 6.4–8.2)
RBC # BLD AUTO: 4.21 M/UL (ref 3.8–5.2)
SERVICE CMNT-IMP: ABNORMAL
SERVICE CMNT-IMP: NORMAL
SODIUM SERPL-SCNC: 141 MMOL/L (ref 136–145)
VANCOMYCIN SERPL-MCNC: 16.3 UG/ML
WBC # BLD AUTO: 9.7 K/UL (ref 3.6–11)

## 2025-06-01 PROCEDURE — 6370000000 HC RX 637 (ALT 250 FOR IP)

## 2025-06-01 PROCEDURE — 2580000003 HC RX 258: Performed by: FAMILY MEDICINE

## 2025-06-01 PROCEDURE — 2580000003 HC RX 258

## 2025-06-01 PROCEDURE — 80202 ASSAY OF VANCOMYCIN: CPT

## 2025-06-01 PROCEDURE — 80053 COMPREHEN METABOLIC PANEL: CPT

## 2025-06-01 PROCEDURE — 6360000002 HC RX W HCPCS

## 2025-06-01 PROCEDURE — 94761 N-INVAS EAR/PLS OXIMETRY MLT: CPT

## 2025-06-01 PROCEDURE — 1100000000 HC RM PRIVATE

## 2025-06-01 PROCEDURE — 6360000002 HC RX W HCPCS: Performed by: FAMILY MEDICINE

## 2025-06-01 PROCEDURE — 94640 AIRWAY INHALATION TREATMENT: CPT

## 2025-06-01 PROCEDURE — 99232 SBSQ HOSP IP/OBS MODERATE 35: CPT | Performed by: FAMILY MEDICINE

## 2025-06-01 PROCEDURE — 82962 GLUCOSE BLOOD TEST: CPT

## 2025-06-01 PROCEDURE — 85025 COMPLETE CBC W/AUTO DIFF WBC: CPT

## 2025-06-01 PROCEDURE — 36415 COLL VENOUS BLD VENIPUNCTURE: CPT

## 2025-06-01 RX ADMIN — METRONIDAZOLE 500 MG: 500 INJECTION, SOLUTION INTRAVENOUS at 04:08

## 2025-06-01 RX ADMIN — MONTELUKAST 10 MG: 10 TABLET, FILM COATED ORAL at 20:06

## 2025-06-01 RX ADMIN — CYCLOBENZAPRINE 10 MG: 10 TABLET, FILM COATED ORAL at 20:06

## 2025-06-01 RX ADMIN — ARFORMOTEROL TARTRATE 15 MCG: 15 SOLUTION RESPIRATORY (INHALATION) at 19:40

## 2025-06-01 RX ADMIN — QUETIAPINE FUMARATE 12.5 MG: 25 TABLET ORAL at 20:06

## 2025-06-01 RX ADMIN — PANTOPRAZOLE SODIUM 40 MG: 40 TABLET, DELAYED RELEASE ORAL at 05:41

## 2025-06-01 RX ADMIN — INSULIN LISPRO 2 UNITS: 100 INJECTION, SOLUTION INTRAVENOUS; SUBCUTANEOUS at 20:07

## 2025-06-01 RX ADMIN — INSULIN LISPRO 4 UNITS: 100 INJECTION, SOLUTION INTRAVENOUS; SUBCUTANEOUS at 11:42

## 2025-06-01 RX ADMIN — BUDESONIDE 500 MCG: 0.5 INHALANT RESPIRATORY (INHALATION) at 07:15

## 2025-06-01 RX ADMIN — CETIRIZINE HYDROCHLORIDE 10 MG: 10 TABLET, FILM COATED ORAL at 09:33

## 2025-06-01 RX ADMIN — INSULIN LISPRO 2 UNITS: 100 INJECTION, SOLUTION INTRAVENOUS; SUBCUTANEOUS at 09:35

## 2025-06-01 RX ADMIN — INSULIN LISPRO 2 UNITS: 100 INJECTION, SOLUTION INTRAVENOUS; SUBCUTANEOUS at 11:42

## 2025-06-01 RX ADMIN — ACETAMINOPHEN 1000 MG: 500 TABLET ORAL at 09:33

## 2025-06-01 RX ADMIN — CYCLOBENZAPRINE 10 MG: 10 TABLET, FILM COATED ORAL at 09:33

## 2025-06-01 RX ADMIN — CARVEDILOL 6.25 MG: 6.25 TABLET, FILM COATED ORAL at 09:33

## 2025-06-01 RX ADMIN — ARFORMOTEROL TARTRATE 15 MCG: 15 SOLUTION RESPIRATORY (INHALATION) at 07:15

## 2025-06-01 RX ADMIN — PREGABALIN 50 MG: 50 CAPSULE ORAL at 09:33

## 2025-06-01 RX ADMIN — INSULIN LISPRO 4 UNITS: 100 INJECTION, SOLUTION INTRAVENOUS; SUBCUTANEOUS at 09:35

## 2025-06-01 RX ADMIN — PREGABALIN 50 MG: 50 CAPSULE ORAL at 20:06

## 2025-06-01 RX ADMIN — METRONIDAZOLE 500 MG: 500 INJECTION, SOLUTION INTRAVENOUS at 19:58

## 2025-06-01 RX ADMIN — CEFEPIME 2000 MG: 2 INJECTION, POWDER, FOR SOLUTION INTRAVENOUS at 20:05

## 2025-06-01 RX ADMIN — METRONIDAZOLE 500 MG: 500 INJECTION, SOLUTION INTRAVENOUS at 11:42

## 2025-06-01 RX ADMIN — INSULIN GLARGINE 16 UNITS: 100 INJECTION, SOLUTION SUBCUTANEOUS at 09:35

## 2025-06-01 RX ADMIN — PREDNISONE 5 MG: 5 TABLET ORAL at 09:33

## 2025-06-01 RX ADMIN — DILTIAZEM HYDROCHLORIDE 90 MG: 90 CAPSULE, EXTENDED RELEASE ORAL at 20:06

## 2025-06-01 RX ADMIN — CEFEPIME 2000 MG: 2 INJECTION, POWDER, FOR SOLUTION INTRAVENOUS at 09:44

## 2025-06-01 RX ADMIN — PANTOPRAZOLE SODIUM 40 MG: 40 TABLET, DELAYED RELEASE ORAL at 17:32

## 2025-06-01 RX ADMIN — VANCOMYCIN HYDROCHLORIDE 1250 MG: 1.25 INJECTION, POWDER, LYOPHILIZED, FOR SOLUTION INTRAVENOUS at 21:09

## 2025-06-01 RX ADMIN — APIXABAN 5 MG: 5 TABLET, FILM COATED ORAL at 20:06

## 2025-06-01 RX ADMIN — APIXABAN 5 MG: 5 TABLET, FILM COATED ORAL at 09:33

## 2025-06-01 RX ADMIN — DILTIAZEM HYDROCHLORIDE 90 MG: 90 CAPSULE, EXTENDED RELEASE ORAL at 09:33

## 2025-06-01 RX ADMIN — BUDESONIDE 500 MCG: 0.5 INHALANT RESPIRATORY (INHALATION) at 19:40

## 2025-06-01 RX ADMIN — TAMSULOSIN HYDROCHLORIDE 0.4 MG: 0.4 CAPSULE ORAL at 09:33

## 2025-06-01 ASSESSMENT — PAIN DESCRIPTION - ORIENTATION: ORIENTATION: LOWER

## 2025-06-01 ASSESSMENT — PAIN SCALES - GENERAL: PAINLEVEL_OUTOF10: 2

## 2025-06-01 ASSESSMENT — PAIN DESCRIPTION - LOCATION: LOCATION: BACK

## 2025-06-01 ASSESSMENT — PAIN DESCRIPTION - DESCRIPTORS: DESCRIPTORS: ACHING

## 2025-06-01 NOTE — PROGRESS NOTES
[facility] Vancomycin Pharmacy Consult 06/01/25  Vancomycin random level= 16.3 mcg/ml.   It predicts an   This is within goal    Plan:  Will continue current regimen    Thank you  VARGHESE DE LA VEGA, [facility]  [phone]

## 2025-06-01 NOTE — PROGRESS NOTES
[address] [place] [address]   Office [phone]  Fax [phone]          Subjective / Objective     Subjective  Overnight Events: NAEO. No acute concerns this morning.       Respiratory: RA  BP 94/60   Pulse 73   Temp 98.2 °F (36.8 °C) (Oral)   Resp 18   Ht 1.6 m (5' 3\")   Wt 80.3 kg (177 lb)   SpO2 94%   BMI 31.35 kg/m²      Physical Examination:   General appearance - alert, anxious  Chest - clear breath sounds bilateral.   Heart - normal rate, regular rhythm  Abdomen - no visible distension   Neurological - no focal deficits  Skin - Dry. No notable rashes  Extremities - no pedal edema. Dressing over right ankle and foot.     I/O:  05/31 0701 - 06/01 0700  In: -   Out: 400 [Urine:400]  Inpatient Medications  Current Facility-Administered Medications   Medication Dose Route Frequency    QUEtiapine (SEROQUEL) tablet 12.5 mg  12.5 mg Oral Nightly    insulin glargine (LANTUS) injection vial 16 Units  16 Units SubCUTAneous Daily    insulin lispro (HUMALOG,ADMELOG) injection vial 4 Units  0.05 Units/kg SubCUTAneous TID WC    vancomycin (VANCOCIN) 1,250 mg in sodium chloride 0.9 % 250 mL IVPB ([id number])  1,250 mg IntraVENous Q24H    diclofenac sodium (VOLTAREN) 1 % gel 4 g  4 g Topical BID    budesonide (PULMICORT) nebulizer suspension 500 mcg  0.5 mg Nebulization BID RT    metroNIDAZOLE (FLAGYL) 500 mg in 0.9% NaCl 100 mL IVPB premix  500 mg IntraVENous Q8H    acetaminophen (TYLENOL) tablet 1,000 mg  1,000 mg Oral Q6H PRN    Or    acetaminophen (TYLENOL) suppository 650 mg  650 mg Rectal Q6H PRN    lidocaine 4 % external patch 1 patch  1 patch TransDERmal Daily    sodium chloride flush 0.9 % injection 5-40 mL  5-40 mL IntraVENous 2 times per day    sodium chloride flush 0.9 % injection 5-40 mL  5-40 mL IntraVENous PRN    0.9 % sodium chloride infusion   IntraVENous PRN    polyethylene glycol (GLYCOLAX) packet 17 g  17 g Oral Daily PRN    ceFEPIme (MAXIPIME) 2,000 mg in sodium chloride  spasms/chronic pain: chronic, stable.   - continue home flexeril 10mg bid     GERD: chronic, stable  - sub home prilosec for protonix 40mg bid     Hx of kidney stones: not currently an issue, but was told to stay on flomax indefinitely.  - continue home flomax 0.4mg qd     Hyperlipidemia: on mevacor 10mg qhs  -sub lipitor 20mg for home mevacor         Obesity BMI Body mass index is 31.35 kg/m².  - Encouraging lifestyle modifications and further follow up outpatient.      FEN/GI - Diabetic diet, NPO from [place]  Activity - Out of bed with assistance  DVT prophylaxis - Hold Eliquis from [place].   GI prophylaxis - Protonix  Fall prophylaxis - Not indicated at this time.  Disposition -  Plan to d/c to Home.   Code Status - Full, discussed with patient / caregivers.  Next of Kin Name and Contact   Lesley Robin (Child)  [phone] (Mobile)     Patient discussed with Dr. Claudio Duarte MD  Family Medicine Resident       For Billing    Chief Complaint   Patient presents with    Wound Infection

## 2025-06-02 ENCOUNTER — ANESTHESIA (OUTPATIENT)
Facility: HOSPITAL | Age: 80
DRG: 629 | End: 2025-06-02
Payer: MEDICARE

## 2025-06-02 ENCOUNTER — ANESTHESIA EVENT (OUTPATIENT)
Facility: HOSPITAL | Age: 80
DRG: 629 | End: 2025-06-02
Payer: MEDICARE

## 2025-06-02 LAB
GLUCOSE BLD STRIP.AUTO-MCNC: 169 MG/DL (ref 65–117)
GLUCOSE BLD STRIP.AUTO-MCNC: 179 MG/DL (ref 65–117)
GLUCOSE BLD STRIP.AUTO-MCNC: 226 MG/DL (ref 65–117)
GLUCOSE BLD STRIP.AUTO-MCNC: 77 MG/DL (ref 65–117)
GLUCOSE BLD STRIP.AUTO-MCNC: 77 MG/DL (ref 65–117)
SERVICE CMNT-IMP: ABNORMAL
SERVICE CMNT-IMP: NORMAL
SERVICE CMNT-IMP: NORMAL

## 2025-06-02 PROCEDURE — 6360000002 HC RX W HCPCS: Performed by: PODIATRIST

## 2025-06-02 PROCEDURE — 3600000012 HC SURGERY LEVEL 2 ADDTL 15MIN: Performed by: PODIATRIST

## 2025-06-02 PROCEDURE — 6370000000 HC RX 637 (ALT 250 FOR IP)

## 2025-06-02 PROCEDURE — 6360000002 HC RX W HCPCS: Performed by: FAMILY MEDICINE

## 2025-06-02 PROCEDURE — 6360000002 HC RX W HCPCS

## 2025-06-02 PROCEDURE — 3700000000 HC ANESTHESIA ATTENDED CARE: Performed by: PODIATRIST

## 2025-06-02 PROCEDURE — 2580000003 HC RX 258: Performed by: ANESTHESIOLOGY

## 2025-06-02 PROCEDURE — 1100000000 HC RM PRIVATE

## 2025-06-02 PROCEDURE — 2709999900 HC NON-CHARGEABLE SUPPLY: Performed by: PODIATRIST

## 2025-06-02 PROCEDURE — 2580000003 HC RX 258: Performed by: FAMILY MEDICINE

## 2025-06-02 PROCEDURE — 88311 DECALCIFY TISSUE: CPT

## 2025-06-02 PROCEDURE — 87070 CULTURE OTHR SPECIMN AEROBIC: CPT

## 2025-06-02 PROCEDURE — 3700000001 HC ADD 15 MINUTES (ANESTHESIA): Performed by: PODIATRIST

## 2025-06-02 PROCEDURE — 6360000002 HC RX W HCPCS: Performed by: ANESTHESIOLOGY

## 2025-06-02 PROCEDURE — 0QBG0ZZ EXCISION OF RIGHT TIBIA, OPEN APPROACH: ICD-10-PCS | Performed by: PODIATRIST

## 2025-06-02 PROCEDURE — 2500000003 HC RX 250 WO HCPCS

## 2025-06-02 PROCEDURE — 88307 TISSUE EXAM BY PATHOLOGIST: CPT

## 2025-06-02 PROCEDURE — 87205 SMEAR GRAM STAIN: CPT

## 2025-06-02 PROCEDURE — 7100000000 HC PACU RECOVERY - FIRST 15 MIN: Performed by: PODIATRIST

## 2025-06-02 PROCEDURE — 99232 SBSQ HOSP IP/OBS MODERATE 35: CPT | Performed by: FAMILY MEDICINE

## 2025-06-02 PROCEDURE — 3600000002 HC SURGERY LEVEL 2 BASE: Performed by: PODIATRIST

## 2025-06-02 PROCEDURE — 2500000003 HC RX 250 WO HCPCS: Performed by: ANESTHESIOLOGY

## 2025-06-02 PROCEDURE — 94640 AIRWAY INHALATION TREATMENT: CPT

## 2025-06-02 PROCEDURE — 87077 CULTURE AEROBIC IDENTIFY: CPT

## 2025-06-02 PROCEDURE — 87186 SC STD MICRODIL/AGAR DIL: CPT

## 2025-06-02 PROCEDURE — 0QBL0ZZ EXCISION OF RIGHT TARSAL, OPEN APPROACH: ICD-10-PCS | Performed by: PODIATRIST

## 2025-06-02 PROCEDURE — 7100000001 HC PACU RECOVERY - ADDTL 15 MIN: Performed by: PODIATRIST

## 2025-06-02 PROCEDURE — 2720000010 HC SURG SUPPLY STERILE: Performed by: PODIATRIST

## 2025-06-02 PROCEDURE — 94761 N-INVAS EAR/PLS OXIMETRY MLT: CPT

## 2025-06-02 PROCEDURE — 2580000003 HC RX 258

## 2025-06-02 PROCEDURE — 82962 GLUCOSE BLOOD TEST: CPT

## 2025-06-02 RX ORDER — INSULIN GLARGINE 100 [IU]/ML
16 INJECTION, SOLUTION SUBCUTANEOUS DAILY
Status: DISCONTINUED | OUTPATIENT
Start: 2025-06-03 | End: 2025-06-05

## 2025-06-02 RX ORDER — SODIUM CHLORIDE, SODIUM LACTATE, POTASSIUM CHLORIDE, CALCIUM CHLORIDE 600; 310; 30; 20 MG/100ML; MG/100ML; MG/100ML; MG/100ML
INJECTION, SOLUTION INTRAVENOUS CONTINUOUS
Status: DISCONTINUED | OUTPATIENT
Start: 2025-06-02 | End: 2025-06-03

## 2025-06-02 RX ORDER — KETAMINE HYDROCHLORIDE 10 MG/ML
INJECTION, SOLUTION INTRAMUSCULAR; INTRAVENOUS
Status: DISCONTINUED | OUTPATIENT
Start: 2025-06-02 | End: 2025-06-02 | Stop reason: SDUPTHER

## 2025-06-02 RX ORDER — LIDOCAINE HYDROCHLORIDE 10 MG/ML
1 INJECTION, SOLUTION EPIDURAL; INFILTRATION; INTRACAUDAL; PERINEURAL
Status: COMPLETED | OUTPATIENT
Start: 2025-06-02 | End: 2025-06-05

## 2025-06-02 RX ORDER — OXYCODONE HYDROCHLORIDE 5 MG/1
2.5 TABLET ORAL EVERY 6 HOURS PRN
Refills: 0 | Status: DISCONTINUED | OUTPATIENT
Start: 2025-06-02 | End: 2025-06-05 | Stop reason: HOSPADM

## 2025-06-02 RX ORDER — OLANZAPINE 5 MG/1
2.5 TABLET, FILM COATED ORAL 2 TIMES DAILY
Status: DISCONTINUED | OUTPATIENT
Start: 2025-06-02 | End: 2025-06-02

## 2025-06-02 RX ORDER — INSULIN GLARGINE 100 [IU]/ML
8 INJECTION, SOLUTION SUBCUTANEOUS DAILY
Status: DISCONTINUED | OUTPATIENT
Start: 2025-06-02 | End: 2025-06-02

## 2025-06-02 RX ORDER — SODIUM CHLORIDE, SODIUM LACTATE, POTASSIUM CHLORIDE, CALCIUM CHLORIDE 600; 310; 30; 20 MG/100ML; MG/100ML; MG/100ML; MG/100ML
INJECTION, SOLUTION INTRAVENOUS CONTINUOUS
Status: DISCONTINUED | OUTPATIENT
Start: 2025-06-02 | End: 2025-06-02 | Stop reason: HOSPADM

## 2025-06-02 RX ORDER — MIDAZOLAM HYDROCHLORIDE 2 MG/2ML
2 INJECTION, SOLUTION INTRAMUSCULAR; INTRAVENOUS
Status: COMPLETED | OUTPATIENT
Start: 2025-06-02 | End: 2025-06-05

## 2025-06-02 RX ORDER — FENTANYL CITRATE 50 UG/ML
100 INJECTION, SOLUTION INTRAMUSCULAR; INTRAVENOUS
Status: DISCONTINUED | OUTPATIENT
Start: 2025-06-02 | End: 2025-06-05 | Stop reason: HOSPADM

## 2025-06-02 RX ORDER — QUETIAPINE FUMARATE 25 MG/1
25 TABLET, FILM COATED ORAL NIGHTLY
Status: DISCONTINUED | OUTPATIENT
Start: 2025-06-02 | End: 2025-06-05 | Stop reason: HOSPADM

## 2025-06-02 RX ORDER — DIPHENHYDRAMINE HYDROCHLORIDE 50 MG/ML
12.5 INJECTION, SOLUTION INTRAMUSCULAR; INTRAVENOUS
Status: DISCONTINUED | OUTPATIENT
Start: 2025-06-02 | End: 2025-06-02 | Stop reason: HOSPADM

## 2025-06-02 RX ORDER — PROPOFOL 10 MG/ML
INJECTION, EMULSION INTRAVENOUS
Status: DISCONTINUED | OUTPATIENT
Start: 2025-06-02 | End: 2025-06-02 | Stop reason: SDUPTHER

## 2025-06-02 RX ORDER — NALOXONE HYDROCHLORIDE 0.4 MG/ML
INJECTION, SOLUTION INTRAMUSCULAR; INTRAVENOUS; SUBCUTANEOUS PRN
Status: DISCONTINUED | OUTPATIENT
Start: 2025-06-02 | End: 2025-06-02 | Stop reason: HOSPADM

## 2025-06-02 RX ORDER — ONDANSETRON 2 MG/ML
4 INJECTION INTRAMUSCULAR; INTRAVENOUS
Status: DISCONTINUED | OUTPATIENT
Start: 2025-06-02 | End: 2025-06-02 | Stop reason: HOSPADM

## 2025-06-02 RX ADMIN — CEFEPIME 2000 MG: 2 INJECTION, POWDER, FOR SOLUTION INTRAVENOUS at 09:06

## 2025-06-02 RX ADMIN — MONTELUKAST 10 MG: 10 TABLET, FILM COATED ORAL at 21:29

## 2025-06-02 RX ADMIN — VANCOMYCIN HYDROCHLORIDE 1250 MG: 1.25 INJECTION, POWDER, LYOPHILIZED, FOR SOLUTION INTRAVENOUS at 23:44

## 2025-06-02 RX ADMIN — PROPOFOL 10 MG: 10 INJECTION, EMULSION INTRAVENOUS at 11:08

## 2025-06-02 RX ADMIN — KETAMINE HYDROCHLORIDE 10 MG: 10 INJECTION INTRAMUSCULAR; INTRAVENOUS at 11:03

## 2025-06-02 RX ADMIN — PREGABALIN 50 MG: 50 CAPSULE ORAL at 21:29

## 2025-06-02 RX ADMIN — OLANZAPINE 2.5 MG: 5 TABLET, FILM COATED ORAL at 09:07

## 2025-06-02 RX ADMIN — CETIRIZINE HYDROCHLORIDE 10 MG: 10 TABLET, FILM COATED ORAL at 09:07

## 2025-06-02 RX ADMIN — DILTIAZEM HYDROCHLORIDE 90 MG: 90 CAPSULE, EXTENDED RELEASE ORAL at 21:29

## 2025-06-02 RX ADMIN — ACETAMINOPHEN 1000 MG: 500 TABLET ORAL at 16:06

## 2025-06-02 RX ADMIN — OLANZAPINE 2.5 MG: 10 INJECTION, POWDER, FOR SOLUTION INTRAMUSCULAR at 01:52

## 2025-06-02 RX ADMIN — INSULIN GLARGINE 8 UNITS: 100 INJECTION, SOLUTION SUBCUTANEOUS at 09:09

## 2025-06-02 RX ADMIN — QUETIAPINE FUMARATE 25 MG: 25 TABLET ORAL at 21:29

## 2025-06-02 RX ADMIN — PREGABALIN 50 MG: 50 CAPSULE ORAL at 09:07

## 2025-06-02 RX ADMIN — INSULIN LISPRO 4 UNITS: 100 INJECTION, SOLUTION INTRAVENOUS; SUBCUTANEOUS at 17:49

## 2025-06-02 RX ADMIN — CYCLOBENZAPRINE 10 MG: 10 TABLET, FILM COATED ORAL at 09:06

## 2025-06-02 RX ADMIN — METRONIDAZOLE 500 MG: 500 INJECTION, SOLUTION INTRAVENOUS at 03:37

## 2025-06-02 RX ADMIN — CARVEDILOL 6.25 MG: 6.25 TABLET, FILM COATED ORAL at 21:29

## 2025-06-02 RX ADMIN — PROPOFOL 10 MG: 10 INJECTION, EMULSION INTRAVENOUS at 11:03

## 2025-06-02 RX ADMIN — PREDNISONE 5 MG: 5 TABLET ORAL at 09:06

## 2025-06-02 RX ADMIN — DICLOFENAC SODIUM 4 G: 10 GEL TOPICAL at 21:30

## 2025-06-02 RX ADMIN — TAMSULOSIN HYDROCHLORIDE 0.4 MG: 0.4 CAPSULE ORAL at 09:06

## 2025-06-02 RX ADMIN — PROPOFOL 10 MG: 10 INJECTION, EMULSION INTRAVENOUS at 11:05

## 2025-06-02 RX ADMIN — KETAMINE HYDROCHLORIDE 10 MG: 10 INJECTION INTRAMUSCULAR; INTRAVENOUS at 11:10

## 2025-06-02 RX ADMIN — PROPOFOL 10 MG: 10 INJECTION, EMULSION INTRAVENOUS at 11:01

## 2025-06-02 RX ADMIN — CEFEPIME 2000 MG: 2 INJECTION, POWDER, FOR SOLUTION INTRAVENOUS at 22:31

## 2025-06-02 RX ADMIN — PROPOFOL 10 MG: 10 INJECTION, EMULSION INTRAVENOUS at 11:10

## 2025-06-02 RX ADMIN — INSULIN LISPRO 4 UNITS: 100 INJECTION, SOLUTION INTRAVENOUS; SUBCUTANEOUS at 17:50

## 2025-06-02 RX ADMIN — CYCLOBENZAPRINE 10 MG: 10 TABLET, FILM COATED ORAL at 21:29

## 2025-06-02 RX ADMIN — PROPOFOL 10 MG: 10 INJECTION, EMULSION INTRAVENOUS at 11:20

## 2025-06-02 RX ADMIN — METRONIDAZOLE 500 MG: 500 INJECTION, SOLUTION INTRAVENOUS at 21:24

## 2025-06-02 RX ADMIN — PROPOFOL 10 MG: 10 INJECTION, EMULSION INTRAVENOUS at 11:17

## 2025-06-02 RX ADMIN — CARVEDILOL 6.25 MG: 6.25 TABLET, FILM COATED ORAL at 09:06

## 2025-06-02 RX ADMIN — DILTIAZEM HYDROCHLORIDE 90 MG: 90 CAPSULE, EXTENDED RELEASE ORAL at 09:07

## 2025-06-02 RX ADMIN — PANTOPRAZOLE SODIUM 40 MG: 40 TABLET, DELAYED RELEASE ORAL at 16:05

## 2025-06-02 RX ADMIN — METRONIDAZOLE 500 MG: 500 INJECTION, SOLUTION INTRAVENOUS at 13:27

## 2025-06-02 RX ADMIN — OXYCODONE 2.5 MG: 5 TABLET ORAL at 17:50

## 2025-06-02 RX ADMIN — PROPOFOL 10 MG: 10 INJECTION, EMULSION INTRAVENOUS at 11:15

## 2025-06-02 RX ADMIN — SODIUM CHLORIDE, PRESERVATIVE FREE 10 ML: 5 INJECTION INTRAVENOUS at 21:29

## 2025-06-02 RX ADMIN — SODIUM CHLORIDE, SODIUM LACTATE, POTASSIUM CHLORIDE, AND CALCIUM CHLORIDE: .6; .31; .03; .02 INJECTION, SOLUTION INTRAVENOUS at 13:26

## 2025-06-02 RX ADMIN — PROPOFOL 10 MG: 10 INJECTION, EMULSION INTRAVENOUS at 10:58

## 2025-06-02 RX ADMIN — BUDESONIDE 500 MCG: 0.5 INHALANT RESPIRATORY (INHALATION) at 19:56

## 2025-06-02 RX ADMIN — ARFORMOTEROL TARTRATE 15 MCG: 15 SOLUTION RESPIRATORY (INHALATION) at 19:56

## 2025-06-02 RX ADMIN — ARFORMOTEROL TARTRATE 15 MCG: 15 SOLUTION RESPIRATORY (INHALATION) at 07:50

## 2025-06-02 RX ADMIN — BUDESONIDE 500 MCG: 0.5 INHALANT RESPIRATORY (INHALATION) at 07:50

## 2025-06-02 RX ADMIN — KETAMINE HYDROCHLORIDE 10 MG: 10 INJECTION INTRAMUSCULAR; INTRAVENOUS at 11:15

## 2025-06-02 ASSESSMENT — PAIN DESCRIPTION - LOCATION
LOCATION: FOOT

## 2025-06-02 ASSESSMENT — PAIN DESCRIPTION - DESCRIPTORS
DESCRIPTORS: ACHING
DESCRIPTORS: ACHING

## 2025-06-02 ASSESSMENT — PAIN SCALES - GENERAL
PAINLEVEL_OUTOF10: 1
PAINLEVEL_OUTOF10: 9
PAINLEVEL_OUTOF10: 5
PAINLEVEL_OUTOF10: 8
PAINLEVEL_OUTOF10: 0
PAINLEVEL_OUTOF10: 2

## 2025-06-02 ASSESSMENT — PAIN DESCRIPTION - ORIENTATION
ORIENTATION: RIGHT
ORIENTATION: RIGHT

## 2025-06-02 ASSESSMENT — PAIN SCALES - WONG BAKER
WONGBAKER_NUMERICALRESPONSE: NO HURT
WONGBAKER_NUMERICALRESPONSE: NO HURT

## 2025-06-02 ASSESSMENT — PAIN - FUNCTIONAL ASSESSMENT: PAIN_FUNCTIONAL_ASSESSMENT: NONE - DENIES PAIN

## 2025-06-02 NOTE — PROGRESS NOTES
Infectious Disease Progress     Impression:   79 y.o. female with past medical Hx of COPD, T2DM, HLD, HTN, DVT who presented to the ED with right ankle wound with concerns for infection, admitted for diabetic foot wound and is being seen for osteomyelitis of right distal fibula.      Right distal tibia osteomyelitis  Right malleolus ulcer  S/p debridement of nonviable tissue and bone ankle (6/2)  PAD  - blood cx (5/28) no growth so far    Wound cx (5/28) MSSA    DM II; A1c 11.2, uncontrolled  Hx COPD  Blood cx 5/28 NGTD    Pt was not seen; in OR    Plan:     - continue with IV vancomycin, cefepime, and flagyl    Pharmacy to dose per creatinine clearance.     Will follow intra-op cx    post op care per podiatry team    Pt will be follow by Dr. Harry tomorrow            History of Present Illness   5/30/2025  \"Patient is a 79 y.o. female with past medical Hx of COPD, T2DM, HLD, HTN, DVT who presented to the ED with right ankle wound with concerns for infection, admitted for diabetic foot wound and is being seen for osteomyelitis of right distal fibula.      Patient reports that right ankle wound started about 2 months ago due to an ankle bracelet rubbing her skin which worsened during recent admission at another hospital.  Was prescribed Bactrim about one weeks ago without improvement.  ED workup without leukocytosis, afebrile, elevated creatinine.  Blood cultures without growth.  Superficial wound culture was collected with probable MSSA and multiple colony types and mixed skin laron.  MRI of right ankle shows osteomyelitis of the distal fibula and lateral ankle soft tissue ulceration with underlying cellulitis, peroneus tenosynovitis may be infected.  Vascular study shows right peripheral artery disease with JOSE J of 0.43 and left 0.98.  Empirically started on vancomycin, cefepime, Flagyl.  Of note she is on chronic prednisone 5 mg daily I suspect for COPD but would have to confirm with patient.   On examination  Units  0.05 Units/kg SubCUTAneous TID  Becky Zimmerman MD   4 Units at 06/01/25 1142    vancomycin (VANCOCIN) 1,250 mg in sodium chloride 0.9 % 250 mL IVPB ([id number])  1,250 mg IntraVENous Q24H Jose Snyder MD   Stopped at 06/02/25 0022    diclofenac sodium (VOLTAREN) 1 % gel 4 g  4 g Topical BID Noman Davis MD   4 g at 05/31/25 0827    budesonide (PULMICORT) nebulizer suspension 500 mcg  0.5 mg Nebulization BID RT Paula Tam MD   500 mcg at 06/02/25 0750    metroNIDAZOLE (FLAGYL) 500 mg in 0.9% NaCl 100 mL IVPB premix  500 mg IntraVENous Q8H Paula Tam MD   Stopped at 06/02/25 1438    acetaminophen (TYLENOL) tablet 1,000 mg  1,000 mg Oral Q6H PRN Karissa Joyce DO   1,000 mg at 06/02/25 1606    Or    acetaminophen (TYLENOL) suppository 650 mg  650 mg Rectal Q6H PRN Karissa Joyce DO        lidocaine 4 % external patch 1 patch  1 patch TransDERmal Daily Stevo Bourne MD   1 patch at 06/02/25 0909    sodium chloride flush 0.9 % injection 5-40 mL  5-40 mL IntraVENous 2 times per day Merry Maguire MD   10 mL at 05/30/25 1232    sodium chloride flush 0.9 % injection 5-40 mL  5-40 mL IntraVENous PRN Merry Maguire MD        0.9 % sodium chloride infusion   IntraVENous PRN Merry Maguire MD        polyethylene glycol (GLYCOLAX) packet 17 g  17 g Oral Daily PRN Merry Maguire MD        ceFEPIme (MAXIPIME) 2,000 mg in sodium chloride 0.9 % 100 mL IVPB (addEASE)  2,000 mg IntraVENous Q12H Merry Maguire MD   Stopped at 06/02/25 0941    carvedilol (COREG) tablet 6.25 mg  6.25 mg Oral BID Merry Maguire MD   6.25 mg at 06/02/25 0906    cyclobenzaprine (FLEXERIL) tablet 10 mg  10 mg Oral BID Merry Maguire MD   10 mg at 06/02/25 0906    dilTIAZem (CARDIZEM 12 HR) extended release capsule 90 mg  90 mg Oral BID Merry Maguire MD   90 mg at 06/02/25 0907    cetirizine (ZYRTEC) tablet 10 mg  10 mg Oral Daily Merry Maguire MD   10 mg at 06/02/25 0907    montelukast (SINGULAIR) tablet 10 mg  10 mg

## 2025-06-02 NOTE — ANESTHESIA PRE PROCEDURE
Department of Anesthesiology  Preprocedure Note       Name:  Lesley Lauren   Age:  79 y.o.  :  1945                                          MRN:  [id number]         Date:  2025      Surgeon: Surgeon(s):  Fer Mcfarlane DPM    Procedure: Procedure(s):  DEBRIDEMENT OF NONVIABLE TISSUE AND BONE RIGHT ANKLE (MAC W/LOCAL)    Medications prior to admission:   Prior to Admission medications    Medication Sig Start Date End Date Taking? Authorizing Provider   insulin glargine (LANTUS SOLOSTAR) 100 UNIT/ML injection pen Inject 10 Units into the skin nightly 25   Wes Vincent MD   glucose monitoring kit 1 kit by Does not apply route in the morning, at noon, in the evening, and at bedtime 25   Wes Vincent MD   tamsulosin (FLOMAX) 0.4 MG capsule TAKE ONE CAPSULE BY MOUTH EVERY DAY 25   Juan David Zamorano MD   dilTIAZem (CARDIZEM 12 HR) 90 MG extended release capsule Take 1 capsule by mouth 2 times daily 25   Lucrecia Kaplan MD   oxyCODONE (ROXICODONE) 5 MG immediate release tablet TAKE ONE TABLET BY MOUTH FOR MODERATE PAIN OR TWO TABLETS FOR SEVERE PAIN FOR EVERY 4-6 HOURS -DO NOT DRIVE AFTER TAKING 25   Provider, Historical, MD   TRELEGY ELLIPTA 200-62.5-25 MCG/ACT AEPB inhaler INHALE ONE PUFF DAILY RINSE MOUTH AND EXPECTORATE AFTER EACH USE 25   Lucrecia Kaplan MD   polyethylene glycol (GLYCOLAX) 17 GM/SCOOP powder Take 17 g by mouth daily as needed (for soft stool) 25  Astrid Mccabe MD   collagenase (SANTYL) 250 UNIT/GM ointment Apply 1 g topically daily Apply to right great toe and to right ankle 25   Juan David Zamorano MD   glipiZIDE (GLUCOTROL) 10 MG tablet Take 1 tablet by mouth 2 times daily 25   Juan David Zamorano MD   predniSONE (DELTASONE) 5 MG tablet TAKE ONE TABLET BY MOUTH EVERY DAY 25   Juan David Zamorano MD   pregabalin (LYRICA) 50 MG capsule TAKE ONE CAPSULE BY MOUTH TWICE A DAY. MAX DAILY AMOUNT: 100 MG

## 2025-06-02 NOTE — PROGRESS NOTES
Podiatry Progress Note    Date:2025       Room:OR/PL  Patient Name:Lesley Lauren     YOB: 1945     Age:79 y.o.    Subjective:     Patient is a 79 y.o. female who is being seen at bedside for chronic healing right leg ulcer.        Objective:     Vitals Last 24 Hours:  TEMPERATURE:  Temp  Av.4 °F (36.9 °C)  Min: 97.9 °F (36.6 °C)  Max: 98.9 °F (37.2 °C)  RESPIRATIONS RANGE: Resp  Av.4  Min: 16  Max: 19  PULSE OXIMETRY RANGE: SpO2  Av.6 %  Min: 90 %  Max: 94 %  PULSE RANGE: Pulse  Av.3  Min: 57  Max: 96  BLOOD PRESSURE RANGE: Systolic (24hrs), Av , Min:116 , Max:140        Diastolic (24hrs), Av, Min:47, Max:88        I/O (24Hr):  No intake or output data in the 24 hours ending 25 1137    Physical Exam:    GEN: Pt is AAOx4 and in NAD.   DERM: Wound lateral right ankle. Sonia wound erythema noted No dry skin noted to B/L LE.   VASC: Pedal pulses (DP/PT) diminished to B/L LE. CFT<3sec to all digits of B/L LE. No pedal hair growth noted to the level of the digits for B/L LE. Skin temp is warm to warm from proximal to distal for B/L LE. Neg homans/venus signs to B/L LE. No varicosities or telangectasias noted to B/L LE.  NEURO: Protective and epicritic sensations grossly absent to B/L LE  MSK: (-) POP, No gross deformities. Good muscle tone and bulk noted to B/L LE.  PSYCH: Cooperative with normal mood and affect       Labs/Imaging/Diagnostics:     Labs:  CBC:  Recent Labs     25  0213   WBC 9.1 9.7   RBC 4.47 4.21   HGB 10.3* 9.6*   HCT 33.1* 31.5*   MCV 74.0* 74.8*   RDW 16.3* 16.4*    275     CHEMISTRIES:  Recent Labs     25 25  0213    141   K 4.1 4.3   * 114*   CO2 23 24   BUN 18 21*   CREATININE 0.98 0.93   GLUCOSE 154* 172*   CALCIUM 8.5 8.7     PT/INR:  No results for input(s): \"PROTIME\", \"INR\", \"INREXT\" in the last 72 hours.  APTT:  No results for input(s): \"APTT\" in the last 72 hours.  LIVER

## 2025-06-02 NOTE — CONSULTS
Sulfonylureas Glipizide 10 mg bid with meals    DPP4 inhibitors     Thiazolidinediones     SGLT-2 inhibitors     GLP-1 [place]     Insulin Lantus 10 units nightly - had not started yet      Diabetes self-management practices:   Eating pattern   [x] Not eating a carbohydrate-controlled meal plan  [x] Breakfast Coffee with cream and regular sugar   [x] Lunch  1/2 bagel with cream cheese   [x] Dinner  Varies; casseroles, spaghetti and meatballs, etc     [x] Snacks Occasional ice cream; a few alayna crackers with PB   [x] Beverages One regular Pepsi a day, water, Crystal light  [] Dentition status   Physical activity pattern   [x] Not employing a physical activity program to control BG    Monitoring pattern   [x] Testing BGs sufficiently to inform self-management adjustments  BG ranging mostly in the 200s; sometimes as high as 400    Taking medications pattern  [x] Consistent administration?  [x] Affordable    Social determinants of health impacting diabetes self-management practices    Concerned that you need to know more about how to stay healthy with diabetes  Daughter at bedside and helps patient at home  Overall evaluation:    [x] Not achieving A1c target with drug therapy & self-care practices    Subjective   Off unit in surgery     Objective   Physical exam  General  Female with obesity  in no acute distress. Conversant and cooperative  Neuro  Alert, oriented   Vital Signs   Vitals:    06/02/25 1030   BP: (!) 129/47   Pulse: 79   Resp: 18   Temp: 98.9 °F (37.2 °C)   SpO2: 94%       Diabetic foot exam:    See podiatry note    Laboratory  Recent Labs     05/31/25  0445 06/01/25  0213   WBC 9.1 9.7   HGB 10.3* 9.6*   HCT 33.1* 31.5*   MCV 74.0* 74.8*    275     Recent Labs     05/31/25  0445 06/01/25  0213    141   K 4.1 4.3   * 114*   CO2 23 24   BUN 18 21*   CREATININE 0.98 0.93     Lab Results   Component Value Date    ALT 12 06/01/2025    AST 18 06/01/2025    ALKPHOS 156 (H) 06/01/2025
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
## 2025-06-02 NOTE — PROGRESS NOTES
[address] [place] [address]   Office [phone]  Fax [phone]          Subjective / Objective     Subjective  Overnight Events: Agitiation overnight, pt called 911. Received additional Zyprexa 2.5, daughter came to bedside and stayed overnight which helped to calm pt.   No acute concerns this morning.     Respiratory: RA  /62   Pulse 88   Temp 98.8 °F (37.1 °C) (Oral)   Resp 18   Ht 1.6 m (5' 3\")   Wt 80.3 kg (177 lb)   SpO2 90%   BMI 31.35 kg/m²      Physical Examination:   General appearance - alert, anxious about procedure.   Chest - clear breath sounds bilateral.   Heart - normal rate, regular rhythm  Abdomen - no visible distension   Neurological - no focal deficits  Skin - Dry. No notable rashes  Extremities - no pedal edema. Dressing over right ankle and foot.     I/O:  No intake/output data recorded.  Inpatient Medications  Current Facility-Administered Medications   Medication Dose Route Frequency    OLANZapine (ZYPREXA) tablet 2.5 mg  2.5 mg Oral BID    Or    OLANZapine (ZyPREXA) 2.5 mg in sterile water 0.5 mL injection  2.5 mg IntraMUSCular BID    QUEtiapine (SEROQUEL) tablet 12.5 mg  12.5 mg Oral Nightly    insulin glargine (LANTUS) injection vial 16 Units  16 Units SubCUTAneous Daily    [Held by provider] insulin lispro (HUMALOG,ADMELOG) injection vial 4 Units  0.05 Units/kg SubCUTAneous TID     vancomycin (VANCOCIN) 1,250 mg in sodium chloride 0.9 % 250 mL IVPB ([id number])  1,250 mg IntraVENous Q24H    diclofenac sodium (VOLTAREN) 1 % gel 4 g  4 g Topical BID    budesonide (PULMICORT) nebulizer suspension 500 mcg  0.5 mg Nebulization BID RT    metroNIDAZOLE (FLAGYL) 500 mg in 0.9% NaCl 100 mL IVPB premix  500 mg IntraVENous Q8H    acetaminophen (TYLENOL) tablet 1,000 mg  1,000 mg Oral Q6H PRN    Or    acetaminophen (TYLENOL) suppository 650 mg  650 mg Rectal Q6H PRN    lidocaine 4 % external patch 1 patch  1 patch TransDERmal Daily    sodium chloride flush 0.9

## 2025-06-02 NOTE — PROGRESS NOTES
Patient had a missing armband so when her blood sugar was taken it did not automatically upload into Epic, her blood sugar was 192 and this was taken at 0730 on 6/2/2025. New armband has since been printed and put into patients room.

## 2025-06-02 NOTE — OP NOTE
tolerated the above procedures well and all post operative counts were correct.  Patient was transferred to PACU without incident. A thorough neurovascular check was then performed. Upon meeting transfer criteria, patient was transferred back to the medical floor.      Electronically signed by eFr Mcfarlane DPM on 6/2/2025 at 11:41 AM

## 2025-06-02 NOTE — PROGRESS NOTES
Occupational/Physical Therapy Note: Pt off the floor in surgery. Will cont to follow for any new orders.

## 2025-06-02 NOTE — PLAN OF CARE
Problem: Chronic Conditions and Co-morbidities  Goal: Patient's chronic conditions and co-morbidity symptoms are monitored and maintained or improved  Outcome: Progressing     Problem: Discharge Planning  Goal: Discharge to home or other facility with appropriate resources  Outcome: Progressing     Problem: Safety - Adult  Goal: Free from fall injury  Outcome: Progressing     Problem: Skin/Tissue Integrity  Goal: Skin integrity remains intact  Description: 1.  Monitor for areas of redness and/or skin breakdown2.  Assess vascular access sites hourly3.  Every 4-6 hours minimum:  Change oxygen saturation probe site4.  Every 4-6 hours:  If on nasal continuous positive airway pressure, respiratory therapy assess nares and determine need for appliance change or resting period  Outcome: Progressing     Problem: Respiratory - Adult  Goal: Achieves optimal ventilation and oxygenation  Outcome: Progressing     Problem: Pain  Goal: Verbalizes/displays adequate comfort level or baseline comfort level  Outcome: Progressing     Problem: ABCDS Injury Assessment  Goal: Absence of physical injury  Outcome: Progressing

## 2025-06-02 NOTE — PROGRESS NOTES
Ultrasound IV by  :  Procedure Note    Ultrasound IV education provided to patient. Opportunities for questions given.     Ultrasound used for PIV placement:  20 gauge 8 cm PowerGlide Pro 8cm  left basilic} location.  1 X Attempt(s).    Vigorous blood return present and saline flushes with ease.     Procedure tolerated well. Primary RN aware of IV placement and added to LDA.      MARIBEL DONOHUE RN

## 2025-06-02 NOTE — ANESTHESIA POSTPROCEDURE EVALUATION
Department of Anesthesiology  Postprocedure Note    Patient: Lesley Lauren  MRN: [id number]  YOB: 1945  Date of evaluation: 6/2/2025    Procedure Summary       Date: 06/02/25 Room / Location: [facility] MAIN OR  / M MAIN OR    Anesthesia Start: 1053 Anesthesia Stop: 1133    Procedure: DEBRIDEMENT OF NONVIABLE TISSUE AND BONE RIGHT ANKLE (MAC W/LOCAL) (Right: Ankle) Diagnosis:       Acute osteomyelitis of right ankle or foot (HCC)      (Acute osteomyelitis of right ankle or foot (HCC) [M86.171])    Surgeons: Fer Mcfarlane DPM Responsible Provider: Ephraim Longoria MD    Anesthesia Type: MAC ASA Status: 3            Anesthesia Type: No value filed.    Ilda Phase I: Ilda Score: 10    Ilda Phase II:      Anesthesia Post Evaluation    Patient location during evaluation: PACU  Patient participation: complete - patient participated  Level of consciousness: awake  Airway patency: patent  Nausea & Vomiting: no vomiting and no nausea  Cardiovascular status: hemodynamically stable  Respiratory status: acceptable  Hydration status: stable  Pain management: adequate    No notable events documented.

## 2025-06-02 NOTE — PERIOP NOTE
TRANSFER - OUT REPORT:    Verbal report given to SAMINA Kearns on Lesley Lauren  being transferred to [facility] for routine post-op       Report consisted of patient's Situation, Background, Assessment and   Recommendations(SBAR).     Information from the following report(s) Nurse Handoff Report, Surgery Report, and MAR was reviewed with the receiving nurse.           Lines:   Extended Dwell Peripherial IV 06/01/25 Left Basilic (Active)   Criteria for Appropriate Use Limited/no vessel suitable for conventional peripheral access 06/02/25 1035   Site Assessment Clean, dry & intact 06/02/25 1035   Phlebitis Assessment No symptoms 06/02/25 1035   Infiltration Assessment 0 06/02/25 1035   Line Status Blood return noted;Flushed 06/02/25 1035   Line Care Cap changed;Connections checked and tightened;Ports disinfected 06/02/25 1035   Alcohol Cap Used Yes 06/02/25 1035   Date of Last Dressing Change 06/01/25 06/02/25 0800   Dressing Type Transparent 06/02/25 1035   Dressing Status Dry;Intact 06/02/25 1035   Dressing Intervention New 06/01/25 2322        Opportunity for questions and clarification was provided.      Patient transported with:  Registered Nurse

## 2025-06-03 LAB
ALBUMIN SERPL-MCNC: 2.6 G/DL (ref 3.5–5)
ALBUMIN/GLOB SERPL: 0.8 (ref 1.1–2.2)
ALP SERPL-CCNC: 148 U/L (ref 45–117)
ALT SERPL-CCNC: 13 U/L (ref 12–78)
ANION GAP SERPL CALC-SCNC: 4 MMOL/L (ref 2–12)
AST SERPL-CCNC: 17 U/L (ref 15–37)
BACTERIA SPEC CULT: NORMAL
BASOPHILS # BLD: 0.09 K/UL (ref 0–0.1)
BASOPHILS NFR BLD: 1.1 % (ref 0–1)
BILIRUB SERPL-MCNC: 0.4 MG/DL (ref 0.2–1)
BUN SERPL-MCNC: 19 MG/DL (ref 6–20)
BUN/CREAT SERPL: 22 (ref 12–20)
CALCIUM SERPL-MCNC: 8.6 MG/DL (ref 8.5–10.1)
CHLORIDE SERPL-SCNC: 115 MMOL/L (ref 97–108)
CO2 SERPL-SCNC: 23 MMOL/L (ref 21–32)
CREAT SERPL-MCNC: 0.85 MG/DL (ref 0.55–1.02)
DIFFERENTIAL METHOD BLD: ABNORMAL
EOSINOPHIL # BLD: 0.37 K/UL (ref 0–0.4)
EOSINOPHIL NFR BLD: 4.4 % (ref 0–7)
ERYTHROCYTE [DISTWIDTH] IN BLOOD BY AUTOMATED COUNT: 16.9 % (ref 11.5–14.5)
GLOBULIN SER CALC-MCNC: 3.3 G/DL (ref 2–4)
GLUCOSE BLD STRIP.AUTO-MCNC: 118 MG/DL (ref 65–117)
GLUCOSE BLD STRIP.AUTO-MCNC: 142 MG/DL (ref 65–117)
GLUCOSE BLD STRIP.AUTO-MCNC: 162 MG/DL (ref 65–117)
GLUCOSE BLD STRIP.AUTO-MCNC: 236 MG/DL (ref 65–117)
GLUCOSE SERPL-MCNC: 143 MG/DL (ref 65–100)
HCT VFR BLD AUTO: 32.1 % (ref 35–47)
HGB BLD-MCNC: 9.6 G/DL (ref 11.5–16)
IMM GRANULOCYTES # BLD AUTO: 0.04 K/UL (ref 0–0.04)
IMM GRANULOCYTES NFR BLD AUTO: 0.5 % (ref 0–0.5)
LYMPHOCYTES # BLD: 1.65 K/UL (ref 0.8–3.5)
LYMPHOCYTES NFR BLD: 19.6 % (ref 12–49)
MCH RBC QN AUTO: 23.1 PG (ref 26–34)
MCHC RBC AUTO-ENTMCNC: 29.9 G/DL (ref 30–36.5)
MCV RBC AUTO: 77.3 FL (ref 80–99)
MONOCYTES # BLD: 0.87 K/UL (ref 0–1)
MONOCYTES NFR BLD: 10.4 % (ref 5–13)
NEUTS SEG # BLD: 5.38 K/UL (ref 1.8–8)
NEUTS SEG NFR BLD: 64 % (ref 32–75)
NRBC # BLD: 0 K/UL (ref 0–0.01)
NRBC BLD-RTO: 0 PER 100 WBC
PLATELET # BLD AUTO: 246 K/UL (ref 150–400)
PMV BLD AUTO: 10.5 FL (ref 8.9–12.9)
POTASSIUM SERPL-SCNC: 4.3 MMOL/L (ref 3.5–5.1)
PROT SERPL-MCNC: 5.9 G/DL (ref 6.4–8.2)
RBC # BLD AUTO: 4.15 M/UL (ref 3.8–5.2)
SERVICE CMNT-IMP: ABNORMAL
SERVICE CMNT-IMP: NORMAL
SODIUM SERPL-SCNC: 142 MMOL/L (ref 136–145)
WBC # BLD AUTO: 8.4 K/UL (ref 3.6–11)

## 2025-06-03 PROCEDURE — 82962 GLUCOSE BLOOD TEST: CPT

## 2025-06-03 PROCEDURE — 6370000000 HC RX 637 (ALT 250 FOR IP)

## 2025-06-03 PROCEDURE — 6360000002 HC RX W HCPCS: Performed by: FAMILY MEDICINE

## 2025-06-03 PROCEDURE — 80053 COMPREHEN METABOLIC PANEL: CPT

## 2025-06-03 PROCEDURE — 99232 SBSQ HOSP IP/OBS MODERATE 35: CPT | Performed by: FAMILY MEDICINE

## 2025-06-03 PROCEDURE — 1100000000 HC RM PRIVATE

## 2025-06-03 PROCEDURE — 94640 AIRWAY INHALATION TREATMENT: CPT

## 2025-06-03 PROCEDURE — 94761 N-INVAS EAR/PLS OXIMETRY MLT: CPT

## 2025-06-03 PROCEDURE — 2580000003 HC RX 258: Performed by: FAMILY MEDICINE

## 2025-06-03 PROCEDURE — 97116 GAIT TRAINING THERAPY: CPT

## 2025-06-03 PROCEDURE — 99231 SBSQ HOSP IP/OBS SF/LOW 25: CPT

## 2025-06-03 PROCEDURE — 85025 COMPLETE CBC W/AUTO DIFF WBC: CPT

## 2025-06-03 PROCEDURE — 2580000003 HC RX 258

## 2025-06-03 PROCEDURE — 6360000002 HC RX W HCPCS

## 2025-06-03 PROCEDURE — 36415 COLL VENOUS BLD VENIPUNCTURE: CPT

## 2025-06-03 PROCEDURE — 97530 THERAPEUTIC ACTIVITIES: CPT

## 2025-06-03 PROCEDURE — 2500000003 HC RX 250 WO HCPCS

## 2025-06-03 RX ORDER — ENOXAPARIN SODIUM 100 MG/ML
40 INJECTION SUBCUTANEOUS DAILY
Status: DISCONTINUED | OUTPATIENT
Start: 2025-06-03 | End: 2025-06-03

## 2025-06-03 RX ORDER — INSULIN LISPRO 100 [IU]/ML
0-4 INJECTION, SOLUTION INTRAVENOUS; SUBCUTANEOUS
Status: DISCONTINUED | OUTPATIENT
Start: 2025-06-03 | End: 2025-06-05 | Stop reason: HOSPADM

## 2025-06-03 RX ADMIN — CYCLOBENZAPRINE 10 MG: 10 TABLET, FILM COATED ORAL at 20:17

## 2025-06-03 RX ADMIN — DILTIAZEM HYDROCHLORIDE 90 MG: 90 CAPSULE, EXTENDED RELEASE ORAL at 09:22

## 2025-06-03 RX ADMIN — BUDESONIDE 500 MCG: 0.5 INHALANT RESPIRATORY (INHALATION) at 07:21

## 2025-06-03 RX ADMIN — DILTIAZEM HYDROCHLORIDE 90 MG: 90 CAPSULE, EXTENDED RELEASE ORAL at 22:31

## 2025-06-03 RX ADMIN — INSULIN LISPRO 4 UNITS: 100 INJECTION, SOLUTION INTRAVENOUS; SUBCUTANEOUS at 12:14

## 2025-06-03 RX ADMIN — METRONIDAZOLE 500 MG: 500 INJECTION, SOLUTION INTRAVENOUS at 05:43

## 2025-06-03 RX ADMIN — APIXABAN 5 MG: 5 TABLET, FILM COATED ORAL at 20:17

## 2025-06-03 RX ADMIN — SODIUM CHLORIDE, PRESERVATIVE FREE 10 ML: 5 INJECTION INTRAVENOUS at 09:27

## 2025-06-03 RX ADMIN — INSULIN LISPRO 4 UNITS: 100 INJECTION, SOLUTION INTRAVENOUS; SUBCUTANEOUS at 17:24

## 2025-06-03 RX ADMIN — INSULIN LISPRO 4 UNITS: 100 INJECTION, SOLUTION INTRAVENOUS; SUBCUTANEOUS at 09:22

## 2025-06-03 RX ADMIN — TAMSULOSIN HYDROCHLORIDE 0.4 MG: 0.4 CAPSULE ORAL at 09:22

## 2025-06-03 RX ADMIN — CARVEDILOL 6.25 MG: 6.25 TABLET, FILM COATED ORAL at 09:23

## 2025-06-03 RX ADMIN — PANTOPRAZOLE SODIUM 40 MG: 40 TABLET, DELAYED RELEASE ORAL at 05:43

## 2025-06-03 RX ADMIN — VANCOMYCIN HYDROCHLORIDE 1250 MG: 1.25 INJECTION, POWDER, LYOPHILIZED, FOR SOLUTION INTRAVENOUS at 22:35

## 2025-06-03 RX ADMIN — METRONIDAZOLE 500 MG: 500 INJECTION, SOLUTION INTRAVENOUS at 12:17

## 2025-06-03 RX ADMIN — MONTELUKAST 10 MG: 10 TABLET, FILM COATED ORAL at 20:17

## 2025-06-03 RX ADMIN — PANTOPRAZOLE SODIUM 40 MG: 40 TABLET, DELAYED RELEASE ORAL at 16:09

## 2025-06-03 RX ADMIN — DICLOFENAC SODIUM 4 G: 10 GEL TOPICAL at 20:20

## 2025-06-03 RX ADMIN — PREDNISONE 5 MG: 5 TABLET ORAL at 09:22

## 2025-06-03 RX ADMIN — METRONIDAZOLE 500 MG: 500 INJECTION, SOLUTION INTRAVENOUS at 20:25

## 2025-06-03 RX ADMIN — ARFORMOTEROL TARTRATE 15 MCG: 15 SOLUTION RESPIRATORY (INHALATION) at 19:56

## 2025-06-03 RX ADMIN — CARVEDILOL 6.25 MG: 6.25 TABLET, FILM COATED ORAL at 20:17

## 2025-06-03 RX ADMIN — INSULIN GLARGINE 16 UNITS: 100 INJECTION, SOLUTION SUBCUTANEOUS at 09:21

## 2025-06-03 RX ADMIN — BUDESONIDE 500 MCG: 0.5 INHALANT RESPIRATORY (INHALATION) at 19:56

## 2025-06-03 RX ADMIN — OXYCODONE 2.5 MG: 5 TABLET ORAL at 16:08

## 2025-06-03 RX ADMIN — PREGABALIN 50 MG: 50 CAPSULE ORAL at 20:17

## 2025-06-03 RX ADMIN — QUETIAPINE FUMARATE 25 MG: 25 TABLET ORAL at 20:17

## 2025-06-03 RX ADMIN — CETIRIZINE HYDROCHLORIDE 10 MG: 10 TABLET, FILM COATED ORAL at 09:23

## 2025-06-03 RX ADMIN — CEFEPIME 2000 MG: 2 INJECTION, POWDER, FOR SOLUTION INTRAVENOUS at 20:26

## 2025-06-03 RX ADMIN — ARFORMOTEROL TARTRATE 15 MCG: 15 SOLUTION RESPIRATORY (INHALATION) at 07:21

## 2025-06-03 RX ADMIN — CEFEPIME 2000 MG: 2 INJECTION, POWDER, FOR SOLUTION INTRAVENOUS at 09:26

## 2025-06-03 RX ADMIN — APIXABAN 5 MG: 5 TABLET, FILM COATED ORAL at 09:22

## 2025-06-03 RX ADMIN — INSULIN LISPRO 1 UNITS: 100 INJECTION, SOLUTION INTRAVENOUS; SUBCUTANEOUS at 17:24

## 2025-06-03 RX ADMIN — PREGABALIN 50 MG: 50 CAPSULE ORAL at 09:23

## 2025-06-03 RX ADMIN — CYCLOBENZAPRINE 10 MG: 10 TABLET, FILM COATED ORAL at 09:22

## 2025-06-03 ASSESSMENT — PAIN SCALES - GENERAL
PAINLEVEL_OUTOF10: 8
PAINLEVEL_OUTOF10: 10
PAINLEVEL_OUTOF10: 2

## 2025-06-03 ASSESSMENT — PAIN SCALES - WONG BAKER: WONGBAKER_NUMERICALRESPONSE: NO HURT

## 2025-06-03 ASSESSMENT — PAIN DESCRIPTION - ORIENTATION
ORIENTATION: RIGHT
ORIENTATION: RIGHT

## 2025-06-03 ASSESSMENT — PAIN DESCRIPTION - LOCATION
LOCATION: FOOT
LOCATION: FOOT

## 2025-06-03 ASSESSMENT — PAIN DESCRIPTION - DESCRIPTORS
DESCRIPTORS: ACHING
DESCRIPTORS: ACHING

## 2025-06-03 NOTE — PLAN OF CARE
Problem: Chronic Conditions and Co-morbidities  Goal: Patient's chronic conditions and co-morbidity symptoms are monitored and maintained or improved  Outcome: Progressing     Problem: Discharge Planning  Goal: Discharge to home or other facility with appropriate resources  Outcome: Progressing     Problem: Safety - Adult  Goal: Free from fall injury  Outcome: Progressing     Problem: Skin/Tissue Integrity  Goal: Skin integrity remains intact  Description: 1.  Monitor for areas of redness and/or skin breakdown2.  Assess vascular access sites hourly3.  Every 4-6 hours minimum:  Change oxygen saturation probe site4.  Every 4-6 hours:  If on nasal continuous positive airway pressure, respiratory therapy assess nares and determine need for appliance change or resting period  Outcome: Progressing     Problem: Respiratory - Adult  Goal: Achieves optimal ventilation and oxygenation  6/3/2025 1000 by Debra Ross RN  Outcome: Progressing  6/3/2025 0811 by Carla Simon, RT  Outcome: Progressing  6/3/2025 0521 by Vandana Carlton RCP  Outcome: Progressing     Problem: Pain  Goal: Verbalizes/displays adequate comfort level or baseline comfort level  Outcome: Progressing     Problem: ABCDS Injury Assessment  Goal: Absence of physical injury  Outcome: Progressing

## 2025-06-03 NOTE — PLAN OF CARE
Problem: Physical Therapy - Adult  Goal: By Discharge: Performs mobility at highest level of function for planned discharge setting.  See evaluation for individualized goals.  Description: FUNCTIONAL STATUS PRIOR TO ADMISSION: Patient was modified independent using a rolling walker for functional mobility in the last few months since development of the R ankle wound.  Patient also had a fall in late April and fractured several ribs per daughter.    HOME SUPPORT PRIOR TO ADMISSION: The patient lived alone with daughter locally to provide assistance as needed.    Physical Therapy Goals  Initiated 5/30/2025  1.  Patient will move from supine to sit and sit to supine, scoot up and down, and roll side to side in bed with modified independence within 7 day(s).    2.  Patient will perform sit to stand with modified independence within 7 day(s).  3.  Patient will transfer from bed to chair and chair to bed with modified independence using the least restrictive device within 7 day(s).  4.  Patient will ambulate with modified independence for 50 feet with the least restrictive device within 7 day(s).   5.  Patient will sit up in chair 2 hours at a time to improve OOB tolerance within 7 day(s).        Outcome: Progressing   PHYSICAL THERAPY TREATMENT    Patient: Lesley Lauren (79 y.o. female)  Date: 6/3/2025  Diagnosis: Diabetic foot infection (HCC) [E11.628, L08.9]  Ankle wound, right, initial encounter [S91.001A] Diabetic foot infection (HCC)  Procedure(s) (LRB):  DEBRIDEMENT OF NONVIABLE TISSUE AND BONE RIGHT ANKLE (MAC W/LOCAL) (Right) 1 Day Post-Op  Precautions: Restrictions/Precautions  Restrictions/Precautions: Fall Risk            ASSESSMENT:  Patient continues to benefit from skilled PT services and is slowly progressing towards goals. Pt limited by impaired balance, decreased safety awareness with and without use of RW for steadying. Min A x 1 with verbal cues for transfers and short bout of gait training.  treatment:   Patient left in no apparent distress in bed, Call bell within reach, and Bed/ chair alarm activated      COMMUNICATION/EDUCATION:   The patient's plan of care was discussed with: registered nurse    Patient Education  Education Given To: Patient  Education Provided: Role of Therapy;Mobility Training;Transfer Training  Education Method: Verbal  Barriers to Learning: Readiness to Learn  Education Outcome: Verbalized understanding;Continued education needed      Kimberly Paniagua, PTA  Minutes: 23

## 2025-06-03 NOTE — CARE COORDINATION
Care Management Progress Note        Reason for Admission:   Diabetic foot infection (HCC) [E11.628, L08.9]  Ankle wound, right, initial encounter [S91.001A]  Procedure(s) (LRB):  DEBRIDEMENT OF NONVIABLE TISSUE AND BONE RIGHT ANKLE (MAC W/LOCAL) (Right)  1 Day Post-Op        Patient Admission Status: Inpatient  RUR: 16%  Hospitalization in the last 30 days (Readmission):  No        Transition of care plan:  Patient was discussed in IDR and continues to be medically managed. Cx remain pending.     Dispo: SNF. Patient is agreeable. Referrals sent to  [place] as the first choice. Referrals also sent to [place] and [place]. [facility] in [place] does not have beds available. Patient will need insurance authorization.     CM has provided patient's daughter with a list of private duty care givers if needed after discharge from SNF.     Patient was open to [facility] prior to admission. CM will update  as patient discharge plan to SNF is finalized.     Discharge plan communicated with patient and/or discharge caregiver: Yes . CM discussed SNF with patient and daughter Lesley at bedside today 6/3.    Date 1st IMM letter given: 5/28/25.    Outpatient follow-up.    Transport at discharge: pt's daughter Lesley.         ___________________________________________   Christina Willett RN Case Manager  6/3/2025   4:40 PM

## 2025-06-03 NOTE — PROGRESS NOTES
[address] [place] [address]   Office [phone]  Fax [phone]          Subjective / Objective     Subjective  Overnight Events: NAEO. No acute concerns this morning.     Respiratory: RA  BP (!) 146/87   Pulse 86   Temp 97.7 °F (36.5 °C) (Oral)   Resp 18   Ht 1.6 m (5' 3\")   Wt 80.3 kg (177 lb)   SpO2 97%   BMI 31.35 kg/m²      Physical Examination:   General appearance - alert, anxious about procedure.   Chest - clear breath sounds bilateral.   Heart - normal rate, regular rhythm  Abdomen - no visible distension   Neurological - no focal deficits  Skin - Dry. No notable rashes  Extremities - no pedal edema. Dressing over right ankle and foot.     I/O:  No intake/output data recorded.  Inpatient Medications  Current Facility-Administered Medications   Medication Dose Route Frequency    QUEtiapine (SEROQUEL) tablet 25 mg  25 mg Oral Nightly    lidocaine PF 1 % injection 1 mL  1 mL IntraDERmal Once PRN    fentaNYL (SUBLIMAZE) injection 100 mcg  100 mcg IntraVENous Once PRN    lactated ringers infusion   IntraVENous Continuous    midazolam PF (VERSED) injection 2 mg  2 mg IntraVENous Once PRN    insulin glargine (LANTUS) injection vial 16 Units  16 Units SubCUTAneous Daily    apixaban (ELIQUIS) tablet 5 mg  5 mg Oral BID    oxyCODONE (ROXICODONE) immediate release tablet 2.5 mg  2.5 mg Oral Q6H PRN    insulin lispro (HUMALOG,ADMELOG) injection vial 4 Units  0.05 Units/kg SubCUTAneous TID WC    vancomycin (VANCOCIN) 1,250 mg in sodium chloride 0.9 % 250 mL IVPB ([id number])  1,250 mg IntraVENous Q24H    diclofenac sodium (VOLTAREN) 1 % gel 4 g  4 g Topical BID    budesonide (PULMICORT) nebulizer suspension 500 mcg  0.5 mg Nebulization BID RT    metroNIDAZOLE (FLAGYL) 500 mg in 0.9% NaCl 100 mL IVPB premix  500 mg IntraVENous Q8H    acetaminophen (TYLENOL) tablet 1,000 mg  1,000 mg Oral Q6H PRN    Or    acetaminophen (TYLENOL) suppository 650 mg  650 mg Rectal Q6H PRN    lidocaine 4 %

## 2025-06-03 NOTE — CONSULTS
[facility]  [facility]ALTH  DIABETES MANAGEMENT CONSULT    Consulted by Provider for advanced nursing evaluation and care for inpatient blood glucose management.    Evaluation and Action Plan   Lesley Lauren is a 79 y.o. female with PMHx of T2DM, HTN, previous CVA, breast CA, asthma, COPD, PE, who presented to ED with right ankle wound that has worsened over last 2 months. Labs on admission noted for , A1c 11.2%, creat 1.32, CRP 4.42. MRI of right ankle showing acute osteomyelitis. She is admitted for abx treatment and work up in progress. DM consulted to assist with glycemic management and home assessment.     Patient with T2DM for 20+ years. Has only ever been on oral medications. Currently on Glipizide 10 mg bid (this was just recently increased from 5 mg bid) and was recently prescribed Lantus 10 units nightly (but came to hospital before starting this). Diet tends to be high in sugar/carbs. Her current A1c is 11.2%, indicating poor diabetes control. We discussed need for insulin given high A1c and likely loss of beta cell function (and not enough insulin being made). Also discussed the obvious dietary changes that need to be made. Emphasized how BG contributes to infection/complications and how BG control will help with healing. Daughter at bedside to assist with history and assists patient at home. All questions answered.     6/3 - Patient feeling well today after debridement surgery yesterday. Had a bit of a low BG (77) last evening. Possibly didn't eat enough carbs at dinner to warrant the amount of lispro she received.  this am - will continue current basal/bolus regimen. Will switch correctional dose to low dose. Daughter asking (over phone) about food/carbs that she can have at home - will review with daughter when she's in room.     Blood glucose pattern    Significant diabetes-related events over the past 24-72 hours  A1C 11.2%  Fasting B  BG down to 77 last  Results   Component Value Date    ALT 13 06/03/2025    AST 17 06/03/2025    ALKPHOS 148 (H) 06/03/2025    BILITOT 0.4 06/03/2025     Lab Results   Component Value Date    TSH 1.21 12/12/2021         Factors impacting BG management  Factor Dose Comments   Nutrition:  Standard meals   60 grams/meal    Pain PRN    Infection Cefepime, Flagyl, Vanc    Kidney function MARIANA resolved     Liver function Normal       Billing Code(s)   01718      Before making these care recommendations, I personally reviewed the hospitalization record, including notes, laboratory & diagnostic data and current medications, and examined the patient at the bedside.  Total minutes:  25    SONDRA DOTY - CNS   Diabetes Clinical Nurse Specialist   [facility]  Access via [employer]

## 2025-06-03 NOTE — PROGRESS NOTES
Progress Note    Date:6/3/2025       Room:[facility]  Patient Name:Lesley Lauren     YOB: 1945     Age:79 y.o.    Subjective    Subjective   Pt seen at BS. Denies any new complaints. Per nursing, no acute overnight events      Review of Systems  Unremarkable outside of HPI      Objective         Vitals Last 24 Hours:  TEMPERATURE:  Temp  Av.8 °F (36.6 °C)  Min: 97.3 °F (36.3 °C)  Max: 98.8 °F (37.1 °C)  RESPIRATIONS RANGE: Resp  Av.2  Min: 16  Max: 25  PULSE OXIMETRY RANGE: SpO2  Av.8 %  Min: 91 %  Max: 97 %  PULSE RANGE: Pulse  Av.8  Min: 71  Max: 91  BLOOD PRESSURE RANGE: Systolic (24hrs), Av , Min:110 , Max:148   ; Diastolic (24hrs), Av, Min:62, Max:126    I/O (24Hr):  No intake or output data in the 24 hours ending 25 1156    Objective  GEN: Pt is AAOx4 and in NAD. Dressing to the right ankle is C/D/I. Family noted at BS  DERM: Wound lateral right ankle. Granular base. No dry skin noted to B/L LE.   VASC: Pedal pulses (DP/PT) diminished to B/L LE. CFT<3sec to all digits of B/L LE. No pedal hair growth noted to the level of the digits for B/L LE. Skin temp is warm to warm from proximal to distal for B/L LE. Neg homans/venus signs to B/L LE. No varicosities or telangectasias noted to B/L LE.  NEURO: Protective and epicritic sensations grossly absent to B/L LE  MSK: (-) POP, No gross deformities. Good muscle tone and bulk noted to B/L LE.  PSYCH: Cooperative with normal mood and affect       Labs/Imaging/Diagnostics    Labs:  CBC:  Recent Labs     25  0213 25  0256   WBC 9.7 8.4   RBC 4.21 4.15   HGB 9.6* 9.6*   HCT 31.5* 32.1*   MCV 74.8* 77.3*   RDW 16.4* 16.9*    246     CHEMISTRIES:  Recent Labs     25  0213 25  0256    142   K 4.3 4.3   * 115*   CO2 24 23   BUN 21* 19   CREATININE 0.93 0.85   GLUCOSE 172* 143*     PT/INR:No results for input(s): \"PROTIME\", \"INR\" in the last 72 hours.  APTT:No results for input(s):

## 2025-06-03 NOTE — PROGRESS NOTES
Have time Thursday for angio  Will plan on this if still inpatient  Otherwise will arrange for outpatient

## 2025-06-04 PROBLEM — T38.0X5A IMMUNOSUPPRESSION DUE TO CHRONIC STEROID USE: Status: ACTIVE | Noted: 2025-06-04

## 2025-06-04 PROBLEM — D84.821 IMMUNOSUPPRESSION DUE TO CHRONIC STEROID USE: Status: ACTIVE | Noted: 2025-06-04

## 2025-06-04 PROBLEM — A49.01 METHICILLIN SUSCEPTIBLE STAPHYLOCOCCUS AUREUS INFECTION: Status: ACTIVE | Noted: 2025-06-04

## 2025-06-04 PROBLEM — Z79.52 IMMUNOSUPPRESSION DUE TO CHRONIC STEROID USE: Status: ACTIVE | Noted: 2025-06-04

## 2025-06-04 LAB
ALBUMIN SERPL-MCNC: 2.6 G/DL (ref 3.5–5)
ALBUMIN/GLOB SERPL: 0.8 (ref 1.1–2.2)
ALP SERPL-CCNC: 146 U/L (ref 45–117)
ALT SERPL-CCNC: 11 U/L (ref 12–78)
ANION GAP SERPL CALC-SCNC: 3 MMOL/L (ref 2–12)
AST SERPL-CCNC: 17 U/L (ref 15–37)
BASOPHILS # BLD: 0.09 K/UL (ref 0–0.1)
BASOPHILS NFR BLD: 1.1 % (ref 0–1)
BILIRUB SERPL-MCNC: 0.4 MG/DL (ref 0.2–1)
BUN SERPL-MCNC: 17 MG/DL (ref 6–20)
BUN/CREAT SERPL: 21 (ref 12–20)
CALCIUM SERPL-MCNC: 8.5 MG/DL (ref 8.5–10.1)
CHLORIDE SERPL-SCNC: 115 MMOL/L (ref 97–108)
CO2 SERPL-SCNC: 25 MMOL/L (ref 21–32)
CREAT SERPL-MCNC: 0.82 MG/DL (ref 0.55–1.02)
DIFFERENTIAL METHOD BLD: ABNORMAL
EOSINOPHIL # BLD: 0.47 K/UL (ref 0–0.4)
EOSINOPHIL NFR BLD: 5.6 % (ref 0–7)
ERYTHROCYTE [DISTWIDTH] IN BLOOD BY AUTOMATED COUNT: 17.1 % (ref 11.5–14.5)
GLOBULIN SER CALC-MCNC: 3.1 G/DL (ref 2–4)
GLUCOSE BLD STRIP.AUTO-MCNC: 126 MG/DL (ref 65–117)
GLUCOSE BLD STRIP.AUTO-MCNC: 133 MG/DL (ref 65–117)
GLUCOSE BLD STRIP.AUTO-MCNC: 178 MG/DL (ref 65–117)
GLUCOSE BLD STRIP.AUTO-MCNC: 248 MG/DL (ref 65–117)
GLUCOSE SERPL-MCNC: 120 MG/DL (ref 65–100)
HCT VFR BLD AUTO: 30.8 % (ref 35–47)
HGB BLD-MCNC: 9.5 G/DL (ref 11.5–16)
IMM GRANULOCYTES # BLD AUTO: 0.06 K/UL (ref 0–0.04)
IMM GRANULOCYTES NFR BLD AUTO: 0.7 % (ref 0–0.5)
LYMPHOCYTES # BLD: 1.85 K/UL (ref 0.8–3.5)
LYMPHOCYTES NFR BLD: 22.2 % (ref 12–49)
MCH RBC QN AUTO: 23.2 PG (ref 26–34)
MCHC RBC AUTO-ENTMCNC: 30.8 G/DL (ref 30–36.5)
MCV RBC AUTO: 75.1 FL (ref 80–99)
MONOCYTES # BLD: 0.97 K/UL (ref 0–1)
MONOCYTES NFR BLD: 11.6 % (ref 5–13)
NEUTS SEG # BLD: 4.9 K/UL (ref 1.8–8)
NEUTS SEG NFR BLD: 58.8 % (ref 32–75)
NRBC # BLD: 0 K/UL (ref 0–0.01)
NRBC BLD-RTO: 0 PER 100 WBC
PLATELET # BLD AUTO: 269 K/UL (ref 150–400)
PMV BLD AUTO: 10.4 FL (ref 8.9–12.9)
POTASSIUM SERPL-SCNC: 4.3 MMOL/L (ref 3.5–5.1)
PROT SERPL-MCNC: 5.7 G/DL (ref 6.4–8.2)
RBC # BLD AUTO: 4.1 M/UL (ref 3.8–5.2)
SERVICE CMNT-IMP: ABNORMAL
SODIUM SERPL-SCNC: 143 MMOL/L (ref 136–145)
WBC # BLD AUTO: 8.3 K/UL (ref 3.6–11)

## 2025-06-04 PROCEDURE — 94640 AIRWAY INHALATION TREATMENT: CPT

## 2025-06-04 PROCEDURE — 1100000000 HC RM PRIVATE

## 2025-06-04 PROCEDURE — 97530 THERAPEUTIC ACTIVITIES: CPT

## 2025-06-04 PROCEDURE — 6360000002 HC RX W HCPCS: Performed by: FAMILY MEDICINE

## 2025-06-04 PROCEDURE — 6370000000 HC RX 637 (ALT 250 FOR IP)

## 2025-06-04 PROCEDURE — 36415 COLL VENOUS BLD VENIPUNCTURE: CPT

## 2025-06-04 PROCEDURE — 94761 N-INVAS EAR/PLS OXIMETRY MLT: CPT

## 2025-06-04 PROCEDURE — 80053 COMPREHEN METABOLIC PANEL: CPT

## 2025-06-04 PROCEDURE — 85025 COMPLETE CBC W/AUTO DIFF WBC: CPT

## 2025-06-04 PROCEDURE — 99232 SBSQ HOSP IP/OBS MODERATE 35: CPT | Performed by: FAMILY MEDICINE

## 2025-06-04 PROCEDURE — 97535 SELF CARE MNGMENT TRAINING: CPT

## 2025-06-04 PROCEDURE — 2500000003 HC RX 250 WO HCPCS

## 2025-06-04 PROCEDURE — 82962 GLUCOSE BLOOD TEST: CPT

## 2025-06-04 PROCEDURE — 6360000002 HC RX W HCPCS: Performed by: INTERNAL MEDICINE

## 2025-06-04 PROCEDURE — 99231 SBSQ HOSP IP/OBS SF/LOW 25: CPT

## 2025-06-04 PROCEDURE — 2500000003 HC RX 250 WO HCPCS: Performed by: INTERNAL MEDICINE

## 2025-06-04 PROCEDURE — 6360000002 HC RX W HCPCS

## 2025-06-04 PROCEDURE — 99233 SBSQ HOSP IP/OBS HIGH 50: CPT | Performed by: INTERNAL MEDICINE

## 2025-06-04 RX ORDER — QUETIAPINE FUMARATE 25 MG/1
25 TABLET, FILM COATED ORAL NIGHTLY
Qty: 30 TABLET | Refills: 0 | Status: SHIPPED
Start: 2025-06-04

## 2025-06-04 RX ORDER — INSULIN GLARGINE 100 [IU]/ML
12 INJECTION, SOLUTION SUBCUTANEOUS NIGHTLY
Qty: 5 ADJUSTABLE DOSE PRE-FILLED PEN SYRINGE | Refills: 3 | Status: SHIPPED | OUTPATIENT
Start: 2025-06-05

## 2025-06-04 RX ADMIN — CARVEDILOL 6.25 MG: 6.25 TABLET, FILM COATED ORAL at 22:09

## 2025-06-04 RX ADMIN — DILTIAZEM HYDROCHLORIDE 90 MG: 90 CAPSULE, EXTENDED RELEASE ORAL at 09:34

## 2025-06-04 RX ADMIN — ARFORMOTEROL TARTRATE 15 MCG: 15 SOLUTION RESPIRATORY (INHALATION) at 21:10

## 2025-06-04 RX ADMIN — CYCLOBENZAPRINE 10 MG: 10 TABLET, FILM COATED ORAL at 09:35

## 2025-06-04 RX ADMIN — TAMSULOSIN HYDROCHLORIDE 0.4 MG: 0.4 CAPSULE ORAL at 09:35

## 2025-06-04 RX ADMIN — PREGABALIN 50 MG: 50 CAPSULE ORAL at 22:09

## 2025-06-04 RX ADMIN — APIXABAN 5 MG: 5 TABLET, FILM COATED ORAL at 09:35

## 2025-06-04 RX ADMIN — CYCLOBENZAPRINE 10 MG: 10 TABLET, FILM COATED ORAL at 22:09

## 2025-06-04 RX ADMIN — DICLOFENAC SODIUM 4 G: 10 GEL TOPICAL at 22:08

## 2025-06-04 RX ADMIN — SODIUM CHLORIDE, PRESERVATIVE FREE 10 ML: 5 INJECTION INTRAVENOUS at 22:10

## 2025-06-04 RX ADMIN — CETIRIZINE HYDROCHLORIDE 10 MG: 10 TABLET, FILM COATED ORAL at 09:35

## 2025-06-04 RX ADMIN — MONTELUKAST 10 MG: 10 TABLET, FILM COATED ORAL at 22:10

## 2025-06-04 RX ADMIN — PANTOPRAZOLE SODIUM 40 MG: 40 TABLET, DELAYED RELEASE ORAL at 16:28

## 2025-06-04 RX ADMIN — INSULIN LISPRO 4 UNITS: 100 INJECTION, SOLUTION INTRAVENOUS; SUBCUTANEOUS at 12:07

## 2025-06-04 RX ADMIN — BUDESONIDE 500 MCG: 0.5 INHALANT RESPIRATORY (INHALATION) at 21:10

## 2025-06-04 RX ADMIN — INSULIN LISPRO 1 UNITS: 100 INJECTION, SOLUTION INTRAVENOUS; SUBCUTANEOUS at 22:09

## 2025-06-04 RX ADMIN — QUETIAPINE FUMARATE 25 MG: 25 TABLET ORAL at 22:10

## 2025-06-04 RX ADMIN — DILTIAZEM HYDROCHLORIDE 90 MG: 90 CAPSULE, EXTENDED RELEASE ORAL at 22:09

## 2025-06-04 RX ADMIN — INSULIN LISPRO 4 UNITS: 100 INJECTION, SOLUTION INTRAVENOUS; SUBCUTANEOUS at 09:37

## 2025-06-04 RX ADMIN — WATER 2000 MG: 1 INJECTION INTRAMUSCULAR; INTRAVENOUS; SUBCUTANEOUS at 18:06

## 2025-06-04 RX ADMIN — PREDNISONE 5 MG: 5 TABLET ORAL at 09:35

## 2025-06-04 RX ADMIN — PREGABALIN 50 MG: 50 CAPSULE ORAL at 09:35

## 2025-06-04 RX ADMIN — SODIUM CHLORIDE, PRESERVATIVE FREE 10 ML: 5 INJECTION INTRAVENOUS at 09:33

## 2025-06-04 RX ADMIN — INSULIN LISPRO 4 UNITS: 100 INJECTION, SOLUTION INTRAVENOUS; SUBCUTANEOUS at 16:28

## 2025-06-04 RX ADMIN — CARVEDILOL 6.25 MG: 6.25 TABLET, FILM COATED ORAL at 09:34

## 2025-06-04 RX ADMIN — INSULIN GLARGINE 16 UNITS: 100 INJECTION, SOLUTION SUBCUTANEOUS at 09:38

## 2025-06-04 RX ADMIN — WATER 2000 MG: 1 INJECTION INTRAMUSCULAR; INTRAVENOUS; SUBCUTANEOUS at 09:38

## 2025-06-04 RX ADMIN — PANTOPRAZOLE SODIUM 40 MG: 40 TABLET, DELAYED RELEASE ORAL at 05:19

## 2025-06-04 RX ADMIN — METRONIDAZOLE 500 MG: 500 INJECTION, SOLUTION INTRAVENOUS at 05:19

## 2025-06-04 ASSESSMENT — PAIN DESCRIPTION - DESCRIPTORS
DESCRIPTORS: ACHING
DESCRIPTORS: DISCOMFORT

## 2025-06-04 ASSESSMENT — PAIN SCALES - GENERAL
PAINLEVEL_OUTOF10: 3
PAINLEVEL_OUTOF10: 9

## 2025-06-04 ASSESSMENT — PAIN DESCRIPTION - ORIENTATION
ORIENTATION: RIGHT
ORIENTATION: RIGHT

## 2025-06-04 ASSESSMENT — PAIN DESCRIPTION - LOCATION
LOCATION: SHOULDER
LOCATION: SHOULDER

## 2025-06-04 NOTE — PLAN OF CARE
lacks insight into her deficits and need for safety.            PLAN :  Patient continues to benefit from skilled intervention to address the above impairments.  Continue treatment per established plan of care to address goals.    Recommend with staff: BSC use, RW for transfer    Recommend next OT session: LB dressing, toileting, bathroom mobility     Recommendation for discharge: (in order for the patient to meet his/her long term goals):   Moderate intensity short-term skilled occupational therapy up to 5x/week    Other factors to consider for discharge: lives alone, high risk for falls, not safe to be alone, and concern for safely navigating or managing the home environment    IF patient discharges home will need the following DME: continuing to assess with progress       SUBJECTIVE:   Patient pleasant and cooperative     OBJECTIVE DATA SUMMARY:   Cognitive/Behavioral Status:     Cognition  Overall Cognitive Status: Exceptions  Memory: Decreased recall of recent events    Functional Mobility and Transfers for ADLs:  Bed Mobility:  Bed Mobility Training  Supine to Sit: Stand by assistance  Sit to Supine: Stand by assistance     Transfers:   Transfer Training  Sit to Stand: Contact guard assistance;Minimal assistance  Stand to Sit: Minimal assistance           Balance:     Balance  Sitting: Intact  Standing - Static: Fair      ADL Intervention:                   Grooming: Stand by assistance   Grooming Skilled Clinical Factors: seated EOB                   Pain Ratin/10   Pain Intervention(s):         Activity Tolerance:   Good and requires rest breaks  Please refer to the flowsheet for vital signs taken during this treatment.    After treatment:   Patient left in no apparent distress in bed, Call bell within reach, Bed/ chair alarm activated, and Side rails x3    COMMUNICATION/EDUCATION:   The patient's plan of care was discussed with: registered nurse    Patient Education  Education Given To:  Patient  Education Provided: ADL Adaptive Strategies  Education Method: Demonstration;Verbal;Teach Back  Barriers to Learning: Cognition  Education Outcome: Continued education needed    Thank you for this referral.  Gali Mckeon OT  Minutes: 34

## 2025-06-04 NOTE — DISCHARGE INSTRUCTIONS
Patient Discharge Instructions    Lesley Lauren / [id number] : 1945    Admitted 2025 Discharged: 2025 11:50 AM     Primary care provider:Juan David Zamorano    Discharging provider:  Patrick Duarte MD  - Family Medicine Resident  Dr. Joseph Single Select Template:::\"Marbin Riveraky\",\"Rhonda Bratysheva\",\"Zion Vanda\",\"Jose Snyder\",\"Paula Tam\",\"Connor Alvarado\",\"Linda Leblanc\", \"Fátima Luc\", \"Balta Ko\"}. Attending, Family Medicine - Family Medicine, Attending    . . . . . . . . . . . . . . . . . . . . . . . . . . . . . . . . . . . . . . . . . . . . . . . . . . . . . . . . . . . . . . . . . . . . . . . .   MEDICATION CHANGES  Current Discharge Medication List        CONTINUE these medications which have CHANGED    Details   insulin glargine (LANTUS SOLOSTAR) 100 UNIT/ML injection pen Inject 12 Units into the skin nightly  Qty: 5 Adjustable Dose Pre-filled Pen Syringe, Refills: 3  Start date: 2025    Associated Diagnoses: Type 2 diabetes mellitus with hyperglycemia, without long-term current use of insulin (HCC)           CONTINUE these medications which have NOT CHANGED    Details   glucose monitoring kit 1 kit by Does not apply route in the morning, at noon, in the evening, and at bedtime  Qty: 1 kit, Refills: 0    Associated Diagnoses: Type 2 diabetes mellitus with hyperglycemia, without long-term current use of insulin (HCC)            . . . . . . . . . . . . . . . . . . . . . . . . . . . . . . . . . . . . . . . . . . . . . . . . . . . . . . . . . . . . . . . . . . . . . . .     FINAL DIAGNOSES & HOSPITAL COURSE:    Lesley Lauren is a 79 y.o. female with a PMHx of T2DM, COPD, HTN, HLD, hx of DVT/PE who was admitted for R diabetic foot infection with osteomyelitis.     R Diabetic foot infection w/ Osteomyelitis:   Moderate PAD of RLE:  Failed outpatient abx (bactrim). XR without e/o osteomyelitis. MRI w/ Osteomyelitis of distal fibula, celluitis and peroneal           Date/Time                                                                                                                                              Patient or Representative Signature                                                          Date/Time

## 2025-06-04 NOTE — PLAN OF CARE
Problem: Physical Therapy - Adult  Goal: By Discharge: Performs mobility at highest level of function for planned discharge setting.  See evaluation for individualized goals.  Description: FUNCTIONAL STATUS PRIOR TO ADMISSION: Patient was modified independent using a rolling walker for functional mobility in the last few months since development of the R ankle wound.  Patient also had a fall in late April and fractured several ribs per daughter.    HOME SUPPORT PRIOR TO ADMISSION: The patient lived alone with daughter locally to provide assistance as needed.    Physical Therapy Goals  Initiated 5/30/2025  1.  Patient will move from supine to sit and sit to supine, scoot up and down, and roll side to side in bed with modified independence within 7 day(s).    2.  Patient will perform sit to stand with modified independence within 7 day(s).  3.  Patient will transfer from bed to chair and chair to bed with modified independence using the least restrictive device within 7 day(s).  4.  Patient will ambulate with modified independence for 50 feet with the least restrictive device within 7 day(s).   5.  Patient will sit up in chair 2 hours at a time to improve OOB tolerance within 7 day(s).        Outcome: Progressing   PHYSICAL THERAPY TREATMENT    Patient: Lesley Lauren (79 y.o. female)  Date: 6/4/2025  Diagnosis: Diabetic foot infection (HCC) [E11.628, L08.9]  Ankle wound, right, initial encounter [S91.001A] Diabetic foot infection (HCC)  Procedure(s) (LRB):  DEBRIDEMENT OF NONVIABLE TISSUE AND BONE RIGHT ANKLE (MAC W/LOCAL) (Right) 2 Days Post-Op  Precautions: Restrictions/Precautions  Restrictions/Precautions: Fall Risk            ASSESSMENT:  Patient continues to benefit from skilled PT services.Pt supine to sit with CGA to min assist.Pt sit to stand from bed with CGA to min assist.Pt ambulated 6ft to chair with RW min assist.Pt is unsteady tending to lean to posterior to decrease WB on right LE.Pt left  sitting in chair.Pt progress slow.Continue goals.         PLAN:  Patient continues to benefit from skilled intervention to address the above impairments.  Continue treatment per established plan of care.        Recommendation for discharge: (in order for the patient to meet his/her long term goals):   Moderate intensity short-term skilled physical therapy up to 5x/week    Other factors to consider for discharge:       IF patient discharges home will need the following DME: rolling walker       SUBJECTIVE:       OBJECTIVE DATA SUMMARY:   Critical Behavior:     Cognition  Overall Cognitive Status: WNL    Functional Mobility Training:  Bed Mobility:  Bed Mobility Training  Supine to Sit: Contact guard assistance;Minimal assistance  Transfers:  Transfer Training  Sit to Stand: Contact guard assistance;Minimal assistance  Stand to Sit: Minimal assistance  Balance:  Balance  Sitting: Intact  Standing - Static: Fair   Ambulation/Gait Training:     Gait  Distance (ft): 6 Feet  Assistive Device: Gait belt;Walker, rolling  Base of Support: Narrowed  Speed/Sara: Pace decreased (< 100 feet/min)  Step Length: Right shortened;Left shortened  Gait Abnormalities: Antalgic;Decreased step clearance        Neuro Re-Education:                Activity Tolerance:   Fair     After treatment:   Patient left in no apparent distress sitting up in chair      COMMUNICATION/EDUCATION:   The patient's plan of care was discussed with: physical therapist           Tyrell Anderson, PTA  Minutes: 23

## 2025-06-04 NOTE — PROGRESS NOTES
Infectious Disease Progress     Impression:   79 y.o. female with past medical Hx of COPD, T2DM, HLD, HTN, DVT who presented to the ED with right ankle wound with concerns for infection, admitted for diabetic foot wound and is being seen for osteomyelitis of right distal fibula.      Right distal tibia osteomyelitis  Right malleolus ulcer  S/p debridement of nonviable tissue and bone ankle (6/2)  PAD  - blood cx (5/28) no growth so far    Wound cx (5/28) MSSA    DM II; A1c 11.2, uncontrolled  Hx COPD  Blood cx 5/28 NGTD  Chronic steroid use      Plan:     Cultures growing MSSA.  Discussed with microbiology lab.  There may be a second strain of MSSA growing but no concern for MRSA or gram-negative rods.  Case discussed with podiatry.  Pathology negative for osteomyelitis however with the MRI findings concerning for osteomyelitis and chronic wound at that site will plan for 6-week course of IV antibiotics.  Additional risk factors are poorly controlled diabetes mellitus and chronic steroid use.  See IV antibiotic orders below.  PICC line ordered.  Discussed at length with patient who is in agreement.  Anticipate transitioning to oral antibiotic for an additional several weeks once IV antibiotics are complete.  Patient to follow-up with me in the office 7/17/2025 at 2:20 PM      Post Discharge PICC and Antibiotic Orders    1.  Diagnosis: DFI/osteomyelitis tibia.  MSSA  2.  Antibiotic: Cefazolin 2 g IV every 8 hours through 7/16/25  3.  Routine PICC/ Lilli/ Portacath Care including PRN catheter flow management  4.  Weekly labs:   [x] CBC/diff/platelets   [x] BUN/Creatinine   [] CPK   [x] AST/TotalBilirubin/AlkalinePhosphatase   [x] CRP   []Trough Vancomycin level goal 15-20  5.  Fax lab to [phone]  Call Critical Lab Values to [phone]  6.  May send to IR for line evaluation or replacement [phone]  7.  Home Health to pull PIC line at end of therapy or send to IR for Lilli removal  8.   right ankle demonstrate no fracture or disruption of the ankle mortise. There is osteoarthritis. There is no bony erosion/osteomyelitis.. There is no foreign body. No significant change.     No acute abnormality. Electronically signed by CHRISTO MEADOWS      Thank you for the opportunity to participate in the care of this patient.   Please contact with questions or concerns.   Discussed with podiatry, family practice team, and microbiology lab       Christopher Harry,

## 2025-06-04 NOTE — DISCHARGE SUMMARY
Discharge / Transfer / Off-Service Note     Name: Lesley Lauren MRN: [id number]  Sex: Female   YOB: 1945  Age: 79 y.o.  PCP: Juan David Zamorano MD     Date of admission: 5/28/2025  Date of discharge: 6/5/2025    Attending physician at admission: Dr. Paula Tam.  Attending physician at discharge/transfer: Dr. Jose Snyder.  Resident physician at discharge/transfer: Patrick Duarte MD     Consultants during hospitalization  IP CONSULT TO PHARMACY  IP CONSULT TO PODIATRY  IP CONSULT TO DIABETES MANAGEMENT  IP CONSULT TO INFECTIOUS DISEASES  IP CONSULT TO CASE MANAGEMENT  IP CONSULT TO CASE MANAGEMENT  IP CONSULT TO VASCULAR ACCESS TEAM     Admission diagnoses   Diabetic foot infection (HCC) [E11.628, L08.9]  Ankle wound, right, initial encounter [S91.001A]    Recommended follow-up after discharge    1. PCP-Juan David Zamorano MD  2. Podiatry - Denys Monroe     Things to follow up on with PCP:   - resolution of symptoms  - diabetic management    Medication Changes:     Medication List        START taking these medications      QUEtiapine 25 MG tablet  Commonly known as: SEROQUEL  Take 1 tablet by mouth nightly            CHANGE how you take these medications      Lantus SoloStar 100 UNIT/ML injection pen  Generic drug: insulin glargine  Inject 12 Units into the skin nightly  What changed: how much to take            CONTINUE taking these medications      acetaminophen 650 MG extended release tablet  Commonly known as: TYLENOL     albuterol sulfate  (90 Base) MCG/ACT inhaler  Commonly known as: PROVENTIL;VENTOLIN;PROAIR  Inhale 2 puffs into the lungs every 4 hours as needed for Wheezing     apixaban 5 MG Tabs tablet  Commonly known as: Eliquis  Take 1 tablet by mouth 2 times daily     carvedilol 6.25 MG tablet  Commonly known as: COREG  TAKE ONE TABLET BY MOUTH TWICE DAILY     collagenase 250 UNIT/GM ointment  Commonly known as: Santyl  Apply 1 g topically daily Apply to right  [facility] [address] [phone]      Pavel Pete MD Vascular Surgery Follow up in 1 month(s) for hospital follow-up [address]  [facility] [address] [phone]               Patrick Duarte MD  Family Medicine Resident       For Billing    Chief Complaint   Patient presents with    Wound Infection       Principal Problem:    Diabetic foot infection (HCC)  Active Problems:    Ankle wound, right, initial encounter    Uncontrolled type 2 diabetes mellitus with hyperglycemia (HCC)    Gastroesophageal reflux disease without esophagitis    Class 1 obesity due to excess calories with serious comorbidity and body mass index (BMI) of 31.0 to 31.9 in adult    Type 2 diabetes mellitus with hyperglycemia, without long-term current use of insulin (HCC)    Osteomyelitis of right fibula (HCC)    History of COPD    Type 2 diabetes mellitus with hyperglycemia (HCC)    Methicillin susceptible Staphylococcus aureus infection    Immunosuppression due to chronic steroid use  Resolved Problems:    * No resolved hospital problems. *

## 2025-06-04 NOTE — PROGRESS NOTES
[address] [place] [address]   Office [phone]  Fax [phone]          Subjective / Objective     Subjective  Overnight Events: NAEO. No acute concerns this morning.     Respiratory: RA  BP (!) 114/56   Pulse 62   Temp 97.7 °F (36.5 °C) (Oral)   Resp 17   Ht 1.6 m (5' 3\")   Wt 80.3 kg (177 lb)   SpO2 97%   BMI 31.35 kg/m²      Physical Examination:   General appearance - resting comfortably  Chest - clear breath sounds bilateral.   Heart - normal rate, regular rhythm  Abdomen - no visible distension   Neurological - no focal deficits  Skin - Dry. No notable rashes  Extremities - no pedal edema. Dressing over right ankle and foot.     I/O:  06/03 0701 - 06/04 0700  In: 180 [P.O.:180]  Out: -   Inpatient Medications  Current Facility-Administered Medications   Medication Dose Route Frequency    apixaban (ELIQUIS) tablet 5 mg  5 mg Oral BID    insulin lispro (HUMALOG,ADMELOG) injection vial 0-4 Units  0-4 Units SubCUTAneous 4x Daily AC & HS    QUEtiapine (SEROQUEL) tablet 25 mg  25 mg Oral Nightly    lidocaine PF 1 % injection 1 mL  1 mL IntraDERmal Once PRN    fentaNYL (SUBLIMAZE) injection 100 mcg  100 mcg IntraVENous Once PRN    midazolam PF (VERSED) injection 2 mg  2 mg IntraVENous Once PRN    insulin glargine (LANTUS) injection vial 16 Units  16 Units SubCUTAneous Daily    oxyCODONE (ROXICODONE) immediate release tablet 2.5 mg  2.5 mg Oral Q6H PRN    insulin lispro (HUMALOG,ADMELOG) injection vial 4 Units  0.05 Units/kg SubCUTAneous TID WC    diclofenac sodium (VOLTAREN) 1 % gel 4 g  4 g Topical BID    budesonide (PULMICORT) nebulizer suspension 500 mcg  0.5 mg Nebulization BID RT    metroNIDAZOLE (FLAGYL) 500 mg in 0.9% NaCl 100 mL IVPB premix  500 mg IntraVENous Q8H    acetaminophen (TYLENOL) tablet 1,000 mg  1,000 mg Oral Q6H PRN    Or    acetaminophen (TYLENOL) suppository 650 mg  650 mg Rectal Q6H PRN    lidocaine 4 % external patch 1 patch  1 patch TransDERmal Daily

## 2025-06-04 NOTE — CONSULTS
[facility]ALTH  DIABETES MANAGEMENT CONSULT    Consulted by Provider for advanced nursing evaluation and care for inpatient blood glucose management.    Evaluation and Action Plan   Lesley Lauren is a 79 y.o. female with PMHx of T2DM, HTN, previous CVA, breast CA, asthma, COPD, PE, who presented to ED with right ankle wound that has worsened over last 2 months. Labs on admission noted for , A1c 11.2%, creat 1.32, CRP 4.42. MRI of right ankle showing acute osteomyelitis. She is admitted for abx treatment and work up in progress. DM consulted to assist with glycemic management and home assessment.     Patient with T2DM for 20+ years. Has only ever been on oral medications. Currently on Glipizide 10 mg bid (this was just recently increased from 5 mg bid) and was recently prescribed Lantus 10 units nightly (but came to hospital before starting this). Diet tends to be high in sugar/carbs. Her current A1c is 11.2%, indicating poor diabetes control. We discussed need for insulin given high A1c and likely loss of beta cell function (and not enough insulin being made). Also discussed the obvious dietary changes that need to be made. Emphasized how BG contributes to infection/complications and how BG control will help with healing. Daughter at bedside to assist with history and assists patient at home. All questions answered.     6/4 - Patient doing well. 3/4 BG yesterday were within target goals.  this am -will continue current doses. Spoke to daughter, Radha, as well as patient about healthy plate and carb control, staying away from high sugar juices. Suggest diet cranberry and if wanting something sweet, suggested sugar free pudding or jello. Advised to continue Glipizide 10 mg bid (with food only) and start the Lantus that was already prescribed. Will adjust dose just slightly for home. Informed of A1c goal closer to 7% and BG range of 100-150.     Blood glucose

## 2025-06-04 NOTE — CARE COORDINATION
06/04/25  4:24 PM  [facility] has accepted. Insurance authorization has been approved to start tomorrow 6/5. CM has sent information to SNF in [facility]. Final ID orders have been received, patient will need a picc line prior to discharge. CM has updated pt's daughter Lesley [phone].                 Care Management Progress Note        Reason for Admission:   Diabetic foot infection (HCC) [E11.628, L08.9]  Ankle wound, right, initial encounter [S91.001A]  Procedure(s) (LRB):  DEBRIDEMENT OF NONVIABLE TISSUE AND BONE RIGHT ANKLE (MAC W/LOCAL) (Right)  2 Days Post-Op      Patient Admission Status: Inpatient  RUR: 16%  Hospitalization in the last 30 days (Readmission):  No          Transition of care plan:  Patient was discussed in IDR and is medically ready for discharge.     Dispo: SNF. [facility] preference: they are still reviewing. Updated clinicals and final ID orders have been sent to SNF in [facility]. [facility]. [facility] is reviewing. [facility] does not have beds available. Patient will need insurance authorization.     Patient will need a picc line prior to dc.     MARTIR has provided patient's daughter with a list of private duty care givers if needed after discharge from SNF.     Patient was open to [facility] prior to admission. CM will update  as patient discharge plan to SNF is finalized.     Discharge plan communicated with patient and/or discharge caregiver: Yes  . MARTIR updated pt's daughter Lesley [phone] today 6/4. Daughter Lesley expressed interest in patient receiving an angio with vascular tomorrow (per previous notes) since patient will still be in house waiting on SNF acceptance and authorization. MARTIR has notified attending, nursing, and vascular in [facility].       Date 1st IMM letter given: 5/28/25.    Outpatient follow-up.    Transport at discharge: pt's daughter Lesley.       ___________________________________________   Christina Willett  RN Case Manager  6/4/2025   1:09 PM

## 2025-06-04 NOTE — PROGRESS NOTES
Podiatry Progress Note    Date:2025       Room:[facility]  Patient Name:Lesley Lauren     YOB: 1945     Age:79 y.o.    Subjective:     Patient is a 79 y.o. female who is being seen at bedside for chronic healing right leg ulcer.      Objective:     Vitals Last 24 Hours:  TEMPERATURE:  Temp  Av.9 °F (36.6 °C)  Min: 97.5 °F (36.4 °C)  Max: 98.4 °F (36.9 °C)  RESPIRATIONS RANGE: Resp  Av.3  Min: 16  Max: 17  PULSE OXIMETRY RANGE: SpO2  Av.1 %  Min: 93 %  Max: 97 %  PULSE RANGE: Pulse  Av.4  Min: 62  Max: 86  BLOOD PRESSURE RANGE: Systolic (24hrs), Av , Min:102 , Max:149        Diastolic (24hrs), Av, Min:39, Max:95        I/O (24Hr):    Intake/Output Summary (Last 24 hours) at 2025 1150  Last data filed at 2025 0734  Gross per 24 hour   Intake 5430.93 ml   Output --   Net 5430.93 ml       Physical Exam:    GEN: Pt is AAOx4 and in NAD.   DERM: Wound lateral right ankle. Sonia wound erythema noted No dry skin noted to B/L LE.   VASC: Pedal pulses (DP/PT) diminished to B/L LE. CFT<3sec to all digits of B/L LE. No pedal hair growth noted to the level of the digits for B/L LE. Skin temp is warm to warm from proximal to distal for B/L LE. Neg homans/venus signs to B/L LE. No varicosities or telangectasias noted to B/L LE.  NEURO: Protective and epicritic sensations grossly absent to B/L LE  MSK: (-) POP, No gross deformities. Good muscle tone and bulk noted to B/L LE.  PSYCH: Cooperative with normal mood and affect       Labs/Imaging/Diagnostics:     Labs:  CBC:  Recent Labs     25  0256 25  0553   WBC 8.4 8.3   RBC 4.15 4.10   HGB 9.6* 9.5*   HCT 32.1* 30.8*   MCV 77.3* 75.1*   RDW 16.9* 17.1*    269     CHEMISTRIES:  Recent Labs     25  0256 25  0553    143   K 4.3 4.3   * 115*   CO2 23 25   BUN 19 17   CREATININE 0.85 0.82   GLUCOSE 143* 120*   CALCIUM 8.6 8.5     PT/INR:  No results for input(s): \"PROTIME\", \"INR\",  \"INREXT\" in the last 72 hours.  APTT:  No results for input(s): \"APTT\" in the last 72 hours.  LIVER PROFILE:  Recent Labs     06/03/25  0256 06/04/25  0553   AST 17 17   ALT 13 11*   BILITOT 0.4 0.4   ALKPHOS 148* 146*     Lab Results   Component Value Date/Time    ALT 11 06/04/2025 05:53 AM    AST 17 06/04/2025 05:53 AM    ALKPHOS 146 06/04/2025 05:53 AM    ALKPHOS 88 04/26/2023 11:50 AM    BILITOT 0.4 06/04/2025 05:53 AM       Imaging Last 24 Hours:    Assessment:   Nonhealing ulcer right ankle  Diabetes mellitus with neuropathy  Peripheral arterial disease       Plan:   - Patient was seen and evaluated at bedside.  - Current labs personally reviewed and findings dicussed with patient  - Recommend continue local wound care.  Wound care orders placed  - Discussed case with infect disease doctor.  I do recommend long-term IV antibiotics due to chronicity of wound and proximity of fibula.  - Patient to undergo vascular procedure to improve blood flow and wound healing potential.  - Patient is okay to be discharged from prior standpoint to follow-up in office in 2 weeks.      Fer Mcfarlane DPM, CWSP, AACFAS

## 2025-06-04 NOTE — CARE COORDINATION
6/4/2025 2:23 PM   SNF auth for [facility] initiated by CM and approved via [employer] portal.   Auth reference # [id number]   Effective from 6/5/2025 through 6/9/2025.  Auth information communicated with covering .    The following information was submitted via [employer] portal for initial authorization:  Face Sheet/Demographics   H&P  Last 24 hours of MD notes  PT/OT Evaluations and/or notes within the last 48 hours  IV abx orders  MD orders  (Include: Attending or ordering MD’s name and phone #; skilled nursing needs;   Wound care/etc)  Projected Date of Transfer to SNF  Requested SNF  CM Point of Contact and Phone #  NPI # for SNF     bleeding sp tonsillectomy

## 2025-06-04 NOTE — PLAN OF CARE
Problem: Chronic Conditions and Co-morbidities  Goal: Patient's chronic conditions and co-morbidity symptoms are monitored and maintained or improved  6/3/2025 2318 by Deann Wang RN  Outcome: Progressing  Flowsheets (Taken 6/3/2025 2017)  Care Plan - Patient's Chronic Conditions and Co-Morbidity Symptoms are Monitored and Maintained or Improved:   Monitor and assess patient's chronic conditions and comorbid symptoms for stability, deterioration, or improvement   Collaborate with multidisciplinary team to address chronic and comorbid conditions and prevent exacerbation or deterioration   Update acute care plan with appropriate goals if chronic or comorbid symptoms are exacerbated and prevent overall improvement and discharge  6/3/2025 1000 by Debra Ross RN  Outcome: Progressing     Problem: Discharge Planning  Goal: Discharge to home or other facility with appropriate resources  6/3/2025 2318 by Deann Wang RN  Outcome: Progressing  Flowsheets (Taken 6/3/2025 2017)  Discharge to home or other facility with appropriate resources:   Identify barriers to discharge with patient and caregiver   Arrange for needed discharge resources and transportation as appropriate  6/3/2025 1000 by Debra Ross RN  Outcome: Progressing     Problem: Safety - Adult  Goal: Free from fall injury  6/3/2025 2318 by Deann Wang RN  Outcome: Progressing  6/3/2025 1000 by Debra Ross RN  Outcome: Progressing     Problem: Skin/Tissue Integrity  Goal: Skin integrity remains intact  Description: 1.  Monitor for areas of redness and/or skin breakdown2.  Assess vascular access sites hourly3.  Every 4-6 hours minimum:  Change oxygen saturation probe site4.  Every 4-6 hours:  If on nasal continuous positive airway pressure, respiratory therapy assess nares and determine need for appliance change or resting period  6/3/2025 2318 by Deann Wang, RN  Outcome: Progressing  Flowsheets (Taken 6/3/2025 2017)  Skin

## 2025-06-04 NOTE — PLAN OF CARE
Problem: Chronic Conditions and Co-morbidities  Goal: Patient's chronic conditions and co-morbidity symptoms are monitored and maintained or improved  6/4/2025 1101 by Jacki Lim LPN  Outcome: Progressing  6/3/2025 2318 by Deann Wang, RN  Outcome: Progressing  Flowsheets (Taken 6/3/2025 2017)  Care Plan - Patient's Chronic Conditions and Co-Morbidity Symptoms are Monitored and Maintained or Improved:   Monitor and assess patient's chronic conditions and comorbid symptoms for stability, deterioration, or improvement   Collaborate with multidisciplinary team to address chronic and comorbid conditions and prevent exacerbation or deterioration   Update acute care plan with appropriate goals if chronic or comorbid symptoms are exacerbated and prevent overall improvement and discharge     Problem: Discharge Planning  Goal: Discharge to home or other facility with appropriate resources  6/4/2025 1101 by Jacki Lim LPN  Outcome: Progressing  6/3/2025 2318 by Deann Wang RN  Outcome: Progressing  Flowsheets (Taken 6/3/2025 2017)  Discharge to home or other facility with appropriate resources:   Identify barriers to discharge with patient and caregiver   Arrange for needed discharge resources and transportation as appropriate     Problem: Safety - Adult  Goal: Free from fall injury  6/4/2025 1101 by Jacki Lim LPN  Outcome: Progressing  6/3/2025 2318 by Deann Wang RN  Outcome: Progressing     Problem: Skin/Tissue Integrity  Goal: Skin integrity remains intact  Description: 1.  Monitor for areas of redness and/or skin breakdown2.  Assess vascular access sites hourly3.  Every 4-6 hours minimum:  Change oxygen saturation probe site4.  Every 4-6 hours:  If on nasal continuous positive airway pressure, respiratory therapy assess nares and determine need for appliance change or resting period  6/4/2025 1101 by Jacki Lim LPN  Outcome: Progressing  6/3/2025 2318 by Deann Wang

## 2025-06-05 VITALS
SYSTOLIC BLOOD PRESSURE: 135 MMHG | DIASTOLIC BLOOD PRESSURE: 96 MMHG | TEMPERATURE: 97.7 F | HEIGHT: 63 IN | OXYGEN SATURATION: 95 % | BODY MASS INDEX: 31.36 KG/M2 | WEIGHT: 177 LBS | HEART RATE: 63 BPM | RESPIRATION RATE: 20 BRPM

## 2025-06-05 LAB
ALBUMIN SERPL-MCNC: 2.7 G/DL (ref 3.5–5)
ALBUMIN/GLOB SERPL: 0.7 (ref 1.1–2.2)
ALP SERPL-CCNC: 146 U/L (ref 45–117)
ALT SERPL-CCNC: 11 U/L (ref 12–78)
ANION GAP SERPL CALC-SCNC: 4 MMOL/L (ref 2–12)
AST SERPL-CCNC: 15 U/L (ref 15–37)
BACTERIA SPEC CULT: ABNORMAL
BACTERIA SPEC CULT: ABNORMAL
BASOPHILS # BLD: 0.08 K/UL (ref 0–0.1)
BASOPHILS NFR BLD: 1.1 % (ref 0–1)
BILIRUB SERPL-MCNC: 0.3 MG/DL (ref 0.2–1)
BUN SERPL-MCNC: 14 MG/DL (ref 6–20)
BUN/CREAT SERPL: 14 (ref 12–20)
CALCIUM SERPL-MCNC: 8.9 MG/DL (ref 8.5–10.1)
CHLORIDE SERPL-SCNC: 113 MMOL/L (ref 97–108)
CO2 SERPL-SCNC: 26 MMOL/L (ref 21–32)
CREAT SERPL-MCNC: 0.98 MG/DL (ref 0.55–1.02)
DIFFERENTIAL METHOD BLD: ABNORMAL
ECHO BSA: 1.89 M2
EOSINOPHIL # BLD: 0.36 K/UL (ref 0–0.4)
EOSINOPHIL NFR BLD: 4.8 % (ref 0–7)
ERYTHROCYTE [DISTWIDTH] IN BLOOD BY AUTOMATED COUNT: 17.1 % (ref 11.5–14.5)
GLOBULIN SER CALC-MCNC: 3.7 G/DL (ref 2–4)
GLUCOSE BLD STRIP.AUTO-MCNC: 134 MG/DL (ref 65–117)
GLUCOSE BLD STRIP.AUTO-MCNC: 164 MG/DL (ref 65–117)
GLUCOSE BLD STRIP.AUTO-MCNC: 197 MG/DL (ref 65–117)
GLUCOSE SERPL-MCNC: 136 MG/DL (ref 65–100)
GRAM STN SPEC: ABNORMAL
GRAM STN SPEC: ABNORMAL
HCT VFR BLD AUTO: 30.7 % (ref 35–47)
HGB BLD-MCNC: 9.4 G/DL (ref 11.5–16)
IMM GRANULOCYTES # BLD AUTO: 0.03 K/UL (ref 0–0.04)
IMM GRANULOCYTES NFR BLD AUTO: 0.4 % (ref 0–0.5)
LYMPHOCYTES # BLD: 1.92 K/UL (ref 0.8–3.5)
LYMPHOCYTES NFR BLD: 25.4 % (ref 12–49)
MAGNESIUM SERPL-MCNC: 2 MG/DL (ref 1.6–2.4)
MCH RBC QN AUTO: 23 PG (ref 26–34)
MCHC RBC AUTO-ENTMCNC: 30.6 G/DL (ref 30–36.5)
MCV RBC AUTO: 75.1 FL (ref 80–99)
MONOCYTES # BLD: 0.99 K/UL (ref 0–1)
MONOCYTES NFR BLD: 13.1 % (ref 5–13)
NEUTS SEG # BLD: 4.18 K/UL (ref 1.8–8)
NEUTS SEG NFR BLD: 55.2 % (ref 32–75)
NRBC # BLD: 0 K/UL (ref 0–0.01)
NRBC BLD-RTO: 0 PER 100 WBC
PLATELET # BLD AUTO: 271 K/UL (ref 150–400)
PMV BLD AUTO: 10.4 FL (ref 8.9–12.9)
POTASSIUM SERPL-SCNC: 3.4 MMOL/L (ref 3.5–5.1)
PROT SERPL-MCNC: 6.4 G/DL (ref 6.4–8.2)
RBC # BLD AUTO: 4.09 M/UL (ref 3.8–5.2)
SERVICE CMNT-IMP: ABNORMAL
SODIUM SERPL-SCNC: 143 MMOL/L (ref 136–145)
WBC # BLD AUTO: 7.6 K/UL (ref 3.6–11)

## 2025-06-05 PROCEDURE — 75630 X-RAY AORTA LEG ARTERIES: CPT

## 2025-06-05 PROCEDURE — 6370000000 HC RX 637 (ALT 250 FOR IP)

## 2025-06-05 PROCEDURE — C1714 CATH, TRANS ATHERECTOMY, DIR: HCPCS

## 2025-06-05 PROCEDURE — 6360000002 HC RX W HCPCS: Performed by: INTERNAL MEDICINE

## 2025-06-05 PROCEDURE — 36569 INSJ PICC 5 YR+ W/O IMAGING: CPT

## 2025-06-05 PROCEDURE — 82962 GLUCOSE BLOOD TEST: CPT

## 2025-06-05 PROCEDURE — 99238 HOSP IP/OBS DSCHRG MGMT 30/<: CPT | Performed by: FAMILY MEDICINE

## 2025-06-05 PROCEDURE — B41C1ZZ FLUOROSCOPY OF PELVIC ARTERIES USING LOW OSMOLAR CONTRAST: ICD-10-PCS

## 2025-06-05 PROCEDURE — 94640 AIRWAY INHALATION TREATMENT: CPT

## 2025-06-05 PROCEDURE — 99153 MOD SED SAME PHYS/QHP EA: CPT

## 2025-06-05 PROCEDURE — 83735 ASSAY OF MAGNESIUM: CPT

## 2025-06-05 PROCEDURE — 99152 MOD SED SAME PHYS/QHP 5/>YRS: CPT

## 2025-06-05 PROCEDURE — 37226 HC FEM POP TERR PLASTY AND STENT: CPT

## 2025-06-05 PROCEDURE — 80053 COMPREHEN METABOLIC PANEL: CPT

## 2025-06-05 PROCEDURE — 94761 N-INVAS EAR/PLS OXIMETRY MLT: CPT

## 2025-06-05 PROCEDURE — C1725 CATH, TRANSLUMIN NON-LASER: HCPCS

## 2025-06-05 PROCEDURE — 76937 US GUIDE VASCULAR ACCESS: CPT

## 2025-06-05 PROCEDURE — 02HV33Z INSERTION OF INFUSION DEVICE INTO SUPERIOR VENA CAVA, PERCUTANEOUS APPROACH: ICD-10-PCS | Performed by: FAMILY MEDICINE

## 2025-06-05 PROCEDURE — C1894 INTRO/SHEATH, NON-LASER: HCPCS

## 2025-06-05 PROCEDURE — 36415 COLL VENOUS BLD VENIPUNCTURE: CPT

## 2025-06-05 PROCEDURE — C1887 CATHETER, GUIDING: HCPCS

## 2025-06-05 PROCEDURE — 6360000002 HC RX W HCPCS: Performed by: ANESTHESIOLOGY

## 2025-06-05 PROCEDURE — 6360000004 HC RX CONTRAST MEDICATION

## 2025-06-05 PROCEDURE — B41D1ZZ FLUOROSCOPY OF AORTA AND BILATERAL LOWER EXTREMITY ARTERIES USING LOW OSMOLAR CONTRAST: ICD-10-PCS

## 2025-06-05 PROCEDURE — 2709999900 HC NON-CHARGEABLE SUPPLY

## 2025-06-05 PROCEDURE — 6360000002 HC RX W HCPCS: Performed by: FAMILY MEDICINE

## 2025-06-05 PROCEDURE — 6360000002 HC RX W HCPCS

## 2025-06-05 PROCEDURE — 047K3DZ DILATION OF RIGHT FEMORAL ARTERY WITH INTRALUMINAL DEVICE, PERCUTANEOUS APPROACH: ICD-10-PCS

## 2025-06-05 PROCEDURE — 2500000003 HC RX 250 WO HCPCS: Performed by: INTERNAL MEDICINE

## 2025-06-05 PROCEDURE — C1876 STENT, NON-COA/NON-COV W/DEL: HCPCS

## 2025-06-05 PROCEDURE — 37225 HC FEM POP TERRITORY ATHERECTOMY: CPT

## 2025-06-05 PROCEDURE — 2500000003 HC RX 250 WO HCPCS

## 2025-06-05 PROCEDURE — C1769 GUIDE WIRE: HCPCS

## 2025-06-05 PROCEDURE — 85025 COMPLETE CBC W/AUTO DIFF WBC: CPT

## 2025-06-05 DEVICE — IMPLANTABLE DEVICE: Type: IMPLANTABLE DEVICE | Site: LEG | Status: FUNCTIONAL

## 2025-06-05 RX ORDER — DIPHENHYDRAMINE HYDROCHLORIDE 50 MG/ML
50 INJECTION, SOLUTION INTRAMUSCULAR; INTRAVENOUS ONCE
Status: COMPLETED | OUTPATIENT
Start: 2025-06-05 | End: 2025-06-05

## 2025-06-05 RX ORDER — HEPARIN SODIUM 1000 [USP'U]/ML
INJECTION, SOLUTION INTRAVENOUS; SUBCUTANEOUS PRN
Status: DISCONTINUED | OUTPATIENT
Start: 2025-06-05 | End: 2025-06-05 | Stop reason: HOSPADM

## 2025-06-05 RX ORDER — FENTANYL CITRATE 50 UG/ML
INJECTION, SOLUTION INTRAMUSCULAR; INTRAVENOUS PRN
Status: DISCONTINUED | OUTPATIENT
Start: 2025-06-05 | End: 2025-06-05 | Stop reason: HOSPADM

## 2025-06-05 RX ORDER — INSULIN GLARGINE 100 [IU]/ML
8 INJECTION, SOLUTION SUBCUTANEOUS DAILY
Status: DISCONTINUED | OUTPATIENT
Start: 2025-06-05 | End: 2025-06-05 | Stop reason: HOSPADM

## 2025-06-05 RX ORDER — IOPAMIDOL 612 MG/ML
INJECTION, SOLUTION INTRAVASCULAR PRN
Status: DISCONTINUED | OUTPATIENT
Start: 2025-06-05 | End: 2025-06-05 | Stop reason: HOSPADM

## 2025-06-05 RX ORDER — MIDAZOLAM HYDROCHLORIDE 1 MG/ML
INJECTION, SOLUTION INTRAMUSCULAR; INTRAVENOUS PRN
Status: DISCONTINUED | OUTPATIENT
Start: 2025-06-05 | End: 2025-06-05 | Stop reason: HOSPADM

## 2025-06-05 RX ADMIN — BUDESONIDE 500 MCG: 0.5 INHALANT RESPIRATORY (INHALATION) at 08:16

## 2025-06-05 RX ADMIN — CYCLOBENZAPRINE 10 MG: 10 TABLET, FILM COATED ORAL at 08:10

## 2025-06-05 RX ADMIN — DIPHENHYDRAMINE HYDROCHLORIDE 50 MG: 50 INJECTION INTRAMUSCULAR; INTRAVENOUS at 09:31

## 2025-06-05 RX ADMIN — DICLOFENAC SODIUM 4 G: 10 GEL TOPICAL at 08:11

## 2025-06-05 RX ADMIN — WATER 125 MG: 1 INJECTION INTRAMUSCULAR; INTRAVENOUS; SUBCUTANEOUS at 09:31

## 2025-06-05 RX ADMIN — TAMSULOSIN HYDROCHLORIDE 0.4 MG: 0.4 CAPSULE ORAL at 08:10

## 2025-06-05 RX ADMIN — WATER 2000 MG: 1 INJECTION INTRAMUSCULAR; INTRAVENOUS; SUBCUTANEOUS at 12:06

## 2025-06-05 RX ADMIN — DILTIAZEM HYDROCHLORIDE 90 MG: 90 CAPSULE, EXTENDED RELEASE ORAL at 08:10

## 2025-06-05 RX ADMIN — SODIUM CHLORIDE, PRESERVATIVE FREE 10 ML: 5 INJECTION INTRAVENOUS at 08:12

## 2025-06-05 RX ADMIN — PANTOPRAZOLE SODIUM 40 MG: 40 TABLET, DELAYED RELEASE ORAL at 08:10

## 2025-06-05 RX ADMIN — PREGABALIN 50 MG: 50 CAPSULE ORAL at 08:10

## 2025-06-05 RX ADMIN — CARVEDILOL 6.25 MG: 6.25 TABLET, FILM COATED ORAL at 08:10

## 2025-06-05 RX ADMIN — INSULIN LISPRO 1 UNITS: 100 INJECTION, SOLUTION INTRAVENOUS; SUBCUTANEOUS at 12:06

## 2025-06-05 RX ADMIN — ARFORMOTEROL TARTRATE 15 MCG: 15 SOLUTION RESPIRATORY (INHALATION) at 08:16

## 2025-06-05 RX ADMIN — POTASSIUM BICARBONATE 20 MEQ: 782 TABLET, EFFERVESCENT ORAL at 08:11

## 2025-06-05 RX ADMIN — WATER 2000 MG: 1 INJECTION INTRAMUSCULAR; INTRAVENOUS; SUBCUTANEOUS at 04:16

## 2025-06-05 RX ADMIN — INSULIN GLARGINE 8 UNITS: 100 INJECTION, SOLUTION SUBCUTANEOUS at 08:11

## 2025-06-05 RX ADMIN — CETIRIZINE HYDROCHLORIDE 10 MG: 10 TABLET, FILM COATED ORAL at 08:10

## 2025-06-05 RX ADMIN — PREDNISONE 5 MG: 5 TABLET ORAL at 08:10

## 2025-06-05 ASSESSMENT — PAIN SCALES - GENERAL
PAINLEVEL_OUTOF10: 0
PAINLEVEL_OUTOF10: 0

## 2025-06-05 NOTE — PROGRESS NOTES
End of Shift Note    Bedside shift change report given to SAMINA Briones (oncoming nurse) by Silvina Del Rosario RN (offgoing nurse).  Report included the following information SBAR, Kardex, Intake/Output, MAR, Accordion, and Recent Results    Shift worked:  0640-1530       Shift summary and any significant changes:     None     Concerns for physician to address:  Patient to have Angiogram today.     Zone phone for oncoming shift:   N/A       Activity:  Level of Assistance: Moderate assist, patient does 50-74%  Number times ambulated in hallways past shift: 0  Number of times OOB to chair past shift: 0    Cardiac:   Cardiac Monitoring: No      Cardiac Rhythm: Sinus rhythm    Access:  Current line(s): PIV    Genitourinary:   Urinary Status: Voiding    Respiratory:   O2 Device: None (Room air)  Chronic home O2 use?: NO  Incentive spirometer at bedside: YES    GI:  Last BM (including prior to admit): 06/04/25  Current diet:  Diet NPO  Passing flatus: YES    Pain Management:   Patient states pain is manageable on current regimen: YES    Skin:  Dusty Scale Score: 21  Interventions: Wound Offloading (Prevention Methods): Repositioning    Patient Safety:  Fall Risk: Nursing Judgement-Fall Risk High(Add Comments): Yes  Fall Risk Interventions  Nursing Judgement-Fall Risk High(Add Comments): Yes  Toilet Every 2 Hours-In Advance of Need: Yes  Hourly Visual Checks: In bed, Awake  Fall Visual Posted: Armband, Socks, Fall sign posted  Room Door Open: Yes  Alarm On: Bed  Patient Moved Closer to Nursing Station: No    Active Consults:   IP CONSULT TO PHARMACY  IP CONSULT TO PODIATRY  IP CONSULT TO DIABETES MANAGEMENT  IP CONSULT TO INFECTIOUS DISEASES  IP CONSULT TO CASE MANAGEMENT  IP CONSULT TO CASE MANAGEMENT  IP CONSULT TO VASCULAR ACCESS TEAM    Length of Stay:  Expected LOS: 7  Actual LOS: 8    Silvina Del Rosario RN

## 2025-06-05 NOTE — OP NOTE
[facility]          [address] [place]  [address]                            OPERATIVE REPORT      PATIENT NAME: CALE ZHANG          : 1945  MED REC NO: [id number]                       ROOM: 419  ACCOUNT NO: [id number]                       ADMIT DATE: 2025  PROVIDER: Pavel Pete MD    DATE OF SERVICE:  2025    PREOPERATIVE DIAGNOSES:  Peripheral vascular disease with ulcer.    POSTOPERATIVE DIAGNOSES:  Peripheral vascular disease with ulcer.    PROCEDURES PERFORMED:       1. Ultrasound-guided cannulation of left common femoral artery.     2. Aortoiliac angiogram.     3. Selective catheterization of right SFA with right lower extremity runoff.     4. Revascularization of right SFA with atherectomy, angioplasty and stent placement.    SURGEON:  Pavel Pete MD    ASSISTANT:  None.    ANESTHESIA:  IV sedation, local anesthesia.    ESTIMATED BLOOD LOSS:  Minimal.    SPECIMENS REMOVED:  None.    INTRAOPERATIVE FINDINGS:       1. Aortoiliac angiogram:  The aortoiliac angiogram shows patent aorta and iliac vessels bilaterally.  No stenosis or flow-limiting lesions are identified.     2. Right lower extremity runoff:  The right lower extremity runoff shows a patent common and profunda femoral artery.  The superficial femoral artery is occluded at its origin.  There is reconstitution of an above-knee popliteal artery.  There is some infrageniculate disease with primary runoff via the peroneal with robust collaterals seen.     COMPLICATIONS:  None.    IMPLANTS:  Stent.    INDICATIONS:  The patient is a 79-year-old female with an ulcer on the right leg and evidence of arterial insufficiency.  Decision was made to take her to the cath lab for angiographic evaluation and hopeful rectification.    DESCRIPTION OF PROCEDURE:  The patient was taken to the cath lab.  Once a suitable level of anesthesia was introduced, she was prepped and draped  in typical sterile fashion.  Ultrasound examination of the left common femoral artery confirmed it to be patent.  The artery was then accessed under direct ultrasound guidance and a permanent image stored.  Sheath was placed.  Catheter was introduced into the abdominal aorta and aortoiliac angiogram was performed.  Catheter was used to select out the right common iliac, external iliac, and common femoral artery.  Through this, a right lower extremity runoff was performed.  The patient was systemically heparinized.  The sheath was upsized to a long sheath, which was positioned proximally.  The lesion in the SFA was then crossed with a series of catheters and wires.  Attempts to cross the infrageniculate lesion were unsuccessful because of the robust collaterals.  It was not felt to be necessary to push the issue.  The lesion in the SFA was debulked with an atherectomy device.  Followup angioplasty to 5 mm was performed.  Completion angiography showed significant dissection and continued flow-limiting lesions in the SFA.  A 7 mm self-expanding stent was subsequently placed with good technical results.  The sheath was removed.  Angio-Seal was used for hemostasis.  She tolerated the procedure well.  Sponge and instrument counts were correct x2.  She was awakened and transferred to the recovery area in good condition.        MD WES OKEEFE/CARLOS  D:  06/05/2025 13:39:34  T:  06/05/2025 15:55:42  JOB #:  [id number]

## 2025-06-05 NOTE — PROGRESS NOTES
[address] [place] [address]   Office [phone]  Fax [phone]          Subjective / Objective     Subjective  Overnight Events: NAEO. No acute concerns this morning.     Respiratory: RA  /78   Pulse 81   Temp 97.9 °F (36.6 °C) (Oral)   Resp 20   Ht 1.6 m (5' 3\")   Wt 80.3 kg (177 lb)   SpO2 96%   BMI 31.35 kg/m²      Physical Examination:   General appearance - resting comfortably  Chest - clear breath sounds bilateral.   Heart - normal rate, regular rhythm  Abdomen - no visible distension   Neurological - no focal deficits  Skin - Dry. No notable rashes  Extremities - no pedal edema. Dressing over right ankle and foot.     I/O:  06/04 0701 - 06/05 0700  In: 5260.9 [I.V.:1010.9]  Out: -   Inpatient Medications  Current Facility-Administered Medications   Medication Dose Route Frequency    ceFAZolin (ANCEF) 2,000 mg in sterile water 20 mL IV syringe  2,000 mg IntraVENous Q8H    insulin lispro (HUMALOG,ADMELOG) injection vial 0-4 Units  0-4 Units SubCUTAneous 4x Daily AC & HS    QUEtiapine (SEROQUEL) tablet 25 mg  25 mg Oral Nightly    lidocaine PF 1 % injection 1 mL  1 mL IntraDERmal Once PRN    fentaNYL (SUBLIMAZE) injection 100 mcg  100 mcg IntraVENous Once PRN    midazolam PF (VERSED) injection 2 mg  2 mg IntraVENous Once PRN    insulin glargine (LANTUS) injection vial 16 Units  16 Units SubCUTAneous Daily    oxyCODONE (ROXICODONE) immediate release tablet 2.5 mg  2.5 mg Oral Q6H PRN    insulin lispro (HUMALOG,ADMELOG) injection vial 4 Units  0.05 Units/kg SubCUTAneous TID WC    diclofenac sodium (VOLTAREN) 1 % gel 4 g  4 g Topical BID    budesonide (PULMICORT) nebulizer suspension 500 mcg  0.5 mg Nebulization BID RT    acetaminophen (TYLENOL) tablet 1,000 mg  1,000 mg Oral Q6H PRN    Or    acetaminophen (TYLENOL) suppository 650 mg  650 mg Rectal Q6H PRN    lidocaine 4 % external patch 1 patch  1 patch TransDERmal Daily    sodium chloride flush 0.9 % injection 5-40 mL   further follow up outpatient.      FEN/GI - Diabetic diet  Activity - Out of bed with assistance  DVT prophylaxis - Eliquis   GI prophylaxis - Protonix  Fall prophylaxis - Not indicated at this time.  Disposition -  Plan to d/c to SNF   Code Status - Full, discussed with patient / caregivers.  Next of Kin Name and Contact   Lesley Robin (Child)  [phone] (Mobile)     Patient discussed with Dr. Claudio Duarte MD  Family Medicine Resident       For Billing    Chief Complaint   Patient presents with    Wound Infection

## 2025-06-05 NOTE — PROGRESS NOTES
Gave report to Genaro Williamson rn at [place]. Patient to be transferred to [place] room 103. Transport to  patient around 1630. PICC line right arm to remain in place for long term antibiotics.

## 2025-06-05 NOTE — PROGRESS NOTES
Occupational and Physical Therapy Note:  Chart reviewed in preparation for therapy treatments.  Patient is currently off the floor and unavailable at this time.  Miryam Wagner OTR/L

## 2025-06-05 NOTE — PROGRESS NOTES
Spiritual Health History and Assessment/Progress Note  [facility]    Attempted Encounter,  ,  ,      Name: Lesley Lauren MRN: [id number]    Age: 79 y.o.     Sex: female   Language: English   Rastafari: Zoroastrian   Diabetic foot infection (HCC)     Date: 6/5/2025            Total Time Calculated: 26 min              Spiritual Assessment began unable to assess in [facility] B4 MULTI-SPECIALTY ORTHOPEDICS 2        Referral/Consult From: Rounding   Encounter Overview/Reason: Attempted Encounter  Service Provided For: Patient not available    Samira, Belief, Meaning:   Patient unable to assess at this time  Family/Friends No family/friends present      Importance and Influence:  Patient unable to assess at this time  Family/Friends No family/friends present    Community:  Patient Other: Patient alone in hospital room.   Family/Friends No family/friends present    Assessment and Plan of Care:   Patient Interventions include: Other: Visited patient. Patient unable to visit.   Family/Friends Interventions include: No family/friends present    Patient Plan of Care: Spiritual Care available upon further referral  Family/Friends Plan of Care: Spiritual Care available upon further referral    Chart review with  Intern Leroy. Visited patient's room. Patient having procedure and unable to converse. Attempted encounter. Unable to assess. Please contact spiritual health services for further assistance needed.    Electronically signed by MAGDALENA Nelson on 6/5/2025 at 3:03 PM

## 2025-06-05 NOTE — PROCEDURES
PROCEDURE NOTE  Date: 6/5/2025   Name: Lesley Lauren  YOB: 1945    Procedures PICC Line Insertion Procedure Note    Procedure: Insertion of #4 FR/18G PICC    Indications:  Long Term IV therapy    Procedure Details   Informed consent was obtained for the procedure.  Risks of hemorrhage, thrombus and adverse drug reaction were discussed. Vessel assessment revealed compressible vessels with no evidence of clot.     RUE PICC placed without complication for patient.  2ml of Lidocaine 1% administered via infiltration prior to PICC insertion.  Time-Out performed at bedside with SAMINA Vaughan.      #4 FR/18G PICC inserted to the R brachial vein per hospital protocol.   Blood return:  yes    Catheter length 38 cm, with 0 cm exposed. Mid upper arm circumference is 36 cm.   Catheter was flushed with 20 cc NS. Patient did tolerate procedure well.    PICC tip confirmed in SVC with Sherlock 3CG technology. Positive p wave without negative deflection noted on ECG.  ECG attached below.      Upon completion of procedure, all PICC kit components accounted for and intact.  Post procedure hand-off given to Alexus HAAS  PICC Brochure given to patient with teaching instruction. Bed returned to low locked position with call bell in reach.

## 2025-06-05 NOTE — PLAN OF CARE
LPN  Outcome: Progressing     Problem: Respiratory - Adult  Goal: Achieves optimal ventilation and oxygenation  6/5/2025 0006 by Silvina Del Rosario RN  Outcome: Progressing  6/4/2025 1101 by Jacki Lim LPN  Outcome: Progressing     Problem: Pain  Goal: Verbalizes/displays adequate comfort level or baseline comfort level  6/5/2025 0006 by Silvina Del Rosario RN  Outcome: Progressing  6/4/2025 1101 by Jacki Lim LPN  Outcome: Progressing     Problem: Occupational Therapy - Adult  Goal: By Discharge: Performs self-care activities at highest level of function for planned discharge setting.  See evaluation for individualized goals.  Description: FUNCTIONAL STATUS PRIOR TO ADMISSION:  Patient lives alone in 1 level house with 3-4 TATUM. She uses a cane typically for mobility though has needed a RW recently. She has a walk in shower with built in seat and hand held shower.   Receives Help From: Family, Prior Level of Assist for ADLs: Independent,  ,  ,  ,  ,  , Prior Level of Assist for Homemaking: Independent,  , Prior Level of Assist for Transfers: Independent, Active : No       Occupational Therapy Goals:  Initiated 5/30/2025  1.  Patient will perform grooming in stand with Stand by Assist within 7 day(s).  2.  Patient will perform bathing with Stand by Assist within 7 day(s).  3.  Patient will perform lower body dressing with Stand by Assist using AE prn within 7 day(s).  4.  Patient will perform toilet transfers with Stand by Assist  within 7 day(s).  5.  Patient will perform all aspects of toileting with Stand by Assist within 7 day(s).  6/4/2025 1510 by Gali Mckeon OT  Outcome: Progressing     Problem: Physical Therapy - Adult  Goal: By Discharge: Performs mobility at highest level of function for planned discharge setting.  See evaluation for individualized goals.  Description: FUNCTIONAL STATUS PRIOR TO ADMISSION: Patient was modified independent using a rolling walker for functional mobility  in the last few months since development of the R ankle wound.  Patient also had a fall in late April and fractured several ribs per daughter.    HOME SUPPORT PRIOR TO ADMISSION: The patient lived alone with daughter locally to provide assistance as needed.    Physical Therapy Goals  Initiated 5/30/2025  1.  Patient will move from supine to sit and sit to supine, scoot up and down, and roll side to side in bed with modified independence within 7 day(s).    2.  Patient will perform sit to stand with modified independence within 7 day(s).  3.  Patient will transfer from bed to chair and chair to bed with modified independence using the least restrictive device within 7 day(s).  4.  Patient will ambulate with modified independence for 50 feet with the least restrictive device within 7 day(s).   5.  Patient will sit up in chair 2 hours at a time to improve OOB tolerance within 7 day(s).        6/4/2025 1315 by Tyrell Anderson PTA  Outcome: Progressing     Problem: ABCDS Injury Assessment  Goal: Absence of physical injury  6/5/2025 0006 by Silvina Del Rosario, RN  Outcome: Progressing  6/4/2025 1101 by Jacki Lim LPN  Outcome: Progressing

## 2025-06-05 NOTE — PROGRESS NOTES
Progress Note    Date:2025       Room:[facility]  Patient Name:Lesley Lauren     YOB: 1945     Age:79 y.o.    Subjective    Subjective   Pt seen at BS in CCL recovery. Denies any new complaints. Per nursing, no acute overnight events       Review of Systems  Unremarkable outside of HPI       Objective         Vitals Last 24 Hours:  TEMPERATURE:  Temp  Av.7 °F (36.5 °C)  Min: 96.3 °F (35.7 °C)  Max: 98.2 °F (36.8 °C)  RESPIRATIONS RANGE: Resp  Av.1  Min: 16  Max: 20  PULSE OXIMETRY RANGE: SpO2  Av.9 %  Min: 93 %  Max: 96 %  PULSE RANGE: Pulse  Av.1  Min: 68  Max: 81  BLOOD PRESSURE RANGE: Systolic (24hrs), Av , Min:105 , Max:151   ; Diastolic (24hrs), Av, Min:61, Max:98    I/O (24Hr):    Intake/Output Summary (Last 24 hours) at 2025 0821  Last data filed at 2025 2209  Gross per 24 hour   Intake 10 ml   Output --   Net 10 ml     Objective:  Vital signs: (most recent): Blood pressure (!) 153/79, pulse 78, temperature 98.1 °F (36.7 °C), resp. rate 17, height 1.6 m (5' 3\"), weight 80.3 kg (177 lb), SpO2 93%.    GEN: Pt is AAOx4 and in NAD. Dressing to the right ankle is C/D/I. Family noted at   DERM: Wound lateral right ankle. Granular base. No dry skin noted to B/L LE.   VASC: Pedal pulses (DP/PT) diminished to B/L LE. CFT<3sec to all digits of B/L LE. No pedal hair growth noted to the level of the digits for B/L LE. Skin temp is warm to warm from proximal to distal for B/L LE. Neg homans/venus signs to B/L LE. No varicosities or telangectasias noted to B/L LE.  NEURO: Protective and epicritic sensations grossly absent to B/L LE  MSK: (-) POP, No gross deformities. Good muscle tone and bulk noted to B/L JACEY.  PSYCH: Cooperative with normal mood and affect      Labs/Imaging/Diagnostics    Labs:  CBC:  Recent Labs     25  0256 25  0553 25  0434   WBC 8.4 8.3 7.6   RBC 4.15 4.10 4.09   HGB 9.6* 9.5* 9.4*   HCT 32.1* 30.8* 30.7*   MCV 77.3* 75.1*

## 2025-06-05 NOTE — PROGRESS NOTES
SFA atherectomy/angioplasty and stent placement done without difficulty or obvious complication  Ok for discharge from a vascular standpoint  Will need to see her in the office in 1-2 months to establish follow up schedule.

## 2025-06-05 NOTE — PROGRESS NOTES
0830:  Patient arrived. ID and allergies verified verbally with patient.  Pt prepped for procedure. Pt notably confused, daughter called. Daughter at bedside and signed consent on behalf of Lesley Lauren. Daughter (Lesley Robin) does report that mother has been confused at night time and occasionalluy throughout her hospital stay. Pt is tearful and anxious for the procedure, reassurance provided. Pt reported previous \"throat tightening\"  with contrast, this could not confirmed by daughter, medication given per Dr. Pete. Dr. Pete bedside when consent was reviewed with daughter and patient.     0945:  TRANSFER - OUT REPORT:    Verbal report given to Jailyn amy Lauren  being transferred to [facility] for ordered procedure       Report consisted of patient's Situation, Background, Assessment and   Recommendations(SBAR).     Information from the following report(s) Nurse Handoff Report was reviewed with the receiving nurse.           Lines:   Extended Dwell Peripherial IV 06/01/25 Left Basilic (Active)   Criteria for Appropriate Use Limited/no vessel suitable for conventional peripheral access 06/05/25 0815   Site Assessment Clean, dry & intact 06/05/25 0856   Phlebitis Assessment No symptoms 06/05/25 0856   Infiltration Assessment 0 06/05/25 0856   Line Status Flushed 06/05/25 0815   Line Care Connections checked and tightened 06/05/25 0815   Alcohol Cap Used Yes 06/05/25 0815   Date of Last Dressing Change 06/03/25 06/05/25 0815   Dressing Type Transparent;Securing device 06/05/25 0815   Dressing Status Clean, dry & intact 06/05/25 0815   Dressing Intervention Dressing changed 06/03/25 1039        Opportunity for questions and clarification was provided.      Patient transported with:  Registered Nurse    Information above and consent, pre-medication and allergies reviewed with oncoming nurse.       1105:  TRANSFER - IN REPORT:    Verbal report received from Jailyn on Lesley Lauren  being received from  [facility] for routine post-op      Report consisted of patient's Situation, Background, Assessment and   Recommendations(SBAR).     Information from the following report(s) Nurse Handoff Report was reviewed with the receiving nurse.    Opportunity for questions and clarification was provided.      Assessment completed upon patient's arrival to unit and care assumed.       left groin site check done. Hemostatic gauze in place, CDI with no evidence of hematoma or bleeding. Distal pulses intact with doppler, patient with no complaints of groin or back pain.      1140:  TRANSFER - OUT REPORT:    Verbal report given to 419 RN on Lesley Lauren  being transferred to [facility] for routine progression of patient care       Report consisted of patient's Situation, Background, Assessment and   Recommendations(SBAR).     Information from the following report(s) Nurse Handoff Report was reviewed with the receiving nurse.           Lines:   Extended Dwell Peripherial IV 06/01/25 Left Basilic (Active)   Criteria for Appropriate Use Limited/no vessel suitable for conventional peripheral access 06/05/25 0815   Site Assessment Clean, dry & intact 06/05/25 0856   Phlebitis Assessment No symptoms 06/05/25 0856   Infiltration Assessment 0 06/05/25 0856   Line Status Flushed 06/05/25 0815   Line Care Connections checked and tightened 06/05/25 0815   Alcohol Cap Used Yes 06/05/25 0815   Date of Last Dressing Change 06/03/25 06/05/25 0815   Dressing Type Transparent;Securing device 06/05/25 0815   Dressing Status Clean, dry & intact 06/05/25 0815   Dressing Intervention Dressing changed 06/03/25 9432        Opportunity for questions and clarification was provided.      Patient transported with:  Monitor    left groin site check done with oncoming nurse. Hemostatic gauze in place, CDI with no evidence of hematoma or bleeding. Distal pulses intact with doppler, patient with no complaints of groin or back pain.

## 2025-06-05 NOTE — CARE COORDINATION
Transition of Care Plan to SNF/Rehab    Communication to Patient/Family:   Met with patient and family and they are agreeable to the transition plan. The Plan for Transition of Care is related to the following treatment goals: Diabetic foot infection (HCC) [E11.628, L08.9]  Ankle wound, right, initial encounter [S91.001A]      The Patient and/or patient representative was provided with a choice of provider and agrees  with the discharge plan.      Yes [x] No []        A Freedom of choice list was provided with basic dialogue that supports the patient's individualized plan of care/goals and shares the quality data associated with the providers.       Yes [x] No []        SNF/Rehab Transition:  Patient has been accepted to [facility] SN[facility]Rehab and meets criteria for admission.       SNF reports auth has been received? [x]  Medicare 3 night stay satisfied? []   Inpatient Admission Dates: 5/28/25 - 6/5/25      Patient will be transported by hospital to home wheelchair private pay and expected to leave at 1630. CM has requested WC transport--quote received of $441.24, pt's daughter Lesley is agreeable to payment. CM booked trip with Cooper from [facility]. Per Cooper wc van is unavailable for 1630  but a stretcher is available at 1630 at no extra cost to the family. Cooper from [facility] will call pt's daughter Lesley [phone] for payment.   [x] Packet on chart (if needed)  [] PCS completed (if applicable)         Communication to SNF/Rehab:  Bedside RN, Alexus, has been notified to update the transition plan to the facility and call report ( [phone], room 103 [facility] ).  Discharge information has been updated on the AVS. And communicated to facility via [email or web address], or CC link.     Discharge instructions to be fax'd to facility via [x] [facility]        Nursing Please include all hard scripts for controlled substances, med rec and dc summary, and AVS in packet.       Nursing, please

## 2025-06-06 LAB
GLUCOSE BLD STRIP.AUTO-MCNC: 192 MG/DL (ref 65–117)
SERVICE CMNT-IMP: ABNORMAL

## 2025-06-09 ENCOUNTER — TELEPHONE (OUTPATIENT)
Facility: CLINIC | Age: 80
End: 2025-06-09

## 2025-06-09 NOTE — TELEPHONE ENCOUNTER
Pt's dtr states the pt recently had surgery on her ankle due to the wound. Pt is in the [facility] right now for therapy due to weakness. Pt does not want to stay and would like to go home. Pt's dtr would like to discuss this with the pcp to get his opinion on this.

## 2025-06-09 NOTE — TELEPHONE ENCOUNTER
Called and advised that she would do better to stay at rehab for now until she is able to trnsfer, walk and perform basic ADL's with minimal assistance.  Also she has Picc line, would be best to complete early antibiotics there as well.  Will need to complete at home.  Daughter understands and agrees with assessment.    Juan David Zamorano MD  [facility]  06/09/25

## 2025-06-13 LAB — ECHO BSA: 1.89 M2

## 2025-07-03 ENCOUNTER — TELEPHONE (OUTPATIENT)
Facility: CLINIC | Age: 80
End: 2025-07-03

## 2025-07-03 DIAGNOSIS — J41.1 MUCOPURULENT CHRONIC BRONCHITIS (HCC): ICD-10-CM

## 2025-07-03 RX ORDER — MONTELUKAST SODIUM 10 MG/1
10 TABLET ORAL NIGHTLY
Qty: 90 TABLET | Refills: 1 | Status: SHIPPED | OUTPATIENT
Start: 2025-07-03

## 2025-07-15 ENCOUNTER — OFFICE VISIT (OUTPATIENT)
Facility: CLINIC | Age: 80
End: 2025-07-15
Payer: MEDICARE

## 2025-07-15 VITALS
WEIGHT: 171.4 LBS | HEART RATE: 73 BPM | RESPIRATION RATE: 18 BRPM | DIASTOLIC BLOOD PRESSURE: 63 MMHG | SYSTOLIC BLOOD PRESSURE: 119 MMHG | BODY MASS INDEX: 30.37 KG/M2 | TEMPERATURE: 97.1 F | HEIGHT: 63 IN

## 2025-07-15 DIAGNOSIS — L03.115 CELLULITIS OF RIGHT LOWER EXTREMITY: ICD-10-CM

## 2025-07-15 DIAGNOSIS — S81.801D OPEN WOUND OF RIGHT LOWER EXTREMITY, SUBSEQUENT ENCOUNTER: ICD-10-CM

## 2025-07-15 DIAGNOSIS — M86.171 OTHER ACUTE OSTEOMYELITIS OF RIGHT FOOT (HCC): ICD-10-CM

## 2025-07-15 DIAGNOSIS — Z09 HOSPITAL DISCHARGE FOLLOW-UP: ICD-10-CM

## 2025-07-15 DIAGNOSIS — L08.9 DIABETIC FOOT INFECTION (HCC): Primary | ICD-10-CM

## 2025-07-15 DIAGNOSIS — E11.628 DIABETIC FOOT INFECTION (HCC): Primary | ICD-10-CM

## 2025-07-15 DIAGNOSIS — E11.65 TYPE 2 DIABETES MELLITUS WITH HYPERGLYCEMIA, WITHOUT LONG-TERM CURRENT USE OF INSULIN (HCC): ICD-10-CM

## 2025-07-15 DIAGNOSIS — B37.31 YEAST VAGINITIS: ICD-10-CM

## 2025-07-15 PROCEDURE — G8427 DOCREV CUR MEDS BY ELIG CLIN: HCPCS | Performed by: FAMILY MEDICINE

## 2025-07-15 PROCEDURE — G8417 CALC BMI ABV UP PARAM F/U: HCPCS | Performed by: FAMILY MEDICINE

## 2025-07-15 PROCEDURE — 3074F SYST BP LT 130 MM HG: CPT | Performed by: FAMILY MEDICINE

## 2025-07-15 PROCEDURE — 3078F DIAST BP <80 MM HG: CPT | Performed by: FAMILY MEDICINE

## 2025-07-15 PROCEDURE — 99214 OFFICE O/P EST MOD 30 MIN: CPT | Performed by: FAMILY MEDICINE

## 2025-07-15 PROCEDURE — 1160F RVW MEDS BY RX/DR IN RCRD: CPT | Performed by: FAMILY MEDICINE

## 2025-07-15 PROCEDURE — 1036F TOBACCO NON-USER: CPT | Performed by: FAMILY MEDICINE

## 2025-07-15 PROCEDURE — 1090F PRES/ABSN URINE INCON ASSESS: CPT | Performed by: FAMILY MEDICINE

## 2025-07-15 PROCEDURE — G8400 PT W/DXA NO RESULTS DOC: HCPCS | Performed by: FAMILY MEDICINE

## 2025-07-15 PROCEDURE — 1123F ACP DISCUSS/DSCN MKR DOCD: CPT | Performed by: FAMILY MEDICINE

## 2025-07-15 PROCEDURE — G2211 COMPLEX E/M VISIT ADD ON: HCPCS | Performed by: FAMILY MEDICINE

## 2025-07-15 PROCEDURE — 1159F MED LIST DOCD IN RCRD: CPT | Performed by: FAMILY MEDICINE

## 2025-07-15 PROCEDURE — 3046F HEMOGLOBIN A1C LEVEL >9.0%: CPT | Performed by: FAMILY MEDICINE

## 2025-07-15 RX ORDER — FLUCONAZOLE 150 MG/1
150 TABLET ORAL
Qty: 2 TABLET | Refills: 0 | Status: SHIPPED | OUTPATIENT
Start: 2025-07-15 | End: 2025-07-17 | Stop reason: SDUPTHER

## 2025-07-15 ASSESSMENT — ENCOUNTER SYMPTOMS
APNEA: 0
CHEST TIGHTNESS: 0

## 2025-07-15 NOTE — PROGRESS NOTES
\"Have you been to the ER, urgent care clinic since your last visit?  Hospitalized since your last visit?\"    Yes    “Have you seen or consulted any other health care providers outside of [facility] since your last visit?”    No            Click Here for Release of Records Request   
MD Isra at [facility] ENDOSCOPY    UPPER GASTROINTESTINAL ENDOSCOPY  2023    EGD DILATION BALLOON performed by Isra Wells MD at [facility] ENDOSCOPY       Social History     Socioeconomic History    Marital status: Single     Spouse name: None    Number of children: None    Years of education: None    Highest education level: None   Tobacco Use    Smoking status: Former     Current packs/day: 0.00     Average packs/day: 1 pack/day for 15.0 years (15.0 ttl pk-yrs)     Types: Cigarettes     Start date: 1994     Quit date: 3/20/2012     Years since quittin.3    Smokeless tobacco: Never   Vaping Use    Vaping status: Never Used   Substance and Sexual Activity    Alcohol use: Never    Drug use: No    Sexual activity: Not Currently     Partners: Male     Social Drivers of Health     Financial Resource Strain: Low Risk  (10/23/2023)    Overall Financial Resource Strain (CARDIA)     Difficulty of Paying Living Expenses: Not hard at all   Food Insecurity: No Food Insecurity (6/3/2025)    Hunger Vital Sign     Worried About Running Out of Food in the Last Year: Never true     Ran Out of Food in the Last Year: Never true   Transportation Needs: No Transportation Needs (6/3/2025)    PRAPARE - Transportation     Lack of Transportation (Medical): No     Lack of Transportation (Non-Medical): No   Physical Activity: Inactive (2024)    Exercise Vital Sign     Days of Exercise per Week: 0 days     Minutes of Exercise per Session: 0 min   Stress: No Stress Concern Present (2023)    Citizen of the [place] of Occupational Health - Occupational Stress Questionnaire     Feeling of Stress : Only a little   Social Connections: Socially Isolated (2023)    Social Connection and Isolation Panel [NHANES]     Frequency of Communication with Friends and Family: More than three times a week     Frequency of Social Gatherings with Friends and Family: Once a week     Attends Mandaen Services: Never     Active Member of Clubs or

## 2025-07-17 ENCOUNTER — OFFICE VISIT (OUTPATIENT)
Facility: CLINIC | Age: 80
End: 2025-07-17
Payer: MEDICARE

## 2025-07-17 VITALS
HEART RATE: 76 BPM | RESPIRATION RATE: 20 BRPM | HEIGHT: 63 IN | WEIGHT: 171 LBS | OXYGEN SATURATION: 94 % | TEMPERATURE: 97.3 F | SYSTOLIC BLOOD PRESSURE: 102 MMHG | BODY MASS INDEX: 30.3 KG/M2 | DIASTOLIC BLOOD PRESSURE: 52 MMHG

## 2025-07-17 DIAGNOSIS — S91.109D OPEN WOUND OF TOE, SUBSEQUENT ENCOUNTER: Primary | ICD-10-CM

## 2025-07-17 DIAGNOSIS — B37.31 YEAST VAGINITIS: ICD-10-CM

## 2025-07-17 PROCEDURE — G8417 CALC BMI ABV UP PARAM F/U: HCPCS | Performed by: INTERNAL MEDICINE

## 2025-07-17 PROCEDURE — 1036F TOBACCO NON-USER: CPT | Performed by: INTERNAL MEDICINE

## 2025-07-17 PROCEDURE — 1090F PRES/ABSN URINE INCON ASSESS: CPT | Performed by: INTERNAL MEDICINE

## 2025-07-17 PROCEDURE — 3074F SYST BP LT 130 MM HG: CPT | Performed by: INTERNAL MEDICINE

## 2025-07-17 PROCEDURE — G8428 CUR MEDS NOT DOCUMENT: HCPCS | Performed by: INTERNAL MEDICINE

## 2025-07-17 PROCEDURE — 99213 OFFICE O/P EST LOW 20 MIN: CPT | Performed by: INTERNAL MEDICINE

## 2025-07-17 PROCEDURE — 1126F AMNT PAIN NOTED NONE PRSNT: CPT | Performed by: INTERNAL MEDICINE

## 2025-07-17 PROCEDURE — 3078F DIAST BP <80 MM HG: CPT | Performed by: INTERNAL MEDICINE

## 2025-07-17 PROCEDURE — G8400 PT W/DXA NO RESULTS DOC: HCPCS | Performed by: INTERNAL MEDICINE

## 2025-07-17 PROCEDURE — 1123F ACP DISCUSS/DSCN MKR DOCD: CPT | Performed by: INTERNAL MEDICINE

## 2025-07-17 RX ORDER — DOXYCYCLINE HYCLATE 100 MG
100 TABLET ORAL 2 TIMES DAILY
Qty: 28 TABLET | Refills: 0 | Status: SHIPPED | OUTPATIENT
Start: 2025-07-17 | End: 2025-07-31

## 2025-07-17 NOTE — PROGRESS NOTES
Chief Complaint   Patient presents with    Follow-Up from Hospital     Diagnosis: DFI/osteomyelitis tibia.  MSSA  2.  Antibiotic: Cefazolin 2 g IV every 8 hours through 7/16/25

## 2025-07-17 NOTE — PROGRESS NOTES
[facility] Progress Note  Chrsitopher Harry DO  [phone] Office  [phone] Fax    7/17/2025      Assessment & Plan:     Right distal tibia osteomyelitis  Right malleolus ulcer  S/p debridement of nonviable tissue and bone ankle (6/2)  PAD  - blood cx (5/28) no growth so far    Wound cx (5/28) MSSA   DM II; A1c 11.2, uncontrolled  Hx COPD  Chronic steroid use    Patient to complete 6-week course of cefazolin 7/16/2025.  CRP has normalized however in setting of chronic immunosuppression will treat with another 2 weeks of doxycycline.  Prescription sent electronically to patient's pharmacy.  I recommended that she follow-up with [facility] wound care clinic however patient declined as she lives several hours away.  She will have home health pull PICC line tomorrow.  We discussed signs of returning infection.  RTO as needed          Subjective:     No complaints.  Tolerating antibiotics    Objective:     Vitals: BP (!) 102/52   Pulse 76   Temp 97.3 °F (36.3 °C) (Temporal)   Resp 20   Ht 1.6 m (5' 3\")   Wt 77.6 kg (171 lb)   SpO2 94%   BMI 30.29 kg/m²      Physical Examination:   General:  Alert, cooperative, no distress   Head:  Normocephalic, atraumatic.   Eyes:  Conjunctivae clear   Neck: Supple       Lungs:   No distress.     Chest wall:     Heart:     Abdomen:    non-distended   Extremities: Moves all.    Skin: No acute rash on exposed skin   Neurologic: CNII-XII intact. Normal strength     Labs:        Invalid input(s): \"ITNL\"   No results for input(s): \"CPK\", \"CKMB\" in the last 72 hours.    Invalid input(s): \"TROIQ\"  No results for input(s): \"NA\", \"K\", \"CL\", \"CO2\", \"BUN\", \"GLU\", \"PHOS\", \"MG\", \"WBC\", \"HGB\", \"HCT\", \"PLT\" in the last 72 hours.    Invalid input(s): \"CREA\", \"CA\", \"ALB\"  No results for input(s): \"INR\", \"APTT\" in the last 72 hours.    Invalid input(s): \"PTP\"  Needs: urine analysis, urine sodium, protein and creatinine  No results found for: \"KU\"      Cultures:

## 2025-07-18 RX ORDER — FLUCONAZOLE 150 MG/1
150 TABLET ORAL
Qty: 5 TABLET | Refills: 0 | Status: SHIPPED | OUTPATIENT
Start: 2025-07-18 | End: 2025-07-31

## 2025-07-21 ENCOUNTER — PATIENT MESSAGE (OUTPATIENT)
Facility: CLINIC | Age: 80
End: 2025-07-21

## 2025-07-21 DIAGNOSIS — R30.0 DYSURIA: Primary | ICD-10-CM

## 2025-07-22 DIAGNOSIS — J41.1 MUCOPURULENT CHRONIC BRONCHITIS (HCC): ICD-10-CM

## 2025-07-22 RX ORDER — PREDNISONE 5 MG/1
5 TABLET ORAL DAILY
Qty: 30 TABLET | Refills: 1 | Status: SHIPPED | OUTPATIENT
Start: 2025-07-22

## 2025-07-23 ENCOUNTER — PATIENT MESSAGE (OUTPATIENT)
Facility: CLINIC | Age: 80
End: 2025-07-23

## 2025-07-23 DIAGNOSIS — R30.0 DYSURIA: ICD-10-CM

## 2025-07-24 LAB
APPEARANCE UR: ABNORMAL
BACTERIA URNS QL MICRO: ABNORMAL /HPF
BILIRUB UR QL: NEGATIVE
COLOR UR: ABNORMAL
EPITH CASTS URNS QL MICRO: ABNORMAL /LPF
GLUCOSE UR STRIP.AUTO-MCNC: 100 MG/DL
HGB UR QL STRIP: ABNORMAL
KETONES UR QL STRIP.AUTO: NEGATIVE MG/DL
LEUKOCYTE ESTERASE UR QL STRIP.AUTO: ABNORMAL
NITRITE UR QL STRIP.AUTO: NEGATIVE
PH UR STRIP: 5.5 (ref 5–8)
PROT UR STRIP-MCNC: 100 MG/DL
RBC #/AREA URNS HPF: ABNORMAL /HPF (ref 0–5)
SP GR UR REFRACTOMETRY: 1.01 (ref 1–1.03)
UROBILINOGEN UR QL STRIP.AUTO: 0.2 EU/DL (ref 0.2–1)
WBC URNS QL MICRO: >100 /HPF (ref 0–4)

## 2025-07-26 LAB
BACTERIA SPEC CULT: ABNORMAL
CC UR VC: ABNORMAL
SERVICE CMNT-IMP: ABNORMAL

## 2025-07-28 DIAGNOSIS — N30.00 ACUTE CYSTITIS WITHOUT HEMATURIA: Primary | ICD-10-CM

## 2025-07-28 RX ORDER — CEFUROXIME AXETIL 500 MG/1
500 TABLET ORAL 2 TIMES DAILY
Qty: 14 TABLET | Refills: 0 | Status: SHIPPED | OUTPATIENT
Start: 2025-07-28 | End: 2025-08-04

## 2025-07-29 DIAGNOSIS — Z86.718 HISTORY OF DVT (DEEP VEIN THROMBOSIS): ICD-10-CM

## 2025-07-29 DIAGNOSIS — I26.99 ACUTE PULMONARY EMBOLISM WITHOUT ACUTE COR PULMONALE, UNSPECIFIED PULMONARY EMBOLISM TYPE (HCC): ICD-10-CM

## 2025-07-29 RX ORDER — APIXABAN 5 MG/1
5 TABLET, FILM COATED ORAL 2 TIMES DAILY
Qty: 180 TABLET | Refills: 1 | Status: SHIPPED | OUTPATIENT
Start: 2025-07-29

## 2025-07-30 DIAGNOSIS — K21.9 GASTROESOPHAGEAL REFLUX DISEASE WITHOUT ESOPHAGITIS: ICD-10-CM

## 2025-07-30 DIAGNOSIS — Z87.19 HISTORY OF GI BLEED: Primary | ICD-10-CM

## 2025-07-30 RX ORDER — OMEPRAZOLE 40 MG/1
40 CAPSULE, DELAYED RELEASE ORAL 2 TIMES DAILY
Qty: 180 CAPSULE | Refills: 1 | Status: SHIPPED | OUTPATIENT
Start: 2025-07-30

## 2025-08-11 DIAGNOSIS — I10 PRIMARY HYPERTENSION: Primary | ICD-10-CM

## 2025-08-11 RX ORDER — DILTIAZEM HYDROCHLORIDE 90 MG/1
90 CAPSULE, EXTENDED RELEASE ORAL 2 TIMES DAILY
Qty: 180 CAPSULE | Refills: 1 | Status: SHIPPED | OUTPATIENT
Start: 2025-08-11

## 2025-08-18 ENCOUNTER — OFFICE VISIT (OUTPATIENT)
Facility: CLINIC | Age: 80
End: 2025-08-18

## 2025-08-18 VITALS
WEIGHT: 173 LBS | OXYGEN SATURATION: 95 % | RESPIRATION RATE: 16 BRPM | HEIGHT: 63 IN | DIASTOLIC BLOOD PRESSURE: 52 MMHG | SYSTOLIC BLOOD PRESSURE: 98 MMHG | TEMPERATURE: 96.8 F | BODY MASS INDEX: 30.65 KG/M2 | HEART RATE: 67 BPM

## 2025-08-18 DIAGNOSIS — S91.001D ANKLE WOUND, RIGHT, SUBSEQUENT ENCOUNTER: ICD-10-CM

## 2025-08-18 DIAGNOSIS — I10 PRIMARY HYPERTENSION: ICD-10-CM

## 2025-08-18 DIAGNOSIS — S91.109A OPEN WOUND OF TOE, INITIAL ENCOUNTER: ICD-10-CM

## 2025-08-18 DIAGNOSIS — E11.42 DIABETIC POLYNEUROPATHY ASSOCIATED WITH TYPE 2 DIABETES MELLITUS (HCC): ICD-10-CM

## 2025-08-18 DIAGNOSIS — E11.42 TYPE 2 DIABETES MELLITUS WITH DIABETIC POLYNEUROPATHY, WITHOUT LONG-TERM CURRENT USE OF INSULIN (HCC): Primary | ICD-10-CM

## 2025-08-18 DIAGNOSIS — R53.81 DEBILITY: ICD-10-CM

## 2025-08-18 DIAGNOSIS — L89.892 PRESSURE ULCER OF TOE OF RIGHT FOOT, STAGE 2 (HCC): ICD-10-CM

## 2025-08-18 RX ORDER — DILTIAZEM HCL 60 MG
60 TABLET ORAL 2 TIMES DAILY
Qty: 180 TABLET | Refills: 1 | Status: SHIPPED | OUTPATIENT
Start: 2025-08-18

## 2025-08-18 ASSESSMENT — ENCOUNTER SYMPTOMS
DIARRHEA: 0
NAUSEA: 0
SHORTNESS OF BREATH: 0
CONSTIPATION: 1
CHEST TIGHTNESS: 0

## 2025-08-19 ENCOUNTER — RESULTS FOLLOW-UP (OUTPATIENT)
Facility: CLINIC | Age: 80
End: 2025-08-19

## 2025-08-19 LAB
ALBUMIN SERPL-MCNC: 3.3 G/DL (ref 3.5–5.2)
ALBUMIN/GLOB SERPL: 1 (ref 1.1–2.2)
ALP SERPL-CCNC: 168 U/L (ref 35–104)
ALT SERPL-CCNC: 10 U/L (ref 10–35)
ANION GAP SERPL CALC-SCNC: 12 MMOL/L (ref 2–14)
AST SERPL-CCNC: 13 U/L (ref 10–35)
BASOPHILS # BLD: 0.06 K/UL (ref 0–0.1)
BASOPHILS NFR BLD: 0.5 % (ref 0–1)
BILIRUB SERPL-MCNC: <0.2 MG/DL (ref 0–1.2)
BUN SERPL-MCNC: 22 MG/DL (ref 8–23)
BUN/CREAT SERPL: 21 (ref 12–20)
CALCIUM SERPL-MCNC: 9.1 MG/DL (ref 8.8–10.2)
CHLORIDE SERPL-SCNC: 104 MMOL/L (ref 98–107)
CO2 SERPL-SCNC: 23 MMOL/L (ref 20–29)
CREAT SERPL-MCNC: 1.04 MG/DL (ref 0.6–1)
DIFFERENTIAL METHOD BLD: ABNORMAL
EOSINOPHIL # BLD: 0.14 K/UL (ref 0–0.4)
EOSINOPHIL NFR BLD: 1.3 % (ref 0–7)
ERYTHROCYTE [DISTWIDTH] IN BLOOD BY AUTOMATED COUNT: 15.8 % (ref 11.5–14.5)
EST. AVERAGE GLUCOSE BLD GHB EST-MCNC: 252 MG/DL
GLOBULIN SER CALC-MCNC: 3.4 G/DL (ref 2–4)
GLUCOSE SERPL-MCNC: 333 MG/DL (ref 65–100)
HBA1C MFR BLD: 10.4 % (ref 4–5.6)
HCT VFR BLD AUTO: 30.6 % (ref 35–47)
HGB BLD-MCNC: 8.7 G/DL (ref 11.5–16)
IMM GRANULOCYTES # BLD AUTO: 0.06 K/UL (ref 0–0.04)
IMM GRANULOCYTES NFR BLD AUTO: 0.5 % (ref 0–0.5)
LYMPHOCYTES # BLD: 1.27 K/UL (ref 0.8–3.5)
LYMPHOCYTES NFR BLD: 11.5 % (ref 12–49)
MCH RBC QN AUTO: 20.7 PG (ref 26–34)
MCHC RBC AUTO-ENTMCNC: 28.4 G/DL (ref 30–36.5)
MCV RBC AUTO: 72.7 FL (ref 80–99)
MONOCYTES # BLD: 0.52 K/UL (ref 0–1)
MONOCYTES NFR BLD: 4.7 % (ref 5–13)
NEUTS SEG # BLD: 8.95 K/UL (ref 1.8–8)
NEUTS SEG NFR BLD: 81.5 % (ref 32–75)
NRBC # BLD: 0 K/UL (ref 0–0.01)
NRBC BLD-RTO: 0 PER 100 WBC
PLATELET # BLD AUTO: 290 K/UL (ref 150–400)
PMV BLD AUTO: 12.3 FL (ref 8.9–12.9)
POTASSIUM SERPL-SCNC: 5.5 MMOL/L (ref 3.5–5.1)
PROT SERPL-MCNC: 6.7 G/DL (ref 6.4–8.3)
RBC # BLD AUTO: 4.21 M/UL (ref 3.8–5.2)
RBC MORPH BLD: ABNORMAL
RBC MORPH BLD: ABNORMAL
SODIUM SERPL-SCNC: 139 MMOL/L (ref 136–145)
WBC # BLD AUTO: 11 K/UL (ref 3.6–11)

## (undated) DEVICE — BASIN EMESIS 500CC ROSE 250/CS 60/PLT: Brand: MEDEGEN MEDICAL PRODUCTS, LLC

## (undated) DEVICE — SYR 5ML 1/5 GRAD LL NSAF LF --

## (undated) DEVICE — NDL FLTR TIP 5 MIC 18GX1.5IN --

## (undated) DEVICE — CLEANSER WND CLN DEB SYS IRRISEPT

## (undated) DEVICE — GLOVE ORANGE PI 7 1/2   MSG9075

## (undated) DEVICE — PINNACLE INTRODUCER SHEATH: Brand: PINNACLE

## (undated) DEVICE — EXTREMITY-SFMCASU: Brand: MEDLINE INDUSTRIES, INC.

## (undated) DEVICE — KENDALL RADIOLUCENT FOAM MONITORING ELECTRODE -RECTANGULAR SHAPE: Brand: KENDALL

## (undated) DEVICE — RADIFOCUS GLIDEWIRE: Brand: GLIDEWIRE

## (undated) DEVICE — GUIDEWIRE VASC L300CM DIA0.014IN TIP L5CM 15DEG G TUNGSTEN

## (undated) DEVICE — BLUNTFILL: Brand: MONOJECT

## (undated) DEVICE — ESOPHAGEAL BALLOON DILATATION CATHETER: Brand: CRE FIXED WIRE

## (undated) DEVICE — PAD,ABDOMINAL,5"X9",ST,LF,25/BX: Brand: MEDLINE INDUSTRIES, INC.

## (undated) DEVICE — SOLUTION IRRIG 1000ML H2O PIC PLAS SHATTERPROOF CONTAINER

## (undated) DEVICE — ADULT SPO2 SENSOR: Brand: NELLCOR

## (undated) DEVICE — BLUNTFILL WITH FILTER: Brand: MONOJECT

## (undated) DEVICE — CATHETER ANGIOPLSTY OTW 035 5 FRX135 CM 5X120 MM EVERCROSS

## (undated) DEVICE — CATHETER GUID OTW 0.035 IN 6 FRX135 CM 5 FR TRAILBLAZER

## (undated) DEVICE — CATHETER SUPP L150CM 15MM MRK BND SPC GWIRE 0.014IN

## (undated) DEVICE — NDL PRT INJ NSAF BLNT 18GX1.5 --

## (undated) DEVICE — RADIFOCUS GLIDEWIRE ADVANTAGE GUIDEWIRE: Brand: GLIDEWIRE ADVANTAGE

## (undated) DEVICE — BITEBLOCK ENDOSCP 60FR MAXI WHT POLYETH STURDY W/ VELC WVN

## (undated) DEVICE — SOLUTION IV 500ML 0.9% SOD BOTTLE CHL LTWT DURABLE SHATTERPROOF

## (undated) DEVICE — 1200 GUARD II KIT W/5MM TUBE W/O VAC TUBE: Brand: GUARDIAN

## (undated) DEVICE — DESTINATION PERIPHERAL GUIDING SHEATH: Brand: DESTINATION

## (undated) DEVICE — KIT COLON W/ 1.1OZ LUB AND 2 END

## (undated) DEVICE — Device

## (undated) DEVICE — CATHETER ANGIO 5FR L65CM 0.038IN VIS OMNI FLUSH-0 SFT TIP

## (undated) DEVICE — SOLIDIFIER FLD 2OZ 1500CC N DISINF IN BTL DISP SAFESORB

## (undated) DEVICE — SWAB CULT DBL W/O CHAR RAYON TIP AMIES GEL CLMN FOR COLL

## (undated) DEVICE — TRAILBLAZER L90CM 50MM MRK BND SPC GWIRE 0.035IN

## (undated) DEVICE — FIXED CORE WIRE GUIDE SAFE-T-J, CURVED: Brand: COOK

## (undated) DEVICE — MICROPUNCTURE INTRODUCER SET SILHOUETTE TRANSITIONLESS WITH NITINOL WIRE GUIDE: Brand: MICROPUNCTURE

## (undated) DEVICE — SYRINGE INFL 60ML DISP ALLIANCE II

## (undated) DEVICE — ULTRASONIC SET TUBE SONIC 1 SONICVAC DISP

## (undated) DEVICE — IV STRT KT 3282] LSL INDUSTRIES INC]

## (undated) DEVICE — SET ADMIN 16ML TBNG L100IN 2 Y INJ SITE IV PIGGY BK DISP

## (undated) DEVICE — SYR 3ML LL TIP 1/10ML GRAD --

## (undated) DEVICE — GLOVE SURG SZ 8 L12IN FNGR THK79MIL GRN LTX FREE

## (undated) DEVICE — SET ADMIN 16ML TBNG L100IN 2 Y INJ SITE IV PIGGY BK DISP (ORDER IN MULIPLES OF 48)

## (undated) DEVICE — CATH IV AUTOGRD BC BLU 22GA 25 -- INSYTE

## (undated) DEVICE — CANNULA CUSH AD W/ 14FT TBG

## (undated) DEVICE — SYRINGE MED 3ML CLR PLAS STD N CTRL LUERLOCK TIP DISP

## (undated) DEVICE — SOLIDIFIER MEDC 1200ML -- CONVERT TO 356117

## (undated) DEVICE — BAG BELONG PT PERS CLEAR HANDL

## (undated) DEVICE — SYRINGE CNTRST 10ML LT BLU W/ CLR POLYCARB BRL MEDALLION

## (undated) DEVICE — Device: Brand: JELCO

## (undated) DEVICE — SYRINGE MED 5ML STD CLR PLAS LUERLOCK TIP N CTRL DISP

## (undated) DEVICE — CATHETER ATHRCTMY 6FR L135CM TIP L5.9CM GWIRE 0.014IN

## (undated) DEVICE — CATHETER IV 22GA L1IN OD0.8382-0.9144MM ID0.6096-0.6858MM 382523

## (undated) DEVICE — ELECTRODE,RADIOTRANSLUCENT,FOAM,3PK: Brand: MEDLINE

## (undated) DEVICE — KENDALL DL ECG DUAL CONNECT RADIOLUCENT LEAD WIRES, 5-LEAD, SINGLE PATIENT USE: Brand: KENDALL

## (undated) DEVICE — Device: Brand: FIELDER XT